# Patient Record
Sex: MALE | Race: WHITE | HISPANIC OR LATINO | Employment: OTHER | URBAN - METROPOLITAN AREA
[De-identification: names, ages, dates, MRNs, and addresses within clinical notes are randomized per-mention and may not be internally consistent; named-entity substitution may affect disease eponyms.]

---

## 2017-01-03 ENCOUNTER — OUTPATIENT INFUSION SERVICES (OUTPATIENT)
Dept: ONCOLOGY | Facility: MEDICAL CENTER | Age: 72
End: 2017-01-03
Attending: SPECIALIST
Payer: MEDICARE

## 2017-01-03 VITALS
HEART RATE: 71 BPM | BODY MASS INDEX: 27.42 KG/M2 | TEMPERATURE: 98.4 F | WEIGHT: 170.64 LBS | HEIGHT: 66 IN | OXYGEN SATURATION: 100 % | DIASTOLIC BLOOD PRESSURE: 52 MMHG | SYSTOLIC BLOOD PRESSURE: 130 MMHG | RESPIRATION RATE: 18 BRPM

## 2017-01-03 DIAGNOSIS — S06.5XAA SUBDURAL HEMATOMA, ACUTE (HCC): ICD-10-CM

## 2017-01-03 DIAGNOSIS — C92.10 CML (CHRONIC MYELOCYTIC LEUKEMIA) (HCC): ICD-10-CM

## 2017-01-03 DIAGNOSIS — D61.818 PANCYTOPENIA (HCC): ICD-10-CM

## 2017-01-03 LAB
ABO GROUP BLD: NORMAL
BASOPHILS # BLD AUTO: 0 % (ref 0–1.8)
BASOPHILS # BLD: 0 K/UL (ref 0–0.12)
BLD GP AB SCN SERPL QL: NORMAL
COMPONENT P 8504P: NORMAL
COMPONENT R 8504R: NORMAL
COMPONENT R 8504R: NORMAL
EOSINOPHIL # BLD AUTO: 0 K/UL (ref 0–0.51)
EOSINOPHIL NFR BLD: 0 % (ref 0–6.9)
ERYTHROCYTE [DISTWIDTH] IN BLOOD BY AUTOMATED COUNT: 92 FL (ref 35.9–50)
HCT VFR BLD AUTO: 22.9 % (ref 42–52)
HGB BLD-MCNC: 7.4 G/DL (ref 14–18)
LYMPHOCYTES # BLD AUTO: 0.86 K/UL (ref 1–4.8)
LYMPHOCYTES NFR BLD: 60.6 % (ref 22–41)
MCH RBC QN AUTO: 34.1 PG (ref 27–33)
MCHC RBC AUTO-ENTMCNC: 32.3 G/DL (ref 33.7–35.3)
MCV RBC AUTO: 105.5 FL (ref 81.4–97.8)
MONOCYTES # BLD AUTO: 0.17 K/UL (ref 0–0.85)
MONOCYTES NFR BLD AUTO: 12 % (ref 0–13.4)
NEUTROPHILS # BLD AUTO: 0.39 K/UL (ref 1.82–7.42)
NEUTROPHILS NFR BLD: 27.4 % (ref 44–72)
PLATELET # BLD AUTO: 10 K/UL (ref 164–446)
RBC # BLD AUTO: 2.17 M/UL (ref 4.7–6.1)
RH BLD: NORMAL
WBC # BLD AUTO: 1.4 K/UL (ref 4.8–10.8)

## 2017-01-03 PROCEDURE — 700111 HCHG RX REV CODE 636 W/ 250 OVERRIDE (IP): Performed by: SPECIALIST

## 2017-01-03 PROCEDURE — 36430 TRANSFUSION BLD/BLD COMPNT: CPT

## 2017-01-03 PROCEDURE — P9016 RBC LEUKOCYTES REDUCED: HCPCS

## 2017-01-03 PROCEDURE — 96374 THER/PROPH/DIAG INJ IV PUSH: CPT

## 2017-01-03 PROCEDURE — 86901 BLOOD TYPING SEROLOGIC RH(D): CPT

## 2017-01-03 PROCEDURE — 86850 RBC ANTIBODY SCREEN: CPT

## 2017-01-03 PROCEDURE — 86644 CMV ANTIBODY: CPT | Mod: 91

## 2017-01-03 PROCEDURE — 85025 COMPLETE CBC W/AUTO DIFF WBC: CPT

## 2017-01-03 PROCEDURE — 86923 COMPATIBILITY TEST ELECTRIC: CPT

## 2017-01-03 PROCEDURE — P9034 PLATELETS, PHERESIS: HCPCS

## 2017-01-03 PROCEDURE — A9270 NON-COVERED ITEM OR SERVICE: HCPCS | Performed by: SPECIALIST

## 2017-01-03 PROCEDURE — 36415 COLL VENOUS BLD VENIPUNCTURE: CPT

## 2017-01-03 PROCEDURE — 86900 BLOOD TYPING SEROLOGIC ABO: CPT

## 2017-01-03 PROCEDURE — 700102 HCHG RX REV CODE 250 W/ 637 OVERRIDE(OP): Performed by: SPECIALIST

## 2017-01-03 RX ORDER — ACETAMINOPHEN 325 MG/1
650 TABLET ORAL ONCE
Status: COMPLETED | OUTPATIENT
Start: 2017-01-03 | End: 2017-01-03

## 2017-01-03 RX ADMIN — HYDROCORTISONE SODIUM SUCCINATE 100 MG: 100 INJECTION, POWDER, FOR SOLUTION INTRAMUSCULAR; INTRAVENOUS at 12:18

## 2017-01-03 RX ADMIN — ACETAMINOPHEN 650 MG: 325 TABLET, FILM COATED ORAL at 12:17

## 2017-01-03 NOTE — PROGRESS NOTES
Patient arrived to Infusion for CBC/possible Infusion. PIV started, labs drawn.  Results reviewed, pt hemoglobin 7.4, platelets 10.  Parameters met for pt to received 2 units PRBC (CMV- and irradiated) and 1 unit platelets (CMV- and irradiated).  Pt notified daughter to re-scheduled his 4pm appt with Dr. Vu, as he will most likely not be able to make it. Blood bank notified.  Pre-meds given as ordered. Platelets received, infusion completed.  Pt tolerated well.  2 units PRBC infused.  Pt tolerated well.  PIV discontinued, confirmed next weeks appt.  Pt left in good spirits, with no s/s distress.

## 2017-01-10 ENCOUNTER — OUTPATIENT INFUSION SERVICES (OUTPATIENT)
Dept: ONCOLOGY | Facility: MEDICAL CENTER | Age: 72
End: 2017-01-10
Attending: SPECIALIST
Payer: MEDICARE

## 2017-01-10 VITALS
RESPIRATION RATE: 18 BRPM | DIASTOLIC BLOOD PRESSURE: 98 MMHG | SYSTOLIC BLOOD PRESSURE: 140 MMHG | TEMPERATURE: 98.4 F | OXYGEN SATURATION: 99 % | HEIGHT: 66 IN | BODY MASS INDEX: 27.42 KG/M2 | WEIGHT: 170.64 LBS | HEART RATE: 74 BPM

## 2017-01-10 DIAGNOSIS — C92.10 CML (CHRONIC MYELOCYTIC LEUKEMIA) (HCC): ICD-10-CM

## 2017-01-10 DIAGNOSIS — D61.818 PANCYTOPENIA (HCC): ICD-10-CM

## 2017-01-10 DIAGNOSIS — C95.90 LEUKEMIA, UNSPECIFIED LEUKEMIA TYPE: ICD-10-CM

## 2017-01-10 DIAGNOSIS — C92.02 AML (ACUTE MYELOID LEUKEMIA) IN RELAPSE (HCC): ICD-10-CM

## 2017-01-10 DIAGNOSIS — E87.6 HYPOKALEMIA: ICD-10-CM

## 2017-01-10 DIAGNOSIS — C92.10 ACCELERATED PHASE CHRONIC MYELOID LEUKEMIA (HCC): ICD-10-CM

## 2017-01-10 DIAGNOSIS — J90 PLEURAL EFFUSION, BILATERAL: ICD-10-CM

## 2017-01-10 DIAGNOSIS — R91.8 PULMONARY INFILTRATE: ICD-10-CM

## 2017-01-10 DIAGNOSIS — J90 PLEURAL EFFUSION EXUDATIVE: ICD-10-CM

## 2017-01-10 DIAGNOSIS — S06.5XAA SUBDURAL HEMATOMA, ACUTE (HCC): ICD-10-CM

## 2017-01-10 DIAGNOSIS — R04.2 HEMOPTYSIS: ICD-10-CM

## 2017-01-10 LAB
ALBUMIN SERPL BCP-MCNC: 4.3 G/DL (ref 3.2–4.9)
ALBUMIN/GLOB SERPL: 1.2 G/DL
ALP SERPL-CCNC: 167 U/L (ref 30–99)
ALT SERPL-CCNC: 63 U/L (ref 2–50)
ANION GAP SERPL CALC-SCNC: 3 MMOL/L (ref 0–11.9)
AST SERPL-CCNC: 38 U/L (ref 12–45)
BASOPHILS # BLD AUTO: 0.7 % (ref 0–1.8)
BASOPHILS # BLD: 0.01 K/UL (ref 0–0.12)
BILIRUB SERPL-MCNC: 0.7 MG/DL (ref 0.1–1.5)
BUN SERPL-MCNC: 18 MG/DL (ref 8–22)
CALCIUM SERPL-MCNC: 9.6 MG/DL (ref 8.5–10.5)
CHLORIDE SERPL-SCNC: 106 MMOL/L (ref 96–112)
CO2 SERPL-SCNC: 27 MMOL/L (ref 20–33)
COMPONENT P 8504P: NORMAL
CREAT SERPL-MCNC: 0.86 MG/DL (ref 0.5–1.4)
EOSINOPHIL # BLD AUTO: 0.01 K/UL (ref 0–0.51)
EOSINOPHIL NFR BLD: 0.7 % (ref 0–6.9)
ERYTHROCYTE [DISTWIDTH] IN BLOOD BY AUTOMATED COUNT: 80.9 FL (ref 35.9–50)
GFR SERPL CREATININE-BSD FRML MDRD: >60 ML/MIN/1.73 M 2
GLOBULIN SER CALC-MCNC: 3.5 G/DL (ref 1.9–3.5)
GLUCOSE SERPL-MCNC: 97 MG/DL (ref 65–99)
HCT VFR BLD AUTO: 30.1 % (ref 42–52)
HGB BLD-MCNC: 10 G/DL (ref 14–18)
LDH SERPL-CCNC: 193 U/L (ref 107–266)
LYMPHOCYTES # BLD AUTO: 0.92 K/UL (ref 1–4.8)
LYMPHOCYTES NFR BLD: 60.5 % (ref 22–41)
MCH RBC QN AUTO: 33.7 PG (ref 27–33)
MCHC RBC AUTO-ENTMCNC: 33.2 G/DL (ref 33.7–35.3)
MCV RBC AUTO: 101.3 FL (ref 81.4–97.8)
MONOCYTES # BLD AUTO: 0.18 K/UL (ref 0–0.85)
MONOCYTES NFR BLD AUTO: 11.8 % (ref 0–13.4)
NEUTROPHILS # BLD AUTO: 0.4 K/UL (ref 1.82–7.42)
NEUTROPHILS NFR BLD: 26.3 % (ref 44–72)
PLATELET # BLD AUTO: 11 K/UL (ref 164–446)
PMV BLD AUTO: 9.4 FL (ref 9–12.9)
POTASSIUM SERPL-SCNC: 3.9 MMOL/L (ref 3.6–5.5)
PROT SERPL-MCNC: 7.8 G/DL (ref 6–8.2)
RBC # BLD AUTO: 2.97 M/UL (ref 4.7–6.1)
SODIUM SERPL-SCNC: 136 MMOL/L (ref 135–145)
URATE SERPL-MCNC: 5 MG/DL (ref 2.5–8.3)
WBC # BLD AUTO: 1.5 K/UL (ref 4.8–10.8)

## 2017-01-10 PROCEDURE — 84550 ASSAY OF BLOOD/URIC ACID: CPT

## 2017-01-10 PROCEDURE — 700111 HCHG RX REV CODE 636 W/ 250 OVERRIDE (IP): Performed by: SPECIALIST

## 2017-01-10 PROCEDURE — 36415 COLL VENOUS BLD VENIPUNCTURE: CPT

## 2017-01-10 PROCEDURE — 86644 CMV ANTIBODY: CPT

## 2017-01-10 PROCEDURE — A9270 NON-COVERED ITEM OR SERVICE: HCPCS | Performed by: SPECIALIST

## 2017-01-10 PROCEDURE — 85025 COMPLETE CBC W/AUTO DIFF WBC: CPT

## 2017-01-10 PROCEDURE — 700102 HCHG RX REV CODE 250 W/ 637 OVERRIDE(OP): Performed by: SPECIALIST

## 2017-01-10 PROCEDURE — 80053 COMPREHEN METABOLIC PANEL: CPT

## 2017-01-10 PROCEDURE — 83615 LACTATE (LD) (LDH) ENZYME: CPT

## 2017-01-10 PROCEDURE — 36430 TRANSFUSION BLD/BLD COMPNT: CPT

## 2017-01-10 PROCEDURE — P9034 PLATELETS, PHERESIS: HCPCS

## 2017-01-10 PROCEDURE — 306780 HCHG STAT FOR TRANSFUSION PER CASE

## 2017-01-10 PROCEDURE — 96375 TX/PRO/DX INJ NEW DRUG ADDON: CPT

## 2017-01-10 RX ORDER — DIPHENHYDRAMINE HCL 25 MG
25 TABLET ORAL ONCE
Status: COMPLETED | OUTPATIENT
Start: 2017-01-10 | End: 2017-01-10

## 2017-01-10 RX ORDER — ACETAMINOPHEN 325 MG/1
650 TABLET ORAL ONCE
Status: COMPLETED | OUTPATIENT
Start: 2017-01-10 | End: 2017-01-10

## 2017-01-10 RX ADMIN — HYDROCORTISONE SODIUM SUCCINATE 100 MG: 100 INJECTION, POWDER, FOR SOLUTION INTRAMUSCULAR; INTRAVENOUS at 11:58

## 2017-01-10 RX ADMIN — DIPHENHYDRAMINE HCL 25 MG: 25 TABLET ORAL at 11:55

## 2017-01-10 RX ADMIN — ACETAMINOPHEN 650 MG: 325 TABLET, FILM COATED ORAL at 11:56

## 2017-01-10 NOTE — PROGRESS NOTES
Patient arrives ambulatory for labs and possible blood products.  IV established and labs collected per order.  Hgb 10 and is not within parameters to receive RBCs, platelets were 11,000 and are within parameters to receive one unit of platelets.  Pre medications given per order with no complication.  Platelets were infused as ordered with no complications or adverse effects.  Patient to return next week for next appt, confirmed with patient.  IV discontinued, gauze, and coban applied to site.  Patient left ambulatory in no distress.

## 2017-01-17 ENCOUNTER — OUTPATIENT INFUSION SERVICES (OUTPATIENT)
Dept: ONCOLOGY | Facility: MEDICAL CENTER | Age: 72
End: 2017-01-17
Attending: SPECIALIST
Payer: MEDICARE

## 2017-01-17 VITALS
OXYGEN SATURATION: 99 % | RESPIRATION RATE: 18 BRPM | SYSTOLIC BLOOD PRESSURE: 154 MMHG | WEIGHT: 174.6 LBS | HEIGHT: 66 IN | BODY MASS INDEX: 28.06 KG/M2 | HEART RATE: 86 BPM | TEMPERATURE: 98.1 F | DIASTOLIC BLOOD PRESSURE: 62 MMHG

## 2017-01-17 DIAGNOSIS — C92.02 AML (ACUTE MYELOID LEUKEMIA) IN RELAPSE (HCC): ICD-10-CM

## 2017-01-17 DIAGNOSIS — D61.818 PANCYTOPENIA (HCC): ICD-10-CM

## 2017-01-17 DIAGNOSIS — C92.10 CML (CHRONIC MYELOCYTIC LEUKEMIA) (HCC): ICD-10-CM

## 2017-01-17 LAB
ANISOCYTOSIS BLD QL SMEAR: ABNORMAL
BASOPHILS # BLD AUTO: 0.6 % (ref 0–1.8)
BASOPHILS # BLD: 0.01 K/UL (ref 0–0.12)
COMMENT 1642: NORMAL
COMPONENT P 8504P: NORMAL
EOSINOPHIL # BLD AUTO: 0 K/UL (ref 0–0.51)
EOSINOPHIL NFR BLD: 0 % (ref 0–6.9)
ERYTHROCYTE [DISTWIDTH] IN BLOOD BY AUTOMATED COUNT: 84.9 FL (ref 35.9–50)
HCT VFR BLD AUTO: 27.4 % (ref 42–52)
HGB BLD-MCNC: 8.9 G/DL (ref 14–18)
LG PLATELETS BLD QL SMEAR: NORMAL
LYMPHOCYTES # BLD AUTO: 1.07 K/UL (ref 1–4.8)
LYMPHOCYTES NFR BLD: 61.1 % (ref 22–41)
MACROCYTES BLD QL SMEAR: ABNORMAL
MCH RBC QN AUTO: 33.8 PG (ref 27–33)
MCHC RBC AUTO-ENTMCNC: 32.5 G/DL (ref 33.7–35.3)
MCV RBC AUTO: 104.2 FL (ref 81.4–97.8)
MICROCYTES BLD QL SMEAR: ABNORMAL
MONOCYTES # BLD AUTO: 0.22 K/UL (ref 0–0.85)
MONOCYTES NFR BLD AUTO: 12.6 % (ref 0–13.4)
MORPHOLOGY BLD-IMP: NORMAL
NEUTROPHILS # BLD AUTO: 0.45 K/UL (ref 1.82–7.42)
NEUTROPHILS NFR BLD: 25.7 % (ref 44–72)
PLATELET # BLD AUTO: 11 K/UL (ref 164–446)
PLATELET BLD QL SMEAR: NORMAL
PMV BLD AUTO: 9.6 FL (ref 9–12.9)
POIKILOCYTOSIS BLD QL SMEAR: NORMAL
POLYCHROMASIA BLD QL SMEAR: NORMAL
RBC # BLD AUTO: 2.63 M/UL (ref 4.7–6.1)
RBC BLD AUTO: PRESENT
WBC # BLD AUTO: 1.8 K/UL (ref 4.8–10.8)

## 2017-01-17 PROCEDURE — 306780 HCHG STAT FOR TRANSFUSION PER CASE

## 2017-01-17 PROCEDURE — 86644 CMV ANTIBODY: CPT

## 2017-01-17 PROCEDURE — 700111 HCHG RX REV CODE 636 W/ 250 OVERRIDE (IP): Performed by: SPECIALIST

## 2017-01-17 PROCEDURE — P9034 PLATELETS, PHERESIS: HCPCS

## 2017-01-17 PROCEDURE — 700102 HCHG RX REV CODE 250 W/ 637 OVERRIDE(OP): Performed by: SPECIALIST

## 2017-01-17 PROCEDURE — A9270 NON-COVERED ITEM OR SERVICE: HCPCS | Performed by: SPECIALIST

## 2017-01-17 PROCEDURE — 36415 COLL VENOUS BLD VENIPUNCTURE: CPT

## 2017-01-17 PROCEDURE — 85025 COMPLETE CBC W/AUTO DIFF WBC: CPT

## 2017-01-17 PROCEDURE — 36430 TRANSFUSION BLD/BLD COMPNT: CPT

## 2017-01-17 RX ORDER — DIPHENHYDRAMINE HCL 25 MG
25 TABLET ORAL ONCE
Status: COMPLETED | OUTPATIENT
Start: 2017-01-17 | End: 2017-01-17

## 2017-01-17 RX ORDER — ACETAMINOPHEN 325 MG/1
650 TABLET ORAL ONCE
Status: COMPLETED | OUTPATIENT
Start: 2017-01-17 | End: 2017-01-17

## 2017-01-17 RX ADMIN — DIPHENHYDRAMINE HCL 25 MG: 25 TABLET ORAL at 11:39

## 2017-01-17 RX ADMIN — HYDROCORTISONE SODIUM SUCCINATE 100 MG: 100 INJECTION, POWDER, FOR SOLUTION INTRAMUSCULAR; INTRAVENOUS at 11:42

## 2017-01-17 RX ADMIN — ACETAMINOPHEN 650 MG: 325 TABLET, FILM COATED ORAL at 11:38

## 2017-01-17 NOTE — PROGRESS NOTES
Patient arrives ambulatory for labs and possible blood products.  IV established and labs collected per order.  Hgb was 8.9 and was not within orders to proceed with blood transfusion.  Platelets were 11,000 and were within orders to proceed with giving 1 unit of platelets.  Platelets infused per protocol with no complications or adverse reactions.  IV discontinued, gauze, and coban applied to site.  Patient to return in one week, confirmed with patient.  Patient left ambulatory in no distress.

## 2017-01-24 ENCOUNTER — OUTPATIENT INFUSION SERVICES (OUTPATIENT)
Dept: ONCOLOGY | Facility: MEDICAL CENTER | Age: 72
End: 2017-01-24
Attending: SPECIALIST
Payer: MEDICARE

## 2017-01-24 VITALS
OXYGEN SATURATION: 99 % | RESPIRATION RATE: 18 BRPM | HEIGHT: 66 IN | DIASTOLIC BLOOD PRESSURE: 70 MMHG | TEMPERATURE: 98.5 F | HEART RATE: 80 BPM | SYSTOLIC BLOOD PRESSURE: 142 MMHG | WEIGHT: 173.72 LBS | BODY MASS INDEX: 27.92 KG/M2

## 2017-01-24 DIAGNOSIS — D61.818 PANCYTOPENIA (HCC): ICD-10-CM

## 2017-01-24 DIAGNOSIS — C92.10 CML (CHRONIC MYELOCYTIC LEUKEMIA) (HCC): ICD-10-CM

## 2017-01-24 LAB
ANISOCYTOSIS BLD QL SMEAR: ABNORMAL
BASOPHILS # BLD AUTO: 0 % (ref 0–1.8)
BASOPHILS # BLD: 0 K/UL (ref 0–0.12)
COMPONENT P 8504P: NORMAL
EOSINOPHIL # BLD AUTO: 0 K/UL (ref 0–0.51)
EOSINOPHIL NFR BLD: 0 % (ref 0–6.9)
ERYTHROCYTE [DISTWIDTH] IN BLOOD BY AUTOMATED COUNT: 86.8 FL (ref 35.9–50)
HCT VFR BLD AUTO: 27 % (ref 42–52)
HGB BLD-MCNC: 8.9 G/DL (ref 14–18)
LYMPHOCYTES # BLD AUTO: 0.74 K/UL (ref 1–4.8)
LYMPHOCYTES NFR BLD: 52.6 % (ref 22–41)
MACROCYTES BLD QL SMEAR: ABNORMAL
MANUAL DIFF BLD: NORMAL
MCH RBC QN AUTO: 35 PG (ref 27–33)
MCHC RBC AUTO-ENTMCNC: 33 G/DL (ref 33.7–35.3)
MCV RBC AUTO: 106.3 FL (ref 81.4–97.8)
MICROCYTES BLD QL SMEAR: ABNORMAL
MONOCYTES # BLD AUTO: 0.11 K/UL (ref 0–0.85)
MONOCYTES NFR BLD AUTO: 7.9 % (ref 0–13.4)
MORPHOLOGY BLD-IMP: NORMAL
NEUTROPHILS # BLD AUTO: 0.55 K/UL (ref 1.82–7.42)
NEUTROPHILS NFR BLD: 38.6 % (ref 44–72)
NEUTS BAND NFR BLD MANUAL: 0.9 % (ref 0–10)
OVALOCYTES BLD QL SMEAR: NORMAL
PLATELET # BLD AUTO: 13 K/UL (ref 164–446)
PLATELET BLD QL SMEAR: NORMAL
PMV BLD AUTO: 9.7 FL (ref 9–12.9)
POIKILOCYTOSIS BLD QL SMEAR: NORMAL
RBC # BLD AUTO: 2.54 M/UL (ref 4.7–6.1)
RBC BLD AUTO: PRESENT
WBC # BLD AUTO: 1.4 K/UL (ref 4.8–10.8)

## 2017-01-24 PROCEDURE — 86644 CMV ANTIBODY: CPT

## 2017-01-24 PROCEDURE — 85007 BL SMEAR W/DIFF WBC COUNT: CPT

## 2017-01-24 PROCEDURE — 36430 TRANSFUSION BLD/BLD COMPNT: CPT

## 2017-01-24 PROCEDURE — 306780 HCHG STAT FOR TRANSFUSION PER CASE

## 2017-01-24 PROCEDURE — A9270 NON-COVERED ITEM OR SERVICE: HCPCS | Performed by: SPECIALIST

## 2017-01-24 PROCEDURE — 85027 COMPLETE CBC AUTOMATED: CPT

## 2017-01-24 PROCEDURE — P9034 PLATELETS, PHERESIS: HCPCS

## 2017-01-24 PROCEDURE — 36415 COLL VENOUS BLD VENIPUNCTURE: CPT

## 2017-01-24 PROCEDURE — 700102 HCHG RX REV CODE 250 W/ 637 OVERRIDE(OP): Performed by: SPECIALIST

## 2017-01-24 PROCEDURE — 700111 HCHG RX REV CODE 636 W/ 250 OVERRIDE (IP): Performed by: SPECIALIST

## 2017-01-24 PROCEDURE — 96374 THER/PROPH/DIAG INJ IV PUSH: CPT

## 2017-01-24 RX ORDER — ACETAMINOPHEN 325 MG/1
650 TABLET ORAL ONCE
Status: COMPLETED | OUTPATIENT
Start: 2017-01-24 | End: 2017-01-24

## 2017-01-24 RX ADMIN — ACETAMINOPHEN 650 MG: 325 TABLET, FILM COATED ORAL at 10:30

## 2017-01-24 RX ADMIN — HYDROCORTISONE SODIUM SUCCINATE 100 MG: 100 INJECTION, POWDER, FOR SOLUTION INTRAMUSCULAR; INTRAVENOUS at 10:29

## 2017-01-24 NOTE — PROGRESS NOTES
Pt arrived to IS, ambulatory, for labs/possible blood transfusion. 24g PIV established in the R-AC, positive blood return noted. Labs drawn per active order. Platelets noted to be 13,000 today. Pre-medications given with no s/sx of adverse reaction. 1 unit platelets transfused with no s/sx of adverse reaction. PIV flushed and removed. Pt left IS with no s/sx of distress. Follow up appointment confirmed.

## 2017-01-30 ENCOUNTER — OUTPATIENT INFUSION SERVICES (OUTPATIENT)
Dept: ONCOLOGY | Facility: MEDICAL CENTER | Age: 72
End: 2017-01-30
Attending: SPECIALIST
Payer: MEDICARE

## 2017-01-30 VITALS
RESPIRATION RATE: 18 BRPM | WEIGHT: 171.3 LBS | TEMPERATURE: 98.3 F | HEIGHT: 66 IN | BODY MASS INDEX: 27.53 KG/M2 | OXYGEN SATURATION: 100 % | SYSTOLIC BLOOD PRESSURE: 159 MMHG | HEART RATE: 75 BPM | DIASTOLIC BLOOD PRESSURE: 68 MMHG

## 2017-01-30 DIAGNOSIS — D61.818 PANCYTOPENIA (HCC): ICD-10-CM

## 2017-01-30 DIAGNOSIS — C92.10 CML (CHRONIC MYELOCYTIC LEUKEMIA) (HCC): ICD-10-CM

## 2017-01-30 LAB
ABO GROUP BLD: NORMAL
ANISOCYTOSIS BLD QL SMEAR: ABNORMAL
BASOPHILS # BLD AUTO: 0.9 % (ref 0–1.8)
BASOPHILS # BLD: 0.01 K/UL (ref 0–0.12)
BLD GP AB SCN SERPL QL: NORMAL
COMPONENT P 8504P: NORMAL
COMPONENT RCI 8504RCI: NORMAL
EOSINOPHIL # BLD AUTO: 0.01 K/UL (ref 0–0.51)
EOSINOPHIL NFR BLD: 0.9 % (ref 0–6.9)
ERYTHROCYTE [DISTWIDTH] IN BLOOD BY AUTOMATED COUNT: 91.7 FL (ref 35.9–50)
HCT VFR BLD AUTO: 26.2 % (ref 42–52)
HGB BLD-MCNC: 8.5 G/DL (ref 14–18)
LYMPHOCYTES # BLD AUTO: 0.96 K/UL (ref 1–4.8)
LYMPHOCYTES NFR BLD: 60.3 % (ref 22–41)
MACROCYTES BLD QL SMEAR: ABNORMAL
MANUAL DIFF BLD: NORMAL
MCH RBC QN AUTO: 35 PG (ref 27–33)
MCHC RBC AUTO-ENTMCNC: 32.4 G/DL (ref 33.7–35.3)
MCV RBC AUTO: 107.8 FL (ref 81.4–97.8)
MICROCYTES BLD QL SMEAR: ABNORMAL
MONOCYTES # BLD AUTO: 0.1 K/UL (ref 0–0.85)
MONOCYTES NFR BLD AUTO: 6 % (ref 0–13.4)
MORPHOLOGY BLD-IMP: NORMAL
NEUTROPHILS # BLD AUTO: 0.51 K/UL (ref 1.82–7.42)
NEUTROPHILS NFR BLD: 31.9 % (ref 44–72)
PLATELET # BLD AUTO: 18 K/UL (ref 164–446)
PLATELET BLD QL SMEAR: NORMAL
POIKILOCYTOSIS BLD QL SMEAR: NORMAL
RBC # BLD AUTO: 2.43 M/UL (ref 4.7–6.1)
RBC BLD AUTO: PRESENT
RH BLD: NORMAL
VARIANT LYMPHS BLD QL SMEAR: NORMAL
WBC # BLD AUTO: 1.6 K/UL (ref 4.8–10.8)

## 2017-01-30 PROCEDURE — 36415 COLL VENOUS BLD VENIPUNCTURE: CPT

## 2017-01-30 PROCEDURE — 85027 COMPLETE CBC AUTOMATED: CPT

## 2017-01-30 PROCEDURE — A9270 NON-COVERED ITEM OR SERVICE: HCPCS | Performed by: SPECIALIST

## 2017-01-30 PROCEDURE — 306780 HCHG STAT FOR TRANSFUSION PER CASE

## 2017-01-30 PROCEDURE — 86850 RBC ANTIBODY SCREEN: CPT

## 2017-01-30 PROCEDURE — P9016 RBC LEUKOCYTES REDUCED: HCPCS

## 2017-01-30 PROCEDURE — 36430 TRANSFUSION BLD/BLD COMPNT: CPT

## 2017-01-30 PROCEDURE — 700102 HCHG RX REV CODE 250 W/ 637 OVERRIDE(OP): Performed by: SPECIALIST

## 2017-01-30 PROCEDURE — 86901 BLOOD TYPING SEROLOGIC RH(D): CPT

## 2017-01-30 PROCEDURE — 86644 CMV ANTIBODY: CPT | Mod: 91

## 2017-01-30 PROCEDURE — P9034 PLATELETS, PHERESIS: HCPCS

## 2017-01-30 PROCEDURE — 86923 COMPATIBILITY TEST ELECTRIC: CPT

## 2017-01-30 PROCEDURE — 700111 HCHG RX REV CODE 636 W/ 250 OVERRIDE (IP): Performed by: SPECIALIST

## 2017-01-30 PROCEDURE — 85007 BL SMEAR W/DIFF WBC COUNT: CPT

## 2017-01-30 PROCEDURE — 86900 BLOOD TYPING SEROLOGIC ABO: CPT

## 2017-01-30 PROCEDURE — 96374 THER/PROPH/DIAG INJ IV PUSH: CPT

## 2017-01-30 RX ORDER — ACETAMINOPHEN 325 MG/1
650 TABLET ORAL ONCE
Status: COMPLETED | OUTPATIENT
Start: 2017-01-30 | End: 2017-01-30

## 2017-01-30 RX ADMIN — ACETAMINOPHEN 650 MG: 325 TABLET, FILM COATED ORAL at 10:19

## 2017-01-30 RX ADMIN — HYDROCORTISONE SODIUM SUCCINATE 100 MG: 100 INJECTION, POWDER, FOR SOLUTION INTRAMUSCULAR; INTRAVENOUS at 10:19

## 2017-01-31 NOTE — PROGRESS NOTES
Pt arrived to IS ambulatory, here for CBC, possible transfusion. IV access established, CBC drawn with IV start. Results reviewed and pt appropriate for one unit of blood and platelets. Premeds given and platelets transfused. PRBC infused after platelets completed. Line flushed clear and IV flushed and removed. Pressure dressing applied. Pt knows to return, discharged home under self care in no apparent distress. Will return on 2/7.

## 2017-02-06 ENCOUNTER — TELEPHONE (OUTPATIENT)
Dept: OTHER | Facility: MEDICAL CENTER | Age: 72
End: 2017-02-06

## 2017-02-07 ENCOUNTER — TELEPHONE (OUTPATIENT)
Dept: OTHER | Facility: MEDICAL CENTER | Age: 72
End: 2017-02-07

## 2017-02-07 ENCOUNTER — OUTPATIENT INFUSION SERVICES (OUTPATIENT)
Dept: ONCOLOGY | Facility: MEDICAL CENTER | Age: 72
End: 2017-02-07
Attending: SPECIALIST
Payer: MEDICARE

## 2017-02-07 VITALS
HEIGHT: 66 IN | BODY MASS INDEX: 27.53 KG/M2 | RESPIRATION RATE: 18 BRPM | HEART RATE: 73 BPM | OXYGEN SATURATION: 98 % | WEIGHT: 171.3 LBS | DIASTOLIC BLOOD PRESSURE: 68 MMHG | TEMPERATURE: 98.4 F | SYSTOLIC BLOOD PRESSURE: 153 MMHG

## 2017-02-07 DIAGNOSIS — D61.818 PANCYTOPENIA (HCC): ICD-10-CM

## 2017-02-07 DIAGNOSIS — C92.10 CML (CHRONIC MYELOCYTIC LEUKEMIA) (HCC): ICD-10-CM

## 2017-02-07 LAB
ABO GROUP BLD: NORMAL
BARCODED ABORH UBTYP: 600
BARCODED PRD CODE UBPRD: NORMAL
BARCODED UNIT NUM UBUNT: NORMAL
BASOPHILS # BLD AUTO: 2.2 % (ref 0–1.8)
BASOPHILS # BLD: 0.04 K/UL (ref 0–0.12)
BLD GP AB SCN SERPL QL: NORMAL
COMPONENT P 8504P: NORMAL
EOSINOPHIL # BLD AUTO: 0.01 K/UL (ref 0–0.51)
EOSINOPHIL NFR BLD: 0.6 % (ref 0–6.9)
HCT VFR BLD AUTO: 29.2 % (ref 42–52)
HGB BLD-MCNC: 9.6 G/DL (ref 14–18)
LYMPHOCYTES # BLD AUTO: 0.73 K/UL (ref 1–4.8)
LYMPHOCYTES NFR BLD: 40.3 % (ref 22–41)
MCH RBC QN AUTO: 34.2 PG (ref 27–33)
MCHC RBC AUTO-ENTMCNC: 32.9 G/DL (ref 33.7–35.3)
MCV RBC AUTO: 103.9 FL (ref 81.4–97.8)
MONOCYTES # BLD AUTO: 0.28 K/UL (ref 0–0.85)
MONOCYTES NFR BLD AUTO: 15.5 % (ref 0–13.4)
MORPHOLOGY BLD-IMP: NORMAL
NEUTROPHILS # BLD AUTO: 0.75 K/UL (ref 1.82–7.42)
NEUTROPHILS NFR BLD: 41.4 % (ref 44–72)
PLATELET # BLD AUTO: 12 K/UL (ref 164–446)
PMV BLD AUTO: 11.2 FL (ref 9–12.9)
PRODUCT TYPE UPROD: NORMAL
RBC # BLD AUTO: 2.81 M/UL (ref 4.7–6.1)
RH BLD: NORMAL
UNIT STATUS USTAT: NORMAL
WBC # BLD AUTO: 1.8 K/UL (ref 4.8–10.8)

## 2017-02-07 PROCEDURE — P9034 PLATELETS, PHERESIS: HCPCS

## 2017-02-07 PROCEDURE — 86901 BLOOD TYPING SEROLOGIC RH(D): CPT

## 2017-02-07 PROCEDURE — 85025 COMPLETE CBC W/AUTO DIFF WBC: CPT

## 2017-02-07 PROCEDURE — 96375 TX/PRO/DX INJ NEW DRUG ADDON: CPT

## 2017-02-07 PROCEDURE — 700102 HCHG RX REV CODE 250 W/ 637 OVERRIDE(OP): Performed by: SPECIALIST

## 2017-02-07 PROCEDURE — 700111 HCHG RX REV CODE 636 W/ 250 OVERRIDE (IP): Performed by: SPECIALIST

## 2017-02-07 PROCEDURE — 96365 THER/PROPH/DIAG IV INF INIT: CPT

## 2017-02-07 PROCEDURE — 96366 THER/PROPH/DIAG IV INF ADDON: CPT

## 2017-02-07 PROCEDURE — 86644 CMV ANTIBODY: CPT

## 2017-02-07 PROCEDURE — 36430 TRANSFUSION BLD/BLD COMPNT: CPT

## 2017-02-07 PROCEDURE — A9270 NON-COVERED ITEM OR SERVICE: HCPCS | Performed by: SPECIALIST

## 2017-02-07 PROCEDURE — 36415 COLL VENOUS BLD VENIPUNCTURE: CPT

## 2017-02-07 PROCEDURE — 96374 THER/PROPH/DIAG INJ IV PUSH: CPT

## 2017-02-07 PROCEDURE — 86850 RBC ANTIBODY SCREEN: CPT

## 2017-02-07 PROCEDURE — 306780 HCHG STAT FOR TRANSFUSION PER CASE

## 2017-02-07 PROCEDURE — 86900 BLOOD TYPING SEROLOGIC ABO: CPT

## 2017-02-07 RX ORDER — ACETAMINOPHEN 325 MG/1
650 TABLET ORAL ONCE
Status: COMPLETED | OUTPATIENT
Start: 2017-02-07 | End: 2017-02-07

## 2017-02-07 RX ADMIN — HYDROCORTISONE SODIUM SUCCINATE 100 MG: 100 INJECTION, POWDER, FOR SOLUTION INTRAMUSCULAR; INTRAVENOUS at 11:18

## 2017-02-07 RX ADMIN — ACETAMINOPHEN 650 MG: 325 TABLET, FILM COATED ORAL at 11:18

## 2017-02-07 ASSESSMENT — PAIN SCALES - GENERAL: PAINLEVEL: NO PAIN

## 2017-02-07 NOTE — PROGRESS NOTES
On February 6, 2017,  Natalia Stewart attempted telephone contact with pt.'s daughter Keith to confirm appointment with BRIANNE Stewart and JUS Meza for February 8, 2017 at 2 pm.  Keith's voice mail was full.  BRIANNE Stewart sent an SMS message with telephone number to confirm appointment.

## 2017-02-07 NOTE — PROGRESS NOTES
On February 7, 2017,  Natalia Stewart spoke to pt.'s daughter Keith Post via telephone to confirm appointment.  BRIANNE Stewart explained where appointment was at Radiation Department and stressed importance of setting up appointments with BRIANNE Stewart and JUS Meza directly in the future to ensure there isn't any miscommunication.  Keith agreed and stated she and her father would meet with JUS Meza and BRIANNE Stewart.  Appointment is scheduled for 2 pm in Radiation Therapy.

## 2017-02-08 ENCOUNTER — AMB ONCOLOGY NURSE NAVIGATOR ENCOUNTER (OUTPATIENT)
Dept: OTHER | Facility: MEDICAL CENTER | Age: 72
End: 2017-02-08

## 2017-02-08 NOTE — PROGRESS NOTES
"Met briefly with patient and daughter Keith Post, Cell# [phone]. Patient is currently residing with her and her family.  Requesting help with providing care giver for Benjamin when he goes to [facility] SCT.  Keith states she is a donor match.  Was told by someone that [facility] would provide caregiver to stay with him for 30 as OP post-transplant.  She keeps referring to his bone marrow transplant as \"surgery\".  Tells me that there is no one in the family that can stay with him except her Uncle who lives in [place] and does not drive.  Her sister Reny are presently estranged from Benjamin and Keith. His ex-wife refuses to help as well.  Explained that we need to set up appointment with BRIANNE Stewart and myself as patient is Djiboutian speaking and I want him to clearly understand what we are discussing; not sure Keith clearly understands the bone marrow process either.  Appointment next Wednesday to meet and discuss [facility].  "

## 2017-02-14 ENCOUNTER — OUTPATIENT INFUSION SERVICES (OUTPATIENT)
Dept: ONCOLOGY | Facility: MEDICAL CENTER | Age: 72
End: 2017-02-14
Attending: SPECIALIST
Payer: MEDICARE

## 2017-02-14 ENCOUNTER — AMB ONCOLOGY NURSE NAVIGATOR ENCOUNTER (OUTPATIENT)
Dept: OTHER | Facility: MEDICAL CENTER | Age: 72
End: 2017-02-14

## 2017-02-14 VITALS
DIASTOLIC BLOOD PRESSURE: 54 MMHG | OXYGEN SATURATION: 99 % | TEMPERATURE: 99.2 F | SYSTOLIC BLOOD PRESSURE: 129 MMHG | BODY MASS INDEX: 27.53 KG/M2 | WEIGHT: 171.3 LBS | HEIGHT: 66 IN | RESPIRATION RATE: 18 BRPM | HEART RATE: 78 BPM

## 2017-02-14 DIAGNOSIS — D61.818 PANCYTOPENIA (HCC): ICD-10-CM

## 2017-02-14 DIAGNOSIS — C92.10 CML (CHRONIC MYELOCYTIC LEUKEMIA) (HCC): ICD-10-CM

## 2017-02-14 LAB
ANISOCYTOSIS BLD QL SMEAR: ABNORMAL
BARCODED ABORH UBTYP: 5100
BARCODED PRD CODE UBPRD: NORMAL
BARCODED UNIT NUM UBUNT: NORMAL
BASOPHILS # BLD AUTO: 0 % (ref 0–1.8)
BASOPHILS # BLD: 0 K/UL (ref 0–0.12)
COMPONENT P 8504P: NORMAL
EOSINOPHIL # BLD AUTO: 0 K/UL (ref 0–0.51)
EOSINOPHIL NFR BLD: 0 % (ref 0–6.9)
ERYTHROCYTE [DISTWIDTH] IN BLOOD BY AUTOMATED COUNT: 90.1 FL (ref 35.9–50)
HCT VFR BLD AUTO: 27.5 % (ref 42–52)
HGB BLD-MCNC: 9.1 G/DL (ref 14–18)
LYMPHOCYTES # BLD AUTO: 0.71 K/UL (ref 1–4.8)
LYMPHOCYTES NFR BLD: 54.6 % (ref 22–41)
MACROCYTES BLD QL SMEAR: ABNORMAL
MANUAL DIFF BLD: NORMAL
MCH RBC QN AUTO: 34.6 PG (ref 27–33)
MCHC RBC AUTO-ENTMCNC: 33.1 G/DL (ref 33.7–35.3)
MCV RBC AUTO: 104.6 FL (ref 81.4–97.8)
MICROCYTES BLD QL SMEAR: ABNORMAL
MONOCYTES # BLD AUTO: 0.06 K/UL (ref 0–0.85)
MONOCYTES NFR BLD AUTO: 4.5 % (ref 0–13.4)
MORPHOLOGY BLD-IMP: NORMAL
NEUTROPHILS # BLD AUTO: 0.53 K/UL (ref 1.82–7.42)
NEUTROPHILS NFR BLD: 40.9 % (ref 44–72)
NRBC # BLD AUTO: 0 K/UL
NRBC BLD AUTO-RTO: 0 /100 WBC
OVALOCYTES BLD QL SMEAR: NORMAL
PLATELET # BLD AUTO: 18 K/UL (ref 164–446)
PLATELET BLD QL SMEAR: NORMAL
PLATELETS.RETICULATED NFR BLD AUTO: 3.4 K/UL (ref 0.6–13.1)
POIKILOCYTOSIS BLD QL SMEAR: NORMAL
PRODUCT TYPE UPROD: NORMAL
RBC # BLD AUTO: 2.63 M/UL (ref 4.7–6.1)
RBC BLD AUTO: PRESENT
UNIT STATUS USTAT: NORMAL
WBC # BLD AUTO: 1.3 K/UL (ref 4.8–10.8)

## 2017-02-14 PROCEDURE — 36430 TRANSFUSION BLD/BLD COMPNT: CPT

## 2017-02-14 PROCEDURE — 700102 HCHG RX REV CODE 250 W/ 637 OVERRIDE(OP): Performed by: SPECIALIST

## 2017-02-14 PROCEDURE — P9034 PLATELETS, PHERESIS: HCPCS

## 2017-02-14 PROCEDURE — A9270 NON-COVERED ITEM OR SERVICE: HCPCS | Performed by: SPECIALIST

## 2017-02-14 PROCEDURE — 85055 RETICULATED PLATELET ASSAY: CPT

## 2017-02-14 PROCEDURE — 306780 HCHG STAT FOR TRANSFUSION PER CASE

## 2017-02-14 PROCEDURE — 86644 CMV ANTIBODY: CPT

## 2017-02-14 PROCEDURE — 85007 BL SMEAR W/DIFF WBC COUNT: CPT

## 2017-02-14 PROCEDURE — 36415 COLL VENOUS BLD VENIPUNCTURE: CPT

## 2017-02-14 PROCEDURE — 700111 HCHG RX REV CODE 636 W/ 250 OVERRIDE (IP): Performed by: SPECIALIST

## 2017-02-14 PROCEDURE — 85027 COMPLETE CBC AUTOMATED: CPT

## 2017-02-14 PROCEDURE — 96374 THER/PROPH/DIAG INJ IV PUSH: CPT

## 2017-02-14 RX ORDER — ACETAMINOPHEN 325 MG/1
650 TABLET ORAL PRN
Status: DISCONTINUED | OUTPATIENT
Start: 2017-02-14 | End: 2017-02-14 | Stop reason: HOSPADM

## 2017-02-14 RX ADMIN — ACETAMINOPHEN 650 MG: 325 TABLET, FILM COATED ORAL at 11:50

## 2017-02-14 RX ADMIN — HYDROCORTISONE SODIUM SUCCINATE 100 MG: 100 INJECTION, POWDER, FOR SOLUTION INTRAMUSCULAR; INTRAVENOUS at 11:56

## 2017-02-14 ASSESSMENT — PAIN SCALES - GENERAL: PAINLEVEL: NO PAIN

## 2017-02-14 NOTE — PROGRESS NOTES
Radha from Lab called with critical result of WBC= 1.3 and platelets = 18 at 1122. Critical lab result read back to Radha.   This critical lab result is within parameters established by Dr. Vu for this patient.

## 2017-02-14 NOTE — PROGRESS NOTES
Spoke with Joan, RN, Transplant Coordinator at [facility] regarding meeting on 2/8 with OP SW CIRO Stewart, Benjamin, and caregivers Gabrielle (daughter) and Jorden (son-in-law).  Relayed that they will be paying a male friend to care for Benjamin post transplant when he is released from the hospital. There are no family members available to work out a schedule to be with him for the 4-6 weeks post transplant at [facility], as they all work full time and have  issues.  Gabrielle also reports that her 2 other sisters and mother are currently estranged from Benjamin and do want to be a part of this process.  Joan voiced concern about the caregiver staying the entire 4-6 weeks.  I did explain that this was brought up during the meeting with the family, and they were encouraged to find alternate caregivers in case their friend is unable to stay the entire time.  Education provided in Belgian via CIRO Stewart translating about the SCT procedure and immediate post care that will be necessary.  Benjamin and family verbalized understanding regarding the SCT procedure and post care, but I have concerns that they do not completely understand the seriousness of transplant and possible side effects they may occur.  Benjamin has much ramone that God will see him thru this and is staying positive for a good outcome as he want many more years to be with his children and grandchildren. Additionally, Joan would like Benjamin to stay at the [facility] as this is the closest lodging to the transplant center in the event he needs to be seen/readmitted urgently.  Per Joan, he will need to undergo another complete diagnostic and dental work-up prior to transplant.  They are to go to [facility] this week to start the process. Tentative transplant to take place March, 2017.  Provided my contact information to Gabrielle.  Have asked that they drop off the financial applications at his next appointment in IS.  Continue to follow

## 2017-02-14 NOTE — PROGRESS NOTES
Pt to infusion services ambulatory per self.  Pt here for scheduled labs, possible blood products today.  Plan of care reviewed.  Pt verbalizes understanding.  PIV established to RAC, #22G.  IV flushes easily; + blood return verified.  CBC drawn per supportive plan.  CBC results available and reviewed.  Platelets = 18 and Hgb = 9.1.  Per supportive plan, patient to receive one unit of platelets today.  Pt updated to plan of care and verbalizes understanding. Consent previously signed for this month and in chart.

## 2017-02-15 NOTE — PROGRESS NOTES
Late entry for 1405:  1 unit of platelets received from blood bank, verified by RN's x 2 at patient chairside, and platelets transfused.  Platelet transfusion completed without incident.  Line flushed clear with normal saline.  No adverse effects observed or expressed.  VSS.  PIV flushed with normal saline; continued + blood return verified.  PIV d/c'd.  Pressure dressing applied.  Pt released to self care in no apparent distress after completion of treatment, ambulatory.  Pt to return in one week; appointment scheduled and confirmed with patient.

## 2017-02-21 ENCOUNTER — OUTPATIENT INFUSION SERVICES (OUTPATIENT)
Dept: ONCOLOGY | Facility: MEDICAL CENTER | Age: 72
End: 2017-02-21
Attending: SPECIALIST
Payer: MEDICARE

## 2017-02-21 VITALS
BODY MASS INDEX: 27.53 KG/M2 | OXYGEN SATURATION: 100 % | HEIGHT: 66 IN | DIASTOLIC BLOOD PRESSURE: 60 MMHG | WEIGHT: 171.3 LBS | TEMPERATURE: 99.2 F | HEART RATE: 72 BPM | RESPIRATION RATE: 16 BRPM | SYSTOLIC BLOOD PRESSURE: 116 MMHG

## 2017-02-21 DIAGNOSIS — C92.10 CML (CHRONIC MYELOCYTIC LEUKEMIA) (HCC): ICD-10-CM

## 2017-02-21 DIAGNOSIS — D61.818 PANCYTOPENIA (HCC): ICD-10-CM

## 2017-02-21 LAB
ABO GROUP BLD: NORMAL
ANISOCYTOSIS BLD QL SMEAR: ABNORMAL
BARCODED ABORH UBTYP: 9500
BARCODED PRD CODE UBPRD: NORMAL
BARCODED UNIT NUM UBUNT: NORMAL
BASOPHILS # BLD AUTO: 0 % (ref 0–1.8)
BASOPHILS # BLD: 0 K/UL (ref 0–0.12)
BLD GP AB SCN SERPL QL: NORMAL
COMPONENT P 8504P: NORMAL
EOSINOPHIL # BLD AUTO: 0.03 K/UL (ref 0–0.51)
EOSINOPHIL NFR BLD: 1.8 % (ref 0–6.9)
HCT VFR BLD AUTO: 28 % (ref 42–52)
HGB BLD-MCNC: 9.1 G/DL (ref 14–18)
LYMPHOCYTES # BLD AUTO: 0.79 K/UL (ref 1–4.8)
LYMPHOCYTES NFR BLD: 52.7 % (ref 22–41)
MACROCYTES BLD QL SMEAR: ABNORMAL
MANUAL DIFF BLD: NORMAL
MCH RBC QN AUTO: 35 PG (ref 27–33)
MCHC RBC AUTO-ENTMCNC: 32.5 G/DL (ref 33.7–35.3)
MCV RBC AUTO: 107.7 FL (ref 81.4–97.8)
MONOCYTES # BLD AUTO: 0.08 K/UL (ref 0–0.85)
MONOCYTES NFR BLD AUTO: 5.3 % (ref 0–13.4)
MORPHOLOGY BLD-IMP: NORMAL
NEUTROPHILS # BLD AUTO: 0.6 K/UL (ref 1.82–7.42)
NEUTROPHILS NFR BLD: 40.2 % (ref 44–72)
NRBC # BLD AUTO: 0 K/UL
NRBC BLD AUTO-RTO: 0 /100 WBC
OVALOCYTES BLD QL SMEAR: NORMAL
PLATELET # BLD AUTO: 15 K/UL (ref 164–446)
PLATELET BLD QL SMEAR: NORMAL
PLATELETS.RETICULATED NFR BLD AUTO: 4.9 K/UL (ref 0.6–13.1)
POIKILOCYTOSIS BLD QL SMEAR: NORMAL
POLYCHROMASIA BLD QL SMEAR: NORMAL
PRODUCT TYPE UPROD: NORMAL
RBC # BLD AUTO: 2.6 M/UL (ref 4.7–6.1)
RBC BLD AUTO: PRESENT
RH BLD: NORMAL
UNIT STATUS USTAT: NORMAL
VARIANT LYMPHS BLD QL SMEAR: NORMAL
WBC # BLD AUTO: 1.5 K/UL (ref 4.8–10.8)

## 2017-02-21 PROCEDURE — 86644 CMV ANTIBODY: CPT

## 2017-02-21 PROCEDURE — 86850 RBC ANTIBODY SCREEN: CPT

## 2017-02-21 PROCEDURE — 86901 BLOOD TYPING SEROLOGIC RH(D): CPT

## 2017-02-21 PROCEDURE — 85007 BL SMEAR W/DIFF WBC COUNT: CPT

## 2017-02-21 PROCEDURE — 36430 TRANSFUSION BLD/BLD COMPNT: CPT

## 2017-02-21 PROCEDURE — 700102 HCHG RX REV CODE 250 W/ 637 OVERRIDE(OP): Performed by: SPECIALIST

## 2017-02-21 PROCEDURE — 85027 COMPLETE CBC AUTOMATED: CPT

## 2017-02-21 PROCEDURE — P9034 PLATELETS, PHERESIS: HCPCS

## 2017-02-21 PROCEDURE — 306780 HCHG STAT FOR TRANSFUSION PER CASE

## 2017-02-21 PROCEDURE — 85055 RETICULATED PLATELET ASSAY: CPT

## 2017-02-21 PROCEDURE — 96375 TX/PRO/DX INJ NEW DRUG ADDON: CPT

## 2017-02-21 PROCEDURE — 86900 BLOOD TYPING SEROLOGIC ABO: CPT

## 2017-02-21 PROCEDURE — 36415 COLL VENOUS BLD VENIPUNCTURE: CPT

## 2017-02-21 PROCEDURE — A9270 NON-COVERED ITEM OR SERVICE: HCPCS | Performed by: SPECIALIST

## 2017-02-21 PROCEDURE — 700111 HCHG RX REV CODE 636 W/ 250 OVERRIDE (IP): Performed by: SPECIALIST

## 2017-02-21 RX ORDER — ACETAMINOPHEN 325 MG/1
650 TABLET ORAL PRN
Status: DISCONTINUED | OUTPATIENT
Start: 2017-02-21 | End: 2017-02-21 | Stop reason: HOSPADM

## 2017-02-21 RX ADMIN — ACETAMINOPHEN 650 MG: 325 TABLET, FILM COATED ORAL at 11:56

## 2017-02-21 RX ADMIN — HYDROCORTISONE SODIUM SUCCINATE 100 MG: 100 INJECTION, POWDER, FOR SOLUTION INTRAMUSCULAR; INTRAVENOUS at 11:57

## 2017-02-21 ASSESSMENT — PAIN SCALES - GENERAL: PAINLEVEL: NO PAIN

## 2017-02-21 NOTE — PROGRESS NOTES
Radha from Lab called with critical result of WBC 1.5, platelets 15 at 1110. Critical lab result read back to Radha.   Dr. Vu not required to be notified of critical lab.  Orders already in place for transfusion with parameters established.

## 2017-02-21 NOTE — PROGRESS NOTES
Pt arrived to IS ambulatory, here for CBC, possible transfusion. IV access established, CBC drawn with IV start. Results reviewed and pt appropriate for one unit of platelets. Premeds given and platelets transfused. Line flushed clear and IV flushed and removed. Pressure dressing applied. Pt knows to return, discharged home under self care in no apparent distress. Will return on 2/27 as he will be traveling to [place] to meet with the transplant team on 2/28.

## 2017-02-28 ENCOUNTER — APPOINTMENT (OUTPATIENT)
Dept: ONCOLOGY | Facility: MEDICAL CENTER | Age: 72
End: 2017-02-28
Attending: SPECIALIST
Payer: MEDICARE

## 2017-03-01 ENCOUNTER — OUTPATIENT INFUSION SERVICES (OUTPATIENT)
Dept: ONCOLOGY | Facility: MEDICAL CENTER | Age: 72
End: 2017-03-01
Attending: SPECIALIST
Payer: MEDICARE

## 2017-03-01 VITALS
DIASTOLIC BLOOD PRESSURE: 53 MMHG | WEIGHT: 172.18 LBS | RESPIRATION RATE: 18 BRPM | BODY MASS INDEX: 27.67 KG/M2 | OXYGEN SATURATION: 100 % | TEMPERATURE: 100 F | HEIGHT: 66 IN | HEART RATE: 75 BPM | SYSTOLIC BLOOD PRESSURE: 124 MMHG

## 2017-03-01 DIAGNOSIS — C92.10 CML (CHRONIC MYELOCYTIC LEUKEMIA) (HCC): ICD-10-CM

## 2017-03-01 DIAGNOSIS — D61.818 PANCYTOPENIA (HCC): ICD-10-CM

## 2017-03-01 LAB
BARCODED ABORH UBTYP: 9500
BARCODED PRD CODE UBPRD: NORMAL
BARCODED UNIT NUM UBUNT: NORMAL
COMPONENT P 8504P: NORMAL
PRODUCT TYPE UPROD: NORMAL
UNIT STATUS USTAT: NORMAL

## 2017-03-01 PROCEDURE — 306780 HCHG STAT FOR TRANSFUSION PER CASE

## 2017-03-01 PROCEDURE — 700111 HCHG RX REV CODE 636 W/ 250 OVERRIDE (IP): Performed by: SPECIALIST

## 2017-03-01 PROCEDURE — 700102 HCHG RX REV CODE 250 W/ 637 OVERRIDE(OP): Performed by: SPECIALIST

## 2017-03-01 PROCEDURE — 36415 COLL VENOUS BLD VENIPUNCTURE: CPT

## 2017-03-01 PROCEDURE — 86644 CMV ANTIBODY: CPT

## 2017-03-01 PROCEDURE — P9034 PLATELETS, PHERESIS: HCPCS

## 2017-03-01 PROCEDURE — 96374 THER/PROPH/DIAG INJ IV PUSH: CPT

## 2017-03-01 PROCEDURE — 36430 TRANSFUSION BLD/BLD COMPNT: CPT

## 2017-03-01 PROCEDURE — A9270 NON-COVERED ITEM OR SERVICE: HCPCS | Performed by: SPECIALIST

## 2017-03-01 RX ORDER — ACETAMINOPHEN 325 MG/1
650 TABLET ORAL PRN
Status: DISCONTINUED | OUTPATIENT
Start: 2017-03-01 | End: 2017-03-01 | Stop reason: HOSPADM

## 2017-03-01 RX ADMIN — HYDROCORTISONE SODIUM SUCCINATE 100 MG: 100 INJECTION, POWDER, FOR SOLUTION INTRAMUSCULAR; INTRAVENOUS at 14:18

## 2017-03-01 RX ADMIN — ACETAMINOPHEN 650 MG: 325 TABLET, FILM COATED ORAL at 14:17

## 2017-03-01 ASSESSMENT — PAIN SCALES - GENERAL: PAINLEVEL: NO PAIN

## 2017-03-02 NOTE — PROGRESS NOTES
Patient presents ambulatory for labs and possible transfusion.  IV established and labs collected per order, reviewed, and are within parameters to proceed with one unit of platelets.  Platelets infused per order with no complications or adverse reactions.  Patient to return 3/7/17, confirmed with patient.  IV discontinued, gauze, and coban applied to site.  Patient left ambulatory in no distress.

## 2017-03-07 ENCOUNTER — OUTPATIENT INFUSION SERVICES (OUTPATIENT)
Dept: ONCOLOGY | Facility: MEDICAL CENTER | Age: 72
End: 2017-03-07
Attending: SPECIALIST
Payer: MEDICARE

## 2017-03-07 VITALS
RESPIRATION RATE: 18 BRPM | SYSTOLIC BLOOD PRESSURE: 145 MMHG | HEART RATE: 80 BPM | OXYGEN SATURATION: 100 % | TEMPERATURE: 99.4 F | HEIGHT: 66 IN | WEIGHT: 171.74 LBS | BODY MASS INDEX: 27.6 KG/M2 | DIASTOLIC BLOOD PRESSURE: 69 MMHG

## 2017-03-07 DIAGNOSIS — D61.818 PANCYTOPENIA (HCC): ICD-10-CM

## 2017-03-07 DIAGNOSIS — C92.10 CML (CHRONIC MYELOCYTIC LEUKEMIA) (HCC): ICD-10-CM

## 2017-03-07 LAB
ABO GROUP BLD: NORMAL
ANISOCYTOSIS BLD QL SMEAR: ABNORMAL
BARCODED ABORH UBTYP: 5100
BARCODED ABORH UBTYP: 5100
BARCODED ABORH UBTYP: 7300
BARCODED PRD CODE UBPRD: NORMAL
BARCODED UNIT NUM UBUNT: NORMAL
BASOPHILS # BLD AUTO: 0 % (ref 0–1.8)
BASOPHILS # BLD: 0 K/UL (ref 0–0.12)
BLD GP AB SCN SERPL QL: NORMAL
COMPONENT P 8504P: NORMAL
COMPONENT R 8504R: NORMAL
COMPONENT R 8504R: NORMAL
EOSINOPHIL # BLD AUTO: 0.13 K/UL (ref 0–0.51)
EOSINOPHIL NFR BLD: 7.9 % (ref 0–6.9)
ERYTHROCYTE [DISTWIDTH] IN BLOOD BY AUTOMATED COUNT: 94.2 FL (ref 35.9–50)
HCT VFR BLD AUTO: 24.1 % (ref 42–52)
HGB BLD-MCNC: 7.7 G/DL (ref 14–18)
LYMPHOCYTES # BLD AUTO: 0.67 K/UL (ref 1–4.8)
LYMPHOCYTES NFR BLD: 39.5 % (ref 22–41)
MACROCYTES BLD QL SMEAR: ABNORMAL
MANUAL DIFF BLD: NORMAL
MCH RBC QN AUTO: 35.5 PG (ref 27–33)
MCHC RBC AUTO-ENTMCNC: 32 G/DL (ref 33.7–35.3)
MCV RBC AUTO: 111.1 FL (ref 81.4–97.8)
MICROCYTES BLD QL SMEAR: ABNORMAL
MONOCYTES # BLD AUTO: 0.1 K/UL (ref 0–0.85)
MONOCYTES NFR BLD AUTO: 6.1 % (ref 0–13.4)
MORPHOLOGY BLD-IMP: NORMAL
NEUTROPHILS # BLD AUTO: 0.79 K/UL (ref 1.82–7.42)
NEUTROPHILS NFR BLD: 45.6 % (ref 44–72)
NEUTS BAND NFR BLD MANUAL: 0.9 % (ref 0–10)
NRBC # BLD AUTO: 0 K/UL
NRBC BLD AUTO-RTO: 0 /100 WBC
OVALOCYTES BLD QL SMEAR: NORMAL
PLATELET # BLD AUTO: 17 K/UL (ref 164–446)
PLATELET BLD QL SMEAR: NORMAL
PLATELETS.RETICULATED NFR BLD AUTO: 5.3 K/UL (ref 0.6–13.1)
POIKILOCYTOSIS BLD QL SMEAR: NORMAL
PRODUCT TYPE UPROD: NORMAL
RBC # BLD AUTO: 2.17 M/UL (ref 4.7–6.1)
RBC BLD AUTO: PRESENT
RH BLD: NORMAL
UNIT STATUS USTAT: NORMAL
WBC # BLD AUTO: 1.7 K/UL (ref 4.8–10.8)

## 2017-03-07 PROCEDURE — 306780 HCHG STAT FOR TRANSFUSION PER CASE

## 2017-03-07 PROCEDURE — 86900 BLOOD TYPING SEROLOGIC ABO: CPT

## 2017-03-07 PROCEDURE — P9016 RBC LEUKOCYTES REDUCED: HCPCS

## 2017-03-07 PROCEDURE — 86901 BLOOD TYPING SEROLOGIC RH(D): CPT

## 2017-03-07 PROCEDURE — 96374 THER/PROPH/DIAG INJ IV PUSH: CPT

## 2017-03-07 PROCEDURE — 85027 COMPLETE CBC AUTOMATED: CPT

## 2017-03-07 PROCEDURE — A9270 NON-COVERED ITEM OR SERVICE: HCPCS | Performed by: SPECIALIST

## 2017-03-07 PROCEDURE — 85055 RETICULATED PLATELET ASSAY: CPT

## 2017-03-07 PROCEDURE — 36415 COLL VENOUS BLD VENIPUNCTURE: CPT

## 2017-03-07 PROCEDURE — P9034 PLATELETS, PHERESIS: HCPCS

## 2017-03-07 PROCEDURE — 700111 HCHG RX REV CODE 636 W/ 250 OVERRIDE (IP): Performed by: SPECIALIST

## 2017-03-07 PROCEDURE — 36430 TRANSFUSION BLD/BLD COMPNT: CPT

## 2017-03-07 PROCEDURE — 86850 RBC ANTIBODY SCREEN: CPT

## 2017-03-07 PROCEDURE — 86644 CMV ANTIBODY: CPT | Mod: 91

## 2017-03-07 PROCEDURE — 86923 COMPATIBILITY TEST ELECTRIC: CPT | Mod: 91

## 2017-03-07 PROCEDURE — 85007 BL SMEAR W/DIFF WBC COUNT: CPT

## 2017-03-07 PROCEDURE — 700102 HCHG RX REV CODE 250 W/ 637 OVERRIDE(OP): Performed by: SPECIALIST

## 2017-03-07 RX ORDER — ACETAMINOPHEN 325 MG/1
650 TABLET ORAL PRN
Status: DISCONTINUED | OUTPATIENT
Start: 2017-03-07 | End: 2017-03-07 | Stop reason: HOSPADM

## 2017-03-07 RX ADMIN — ACETAMINOPHEN 650 MG: 325 TABLET, FILM COATED ORAL at 12:54

## 2017-03-07 RX ADMIN — HYDROCORTISONE SODIUM SUCCINATE 100 MG: 100 INJECTION, POWDER, FOR SOLUTION INTRAMUSCULAR; INTRAVENOUS at 12:54

## 2017-03-07 ASSESSMENT — PAIN SCALES - GENERAL: PAINLEVEL: NO PAIN

## 2017-03-07 NOTE — PROGRESS NOTES
Lab from Lab called with critical result of WBC 1.7, PLT 17, HGB 7.7 at 1105. Critical lab result read back to Lab.   This critical lab result is within parameters established by Dr. Elizabeth for this patient

## 2017-03-08 NOTE — PROGRESS NOTES
Pt here for CBC possible transfusion.  Pt a very difficult IV start.  PIV started, lab drawn.  Manual diff needed d/t low ANC.  Pt within parameters for 2 units PRBC and 1 unit platelets.  Premeds given.  Pt tolerated infusions without issue.  PIV removed.  Pt left IC, no s/s of distress.  Next Tues apt confirmed with pt.

## 2017-03-14 ENCOUNTER — OUTPATIENT INFUSION SERVICES (OUTPATIENT)
Dept: ONCOLOGY | Facility: MEDICAL CENTER | Age: 72
End: 2017-03-14
Attending: SPECIALIST
Payer: MEDICARE

## 2017-03-14 ENCOUNTER — TELEPHONE (OUTPATIENT)
Dept: ONCOLOGY | Facility: MEDICAL CENTER | Age: 72
End: 2017-03-14

## 2017-03-14 VITALS
SYSTOLIC BLOOD PRESSURE: 118 MMHG | DIASTOLIC BLOOD PRESSURE: 55 MMHG | WEIGHT: 169.53 LBS | BODY MASS INDEX: 27.25 KG/M2 | OXYGEN SATURATION: 98 % | RESPIRATION RATE: 18 BRPM | HEIGHT: 66 IN | HEART RATE: 76 BPM | TEMPERATURE: 98.4 F

## 2017-03-14 DIAGNOSIS — C92.10 CML (CHRONIC MYELOCYTIC LEUKEMIA) (HCC): ICD-10-CM

## 2017-03-14 DIAGNOSIS — D61.818 PANCYTOPENIA (HCC): ICD-10-CM

## 2017-03-14 LAB
ANISOCYTOSIS BLD QL SMEAR: ABNORMAL
BARCODED ABORH UBTYP: 5100
BARCODED PRD CODE UBPRD: NORMAL
BARCODED UNIT NUM UBUNT: NORMAL
BASOPHILS # BLD AUTO: 0.4 % (ref 0–1.8)
BASOPHILS # BLD: 0.01 K/UL (ref 0–0.12)
COMMENT 1642: NORMAL
COMPONENT P 8504P: NORMAL
EOSINOPHIL # BLD AUTO: 0.06 K/UL (ref 0–0.51)
EOSINOPHIL NFR BLD: 2.2 % (ref 0–6.9)
ERYTHROCYTE [DISTWIDTH] IN BLOOD BY AUTOMATED COUNT: 83.8 FL (ref 35.9–50)
HCT VFR BLD AUTO: 34.1 % (ref 42–52)
HGB BLD-MCNC: 11.2 G/DL (ref 14–18)
IMM GRANULOCYTES # BLD AUTO: 0.03 K/UL (ref 0–0.11)
IMM GRANULOCYTES NFR BLD AUTO: 1.1 % (ref 0–0.9)
LYMPHOCYTES # BLD AUTO: 0.7 K/UL (ref 1–4.8)
LYMPHOCYTES NFR BLD: 25.9 % (ref 22–41)
MACROCYTES BLD QL SMEAR: ABNORMAL
MCH RBC QN AUTO: 35.6 PG (ref 27–33)
MCHC RBC AUTO-ENTMCNC: 33.3 G/DL (ref 33.7–35.3)
MCV RBC AUTO: 106.7 FL (ref 81.4–97.8)
MICROCYTES BLD QL SMEAR: ABNORMAL
MONOCYTES # BLD AUTO: 0.63 K/UL (ref 0–0.85)
MONOCYTES NFR BLD AUTO: 23.3 % (ref 0–13.4)
MORPHOLOGY BLD-IMP: NORMAL
NEUTROPHILS # BLD AUTO: 1.27 K/UL (ref 1.82–7.42)
NEUTROPHILS NFR BLD: 47.1 % (ref 44–72)
NRBC # BLD AUTO: 0 K/UL
NRBC BLD AUTO-RTO: 0 /100 WBC
PLATELET # BLD AUTO: 11 K/UL (ref 164–446)
PLATELET BLD QL SMEAR: NORMAL
PLATELETS.RETICULATED NFR BLD AUTO: 4.1 K/UL (ref 0.6–13.1)
POIKILOCYTOSIS BLD QL SMEAR: NORMAL
POLYCHROMASIA BLD QL SMEAR: NORMAL
PRODUCT TYPE UPROD: NORMAL
RBC # BLD AUTO: 3.15 M/UL (ref 4.7–6.1)
RBC BLD AUTO: PRESENT
UNIT STATUS USTAT: NORMAL
WBC # BLD AUTO: 2.7 K/UL (ref 4.8–10.8)

## 2017-03-14 PROCEDURE — 36430 TRANSFUSION BLD/BLD COMPNT: CPT

## 2017-03-14 PROCEDURE — P9034 PLATELETS, PHERESIS: HCPCS

## 2017-03-14 PROCEDURE — 700111 HCHG RX REV CODE 636 W/ 250 OVERRIDE (IP): Performed by: SPECIALIST

## 2017-03-14 PROCEDURE — 36415 COLL VENOUS BLD VENIPUNCTURE: CPT

## 2017-03-14 PROCEDURE — 96374 THER/PROPH/DIAG INJ IV PUSH: CPT

## 2017-03-14 PROCEDURE — 85025 COMPLETE CBC W/AUTO DIFF WBC: CPT

## 2017-03-14 PROCEDURE — A9270 NON-COVERED ITEM OR SERVICE: HCPCS | Performed by: SPECIALIST

## 2017-03-14 PROCEDURE — 86644 CMV ANTIBODY: CPT

## 2017-03-14 PROCEDURE — 700102 HCHG RX REV CODE 250 W/ 637 OVERRIDE(OP): Performed by: SPECIALIST

## 2017-03-14 PROCEDURE — 306780 HCHG STAT FOR TRANSFUSION PER CASE

## 2017-03-14 PROCEDURE — 85055 RETICULATED PLATELET ASSAY: CPT

## 2017-03-14 RX ORDER — ACETAMINOPHEN 325 MG/1
650 TABLET ORAL PRN
Status: DISCONTINUED | OUTPATIENT
Start: 2017-03-14 | End: 2017-03-14 | Stop reason: HOSPADM

## 2017-03-14 RX ADMIN — ACETAMINOPHEN 650 MG: 325 TABLET, FILM COATED ORAL at 11:37

## 2017-03-14 RX ADMIN — HYDROCORTISONE SODIUM SUCCINATE 100 MG: 100 INJECTION, POWDER, FOR SOLUTION INTRAMUSCULAR; INTRAVENOUS at 11:37

## 2017-03-14 ASSESSMENT — PAIN SCALES - GENERAL: PAINLEVEL: NO PAIN

## 2017-03-14 NOTE — PROGRESS NOTES
Pt arrives ambulatory to IS accompanied by self.  Pt is here for CBC and possible blood products.  He has had this course of treatment in the past.  Platelets=11,000, one unit platelets indicated today.    Pre medicated as ordered.  Platelets transfused as ordered, pt tolerated well, no evidence of adverse effects noted or expressed.    Pt to be seen at [facility] next week.  Will continue to come to IS weekly for CBC/possible blood products.  Pt does appear to have upper respiratory congestion today.  Afebrile, c/o fatigue.    Pt dc'd to care of self in no apparent distress.  Return weekly.

## 2017-03-15 NOTE — PROGRESS NOTES
Salvatore from Lab called with critical result of platelets=11,000 at 1122. Critical lab result read back to heraclio.   This critical lab result is within parameters established by  for this patient  Pt did receive one unit platelets as ordered.

## 2017-03-21 ENCOUNTER — APPOINTMENT (OUTPATIENT)
Dept: ONCOLOGY | Facility: MEDICAL CENTER | Age: 72
End: 2017-03-21
Attending: SPECIALIST
Payer: MEDICARE

## 2017-03-22 ENCOUNTER — OUTPATIENT INFUSION SERVICES (OUTPATIENT)
Dept: ONCOLOGY | Facility: MEDICAL CENTER | Age: 72
End: 2017-03-22
Attending: SPECIALIST
Payer: MEDICARE

## 2017-03-22 VITALS
HEART RATE: 69 BPM | RESPIRATION RATE: 18 BRPM | TEMPERATURE: 98.5 F | SYSTOLIC BLOOD PRESSURE: 110 MMHG | WEIGHT: 163.8 LBS | HEIGHT: 66 IN | BODY MASS INDEX: 26.33 KG/M2 | DIASTOLIC BLOOD PRESSURE: 41 MMHG | OXYGEN SATURATION: 100 %

## 2017-03-22 DIAGNOSIS — C92.02 AML (ACUTE MYELOID LEUKEMIA) IN RELAPSE (HCC): ICD-10-CM

## 2017-03-22 DIAGNOSIS — D61.818 PANCYTOPENIA (HCC): ICD-10-CM

## 2017-03-22 DIAGNOSIS — C92.10 CML (CHRONIC MYELOCYTIC LEUKEMIA) (HCC): ICD-10-CM

## 2017-03-22 LAB
ANISOCYTOSIS BLD QL SMEAR: ABNORMAL
BARCODED ABORH UBTYP: 6200
BARCODED PRD CODE UBPRD: NORMAL
BARCODED UNIT NUM UBUNT: NORMAL
BASOPHILS # BLD AUTO: 0 % (ref 0–1.8)
BASOPHILS # BLD: 0 K/UL (ref 0–0.12)
COMPONENT P 8504P: NORMAL
EOSINOPHIL # BLD AUTO: 0.07 K/UL (ref 0–0.51)
EOSINOPHIL NFR BLD: 1.8 % (ref 0–6.9)
ERYTHROCYTE [DISTWIDTH] IN BLOOD BY AUTOMATED COUNT: 77.8 FL (ref 35.9–50)
HCT VFR BLD AUTO: 26.8 % (ref 42–52)
HGB BLD-MCNC: 8.9 G/DL (ref 14–18)
LYMPHOCYTES # BLD AUTO: 0.35 K/UL (ref 1–4.8)
LYMPHOCYTES NFR BLD: 8.7 % (ref 22–41)
MACROCYTES BLD QL SMEAR: ABNORMAL
MANUAL DIFF BLD: NORMAL
MCH RBC QN AUTO: 34.6 PG (ref 27–33)
MCHC RBC AUTO-ENTMCNC: 33.2 G/DL (ref 33.7–35.3)
MCV RBC AUTO: 104.3 FL (ref 81.4–97.8)
METAMYELOCYTES NFR BLD MANUAL: 1.7 %
MONOCYTES # BLD AUTO: 0.31 K/UL (ref 0–0.85)
MONOCYTES NFR BLD AUTO: 7.8 % (ref 0–13.4)
MORPHOLOGY BLD-IMP: NORMAL
NEUTROPHILS # BLD AUTO: 3.2 K/UL (ref 1.82–7.42)
NEUTROPHILS NFR BLD: 77.4 % (ref 44–72)
NEUTS BAND NFR BLD MANUAL: 2.6 % (ref 0–10)
NRBC # BLD AUTO: 0 K/UL
NRBC BLD AUTO-RTO: 0 /100 WBC
OVALOCYTES BLD QL SMEAR: NORMAL
PLATELET # BLD AUTO: 14 K/UL (ref 164–446)
PLATELET BLD QL SMEAR: NORMAL
PLATELETS.RETICULATED NFR BLD AUTO: 4 K/UL (ref 0.6–13.1)
PMV BLD AUTO: 10.2 FL (ref 9–12.9)
POIKILOCYTOSIS BLD QL SMEAR: NORMAL
PRODUCT TYPE UPROD: NORMAL
RBC # BLD AUTO: 2.57 M/UL (ref 4.7–6.1)
RBC BLD AUTO: PRESENT
UNIT STATUS USTAT: NORMAL
WBC # BLD AUTO: 4 K/UL (ref 4.8–10.8)

## 2017-03-22 PROCEDURE — 85055 RETICULATED PLATELET ASSAY: CPT

## 2017-03-22 PROCEDURE — 36430 TRANSFUSION BLD/BLD COMPNT: CPT

## 2017-03-22 PROCEDURE — 700111 HCHG RX REV CODE 636 W/ 250 OVERRIDE (IP): Performed by: SPECIALIST

## 2017-03-22 PROCEDURE — 86644 CMV ANTIBODY: CPT

## 2017-03-22 PROCEDURE — 700102 HCHG RX REV CODE 250 W/ 637 OVERRIDE(OP): Performed by: SPECIALIST

## 2017-03-22 PROCEDURE — 36415 COLL VENOUS BLD VENIPUNCTURE: CPT

## 2017-03-22 PROCEDURE — A9270 NON-COVERED ITEM OR SERVICE: HCPCS | Performed by: SPECIALIST

## 2017-03-22 PROCEDURE — 85007 BL SMEAR W/DIFF WBC COUNT: CPT

## 2017-03-22 PROCEDURE — 85027 COMPLETE CBC AUTOMATED: CPT

## 2017-03-22 PROCEDURE — 306780 HCHG STAT FOR TRANSFUSION PER CASE

## 2017-03-22 PROCEDURE — 96374 THER/PROPH/DIAG INJ IV PUSH: CPT

## 2017-03-22 PROCEDURE — P9034 PLATELETS, PHERESIS: HCPCS

## 2017-03-22 RX ORDER — ACETAMINOPHEN 325 MG/1
650 TABLET ORAL PRN
Status: DISCONTINUED | OUTPATIENT
Start: 2017-03-22 | End: 2017-03-22 | Stop reason: HOSPADM

## 2017-03-22 RX ADMIN — HYDROCORTISONE SODIUM SUCCINATE 100 MG: 100 INJECTION, POWDER, FOR SOLUTION INTRAMUSCULAR; INTRAVENOUS at 11:16

## 2017-03-22 RX ADMIN — ACETAMINOPHEN 650 MG: 325 TABLET, FILM COATED ORAL at 11:16

## 2017-03-22 ASSESSMENT — PAIN SCALES - GENERAL: PAINLEVEL: NO PAIN

## 2017-03-22 NOTE — PROGRESS NOTES
Pt arrived to IS, ambulatory, for labs/possible blood products. Pt voices no new complaints. 24g PIV established in the L-AC, positive blood return noted. Labs drawn per active order, platelets noted to be 14 today. Pre-medications given with no s/sx of adverse reaction. 1 unit platelets transfused with no s/sx of adverse reaction. PIV flushed and removed. Pt left IS with no s/sx of distress. Follow up appointment confirmed.

## 2017-03-25 ENCOUNTER — HOSPITAL ENCOUNTER (EMERGENCY)
Facility: MEDICAL CENTER | Age: 72
DRG: 157 | End: 2017-03-25
Attending: EMERGENCY MEDICINE
Payer: MEDICARE

## 2017-03-25 ENCOUNTER — APPOINTMENT (OUTPATIENT)
Dept: RADIOLOGY | Facility: MEDICAL CENTER | Age: 72
DRG: 157 | End: 2017-03-25
Attending: EMERGENCY MEDICINE
Payer: MEDICARE

## 2017-03-25 ENCOUNTER — RESOLUTE PROFESSIONAL BILLING HOSPITAL PROF FEE (OUTPATIENT)
Dept: HOSPITALIST | Facility: MEDICAL CENTER | Age: 72
End: 2017-03-25
Payer: MEDICARE

## 2017-03-25 ENCOUNTER — APPOINTMENT (OUTPATIENT)
Dept: RADIOLOGY | Facility: MEDICAL CENTER | Age: 72
DRG: 157 | End: 2017-03-25
Attending: DENTIST
Payer: MEDICARE

## 2017-03-25 ENCOUNTER — HOSPITAL ENCOUNTER (INPATIENT)
Facility: MEDICAL CENTER | Age: 72
LOS: 3 days | DRG: 157 | End: 2017-03-28
Attending: EMERGENCY MEDICINE | Admitting: HOSPITALIST
Payer: MEDICARE

## 2017-03-25 VITALS
WEIGHT: 163 LBS | TEMPERATURE: 97.1 F | DIASTOLIC BLOOD PRESSURE: 58 MMHG | HEART RATE: 77 BPM | BODY MASS INDEX: 26.32 KG/M2 | RESPIRATION RATE: 16 BRPM | SYSTOLIC BLOOD PRESSURE: 134 MMHG | OXYGEN SATURATION: 98 %

## 2017-03-25 DIAGNOSIS — K04.7 DENTAL INFECTION: ICD-10-CM

## 2017-03-25 DIAGNOSIS — C80.1 CANCER (HCC): ICD-10-CM

## 2017-03-25 DIAGNOSIS — K08.9 DENTAL DISORDER: ICD-10-CM

## 2017-03-25 DIAGNOSIS — D69.6 THROMBOCYTOPENIA (HCC): ICD-10-CM

## 2017-03-25 PROBLEM — K02.9 DENTAL CARIES, UNSPECIFIED: Status: ACTIVE | Noted: 2017-03-25

## 2017-03-25 LAB
ABO GROUP BLD: NORMAL
ALBUMIN SERPL BCP-MCNC: 3.5 G/DL (ref 3.2–4.9)
ALBUMIN/GLOB SERPL: 0.9 G/DL
ALP SERPL-CCNC: 107 U/L (ref 30–99)
ALT SERPL-CCNC: 74 U/L (ref 2–50)
ANION GAP SERPL CALC-SCNC: 10 MMOL/L (ref 0–11.9)
ANISOCYTOSIS BLD QL SMEAR: ABNORMAL
APTT PPP: 35.1 SEC (ref 24.7–36)
AST SERPL-CCNC: 36 U/L (ref 12–45)
BARCODED ABORH UBTYP: 5100
BARCODED PRD CODE UBPRD: NORMAL
BARCODED UNIT NUM UBUNT: NORMAL
BASOPHILS # BLD AUTO: 0.9 % (ref 0–1.8)
BASOPHILS # BLD: 0.02 K/UL (ref 0–0.12)
BILIRUB SERPL-MCNC: 0.8 MG/DL (ref 0.1–1.5)
BLASTS NFR BLD MANUAL: 7.9 %
BLD GP AB SCN SERPL QL: NORMAL
BUN SERPL-MCNC: 29 MG/DL (ref 8–22)
CALCIUM SERPL-MCNC: 11.4 MG/DL (ref 8.5–10.5)
CHLORIDE SERPL-SCNC: 103 MMOL/L (ref 96–112)
CO2 SERPL-SCNC: 26 MMOL/L (ref 20–33)
COMPONENT P 8504P: NORMAL
COMPONENT R 8504R: NORMAL
COMPONENT R 8504R: NORMAL
CREAT SERPL-MCNC: 1.05 MG/DL (ref 0.5–1.4)
EKG IMPRESSION: NORMAL
EOSINOPHIL # BLD AUTO: 0.07 K/UL (ref 0–0.51)
EOSINOPHIL NFR BLD: 2.6 % (ref 0–6.9)
ERYTHROCYTE [DISTWIDTH] IN BLOOD BY AUTOMATED COUNT: 74.5 FL (ref 35.9–50)
GFR SERPL CREATININE-BSD FRML MDRD: >60 ML/MIN/1.73 M 2
GLOBULIN SER CALC-MCNC: 3.9 G/DL (ref 1.9–3.5)
GLUCOSE SERPL-MCNC: 111 MG/DL (ref 65–99)
HCT VFR BLD AUTO: 25.2 % (ref 42–52)
HGB BLD-MCNC: 8.6 G/DL (ref 14–18)
INR PPP: 1.22 (ref 0.87–1.13)
LYMPHOCYTES # BLD AUTO: 0.3 K/UL (ref 1–4.8)
LYMPHOCYTES NFR BLD: 11.4 % (ref 22–41)
MACROCYTES BLD QL SMEAR: ABNORMAL
MAGNESIUM SERPL-MCNC: 2.1 MG/DL (ref 1.5–2.5)
MANUAL DIFF BLD: NORMAL
MCH RBC QN AUTO: 35.1 PG (ref 27–33)
MCHC RBC AUTO-ENTMCNC: 34.1 G/DL (ref 33.7–35.3)
MCV RBC AUTO: 102.9 FL (ref 81.4–97.8)
MONOCYTES # BLD AUTO: 0.14 K/UL (ref 0–0.85)
MONOCYTES NFR BLD AUTO: 5.3 % (ref 0–13.4)
MORPHOLOGY BLD-IMP: NORMAL
NEUTROPHILS # BLD AUTO: 1.87 K/UL (ref 1.82–7.42)
NEUTROPHILS NFR BLD: 71.9 % (ref 44–72)
NRBC # BLD AUTO: 0 K/UL
NRBC BLD AUTO-RTO: 0 /100 WBC
OVALOCYTES BLD QL SMEAR: NORMAL
PLATELET # BLD AUTO: 17 K/UL (ref 164–446)
PLATELET BLD QL SMEAR: NORMAL
PLATELETS.RETICULATED NFR BLD AUTO: 3.7 K/UL (ref 0.6–13.1)
PMV BLD AUTO: 11.8 FL (ref 9–12.9)
POIKILOCYTOSIS BLD QL SMEAR: NORMAL
POTASSIUM SERPL-SCNC: 3.7 MMOL/L (ref 3.6–5.5)
PRODUCT TYPE UPROD: NORMAL
PROT SERPL-MCNC: 7.4 G/DL (ref 6–8.2)
PROTHROMBIN TIME: 15.8 SEC (ref 12–14.6)
RBC # BLD AUTO: 2.45 M/UL (ref 4.7–6.1)
RBC BLD AUTO: PRESENT
RH BLD: NORMAL
SODIUM SERPL-SCNC: 139 MMOL/L (ref 135–145)
UNIT STATUS USTAT: NORMAL
WBC # BLD AUTO: 2.6 K/UL (ref 4.8–10.8)

## 2017-03-25 PROCEDURE — 0C9X0Z1 DRAINAGE OF LOWER TOOTH, OPEN APPROACH, MULTIPLE: ICD-10-PCS | Performed by: DENTIST

## 2017-03-25 PROCEDURE — 160035 HCHG PACU - 1ST 60 MINS PHASE I: Performed by: DENTIST

## 2017-03-25 PROCEDURE — 700111 HCHG RX REV CODE 636 W/ 250 OVERRIDE (IP): Performed by: EMERGENCY MEDICINE

## 2017-03-25 PROCEDURE — 36415 COLL VENOUS BLD VENIPUNCTURE: CPT

## 2017-03-25 PROCEDURE — 93005 ELECTROCARDIOGRAM TRACING: CPT | Performed by: EMERGENCY MEDICINE

## 2017-03-25 PROCEDURE — 86901 BLOOD TYPING SEROLOGIC RH(D): CPT

## 2017-03-25 PROCEDURE — 80053 COMPREHEN METABOLIC PANEL: CPT

## 2017-03-25 PROCEDURE — 99285 EMERGENCY DEPT VISIT HI MDM: CPT

## 2017-03-25 PROCEDURE — 0C9W0Z1 DRAINAGE OF UPPER TOOTH, OPEN APPROACH, MULTIPLE: ICD-10-PCS | Performed by: DENTIST

## 2017-03-25 PROCEDURE — 86923 COMPATIBILITY TEST ELECTRIC: CPT

## 2017-03-25 PROCEDURE — P9034 PLATELETS, PHERESIS: HCPCS

## 2017-03-25 PROCEDURE — A4606 OXYGEN PROBE USED W OXIMETER: HCPCS | Performed by: DENTIST

## 2017-03-25 PROCEDURE — 99223 1ST HOSP IP/OBS HIGH 75: CPT | Performed by: HOSPITALIST

## 2017-03-25 PROCEDURE — 160009 HCHG ANES TIME/MIN: Performed by: DENTIST

## 2017-03-25 PROCEDURE — 85055 RETICULATED PLATELET ASSAY: CPT

## 2017-03-25 PROCEDURE — 96366 THER/PROPH/DIAG IV INF ADDON: CPT

## 2017-03-25 PROCEDURE — 96361 HYDRATE IV INFUSION ADD-ON: CPT

## 2017-03-25 PROCEDURE — 700105 HCHG RX REV CODE 258: Performed by: HOSPITALIST

## 2017-03-25 PROCEDURE — 110382 HCHG SHELL REV 271: Performed by: DENTIST

## 2017-03-25 PROCEDURE — 500444 HCHG HEMOSTAT, SURGICEL 2X3: Performed by: DENTIST

## 2017-03-25 PROCEDURE — 85027 COMPLETE CBC AUTOMATED: CPT

## 2017-03-25 PROCEDURE — 700105 HCHG RX REV CODE 258: Performed by: EMERGENCY MEDICINE

## 2017-03-25 PROCEDURE — 96365 THER/PROPH/DIAG IV INF INIT: CPT

## 2017-03-25 PROCEDURE — 85730 THROMBOPLASTIN TIME PARTIAL: CPT

## 2017-03-25 PROCEDURE — 87040 BLOOD CULTURE FOR BACTERIA: CPT | Mod: 91

## 2017-03-25 PROCEDURE — 700111 HCHG RX REV CODE 636 W/ 250 OVERRIDE (IP): Performed by: HOSPITALIST

## 2017-03-25 PROCEDURE — 83735 ASSAY OF MAGNESIUM: CPT

## 2017-03-25 PROCEDURE — 36430 TRANSFUSION BLD/BLD COMPNT: CPT

## 2017-03-25 PROCEDURE — 700101 HCHG RX REV CODE 250

## 2017-03-25 PROCEDURE — 700111 HCHG RX REV CODE 636 W/ 250 OVERRIDE (IP)

## 2017-03-25 PROCEDURE — 160002 HCHG RECOVERY MINUTES (STAT): Performed by: DENTIST

## 2017-03-25 PROCEDURE — 86644 CMV ANTIBODY: CPT | Mod: 91

## 2017-03-25 PROCEDURE — 85610 PROTHROMBIN TIME: CPT

## 2017-03-25 PROCEDURE — 30233N1 TRANSFUSION OF NONAUTOLOGOUS RED BLOOD CELLS INTO PERIPHERAL VEIN, PERCUTANEOUS APPROACH: ICD-10-PCS | Performed by: EMERGENCY MEDICINE

## 2017-03-25 PROCEDURE — 30233R1 TRANSFUSION OF NONAUTOLOGOUS PLATELETS INTO PERIPHERAL VEIN, PERCUTANEOUS APPROACH: ICD-10-PCS | Performed by: EMERGENCY MEDICINE

## 2017-03-25 PROCEDURE — 0CTX0Z2 RESECTION OF LOWER TOOTH, ALL, OPEN APPROACH: ICD-10-PCS | Performed by: DENTIST

## 2017-03-25 PROCEDURE — 500380 HCHG DRAIN, PENROSE 1/4X12: Performed by: DENTIST

## 2017-03-25 PROCEDURE — 160048 HCHG OR STATISTICAL LEVEL 1-5: Performed by: DENTIST

## 2017-03-25 PROCEDURE — 160038 HCHG SURGERY MINUTES - EA ADDL 1 MIN LEVEL 2: Performed by: DENTIST

## 2017-03-25 PROCEDURE — 160027 HCHG SURGERY MINUTES - 1ST 30 MINS LEVEL 2: Performed by: DENTIST

## 2017-03-25 PROCEDURE — 502240 HCHG MISC OR SUPPLY RC 0272: Performed by: DENTIST

## 2017-03-25 PROCEDURE — 500754 HCHG JAW BRA: Performed by: DENTIST

## 2017-03-25 PROCEDURE — 85007 BL SMEAR W/DIFF WBC COUNT: CPT

## 2017-03-25 PROCEDURE — 770001 HCHG ROOM/CARE - MED/SURG/GYN PRIV*

## 2017-03-25 PROCEDURE — P9016 RBC LEUKOCYTES REDUCED: HCPCS

## 2017-03-25 PROCEDURE — 86850 RBC ANTIBODY SCREEN: CPT

## 2017-03-25 PROCEDURE — 500445 HCHG HEMOSTAT, SURGICEL 4X8: Performed by: DENTIST

## 2017-03-25 PROCEDURE — 86900 BLOOD TYPING SEROLOGIC ABO: CPT

## 2017-03-25 PROCEDURE — 700102 HCHG RX REV CODE 250 W/ 637 OVERRIDE(OP): Performed by: HOSPITALIST

## 2017-03-25 PROCEDURE — 71010 DX-CHEST-PORTABLE (1 VIEW): CPT

## 2017-03-25 PROCEDURE — 0CTW0Z2 RESECTION OF UPPER TOOTH, ALL, OPEN APPROACH: ICD-10-PCS | Performed by: DENTIST

## 2017-03-25 PROCEDURE — 70355 PANORAMIC X-RAY OF JAWS: CPT

## 2017-03-25 PROCEDURE — 500888 HCHG PACK, MINOR BASIN: Performed by: DENTIST

## 2017-03-25 PROCEDURE — A9270 NON-COVERED ITEM OR SERVICE: HCPCS | Performed by: HOSPITALIST

## 2017-03-25 PROCEDURE — 501838 HCHG SUTURE GENERAL: Performed by: DENTIST

## 2017-03-25 RX ORDER — HYDROCODONE BITARTRATE AND ACETAMINOPHEN 5; 325 MG/1; MG/1
1-2 TABLET ORAL EVERY 4 HOURS PRN
Status: DISCONTINUED | OUTPATIENT
Start: 2017-03-25 | End: 2017-03-25

## 2017-03-25 RX ORDER — TAMSULOSIN HYDROCHLORIDE 0.4 MG/1
0.4 CAPSULE ORAL EVERY MORNING
Status: DISCONTINUED | OUTPATIENT
Start: 2017-03-25 | End: 2017-03-28 | Stop reason: HOSPADM

## 2017-03-25 RX ORDER — POLYETHYLENE GLYCOL 3350 17 G/17G
1 POWDER, FOR SOLUTION ORAL
Status: DISCONTINUED | OUTPATIENT
Start: 2017-03-26 | End: 2017-03-28 | Stop reason: HOSPADM

## 2017-03-25 RX ORDER — ACETAMINOPHEN 325 MG/1
650 TABLET ORAL EVERY 6 HOURS PRN
Status: DISCONTINUED | OUTPATIENT
Start: 2017-03-25 | End: 2017-03-28 | Stop reason: HOSPADM

## 2017-03-25 RX ORDER — OXYCODONE HYDROCHLORIDE 10 MG/1
5 TABLET ORAL EVERY 4 HOURS PRN
Status: DISCONTINUED | OUTPATIENT
Start: 2017-03-25 | End: 2017-03-28 | Stop reason: HOSPADM

## 2017-03-25 RX ORDER — LEVETIRACETAM 250 MG/1
500 TABLET ORAL 2 TIMES DAILY
Status: DISCONTINUED | OUTPATIENT
Start: 2017-03-25 | End: 2017-03-26

## 2017-03-25 RX ORDER — AMPICILLIN AND SULBACTAM 2; 1 G/1; G/1
3 INJECTION, POWDER, FOR SOLUTION INTRAMUSCULAR; INTRAVENOUS ONCE
Status: COMPLETED | OUTPATIENT
Start: 2017-03-25 | End: 2017-03-25

## 2017-03-25 RX ORDER — AMOXICILLIN 250 MG
2 CAPSULE ORAL 2 TIMES DAILY
Status: DISCONTINUED | OUTPATIENT
Start: 2017-03-26 | End: 2017-03-28 | Stop reason: HOSPADM

## 2017-03-25 RX ORDER — HYDROCODONE BITARTRATE AND ACETAMINOPHEN 5; 325 MG/1; MG/1
1-2 TABLET ORAL EVERY 4 HOURS PRN
Qty: 10 TAB | Refills: 0 | Status: SHIPPED | OUTPATIENT
Start: 2017-03-25 | End: 2017-03-25

## 2017-03-25 RX ORDER — BISACODYL 10 MG
10 SUPPOSITORY, RECTAL RECTAL
Status: DISCONTINUED | OUTPATIENT
Start: 2017-03-26 | End: 2017-03-28 | Stop reason: HOSPADM

## 2017-03-25 RX ORDER — AMOXICILLIN 500 MG/1
500 CAPSULE ORAL 3 TIMES DAILY
Qty: 30 CAP | Refills: 0 | Status: SHIPPED | OUTPATIENT
Start: 2017-03-25 | End: 2017-03-25

## 2017-03-25 RX ORDER — LOPERAMIDE HYDROCHLORIDE 2 MG/1
2 CAPSULE ORAL 4 TIMES DAILY PRN
Status: DISCONTINUED | OUTPATIENT
Start: 2017-03-25 | End: 2017-03-28 | Stop reason: HOSPADM

## 2017-03-25 RX ORDER — HYDROCODONE BITARTRATE AND ACETAMINOPHEN 5; 325 MG/1; MG/1
1 TABLET ORAL ONCE
Status: COMPLETED | OUTPATIENT
Start: 2017-03-25 | End: 2017-03-25

## 2017-03-25 RX ORDER — SODIUM CHLORIDE 9 MG/ML
INJECTION, SOLUTION INTRAVENOUS CONTINUOUS
Status: DISCONTINUED | OUTPATIENT
Start: 2017-03-25 | End: 2017-03-25

## 2017-03-25 RX ORDER — LIDOCAINE HYDROCHLORIDE AND EPINEPHRINE BITARTRATE 20; .01 MG/ML; MG/ML
INJECTION, SOLUTION SUBCUTANEOUS
Status: DISCONTINUED | OUTPATIENT
Start: 2017-03-25 | End: 2017-03-25 | Stop reason: HOSPADM

## 2017-03-25 RX ORDER — SODIUM CHLORIDE 9 MG/ML
INJECTION, SOLUTION INTRAVENOUS CONTINUOUS
Status: DISCONTINUED | OUTPATIENT
Start: 2017-03-25 | End: 2017-03-26

## 2017-03-25 RX ORDER — CARVEDILOL 6.25 MG/1
3.12 TABLET ORAL 2 TIMES DAILY WITH MEALS
Status: DISCONTINUED | OUTPATIENT
Start: 2017-03-25 | End: 2017-03-28

## 2017-03-25 RX ADMIN — AMPICILLIN SODIUM AND SULBACTAM SODIUM 3 G: 2; 1 INJECTION, POWDER, FOR SOLUTION INTRAMUSCULAR; INTRAVENOUS at 20:59

## 2017-03-25 RX ADMIN — SODIUM CHLORIDE: 9 INJECTION, SOLUTION INTRAVENOUS at 12:02

## 2017-03-25 RX ADMIN — CARVEDILOL 3.12 MG: 6.25 TABLET, FILM COATED ORAL at 21:00

## 2017-03-25 RX ADMIN — LEVETIRACETAM 500 MG: 250 TABLET, FILM COATED ORAL at 21:01

## 2017-03-25 RX ADMIN — AMPICILLIN AND SULBACTAM 3 G: 2; 1 INJECTION, POWDER, FOR SOLUTION INTRAMUSCULAR; INTRAVENOUS at 13:16

## 2017-03-25 RX ADMIN — OXYCODONE HYDROCHLORIDE 5 MG: 10 TABLET ORAL at 21:01

## 2017-03-25 RX ADMIN — HYDROCODONE BITARTRATE AND ACETAMINOPHEN 1 TABLET: 5; 325 TABLET ORAL at 09:55

## 2017-03-25 RX ADMIN — SODIUM CHLORIDE: 9 INJECTION, SOLUTION INTRAVENOUS at 20:58

## 2017-03-25 RX ADMIN — TAMSULOSIN HYDROCHLORIDE 0.4 MG: 0.4 CAPSULE ORAL at 21:04

## 2017-03-25 ASSESSMENT — LIFESTYLE VARIABLES
DO YOU DRINK ALCOHOL: NO
EVER_SMOKED: YES
DO YOU DRINK ALCOHOL: NO

## 2017-03-25 ASSESSMENT — PAIN SCALES - GENERAL
PAINLEVEL_OUTOF10: 0
PAINLEVEL_OUTOF10: 9
PAINLEVEL_OUTOF10: 8
PAINLEVEL_OUTOF10: 0
PAINLEVEL_OUTOF10: 5
PAINLEVEL_OUTOF10: 0

## 2017-03-25 NOTE — ED AVS SNAPSHOT
3/25/2017          Benjamin Post  [address]  [place] [address]    Dear Benjamin:    formerly [facility] wants to ensure your discharge home is safe and you or your loved ones have had all your questions answered regarding your care after you leave the hospital.    You may receive a telephone call within two days of your discharge.  This call is to make certain you understand your discharge instructions as well as ensure we provided you with the best care possible during your stay with us.     The call will only last approximately 3-5 minutes and will be done by a nurse.    Once again, we want to ensure your discharge home is safe and that you have a clear understanding of any next steps in your care.  If you have any questions or concerns, please do not hesitate to contact us, we are here for you.  Thank you for choosing [facility] for your healthcare needs.    Sincerely,    Darshan Heaton    [facility]

## 2017-03-25 NOTE — ED NOTES
Discussed  Dc instructions, medications. verbalizes understanding and denies other questions.  RX x 2 in hand. Released amb to family.

## 2017-03-25 NOTE — IP AVS SNAPSHOT
" <p align=\"LEFT\"><IMG SRC=\"//[email or web address]$/[email or web address]\" alt=\"Image\" WIDTH=\"50%\" HEIGHT=\"200\" BORDER=\"\"></p>                   Name:Benjamin oPst  Medical Record Number:[id number]  CSN: [id number]    YOB: 1945   Age: 71 y.o.  Sex: male  HT:1.676 m (5' 6\") WT: 73.936 kg (163 lb)          Admit Date: 3/25/2017     Discharge Date:   Today's Date: 3/28/2017  Attending Doctor:  Mary Pike M.D.                  Allergies:  Review of patient's allergies indicates no known allergies.          Your appointments     Apr 04, 2017 10:30 AM   Est Blood Products with RN 4   Infusion Services ([facility])    [address] [address]  [place] [address]   [phone]            Apr 11, 2017 10:30 AM   Est Blood Products with RN 4   Infusion Services ([facility])    [address] [address]  [place] [address]   [phone]            Apr 18, 2017 10:30 AM   Est Blood Products with RN 4   Infusion Services ([facility])    [address] [address]  [place] [address]   [phone]            Apr 25, 2017 10:00 AM   Est Blood Products with RN 1   Infusion Services ([facility])    [address] [address]  [place] [address]   [phone]            May 02, 2017 10:00 AM   Est Blood Products with RN 1   Infusion Services ([facility])    [address] [address]  [place] [address]   [phone]            May 09, 2017 10:00 AM   Est Blood Products with RN 1   Infusion Services ([facility])    [address] [address]  [place] [address]   [phone]            May 16, 2017 10:30 AM   Est Blood Products with RN 6   Infusion Services ([facility])    [address] [address]  [place] [address]   [phone]            May 23, 2017 10:00 AM   Est Blood Products with RN 2   Infusion Services ([facility])    [address] [address]  [place] [address]   [phone]            May 30, 2017 10:00 AM   Est Blood Products with RN 2   Infusion Services ([facility])    [address] [address]  [place] [address]   [phone]            Vasquez " 06, 2017 10:00 AM   Est Blood Products with RN 2   Infusion Services ([facility])    [address] [address] [address]   [phone]            Jun 13, 2017 10:30 AM   Est Blood Products with RN 6   Infusion Services ([facility])    [address] [address] [address]   [phone]            Jun 20, 2017 10:30 AM   Est Blood Products with RN 6   Infusion Services ([facility])    [address] [address] [address]   [phone]            Jun 27, 2017 10:30 AM   Est Blood Products with RN 6   Infusion Services ([facility])    [address] [address] [address]   [phone]              Follow-up Information     1. Follow up with [facility]. Schedule an appointment as soon as possible for a visit in 1 week.    Contact information    [address] [place] [address] [phone]        2. Follow up with Surendra Barragan D.M.D.,M.JAQUELINE.. Schedule an appointment as soon as possible for a visit in 1 week.    Specialty:  Oral Surgery    Contact information    [address]  [facility] [address]  [phone]           Medication List      Take these Medications        Instructions    Bosutinib 100 MG Tabs    Take 300 mg by mouth every day.   Dose:  300 mg       carvedilol 3.125 MG Tabs   Commonly known as:  COREG    Take 3.125 mg by mouth 2 times a day, with meals.   Dose:  3.125 mg       levetiracetam 500 MG Tabs   What changed:  how much to take   Commonly known as:  KEPPRA    Take 1.5 Tabs by mouth 2 Times a Day.   Dose:  750 mg       loperamide 2 MG Caps   Commonly known as:  IMODIUM    Take 2 mg by mouth 4 times a day as needed for Diarrhea.   Dose:  2 mg       oxycodone immediate-release 5 MG Tabs   Commonly known as:  ROXICODONE    Take 1 Tab by mouth every four hours as needed (Moderate Pain; (Pain scale 4-6)).   Dose:  5 mg       penicillin v potassium 500 MG Tabs   Commonly known as:  VEETID    Take 1 Tab by mouth 4 times a day for 7 days.   Dose:   500 mg       PRESERVISION AREDS 2 Caps    Take 1 Cap by mouth every day.   Dose:  1 Cap       tamsulosin 0.4 MG capsule   Commonly known as:  FLOMAX    Take 0.4 mg by mouth every morning.   Dose:  0.4 mg

## 2017-03-25 NOTE — ED NOTES
Patient was able to up self to restroom and back to room with steady gate at this time  He continues to wait for room on floor

## 2017-03-25 NOTE — IP AVS SNAPSHOT
[employer] Access Code: [email or web address]  Expires: 4/24/2017  9:59 AM    Your email address is not on file at [facility].  Email Addresses are required for you to sign up for [employer], please contact [phone] to verify your personal information and to provide your email address prior to attempting to register for [employer].    Benjamin Post  [address]  [place] [address]    [employer]  A secure, online tool to manage your health information     [facility]’s [employer]® is a secure, online tool that connects you to your personalized health information from the privacy of your home -- day or night - making it very easy for you to manage your healthcare. Once the activation process is completed, you can even access your medical information using the [employer] alexsander, which is available for free in the Apple Alexsander store or Google Play store.     To learn more about [employer], visit [email or web address]    There are two levels of access available (as shown below):   My Chart Features  [facility] Primary Care Doctor [facility]  Specialists [facility]  Urgent  Care Non-[facility] Primary Care Doctor   Email your healthcare team securely and privately 24/7 X X X    Manage appointments: schedule your next appointment; view details of past/upcoming appointments X      Request prescription refills. X      View recent personal medical records, including lab and immunizations X X X X   View health record, including health history, allergies, medications X X X X   Read reports about your outpatient visits, procedures, consult and ER notes X X X X   See your discharge summary, which is a recap of your hospital and/or ER visit that includes your diagnosis, lab results, and care plan X X  X     How to register for [employer]:  Once your e-mail address has been verified, follow the following steps to sign up for [employer].     1. Go to  [email or web address]  2. Click on the Sign Up Now box, which takes you to the New Member Sign Up page. You will  need to provide the following information:  a. Enter your [employer] Access Code exactly as it appears at the top of this page. (You will not need to use this code after you’ve completed the sign-up process. If you do not sign up before the expiration date, you must request a new code.)   b. Enter your date of birth.   c. Enter your home email address.   d. Click Submit, and follow the next screen’s instructions.  3. Create a [email or web address] ID. This will be your [employer] login ID and cannot be changed, so think of one that is secure and easy to remember.  4. Create a [employer] password. You can change your password at any time.  5. Enter your Password Reset Question and Answer. This can be used at a later time if you forget your password.   6. Enter your e-mail address. This allows you to receive e-mail notifications when new information is available in [employer].  7. Click Sign Up. You can now view your health information.    For assistance activating your [employer] account, call [phone]

## 2017-03-25 NOTE — DISCHARGE INSTRUCTIONS
Dental Care and Dentist Visits  Dental care supports good overall health. Regular dental visits can also help you avoid dental pain, bleeding, infection, and other more serious health problems in the future. It is important to keep the mouth healthy because diseases in the teeth, gums, and other oral tissues can spread to other areas of the body. Some problems, such as diabetes, heart disease, and pre-term labor have been associated with poor oral health.   See your dentist every 6 months. If you experience emergency problems such as a toothache or broken tooth, go to the dentist right away. If you see your dentist regularly, you may catch problems early. It is easier to be treated for problems in the early stages.   WHAT TO EXPECT AT A DENTIST VISIT   Your dentist will look for many common oral health problems and recommend proper treatment. At your regular dental visit, you can expect:  · Gentle cleaning of the teeth and gums. This includes scraping and polishing. This helps to remove the sticky substance around the teeth and gums (plaque). Plaque forms in the mouth shortly after eating. Over time, plaque hardens on the teeth as tartar. If tartar is not removed regularly, it can cause problems. Cleaning also helps remove stains.  · Periodic X-rays. These pictures of the teeth and supporting bone will help your dentist assess the health of your teeth.  · Periodic fluoride treatments. Fluoride is a natural mineral shown to help strengthen teeth. Fluoride treatment involves applying a fluoride gel or varnish to the teeth. It is most commonly done in children.  · Examination of the mouth, tongue, jaws, teeth, and gums to look for any oral health problems, such as:  · Cavities (dental caries). This is decay on the tooth caused by plaque, sugar, and acid in the mouth. It is best to catch a cavity when it is small.  · Inflammation of the gums caused by plaque buildup (gingivitis).  · Problems with the mouth or malformed  or misaligned teeth.  · Oral cancer or other diseases of the soft tissues or jaws.   KEEP YOUR TEETH AND GUMS HEALTHY  For healthy teeth and gums, follow these general guidelines as well as your dentist's specific advice:  · Have your teeth professionally cleaned at the dentist every 6 months.  · Brush twice daily with a fluoride toothpaste.  · Floss your teeth daily.   · Ask your dentist if you need fluoride supplements, treatments, or fluoride toothpaste.  · Eat a healthy diet. Reduce foods and drinks with added sugar.  · Avoid smoking.  TREATMENT FOR ORAL HEALTH PROBLEMS  If you have oral health problems, treatment varies depending on the conditions present in your teeth and gums.  · Your caregiver will most likely recommend good oral hygiene at each visit.  · For cavities, gingivitis, or other oral health disease, your caregiver will perform a procedure to treat the problem. This is typically done at a separate appointment. Sometimes your caregiver will refer you to another dental specialist for specific tooth problems or for surgery.  SEEK IMMEDIATE DENTAL CARE IF:  · You have pain, bleeding, or soreness in the gum, tooth, jaw, or mouth area.  · A permanent tooth becomes loose or  from the gum socket.  · You experience a blow or injury to the mouth or jaw area.     This information is not intended to replace advice given to you by your health care provider. Make sure you discuss any questions you have with your health care provider.     Document Released: 08/29/2012 Document Revised: 03/11/2013 Document Reviewed: 08/29/2012  [employer] Interactive Patient Education ©2016 [employer]    Toothache  Toothaches are usually caused by tooth decay (cavity). However, other causes of toothache include:  · Gum disease.  · Cracked tooth.  · Cracked filling.  · Injury.  · Jaw problem (temporo mandibular joint or TMJ disorder).  · Tooth abscess.  · Root sensitivity.  · Grinding.  · Eruption problems.  Swelling and  redness around a painful tooth often means you have a dental abscess.  Pain medicine and antibiotics can help reduce symptoms, but you will need to see a dentist within the next few days to have your problem properly evaluated and treated. If tooth decay is the problem, you may need a filling or root canal to save your tooth. If the problem is more severe, your tooth may need to be pulled.  SEEK IMMEDIATE MEDICAL CARE IF:  · You cannot swallow.  · You develop severe swelling, increased redness, or increased pain in your mouth or face.  · You have a fever.  · You cannot open your mouth adequately.  Document Released: 01/25/2006 Document Revised: 03/11/2013 Document Reviewed: 03/17/2011  ExitCare® Patient Information ©2014 [email or web address].    Teeth and Gum Care  Most problems with teeth and gums can be prevented if you do the following:  · Brush your teeth.  · Brush after each meal or at least 3 times a day with a soft bristle tooth brush.  · Brush in a circular motion.  · Brush close to your gums as well as your teeth.  · Brush your back teeth longer than your front teeth.  · Floss your teeth once a day. Before bedtime is a good time to floss.  · Go to a dentist 2 times a year.  · Eat a balanced diet.  · Eat good meals including fruits, vegetables, fish, chicken or other meat, and milk products.  · Drink 8 to 10 glasses of water a day.  · Do not eat candy or snack foods.  · Do not  drink sugar-sweetened soft drinks or juice.  · If a problem develops, go to your dentist right away.  · Common problems are cavities, bleeding gums, sores or lumps in your mouth, or pain in your teeth, gums or jaws.  · It is important to see a Dentist right away for these problems because you may lose your teeth or have a lot of pain if you do not go soon.  · Take care of your child's teeth.  · Brush the teeth with a soft bristle tooth brush.  · Do not let your child eat or drink sweet foods and drinks.  · Do not let your child fall asleep  with a bottle in his or her mouth.  · Take your child to a dentist when they are between 2 and 3 years old. This is the age you should start taking your child to a dentist. If your child has problems with their teeth, take them to a dentist sooner.  · If a tooth is knocked out (yours or your child's):  · Save the tooth if possible.  · Wrap it in tissue or put it in a glass of milk.  · Take it with you to your dentist right away.  Document Released: 09/26/2009 Document Revised: 03/11/2013 Document Reviewed: 09/26/2009  [employer]® Patient Information ©2014 [employer].

## 2017-03-25 NOTE — ED AVS SNAPSHOT
Home Care Instructions                                                                                                                Benjamin Post   MRN: [id number]    Department:  [facility], Emergency Dept   Date of Visit:  3/25/2017            [facility], Emergency Dept    [address] [address]    Phone:  [phone]      You were seen by     Blaine Blackmon M.D.      Your Diagnosis Was     Dental disorder     K08.9       These are the medications you received during your hospitalization from 03/25/2017 0900 to 03/25/2017 0959     Date/Time Order Dose Route Action    03/25/2017 0955 hydrocodone-acetaminophen (NORCO) 5-325 MG per tablet 1 Tab 1 Tab Oral Given      Follow-up Information     1. Follow up with LEVAR Vu M.D.. Schedule an appointment as soon as possible for a visit in 2 days.    Specialty:  Oncology    Contact information    [address] [address] [phone]        Medication Information     Review all of your home medications and newly ordered medications with your primary doctor and/or pharmacist as soon as possible. Follow medication instructions as directed by your doctor and/or pharmacist.     Please keep your complete medication list with you and share with your physician. Update the information when medications are discontinued, doses are changed, or new medications (including over-the-counter products) are added; and carry medication information at all times in the event of emergency situations.               Medication List      START taking these medications        Instructions    Morning Afternoon Evening Bedtime    amoxicillin 500 MG Caps   Commonly known as:  AMOXIL        Take 1 Cap by mouth 3 times a day for 10 days.   Dose:  500 mg                        hydrocodone-acetaminophen 5-325 MG Tabs per tablet   Last time this was given:  1 Tab on 3/25/2017  9:55 AM   Commonly known as:  NORCO        Take 1-2 Tabs  by mouth every four hours as needed.   Dose:  1-2 Tab                          ASK your doctor about these medications        Instructions    Morning Afternoon Evening Bedtime    Bosutinib 100 MG Tabs        Take 300 mg by mouth every day.   Dose:  300 mg                        carvedilol 3.125 MG Tabs   Commonly known as:  COREG        Take 3.125 mg by mouth 2 times a day, with meals.   Dose:  3.125 mg                        levetiracetam 500 MG Tabs   Commonly known as:  KEPPRA        Take 1 Tab by mouth 2 Times a Day.   Dose:  500 mg                        loperamide 2 MG Caps   Commonly known as:  IMODIUM        Take 3 mg by mouth every day.   Dose:  3 mg                        NON SPECIFIED        Indications: current chemo for leukemia                        oxycodone immediate-release 5 MG Tabs   Commonly known as:  ROXICODONE        Take 1 Tab by mouth every four hours as needed (Moderate Pain; (Pain scale 4-6)).   Dose:  5 mg                        PRESERVISION AREDS 2 Caps        Take 1 Cap by mouth every day.   Dose:  1 Cap                        tamsulosin 0.4 MG capsule   Commonly known as:  FLOMAX        Take 0.4 mg by mouth every morning.   Dose:  0.4 mg                             Where to Get Your Medications      You can get these medications from any pharmacy     Bring a paper prescription for each of these medications    - amoxicillin 500 MG Caps  - hydrocodone-acetaminophen 5-325 MG Tabs per tablet              Discharge Instructions       Dental Care and Dentist Visits  Dental care supports good overall health. Regular dental visits can also help you avoid dental pain, bleeding, infection, and other more serious health problems in the future. It is important to keep the mouth healthy because diseases in the teeth, gums, and other oral tissues can spread to other areas of the body. Some problems, such as diabetes, heart disease, and pre-term labor have been associated with poor oral health.      See your dentist every 6 months. If you experience emergency problems such as a toothache or broken tooth, go to the dentist right away. If you see your dentist regularly, you may catch problems early. It is easier to be treated for problems in the early stages.   WHAT TO EXPECT AT A DENTIST VISIT   Your dentist will look for many common oral health problems and recommend proper treatment. At your regular dental visit, you can expect:  · Gentle cleaning of the teeth and gums. This includes scraping and polishing. This helps to remove the sticky substance around the teeth and gums (plaque). Plaque forms in the mouth shortly after eating. Over time, plaque hardens on the teeth as tartar. If tartar is not removed regularly, it can cause problems. Cleaning also helps remove stains.  · Periodic X-rays. These pictures of the teeth and supporting bone will help your dentist assess the health of your teeth.  · Periodic fluoride treatments. Fluoride is a natural mineral shown to help strengthen teeth. Fluoride treatment involves applying a fluoride gel or varnish to the teeth. It is most commonly done in children.  · Examination of the mouth, tongue, jaws, teeth, and gums to look for any oral health problems, such as:  · Cavities (dental caries). This is decay on the tooth caused by plaque, sugar, and acid in the mouth. It is best to catch a cavity when it is small.  · Inflammation of the gums caused by plaque buildup (gingivitis).  · Problems with the mouth or malformed or misaligned teeth.  · Oral cancer or other diseases of the soft tissues or jaws.   KEEP YOUR TEETH AND GUMS HEALTHY  For healthy teeth and gums, follow these general guidelines as well as your dentist's specific advice:  · Have your teeth professionally cleaned at the dentist every 6 months.  · Brush twice daily with a fluoride toothpaste.  · Floss your teeth daily.   · Ask your dentist if you need fluoride supplements, treatments, or fluoride  toothpaste.  · Eat a healthy diet. Reduce foods and drinks with added sugar.  · Avoid smoking.  TREATMENT FOR ORAL HEALTH PROBLEMS  If you have oral health problems, treatment varies depending on the conditions present in your teeth and gums.  · Your caregiver will most likely recommend good oral hygiene at each visit.  · For cavities, gingivitis, or other oral health disease, your caregiver will perform a procedure to treat the problem. This is typically done at a separate appointment. Sometimes your caregiver will refer you to another dental specialist for specific tooth problems or for surgery.  SEEK IMMEDIATE DENTAL CARE IF:  · You have pain, bleeding, or soreness in the gum, tooth, jaw, or mouth area.  · A permanent tooth becomes loose or  from the gum socket.  · You experience a blow or injury to the mouth or jaw area.     This information is not intended to replace advice given to you by your health care provider. Make sure you discuss any questions you have with your health care provider.     Document Released: 08/29/2012 Document Revised: 03/11/2013 Document Reviewed: 08/29/2012  [employer] Interactive Patient Education ©2016 [employer]    Toothache  Toothaches are usually caused by tooth decay (cavity). However, other causes of toothache include:  · Gum disease.  · Cracked tooth.  · Cracked filling.  · Injury.  · Jaw problem (temporo mandibular joint or TMJ disorder).  · Tooth abscess.  · Root sensitivity.  · Grinding.  · Eruption problems.  Swelling and redness around a painful tooth often means you have a dental abscess.  Pain medicine and antibiotics can help reduce symptoms, but you will need to see a dentist within the next few days to have your problem properly evaluated and treated. If tooth decay is the problem, you may need a filling or root canal to save your tooth. If the problem is more severe, your tooth may need to be pulled.  SEEK IMMEDIATE MEDICAL CARE IF:  · You cannot  swallow.  · You develop severe swelling, increased redness, or increased pain in your mouth or face.  · You have a fever.  · You cannot open your mouth adequately.  Document Released: 01/25/2006 Document Revised: 03/11/2013 Document Reviewed: 03/17/2011  ExitCare® Patient Information ©2014 [employer].    Teeth and Gum Care  Most problems with teeth and gums can be prevented if you do the following:  · Brush your teeth.  · Brush after each meal or at least 3 times a day with a soft bristle tooth brush.  · Brush in a circular motion.  · Brush close to your gums as well as your teeth.  · Brush your back teeth longer than your front teeth.  · Floss your teeth once a day. Before bedtime is a good time to floss.  · Go to a dentist 2 times a year.  · Eat a balanced diet.  · Eat good meals including fruits, vegetables, fish, chicken or other meat, and milk products.  · Drink 8 to 10 glasses of water a day.  · Do not eat candy or snack foods.  · Do not  drink sugar-sweetened soft drinks or juice.  · If a problem develops, go to your dentist right away.  · Common problems are cavities, bleeding gums, sores or lumps in your mouth, or pain in your teeth, gums or jaws.  · It is important to see a Dentist right away for these problems because you may lose your teeth or have a lot of pain if you do not go soon.  · Take care of your child's teeth.  · Brush the teeth with a soft bristle tooth brush.  · Do not let your child eat or drink sweet foods and drinks.  · Do not let your child fall asleep with a bottle in his or her mouth.  · Take your child to a dentist when they are between 2 and 3 years old. This is the age you should start taking your child to a dentist. If your child has problems with their teeth, take them to a dentist sooner.  · If a tooth is knocked out (yours or your child's):  · Save the tooth if possible.  · Wrap it in tissue or put it in a glass of milk.  · Take it with you to your dentist right  away.  Document Released: 09/26/2009 Document Revised: 03/11/2013 Document Reviewed: 09/26/2009  ExitCare® Patient Information ©2014 [employer].            Patient Information     Patient Information    Following emergency treatment: all patient requiring follow-up care must return either to a private physician or a clinic if your condition worsens before you are able to obtain further medical attention, please return to the emergency room.     Billing Information    At [facility], we work to make the billing process streamlined for our patients.  Our Representatives are here to answer any questions you may have regarding your hospital bill.  If you have insurance coverage and have supplied your insurance information to us, we will submit a claim to your insurer on your behalf.  Should you have any questions regarding your bill, we can be reached online or by phone as follows:  Online: You are able pay your bills online or live chat with our representatives about any billing questions you may have. We are here to help Monday - Friday from 8:00am to 7:30pm and 9:00am - 12:00pm on Saturdays.  Please visit [email or web address]  for more information.   Phone:  [phone] or [phone]    Please note that your emergency physician, surgeon, pathologist, radiologist, anesthesiologist, and other specialists are not employed by [facility] and will therefore bill separately for their services.  Please contact them directly for any questions concerning their bills at the numbers below:     Emergency Physician Services:  [phone]  [facility]:  [phone]  [facility]:  [phone]  [facility]:  [phone]    1. Your final bill may vary from the amount quoted upon discharge if all procedures are not complete at that time, or if your doctor has additional procedures of which we are not aware. You will receive an additional bill if  you return to the Emergency Department at [facility] for suture removal regardless of the facility of which the sutures were placed.     2. Please arrange for settlement of this account at the emergency registration.    3. All self-pay accounts are due in full at the time of treatment.  If you are unable to meet this obligation then payment is expected within 4-5 days.     4. If you have had radiology studies (CT, X-ray, Ultrasound, MRI), you have received a preliminary result during your emergency department visit. Please contact the radiology department [phone] to receive a copy of your final result. Please discuss the Final result with your primary physician or with the follow up physician provided.     Crisis Hotline:  [place] Crisis Hotline:  0-628-WHTJSBJ or 1-948.648.7459  [place] Crisis Hotline:    1-594.210.3013 or 799-036-6063         ED Discharge Follow Up Questions    1. In order to provide you with very good care, we would like to follow up with a phone call in the next few days.  May we have your permission to contact you?     YES /  NO    2. What is the best phone number to call you? (       )_____-__________    3. What is the best time to call you?      Morning  /  Afternoon  /  Evening                   Patient Signature:  ____________________________________________________________    Date:  ____________________________________________________________      Your appointments     Mar 28, 2017 10:00 AM   [facility] with RN 2   In[facility] ([facility])    [address] [place] [address]  [place] [address]   [phone]            Apr 04, 2017 10:30 AM   [facility] with RN 4   [facility] ([facility])    [address] [place] [address]  [place] [address]   [phone]            Apr 11, 2017 10:30 AM   Est [facility] with RN 4   [facility] ([facility])    [address] [facility] [address]  [place] [address]   [phone]            Apr 18, 2017 10:30 AM   Est [facility] with  RN 4   Infusion [facility] ([facility])    [address] [facility] [address]  [place] [address]   [phone]            Apr 25, 2017 10:00 AM   Est [facility] with RN 1   In[facility] ([facility])    [address] [facility] [address]  [place] [address]   [phone]            May 02, 2017 10:00 AM   Est [facility] [employer] with RN 1   [facility] ([facility])    [address] [facility] [address]  [place] [address]   [phone]            May 09, 2017 10:00 AM   Est [facility] with RN 1   In[facility] ([facility])    [address] [facility] [address]  [place] [address]   [phone]            May 16, 2017 10:30 AM   Est [facility] [employer] with RN 6   Infusion [facility] ([facility])    [address] [facility] [address]  [place] [address]   [phone]            May 23, 2017 10:00 AM   Est [facility] [employer] with RN 2   In[facility] ([facility])    [address]  [place] [address]   [phone]            May 30, 2017 10:00 AM   Est [facility] [employer] with RN 2   In[facility] ([facility])    [address] [facility] [address]  [place] [address]   [phone]            Jun 06, 2017 10:00 AM   Est [employer] with RN 2   Infusion [facility] ([facility])    [address] [facility] [address]  [place] [address]   [phone]            Jun 13, 2017 10:30 AM   Est [facility] [employer] with RN 6   In[facility] ([facility])    [address] [facility] [address]  [place] [address]   [phone]            Jun 20, 2017 10:30 AM   Est Blood [employer] with RN 6   Infusion [facility] ([facility])    [address] [facility] [address]  [place] [address]   [phone]            Jun 27, 2017 10:30 AM   Est [facility] with RN 6   Infusion [facility] ([facility])    [address] [facility] [address]  [place] [address]   [phone]

## 2017-03-25 NOTE — ED PROVIDER NOTES
ED Provider Note    CHIEF COMPLAINT  No chief complaint on file.      Hospitals in [place]  Benjamin Post is a 71 y.o. male with history of CML, chronic thrombocytopenia who presents from the [facility] clinic for complaints of dental pain. The patient is awaiting a bone marrow transplant at [facility], and has been having problems with dental infections. The patient was seen at the [facility] clinic, and because of his chronic thrombocytopenia, no one was willing to do any dental extractions or place the patient on antibiotics. The patient was referred to the emergency room for further care. The patient denies any current fever, shaking chills, sore throat, cough, vomiting, or diarrhea. He has not noticed any significant bleeding from the gums, or blood in his stools or urine. He has noticed some mild bruising to the arms.  The patient was seen earlier today in the emergency department and was discharged home with instructions to follow-up with his oncologist Dr. Vu. However he apparently went back to see the dentist at the [facility] clinic who contacted Dr. Barragan or oral surgery.  The patient was then referred back to the emergency department.       REVIEW OF SYSTEMS  See HPI for further details. All other systems are negative.     PAST MEDICAL HISTORY  Past Medical History   Diagnosis Date   • Hypertension    • Cancer (CMS-HCC)    • Indigestion    • Heart burn        FAMILY HISTORY  No family history on file.    SOCIAL HISTORY  Social History     Social History   • Marital Status:      Spouse Name: N/A   • Number of Children: N/A   • Years of Education: N/A     Social History Main Topics   • Smoking status: Never Smoker    • Smokeless tobacco: Not on file   • Alcohol Use: No   • Drug Use: No   • Sexual Activity: Not on file     Other Topics Concern   • Not on file     Social History Narrative       SURGICAL HISTORY  Past Surgical History   Procedure Laterality Date   • Bone marrow aspiration  11/5/2015     Procedure: BONE MARROW  ASPIRATION;  Surgeon: Rosita Bolton M.D.;  Location: [facility];  Service:    • Bone marrow biopsy, ndl/trocar  11/5/2015     Procedure: BONE MARROW BIOPSY, NDL/TROCAR;  Surgeon: Rosita Bolton M.D.;  Location: [facility];  Service:    • Bone marrow biopsy, ndl/trocar  3/23/2016     Procedure: BONE MARROW BIOPSY, NDL/TROCAR;  Surgeon: Fili Prakash M.D.;  Location: [facility];  Service:    • Bone marrow aspiration  3/23/2016     Procedure: BONE MARROW ASPIRATION;  Surgeon: Fili Prakash M.D.;  Location: [facility];  Service:    • Bone marrow biopsy, ndl/trocar  7/12/2016     Procedure: BONE MARROW BIOPSY, NDL/TROCAR;  Surgeon: Fili Prakash M.D.;  Location: [facility];  Service:    • Bone marrow aspiration  7/12/2016     Procedure: BONE MARROW ASPIRATION;  Surgeon: Fili Prakash M.D.;  Location: [facility];  Service:        CURRENT MEDICATIONS  Home Medications     **Home medications have not yet been reviewed for this encounter**          ALLERGIES  No Known Allergies    PHYSICAL EXAM  VITAL SIGNS: /57 mmHg  Pulse 84  Temp(Src) 36.8 °C (98.3 °F)  Resp 16  SpO2 99%  Constitutional: Awake, alert, in no acute distress, Non-toxic appearance.   HENT: Atraumatic. Bilateral external ears normal, mucous membranes moist, throat nonerythematous without exudates, nose is normal. Mucous members are slightly dry. Teeth show poor dentition with several eroded teeth down to the gumline. There is no significant erythema or induration.  No swelling to the soft palate or uvula.  Eyes: PERRL, EOMI, conjunctiva moist, noninjected.  Neck: Nontender, Normal range of motion, No nuchal rigidity, No stridor.   Lymphatic: No lymphadenopathy noted.   Cardiovascular: Regular rate and rhythm, no murmurs, rubs, gallops.  Thorax & Lungs:  Good breath sounds bilaterally, no wheezes, rales, or retractions.  No chest  tenderness.  Abdomen: Bowel sounds normal, Soft, nontender, nondistended, no rebound, guarding, masses.  Back: No CVA or spinal tenderness.  Extremities: Intact distal pulses, No edema, No tenderness.   Skin: Warm, Dry, No rashes. There is some ecchymotic areas to the upper extremities, with no significant tenderness, erythema, or induration.  Musculoskeletal: No joint swelling or tenderness.  Neurologic: Alert & oriented x 3, sensory and motor function normal. No focal deficits.   Psychiatric: Affect normal, Judgment normal, Mood normal.     EKG  Twelve-lead EKG shows normal sinus rhythm, rate of 86, normal intervals, normal axis, no acute ST wave elevations or ST depressions, no pathologic Q waves, no evidence of an acute injury or ischemic pattern on my reading, comparison to previous EKG from May 2015, there are no acute changes.      RADIOLOGY/PROCEDURES  [id number]   Final Result      1.  Multiple maxillary dental caries.      2.  No root abscess identified.      3.  No acute bony abnormality.      DX-CHEST-PORTABLE (1 VIEW)   Final Result      Hazy bibasilar opacities may represent atelectasis or pneumonitis.      Stable blunting of the right costophrenic angle may be related to pleural thickening.      Healing lower right-sided rib fracture.            COURSE & MEDICAL DECISION MAKING  Pertinent Labs & Imaging studies reviewed. (See chart for details)  The patient presents immersed from with above complaints. He has had recurrent dental pain and infections to his teeth.  He has poor dentition and several eroded teeth. IV was placed, he was given normal saline, and Unasyn 3 g IVPB. Because of his history of anemia and thrombocytopenia, he was crossmatched. He was found to be thrombocytopenic with a white count of 17,000, and was given one unit of platelets. Discussed case Dr. Barragan of oral surgery, and he would like to take the patient to the operating room this evening. Because of his concern  for bleeding and the patient's current thrombocytopenia, he has asked that the patient be given a unit of packed red blood cells. Case was discussed with Dr. Pike for admission.    FINAL IMPRESSION  1. Thrombocytopenia  2. Anemia  3. Recurrent dental infection         Electronically signed by: Blaine Romero, 3/25/2017 11:37 AM

## 2017-03-25 NOTE — IP AVS SNAPSHOT
" Home Care Instructions                                                                                                                  Name:Benjamin Post  Medical Record Number:[id number]  CSN: [id number]    YOB: 1945   Age: 71 y.o.  Sex: male  HT:1.676 m (5' 6\") WT: 73.936 kg (163 lb)          Admit Date: 3/25/2017     Discharge Date:   Today's Date: 3/28/2017  Attending Doctor:  Mary Pike M.D.                  Allergies:  Review of patient's allergies indicates no known allergies.            Discharge Instructions       Ok to discharge to home   Take Rx as prescribed   Follow up with PCP, and Dr Barragan within 10 days   Call Primary Care MD if new problems arise   Return to ER/hospital for SOB, CP, or worsening/concerning symptoms     Discharge Instructions    Discharged to home by car with relative. Discharged via wheelchair, hospital escort: Yes.  Special equipment needed: Not Applicable    Be sure to schedule a follow-up appointment with your primary care doctor or any specialists as instructed.     Discharge Plan:   Influenza Vaccine Indication: Indicated: 65 years and older  Influenza Vaccine Given - only chart on this line when given: Influenza Vaccine Given (See MAR)    I understand that a diet low in cholesterol, fat, and sodium is recommended for good health. Unless I have been given specific instructions below for another diet, I accept this instruction as my diet prescription.   Other diet: regular    Special Instructions: None    · Is patient discharged on Warfarin / Coumadin?   No     · Is patient Post Blood Transfusion?  No    Depression / Suicide Risk    As you are discharged from this [facility] facility, it is important to learn how to keep safe from harming yourself.    Recognize the warning signs:  · Abrupt changes in personality, positive or negative- including increase in energy   · Giving away possessions  · Change in eating patterns- significant weight changes-  " positive or negative  · Change in sleeping patterns- unable to sleep or sleeping all the time   · Unwillingness or inability to communicate  · Depression  · Unusual sadness, discouragement and loneliness  · Talk of wanting to die  · Neglect of personal appearance   · Rebelliousness- reckless behavior  · Withdrawal from people/activities they love  · Confusion- inability to concentrate     If you or a loved one observes any of these behaviors or has concerns about self-harm, here's what you can do:  · Talk about it- your feelings and reasons for harming yourself  · Remove any means that you might use to hurt yourself (examples: pills, rope, extension cords, firearm)  · Get professional help from the community (Mental Health, Substance Abuse, psychological counseling)  · Do not be alone:Call your Safe Contact- someone whom you trust who will be there for you.  · Call your local CRISIS HOTLINE 431-4686 or 723-169-5605  · Call your local Children's Mobile Crisis Response Team [place] (419) 788-5230 or www.ConnectFu  · Call the toll free National Suicide Prevention Hotlines   · National Suicide Prevention Lifeline 729-607-MODC (5392)  · National Hope Line Network 800-SUICIDE (178-8655)        Your appointments     Apr 04, 2017 10:30 AM   [facility] with RN 4   Infusion Services ([facility])    [address] [address]  [place] [address]   [phone]            Apr 11, 2017 10:30 AM   [facility] with RN 4   Infusion Services ([facility])    [address] [address]  [place] [address]   [phone]            Apr 18, 2017 10:30 AM   Est [facility] [employer] with RN 4   Infusion Services ([facility])    [address] [address]  [place] [address]   [phone]            Apr 25, 2017 10:00 AM   Est [facility] with RN 1   Infusion Services ([facility])    [address] [address]  [place] [address]   [phone]            May 02, 2017 10:00 AM   Est Blood Products with RN 1   Infusion Services ([facility])    [address] [facility] [address]  [place] [address]   [phone]            May 09, 2017 10:00 AM   Est Blood Products with RN 1   Infusion Services ([facility])    [address] [address]  [place] [address]   [phone]            May 16, 2017 10:30 AM   Est Blood Products with RN 6   Infusion Services ([facility])    [address] [address]  [place] [address]   [phone]            May 23, 2017 10:00 AM   Est Blood Products with RN 2   Infusion Services ([facility])    [address] [address]  [place] [address]   [phone]            May 30, 2017 10:00 AM   Est Blood [employer] with RN 2   Infusion Services ([facility])    [address] [address]  [place] [address]   [phone]            Jun 06, 2017 10:00 AM   Est Blood Products with RN 2   Infusion Services ([facility])    [address] [address]  [place] [address]   [phone]            Jun 13, 2017 10:30 AM   Est Blood Products with RN 6   Infusion Services ([facility])    [address] [address]  [place] [address]   [phone]            Jun 20, 2017 10:30 AM   Est Blood Products with RN 6   Infusion Services ([facility])    [address] [address]  [place] [address]   [phone]            Jun 27, 2017 10:30 AM   Est Blood Products with RN 6   Infusion Services ([facility])    [address] [address]  [place] [address]   [phone]              Follow-up Information     1. Follow up with [facility]. Schedule an appointment as soon as possible for a visit in 1 week.    Contact information    [address] [place] [address] [phone]        2. Follow up with Surendra Barragan D.M.D.,M.JAQUELINE.. Schedule an appointment as soon as possible for a visit in 1 week.    Specialty:  Oral Surgery    Contact information    [address]  [place] [address]  [phone]           Discharge Medication Instructions:    Below are the medications your physician expects you to take upon discharge:    Review all your home  medications and newly ordered medications with your doctor and/or pharmacist. Follow medication instructions as directed by your doctor and/or pharmacist.    Please keep your medication list with you and share with your physician.               Medication List      START taking these medications        Instructions    penicillin v potassium 500 MG Tabs   Commonly known as:  VEETID    Take 1 Tab by mouth 4 times a day for 7 days.   Dose:  500 mg         CHANGE how you take these medications        Instructions    levetiracetam 500 MG Tabs   What changed:  how much to take   Last time this was given:  750 mg on 3/28/2017  8:58 AM   Commonly known as:  KEPPRA    Take 1.5 Tabs by mouth 2 Times a Day.   Dose:  750 mg         CONTINUE taking these medications        Instructions    Bosutinib 100 MG Tabs    Take 300 mg by mouth every day.   Dose:  300 mg       carvedilol 3.125 MG Tabs   Last time this was given:  3.125 mg on 3/27/2017  5:25 PM   Commonly known as:  COREG    Take 3.125 mg by mouth 2 times a day, with meals.   Dose:  3.125 mg       loperamide 2 MG Caps   Commonly known as:  IMODIUM    Take 2 mg by mouth 4 times a day as needed for Diarrhea.   Dose:  2 mg       oxycodone immediate-release 5 MG Tabs   Last time this was given:  5 mg on 3/28/2017  5:21 AM   Commonly known as:  ROXICODONE    Take 1 Tab by mouth every four hours as needed (Moderate Pain; (Pain scale 4-6)).   Dose:  5 mg       PRESERVISION AREDS 2 Caps    Take 1 Cap by mouth every day.   Dose:  1 Cap       tamsulosin 0.4 MG capsule   Last time this was given:  0.4 mg on 3/28/2017  8:58 AM   Commonly known as:  FLOMAX    Take 0.4 mg by mouth every morning.   Dose:  0.4 mg               Instructions           Diet / Nutrition:    Follow any diet instructions given to you by your doctor or the dietician, including how much salt (sodium) you are allowed each day.    If you are overweight, talk to your doctor about a weight reduction  plan.    Activity:    Remain physically active following your doctor's instructions about exercise and activity.    Rest often.     Any time you become even a little tired or short of breath, SIT DOWN and rest.    Worsening Symptoms:    Report any of the following signs and symptoms to the doctor's office immediately:    *Pain of jaw, arm, or neck  *Chest pain not relieved by medication                               *Dizziness or loss of consciousness  *Difficulty breathing even when at rest   *More tired than usual                                       *Bleeding drainage or swelling of surgical site  *Swelling of feet, ankles, legs or stomach                 *Fever (>100ºF)  *Pink or blood tinged sputum  *Weight gain (3lbs/day or 5lbs /week)           *Shock from internal defibrillator (if applicable)  *Palpitations or irregular heartbeats                *Cool and/or numb extremities    Stroke Awareness    Common Risk Factors for Stroke include:    Age  Atrial Fibrillation  Carotid Artery Stenosis  Diabetes Mellitus  Excessive alcohol consumption  High blood pressure  Overweight   Physical inactivity  Smoking    Warning signs and symptoms of a stroke include:    *Sudden numbness or weakness of the face, arm or leg (especially on one side of the body).  *Sudden confusion, trouble speaking or understanding.  *Sudden trouble seeing in one or both eyes.  *Sudden trouble walking, dizziness, loss of balance or coordination.Sudden severe headache with no known cause.    It is very important to get treatment quickly when a stroke occurs. If you experience any of the above warning signs, call 911 immediately.                   Disclaimer         Quit Smoking / Tobacco Use:    I understand the use of any tobacco products increases my chance of suffering from future heart disease or stroke and could cause other illnesses which may shorten my life. Quitting the use of tobacco products is the single most important thing I can  do to improve my health. For further information on smoking / tobacco cessation call a Toll Free Quit Line at 1-193.217.8407 (*National [employer] [place]) or 1-606.587.4369 (American Lung Association) or you can access the web based program at www.lungusa.org.    [place] Tobacco Users Help Line:  (205) 291-9283       Toll Free: 1-700.213.3436  Quit Tobacco Program [facility] (702)419-5410    Crisis Hotline:    [place] Crisis Hotline:  7-858-JUISLTH or 1-189.741.5395    [place] Crisis Hotline:    1-596.846.1917 or 866-717-8735    Discharge Survey:   Thank you for choosing [facility]. We hope we did everything we could to make your hospital stay a pleasant one. You may be receiving a phone survey and we would appreciate your time and participation in answering the questions. Your input is very valuable to us in our efforts to improve our service to our patients and their families.        My signature on this form indicates that:    1. I have reviewed and understand the above information.  2. My questions regarding this information have been answered to my satisfaction.  3. I have formulated a plan with my discharge nurse to obtain my prescribed medications for home.                  Disclaimer         __________________________________                     __________       ________                       Patient Signature                                                 Date                    Time

## 2017-03-25 NOTE — IP AVS SNAPSHOT
3/28/2017          Benjamin Post  [address]  [place] [address]    Dear Benjamin:    [facility] wants to ensure your discharge home is safe and you or your loved ones have had all your questions answered regarding your care after you leave the hospital.    You may receive a telephone call within two days of your discharge.  This call is to make certain you understand your discharge instructions as well as ensure we provided you with the best care possible during your stay with us.     The call will only last approximately 3-5 minutes and will be done by a nurse.    Once again, we want to ensure your discharge home is safe and that you have a clear understanding of any next steps in your care.  If you have any questions or concerns, please do not hesitate to contact us, we are here for you.  Thank you for choosing [facility] for your healthcare needs.    Sincerely,    Darshan Heaton    [facility]

## 2017-03-25 NOTE — ED NOTES
Med rec complete per pt and pt's family at bedside  Allergies reviewed - NKDA  No ABX in last month  Pt states that he did not take his medications today

## 2017-03-26 ENCOUNTER — APPOINTMENT (OUTPATIENT)
Dept: RADIOLOGY | Facility: MEDICAL CENTER | Age: 72
DRG: 157 | End: 2017-03-26
Attending: NURSE PRACTITIONER
Payer: MEDICARE

## 2017-03-26 ENCOUNTER — APPOINTMENT (OUTPATIENT)
Dept: RADIOLOGY | Facility: MEDICAL CENTER | Age: 72
DRG: 157 | End: 2017-03-26
Attending: PSYCHIATRY & NEUROLOGY
Payer: MEDICARE

## 2017-03-26 ENCOUNTER — APPOINTMENT (OUTPATIENT)
Dept: RADIOLOGY | Facility: MEDICAL CENTER | Age: 72
DRG: 157 | End: 2017-03-26
Attending: INTERNAL MEDICINE
Payer: MEDICARE

## 2017-03-26 ENCOUNTER — PATIENT OUTREACH (OUTPATIENT)
Dept: HEALTH INFORMATION MANAGEMENT | Facility: OTHER | Age: 72
End: 2017-03-26

## 2017-03-26 PROBLEM — D69.6 THROMBOCYTOPENIA (HCC): Chronic | Status: ACTIVE | Noted: 2017-03-25

## 2017-03-26 LAB
ALBUMIN SERPL BCP-MCNC: 2.9 G/DL (ref 3.2–4.9)
ALBUMIN SERPL BCP-MCNC: 2.9 G/DL (ref 3.2–4.9)
ALBUMIN/GLOB SERPL: 0.9 G/DL
ALBUMIN/GLOB SERPL: 0.9 G/DL
ALP SERPL-CCNC: 85 U/L (ref 30–99)
ALP SERPL-CCNC: 90 U/L (ref 30–99)
ALT SERPL-CCNC: 62 U/L (ref 2–50)
ALT SERPL-CCNC: 62 U/L (ref 2–50)
ANION GAP SERPL CALC-SCNC: 5 MMOL/L (ref 0–11.9)
ANION GAP SERPL CALC-SCNC: 5 MMOL/L (ref 0–11.9)
ANISOCYTOSIS BLD QL SMEAR: ABNORMAL
AST SERPL-CCNC: 24 U/L (ref 12–45)
AST SERPL-CCNC: 26 U/L (ref 12–45)
BASOPHILS # BLD AUTO: 0 % (ref 0–1.8)
BASOPHILS # BLD AUTO: 0 % (ref 0–1.8)
BASOPHILS # BLD: 0 K/UL (ref 0–0.12)
BASOPHILS # BLD: 0 K/UL (ref 0–0.12)
BILIRUB SERPL-MCNC: 0.6 MG/DL (ref 0.1–1.5)
BILIRUB SERPL-MCNC: 0.6 MG/DL (ref 0.1–1.5)
BLASTS NFR BLD MANUAL: 0.9 %
BUN SERPL-MCNC: 24 MG/DL (ref 8–22)
BUN SERPL-MCNC: 26 MG/DL (ref 8–22)
CALCIUM SERPL-MCNC: 10 MG/DL (ref 8.5–10.5)
CALCIUM SERPL-MCNC: 10.5 MG/DL (ref 8.5–10.5)
CHLORIDE SERPL-SCNC: 104 MMOL/L (ref 96–112)
CHLORIDE SERPL-SCNC: 106 MMOL/L (ref 96–112)
CO2 SERPL-SCNC: 27 MMOL/L (ref 20–33)
CO2 SERPL-SCNC: 27 MMOL/L (ref 20–33)
CREAT SERPL-MCNC: 0.88 MG/DL (ref 0.5–1.4)
CREAT SERPL-MCNC: 0.98 MG/DL (ref 0.5–1.4)
EOSINOPHIL # BLD AUTO: 0.04 K/UL (ref 0–0.51)
EOSINOPHIL # BLD AUTO: 0.06 K/UL (ref 0–0.51)
EOSINOPHIL NFR BLD: 1.7 % (ref 0–6.9)
EOSINOPHIL NFR BLD: 3 % (ref 0–6.9)
ERYTHROCYTE [DISTWIDTH] IN BLOOD BY AUTOMATED COUNT: 80.4 FL (ref 35.9–50)
ERYTHROCYTE [DISTWIDTH] IN BLOOD BY AUTOMATED COUNT: 81.3 FL (ref 35.9–50)
GFR SERPL CREATININE-BSD FRML MDRD: >60 ML/MIN/1.73 M 2
GFR SERPL CREATININE-BSD FRML MDRD: >60 ML/MIN/1.73 M 2
GLOBULIN SER CALC-MCNC: 3.3 G/DL (ref 1.9–3.5)
GLOBULIN SER CALC-MCNC: 3.4 G/DL (ref 1.9–3.5)
GLUCOSE BLD-MCNC: 132 MG/DL (ref 65–99)
GLUCOSE SERPL-MCNC: 113 MG/DL (ref 65–99)
GLUCOSE SERPL-MCNC: 142 MG/DL (ref 65–99)
HCT VFR BLD AUTO: 24.4 % (ref 42–52)
HCT VFR BLD AUTO: 24.4 % (ref 42–52)
HGB BLD-MCNC: 8.3 G/DL (ref 14–18)
HGB BLD-MCNC: 8.4 G/DL (ref 14–18)
LACTATE BLD-SCNC: 1 MMOL/L (ref 0.5–2)
LACTATE BLD-SCNC: 1.3 MMOL/L (ref 0.5–2)
LYMPHOCYTES # BLD AUTO: 0.24 K/UL (ref 1–4.8)
LYMPHOCYTES # BLD AUTO: 0.29 K/UL (ref 1–4.8)
LYMPHOCYTES NFR BLD: 14 % (ref 22–41)
LYMPHOCYTES NFR BLD: 9.6 % (ref 22–41)
MANUAL DIFF BLD: NORMAL
MANUAL DIFF BLD: NORMAL
MCH RBC QN AUTO: 32.8 PG (ref 27–33)
MCH RBC QN AUTO: 33.2 PG (ref 27–33)
MCHC RBC AUTO-ENTMCNC: 34 G/DL (ref 33.7–35.3)
MCHC RBC AUTO-ENTMCNC: 34.4 G/DL (ref 33.7–35.3)
MCV RBC AUTO: 96.4 FL (ref 81.4–97.8)
MCV RBC AUTO: 96.4 FL (ref 81.4–97.8)
METAMYELOCYTES NFR BLD MANUAL: 1 %
MONOCYTES # BLD AUTO: 0.07 K/UL (ref 0–0.85)
MONOCYTES # BLD AUTO: 0.11 K/UL (ref 0–0.85)
MONOCYTES NFR BLD AUTO: 2.6 % (ref 0–13.4)
MONOCYTES NFR BLD AUTO: 5 % (ref 0–13.4)
MORPHOLOGY BLD-IMP: NORMAL
MORPHOLOGY BLD-IMP: NORMAL
MYELOCYTES NFR BLD MANUAL: 0.9 %
NEUTROPHILS # BLD AUTO: 1.62 K/UL (ref 1.82–7.42)
NEUTROPHILS # BLD AUTO: 2.11 K/UL (ref 1.82–7.42)
NEUTROPHILS NFR BLD: 70 % (ref 44–72)
NEUTROPHILS NFR BLD: 76.5 % (ref 44–72)
NEUTS BAND NFR BLD MANUAL: 7 % (ref 0–10)
NEUTS BAND NFR BLD MANUAL: 7.8 % (ref 0–10)
NRBC # BLD AUTO: 0 K/UL
NRBC # BLD AUTO: 0.02 K/UL
NRBC BLD AUTO-RTO: 0 /100 WBC
NRBC BLD AUTO-RTO: 0.9 /100 WBC
OVALOCYTES BLD QL SMEAR: NORMAL
PLATELET # BLD AUTO: 26 K/UL (ref 164–446)
PLATELET # BLD AUTO: 36 K/UL (ref 164–446)
PLATELET BLD QL SMEAR: NORMAL
PLATELET BLD QL SMEAR: NORMAL
PLATELETS.RETICULATED NFR BLD AUTO: 1 K/UL (ref 0.6–13.1)
PLATELETS.RETICULATED NFR BLD AUTO: 1.9 K/UL (ref 0.6–13.1)
PMV BLD AUTO: 11.1 FL (ref 9–12.9)
PMV BLD AUTO: 11.4 FL (ref 9–12.9)
POIKILOCYTOSIS BLD QL SMEAR: NORMAL
POLYCHROMASIA BLD QL SMEAR: NORMAL
POTASSIUM SERPL-SCNC: 3.5 MMOL/L (ref 3.6–5.5)
POTASSIUM SERPL-SCNC: 4 MMOL/L (ref 3.6–5.5)
PROT SERPL-MCNC: 6.2 G/DL (ref 6–8.2)
PROT SERPL-MCNC: 6.3 G/DL (ref 6–8.2)
RBC # BLD AUTO: 2.53 M/UL (ref 4.7–6.1)
RBC # BLD AUTO: 2.53 M/UL (ref 4.7–6.1)
RBC BLD AUTO: PRESENT
RBC BLD AUTO: PRESENT
SMUDGE CELLS BLD QL SMEAR: NORMAL
SODIUM SERPL-SCNC: 136 MMOL/L (ref 135–145)
SODIUM SERPL-SCNC: 138 MMOL/L (ref 135–145)
WBC # BLD AUTO: 2.1 K/UL (ref 4.8–10.8)
WBC # BLD AUTO: 2.5 K/UL (ref 4.8–10.8)

## 2017-03-26 PROCEDURE — 99232 SBSQ HOSP IP/OBS MODERATE 35: CPT | Performed by: INTERNAL MEDICINE

## 2017-03-26 PROCEDURE — 85007 BL SMEAR W/DIFF WBC COUNT: CPT

## 2017-03-26 PROCEDURE — A9270 NON-COVERED ITEM OR SERVICE: HCPCS | Performed by: HOSPITALIST

## 2017-03-26 PROCEDURE — 71010 DX-CHEST-PORTABLE (1 VIEW): CPT

## 2017-03-26 PROCEDURE — 82962 GLUCOSE BLOOD TEST: CPT

## 2017-03-26 PROCEDURE — 3E0234Z INTRODUCTION OF SERUM, TOXOID AND VACCINE INTO MUSCLE, PERCUTANEOUS APPROACH: ICD-10-PCS | Performed by: HOSPITALIST

## 2017-03-26 PROCEDURE — 700105 HCHG RX REV CODE 258: Performed by: INTERNAL MEDICINE

## 2017-03-26 PROCEDURE — 700117 HCHG RX CONTRAST REV CODE 255: Performed by: PSYCHIATRY & NEUROLOGY

## 2017-03-26 PROCEDURE — 87040 BLOOD CULTURE FOR BACTERIA: CPT | Mod: 91

## 2017-03-26 PROCEDURE — 85055 RETICULATED PLATELET ASSAY: CPT

## 2017-03-26 PROCEDURE — 700102 HCHG RX REV CODE 250 W/ 637 OVERRIDE(OP): Performed by: PSYCHIATRY & NEUROLOGY

## 2017-03-26 PROCEDURE — 700102 HCHG RX REV CODE 250 W/ 637 OVERRIDE(OP): Performed by: HOSPITALIST

## 2017-03-26 PROCEDURE — 700102 HCHG RX REV CODE 250 W/ 637 OVERRIDE(OP): Performed by: NURSE PRACTITIONER

## 2017-03-26 PROCEDURE — A9270 NON-COVERED ITEM OR SERVICE: HCPCS | Performed by: PSYCHIATRY & NEUROLOGY

## 2017-03-26 PROCEDURE — 70450 CT HEAD/BRAIN W/O DYE: CPT

## 2017-03-26 PROCEDURE — 85027 COMPLETE CBC AUTOMATED: CPT

## 2017-03-26 PROCEDURE — 90662 IIV NO PRSV INCREASED AG IM: CPT | Performed by: HOSPITALIST

## 2017-03-26 PROCEDURE — 70498 CT ANGIOGRAPHY NECK: CPT

## 2017-03-26 PROCEDURE — 83605 ASSAY OF LACTIC ACID: CPT | Mod: 91

## 2017-03-26 PROCEDURE — 80053 COMPREHEN METABOLIC PANEL: CPT

## 2017-03-26 PROCEDURE — 770001 HCHG ROOM/CARE - MED/SURG/GYN PRIV*

## 2017-03-26 PROCEDURE — 700111 HCHG RX REV CODE 636 W/ 250 OVERRIDE (IP): Performed by: INTERNAL MEDICINE

## 2017-03-26 PROCEDURE — 700111 HCHG RX REV CODE 636 W/ 250 OVERRIDE (IP): Performed by: HOSPITALIST

## 2017-03-26 PROCEDURE — 74000 DX-ABDOMEN-1 VIEW: CPT

## 2017-03-26 PROCEDURE — 90471 IMMUNIZATION ADMIN: CPT

## 2017-03-26 PROCEDURE — A9270 NON-COVERED ITEM OR SERVICE: HCPCS | Performed by: NURSE PRACTITIONER

## 2017-03-26 PROCEDURE — 700105 HCHG RX REV CODE 258: Performed by: HOSPITALIST

## 2017-03-26 PROCEDURE — 36415 COLL VENOUS BLD VENIPUNCTURE: CPT

## 2017-03-26 PROCEDURE — 70551 MRI BRAIN STEM W/O DYE: CPT

## 2017-03-26 PROCEDURE — 70496 CT ANGIOGRAPHY HEAD: CPT

## 2017-03-26 RX ORDER — SODIUM CHLORIDE 9 MG/ML
500 INJECTION, SOLUTION INTRAVENOUS
Status: DISCONTINUED | OUTPATIENT
Start: 2017-03-26 | End: 2017-03-27

## 2017-03-26 RX ORDER — LEVETIRACETAM 250 MG/1
750 TABLET ORAL 2 TIMES DAILY
Status: DISCONTINUED | OUTPATIENT
Start: 2017-03-26 | End: 2017-03-28 | Stop reason: HOSPADM

## 2017-03-26 RX ORDER — PENICILLIN V POTASSIUM 500 MG/1
500 TABLET ORAL 4 TIMES DAILY
Qty: 28 TAB | Refills: 0 | Status: SHIPPED | OUTPATIENT
Start: 2017-03-26 | End: 2017-03-28

## 2017-03-26 RX ORDER — ACETAMINOPHEN 650 MG/1
650 SUPPOSITORY RECTAL ONCE
Status: COMPLETED | OUTPATIENT
Start: 2017-03-26 | End: 2017-03-26

## 2017-03-26 RX ORDER — SODIUM CHLORIDE 9 MG/ML
30 INJECTION, SOLUTION INTRAVENOUS
Status: DISCONTINUED | OUTPATIENT
Start: 2017-03-26 | End: 2017-03-27

## 2017-03-26 RX ORDER — SODIUM CHLORIDE 9 MG/ML
30 INJECTION, SOLUTION INTRAVENOUS ONCE
Status: COMPLETED | OUTPATIENT
Start: 2017-03-26 | End: 2017-03-26

## 2017-03-26 RX ADMIN — LEVETIRACETAM 750 MG: 250 TABLET, FILM COATED ORAL at 21:16

## 2017-03-26 RX ADMIN — ACETAMINOPHEN 650 MG: 325 TABLET, FILM COATED ORAL at 01:07

## 2017-03-26 RX ADMIN — CEFTRIAXONE 2 G: 2 INJECTION, POWDER, FOR SOLUTION INTRAMUSCULAR; INTRAVENOUS at 21:16

## 2017-03-26 RX ADMIN — AMPICILLIN SODIUM AND SULBACTAM SODIUM 3 G: 2; 1 INJECTION, POWDER, FOR SOLUTION INTRAMUSCULAR; INTRAVENOUS at 01:06

## 2017-03-26 RX ADMIN — OXYCODONE HYDROCHLORIDE 5 MG: 10 TABLET ORAL at 05:32

## 2017-03-26 RX ADMIN — ACETAMINOPHEN 650 MG: 650 SUPPOSITORY RECTAL at 16:21

## 2017-03-26 RX ADMIN — INFLUENZA A VIRUS A/CALIFORNIA/7/2009 X-179A (H1N1) ANTIGEN (FORMALDEHYDE INACTIVATED), INFLUENZA A VIRUS A/HONG KONG/4801/2014 X-263B (H3N2) ANTIGEN (FORMALDEHYDE INACTIVATED), AND INFLUENZA B VIRUS B/BRISBANE/60/2008 ANTIGEN (FORMALDEHYDE INACTIVATED) 0.5 ML: 60; 60; 60 INJECTION, SUSPENSION INTRAMUSCULAR at 05:40

## 2017-03-26 RX ADMIN — AZITHROMYCIN 500 MG: 500 INJECTION, POWDER, LYOPHILIZED, FOR SOLUTION INTRAVENOUS at 21:17

## 2017-03-26 RX ADMIN — CARVEDILOL 3.12 MG: 6.25 TABLET, FILM COATED ORAL at 08:44

## 2017-03-26 RX ADMIN — LEVETIRACETAM 500 MG: 250 TABLET, FILM COATED ORAL at 08:45

## 2017-03-26 RX ADMIN — OXYCODONE HYDROCHLORIDE 5 MG: 10 TABLET ORAL at 01:07

## 2017-03-26 RX ADMIN — CARVEDILOL 3.12 MG: 6.25 TABLET, FILM COATED ORAL at 17:42

## 2017-03-26 RX ADMIN — SODIUM CHLORIDE 2217 ML: 9 INJECTION, SOLUTION INTRAVENOUS at 17:14

## 2017-03-26 RX ADMIN — TAMSULOSIN HYDROCHLORIDE 0.4 MG: 0.4 CAPSULE ORAL at 08:44

## 2017-03-26 RX ADMIN — STANDARDIZED SENNA CONCENTRATE AND DOCUSATE SODIUM 2 TABLET: 8.6; 5 TABLET, FILM COATED ORAL at 21:16

## 2017-03-26 RX ADMIN — AMPICILLIN SODIUM AND SULBACTAM SODIUM 3 G: 2; 1 INJECTION, POWDER, FOR SOLUTION INTRAMUSCULAR; INTRAVENOUS at 05:35

## 2017-03-26 RX ADMIN — STANDARDIZED SENNA CONCENTRATE AND DOCUSATE SODIUM 2 TABLET: 8.6; 5 TABLET, FILM COATED ORAL at 08:44

## 2017-03-26 RX ADMIN — IOHEXOL 80 ML: 350 INJECTION, SOLUTION INTRAVENOUS at 21:05

## 2017-03-26 ASSESSMENT — ENCOUNTER SYMPTOMS
HEADACHES: 0
STRIDOR: 0
ABDOMINAL PAIN: 0
MYALGIAS: 0
CHILLS: 0
SORE THROAT: 0
SHORTNESS OF BREATH: 0
DIAPHORESIS: 0
FEVER: 0
VOMITING: 0
NAUSEA: 0

## 2017-03-26 ASSESSMENT — PATIENT HEALTH QUESTIONNAIRE - PHQ9
2. FEELING DOWN, DEPRESSED, IRRITABLE, OR HOPELESS: NOT AT ALL
SUM OF ALL RESPONSES TO PHQ QUESTIONS 1-9: 0
1. LITTLE INTEREST OR PLEASURE IN DOING THINGS: NOT AT ALL
SUM OF ALL RESPONSES TO PHQ9 QUESTIONS 1 AND 2: 0

## 2017-03-26 ASSESSMENT — COPD QUESTIONNAIRES
DURING THE PAST 4 WEEKS HOW MUCH DID YOU FEEL SHORT OF BREATH: NONE/LITTLE OF THE TIME
DO YOU EVER COUGH UP ANY MUCUS OR PHLEGM?: NO/ONLY WITH OCCASIONAL COLDS OR INFECTIONS
HAVE YOU SMOKED AT LEAST 100 CIGARETTES IN YOUR ENTIRE LIFE: YES
COPD SCREENING SCORE: 4

## 2017-03-26 ASSESSMENT — LIFESTYLE VARIABLES
DO YOU DRINK ALCOHOL: NO
EVER_SMOKED: YES
DO YOU DRINK ALCOHOL: NO

## 2017-03-26 ASSESSMENT — PAIN SCALES - GENERAL
PAINLEVEL_OUTOF10: 7
PAINLEVEL_OUTOF10: 8

## 2017-03-26 NOTE — PROGRESS NOTES
Pt admitted to room S144 and instructed on use of call bell.  Bed locked and in lowest position with bed alarm on.  Vital signs stable and Pt. Not in any distress.

## 2017-03-26 NOTE — PROGRESS NOTES
Pt refused lab draw.  Pt educated on need for labs and the possibility of the need for blood transfusion.  Pt agreed to lab draw but not before 6AM.

## 2017-03-26 NOTE — H&P
CHIEF COMPLAINT:  Dental caries.    HISTORY OF PRESENT ILLNESS:  The patient is a very pleasant 71-year-old male   with past medical history of CML, essentially presented in the [facility]   today for complaints of dental pain.  Patient is waiting to undergo bone   marrow transplantation at [facility]; however, because he has been having   chronic dental infections, as a result, his bone marrow transplantation will   be delayed.  Given this, patient was sent here for further medical management.    Upon our evaluation, ISIDRA Hennessy did discuss the case with Dr. Barragan,   oral surgeon, which he is willing to take the patient to surgery to remove his   dental caries in his teeth.  In addition, patient was noted to be   thrombocytopenic.  We will try to reverse that since the patient undergoes   surgery.  At this time, patient does not have any acute complaints.  Denies   having any chest pain, shortness of breath, fever, chills, nausea, or   vomiting.    REVIEW OF SYSTEMS:  Please see HPI.  Otherwise, all other review of systems   are negative per AMA/CMS criteria.    ALLERGIES:  No known drug allergies.    PAST MEDICAL HISTORY:  1.  Chronic myeloid leukemia.  2.  Chronic thrombocytopenia.  3.  History of dental carries and abscesses.    SOCIAL HISTORY:  Patient used to be a previous smoker, 50-pack-years.  Denies   using tobacco, alcohol, or illicit drugs currently.    FAMILY HISTORY:  Reviewed and noncontributory.    MEDICATIONS:  At home, include bosutinib tabs 300 mg daily, Coreg 3.125 mg   twice a day, Keppra 500 mg twice a day, Flomax 0.4 mg daily, multivitamins.    PHYSICAL EXAMINATION:  VITAL SIGNS:  Temperature 98.3, pulse 84, blood pressure 109/57, respirations   16, SpO2 of 99% on room air.  CONSTITUTIONAL:  Well-groomed, well-nourished.  No apparent distress.    Nontoxic appearance.  HEENT:  Normocephalic, atraumatic.  Pupils equal and reactive to light,   nonicteric.  Mucous membranes dry.  Patient has  poor dentition with several   teeth being eroded with foul smell.  No surrounding tissue erythema.  NECK:  Supple.  No thyromegaly, no JVD, no stridor.  CARDIOVASCULAR:  Normal rate and rhythm.  No gallops, rubs, or murmurs   appreciated.  LUNGS:  Clear bilaterally.  No wheezes, rales, or rhonchi.  ABDOMEN:  Soft, nontender, nondistended.  Positive bowel sounds.  No   hepatosplenomegaly or pulsatile masses.  SKIN:  Mild, upper extremities have several, ecchymotic spots.  EXTREMITIES:  Distal pulses are intact, +2.  No cyanosis, clubbing, or edema.    No joint deformities.  Range of motion in all 4 extremities is normal.  NEUROLOGIC:  Alert and oriented x3.  Cranial nerves II-XII grossly intact.  No   focal deficits.  PSYCHIATRIC:  Affect, judgment, mood is normal.    LABORATORY DATA:  WBC count 2.6, hemoglobin 9.6, hematocrit 25.2, .9,   platelet count 17.  Chemistry, sodium 139, potassium 3.7, chloride 103, CO2 of   26, BUN 29, creatinine 1.05.    DIAGNOSTIC WORKUP:  EKG shows heart rate 86.  No acute ST elevations or   depressions noted.  Mandibular panoramic x-ray, multiple maxillary dental   caries.  No root abscesses identified.  No acute bony abnormality.  Chest   x-ray, hazy bibasilar opacities, may represent atelectasis or pneumonitis,   stable blunting of right costophrenic angle. healing lower right-sided liver   fracture.    ASSESSMENT AND PLAN:  1.  Multiple dental caries and periodontal disease which certainly can _____   his treatment for undergoing a bone marrow biopsy at [facility] next week.  At   this time, Dr. Barragan has been consulted oral surgery for a full mouth   tooth extraction; however, before this procedure, we will have to increase his   platelets, which are currently at 17, with platelet infusions to avoid   bleeding risk.  2.  History of chronic myeloid leukemia, pending bone marrow transplant next   week.  3.  Chronic thrombocytopenia.  His platelets today are 17.  We will go  ahead   and transfuse him with 1 unit of platelets.  Recheck his platelet count.  If   under 30, will go ahead and provide another unit of platelets.  4.  Microcytic anemia secondary to chronic myelogenous leukemia.  Again, as   above, we will continue to monitor closely.    5.  Patient will be placed on sequential compression devices for deep vein   thrombosis prophylaxis as heparin is contraindicated at this time.  Docusate   for gastrointestinal prophylaxis.  6.  The patient's code status is a full code.    I anticipate hospital length of stay to be greater than 2 midnights.       ____________________________________     MD TATI AYERS / JOSÉ ANTONIO    DD:  03/25/2017 18:26:17  DT:  03/25/2017 20:07:32    D#:  [id number]  Job#:  [id number]

## 2017-03-26 NOTE — OR NURSING
Received report from ROGER Cotter. Pt awake alert verbal and appropriate. Denies pain or concern at this time. Await room availability.

## 2017-03-26 NOTE — CODE DOCUMENTATION
Pt back from CT, stroke team initiated.  NIHSS=16 prior to head CT, Ukrainian-speaking CNA aided in translation.

## 2017-03-26 NOTE — PROGRESS NOTES
Hospital Medicine Progress Note, Adult, Complex               Author: Chelsie Moore Date & Time created: 3/26/2017  3:42 PM     Interval History:  Patient was admitted for teeth extraction  He tolerated surgery well and had no bleeding issues overnight    Review of Systems:  Review of Systems   Constitutional: Negative for fever, chills and diaphoresis.   HENT: Negative for sore throat.    Respiratory: Negative for shortness of breath and stridor.    Cardiovascular: Negative for chest pain.   Gastrointestinal: Negative for nausea, vomiting and abdominal pain.   Genitourinary: Negative for urgency.   Musculoskeletal: Negative for myalgias.   Skin: Negative for rash.   Neurological: Negative for headaches.       Physical Exam:  Physical Exam   Constitutional: He is oriented to person, place, and time.   HENT:   Clots over gum surgical site with  No oozing   Eyes: Conjunctivae are normal. No scleral icterus.   Cardiovascular: Normal rate and regular rhythm.    No murmur heard.  Pulses:       Radial pulses are 2+ on the right side, and 2+ on the left side.   Pulmonary/Chest: Effort normal and breath sounds normal.   Abdominal: Soft.   Musculoskeletal: He exhibits no edema.   Neurological: He is alert and oriented to person, place, and time.   Skin: Skin is warm and dry.   Skin is pink and warm  Capillary refill is less than 4 seconds   Nursing note and vitals reviewed.      Labs:        Invalid input(s): [id number]      Recent Labs      03/25/17   1204  03/26/17   0559   SODIUM  139  138   POTASSIUM  3.7  4.0   CHLORIDE  103  106   CO2  26  27   BUN  29*  24*   CREATININE  1.05  0.88   MAGNESIUM  2.1   --    CALCIUM  11.4*  10.0     Recent Labs      03/25/17   1204  03/26/17   0559   ALTSGPT  74*  62*   ASTSGOT  36  26   ALKPHOSPHAT  107*  85   TBILIRUBIN  0.8  0.6   GLUCOSE  111*  113*     Recent Labs      03/25/17   1204  03/26/17   0559   RBC  2.45*  2.53*   HEMOGLOBIN  8.6*  8.4*   HEMATOCRIT  25.2*  24.4*    PLATELETCT  17*  36*   PROTHROMBTM  15.8*   --    APTT  35.1   --    INR  1.22*   --      Recent Labs      17   1204  17   0559   WBC  2.6*  2.5*   NEUTSPOLYS  71.90  76.50*   LYMPHOCYTES  11.40*  9.60*   MONOCYTES  5.30  2.60   EOSINOPHILS  2.60  1.70   BASOPHILS  0.90  0.00   ASTSGOT  36  26   ALTSGPT  74*  62*   ALKPHOSPHAT  107*  85   TBILIRUBIN  0.8  0.6           Hemodynamics:  Temp (24hrs), Av.4 °C (97.6 °F), Min:36 °C (96.8 °F), Max:36.8 °C (98.2 °F)  Temperature: 36.2 °C (97.1 °F)  Pulse  Av.4  Min: 65  Max: 87Heart Rate (Monitored): 81  Blood Pressure : 126/72 mmHg, NIBP: (!) 169/85 mmHg (PT DUE FOR SCHEDUELED MEDS. WILL NOTIFY ROGER SUTTON.)     Respiratory:    Respiration: 18, Pulse Oximetry: 96 %           Fluids:    Intake/Output Summary (Last 24 hours) at 17 1542  Last data filed at 17 1246   Gross per 24 hour   Intake   2565 ml   Output   2025 ml   Net    540 ml     Weight: 73.936 kg (163 lb)  GI/Nutrition:  Orders Placed This Encounter   Procedures   • Diet Order     Standing Status: Standing      Number of Occurrences: 1      Standing Expiration Date:      Order Specific Question:  Diet:     Answer:  Regular [1]     Order Specific Question:  Texture/Fiber modifications:     Answer:  Dysphagia 1(Pureed)specify fluid consistency(question 6) [1]     Medical Decision Making, by Problem:  Active Hospital Problems    Diagnosis   • *Dental caries, unspecified [K02.9] teeth extracted   • Pancytopenia (CMS-HCC) [D61.818] stable   • Thrombocytopenia (CMS-HCC) [D69.6]   • 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] treatment per oncology on follow up   Macrocytic anemia, stable overnight    Labs reviewed and Medications reviewed  Mahajan catheter: No Mahajan      DVT Prophylaxis: Contraindicated - High bleeding risk    Ulcer prophylaxis: Not indicated

## 2017-03-26 NOTE — PROGRESS NOTES
· 3/26/17 at 1:28 PM--Placed phone call to patient's daughter Keith.  Keith states that pt is still admitted to [facility].  Pt was d/c'ed from [facility] ER and readmitted same day on 3/25/17.  No discharge outreach completed at this time d/t pt's current admission.

## 2017-03-26 NOTE — CONSULTS
ORAL SURGERY NOTE    DATE OF CONSULTATION:  3/25/2017    CHIEF COMPLAINT:  Dental Pain and infected teeth    HISTORY OF PRESENT ILLNESS:  This is a 71 y.o. male who has a history of CML.  He is scheduled for a bone marrow transplant in a few weeks.  He has been having off and on pain and swelling in the lower jaw.  He has had worsening symptoms over the last several days until male came to the hospital.  According to him the transplant surgeon said he would not do the bone marrow transplant without all the teeth extracted.     Past Medical/ Family / Social history (PFSH):   Past Medical History:   Active Ambulatory Problems     Diagnosis Date Noted   • Leukemia (CMS-HCC) 03/12/2015   • Dyspnea 04/21/2015   • Pancytopenia (CMS-HCC) 04/21/2015   • Pleural effusion, bilateral 04/21/2015   • Pulmonary infiltrate 04/21/2015   • Hypokalemia 04/24/2015   • Acute respiratory failure (CMS-HCC) 04/24/2015   • CML (chronic myelocytic leukemia)-Accelerated phase 04/24/2015   • Pleural effusion exudative-side effect of Dasatinib 04/24/2015   • Fever 05/09/2015   • AML (acute myeloid leukemia) in relapse (CMS-HCC) 05/09/2015   • Pneumonia 05/10/2015   • Leukopenia 05/10/2015   • Accelerated phase chronic myeloid leukemia (CMS-HCC) 11/05/2015   • Hemoptysis 12/17/2015   • Subdural hematoma, acute (CMS-HCC) 08/03/2016     Resolved Ambulatory Problems     Diagnosis Date Noted   • Hypotension 04/21/2015     Past Medical History   Diagnosis Date   • Hypertension    • Cancer (CMS-HCC)    • Indigestion    • Heart burn      Past Medical History   Diagnosis Date   • Hypertension    • Cancer (CMS-HCC)    • Indigestion    • Heart burn      Past Surgical History:   See ED notes    Current Outpatient Medications:   Current Facility-Administered Medications   Medication Dose   • carvedilol (COREG) tablet 3.125 mg  3.125 mg   • levetiracetam (KEPPRA) tablet 500 mg  500 mg   • tamsulosin (FLOMAX) capsule 0.4 mg  0.4 mg   • [START ON 3/26/2017]  senna-docusate (PERICOLACE or SENOKOT S) 8.6-50 MG per tablet 2 Tab  2 Tab    And   • [START ON 3/26/2017] polyethylene glycol/lytes (MIRALAX) PACKET 1 Packet  1 Packet    And   • [START ON 3/26/2017] magnesium hydroxide (MILK OF MAGNESIA) suspension 30 mL  30 mL    And   • [START ON 3/26/2017] bisacodyl (DULCOLAX) suppository 10 mg  10 mg   • NS infusion     • acetaminophen (TYLENOL) tablet 650 mg  650 mg   • ampicillin/sulbactam (UNASYN) 3 g in  mL IVPB  3 g   • oxycodone immediate-release (ROXICODONE) tablet 5 mg  5 mg   • loperamide (IMODIUM) capsule 2 mg  2 mg       Medication Allergy/Sensitivities:   No Known Allergies     Social History:   NONE    Allergies:  No Known Allergies    REVIEW OF SYSTEMS:  Denies CP, Denies SOB, Denies any Changes in vision, no nausea, no GI upset, 12 point ROS was done and is negative per the HPI.  Facial pain.     PHYSICAL EXAMINATION:  VITAL SIGNS:  Stable.  male is afebrile. Cachectic. VSS. Jaundice?  GENERAL:  male is in no acute distress.  HEENT:  Head is normocephalic and atraumatic.   EARS: TM clear.    EYES: Extraocular   muscles are intact with no entrapment.  Pupils equally round and reactive to light and   accommodation.    NOSE: Nares are patent bilateral with no crepitus of the nasal bones  ORAL:   45 mm mouth opening.  no deviation upon opening.  Dentition is poor. Multiple losse and abscessed teeth. THROAT:  Mallampati 1  HEART:  His heart was regular rate and rhythm.  LUNGS:  Clear to auscultation bilaterally.    X-RAYS: Pano  Multiple abscessed and broken teeth.        ASSESSMENT:  This is a 71 y.o. male who has multiple abscessed teeth. Generalized perio and failing teeth. CML and scheduled for bone marrow transplant next week.   -thrombocytopenia  -anemic        PLAN:  Full mouth tooth extraction. Needs these teeth extracted prior to getting his bone marrow transplant next week.     1. Blood transfusion prior  2.Platelet transfussion prior    Keep him on  abx overnight. Send home tomorrow if hemostatic on penicillin vk 500mg 1 tab qid 1week.         ____________________________________     JOSE A CHOWDHURY DMD,MD

## 2017-03-26 NOTE — DISCHARGE SUMMARY
CHIEF COMPLAINT ON ADMISSION  Chief Complaint   Patient presents with   • Sent by MD   • Dental Pain       CODE STATUS  Full Code    HPI & HOSPITAL COURSE  This is a 71 y.o. year old male here with severe dental caries and history of CML with sig thrombocytopenia. He is due to have a bone marrow transplantation in [facility] but was unable to get until he had teeth extraction. Dr Barragan consulted and took patient for complete teeth extraction on 3/25/17 after a tranfusion of platelets for his level of 17. His platelets came up to 34 with the transfusion. The procedure went well and he has good clot formation. He has no pain complaint and was able to eat.     Therefore, he is discharged in good and stable condition with close outpatient follow-up and on PCN VK four times daily for one week.    SPECIFIC OUTPATIENT FOLLOW-UP  [facility]- within 1-2 weeks  Dr Barragan within 1-2 weeks    DISCHARGE PROBLEM LIST  Principal Problem:    Dental caries, unspecified POA: Yes  Active Problems:    Pancytopenia (CMS-HCC) (Chronic) POA: Yes    CML (chronic myelocytic leukemia)-Accelerated phase (Chronic) POA: Yes    Thrombocytopenia (CMS-HCC) (Chronic) POA: Yes  Resolved Problems:    * No resolved hospital problems. *      FOLLOW UP  Future Appointments  Date Time Provider Department Center   3/28/2017 10:00 AM RN 2 ON [address]   4/4/2017 10:30 AM RN 4 ON [address]   4/11/2017 10:30 AM RN 4 ON [address]   4/18/2017 10:30 AM RN 4 ON [address]   4/25/2017 10:00 AM RN 1 ON [address]   5/2/2017 10:00 AM RN 1 ON [address]   5/9/2017 10:00 AM RN 1 [address]   5/16/2017 10:30 AM RN 6 [address]   5/23/2017 10:00 AM RN 2 [address]   5/30/2017 10:00 AM RN 2 [address]   6/6/2017 10:00 AM RN 2 ON [address]   6/13/2017 10:30 AM RN 6 [address]   6/20/2017 10:30 AM RN 6 ON [address]   6/27/2017 10:30 AM RN 6 ON [place]         MEDICATIONS ON DISCHARGE   Benjamin Post    Home Medication Instructions [facility]:[id number]    Printed on:03/26/17 1001   Medication Information                      Bosutinib 100 MG Tab  Take 300 mg by mouth every day.             carvedilol (COREG) 3.125 MG TABS  Take 3.125 mg by mouth 2 times a day, with meals.             levetiracetam (KEPPRA) 500 MG Tab  Take 1 Tab by mouth 2 Times a Day.             loperamide (IMODIUM) 2 MG Cap  Take 2 mg by mouth 4 times a day as needed for Diarrhea.             Multiple Vitamins-Minerals (PRESERVISION AREDS 2) Cap  Take 1 Cap by mouth every day.             oxycodone immediate-release (ROXICODONE) 5 MG Tab  Take 1 Tab by mouth every four hours as needed (Moderate Pain; (Pain scale 4-6)).             penicillin v potassium (VEETID) 500 MG Tab  Take 1 Tab by mouth 4 times a day for 7 days.             tamsulosin (FLOMAX) 0.4 MG capsule  Take 0.4 mg by mouth every morning.                 DIET  Orders Placed This Encounter   Procedures   • Diet Order     Standing Status: Standing      Number of Occurrences: 1      Standing Expiration Date:      Order Specific Question:  Diet:     Answer:  Regular [1]     Order Specific Question:  Texture/Fiber modifications:     Answer:  Dysphagia 1(Pureed)specify fluid consistency(question 6) [1]       ACTIVITY  As tolerated.      CONSULTATIONS  Dr Barragan    PROCEDURES  Complete Teeth Extraction 3/25/17    LABORATORY  Lab Results   Component Value Date/Time    SODIUM 138 03/26/2017 05:59 AM    POTASSIUM 4.0 03/26/2017 05:59 AM    CHLORIDE 106 03/26/2017 05:59 AM    CO2 27 03/26/2017 05:59 AM    GLUCOSE 113* 03/26/2017 05:59 AM    BUN 24* 03/26/2017 05:59 AM    CREATININE 0.88 03/26/2017 05:59 AM        Lab Results   Component Value Date/Time    WBC 2.5* 03/26/2017 05:59 AM    HEMOGLOBIN 8.4* 03/26/2017 05:59 AM    HEMATOCRIT 24.4* 03/26/2017 05:59 AM    PLATELET COUNT 36* 03/26/2017 05:59 AM        Total time of the discharge process exceeds 31 minutes.  CC: Dr Barragan, [facility] [place]

## 2017-03-26 NOTE — CODE DOCUMENTATION
Pt was found to be confused which was a change from earlier today. He was only oriented to self and unable to lift any extremities. APRN paged. Labs ordered - CBC, LA, CMP. CXR ordered, CT without contrast ordered stat. Tylenol suppository ordered.     1619: Labs collected    1620: Pt taken down for stat head CT.

## 2017-03-26 NOTE — CARE PLAN
Problem: Pain Management  Goal: Pain level will decrease to patient’s comfort goal  Outcome: PROGRESSING AS EXPECTED  Patients pain is treated with oral narcotics.

## 2017-03-26 NOTE — DISCHARGE PLANNING
Medical SW    Referral: Sw received page from bedside RN, Alexandre. Pt to d/c today but pt's daughter (dtr), Keith, refused to take him home. RN states APN indicates pt is medically clear, has no reason to be in hospital and should d/c home.       Intervention: RN further reports:  Pt lives w/ dtr. Dtr indicated she cannot  pt until tomorrow AM. RN states pt is Kiswahili speaking, and is physically able to ambulate and transfer independently. Pt appears to be able to understand RN during prompting for medical care. Pt has twice weekly blood transfusions due to cancer dx. Pt has appointments for the coming week. Dtr is transportation source.     RN indicates pt is safe to ambulate independently per completed evaluation.     Sw called pt's dtr and left VM indicating the hospital bill will not be covered by pt's insurance if he stays tonight as medical staff indicates no medical need for him to be here. Sw awaiting return call from pt's dtr.    RN indicates if pt has to stay tonight and be p/u by dtr tomorrow AM, the family is aware of the out of pocket expense.        Plan: Sw to assist w/ d/c planning as needed.

## 2017-03-26 NOTE — PROGRESS NOTES
Upon discharge today patients daughter stated she could not care for her daughter today and would be able to pick him up tomorrow morning. RN notified JUSTYN Orionjair and she preferred the patient be discharged home today because he is not medically indicated to stay. Yajaira in social work was called to arrange transportation for patient. Daughter was unreachable and patient remains in room.

## 2017-03-26 NOTE — OR NURSING
PT MEETS D/C CRITERIA PLACED ON TRANSPORT BOARD. REPORT TO ROGER SUTTON [id number]. PT W/O PAIN OR NAUSEA. VS WNL.

## 2017-03-27 ENCOUNTER — APPOINTMENT (OUTPATIENT)
Dept: RADIOLOGY | Facility: MEDICAL CENTER | Age: 72
DRG: 157 | End: 2017-03-27
Attending: INTERNAL MEDICINE
Payer: MEDICARE

## 2017-03-27 LAB
ANION GAP SERPL CALC-SCNC: 3 MMOL/L (ref 0–11.9)
ANISOCYTOSIS BLD QL SMEAR: ABNORMAL
APPEARANCE UR: CLEAR
BASOPHILS # BLD AUTO: 0.8 % (ref 0–1.8)
BASOPHILS # BLD: 0.02 K/UL (ref 0–0.12)
BILIRUB UR QL STRIP.AUTO: NEGATIVE
BUN SERPL-MCNC: 22 MG/DL (ref 8–22)
CALCIUM SERPL-MCNC: 10.3 MG/DL (ref 8.5–10.5)
CHLORIDE SERPL-SCNC: 108 MMOL/L (ref 96–112)
CO2 SERPL-SCNC: 27 MMOL/L (ref 20–33)
COLOR UR: NORMAL
CREAT SERPL-MCNC: 0.89 MG/DL (ref 0.5–1.4)
EOSINOPHIL # BLD AUTO: 0.06 K/UL (ref 0–0.51)
EOSINOPHIL NFR BLD: 3 % (ref 0–6.9)
ERYTHROCYTE [DISTWIDTH] IN BLOOD BY AUTOMATED COUNT: 79.8 FL (ref 35.9–50)
GFR SERPL CREATININE-BSD FRML MDRD: >60 ML/MIN/1.73 M 2
GLUCOSE SERPL-MCNC: 116 MG/DL (ref 65–99)
GLUCOSE UR STRIP.AUTO-MCNC: NEGATIVE MG/DL
HCT VFR BLD AUTO: 22.2 % (ref 42–52)
HGB BLD-MCNC: 7.3 G/DL (ref 14–18)
KETONES UR STRIP.AUTO-MCNC: NEGATIVE MG/DL
LEUKOCYTE ESTERASE UR QL STRIP.AUTO: NEGATIVE
LYMPHOCYTES # BLD AUTO: 0.26 K/UL (ref 1–4.8)
LYMPHOCYTES NFR BLD: 13.5 % (ref 22–41)
MANUAL DIFF BLD: NORMAL
MCH RBC QN AUTO: 32.2 PG (ref 27–33)
MCHC RBC AUTO-ENTMCNC: 32.9 G/DL (ref 33.7–35.3)
MCV RBC AUTO: 97.8 FL (ref 81.4–97.8)
MICRO URNS: NORMAL
MONOCYTES # BLD AUTO: 0.26 K/UL (ref 0–0.85)
MONOCYTES NFR BLD AUTO: 13.5 % (ref 0–13.4)
MORPHOLOGY BLD-IMP: NORMAL
NEUTROPHILS # BLD AUTO: 1.31 K/UL (ref 1.82–7.42)
NEUTROPHILS NFR BLD: 66.2 % (ref 44–72)
NEUTS BAND NFR BLD MANUAL: 3 % (ref 0–10)
NITRITE UR QL STRIP.AUTO: NEGATIVE
NRBC # BLD AUTO: 0.02 K/UL
NRBC BLD AUTO-RTO: 1.1 /100 WBC
PH UR STRIP.AUTO: 6 [PH]
PLATELET # BLD AUTO: 22 K/UL (ref 164–446)
PLATELET BLD QL SMEAR: NORMAL
PLATELETS.RETICULATED NFR BLD AUTO: 2 K/UL (ref 0.6–13.1)
PMV BLD AUTO: 10.8 FL (ref 9–12.9)
POTASSIUM SERPL-SCNC: 3.8 MMOL/L (ref 3.6–5.5)
PROT UR QL STRIP: NEGATIVE MG/DL
RBC # BLD AUTO: 2.27 M/UL (ref 4.7–6.1)
RBC BLD AUTO: PRESENT
RBC UR QL AUTO: NEGATIVE
SODIUM SERPL-SCNC: 138 MMOL/L (ref 135–145)
SP GR UR STRIP.AUTO: 1.02
WBC # BLD AUTO: 1.9 K/UL (ref 4.8–10.8)

## 2017-03-27 PROCEDURE — 86644 CMV ANTIBODY: CPT

## 2017-03-27 PROCEDURE — 770001 HCHG ROOM/CARE - MED/SURG/GYN PRIV*

## 2017-03-27 PROCEDURE — 86923 COMPATIBILITY TEST ELECTRIC: CPT

## 2017-03-27 PROCEDURE — 71010 DX-CHEST-PORTABLE (1 VIEW): CPT

## 2017-03-27 PROCEDURE — A9270 NON-COVERED ITEM OR SERVICE: HCPCS | Performed by: PSYCHIATRY & NEUROLOGY

## 2017-03-27 PROCEDURE — P9016 RBC LEUKOCYTES REDUCED: HCPCS

## 2017-03-27 PROCEDURE — 700105 HCHG RX REV CODE 258: Performed by: INTERNAL MEDICINE

## 2017-03-27 PROCEDURE — 36415 COLL VENOUS BLD VENIPUNCTURE: CPT

## 2017-03-27 PROCEDURE — 85055 RETICULATED PLATELET ASSAY: CPT

## 2017-03-27 PROCEDURE — A9270 NON-COVERED ITEM OR SERVICE: HCPCS | Performed by: HOSPITALIST

## 2017-03-27 PROCEDURE — 95951 HCHG EEG-VIDEO-24HR: CPT | Mod: 52

## 2017-03-27 PROCEDURE — 36430 TRANSFUSION BLD/BLD COMPNT: CPT

## 2017-03-27 PROCEDURE — 99233 SBSQ HOSP IP/OBS HIGH 50: CPT | Performed by: INTERNAL MEDICINE

## 2017-03-27 PROCEDURE — 81003 URINALYSIS AUTO W/O SCOPE: CPT

## 2017-03-27 PROCEDURE — 80048 BASIC METABOLIC PNL TOTAL CA: CPT

## 2017-03-27 PROCEDURE — 85027 COMPLETE CBC AUTOMATED: CPT

## 2017-03-27 PROCEDURE — 700102 HCHG RX REV CODE 250 W/ 637 OVERRIDE(OP): Performed by: HOSPITALIST

## 2017-03-27 PROCEDURE — 700111 HCHG RX REV CODE 636 W/ 250 OVERRIDE (IP): Performed by: INTERNAL MEDICINE

## 2017-03-27 PROCEDURE — 94760 N-INVAS EAR/PLS OXIMETRY 1: CPT

## 2017-03-27 PROCEDURE — 700102 HCHG RX REV CODE 250 W/ 637 OVERRIDE(OP): Performed by: PSYCHIATRY & NEUROLOGY

## 2017-03-27 PROCEDURE — 85007 BL SMEAR W/DIFF WBC COUNT: CPT

## 2017-03-27 RX ADMIN — STANDARDIZED SENNA CONCENTRATE AND DOCUSATE SODIUM 2 TABLET: 8.6; 5 TABLET, FILM COATED ORAL at 08:18

## 2017-03-27 RX ADMIN — LEVETIRACETAM 750 MG: 250 TABLET, FILM COATED ORAL at 08:18

## 2017-03-27 RX ADMIN — LEVETIRACETAM 750 MG: 250 TABLET, FILM COATED ORAL at 19:59

## 2017-03-27 RX ADMIN — STANDARDIZED SENNA CONCENTRATE AND DOCUSATE SODIUM 2 TABLET: 8.6; 5 TABLET, FILM COATED ORAL at 19:59

## 2017-03-27 RX ADMIN — AZITHROMYCIN 500 MG: 500 INJECTION, POWDER, LYOPHILIZED, FOR SOLUTION INTRAVENOUS at 19:59

## 2017-03-27 RX ADMIN — CARVEDILOL 3.12 MG: 6.25 TABLET, FILM COATED ORAL at 08:17

## 2017-03-27 RX ADMIN — CARVEDILOL 3.12 MG: 6.25 TABLET, FILM COATED ORAL at 17:25

## 2017-03-27 RX ADMIN — OXYCODONE HYDROCHLORIDE 5 MG: 10 TABLET ORAL at 22:58

## 2017-03-27 RX ADMIN — CEFTRIAXONE 2 G: 2 INJECTION, POWDER, FOR SOLUTION INTRAMUSCULAR; INTRAVENOUS at 18:27

## 2017-03-27 RX ADMIN — TAMSULOSIN HYDROCHLORIDE 0.4 MG: 0.4 CAPSULE ORAL at 08:18

## 2017-03-27 ASSESSMENT — ENCOUNTER SYMPTOMS
DIARRHEA: 0
DIAPHORESIS: 0
PSYCHIATRIC NEGATIVE: 1
MUSCULOSKELETAL NEGATIVE: 1
CARDIOVASCULAR NEGATIVE: 1
DEPRESSION: 0
RESPIRATORY NEGATIVE: 1
PALPITATIONS: 0
TINGLING: 0
DOUBLE VISION: 0
SORE THROAT: 0
NAUSEA: 0
GASTROINTESTINAL NEGATIVE: 1
BRUISES/BLEEDS EASILY: 1
ABDOMINAL PAIN: 0
EYES NEGATIVE: 1
SHORTNESS OF BREATH: 0
FEVER: 0
DIZZINESS: 0
HEADACHES: 0
FOCAL WEAKNESS: 0
WEAKNESS: 1
CHILLS: 0
COUGH: 0
MYALGIAS: 0
VOMITING: 0
BLURRED VISION: 0
STRIDOR: 0
CONSTIPATION: 0

## 2017-03-27 ASSESSMENT — COPD QUESTIONNAIRES
HAVE YOU SMOKED AT LEAST 100 CIGARETTES IN YOUR ENTIRE LIFE: YES
DURING THE PAST 4 WEEKS HOW MUCH DID YOU FEEL SHORT OF BREATH: NONE/LITTLE OF THE TIME
DO YOU EVER COUGH UP ANY MUCUS OR PHLEGM?: NO/ONLY WITH OCCASIONAL COLDS OR INFECTIONS
COPD SCREENING SCORE: 4

## 2017-03-27 ASSESSMENT — LIFESTYLE VARIABLES: EVER_SMOKED: YES

## 2017-03-27 ASSESSMENT — PAIN SCALES - GENERAL
PAINLEVEL_OUTOF10: 0

## 2017-03-27 NOTE — PROCEDURES
VIDEO ELECTROENCEPHALOGRAM REPORT      Referring MD: Dr. Pike.    DOS:  3/27/2017 (total recording of 27 minutes and 19 seconds).     INDICATION:  Benjamin Post 71 y.o. male presenting with seizure.     CURRENT ANTIEPILEPTIC REGIMEN: Levetiracetam 750 mg po bid.     TECHNIQUE: 30 channel routine electroencephalogram (EEG) was performed in accordance with the international 10-20 system. The study was reviewed in bipolar and referential montages. The recording examined the patient during wakeful and drowsy/sleep state(s).     DESCRIPTION OF THE RECORD:  During the wakefulness, the background showed a symmetrical 7 Hz theta activity posteriorly with amplitude of 70 mV.  There was reactivity to eye closure/opening.  A normal anterior-posterior gradient was noted with faster beta frequencies seen anteriorly.  During drowsiness, theta/delta frequencies were seen.    During the sleep state, background shows diffuse high-amplitude 4-5 Hz delta activity.  Symmetrical sleep spindles and vertex sharps were seen in the leads over the central regions.     ACTIVATION PROCEDURES:    Intermittent Photic stimulation was performed in a stepwise fashion from 1 to 30 Hz and elicited a normal response (photic driving), most noticeable in the posterior leads.      ICTAL AND/OR INTERICTAL FINDINGS:    No focal or generalized epileptiform activity noted. No regional slowing was seen during this routine study.  No clinical events or seizures were reported or recorded during the study.      EKG: sampling of the EKG recording demonstrated sinus rhythm.      INTERPRETATION:  This is an abnormal video EEG recording in the awake, drowsy, and sleep state(s).  Mild diffuse cerebral dysfunction, suggestive of an encephalopathic state. Clinical correlation is recommended.    Note: This EEG does not rule out epilepsy.  If the clinical suspicion remains high for seizures, a prolonged recording to capture clinical or subclinical events may be  helpful.        Jose Alanis MD  Medical Director, Epilepsy and Neurodiagnostics.    Clinical  of Neurology [facility].    Diplomate in Neurology, Epilepsy, and Electrodiagnostic Medicine.    Office: [phone]  Fax: [phone]      HAYLIE / JOSÉ ANTONIO    DD:  03/27/2017 12:26:25  DT:  03/27/2017 12:33:12    D#:  [id number]  Job#:  [id number]

## 2017-03-27 NOTE — OR SURGEON
Immediate Post-Operative Note      PreOp Diagnosis: Abscessed teeth, severe periodontal disease generalized, CML awaiting transplant    PostOp Diagnosis: Same    Procedure(s):  DENTAL EXTRACTION(S) - Wound Class: Dirty or Infected Full mouth extractions   INTRAORAL  ABSCESS INCISION AND DRAINAGE - Wound Class: Dirty or Infected  Teeth: 3,4,5,6,7,8,9,10,11.12.13,15,18,21,22,26,27,28,29    Surgeon(s):  Surendra Barragan D.M.D.,MLAKIA    Anesthesiologist/Type of Anesthesia:  Anesthesiologist: Tray Damico M.D./General    Surgical Staff:  Circulator: Cooper Valdes R.N.  Scrub Person: Nando Saenz    Specimen: NONE    Estimated Blood Loss: 30ml. Thin blood. Coagulated but delayed. Placed surgicel and avitine into sockets. Sutured with figure of 8 stures.    Findings: Cx with dx    Complications: NONE        3/27/2017 3:00 PM Surendra Barragan

## 2017-03-27 NOTE — PROGRESS NOTES
Edwar from Lab called with critical lab results of platelets 22, WBC's 1.9.  Call placed to Hospitalist.

## 2017-03-27 NOTE — PROGRESS NOTES
Pt resting comfortably in bed, no signs or symptoms of distress, denies pain. Linen change was done and then pt was moved from chair to bed. Was made aware that patient will need a blood transfusion today. Bed in low position, call light in place and no needs expressed at this time. Hourly rounding is in place.

## 2017-03-27 NOTE — CARE PLAN
Problem: Safety  Goal: Will remain free from falls  Outcome: PROGRESSING AS EXPECTED  Fall precautions in place: treaded slipper socks on, mobility signs posted, hourly rounding, bed in lowest position, belongings and call light are within reach, bed alarm on, and near nurses station.    Problem: Mobility  Goal: Risk for activity intolerance will decrease  Outcome: PROGRESSING AS EXPECTED  Patient mobilizes frequently in room, tolerates well.

## 2017-03-27 NOTE — CONSULTS
Chief Complaint   Patient presents with   • Sent by MD   • Dental Pain       Problem List Items Addressed This Visit     Thrombocytopenia (CMS-HCC) (Chronic)      Other Visit Diagnoses     Dental infection             Cx: Dr. Moore / stroke alert.     History of present illness:  Benjamin Post 71 y.o. male who was admitted for extraction of all teeth as he had multiple cavities and has a h/o CML, pending bone marrow transplant at [facility].     He has a h/o chronic right subdural hematoma but denies trauma.     He apparently has no h/o seizures, at least that he knows of but I noticed he was taking Keppra 500 mg po bid at admission.     He has severe thrombocytopenia.     Around 3:45 pm, he was noted confused and with left sided weakness. Stroke alert was called. Rapid improvement, pt denying currently any symptoms when I presented at bedside.         Past medical history:   Past Medical History   Diagnosis Date   • Hypertension    • Cancer (CMS-HCC)    • Indigestion    • Heart burn        Past surgical history:   Past Surgical History   Procedure Laterality Date   • Bone marrow aspiration  11/5/2015     Procedure: BONE MARROW ASPIRATION;  Surgeon: Rosita Bolton M.D.;  Location: [facility];  Service:    • Bone marrow biopsy, ndl/trocar  11/5/2015     Procedure: BONE MARROW BIOPSY, NDL/TROCAR;  Surgeon: Rosita Bolton M.D.;  Location: [facility];  Service:    • Bone marrow biopsy, ndl/trocar  3/23/2016     Procedure: BONE MARROW BIOPSY, NDL/TROCAR;  Surgeon: Fili Prakash M.D.;  Location: [facility];  Service:    • Bone marrow aspiration  3/23/2016     Procedure: BONE MARROW ASPIRATION;  Surgeon: Fili Prakash M.D.;  Location: [facility];  Service:    • Bone marrow biopsy, ndl/trocar  7/12/2016     Procedure: BONE MARROW BIOPSY, NDL/TROCAR;  Surgeon: Fili Prakash M.D.;  Location: [facility];  Service:    • Bone marrow  aspiration  7/12/2016     Procedure: BONE MARROW ASPIRATION;  Surgeon: Fili Prakash M.D.;  Location: ENDOSCOPY [facility];  Service:    • Dental extraction(s) N/A 3/25/2017     Procedure: DENTAL EXTRACTION(S);  Surgeon: Surendra Barragan D.M.D.MLAKIA;  Location: SURGERY [facility];  Service:    • Submandible abscess incision and drainage N/A 3/25/2017     Procedure:  INTRAORAL  ABSCESS INCISION AND DRAINAGE;  Surgeon: Surendra Barragan D.M.D.,MLAKIA;  Location: SURGERY [facility];  Service:        Family history:   History reviewed. No pertinent family history.    Social history:   Social History     Social History   • Marital Status: Legally      Spouse Name: N/A   • Number of Children: N/A   • Years of Education: N/A     Occupational History   • Not on file.     Social History Main Topics   • Smoking status: Never Smoker    • Smokeless tobacco: Not on file   • Alcohol Use: No   • Drug Use: No   • Sexual Activity: Not on file     Other Topics Concern   • Not on file     Social History Narrative       Current medications:   Current Facility-Administered Medications   Medication Dose   • carvedilol (COREG) tablet 3.125 mg  3.125 mg   • levetiracetam (KEPPRA) tablet 500 mg  500 mg   • tamsulosin (FLOMAX) capsule 0.4 mg  0.4 mg   • senna-docusate (PERICOLACE or SENOKOT S) 8.6-50 MG per tablet 2 Tab  2 Tab    And   • polyethylene glycol/lytes (MIRALAX) PACKET 1 Packet  1 Packet    And   • magnesium hydroxide (MILK OF MAGNESIA) suspension 30 mL  30 mL    And   • bisacodyl (DULCOLAX) suppository 10 mg  10 mg   • NS infusion     • acetaminophen (TYLENOL) tablet 650 mg  650 mg   • ampicillin/sulbactam (UNASYN) 3 g in  mL IVPB  3 g   • oxycodone immediate-release (ROXICODONE) tablet 5 mg  5 mg   • loperamide (IMODIUM) capsule 2 mg  2 mg       Medication Allergy:  No Known Allergies    Review of systems:   Constitutional: denies malaise/fatigue.   Eyes: denies acute vision change, eye pain or  secretion.   Ears, Nose, Mouth, Throat: denies nasal bleeding, difficulty swallowing, hearing loss, tinnitus, vertigo, ear pain. Had all teeth removed.   Endocrine: denies recent weight changes, heat or cold intolerance, neck pain or swelling, polyuria, polydypsia, polyphagia.  Cardiovascular: denies new onset of chest pain, palpitations, syncope, lower extremity edema, or dyspnea of exertion.  Pulmonary: denies shortness of breath, new onset of cough, hemoptysis, wheezing, chest pain or flu-like symptoms.   GI: denies nausea, vomiting, diarrhea, GI bleeding, change in appetite, abdominal pain, and change in bowel habits.  : denies dysuria, urinary incontinence, hematuria.  Heme/oncology: CML.   Allergy/immunology: denies hives/urticaria.   Dermatologic: denies new rash or lesions.  Musculoskeletal:denies joint swelling or pain, muscle pain, neck and back pain.   Neurologic: denies headaches, acute visual changes, facial droopiness, muscle weakness (focal or generalized), paresthesias, anesthesia, ataxia, change in speech or language, memory loss, abnormal movements, seizures, loss of consciousness, or episodes of confusion.   Psychiatric: denies symptoms of depression, anxiety, hallucinations, mood swings or changes, suicidal or homicidal thoughts.     Physical examination:   Filed Vitals:    03/26/17 1601 03/26/17 1606 03/26/17 1615 03/26/17 1646   BP: 135/56  136/54 140/45   Pulse: 94  98 100   Temp:  39.4 °C (102.9 °F)  39.1 °C (102.4 °F)   Resp: 14   20   Height:       Weight:       SpO2: 97%  97% 100%     General: Patient in no acute distress, pleasant and cooperative.  HEENT: Normocephalic, no signs of acute trauma. Fresh blood on his gums from teeth extraction.   Neck: supple, no meningeal signs or carotid bruits. There is normal range of motion. No tenderness on exam.   Chest: clear to auscultation. No cough.   CV: RRR, no murmurs.   Skin: no signs of acute rashes or trauma.   Musculoskeletal: joints  exhibit full range of motion, without any pain to palpation. There are no signs of joint or muscle swelling. There is no tenderness to deep palpation of muscles.   Psychiatric: No hallucinatory behavior. Denies symptoms of depression or suicidal ideation. Mood and affect appear normal on exam.     NEUROLOGICAL EXAM:   Mental status, orientation: Awake, alert and oriented only to self and place but not situation.   Speech and language: speech is mildly slurred but fluent. The patient is able to name, repeat and comprehend.   Memory: Poor recollection of events.   Cranial nerve exam: Pupils are 3-4 mm bilaterally and equally reactive to light and accommodation. Visual fields are intact by confrontation. There is no nystagmus on primary or secondary gaze. Intact full EOM in all directions of gaze. Face appears asymmetric with mild left lower facial droop. Sensation in the face is intact to light touch. Uvula is midline. Palate elevates symmetrically. Tongue is midline and without any signs of tongue biting or fasciculations.Sternocleidomastoid muscles exhibit is normal strength bilaterally. Shoulder shrug is intact bilaterally.   Motor exam: Strength is 5/5 in all extremities. Tone is normal. No abnormal movements were seen on exam.   Sensory exam reveals normal sense of light touch, proprioception, vibration and pinprick in all extremities.   Deep tendon reflexes:  2+ throughout, with absent ankle jerks. Plantar responses are flexor. There is no clonus.   Coordination: shows a normal finger-nose-finger. Normal rapidly alternating movements.   Gait: Deferred.       ANCILLARY DATA REVIEWED:     Lab Data Review:  Recent Results (from the past 24 hour(s))   CBC with Differential    Collection Time: 03/26/17  5:59 AM   Result Value Ref Range    WBC 2.5 (LL) 4.8 - 10.8 K/uL    RBC 2.53 (L) 4.70 - 6.10 M/uL    Hemoglobin 8.4 (L) 14.0 - 18.0 g/dL    Hematocrit 24.4 (L) 42.0 - 52.0 %    MCV 96.4 81.4 - 97.8 fL    MCH 33.2 (H)  27.0 - 33.0 pg    MCHC 34.4 33.7 - 35.3 g/dL    RDW 81.3 (H) 35.9 - 50.0 fL    Platelet Count 36 (LL) 164 - 446 K/uL    MPV 11.4 9.0 - 12.9 fL    Nucleated RBC 0.00 /100 WBC    NRBC (Absolute) 0.00 K/uL    Neutrophils-Polys 76.50 (H) 44.00 - 72.00 %    Lymphocytes 9.60 (L) 22.00 - 41.00 %    Monocytes 2.60 0.00 - 13.40 %    Eosinophils 1.70 0.00 - 6.90 %    Basophils 0.00 0.00 - 1.80 %    Neutrophils (Absolute) 2.11 1.82 - 7.42 K/uL    Lymphs (Absolute) 0.24 (L) 1.00 - 4.80 K/uL    Monos (Absolute) 0.07 0.00 - 0.85 K/uL    Eos (Absolute) 0.04 0.00 - 0.51 K/uL    Baso (Absolute) 0.00 0.00 - 0.12 K/uL    Anisocytosis 1+    Comp Metabolic Panel (CMP)    Collection Time: 03/26/17  5:59 AM   Result Value Ref Range    Sodium 138 135 - 145 mmol/L    Potassium 4.0 3.6 - 5.5 mmol/L    Chloride 106 96 - 112 mmol/L    Co2 27 20 - 33 mmol/L    Anion Gap 5.0 0.0 - 11.9    Glucose 113 (H) 65 - 99 mg/dL    Bun 24 (H) 8 - 22 mg/dL    Creatinine 0.88 0.50 - 1.40 mg/dL    Calcium 10.0 8.5 - 10.5 mg/dL    AST(SGOT) 26 12 - 45 U/L    ALT(SGPT) 62 (H) 2 - 50 U/L    Alkaline Phosphatase 85 30 - 99 U/L    Total Bilirubin 0.6 0.1 - 1.5 mg/dL    Albumin 2.9 (L) 3.2 - 4.9 g/dL    Total Protein 6.3 6.0 - 8.2 g/dL    Globulin 3.4 1.9 - 3.5 g/dL    A-G Ratio 0.9 g/dL   ESTIMATED GFR    Collection Time: 03/26/17  5:59 AM   Result Value Ref Range    GFR If African American >60 >60 mL/min/1.73 m 2    GFR If Non African American >60 >60 mL/min/1.73 m 2   DIFFERENTIAL MANUAL    Collection Time: 03/26/17  5:59 AM   Result Value Ref Range    Bands-Stabs 7.80 0.00 - 10.00 %    Myelocytes 0.90 %    Blasts 0.90 %    Manual Diff Status PERFORMED    PERIPHERAL SMEAR REVIEW    Collection Time: 03/26/17  5:59 AM   Result Value Ref Range    Peripheral Smear Review see below    PLATELET ESTIMATE    Collection Time: 03/26/17  5:59 AM   Result Value Ref Range    Plt Estimation Marked Decrease    MORPHOLOGY    Collection Time: 03/26/17  5:59 AM   Result Value Ref  Range    RBC Morphology Present    IMMATURE PLT FRACTION    Collection Time: 03/26/17  5:59 AM   Result Value Ref Range    Imm. Plt Fraction 1.9 0.6 - 13.1 K/uL   COMP METABOLIC PANEL    Collection Time: 03/26/17  4:17 PM   Result Value Ref Range    Sodium 136 135 - 145 mmol/L    Potassium 3.5 (L) 3.6 - 5.5 mmol/L    Chloride 104 96 - 112 mmol/L    Co2 27 20 - 33 mmol/L    Anion Gap 5.0 0.0 - 11.9    Glucose 142 (H) 65 - 99 mg/dL    Bun 26 (H) 8 - 22 mg/dL    Creatinine 0.98 0.50 - 1.40 mg/dL    Calcium 10.5 8.5 - 10.5 mg/dL    AST(SGOT) 24 12 - 45 U/L    ALT(SGPT) 62 (H) 2 - 50 U/L    Alkaline Phosphatase 90 30 - 99 U/L    Total Bilirubin 0.6 0.1 - 1.5 mg/dL    Albumin 2.9 (L) 3.2 - 4.9 g/dL    Total Protein 6.2 6.0 - 8.2 g/dL    Globulin 3.3 1.9 - 3.5 g/dL    A-G Ratio 0.9 g/dL   CBC WITH DIFFERENTIAL    Collection Time: 03/26/17  4:17 PM   Result Value Ref Range    WBC 2.1 (LL) 4.8 - 10.8 K/uL    RBC 2.53 (L) 4.70 - 6.10 M/uL    Hemoglobin 8.3 (L) 14.0 - 18.0 g/dL    Hematocrit 24.4 (L) 42.0 - 52.0 %    MCV 96.4 81.4 - 97.8 fL    MCH 32.8 27.0 - 33.0 pg    MCHC 34.0 33.7 - 35.3 g/dL    RDW 80.4 (H) 35.9 - 50.0 fL    Platelet Count 26 (LL) 164 - 446 K/uL    MPV 11.1 9.0 - 12.9 fL    Nucleated RBC 0.90 /100 WBC    NRBC (Absolute) 0.02 K/uL    Neutrophils-Polys 70.00 44.00 - 72.00 %    Lymphocytes 14.00 (L) 22.00 - 41.00 %    Monocytes 5.00 0.00 - 13.40 %    Eosinophils 3.00 0.00 - 6.90 %    Basophils 0.00 0.00 - 1.80 %    Neutrophils (Absolute) 1.62 (L) 1.82 - 7.42 K/uL    Lymphs (Absolute) 0.29 (L) 1.00 - 4.80 K/uL    Monos (Absolute) 0.11 0.00 - 0.85 K/uL    Eos (Absolute) 0.06 0.00 - 0.51 K/uL    Baso (Absolute) 0.00 0.00 - 0.12 K/uL   LACTIC ACID    Collection Time: 03/26/17  4:17 PM   Result Value Ref Range    Lactic Acid 1.3 0.5 - 2.0 mmol/L   ESTIMATED GFR    Collection Time: 03/26/17  4:17 PM   Result Value Ref Range    GFR If African American >60 >60 mL/min/1.73 m 2    GFR If Non  >60 >60  mL/min/1.73 m 2   DIFFERENTIAL MANUAL    Collection Time: 03/26/17  4:17 PM   Result Value Ref Range    Bands-Stabs 7.00 0.00 - 10.00 %    Metamyelocytes 1.00 %    Manual Diff Status PERFORMED    PERIPHERAL SMEAR REVIEW    Collection Time: 03/26/17  4:17 PM   Result Value Ref Range    Peripheral Smear Review see below    PLATELET ESTIMATE    Collection Time: 03/26/17  4:17 PM   Result Value Ref Range    Plt Estimation Marked Decrease    MORPHOLOGY    Collection Time: 03/26/17  4:17 PM   Result Value Ref Range    RBC Morphology Present     Polychromia 1+     Poikilocytosis 1+     Ovalocytes 1+     Smudge Cells Few    IMMATURE PLT FRACTION    Collection Time: 03/26/17  4:17 PM   Result Value Ref Range    Imm. Plt Fraction 1.0 0.6 - 13.1 K/uL       Records reviewed:  Chart reviewed.       Imaging:   CTB 3/26/17:  1.  Holohemispheric right chronic subdural collection is similar in size to the prior exam. No significant mass effect or midline shift.  2.  Periventricular white matter changes are consistent with chronic small vessel disease.  (I personally reviewed images, small right SDH appears chronic, no midline shift).         ASSESSMENT AND PLAN:  I overall favor an unwitnessed convulsive focal right hemispheric seizure with post ictal confusion and left sided weakness (Tod's Paralysis). His chronic right SDH may be a risk factor for seizures. I noticed he was on Keppra on admission but there is no records here of epilepsy or h/o seizures. I increased his keppra from 500 mg po bid, to 750 mg po bid.   Alternatively, an acute right hemispheric TIA / ischemic infarct should be considered.   He is much recovered at this time.  He was not a TPA candidate due to multiple reasons: severe thrombocytopenia, chronic right SDH, rapidly improving, bleeding gums).  Proceed with stat MRI brain, CTA head / neck.   He is currently bleeding on his gums and has severe thrombocytopenia, unfortunately, the use of antiplatelets is not  recommended.     Discussed with attending physician.       Jose Alanis MD  Medical Director, Epilepsy and Neurodiagnostics.   Clinical  of Neurology [facility].   Diplomate in Neurology, Epilepsy, and Electrodiagnostic Medicine.   Office: [phone]  Fax: [phone]

## 2017-03-27 NOTE — EEG PROGRESS NOTE
VIDEO ELECTROENCEPHALOGRAM REPORT      Referring MD: Dr. Pike.    DOS:  3/27/2017 (total recording of 27 minutes and 19 seconds).     INDICATION:  Benjamin Post 71 y.o. male presenting with seizure.     CURRENT ANTIEPILEPTIC REGIMEN: Levetiracetam 750 mg po bid.     TECHNIQUE: 30 channel routine electroencephalogram (EEG) was performed in accordance with the international 10-20 system. The study was reviewed in bipolar and referential montages. The recording examined the patient during wakeful and drowsy/sleep state(s).     DESCRIPTION OF THE RECORD:  During the wakefulness, the background showed a symmetrical 7 Hz theta activity posteriorly with amplitude of 70 mV.  There was reactivity to eye closure/opening.  A normal anterior-posterior gradient was noted with faster beta frequencies seen anteriorly.  During drowsiness, theta/delta frequencies were seen.    During the sleep state, background shows diffuse high-amplitude 4-5 Hz delta activity.  Symmetrical sleep spindles and vertex sharps were seen in the leads over the central regions.     ACTIVATION PROCEDURES:   Intermittent Photic stimulation was performed in a stepwise fashion from 1 to 30 Hz and elicited a normal response (photic driving), most noticeable in the posterior leads.      ICTAL AND/OR INTERICTAL FINDINGS:   No focal or generalized epileptiform activity noted. No regional slowing was seen during this routine study.  No clinical events or seizures were reported or recorded during the study.     EKG: sampling of the EKG recording demonstrated sinus rhythm.      INTERPRETATION:  This is an abnormal video EEG recording in the awake, drowsy, and sleep state(s).  Mild diffuse cerebral dysfunction, suggestive of an encephalopathic state. Clinical correlation is recommended.    Note: This EEG does not rule out epilepsy.  If the clinical suspicion remains high for seizures, a prolonged recording to capture clinical or subclinical events may be  helpful.        Jose Alanis MD  Medical Director, Epilepsy and Neurodiagnostics.   Clinical  of Neurology [facility].   Diplomate in Neurology, Epilepsy, and Electrodiagnostic Medicine.   Office: [phone]  Fax: [phone]

## 2017-03-27 NOTE — PROGRESS NOTES
NEUROLOGY PROGRESS NOTE      Background:  71 y.o. male was admitted on 3/25/2017 11:30 AM for possible stroke.    SUBJECTIVE:   Awake, alert and able to follow command. Oriented to self.    NEUROLOGICAL EXAM:  Awake, alert and oriented only to self .    speech is mildly slurred but fluent. Poor recollection of events.    Pupils are 3-4 mm bilaterally and equally reactive to light and accommodation. Visual fields are intact by confrontation. There is no nystagmus on primary or secondary gaze. Intact full EOM in all directions of gaze. Face appears asymmetric with mild left lower facial droop. Sensation in the face is intact to light touch. Uvula is midline. Palate elevates symmetrically. Tongue is midline and without any signs of tongue biting or fasciculations.Sternocleidomastoid muscles exhibit is normal strength bilaterally. Shoulder shrug is intact bilaterally.   Strength is 5/5 in all extremities. Tone is normal. No abnormal movements were seen on exam.   Sensory exam reveals normal sense of light touch, proprioception, vibration and pinprick in all extremities.   Deep tendon reflexes:  2+ throughout, with absent ankle jerks. Plantar responses are flexor. There is no clonus.   Coordination: shows a normal finger-nose-finger. Normal rapidly alternating movements.   OBJECTIVE:    NEUROIMAGING:    Brain MRI:   1.  Marrow replacing lesions again seen in the clivus with some progression and again seen in the upper cervical spine within the field of view which may be related to the history of chronic myeloid leukemia.  2.  Resolving right hemispheric subdural hematomas with only mild residual chronic subdural and dural meningeal thickening over the right hemisphere and minimal residual dural meningeal thickening over the left frontal/parietal convexity.  3.  Slight remaining right to left shift of midline structures of up to 1.5 mm.  4.  No evidence of new subdural hemorrhage or parenchymal hemorrhage. No evidence of  "acute cerebral infarction    EEG:  This is an abnormal video EEG recording in the awake, drowsy, and sleep state(s).  Mild diffuse cerebral dysfunction, suggestive of an encephalopathic state. Clinical correlation is recommended.  Note: This EEG does not rule out epilepsy.  If the clinical suspicion remains high for seizures, a prolonged recording to capture clinical or subclinical events may be helpful.  Jose Alanis MD    MEDICATIONS:  Current Facility-Administered Medications   Medication Dose   • levetiracetam (KEPPRA) tablet 750 mg  750 mg   • cefTRIAXone (ROCEPHIN) 2 g in  mL IVPB  2 g   • azithromycin (ZITHROMAX) 500 mg in D5W 250 mL IVPB  500 mg   • Respiratory Care per Protocol     • carvedilol (COREG) tablet 3.125 mg  3.125 mg   • tamsulosin (FLOMAX) capsule 0.4 mg  0.4 mg   • senna-docusate (PERICOLACE or SENOKOT S) 8.6-50 MG per tablet 2 Tab  2 Tab    And   • polyethylene glycol/lytes (MIRALAX) PACKET 1 Packet  1 Packet    And   • magnesium hydroxide (MILK OF MAGNESIA) suspension 30 mL  30 mL    And   • bisacodyl (DULCOLAX) suppository 10 mg  10 mg   • acetaminophen (TYLENOL) tablet 650 mg  650 mg   • oxycodone immediate-release (ROXICODONE) tablet 5 mg  5 mg   • loperamide (IMODIUM) capsule 2 mg  2 mg       Blood pressure 136/64, pulse 75, temperature 37.2 °C (99 °F), resp. rate 16, height 1.676 m (5' 6\"), weight 73.936 kg (163 lb), SpO2 97 %.        Recent Labs      03/26/17   0559  03/26/17   1617  03/27/17   0130   WBC  2.5*  2.1*  1.9*   RBC  2.53*  2.53*  2.27*   HEMOGLOBIN  8.4*  8.3*  7.3*   HEMATOCRIT  24.4*  24.4*  22.2*   MCV  96.4  96.4  97.8   MCH  33.2*  32.8  32.2   MCHC  34.4  34.0  32.9*   RDW  81.3*  80.4*  79.8*   PLATELETCT  36*  26*  22*   MPV  11.4  11.1  10.8     Recent Labs      03/26/17   0559  03/26/17   1617  03/27/17   0130   SODIUM  138  136  138   POTASSIUM  4.0  3.5*  3.8   CHLORIDE  106  104  108   CO2  27  27  27   GLUCOSE  113*  142*  116*   BUN  24*  26*  22 "       Results for orders placed or performed during the hospital encounter of 03/12/15   ECHOCARDIOGRAM COMP W/O CONT   Result Value Ref Range    Eject.Frac. MOD BP 68.61     Eject.Frac. MOD 4C 70.16     Eject.Frac. MOD 2C 72.15         ASSESSMENT AND PLAN:   71 yr old with AMS and questionable Sz. Brain MRI negative for acute pathology, there is evidence of resolving SDH. EEG as above C/W encephalopathy.  Recommend to con't Keppra.  Will sign off, please call me if any question.

## 2017-03-27 NOTE — SEPSIS REASSESSMENT
"I examined the patient 3/26/2017 8:05 PM  Vital Signs:/45 mmHg  Pulse 100  Temp(Src) 39.1 °C (102.4 °F)  Resp 20  Ht 1.676 m (5' 6\")  Wt 73.936 kg (163 lb)  BMI 26.32 kg/m2  SpO2 100%  Cardiac examination significant for Tachycardia  Pulmonary examination significant for Clear lung fileds  Capillary refill is brisk  Peripheral Pulse is 2+   Skin is normal  "

## 2017-03-27 NOTE — PROGRESS NOTES
Received patient at change of shift resting in bed awake, alert, oriented X 4. No signs of distress at this time.  IV site to right AC in place infusing bolus NS at 500 ml/hr.  SCD's to bilateral lower extremities in place.  Chemo precautions in place.  Bed alarm activated.  Call light within reach.    Patient off the floor for MRI.

## 2017-03-27 NOTE — PROGRESS NOTES
0400 AM       Paged Hospitalist service regarding critical lab results, Hg 7.3, platelets, 22, WBC's 1.9.  Awaiting return call from Dr Samira James.      4:23 AM Second paged to Samira James regarding critical labs.  Received call back from Dr Samira James,   no further orders, just continue to monitor patient.

## 2017-03-27 NOTE — CARE PLAN
Problem: Safety  Goal: Will remain free from falls  Intervention: Implement fall precautions  Safety and fall precautions in place.  Bed in low position, upper side rails X 2, treaded socks applied.  Bed alarm activated.  Call light within reach.      Problem: Infection  Goal: Will remain free from infection  Intervention: Assess signs and symptoms of infection  IV antibiotics given as ordered.  Vital signs WNL.

## 2017-03-27 NOTE — PROGRESS NOTES
Hospital Medicine Progress Note, Adult, Complex               Author: Alyson Searsjair Date & Time created: 3/27/2017  7:50 AM     Interval History:  70 y/o male with h/o CML admitted for teeth extraction so he can get a bone marrow transplantation in [facility]  He tolerated surgery well and had no bleeding issues overnight on 3/25 - was set to go home when he developed fever, tachy, and confusion  Dr Alanis consulted- is concerned for possible seizures, MRI done, CTA head/neck done- unremarkable, and EEG ordered. CXR- shows infiltrates    3/27- afebrile, no longer confused, or tachy. WBC 1.9, Hgb 7.3, and Plts at 22. Was rcving another bolus during exam, concern for fluid overload and bringing Hgb further down, fluids stopped.    Review of Systems:  Review of Systems   Constitutional: Positive for malaise/fatigue. Negative for fever, chills and diaphoresis.   HENT: Negative.  Negative for congestion and sore throat.         All teeth removed, no pain, no bleeding   Eyes: Negative.  Negative for blurred vision and double vision.   Respiratory: Negative.  Negative for cough, shortness of breath and stridor.    Cardiovascular: Negative.  Negative for chest pain, palpitations and leg swelling.   Gastrointestinal: Negative.  Negative for nausea, vomiting, abdominal pain, diarrhea and constipation.   Genitourinary: Negative.  Negative for dysuria.   Musculoskeletal: Negative.  Negative for myalgias and joint pain.   Skin: Negative.  Negative for itching and rash.   Neurological: Positive for weakness. Negative for dizziness, tingling, focal weakness and headaches.   Endo/Heme/Allergies: Bruises/bleeds easily.   Psychiatric/Behavioral: Negative.  Negative for depression.       Physical Exam:  Physical Exam   Constitutional: He is oriented to person, place, and time. No distress.   Frail, chronically ill appearing   HENT:   Mouth/Throat: Oropharynx is clear and moist.   Clots over gum surgical site with  No oozing   Eyes:  Conjunctivae and EOM are normal. Pupils are equal, round, and reactive to light. No scleral icterus.   Cardiovascular: Normal rate and regular rhythm.    Murmur heard.  Pulses:       Radial pulses are 2+ on the right side, and 2+ on the left side.   Pulmonary/Chest: Effort normal and breath sounds normal. No respiratory distress.   Abdominal: Soft.   Musculoskeletal: He exhibits no edema.   Neurological: He is alert and oriented to person, place, and time.   Skin: Skin is warm and dry.   Skin is pink and warm  Capillary refill is less than 4 seconds   Psychiatric: He has a normal mood and affect. His behavior is normal. Judgment and thought content normal.   Nursing note and vitals reviewed.      Labs:        Invalid input(s): [id number]      Recent Labs      03/25/17   1204  03/26/17   0559  03/26/17 1617 03/27/17   0130   SODIUM  139  138  136  138   POTASSIUM  3.7  4.0  3.5*  3.8   CHLORIDE  103  106  104  108   CO2  26  27  27  27   BUN  29*  24*  26*  22   CREATININE  1.05  0.88  0.98  0.89   MAGNESIUM  2.1   --    --    --    CALCIUM  11.4*  10.0  10.5  10.3     Recent Labs      03/25/17   1204  03/26/17   0559  03/26/17 1617 03/27/17   0130   ALTSGPT  74*  62*  62*   --    ASTSGOT  36  26  24   --    ALKPHOSPHAT  107*  85  90   --    TBILIRUBIN  0.8  0.6  0.6   --    GLUCOSE  111*  113*  142*  116*     Recent Labs      03/25/17   1204  03/26/17   0559  03/26/17 1617 03/27/17   0130   RBC  2.45*  2.53*  2.53*  2.27*   HEMOGLOBIN  8.6*  8.4*  8.3*  7.3*   HEMATOCRIT  25.2*  24.4*  24.4*  22.2*   PLATELETCT  17*  36*  26*  22*   PROTHROMBTM  15.8*   --    --    --    APTT  35.1   --    --    --    INR  1.22*   --    --    --      Recent Labs      03/25/17   1204  03/26/17   0559  03/26/17 1617 03/27/17   0130   WBC  2.6*  2.5*  2.1*  1.9*   NEUTSPOLYS  71.90  76.50*  70.00  66.20   LYMPHOCYTES  11.40*  9.60*  14.00*  13.50*   MONOCYTES  5.30  2.60  5.00  13.50*   EOSINOPHILS  2.60  1.70  3.00   3.00   BASOPHILS  0.90  0.00  0.00  0.80   ASTSGOT  36  26  24   --    ALTSGPT  74*  62*  62*   --    ALKPHOSPHAT  107*  85  90   --    TBILIRUBIN  0.8  0.6  0.6   --            Hemodynamics:  Temp (24hrs), Av.4 °C (99.4 °F), Min:36.2 °C (97.1 °F), Max:39.4 °C (102.9 °F)  Temperature: 36.2 °C (97.2 °F)  Pulse  Av.7  Min: 65  Max: 100   Blood Pressure : 122/55 mmHg     Respiratory:    Respiration: 18, Pulse Oximetry: 95 %, O2 Daily Delivery Respiratory : Room Air with O2 Available           Fluids:    Intake/Output Summary (Last 24 hours) at 17 0750  Last data filed at 17 0600   Gross per 24 hour   Intake   1550 ml   Output   1450 ml   Net    100 ml        GI/Nutrition:  Orders Placed This Encounter   Procedures   • Diet Order     Standing Status: Standing      Number of Occurrences: 1      Standing Expiration Date:      Order Specific Question:  Diet:     Answer:  Regular [1]     Order Specific Question:  Texture/Fiber modifications:     Answer:  Dysphagia 1(Pureed)specify fluid consistency(question 6) [1]     Medical Decision Making, by Problem:  Active Hospital Problems    Diagnosis   • *Dental caries, unspecified [K02.9] teeth extracted, clots, doing well   • Pancytopenia (CMS-HCC) [D61.818] Hgb at 7.3, Plt 22, with weakness/fatigue and e/o sepsis last noc- will transfuse another 1unit PRBC   • Thrombocytopenia (CMS-HCC) [D69.6] Plt 22   • 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] treatment per oncology on follow up   Macrocytic anemia- Hgb 7.3, see above  Pneumonitis- infiltrates seen on CXR- IV rocephin/azithro, RT and O2 prn  Sepsis- Lactic acid trended and WNL, NS boluses given, IV abx, Afebrile now   Annabelle evaluating for questionable seizures given confused period, weakness last noc, and pt on Keppra at home- inc Keppra, MRI-  resolving prev subdural hematomas and prev lesions- no new acute findings, EEG pending    Labs reviewed and Medications reviewed  Mahajan catheter:  No Mahajan      DVT Prophylaxis: Contraindicated - High bleeding risk    Ulcer prophylaxis: Not indicated      Dispo- cont IV abx, stop IV fluids, EEG pending, appreciate Dr Barragan and Dr Annabelle nuñez, transfuse 1 unit PRBC, monitor closely

## 2017-03-28 ENCOUNTER — APPOINTMENT (OUTPATIENT)
Dept: ONCOLOGY | Facility: MEDICAL CENTER | Age: 72
End: 2017-03-28
Attending: SPECIALIST
Payer: MEDICARE

## 2017-03-28 VITALS
SYSTOLIC BLOOD PRESSURE: 141 MMHG | OXYGEN SATURATION: 96 % | BODY MASS INDEX: 26.2 KG/M2 | DIASTOLIC BLOOD PRESSURE: 66 MMHG | HEIGHT: 66 IN | RESPIRATION RATE: 28 BRPM | WEIGHT: 163 LBS | HEART RATE: 73 BPM | TEMPERATURE: 96.9 F

## 2017-03-28 LAB
ANION GAP SERPL CALC-SCNC: 5 MMOL/L (ref 0–11.9)
ANISOCYTOSIS BLD QL SMEAR: ABNORMAL
BASOPHILS # BLD AUTO: 0.9 % (ref 0–1.8)
BASOPHILS # BLD: 0.02 K/UL (ref 0–0.12)
BUN SERPL-MCNC: 20 MG/DL (ref 8–22)
CALCIUM SERPL-MCNC: 12 MG/DL (ref 8.5–10.5)
CHLORIDE SERPL-SCNC: 104 MMOL/L (ref 96–112)
CO2 SERPL-SCNC: 29 MMOL/L (ref 20–33)
CREAT SERPL-MCNC: 0.95 MG/DL (ref 0.5–1.4)
EOSINOPHIL # BLD AUTO: 0.06 K/UL (ref 0–0.51)
EOSINOPHIL NFR BLD: 2.6 % (ref 0–6.9)
ERYTHROCYTE [DISTWIDTH] IN BLOOD BY AUTOMATED COUNT: 69.8 FL (ref 35.9–50)
GFR SERPL CREATININE-BSD FRML MDRD: >60 ML/MIN/1.73 M 2
GLUCOSE SERPL-MCNC: 138 MG/DL (ref 65–99)
HCT VFR BLD AUTO: 25.7 % (ref 42–52)
HGB BLD-MCNC: 8.9 G/DL (ref 14–18)
LYMPHOCYTES # BLD AUTO: 0.25 K/UL (ref 1–4.8)
LYMPHOCYTES NFR BLD: 11.3 % (ref 22–41)
MANUAL DIFF BLD: NORMAL
MCH RBC QN AUTO: 32.4 PG (ref 27–33)
MCHC RBC AUTO-ENTMCNC: 34.6 G/DL (ref 33.7–35.3)
MCV RBC AUTO: 93.5 FL (ref 81.4–97.8)
MICROCYTES BLD QL SMEAR: ABNORMAL
MONOCYTES # BLD AUTO: 0.06 K/UL (ref 0–0.85)
MONOCYTES NFR BLD AUTO: 2.6 % (ref 0–13.4)
MORPHOLOGY BLD-IMP: NORMAL
MYELOCYTES NFR BLD MANUAL: 0.9 %
NEUTROPHILS # BLD AUTO: 1.8 K/UL (ref 1.82–7.42)
NEUTROPHILS NFR BLD: 75.6 % (ref 44–72)
NEUTS BAND NFR BLD MANUAL: 6.1 % (ref 0–10)
NRBC # BLD AUTO: 0.02 K/UL
NRBC BLD AUTO-RTO: 0.9 /100 WBC
PLATELET # BLD AUTO: 17 K/UL (ref 164–446)
PLATELET BLD QL SMEAR: NORMAL
PLATELETS.RETICULATED NFR BLD AUTO: 3.7 K/UL (ref 0.6–13.1)
POTASSIUM SERPL-SCNC: 3 MMOL/L (ref 3.6–5.5)
RBC # BLD AUTO: 2.75 M/UL (ref 4.7–6.1)
RBC BLD AUTO: PRESENT
SODIUM SERPL-SCNC: 138 MMOL/L (ref 135–145)
VARIANT LYMPHS BLD QL SMEAR: NORMAL
WBC # BLD AUTO: 2.2 K/UL (ref 4.8–10.8)

## 2017-03-28 PROCEDURE — 700102 HCHG RX REV CODE 250 W/ 637 OVERRIDE(OP): Performed by: HOSPITALIST

## 2017-03-28 PROCEDURE — 700102 HCHG RX REV CODE 250 W/ 637 OVERRIDE(OP): Performed by: NURSE PRACTITIONER

## 2017-03-28 PROCEDURE — A9270 NON-COVERED ITEM OR SERVICE: HCPCS | Performed by: NURSE PRACTITIONER

## 2017-03-28 PROCEDURE — 85007 BL SMEAR W/DIFF WBC COUNT: CPT

## 2017-03-28 PROCEDURE — A9270 NON-COVERED ITEM OR SERVICE: HCPCS | Performed by: PSYCHIATRY & NEUROLOGY

## 2017-03-28 PROCEDURE — 85055 RETICULATED PLATELET ASSAY: CPT

## 2017-03-28 PROCEDURE — 85027 COMPLETE CBC AUTOMATED: CPT

## 2017-03-28 PROCEDURE — 700102 HCHG RX REV CODE 250 W/ 637 OVERRIDE(OP): Performed by: PSYCHIATRY & NEUROLOGY

## 2017-03-28 PROCEDURE — 80048 BASIC METABOLIC PNL TOTAL CA: CPT

## 2017-03-28 PROCEDURE — 36415 COLL VENOUS BLD VENIPUNCTURE: CPT

## 2017-03-28 PROCEDURE — 99239 HOSP IP/OBS DSCHRG MGMT >30: CPT | Performed by: INTERNAL MEDICINE

## 2017-03-28 PROCEDURE — A9270 NON-COVERED ITEM OR SERVICE: HCPCS | Performed by: HOSPITALIST

## 2017-03-28 RX ORDER — PENICILLIN V POTASSIUM 500 MG/1
500 TABLET ORAL 4 TIMES DAILY
Qty: 28 TAB | Refills: 0 | Status: ON HOLD | OUTPATIENT
Start: 2017-03-28 | End: 2017-04-05

## 2017-03-28 RX ORDER — OXYCODONE HYDROCHLORIDE 5 MG/1
5 TABLET ORAL EVERY 4 HOURS PRN
Qty: 15 TAB | Refills: 0 | Status: ON HOLD | OUTPATIENT
Start: 2017-03-28 | End: 2017-06-15

## 2017-03-28 RX ORDER — CARVEDILOL 6.25 MG/1
6.25 TABLET ORAL 2 TIMES DAILY WITH MEALS
Status: DISCONTINUED | OUTPATIENT
Start: 2017-03-28 | End: 2017-03-28 | Stop reason: HOSPADM

## 2017-03-28 RX ORDER — POTASSIUM CHLORIDE 20 MEQ/1
40 TABLET, EXTENDED RELEASE ORAL ONCE
Status: COMPLETED | OUTPATIENT
Start: 2017-03-28 | End: 2017-03-28

## 2017-03-28 RX ORDER — LEVETIRACETAM 500 MG/1
750 TABLET ORAL 2 TIMES DAILY
Qty: 42 TAB | Refills: 0 | Status: ON HOLD | OUTPATIENT
Start: 2017-03-28 | End: 2017-04-29

## 2017-03-28 RX ADMIN — OXYCODONE HYDROCHLORIDE 5 MG: 10 TABLET ORAL at 05:21

## 2017-03-28 RX ADMIN — LEVETIRACETAM 750 MG: 250 TABLET, FILM COATED ORAL at 08:58

## 2017-03-28 RX ADMIN — POTASSIUM CHLORIDE 40 MEQ: 1500 TABLET, EXTENDED RELEASE ORAL at 08:58

## 2017-03-28 RX ADMIN — TAMSULOSIN HYDROCHLORIDE 0.4 MG: 0.4 CAPSULE ORAL at 08:58

## 2017-03-28 RX ADMIN — STANDARDIZED SENNA CONCENTRATE AND DOCUSATE SODIUM 2 TABLET: 8.6; 5 TABLET, FILM COATED ORAL at 08:58

## 2017-03-28 ASSESSMENT — PAIN SCALES - GENERAL: PAINLEVEL_OUTOF10: 0

## 2017-03-28 NOTE — DISCHARGE INSTRUCTIONS
Ok to discharge to home   Take Rx as prescribed   Follow up with PCP, and Dr Barragan within 10 days   Call Primary Care MD if new problems arise   Return to ER/hospital for SOB, CP, or worsening/concerning symptoms     Discharge Instructions    Discharged to home by car with relative. Discharged via wheelchair, hospital escort: Yes.  Special equipment needed: Not Applicable    Be sure to schedule a follow-up appointment with your primary care doctor or any specialists as instructed.     Discharge Plan:   Influenza Vaccine Indication: Indicated: 65 years and older  Influenza Vaccine Given - only chart on this line when given: Influenza Vaccine Given (See MAR)    I understand that a diet low in cholesterol, fat, and sodium is recommended for good health. Unless I have been given specific instructions below for another diet, I accept this instruction as my diet prescription.   Other diet: regular    Special Instructions: None    · Is patient discharged on Warfarin / Coumadin?   No     · Is patient Post Blood Transfusion?  No    Depression / Suicide Risk    As you are discharged from this [facility] facility, it is important to learn how to keep safe from harming yourself.    Recognize the warning signs:  · Abrupt changes in personality, positive or negative- including increase in energy   · Giving away possessions  · Change in eating patterns- significant weight changes-  positive or negative  · Change in sleeping patterns- unable to sleep or sleeping all the time   · Unwillingness or inability to communicate  · Depression  · Unusual sadness, discouragement and loneliness  · Talk of wanting to die  · Neglect of personal appearance   · Rebelliousness- reckless behavior  · Withdrawal from people/activities they love  · Confusion- inability to concentrate     If you or a loved one observes any of these behaviors or has concerns about self-harm, here's what you can do:  · Talk about it- your feelings and reasons for  harming yourself  · Remove any means that you might use to hurt yourself (examples: pills, rope, extension cords, firearm)  · Get professional help from the community (Mental Health, Substance Abuse, psychological counseling)  · Do not be alone:Call your Safe Contact- someone whom you trust who will be there for you.  · Call your local CRISIS HOTLINE 427-5930 or 555-375-7553  · Call your local Children's Mobile Crisis Response Team [place] (162) 515-0077 or www.[employer]  · Call the toll free National Suicide Prevention Hotlines   · National Suicide Prevention Lifeline 329-662-IDAH (7508)  · National Hope Line Network 800-SUICIDE (472-7385)

## 2017-03-28 NOTE — CARE PLAN
Problem: Safety  Goal: Will remain free from falls  Outcome: PROGRESSING AS EXPECTED  Fall precautions in place: treaded slipper socks on, mobility signs posted, hourly rounding, bed in lowest position, belongings and call light are within reach, bed alarm on, and near nurses station.    Problem: Infection  Goal: Will remain free from infection  Outcome: PROGRESSING AS EXPECTED  No s/s of infection noted on examination

## 2017-03-28 NOTE — PROGRESS NOTES
Discharge instructions given to pt and daughter. All questions answered. Pt left via wheelchair with hospital escort and daughter. All belongings taken.

## 2017-03-28 NOTE — OP REPORT
DATE OF SERVICE:  03/25/2017    PREOPERATIVE DIAGNOSES:  Abscessed teeth, severe periodontal disease,   generalized, and chronic myelogenous leukemia, awaiting transplant.    POSTOPERATIVE DIAGNOSES:  Abscessed teeth, severe periodontal disease,   generalized, and chronic myelogenous leukemia, awaiting transplant.    PROCEDURE PERFORMED:  Full mouth tooth extraction with incision and drainage   of intraoral abscess, right side and left side with extraction of teeth #3,   #4, #5, #6, #7, #8, #9, #10, #11, #12, #13, #15, #18, #21, #22, #26, #27, #28,   #29.    OPERATION DESCRIPTION AND FINDINGS:  After a thorough discussion with the   patient about the risks, benefits, and alternatives of the procedure,   including postoperative wound infections, postoperative sepsis, specifically   related to his immune status, possible complications from anesthesia, see   their notes for details, and bleeding issues after surgery due to his   thrombocytopenia, patient agreed to the procedure and said he needed it to be   done before he can get his transplant.  Next, patient was brought back to the   operating room in a supine position.  The patient was then prepped and draped   as usual for procedure in the operating room.  Local anesthesia was deposited   via routine fashion.  Incision was made with a #15 scalpel blade around teeth   to be extracted, #9 periosteal elevator was used to elevate the tissues, teeth   #3, #4, #5, #6, #7, #8, #9, #10, #11, #12, #13, #15, #18, #21, #22, #26, #27,   #28, #29 were all surgically removed.  There was a fair amount of bleeding   during this portion of the procedure.  I irrigated out all extraction sockets.    I did use some cautery to stop bleeding as well as packed Avitene and   Surgicel into the extraction sockets and then used figure-of-eight sutures   over the extraction sockets to stop the bleeding.  By the end of the   procedure, he was fairly hemostatic.  There were no complications  during the   procedure.  The patient tolerated the procedure well and was extubated to the   PACU in a stable condition.    SURGEON:  Surendra Chowdhury DMD, MD    ANESTHESIOLOGIST:  _____.    FINDINGS:  Consistent with diagnosis.    SPECIMENS:  None.    DRESSINGS:  Avitene and Surgicel with figure-of-eight sutures.    DRAINS:  None.    COMPLICATIONS:  None.    ESTIMATED BLOOD LOSS:  About 30 mL.    FLUIDS:  Approximately 500 mL normal saline.       ____________________________________     SURENDRA CHOWDHURY DMD, MD SMR / NTS    DD:  03/27/2017 15:09:47  DT:  03/27/2017 17:19:08    D#:  [id number]  Job#:  [id number]

## 2017-03-28 NOTE — DISCHARGE SUMMARY
CHIEF COMPLAINT ON ADMISSION  Chief Complaint   Patient presents with   • Sent by MD   • Dental Pain       CODE STATUS  Full Code    HPI & HOSPITAL COURSE  This is a 71 y.o. year old male here with severe dental caries and history of CML with sig thrombocytopenia. He is due to have a bone marrow transplantation in [facility] but was unable to get until he had teeth extraction. Dr Barragan consulted and took patient for complete teeth extraction on 3/25/17 after a tranfusion of platelets for his level of 17. His platelets came up to 34 with the transfusion. The procedure went well and he has good clot formation.  Following the surgery the patient developed a fever of 102.4 and became confused overnight.  Chest x-ray did reveal right basilar opacity and mild vascular congestion.  The patient was started on IV antibiotics.  Cultures remained negative.  His fever and confusion resolved overnight.  Dr. 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  EEG was performed and was consistent with encephalopathy, but no evidence of seizure was appreciated.  His home dose of Keppra was increased.  MRI of the brain was negative for acute infarct or new hemorrhage.  He had no witnessed seizure activity over his stay and his confusion completely resolved.  He had no other evidence of infection.  His pain remained controlled and he was tolerating an oral diet.  Due to the patient remaining stable he will be discharged home at this time.      SPECIFIC OUTPATIENT FOLLOW-UP  [facility]- within 1-2 weeks  Dr Barragan within 1-2 weeks    DISCHARGE PROBLEM LIST  Principal Problem:    Dental caries, unspecified POA: Yes  Active Problems:    Pancytopenia (CMS-HCC) (Chronic) POA: Yes    CML (chronic myelocytic leukemia)-Accelerated phase (Chronic) POA: Yes    Thrombocytopenia (CMS-HCC) (Chronic) POA: Yes  Resolved Problems:    Pneumonia POA: Yes      FOLLOW  UP  Future Appointments  Date Time Provider Department Center   3/28/2017 10:00 AM RN 2 ON[address]   4/4/2017 10:30 AM RN 4 ON[address]   4/11/2017 10:30 AM RN 4 ON[address]   4/18/2017 10:30 AM RN 4 ON[address]   4/25/2017 10:00 AM RN 1 ON[address]   5/2/2017 10:00 AM RN 1 [address]   5/9/2017 10:00 AM RN 1 [address]   5/16/2017 10:30 AM RN 6 [address]   5/23/2017 10:00 AM RN 2 ON[address]   5/30/2017 10:00 AM RN 2 ON[address]   6/6/2017 10:00 AM RN 2 ON[address]   6/13/2017 10:30 AM RN 6 ON[address]   6/20/2017 10:30 AM RN 6 ON[address]   6/27/2017 10:30 AM RN 6 ON [facility]         MEDICATIONS ON DISCHARGE   Benjamin Post   Home Medication Instructions MIKE:[id number]    Printed on:03/26/17 1001   Medication Information                      Bosutinib 100 MG Tab  Take 300 mg by mouth every day.             carvedilol (COREG) 3.125 MG TABS  Take 3.125 mg by mouth 2 times a day, with meals.             levetiracetam (KEPPRA) 500 MG Tab  Take 1.5 Tab by mouth 2 Times a Day.             loperamide (IMODIUM) 2 MG Cap  Take 2 mg by mouth 4 times a day as needed for Diarrhea.             Multiple Vitamins-Minerals (PRESERVISION AREDS 2) Cap  Take 1 Cap by mouth every day.             oxycodone immediate-release (ROXICODONE) 5 MG Tab  Take 1 Tab by mouth every four hours as needed (Moderate Pain; (Pain scale 4-6)).             penicillin v potassium (VEETID) 500 MG Tab  Take 1 Tab by mouth 4 times a day for 7 days.             tamsulosin (FLOMAX) 0.4 MG capsule  Take 0.4 mg by mouth every morning.                 DIET  Orders Placed This Encounter   Procedures   • Diet Order     Standing Status: Standing      Number of Occurrences: 1      Standing Expiration Date:      Order Specific Question:  Diet:     Answer:  Regular [1]     Order Specific Question:  Texture/Fiber modifications:     Answer:  Dysphagia 1(Pureed)specify fluid consistency(question 6) [1]   •  DISCONTINUE DIET TRAY     Standing Status: Standing      Number of Occurrences: 1      Standing Expiration Date:        ACTIVITY  As tolerated.      CONSULTATIONS  Dr. Barragan.  Dr. Alanis.    PROCEDURES  Complete Teeth Extraction 3/25/17.    LABORATORY  Lab Results   Component Value Date/Time    SODIUM 138 03/28/2017 01:11 AM    POTASSIUM 3.0* 03/28/2017 01:11 AM    CHLORIDE 104 03/28/2017 01:11 AM    CO2 29 03/28/2017 01:11 AM    GLUCOSE 138* 03/28/2017 01:11 AM    BUN 20 03/28/2017 01:11 AM    CREATININE 0.95 03/28/2017 01:11 AM        Lab Results   Component Value Date/Time    WBC 2.2* 03/28/2017 01:11 AM    HEMOGLOBIN 8.9* 03/28/2017 01:11 AM    HEMATOCRIT 25.7* 03/28/2017 01:11 AM    PLATELET COUNT 17* 03/28/2017 01:11 AM        Total time of the discharge process was 35 minutes.  CC: Dr Barragan, [facility]

## 2017-03-28 NOTE — PROGRESS NOTES
Karissa from Lab called with critical result of wbc and platelets at 2.2 and 17. Critical lab result read back to Karissa.   Dr. Ewing notified of critical lab result at 0200.  Critical lab result read back by Dr. Ewing. No new orders received.

## 2017-03-28 NOTE — PROGRESS NOTES
Patient alert and oriented. Denies numbness and tingling. Patient incontinent. Patient unsteady with ambulation. Patient very impulsive. Bed alarm on. Plan of care discussed with patient. Call light within reach. Bed locked and in low position. Patient encouraged to call with any needs/concerns.

## 2017-03-29 ENCOUNTER — PATIENT OUTREACH (OUTPATIENT)
Dept: HEALTH INFORMATION MANAGEMENT | Facility: OTHER | Age: 72
End: 2017-03-29

## 2017-03-29 NOTE — PROGRESS NOTES
03/29/2017 0902 - Discharge Outreach attempt - No answer, VMB full  03/30/2017 1354 - Discharge Outreach attempt - LM

## 2017-03-30 ENCOUNTER — APPOINTMENT (OUTPATIENT)
Dept: RADIOLOGY | Facility: MEDICAL CENTER | Age: 72
DRG: 640 | End: 2017-03-30
Attending: EMERGENCY MEDICINE
Payer: MEDICARE

## 2017-03-30 ENCOUNTER — RESOLUTE PROFESSIONAL BILLING HOSPITAL PROF FEE (OUTPATIENT)
Dept: OTHER | Facility: MEDICAL CENTER | Age: 72
End: 2017-03-30
Payer: MEDICARE

## 2017-03-30 ENCOUNTER — HOSPITAL ENCOUNTER (INPATIENT)
Facility: MEDICAL CENTER | Age: 72
LOS: 6 days | DRG: 640 | End: 2017-04-05
Attending: EMERGENCY MEDICINE | Admitting: INTERNAL MEDICINE
Payer: MEDICARE
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
## 2017-03-30 DIAGNOSIS — S06.5XAA SUBDURAL HEMATOMA, ACUTE (HCC): ICD-10-CM

## 2017-03-30 DIAGNOSIS — R41.82 ALTERED MENTAL STATUS, UNSPECIFIED ALTERED MENTAL STATUS TYPE: ICD-10-CM

## 2017-03-30 PROBLEM — E83.52 HYPERCALCEMIA: Status: ACTIVE | Noted: 2017-03-30

## 2017-03-30 LAB
ALBUMIN SERPL BCP-MCNC: 3.5 G/DL (ref 3.2–4.9)
ALBUMIN/GLOB SERPL: 0.8 G/DL
ALP SERPL-CCNC: 146 U/L (ref 30–99)
ALT SERPL-CCNC: 48 U/L (ref 2–50)
ANION GAP SERPL CALC-SCNC: 5 MMOL/L (ref 0–11.9)
APPEARANCE UR: CLEAR
AST SERPL-CCNC: 21 U/L (ref 12–45)
BACTERIA BLD CULT: NORMAL
BACTERIA BLD CULT: NORMAL
BILIRUB SERPL-MCNC: 0.9 MG/DL (ref 0.1–1.5)
BILIRUB UR QL STRIP.AUTO: NEGATIVE
BUN SERPL-MCNC: 40 MG/DL (ref 8–22)
CA-I SERPL-SCNC: 2.1 MMOL/L (ref 1.1–1.3)
CALCIUM SERPL-MCNC: 15.2 MG/DL (ref 8.5–10.5)
CHLORIDE SERPL-SCNC: 105 MMOL/L (ref 96–112)
CO2 SERPL-SCNC: 35 MMOL/L (ref 20–33)
COLOR UR: NORMAL
CREAT SERPL-MCNC: 1.54 MG/DL (ref 0.5–1.4)
CULTURE IF INDICATED INDCX: NO UA CULTURE
GFR SERPL CREATININE-BSD FRML MDRD: 44 ML/MIN/1.73 M 2
GLOBULIN SER CALC-MCNC: 4.4 G/DL (ref 1.9–3.5)
GLUCOSE SERPL-MCNC: 141 MG/DL (ref 65–99)
GLUCOSE UR STRIP.AUTO-MCNC: NEGATIVE MG/DL
KETONES UR STRIP.AUTO-MCNC: NEGATIVE MG/DL
LEUKOCYTE ESTERASE UR QL STRIP.AUTO: NEGATIVE
MANUAL DIFF BLD: NORMAL
MICRO URNS: NORMAL
MORPHOLOGY BLD-IMP: NORMAL
MYELOCYTES NFR BLD MANUAL: 0.9 %
NEUTS BAND NFR BLD MANUAL: 1.8 % (ref 0–10)
NITRITE UR QL STRIP.AUTO: NEGATIVE
PH UR STRIP.AUTO: 6.5 [PH]
PHOSPHATE SERPL-MCNC: 3.9 MG/DL (ref 2.5–4.5)
PLATELETS.RETICULATED NFR BLD AUTO: 3.8 K/UL (ref 0.6–13.1)
POTASSIUM SERPL-SCNC: 3.5 MMOL/L (ref 3.6–5.5)
PROT SERPL-MCNC: 7.9 G/DL (ref 6–8.2)
PROT UR QL STRIP: NEGATIVE MG/DL
PTH-INTACT SERPL-MCNC: 6.8 PG/ML (ref 14–72)
RBC UR QL AUTO: NEGATIVE
SIGNIFICANT IND 70042: NORMAL
SIGNIFICANT IND 70042: NORMAL
SITE SITE: NORMAL
SITE SITE: NORMAL
SODIUM SERPL-SCNC: 145 MMOL/L (ref 135–145)
SOURCE SOURCE: NORMAL
SOURCE SOURCE: NORMAL
SP GR UR STRIP.AUTO: 1.01
WBC OTHER NFR BLD MANUAL: 2.7 %

## 2017-03-30 PROCEDURE — 99291 CRITICAL CARE FIRST HOUR: CPT | Mod: GC | Performed by: INTERNAL MEDICINE

## 2017-03-30 PROCEDURE — 85055 RETICULATED PLATELET ASSAY: CPT

## 2017-03-30 PROCEDURE — 770022 HCHG ROOM/CARE - ICU (200)

## 2017-03-30 PROCEDURE — 84165 PROTEIN E-PHORESIS SERUM: CPT

## 2017-03-30 PROCEDURE — 700105 HCHG RX REV CODE 258: Performed by: EMERGENCY MEDICINE

## 2017-03-30 PROCEDURE — 80053 COMPREHEN METABOLIC PANEL: CPT

## 2017-03-30 PROCEDURE — 70450 CT HEAD/BRAIN W/O DYE: CPT

## 2017-03-30 PROCEDURE — 85027 COMPLETE CBC AUTOMATED: CPT

## 2017-03-30 PROCEDURE — 84100 ASSAY OF PHOSPHORUS: CPT

## 2017-03-30 PROCEDURE — 83735 ASSAY OF MAGNESIUM: CPT

## 2017-03-30 PROCEDURE — 93005 ELECTROCARDIOGRAM TRACING: CPT | Performed by: INTERNAL MEDICINE

## 2017-03-30 PROCEDURE — 96361 HYDRATE IV INFUSION ADD-ON: CPT

## 2017-03-30 PROCEDURE — 82330 ASSAY OF CALCIUM: CPT

## 2017-03-30 PROCEDURE — 81003 URINALYSIS AUTO W/O SCOPE: CPT

## 2017-03-30 PROCEDURE — 82306 VITAMIN D 25 HYDROXY: CPT

## 2017-03-30 PROCEDURE — 93005 ELECTROCARDIOGRAM TRACING: CPT | Performed by: EMERGENCY MEDICINE

## 2017-03-30 PROCEDURE — 71010 DX-CHEST-LIMITED (1 VIEW): CPT

## 2017-03-30 PROCEDURE — 99291 CRITICAL CARE FIRST HOUR: CPT

## 2017-03-30 PROCEDURE — 85007 BL SMEAR W/DIFF WBC COUNT: CPT

## 2017-03-30 PROCEDURE — 83970 ASSAY OF PARATHORMONE: CPT

## 2017-03-30 PROCEDURE — 84160 ASSAY OF PROTEIN ANY SOURCE: CPT

## 2017-03-30 RX ORDER — SODIUM CHLORIDE 9 MG/ML
1000 INJECTION, SOLUTION INTRAVENOUS ONCE
Status: COMPLETED | OUTPATIENT
Start: 2017-03-30 | End: 2017-03-30

## 2017-03-30 RX ORDER — SODIUM CHLORIDE 9 MG/ML
1000 INJECTION, SOLUTION INTRAVENOUS ONCE
Status: COMPLETED | OUTPATIENT
Start: 2017-03-31 | End: 2017-03-31

## 2017-03-30 RX ADMIN — SODIUM CHLORIDE 1000 ML: 9 INJECTION, SOLUTION INTRAVENOUS at 21:06

## 2017-03-30 ASSESSMENT — ENCOUNTER SYMPTOMS
CHILLS: 0
BRUISES/BLEEDS EASILY: 0
HEMOPTYSIS: 0
SEIZURES: 1
COUGH: 0
VOMITING: 0
NAUSEA: 0
BLURRED VISION: 0
FEVER: 0
HEADACHES: 0
PALPITATIONS: 0

## 2017-03-30 ASSESSMENT — LIFESTYLE VARIABLES: DO YOU DRINK ALCOHOL: NO

## 2017-03-30 ASSESSMENT — PAIN SCALES - GENERAL: PAINLEVEL_OUTOF10: 8

## 2017-03-30 NOTE — IP AVS SNAPSHOT
" <p align=\"LEFT\"><IMG SRC=\"[email or web address]\" alt=\"Image\" WIDTH=\"50%\" HEIGHT=\"200\" BORDER=\"\"></p>                   Name:Benjamin Post  Medical Record Number:[id number]  CSN: [id number]    YOB: 1945   Age: 71 y.o.  Sex: male  HT:1.753 m (5' 9.02\") WT: 70.4 kg (155 lb 3.3 oz)          Admit Date: 3/30/2017     Discharge Date:   Today's Date: 4/5/2017  Attending Doctor:  Valeriano Crawford M.D.                  Allergies:  Review of patient's allergies indicates no known allergies.          Your appointments     Apr 11, 2017 10:30 AM   Est Blood Products with RN 4   [facility] ([facility])    [address] [address]  [place] [address]   [phone]            Apr 18, 2017 10:30 AM   Est Blood Products with RN 4   [facility] ([facility])    [address] [address]  [place] [address]   [phone]            Apr 25, 2017 10:00 AM   Est Blood Products with RN 1   [facility] ([facility])    [address] [address]  [place] [address]   [phone]            May 02, 2017 10:00 AM   Est Blood Products with RN 1   [facility] ([facility])    [address] [address]  [place] [address]   [phone]            May 09, 2017 10:00 AM   Est Blood Products with RN 1   [facility] ([facility])    [address] [address]  [place] [address]   [phone]            May 16, 2017 10:30 AM   Est Blood Products with RN 6   [facility] ([facility])    [address] [address]  [place] [address]   [phone]            May 23, 2017 10:00 AM   Est Blood Products with RN 2   [facility] ([facility])    [address] [address]  [place] [address]   [phone]            May 30, 2017 10:00 AM   Est Blood Products with RN 2   [facility] ([facility])    [address] [facility] [address]  [place] [address]   [phone]            Jun 06, 2017 10:00 AM   Est Blood Products with RN 2   [facility] ([facility])    [address] [address]  [place] [address]   [phone]           " Jun 13, 2017 10:30 AM   Est Blood Products with RN 6   [facility] ([facility])    [address] [address] [address]   [phone]            Jun 20, 2017 10:30 AM   Est Blood Products with RN 6   [facility] ([facility])    [address] [address] [address]   [phone]            Jun 27, 2017 10:30 AM   Est Blood Products with RN 6   [facility] ([facility])    [address] [address]  [facility] [address]   [phone]                 Medication List      Take these Medications        Instructions    Bosutinib 100 MG Tabs    Take 300 mg by mouth every day.   Dose:  300 mg       carvedilol 3.125 MG Tabs   Commonly known as:  COREG    Take 3.125 mg by mouth 2 times a day, with meals.   Dose:  3.125 mg       levetiracetam 500 MG Tabs   Commonly known as:  KEPPRA    Take 1.5 Tabs by mouth 2 Times a Day.   Dose:  750 mg       oxycodone immediate-release 5 MG Tabs   Commonly known as:  ROXICODONE    Take 1 Tab by mouth every four hours as needed (Moderate Pain; (Pain scale 4-6)).   Dose:  5 mg       PRESERVISION AREDS 2 Caps    Take 1 Cap by mouth every day.   Dose:  1 Cap       tamsulosin 0.4 MG capsule   Commonly known as:  FLOMAX    Take 0.4 mg by mouth every morning.   Dose:  0.4 mg

## 2017-03-30 NOTE — IP AVS SNAPSHOT
4/5/2017          Benjamin Post  [address]  [place] [address]    Dear Benjamin:    [facility] wants to ensure your discharge home is safe and you or your loved ones have had all your questions answered regarding your care after you leave the hospital.    You may receive a telephone call within two days of your discharge.  This call is to make certain you understand your discharge instructions as well as ensure we provided you with the best care possible during your stay with us.     The call will only last approximately 3-5 minutes and will be done by a nurse.    Once again, we want to ensure your discharge home is safe and that you have a clear understanding of any next steps in your care.  If you have any questions or concerns, please do not hesitate to contact us, we are here for you.  Thank you for choosing [facility] for your healthcare needs.    Sincerely,    Darshan Heaton    [facility]

## 2017-03-30 NOTE — IP AVS SNAPSHOT
" Home Care Instructions                                                                                                                  Name:Benjamin Post  Medical Record Number:[id number]  CSN: [id number]    YOB: 1945   Age: 71 y.o.  Sex: male  HT:1.753 m (5' 9.02\") WT: 70.4 kg (155 lb 3.3 oz)          Admit Date: 3/30/2017     Discharge Date:   Today's Date: 4/5/2017  Attending Doctor:  Valeriano Crawford M.D.                  Allergies:  Review of patient's allergies indicates no known allergies.            Discharge Instructions       Discharge Instructions    Discharged to home by car with relative. Discharged via wheelchair, hospital escort: Yes.  Special equipment needed: Walker    Be sure to schedule a follow-up appointment with your primary care doctor or any specialists as instructed.     Discharge Plan:   Diet Plan: Discussed  Activity Level: Discussed  Confirmed Follow up Appointment: Appointment Scheduled  Confirmed Symptoms Management: Discussed  Medication Reconciliation Updated: Yes  Influenza Vaccine Indication: Patient Refuses    I understand that a diet low in cholesterol, fat, and sodium is recommended for good health. Unless I have been given specific instructions below for another diet, I accept this instruction as my diet prescription.   Other diet: Regular    Special Instructions: None    · Is patient discharged on Warfarin / Coumadin?   No     · Is patient Post Blood Transfusion?  Yes  POST BLOOD TRANSFUSION INFORMATION (ADULT)    The purpose of blood transfusion is to correct anemia, low levels of blood clotting factors or to correct acute blood loss.   Blood transfusion is very safe but occasionally unexpected adverse reactions do occur. Most adverse reactions occur during transfusion, within one to two days following transfusion or one to two weeks following transfusion. Some adverse reactions can occur one to six months after transfusion and some even years later.             If " any of the symptoms listed below happen to you during your transfusion,                                 please notify your nurse immediately.   · Fever or Chills  · Flushing of the Face  · Hives, rash or itching  · Difficulty in breathing or shortness of breath  · Pain or oozing of blood from the IV needle site  · Low back pain  · Nausea or vomiting  · Weakness or fainting  · Chest pain  · Blood in the urine  · Decreased frequency of urination    The above symptoms may also occur within 24 to 48 after transfusion; if so, notify your physician.     · Yellowing of the skin    This can happen one to six months after transfusion; if so, notify your physician    Depression / Suicide Risk    As you are discharged from this [facility] facility, it is important to learn how to keep safe from harming yourself.    Recognize the warning signs:  · Abrupt changes in personality, positive or negative- including increase in energy   · Giving away possessions  · Change in eating patterns- significant weight changes-  positive or negative  · Change in sleeping patterns- unable to sleep or sleeping all the time   · Unwillingness or inability to communicate  · Depression  · Unusual sadness, discouragement and loneliness  · Talk of wanting to die  · Neglect of personal appearance   · Rebelliousness- reckless behavior  · Withdrawal from people/activities they love  · Confusion- inability to concentrate     If you or a loved one observes any of these behaviors or has concerns about self-harm, here's what you can do:  · Talk about it- your feelings and reasons for harming yourself  · Remove any means that you might use to hurt yourself (examples: pills, rope, extension cords, firearm)  · Get professional help from the community (Mental Health, Substance Abuse, psychological counseling)  · Do not be alone:Call your Safe Contact- someone whom you trust who will be there for you.  · Call your local CRISIS HOTLINE 382-3355 or  779-410-1020  · Call your local Children's Mobile Crisis Response Team [place] (211) 319-5994 or [email or web address]  · Call the toll free National Suicide Prevention Hotlines   · National Suicide Prevention Lifeline 864-148-LMRH (0594)  · National Hope Line Network 800-SUICIDE (913-0995)        Your appointments     Apr 11, 2017 10:30 AM   [facility] with RN 4   Infusion Services ([facility])    [address]  [place] [address]   [phone]            Apr 18, 2017 10:30 AM   [facility] [employer] with RN 4   In[facility] ([facility])    [address]  [place] [address]   [phone]            Apr 25, 2017 10:00 AM   [facility] [employer] with RN 1   In[facility] ([facility])    [address] [address]  [place] [address]   [phone]            May 02, 2017 10:00 AM   [facility] [employer] with RN 1   In[facility] ([facility])    [address] [address]  [place] [address]   [phone]            May 09, 2017 10:00 AM   [facility] [employer] with RN 1   In[facility] ([facility])    [address] [address]  [place] [address]   [phone]            May 16, 2017 10:30 AM   [facility] [employer] with RN 6   In[facility] ([facility])    [address] [address]  [place] [address]   [phone]            May 23, 2017 10:00 AM   [facility] [employer] with RN 2   In[facility] ([facility])    [address] [address]  [place] [address]   [phone]            May 30, 2017 10:00 AM   [facility] [employer] with RN 2   Infusion [facility] ([facility])    [address] [address]  [place] [address]   [phone]            Jun 06, 2017 10:00 AM   [facility] [employer] with RN 2   In[facility] ([facility])    [address] [address]  [place] [address]   [phone]            Jun 13, 2017 10:30 AM   [facility] [employer] with RN 6   Infusion [facility] ([facility])    [address] [address]  [place] [address]   [phone]            Jun 20, 2017 10:30 AM   [facility] [employer] with RN 6    Infusion Services ([facility])    [address] [facility] [place] [address]   [phone]            Jun 27, 2017 10:30 AM   [facility] [employer] with RN 6   Infusion [facility] ([facility])    [address] [facility] [place] [address]   [phone]                 Discharge Medication Instructions:    Below are the medications your physician expects you to take upon discharge:    Review all your home medications and newly ordered medications with your doctor and/or pharmacist. Follow medication instructions as directed by your doctor and/or pharmacist.    Please keep your medication list with you and share with your physician.               Medication List      CONTINUE taking these medications        Instructions    Bosutinib 100 MG Tabs   Last time this was given:  300 mg on 4/5/2017  8:30 AM    Take 300 mg by mouth every day.   Dose:  300 mg       carvedilol 3.125 MG Tabs   Last time this was given:  12.5 mg on 4/5/2017  8:17 AM   Commonly known as:  COREG    Take 3.125 mg by mouth 2 times a day, with meals.   Dose:  3.125 mg       levetiracetam 500 MG Tabs   Commonly known as:  KEPPRA    Take 1.5 Tabs by mouth 2 Times a Day.   Dose:  750 mg       oxycodone immediate-release 5 MG Tabs   Commonly known as:  ROXICODONE    Take 1 Tab by mouth every four hours as needed (Moderate Pain; (Pain scale 4-6)).   Dose:  5 mg       PRESERVISION AREDS 2 Caps    Take 1 Cap by mouth every day.   Dose:  1 Cap       tamsulosin 0.4 MG capsule   Last time this was given:  0.4 mg on 4/5/2017  8:17 AM   Commonly known as:  FLOMAX    Take 0.4 mg by mouth every morning.   Dose:  0.4 mg         STOP taking these medications     penicillin v potassium 500 MG Tabs   Commonly known as:  VEETID               Instructions           Diet / Nutrition:    Follow any diet instructions given to you by your doctor or the dietician, including how much salt (sodium) you are allowed each day.    If you are overweight, talk to your doctor about a  weight reduction plan.    Activity:    Remain physically active following your doctor's instructions about exercise and activity.    Rest often.     Any time you become even a little tired or short of breath, SIT DOWN and rest.    Worsening Symptoms:    Report any of the following signs and symptoms to the doctor's office immediately:    *Pain of jaw, arm, or neck  *Chest pain not relieved by medication                               *Dizziness or loss of consciousness  *Difficulty breathing even when at rest   *More tired than usual                                       *Bleeding drainage or swelling of surgical site  *Swelling of feet, ankles, legs or stomach                 *Fever (>100ºF)  *Pink or blood tinged sputum  *Weight gain (3lbs/day or 5lbs /week)           *Shock from internal defibrillator (if applicable)  *Palpitations or irregular heartbeats                *Cool and/or numb extremities    Stroke Awareness    Common Risk Factors for Stroke include:    Age  Atrial Fibrillation  Carotid Artery Stenosis  Diabetes Mellitus  Excessive alcohol consumption  High blood pressure  Overweight   Physical inactivity  Smoking    Warning signs and symptoms of a stroke include:    *Sudden numbness or weakness of the face, arm or leg (especially on one side of the body).  *Sudden confusion, trouble speaking or understanding.  *Sudden trouble seeing in one or both eyes.  *Sudden trouble walking, dizziness, loss of balance or coordination.Sudden severe headache with no known cause.    It is very important to get treatment quickly when a stroke occurs. If you experience any of the above warning signs, call 911 immediately.                   Disclaimer         Quit Smoking / Tobacco Use:    I understand the use of any tobacco products increases my chance of suffering from future heart disease or stroke and could cause other illnesses which may shorten my life. Quitting the use of tobacco products is the single most  important thing I can do to improve my health. For further information on smoking / tobacco cessation call a Toll Free Quit Line at 1-838.179.6702 (*National Cancer [place]) or 1-125.198.5940 (American Lung Association) or you can access the web based program at www.lungusa.org.    [place] Tobacco Users Help Line:  (680) 175-1404       Toll Free: 1-436.612.6812  Quit Tobacco Program [facility] (717)926-5880    Crisis Hotline:    [place] Crisis Hotline:  5-489-DPZMMMC or 1-772.743.7078    [place] Crisis Hotline:    1-245.607.4010 or 853-317-0468    Discharge Survey:   Thank you for choosing [facility]. We hope we did everything we could to make your hospital stay a pleasant one. You may be receiving a phone survey and we would appreciate your time and participation in answering the questions. Your input is very valuable to us in our efforts to improve our service to our patients and their families.        My signature on this form indicates that:    1. I have reviewed and understand the above information.  2. My questions regarding this information have been answered to my satisfaction.  3. I have formulated a plan with my discharge nurse to obtain my prescribed medications for home.                  Disclaimer         __________________________________                     __________       ________                       Patient Signature                                                 Date                    Time

## 2017-03-30 NOTE — IP AVS SNAPSHOT
AdMaster Access Code: [email or web address]  Expires: 4/24/2017  9:59 AM    Your email address is not on file at [facility].  Email Addresses are required for you to sign up for AdMaster, please contact [phone] to verify your personal information and to provide your email address prior to attempting to register for AdMaster.    Benjamin Post  [address]  [place] [address]    AdMaster  A secure, online tool to manage your health information     [facility]’s AdMaster® is a secure, online tool that connects you to your personalized health information from the privacy of your home -- day or night - making it very easy for you to manage your healthcare. Once the activation process is completed, you can even access your medical information using the AdMaster alexsander, which is available for free in the Apple Alexsander store or Google Play store.     To learn more about AdMaster, visit [email or web address]    There are two levels of access available (as shown below):   My Chart Features  [facility] Primary Care Doctor [facility]  Specialists [facility]  Urgent  Care Non-[facility] Primary Care Doctor   Email your healthcare team securely and privately 24/7 X X X    Manage appointments: schedule your next appointment; view details of past/upcoming appointments X      Request prescription refills. X      View recent personal medical records, including lab and immunizations X X X X   View health record, including health history, allergies, medications X X X X   Read reports about your outpatient visits, procedures, consult and ER notes X X X X   See your discharge summary, which is a recap of your hospital and/or ER visit that includes your diagnosis, lab results, and care plan X X  X     How to register for General Compressiont:  Once your e-mail address has been verified, follow the following steps to sign up for AdMaster.     1. Go to  [email or web address]  2. Click on the Sign Up Now box, which takes you to the New Member Sign Up page. You will  need to provide the following information:  a. Enter your Qumu Access Code exactly as it appears at the top of this page. (You will not need to use this code after you’ve completed the sign-up process. If you do not sign up before the expiration date, you must request a new code.)   b. Enter your date of birth.   c. Enter your home email address.   d. Click Submit, and follow the next screen’s instructions.  3. Create a [employer] ID. This will be your Qumu login ID and cannot be changed, so think of one that is secure and easy to remember.  4. Create a Qumu password. You can change your password at any time.  5. Enter your Password Reset Question and Answer. This can be used at a later time if you forget your password.   6. Enter your e-mail address. This allows you to receive e-mail notifications when new information is available in Qumu.  7. Click Sign Up. You can now view your health information.    For assistance activating your Qumu account, call [phone]

## 2017-03-30 NOTE — DOCUMENTATION QUERY
DOCUMENTATION QUERY    PROVIDERS: Please select “Cosign w/ note”to reply to query.    To better represent the severity of illness of your patient, please review the following information and exercise your independent professional judgment in responding to this query.     Sepsis is documented in the Progress Notes. Based upon the clinical findings, risk factors, and treatment, can a diagnosis be provided to support this finding? and please specify if this condition was present on admission or developed after admission    Please select all that apply:   • Sepsis present on admission (specify causative organism if known and any associated organ dysfunction if known)  • Sepsis developed after admission  • Sepsis has been ruled out  • SIRS that is unrelated to any infection  • SIRS of non-infectious origin with acute organ dysfunction  • Other explanation of clinical findings  • Findings of no clinical significance  • Unable to determine      The medical record reflects the following:   Clinical Findings  Sepsis protocol started last night for fever and leukopenia, new pneumonia seen on chest xray, Sepsis Reassessment: Vital Signs:/45 mmHg  Pulse 100  Temp(Src) 39.1 °C (102.4 °F)  Resp 20, Dental Caries, OR-extraction    Treatment  Unasyn, Zithromax, Rocephin   Risk Factors  Dental Caries, Pneumonia, OR procedure   Location within medical record  Progress Notes     Thank you,   Ashtyn Boyce RN  Clinical documentation       · Sepsis developed after admission probably from dental abscess's.

## 2017-03-31 PROBLEM — S06.5XAA SUBDURAL HEMATOMA (HCC): Status: ACTIVE | Noted: 2017-03-31

## 2017-03-31 PROBLEM — R41.0 DISORIENTATION: Status: ACTIVE | Noted: 2017-03-31

## 2017-03-31 PROBLEM — D70.9 NEUTROPENIA (HCC): Status: ACTIVE | Noted: 2017-03-31

## 2017-03-31 PROBLEM — I10 ESSENTIAL HYPERTENSION: Status: ACTIVE | Noted: 2017-03-31
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
## 2017-03-31 LAB
25(OH)D3 SERPL-MCNC: 23 NG/ML (ref 30–100)
ALBUMIN SERPL BCP-MCNC: 3.2 G/DL (ref 3.2–4.9)
ALBUMIN SERPL BCP-MCNC: 3.2 G/DL (ref 3.2–4.9)
ALBUMIN/GLOB SERPL: 0.8 G/DL
ALBUMIN/GLOB SERPL: 0.8 G/DL
ALP SERPL-CCNC: 124 U/L (ref 30–99)
ALP SERPL-CCNC: 125 U/L (ref 30–99)
ALT SERPL-CCNC: 40 U/L (ref 2–50)
ALT SERPL-CCNC: 49 U/L (ref 2–50)
ANION GAP SERPL CALC-SCNC: 3 MMOL/L (ref 0–11.9)
ANION GAP SERPL CALC-SCNC: 7 MMOL/L (ref 0–11.9)
ANISOCYTOSIS BLD QL SMEAR: ABNORMAL
ANISOCYTOSIS BLD QL SMEAR: ABNORMAL
AST SERPL-CCNC: 23 U/L (ref 12–45)
AST SERPL-CCNC: 35 U/L (ref 12–45)
BACTERIA BLD CULT: NORMAL
BACTERIA BLD CULT: NORMAL
BARCODED ABORH UBTYP: 7300
BARCODED PRD CODE UBPRD: NORMAL
BARCODED UNIT NUM UBUNT: NORMAL
BASOPHILS # BLD AUTO: 0.9 % (ref 0–1.8)
BASOPHILS # BLD AUTO: 0.9 % (ref 0–1.8)
BASOPHILS # BLD: 0.02 K/UL (ref 0–0.12)
BASOPHILS # BLD: 0.02 K/UL (ref 0–0.12)
BILIRUB SERPL-MCNC: 0.9 MG/DL (ref 0.1–1.5)
BILIRUB SERPL-MCNC: 0.9 MG/DL (ref 0.1–1.5)
BUN SERPL-MCNC: 35 MG/DL (ref 8–22)
BUN SERPL-MCNC: 37 MG/DL (ref 8–22)
CALCIUM SERPL-MCNC: 12.7 MG/DL (ref 8.5–10.5)
CALCIUM SERPL-MCNC: 13.6 MG/DL (ref 8.5–10.5)
CALCIUM SERPL-MCNC: 14.3 MG/DL (ref 8.5–10.5)
CHLORIDE SERPL-SCNC: 111 MMOL/L (ref 96–112)
CHLORIDE SERPL-SCNC: 111 MMOL/L (ref 96–112)
CO2 SERPL-SCNC: 29 MMOL/L (ref 20–33)
CO2 SERPL-SCNC: 29 MMOL/L (ref 20–33)
COMPONENT P 8504P: NORMAL
CREAT SERPL-MCNC: 1.25 MG/DL (ref 0.5–1.4)
CREAT SERPL-MCNC: 1.43 MG/DL (ref 0.5–1.4)
EOSINOPHIL # BLD AUTO: 0.1 K/UL (ref 0–0.51)
EOSINOPHIL # BLD AUTO: 0.14 K/UL (ref 0–0.51)
EOSINOPHIL NFR BLD: 5.3 % (ref 0–6.9)
EOSINOPHIL NFR BLD: 8 % (ref 0–6.9)
ERYTHROCYTE [DISTWIDTH] IN BLOOD BY AUTOMATED COUNT: 66.4 FL (ref 35.9–50)
ERYTHROCYTE [DISTWIDTH] IN BLOOD BY AUTOMATED COUNT: 67.1 FL (ref 35.9–50)
GFR SERPL CREATININE-BSD FRML MDRD: 48 ML/MIN/1.73 M 2
GFR SERPL CREATININE-BSD FRML MDRD: 57 ML/MIN/1.73 M 2
GLOBULIN SER CALC-MCNC: 3.9 G/DL (ref 1.9–3.5)
GLOBULIN SER CALC-MCNC: 4 G/DL (ref 1.9–3.5)
GLUCOSE SERPL-MCNC: 120 MG/DL (ref 65–99)
GLUCOSE SERPL-MCNC: 145 MG/DL (ref 65–99)
HCT VFR BLD AUTO: 26.6 % (ref 42–52)
HCT VFR BLD AUTO: 32.3 % (ref 42–52)
HGB BLD-MCNC: 10.9 G/DL (ref 14–18)
HGB BLD-MCNC: 8.8 G/DL (ref 14–18)
LYMPHOCYTES # BLD AUTO: 0.44 K/UL (ref 1–4.8)
LYMPHOCYTES # BLD AUTO: 0.66 K/UL (ref 1–4.8)
LYMPHOCYTES NFR BLD: 25.9 % (ref 22–41)
LYMPHOCYTES NFR BLD: 36.6 % (ref 22–41)
MACROCYTES BLD QL SMEAR: ABNORMAL
MAGNESIUM SERPL-MCNC: 2.2 MG/DL (ref 1.5–2.5)
MANUAL DIFF BLD: NORMAL
MCH RBC QN AUTO: 31.7 PG (ref 27–33)
MCH RBC QN AUTO: 32.1 PG (ref 27–33)
MCHC RBC AUTO-ENTMCNC: 33.1 G/DL (ref 33.7–35.3)
MCHC RBC AUTO-ENTMCNC: 33.7 G/DL (ref 33.7–35.3)
MCV RBC AUTO: 95 FL (ref 81.4–97.8)
MCV RBC AUTO: 95.7 FL (ref 81.4–97.8)
METAMYELOCYTES NFR BLD MANUAL: 0.9 %
MICROCYTES BLD QL SMEAR: ABNORMAL
MICROCYTES BLD QL SMEAR: ABNORMAL
MONOCYTES # BLD AUTO: 0.05 K/UL (ref 0–0.85)
MONOCYTES # BLD AUTO: 0.08 K/UL (ref 0–0.85)
MONOCYTES NFR BLD AUTO: 2.7 % (ref 0–13.4)
MONOCYTES NFR BLD AUTO: 4.5 % (ref 0–13.4)
MORPHOLOGY BLD-IMP: NORMAL
NEUTROPHILS # BLD AUTO: 0.9 K/UL (ref 1.82–7.42)
NEUTROPHILS # BLD AUTO: 1 K/UL (ref 1.82–7.42)
NEUTROPHILS NFR BLD: 47.3 % (ref 44–72)
NEUTROPHILS NFR BLD: 57.1 % (ref 44–72)
NEUTS BAND NFR BLD MANUAL: 2.7 % (ref 0–10)
NRBC # BLD AUTO: 0 K/UL
NRBC # BLD AUTO: 0 K/UL
NRBC BLD AUTO-RTO: 0 /100 WBC
NRBC BLD AUTO-RTO: 0 /100 WBC
OVALOCYTES BLD QL SMEAR: NORMAL
PLATELET # BLD AUTO: 36 K/UL (ref 164–446)
PLATELET # BLD AUTO: 7 K/UL (ref 164–446)
PLATELET BLD QL SMEAR: NORMAL
PLATELET BLD QL SMEAR: NORMAL
PLATELETS.RETICULATED NFR BLD AUTO: 1.2 K/UL (ref 0.6–13.1)
PMV BLD AUTO: 10.8 FL (ref 9–12.9)
POIKILOCYTOSIS BLD QL SMEAR: NORMAL
POLYCHROMASIA BLD QL SMEAR: NORMAL
POTASSIUM SERPL-SCNC: 3.1 MMOL/L (ref 3.6–5.5)
POTASSIUM SERPL-SCNC: 4.1 MMOL/L (ref 3.6–5.5)
PRODUCT TYPE UPROD: NORMAL
PROT SERPL-MCNC: 7.1 G/DL (ref 6–8.2)
PROT SERPL-MCNC: 7.2 G/DL (ref 6–8.2)
RBC # BLD AUTO: 2.78 M/UL (ref 4.7–6.1)
RBC # BLD AUTO: 3.4 M/UL (ref 4.7–6.1)
RBC BLD AUTO: PRESENT
RBC BLD AUTO: PRESENT
SIGNIFICANT IND 70042: NORMAL
SIGNIFICANT IND 70042: NORMAL
SITE SITE: NORMAL
SITE SITE: NORMAL
SODIUM SERPL-SCNC: 143 MMOL/L (ref 135–145)
SODIUM SERPL-SCNC: 147 MMOL/L (ref 135–145)
SOURCE SOURCE: NORMAL
SOURCE SOURCE: NORMAL
UNIT STATUS USTAT: NORMAL
WBC # BLD AUTO: 1.7 K/UL (ref 4.8–10.8)
WBC # BLD AUTO: 1.8 K/UL (ref 4.8–10.8)
WBC OTHER NFR BLD MANUAL: 1.8 %

## 2017-03-31 PROCEDURE — 770022 HCHG ROOM/CARE - ICU (200)

## 2017-03-31 PROCEDURE — A9270 NON-COVERED ITEM OR SERVICE: HCPCS | Performed by: INTERNAL MEDICINE

## 2017-03-31 PROCEDURE — 85055 RETICULATED PLATELET ASSAY: CPT

## 2017-03-31 PROCEDURE — 85007 BL SMEAR W/DIFF WBC COUNT: CPT

## 2017-03-31 PROCEDURE — 700101 HCHG RX REV CODE 250: Performed by: INTERNAL MEDICINE

## 2017-03-31 PROCEDURE — 700111 HCHG RX REV CODE 636 W/ 250 OVERRIDE (IP): Performed by: INTERNAL MEDICINE

## 2017-03-31 PROCEDURE — 700102 HCHG RX REV CODE 250 W/ 637 OVERRIDE(OP): Performed by: INTERNAL MEDICINE

## 2017-03-31 PROCEDURE — 82310 ASSAY OF CALCIUM: CPT

## 2017-03-31 PROCEDURE — 85027 COMPLETE CBC AUTOMATED: CPT

## 2017-03-31 PROCEDURE — 700105 HCHG RX REV CODE 258: Performed by: INTERNAL MEDICINE

## 2017-03-31 PROCEDURE — 99233 SBSQ HOSP IP/OBS HIGH 50: CPT | Mod: GC | Performed by: INTERNAL MEDICINE

## 2017-03-31 PROCEDURE — 82542 COL CHROMOTOGRAPHY QUAL/QUAN: CPT

## 2017-03-31 PROCEDURE — P9034 PLATELETS, PHERESIS: HCPCS

## 2017-03-31 PROCEDURE — 80053 COMPREHEN METABOLIC PANEL: CPT | Mod: 91

## 2017-03-31 PROCEDURE — 36430 TRANSFUSION BLD/BLD COMPNT: CPT

## 2017-03-31 PROCEDURE — 96365 THER/PROPH/DIAG IV INF INIT: CPT

## 2017-03-31 PROCEDURE — 86644 CMV ANTIBODY: CPT

## 2017-03-31 RX ORDER — SODIUM CHLORIDE 9 MG/ML
INJECTION, SOLUTION INTRAVENOUS CONTINUOUS
Status: DISCONTINUED | OUTPATIENT
Start: 2017-03-31 | End: 2017-03-31

## 2017-03-31 RX ORDER — POTASSIUM CHLORIDE 7.45 MG/ML
10 INJECTION INTRAVENOUS
Status: COMPLETED | OUTPATIENT
Start: 2017-03-31 | End: 2017-03-31

## 2017-03-31 RX ORDER — CARVEDILOL 6.25 MG/1
3.12 TABLET ORAL 2 TIMES DAILY WITH MEALS
Status: DISCONTINUED | OUTPATIENT
Start: 2017-03-31 | End: 2017-03-31

## 2017-03-31 RX ORDER — HYDRALAZINE HYDROCHLORIDE 20 MG/ML
20 INJECTION INTRAMUSCULAR; INTRAVENOUS EVERY 4 HOURS PRN
Status: DISCONTINUED | OUTPATIENT
Start: 2017-03-31 | End: 2017-04-05 | Stop reason: HOSPADM

## 2017-03-31 RX ORDER — CARVEDILOL 12.5 MG/1
12.5 TABLET ORAL 2 TIMES DAILY WITH MEALS
Status: DISCONTINUED | OUTPATIENT
Start: 2017-03-31 | End: 2017-04-05 | Stop reason: HOSPADM

## 2017-03-31 RX ORDER — ZOLEDRONIC ACID 0.04 MG/ML
4 INJECTION, SOLUTION INTRAVENOUS ONCE
Status: DISCONTINUED | OUTPATIENT
Start: 2017-03-31 | End: 2017-03-31

## 2017-03-31 RX ORDER — SODIUM CHLORIDE 9 MG/ML
1000 INJECTION, SOLUTION INTRAVENOUS ONCE
Status: COMPLETED | OUTPATIENT
Start: 2017-03-31 | End: 2017-03-31

## 2017-03-31 RX ORDER — FAMOTIDINE 20 MG/1
20 TABLET, FILM COATED ORAL 2 TIMES DAILY
Status: DISCONTINUED | OUTPATIENT
Start: 2017-03-31 | End: 2017-04-05 | Stop reason: HOSPADM

## 2017-03-31 RX ORDER — ENALAPRILAT 1.25 MG/ML
1.25 INJECTION INTRAVENOUS EVERY 6 HOURS PRN
Status: DISCONTINUED | OUTPATIENT
Start: 2017-03-31 | End: 2017-03-31

## 2017-03-31 RX ORDER — POLYETHYLENE GLYCOL 3350 17 G/17G
1 POWDER, FOR SOLUTION ORAL
Status: DISCONTINUED | OUTPATIENT
Start: 2017-03-30 | End: 2017-04-05 | Stop reason: HOSPADM

## 2017-03-31 RX ORDER — PENICILLIN V POTASSIUM 500 MG/1
500 TABLET ORAL 4 TIMES DAILY
Status: COMPLETED | OUTPATIENT
Start: 2017-03-31 | End: 2017-04-04

## 2017-03-31 RX ORDER — CHLORHEXIDINE GLUCONATE ORAL RINSE 1.2 MG/ML
15 SOLUTION DENTAL 2 TIMES DAILY
Status: DISCONTINUED | OUTPATIENT
Start: 2017-03-31 | End: 2017-04-02

## 2017-03-31 RX ORDER — POTASSIUM CHLORIDE 20 MEQ/1
40 TABLET, EXTENDED RELEASE ORAL 3 TIMES DAILY
Status: COMPLETED | OUTPATIENT
Start: 2017-03-31 | End: 2017-04-02

## 2017-03-31 RX ORDER — SODIUM CHLORIDE 450 MG/100ML
INJECTION, SOLUTION INTRAVENOUS CONTINUOUS
Status: DISCONTINUED | OUTPATIENT
Start: 2017-03-31 | End: 2017-04-01

## 2017-03-31 RX ORDER — TAMSULOSIN HYDROCHLORIDE 0.4 MG/1
0.4 CAPSULE ORAL EVERY MORNING
Status: DISCONTINUED | OUTPATIENT
Start: 2017-03-31 | End: 2017-04-05 | Stop reason: HOSPADM

## 2017-03-31 RX ORDER — PENICILLIN V POTASSIUM 500 MG/1
500 TABLET ORAL 4 TIMES DAILY
Status: DISCONTINUED | OUTPATIENT
Start: 2017-03-31 | End: 2017-03-31

## 2017-03-31 RX ORDER — HYDRALAZINE HYDROCHLORIDE 20 MG/ML
20 INJECTION INTRAMUSCULAR; INTRAVENOUS EVERY 6 HOURS PRN
Status: DISCONTINUED | OUTPATIENT
Start: 2017-03-31 | End: 2017-03-31

## 2017-03-31 RX ORDER — FUROSEMIDE 10 MG/ML
40 INJECTION INTRAMUSCULAR; INTRAVENOUS
Status: DISCONTINUED | OUTPATIENT
Start: 2017-03-31 | End: 2017-04-01

## 2017-03-31 RX ORDER — DEXTROSE MONOHYDRATE 25 G/50ML
25 INJECTION, SOLUTION INTRAVENOUS
Status: DISCONTINUED | OUTPATIENT
Start: 2017-03-31 | End: 2017-04-05 | Stop reason: HOSPADM

## 2017-03-31 RX ORDER — BISACODYL 10 MG
10 SUPPOSITORY, RECTAL RECTAL
Status: DISCONTINUED | OUTPATIENT
Start: 2017-03-30 | End: 2017-04-05 | Stop reason: HOSPADM

## 2017-03-31 RX ORDER — LABETALOL HYDROCHLORIDE 5 MG/ML
10 INJECTION, SOLUTION INTRAVENOUS EVERY 6 HOURS PRN
Status: DISCONTINUED | OUTPATIENT
Start: 2017-03-31 | End: 2017-03-31

## 2017-03-31 RX ORDER — FUROSEMIDE 10 MG/ML
40 INJECTION INTRAMUSCULAR; INTRAVENOUS
Status: DISCONTINUED | OUTPATIENT
Start: 2017-03-31 | End: 2017-03-31

## 2017-03-31 RX ORDER — AMOXICILLIN 250 MG
2 CAPSULE ORAL 2 TIMES DAILY
Status: DISCONTINUED | OUTPATIENT
Start: 2017-03-31 | End: 2017-04-05 | Stop reason: HOSPADM

## 2017-03-31 RX ADMIN — CHLORHEXIDINE GLUCONATE 15 ML: 1.2 RINSE ORAL at 20:45

## 2017-03-31 RX ADMIN — TAMSULOSIN HYDROCHLORIDE 0.4 MG: 0.4 CAPSULE ORAL at 09:13

## 2017-03-31 RX ADMIN — CARVEDILOL 12.5 MG: 12.5 TABLET, FILM COATED ORAL at 09:10

## 2017-03-31 RX ADMIN — CHLORHEXIDINE GLUCONATE 15 ML: 1.2 RINSE ORAL at 14:21

## 2017-03-31 RX ADMIN — DEXTROSE MONOHYDRATE 750 MG: 50 INJECTION, SOLUTION INTRAVENOUS at 05:05

## 2017-03-31 RX ADMIN — FAMOTIDINE 20 MG: 20 TABLET, FILM COATED ORAL at 14:21

## 2017-03-31 RX ADMIN — LABETALOL HYDROCHLORIDE 10 MG: 5 INJECTION, SOLUTION INTRAVENOUS at 03:35

## 2017-03-31 RX ADMIN — SODIUM CHLORIDE: 4.5 INJECTION, SOLUTION INTRAVENOUS at 23:12

## 2017-03-31 RX ADMIN — SODIUM CHLORIDE: 9 INJECTION, SOLUTION INTRAVENOUS at 01:11

## 2017-03-31 RX ADMIN — POTASSIUM CHLORIDE 10 MEQ: 7.46 INJECTION, SOLUTION INTRAVENOUS at 02:43

## 2017-03-31 RX ADMIN — PENICILLIN V POTASIUM 500 MG: 500 TABLET OROPHARYNGEAL at 09:13

## 2017-03-31 RX ADMIN — POTASSIUM CHLORIDE 40 MEQ: 1500 TABLET, EXTENDED RELEASE ORAL at 16:53

## 2017-03-31 RX ADMIN — FUROSEMIDE 40 MG: 10 INJECTION, SOLUTION INTRAVENOUS at 16:53

## 2017-03-31 RX ADMIN — HYDRALAZINE HYDROCHLORIDE 20 MG: 20 INJECTION INTRAMUSCULAR; INTRAVENOUS at 09:10

## 2017-03-31 RX ADMIN — PENICILLIN V POTASIUM 500 MG: 500 TABLET OROPHARYNGEAL at 16:53

## 2017-03-31 RX ADMIN — POTASSIUM CHLORIDE 10 MEQ: 7.46 INJECTION, SOLUTION INTRAVENOUS at 03:53

## 2017-03-31 RX ADMIN — HYDRALAZINE HYDROCHLORIDE 20 MG: 20 INJECTION INTRAMUSCULAR; INTRAVENOUS at 03:07

## 2017-03-31 RX ADMIN — SODIUM CHLORIDE: 4.5 INJECTION, SOLUTION INTRAVENOUS at 16:54

## 2017-03-31 RX ADMIN — CALCITONIN SALMON 280 UNITS: 200 INJECTION, SOLUTION INTRAMUSCULAR; SUBCUTANEOUS at 21:06

## 2017-03-31 RX ADMIN — FUROSEMIDE 40 MG: 10 INJECTION, SOLUTION INTRAVENOUS at 07:45

## 2017-03-31 RX ADMIN — SODIUM CHLORIDE 1000 ML: 9 INJECTION, SOLUTION INTRAVENOUS at 09:10

## 2017-03-31 RX ADMIN — POTASSIUM CHLORIDE 10 MEQ: 7.46 INJECTION, SOLUTION INTRAVENOUS at 05:06

## 2017-03-31 RX ADMIN — CALCITONIN SALMON 280 UNITS: 200 INJECTION, SOLUTION INTRAMUSCULAR; SUBCUTANEOUS at 09:10

## 2017-03-31 RX ADMIN — SODIUM CHLORIDE 1000 ML: 9 INJECTION, SOLUTION INTRAVENOUS at 01:18

## 2017-03-31 RX ADMIN — POTASSIUM CHLORIDE 40 MEQ: 1500 TABLET, EXTENDED RELEASE ORAL at 20:45

## 2017-03-31 RX ADMIN — PAMIDRONATE DISODIUM 90 MG: 9 INJECTION, SOLUTION INTRAVENOUS at 01:11

## 2017-03-31 RX ADMIN — PENICILLIN V POTASIUM 500 MG: 500 TABLET OROPHARYNGEAL at 14:21

## 2017-03-31 RX ADMIN — PENICILLIN V POTASIUM 500 MG: 500 TABLET OROPHARYNGEAL at 20:45

## 2017-03-31 RX ADMIN — FAMOTIDINE 20 MG: 20 TABLET, FILM COATED ORAL at 20:45

## 2017-03-31 RX ADMIN — ENALAPRILAT 1.25 MG: 1.25 INJECTION INTRAVENOUS at 02:34

## 2017-03-31 RX ADMIN — CARVEDILOL 12.5 MG: 12.5 TABLET, FILM COATED ORAL at 16:54

## 2017-03-31 RX ADMIN — DEXTROSE MONOHYDRATE 750 MG: 50 INJECTION, SOLUTION INTRAVENOUS at 16:54

## 2017-03-31 ASSESSMENT — ENCOUNTER SYMPTOMS
COUGH: 0
PALPITATIONS: 0
HEMOPTYSIS: 0
ORTHOPNEA: 0
CHILLS: 0
FEVER: 0
DOUBLE VISION: 0
BLURRED VISION: 0
HEADACHES: 0

## 2017-03-31 ASSESSMENT — COPD QUESTIONNAIRES
COPD SCREENING SCORE: 2
DO YOU EVER COUGH UP ANY MUCUS OR PHLEGM?: NO/ONLY WITH OCCASIONAL COLDS OR INFECTIONS
DURING THE PAST 4 WEEKS HOW MUCH DID YOU FEEL SHORT OF BREATH: NONE/LITTLE OF THE TIME
HAVE YOU SMOKED AT LEAST 100 CIGARETTES IN YOUR ENTIRE LIFE: NO/DON'T KNOW

## 2017-03-31 ASSESSMENT — PAIN SCALES - GENERAL
PAINLEVEL_OUTOF10: 0

## 2017-03-31 ASSESSMENT — LIFESTYLE VARIABLES
REASON UNABLE TO ASSESS: CONFUSED
EVER_SMOKED: NEVER

## 2017-03-31 NOTE — PROGRESS NOTES
tech from Lab called with critical result of ca=12.7 at 1435. Critical lab result read back to tech.   This critical lab result is within parameters established by  for this patient

## 2017-03-31 NOTE — CARE PLAN
Problem: Safety  Goal: Will remain free from falls  Intervention: Implement fall precautions  Bed rails are up, bed alarm is on, pt's room is in view of the nurses station, call light is in reach, pt has been oriented by [place] speaking RN.        Problem: Skin Integrity  Goal: Risk for impaired skin integrity will decrease  Intervention: Assess and monitor skin integrity, appearance and/or temperature  2 RN skin assessment performed on admission to ICU. No skin problems r/t pressure detected at this time.  Pt has documented bruising throughout body from falls at home.

## 2017-03-31 NOTE — PROGRESS NOTES
I have seen and examined the patient today.  I have reviewed the medical record, laboratory data, imaging and all relevant studies.  I have discussed the plan of care with the Internal Medicine Resident and agree with the note and plan as documented.       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 that patient here  - transfuse 1 pack platelets (irradiated/cmv(-))     Total critical care time, not including billable procedures 40 minutes.

## 2017-03-31 NOTE — ED NOTES
Platelet transfusion started.  See doc flow sheet for charting.  Pt educated on transfusion reaction.

## 2017-03-31 NOTE — ED PROVIDER NOTES
ED Provider Note    CHIEF COMPLAINT  Chief Complaint   Patient presents with   • T-5000 GLF     witness fall onto face   • ALOC     increasing confusion since yesterday 3/29       HPI  Benjamin Post is a 71 y.o. male who presents here for evaluation of altered level of consciousness. The patient per family has had increased confusion since yesterday evening. The patient per family has a history of leukemia and is currently waiting bone marrow transplant. The patient reportedly had increased confusion beginning yesterday, without any associated symptoms. Family states that the patient is giving inappropriate answers to questions, is generally weak, and seems confused to them. He also had a ground-level fall yesterday where and he fell and hit the back of his head. Additionally the patient fell out of his bed this morning, and again impacted the back of his head on the ground. Secondary to the falls and increased altered mental status, family decided to bring the patient here for evaluation.    REVIEW OF SYSTEMS  Unable to obtain secondary to altered mental status    PAST MEDICAL HISTORY   has a past medical history of Hypertension; Cancer (CMS-HCC); Indigestion; and Heart burn.    SOCIAL HISTORY  Social History     Social History Main Topics   • Smoking status: Never Smoker    • Smokeless tobacco: Not on file   • Alcohol Use: No   • Drug Use: No   • Sexual Activity: Not on file       SURGICAL HISTORY   has past surgical history that includes bone marrow aspiration (11/5/2015); bone marrow biopsy, ndl/trocar (11/5/2015); bone marrow biopsy, ndl/trocar (3/23/2016); bone marrow aspiration (3/23/2016); bone marrow biopsy, ndl/trocar (7/12/2016); bone marrow aspiration (7/12/2016); dental extraction(s) (N/A, 3/25/2017); and submandible abscess incision and drainage (N/A, 3/25/2017).    CURRENT MEDICATIONS  Home Medications     Reviewed by Tona Dumont R.N. (Registered Nurse) on 03/30/17 at 2034  Med List Status:  "Partial    Medication Last Dose Status    Bosutinib 100 MG Tab 3/24/2017 Active    carvedilol (COREG) 3.125 MG TABS 3/24/2017 Active    levetiracetam (KEPPRA) 500 MG Tab  Active    loperamide (IMODIUM) 2 MG Cap 3/24/2017 Active    Multiple Vitamins-Minerals (PRESERVISION AREDS 2) Cap 3/24/2017 Active    oxycodone immediate-release (ROXICODONE) 5 MG Tab  Active    penicillin v potassium (VEETID) 500 MG Tab  Active    tamsulosin (FLOMAX) 0.4 MG capsule 3/24/2017 Active                ALLERGIES  No Known Allergies    PHYSICAL EXAM  VITAL SIGNS: /78 mmHg  Pulse 84  Temp(Src) 2.3 °C (36.1 °F)  Resp 20  Ht 1.753 m (5' 9.02\")  Wt 79.379 kg (175 lb)  BMI 25.83 kg/m2  SpO2 96%   Pulse ox interpretation: I interpret this pulse ox as normal.  Constitutional: Alert in no apparent distress.  HENT: Head normocephalic, atraumatic, Bilateral external ears normal, Nose normal, diffuse bruising over the lips of the mouth which family states is been present since the patient had oral surgery 4 days ago.  Eyes: Pupils are equal, extraocular movements intact, Conjunctiva normal, Non-icteric.   Neck: Normal range of motion, Supple, No stridor.   Lymphatic: No lymphadenopathy noted.   Cardiovascular: Regular rate and rhythm   Thorax & Lungs: Lungs clear to auscultation bilaterally, No acute respiratory distress, No wheezing, No increased work of breathing.   Abdomen: Soft, minimally tender in the left upper and suprapubic regions, no rebound, no guarding, Non-distended   Skin: Warm, Dry, No erythema, No rash.   Back: No bony tenderness, No CVA tenderness.   Extremities: Intact distal pulses, No edema, No tenderness, No cyanosis   Musculoskeletal: Good range of motion in all major joints. No tenderness to palpation or major deformities noted.   Neurologic: Alert, however not oriented to place or time, Normal motor function, Normal sensory function, No focal deficits noted.   Psychiatric: Affect is flat       DIAGNOSTIC STUDIES " / PROCEDURES    EKG:  Normal sinus rhythm, rate of 81, ST segment elevation in V2 through V4 not meeting criteria for STEMI, normal intervals, no reciprocal change. By comparison to previous patient previously had LVH as well  General impression: LVH    LABS  Labs Reviewed   CBC WITH DIFFERENTIAL - Abnormal; Notable for the following:     WBC 1.7 (*)     RBC 3.40 (*)     Hemoglobin 10.9 (*)     Hematocrit 32.3 (*)     RDW 67.1 (*)     Platelet Count 7 (*)     Eosinophils 8.00 (*)     Neutrophils (Absolute) 1.00 (*)     Lymphs (Absolute) 0.44 (*)     All other components within normal limits   COMP METABOLIC PANEL - Abnormal; Notable for the following:     Potassium 3.5 (*)     Co2 35 (*)     Glucose 141 (*)     Bun 40 (*)     Creatinine 1.54 (*)     Calcium 15.2 (*)     Alkaline Phosphatase 146 (*)     Globulin 4.4 (*)     All other components within normal limits   ESTIMATED GFR - Abnormal; Notable for the following:     GFR If  54 (*)     GFR If Non  44 (*)     All other components within normal limits   PTH WITH IONIZED CALCIUM (AR) - Abnormal; Notable for the following:     Ionized Calcium 2.1 (*)     Pth, Intact 6.8 (*)     All other components within normal limits   URINALYSIS,CULTURE IF INDICATED   PHOSPHORUS   DIFFERENTIAL MANUAL   PERIPHERAL SMEAR REVIEW   PLATELET ESTIMATE   MORPHOLOGY   IMMATURE PLT FRACTION   PATH INTERP HEME   TRANSFUSE PLATELET PHERESIS-NURSING COMMUNICATION         RADIOLOGY  CT-HEAD W/O   Final Result      1.  Stable, nearly isodense right holohemispheric subdural fluid collection.   2.  No new abnormalities.               INTERPRETING LOCATION:  [address] , [facility], [address]      DX-CHEST-LIMITED (1 VIEW)   Final Result      Minimal right basilar opacity, consistent with atelectasis and/or focal pneumonia. No significant change.               INTERPRETING LOCATION: [address] , [place], [address]          COURSE & MEDICAL DECISION MAKING  Pertinent Labs &  Imaging studies reviewed. (See chart for details)    10:34 PM spoke with R ICU team regarding the patient. They will accept him for admission.     Patient presenting here secondary to increased altered mental status and multiple falls recently. Here on examination the patient appears confused, though does not have significant external signs of trauma. The patient had initial considerations including intracranial hemorrhage, elect slight abnormality, pneumonia, and UTI. Here on CT scan of the head the patient has no evidence of intracranial hemorrhage, significantly elevated calcium with a calcium of 15. Additional laboratory tests showed acute kidney injury, and thrombocytopenia of 7. Given this, the patient was transfused a unit of platelets. Additionally the patient had stable pulmonary effusion, and no evidence of infection on the urinalysis. The patient had an EKG performed showing some abnormalities by comparison to previous which are likely effect from the calcium. Patient was given 2 L of normal saline in order to combat this. Additionally, given the severity of the electrolyte abnormality, and the possibility for cardiac manifestations, the patient will be admitted to the intensive care unit for close monitoring, and treatment of his hypercalcemia.    CRITICAL CARE  The very real possibilty of a deterioration of this patient's condition required the highest level of my preparedness for sudden, emergent intervention.  I provided critical care services, which included medication orders, frequent reevaluations of the patient's condition and response to treatment, ordering and reviewing test results, and discussing the case with various consultants.  The critical care time associated with the care of the patient was 34 minutes. Review chart for interventions. This time is exclusive of any other billable procedures.       FINAL IMPRESSION  1. Hypercalcemia  2. Leukemia  3. Acute kidney injury  4. Acute  thrombocytopenia         Electronically signed by: Valeriano Pandey, 3/30/2017 8:41 PM    This record was made with a voice recognition software. I have tried to correct any grammar, spelling or context errors to the best of my ability, but errors may still remain. Interpretation of this chart should be taken in this context.

## 2017-03-31 NOTE — PROGRESS NOTES
Pt care assumed. Pt resting in bed, no c/o pain, VSS. Assessment completed and medications given. Plan of care reviewed with pt using language line  service. Call light with in reach, bed in lowest position, fall precautions in place. Will continue to monitor.

## 2017-03-31 NOTE — PROGRESS NOTES
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 Infectious cause such as UTI and Pneumonia been considered. He also found to have plt <10 and he received 1 unit of platelets.    3/31:  Had large BM  Still confuse and getting out the bed and pulling lines.  NS 1000 CC bolus  Repele electrolytes  1/2  cc/hr + IV furosemide 40 mg BID with KCL   Continue calcitonin for total 2 days due to tachyphylaxis  Increase carvediolol 3.125 mg BID to 12.5mg BID for HTN  Continue IV hydralizine PRN  Continue Keppra  Start oral care with Chlorhexidine BID  Keep close eye on him if become septic such as hypotensive or altered again, may do Blood culture, repeat Chest Xray and start empirical antibiotics such as Zosyn, if hemodynamically unstable consider IV Vancomycin       Review of Systems:  Review of Systems   Constitutional: Negative for fever and chills.   HENT: Negative for hearing loss and tinnitus.    Eyes: Negative for blurred vision and double vision.   Respiratory: Negative for cough and hemoptysis.    Cardiovascular: Negative for chest pain, palpitations and orthopnea.   Neurological: Negative for headaches.       Physical Exam:  Physical Exam   Constitutional: He appears well-developed and well-nourished.   HENT:   Head: Normocephalic and atraumatic.   Right Ear: External ear normal.   Left Ear: External ear normal.   Oropharynx edentulous, blood in his mouth    Eyes: Conjunctivae are normal. Pupils are equal, round, and reactive to light.   Cardiovascular: Regular rhythm and normal heart sounds.  Exam reveals no friction rub.    No murmur heard.  tachycardia   Pulmonary/Chest: Effort normal and  breath sounds normal. No respiratory distress. He has no wheezes. He has no rales.   Abdominal: Soft. Bowel sounds are normal. He exhibits no distension. There is no tenderness. There is no rebound and no guarding.   Musculoskeletal: Normal range of motion. He exhibits no edema.   Neurological: He is alert.   Oriented *2-3  Getting out the bed and pulling lopez cath   Skin: Skin is warm and dry.   Psychiatric: His mood appears anxious. He is agitated. Cognition and memory are impaired. He expresses inappropriate judgment.   Nursing note and vitals reviewed.      Labs:  Recent Results (from the past 24 hour(s))   CBC WITH DIFFERENTIAL    Collection Time: 03/30/17  8:36 PM   Result Value Ref Range    WBC 1.7 (LL) 4.8 - 10.8 K/uL    RBC 3.40 (L) 4.70 - 6.10 M/uL    Hemoglobin 10.9 (L) 14.0 - 18.0 g/dL    Hematocrit 32.3 (L) 42.0 - 52.0 %    MCV 95.0 81.4 - 97.8 fL    MCH 32.1 27.0 - 33.0 pg    MCHC 33.7 33.7 - 35.3 g/dL    RDW 67.1 (H) 35.9 - 50.0 fL    Platelet Count 7 (LL) 164 - 446 K/uL    Nucleated RBC 0.00 /100 WBC    NRBC (Absolute) 0.00 K/uL    Neutrophils-Polys 57.10 44.00 - 72.00 %    Lymphocytes 25.90 22.00 - 41.00 %    Monocytes 2.70 0.00 - 13.40 %    Eosinophils 8.00 (H) 0.00 - 6.90 %    Basophils 0.90 0.00 - 1.80 %    Neutrophils (Absolute) 1.00 (L) 1.82 - 7.42 K/uL    Lymphs (Absolute) 0.44 (L) 1.00 - 4.80 K/uL    Monos (Absolute) 0.05 0.00 - 0.85 K/uL    Eos (Absolute) 0.14 0.00 - 0.51 K/uL    Baso (Absolute) 0.02 0.00 - 0.12 K/uL    Anisocytosis 1+     Macrocytosis 1+     Microcytosis 1+    COMP METABOLIC PANEL    Collection Time: 03/30/17  8:36 PM   Result Value Ref Range    Sodium 145 135 - 145 mmol/L    Potassium 3.5 (L) 3.6 - 5.5 mmol/L    Chloride 105 96 - 112 mmol/L    Co2 35 (H) 20 - 33 mmol/L    Anion Gap 5.0 0.0 - 11.9    Glucose 141 (H) 65 - 99 mg/dL    Bun 40 (H) 8 - 22 mg/dL    Creatinine 1.54 (H) 0.50 - 1.40 mg/dL    Calcium 15.2 (HH) 8.5 - 10.5 mg/dL    AST(SGOT) 21 12 - 45 U/L     ALT(SGPT) 48 2 - 50 U/L    Alkaline Phosphatase 146 (H) 30 - 99 U/L    Total Bilirubin 0.9 0.1 - 1.5 mg/dL    Albumin 3.5 3.2 - 4.9 g/dL    Total Protein 7.9 6.0 - 8.2 g/dL    Globulin 4.4 (H) 1.9 - 3.5 g/dL    A-G Ratio 0.8 g/dL   ESTIMATED GFR    Collection Time: 03/30/17  8:36 PM   Result Value Ref Range    GFR If  54 (A) >60 mL/min/1.73 m 2    GFR If Non  44 (A) >60 mL/min/1.73 m 2   PTH WITH IONIZED CALCIUM    Collection Time: 03/30/17  8:36 PM   Result Value Ref Range    Ionized Calcium 2.1 (H) 1.1 - 1.3 mmol/L    Pth, Intact 6.8 (L) 14.0 - 72.0 pg/mL   PHOSPHORUS    Collection Time: 03/30/17  8:36 PM   Result Value Ref Range    Phosphorus 3.9 2.5 - 4.5 mg/dL   DIFFERENTIAL MANUAL    Collection Time: 03/30/17  8:36 PM   Result Value Ref Range    Bands-Stabs 1.80 0.00 - 10.00 %    Myelocytes 0.90 %    Other 2.70 %    Manual Diff Status PERFORMED    PERIPHERAL SMEAR REVIEW    Collection Time: 03/30/17  8:36 PM   Result Value Ref Range    Peripheral Smear Review see below    PLATELET ESTIMATE    Collection Time: 03/30/17  8:36 PM   Result Value Ref Range    Plt Estimation Marked Decrease    MORPHOLOGY    Collection Time: 03/30/17  8:36 PM   Result Value Ref Range    RBC Morphology Present    IMMATURE PLT FRACTION    Collection Time: 03/30/17  8:36 PM   Result Value Ref Range    Imm. Plt Fraction 3.8 0.6 - 13.1 K/uL   VITAMIN D,25 HYDROXY    Collection Time: 03/30/17  8:36 PM   Result Value Ref Range    25-Hydroxy   Vitamin D 25 23 (L) 30 - 100 ng/mL   MAGNESIUM    Collection Time: 03/30/17  8:36 PM   Result Value Ref Range    Magnesium 2.2 1.5 - 2.5 mg/dL   EKG (ER)    Collection Time: 03/30/17  9:45 PM   Result Value Ref Range    Report       [facility]    Test Date:  2017-03-30  Pt Name:    MUKUND HAINES                Department: ER  MRN:        [id number]                      Room:       [facility]  Gender:     M                             Technician: [id number]  :        [id number]                   Requested By:JAROCHO LUQUE  Order #:    [id number]                    Reading MD:    Measurements  Intervals                                Axis  Rate:       81                           P:          36  NJ:         156                          QRS:        11  QRSD:       106                          T:          0  QT:         348  QTc:        404    Interpretive Statements  SINUS RHYTHM  PROBABLE LEFT VENTRICULAR HYPERTROPHY  ANTERIOR ST ELEVATION, PROBABLY DUE TO LVH  Compared to ECG 2017 12:18:57  Left ventricular hypertrophy now present  ST (T wave) deviation now present     URINALYSIS,CULTURE IF INDICATED    Collection Time: 17  9:54 PM   Result Value Ref Range    Color Lt. Yellow     Character Clear     Specific Gravity 1.007 <1.035    Ph 6.5 5.0-8.0    Glucose Negative Negative mg/dL    Ketones Negative Negative mg/dL    Protein Negative Negative mg/dL    Bilirubin Negative Negative    Nitrite Negative Negative    Leukocyte Esterase Negative Negative    Occult Blood Negative Negative    Micro Urine Req see below     Culture Indicated No UA Culture   PLATELETS REQUEST    Collection Time: 17 11:28 PM   Result Value Ref Range    Component P       P2I                 Plts,PheresisIRR    [id number]   issued       17   00:04      Product Type Platelets  pheresis IRR LR     Dispense Status Issued     Unit Number (Barcoded) [id number]     Product Code (Barcoded) [id number]     Blood Type (Barcoded) 7300    CALCIUM (CA)    Collection Time: 17  1:09 AM   Result Value Ref Range    Calcium 14.3 (HH) 8.5 - 10.5 mg/dL   CBC WITH DIFFERENTIAL    Collection Time: 17  6:30 AM   Result Value Ref Range    WBC 1.8 (LL) 4.8 - 10.8 K/uL    RBC 2.78 (L) 4.70 - 6.10 M/uL    Hemoglobin 8.8 (L) 14.0 - 18.0 g/dL    Hematocrit 26.6 (L) 42.0 - 52.0 %    MCV 95.7 81.4 - 97.8 fL    MCH 31.7 27.0 - 33.0 pg    MCHC 33.1 (L) 33.7 - 35.3 g/dL     RDW 66.4 (H) 35.9 - 50.0 fL    Platelet Count 36 (LL) 164 - 446 K/uL    MPV 10.8 9.0 - 12.9 fL    Nucleated RBC 0.00 /100 WBC    NRBC (Absolute) 0.00 K/uL    Neutrophils-Polys 47.30 44.00 - 72.00 %    Lymphocytes 36.60 22.00 - 41.00 %    Monocytes 4.50 0.00 - 13.40 %    Eosinophils 5.30 0.00 - 6.90 %    Basophils 0.90 0.00 - 1.80 %    Neutrophils (Absolute) 0.90 (L) 1.82 - 7.42 K/uL    Lymphs (Absolute) 0.66 (L) 1.00 - 4.80 K/uL    Monos (Absolute) 0.08 0.00 - 0.85 K/uL    Eos (Absolute) 0.10 0.00 - 0.51 K/uL    Baso (Absolute) 0.02 0.00 - 0.12 K/uL    Anisocytosis 1+     Microcytosis 1+    COMP METABOLIC PANEL    Collection Time: 03/31/17  6:30 AM   Result Value Ref Range    Sodium 143 135 - 145 mmol/L    Potassium 4.1 3.6 - 5.5 mmol/L    Chloride 111 96 - 112 mmol/L    Co2 29 20 - 33 mmol/L    Anion Gap 3.0 0.0 - 11.9    Glucose 120 (H) 65 - 99 mg/dL    Bun 37 (H) 8 - 22 mg/dL    Creatinine 1.43 (H) 0.50 - 1.40 mg/dL    Calcium 13.6 (HH) 8.5 - 10.5 mg/dL    AST(SGOT) 35 12 - 45 U/L    ALT(SGPT) 40 2 - 50 U/L    Alkaline Phosphatase 124 (H) 30 - 99 U/L    Total Bilirubin 0.9 0.1 - 1.5 mg/dL    Albumin 3.2 3.2 - 4.9 g/dL    Total Protein 7.2 6.0 - 8.2 g/dL    Globulin 4.0 (H) 1.9 - 3.5 g/dL    A-G Ratio 0.8 g/dL   ESTIMATED GFR    Collection Time: 03/31/17  6:30 AM   Result Value Ref Range    GFR If  59 (A) >60 mL/min/1.73 m 2    GFR If Non  48 (A) >60 mL/min/1.73 m 2   DIFFERENTIAL MANUAL    Collection Time: 03/31/17  6:30 AM   Result Value Ref Range    Bands-Stabs 2.70 0.00 - 10.00 %    Metamyelocytes 0.90 %    Other 1.80 %    Manual Diff Status PERFORMED    PERIPHERAL SMEAR REVIEW    Collection Time: 03/31/17  6:30 AM   Result Value Ref Range    Peripheral Smear Review see below    PLATELET ESTIMATE    Collection Time: 03/31/17  6:30 AM   Result Value Ref Range    Plt Estimation Marked Decrease    MORPHOLOGY    Collection Time: 03/31/17  6:30 AM   Result Value Ref Range    RBC  Morphology Present     Polychromia 1+     Poikilocytosis 1+     Ovalocytes 1+    IMMATURE PLT FRACTION    Collection Time: 17  6:30 AM   Result Value Ref Range    Imm. Plt Fraction 1.2 0.6 - 13.1 K/uL       Hemodynamics:  Temp (24hrs), Av.7 °C (98 °F), Min:36.1 °C (97 °F), Max:37.2 °C (98.9 °F)  Temperature: 36.8 °C (98.2 °F)  Pulse  Av.4  Min: 77  Max: 116Heart Rate (Monitored): 89  Blood Pressure : (!) 182/84 mmHg, NIBP: 150/70 mmHg     Respiratory:    Respiration: (!) 32, Pulse Oximetry: 97 %, O2 Daily Delivery Respiratory : Room Air with O2 Available     Work Of Breathing / Effort: Mild  RUL Breath Sounds: Clear, RML Breath Sounds: Clear, RLL Breath Sounds: Diminished, WAGNER Breath Sounds: Clear, LLL Breath Sounds: Diminished  Fluids:    Intake/Output Summary (Last 24 hours) at 17 1329  Last data filed at 17 1100   Gross per 24 hour   Intake   2800 ml   Output   3100 ml   Net   -300 ml     Weight: 70.4 kg (155 lb 3.3 oz)  GI/Nutrition:  Orders Placed This Encounter   Procedures   • DIET ORDER     Standing Status: Standing      Number of Occurrences: 1      Standing Expiration Date:      Order Specific Question:  Diet:     Answer:  Clear Liquid [10]     Medications:  Current Facility-Administered Medications   Medication Last Dose   • senna-docusate (PERICOLACE or SENOKOT S) 8.6-50 MG per tablet 2 Tab Stopped at 17 0900    And   • polyethylene glycol/lytes (MIRALAX) PACKET 1 Packet      And   • magnesium hydroxide (MILK OF MAGNESIA) suspension 30 mL      And   • bisacodyl (DULCOLAX) suppository 10 mg     • Respiratory Care per Protocol     • NS infusion     • glucose 4 g chewable tablet 16 g      And   • dextrose 50% (D50W) injection 25 mL     • Bosutinib 300 mg tab (Bosulif) (POM) 300 mg at 17 0830   • tamsulosin (FLOMAX) capsule 0.4 mg 0.4 mg at 17 0913   • penicillin v potassium (VEETID) tablet 500 mg 500 mg at 17 0913   • levetiracetam (KEPPRA) 750 mg in D5W  100 mL IVPB Stopped at 03/31/17 0520   • calcitonin (MIACALCIN) 200 UNIT/ML injection 280 Units 280 Units at 03/31/17 0910   • furosemide (LASIX) injection 40 mg 40 mg at 03/31/17 0745   • carvedilol (COREG) tablet 12.5 mg 12.5 mg at 03/31/17 0910   • hydrALAZINE (APRESOLINE) injection 20 mg 20 mg at 03/31/17 0910     Medical Decision Making, by Problem:  Active Hospital Problems    Diagnosis   • Neutropenia (CMS-HCC) [D70.9]     Priority: High     ANC 1000  Reverse isolation  No fever, chills   Chest xray show likely atelctasis, repeat chest xray tomorrow after hydration, if spike fever, we will start levofloxacin empirically for pneumonia     • Disorientation [R41.0]     Priority: High     Due to metabolic abnormalities  Correct calcium    • Hypercalcemia [E83.52]     Priority: High   • Thrombocytopenia (CMS-HCC) [D69.6]     Priority: High   • Fall from ground level [W18.30XA]     Priority: Medium   • Essential hypertension [I10]     Priority: Medium   • Hypokalemia [E87.6]     Priority: Medium   • Pancytopenia (CMS-HCC) [D61.818]     Priority: Medium   • Leukemia (CMS-[facility], located within [facility]) [C95.90]     Priority: Medium   • Subdural hematoma, acute (CMS-[facility], located within [facility]) [I62.01]     Priority: Low     CT head on 03/31 show Stable, nearly isodense right holohemispheric subdural fluid collection.       Quality  Measures:  EKG reviewed, Radiology images reviewed, Labs reviewed and Medications reviewed  Mahajan catheter: Critically Ill - Requiring Accurate Measurement of Urinary Output and Urinary Tract Retention or Urinary Tract Obstruction      DVT Prophylaxis: Contraindicated - High bleeding risk (thrombocytopenia)  DVT prophylaxis - mechanical: SCDs  Ulcer prophylaxis: Yes    Assessed for rehab: Patient unable to tolerate rehabilitation therapeutic regimen    Plan:  #altered mental status ( improving)  - secondary to dehydration + hypercalcemia + NATY  - Passed bedside swallow eval and startd clear liquid diet   - q 4 hrly neurochecks   - Fall,  aspiration, seizure precuation  - Start non violent restrain ( lap belt) due to agitation and risk of injuring him self    #Hypercalcemia  - most likely secondary to malignancy    - Ca 15.2, ionised calcium 2.1    - PTH : 6.8 appropriate response    - patient has h/o CML which can cause hypercalcemia of malignancy    - PTHrP pending, Vit D 25  Hydroxy 23 and SPEP  pending  - PTH was low which is an appropriate response  - received IV NS 2 L  in ED + 1 liter bolus today + IV  cc/hr  - calcitonin for 2 days due to tachyphylaxis and pamidronate received in ED  - Oncology consulted and I spoke with Dr. Holly    #Thrombocytopenia  Platelets 7,. S/p 1 unit of plt 37  CMV negative, irradiated Platelet transfused on 03/30  Watch for any spontaneous  Intracranial bleed    neurochecks   Fall precaution, aspiration and seizure preaution     #NATY   - secondary to poor appetite , mostly pre renal    - continue fluids   - repeat chem panel showing improvement     #CML   - oncology consult in am    - Dr Vasquez is the oncolgist and consulted and he will see patient  - continue Bosutinib  - patient is to undergo [facility] in [facility] in late April     #neutropenia  - no fever    - Precaution isolation due to ANC 1000  - CXR show atelectasis, ? Pneumonia  - UA negative for infection   - Oncology aware.      #Dental abscess : s/p surgery full mouth tooth extraction with I/D of intraoral abscess, on March 25, 2017 , continue penicillin V for 6 more days , end date in    #Hypertension : increase carvedilol  3 125 BID to 12.5 mg BID  #seizure disorder: recently diagnosed during the last hospital stay , EEG encehpalopathy, continue Keppra

## 2017-03-31 NOTE — ED NOTES
"Benjamin Post    Chief Complaint   Patient presents with   • T-5000 GLF     witness fall onto face   • ALOC     increasing confusion since yesterday 3/29       Pt BIBA patient had witness fall by family today at 1900.  Pt hit oral area, patient recently had dental work done.  Pain 8/10.  Family at bedside.  Family states patient has been increasing in confusion since last night 3/29 after taking PCN    Blood Pressure : 143/78 mmHg, Pulse: 84, Respiration: 20, Temperature: (!) 2.3 °C (36.1 °F), Height: 175.3 cm (5' 9.02\"), Weight: 79.379 kg (175 lb), BMI (Calculated): 25.83, BSA (Calculated): 2, Pulse Oximetry: 96 %, O2 Delivery: None (Room Air)      "

## 2017-03-31 NOTE — PROGRESS NOTES
Lab called with critical result of Calcium 13.6 WBC 1.8 and Platelet 36 at ***. Critical lab result read back to ***.    *** notified of critical lab result at ***.  Critical lab result read back by  ***.

## 2017-03-31 NOTE — ED NOTES
Patient educated on platelet transfusion reaction.  Patient verbalized understanding.  Platelets released

## 2017-03-31 NOTE — H&P
[facility] Internal Medicine Admitting History and Physical    Name Benjamin Post       1945   Age/Sex 71 y.o. male   MRN [id number]   Code Status  full code      After 5PM or if no immediate response to page, please call for cross-coverage  Attending/Team: Dr Locke/JESSICA KIMBROUGH  Call [phone] to page           Chief Complaint:  Fall and confusion     HPI:    71-year-old male with past medical history of chronic myeloid leukemia diagnosed 2 years ago, oncologist Dr. Cole. Past medical history of chronic thrombocytopenia with chronic dental abscesses. Patient was recently admitted here under hospitalist service for dental abscess surgery. During that time patient also had some seizure activity, neurologist was consulted, patient was started on Keppra, MRI brain did not reveal any acute change, EEG was consistent with encephalopathy.    Patient is Italian-speaking. All history obtained with the help of daughter who is also his primary caregiver, Keith . As per her patient had a fall today, it was witnessed by her children, apparently patient was cleaning to assess the site of the patch and trying to turn when he fell on his back and hit his head, there was no history of loss of consciousness, no history of seizure activity, urinary/fecal incontinence, no tongue biting. There is no history of any fevers, chills. After the fall patient's daughter came from work and noticed that he was a little confused, and brought him here .  Patient did have some non specific abdominal pain before coming to the ER but that has completely resolved now, no history of nausea/vomiting       Review of Systems   Constitutional: Negative for fever and chills.   Eyes: Negative for blurred vision.   Respiratory: Negative for cough and hemoptysis.    Cardiovascular: Negative for chest pain and palpitations.   Gastrointestinal: Negative for nausea and vomiting.   Skin: Negative for rash.   Neurological: Positive for seizures.  Negative for headaches.   Endo/Heme/Allergies: Does not bruise/bleed easily.             Past Medical History:   Past Medical History   Diagnosis Date   • Hypertension    • Cancer (CMS-HCC)    • Indigestion    • Heart burn        Past Surgical History:  Past Surgical History   Procedure Laterality Date   • Bone marrow aspiration  11/5/2015     Procedure: BONE MARROW ASPIRATION;  Surgeon: Rosita Bolton M.D.;  Location: [facility];  Service:    • Bone marrow biopsy, ndl/trocar  11/5/2015     Procedure: BONE MARROW BIOPSY, NDL/TROCAR;  Surgeon: Rosita Bolton M.D.;  Location: [facility];  Service:    • Bone marrow biopsy, ndl/trocar  3/23/2016     Procedure: BONE MARROW BIOPSY, NDL/TROCAR;  Surgeon: Fili Prakash M.D.;  Location: [facility];  Service:    • Bone marrow aspiration  3/23/2016     Procedure: BONE MARROW ASPIRATION;  Surgeon: Fili Prakash M.D.;  Location: [facility];  Service:    • Bone marrow biopsy, ndl/trocar  7/12/2016     Procedure: BONE MARROW BIOPSY, NDL/TROCAR;  Surgeon: Fili Prakash M.D.;  Location: [facility];  Service:    • Bone marrow aspiration  7/12/2016     Procedure: BONE MARROW ASPIRATION;  Surgeon: Fili Prakash M.D.;  Location: [facility];  Service:    • Dental extraction(s) N/A 3/25/2017     Procedure: DENTAL EXTRACTION(S);  Surgeon: Surendra Barragan D.M.D.,MRupertDRupert;  Location: SURGERY [facility];  Service:    • Submandible abscess incision and drainage N/A 3/25/2017     Procedure:  INTRAORAL  ABSCESS INCISION AND DRAINAGE;  Surgeon: Surendra Barragan D.M.D.,MRupertDRupert;  Location: SURGERY [facility];  Service:        Current Outpatient Medications:  Home Medications     Reviewed by Tona Dumont R.N. (Registered Nurse) on 03/30/17 at 2034  Med List Status: Partial    Medication Last Dose Status    Bosutinib 100 MG Tab 3/24/2017 Active    carvedilol (COREG) 3.125 MG  "TABS 3/24/2017 Active    levetiracetam (KEPPRA) 500 MG Tab  Active    loperamide (IMODIUM) 2 MG Cap 3/24/2017 Active    Multiple Vitamins-Minerals (PRESERVISION AREDS 2) Cap 3/24/2017 Active    oxycodone immediate-release (ROXICODONE) 5 MG Tab  Active    penicillin v potassium (VEETID) 500 MG Tab  Active    tamsulosin (FLOMAX) 0.4 MG capsule 3/24/2017 Active                Medication Allergy/Sensitivities:  No Known Allergies      Family History:  No family history on file.    Social History:  Social History     Social History   • Marital Status: Legally      Spouse Name: N/A   • Number of Children: N/A   • Years of Education: N/A     Occupational History   • Not on file.     Social History Main Topics   • Smoking status: Never Smoker    • Smokeless tobacco: Not on file   • Alcohol Use: No   • Drug Use: No   • Sexual Activity: Not on file     Other Topics Concern   • Not on file     Social History Narrative     Living situation: Lives with daughter  PCP : Dr. Cole( as per daughter he is his PCP )      Physical Exam     Filed Vitals:    03/30/17 2027 03/30/17 2030 03/30/17 2100 03/30/17 2130   BP:  143/78     Pulse:  84 86 80   Temp:  36.1 °C (97 °F)     Resp:  20 18 18   Height: 1.753 m (5' 9.02\")      Weight: 79.379 kg (175 lb)      SpO2:  96% 97% 95%     Body mass index is 25.83 kg/(m^2).  /78 mmHg  Pulse 80  Temp(Src) 36.1 °C (97 °F)  Resp 18  Ht 1.753 m (5' 9.02\")  Wt 79.379 kg (175 lb)  BMI 25.83 kg/m2  SpO2 95%  O2 therapy: Pulse Oximetry: 95 %, O2 Delivery: None (Room Air)    Physical Exam   Constitutional: No distress.   Thin looking man   HENT:   Head: Normocephalic and atraumatic.   Mouth/Throat: No oropharyngeal exudate.   Mouth no dentures, dried up blood around the mouth   Eyes:   Right pupil coloboma, arcus senilis present, reactive, left pupil reactive   Neck: Normal range of motion. No thyromegaly present.   Cardiovascular: Normal rate, regular rhythm and normal heart sounds.  "   No murmur heard.  Pulmonary/Chest: Effort normal and breath sounds normal.   Abdominal: Soft. Bowel sounds are normal. He exhibits no distension. There is tenderness.   Musculoskeletal: Normal range of motion. He exhibits no edema.   Neurological: He is alert.   Oriented ×2, DTR 2+ in upper extremities, 1+ in patellar, plantar downgoing  Motor strength 5/ 5 in upper extremities, 5/5 in lower extremities   Skin: Skin is warm and dry.   Psychiatric: Mood normal.                    Old Records Request:   Completed  Current Records review and summary: Completed    Lab Data Review:  Recent Results (from the past 24 hour(s))   CBC WITH DIFFERENTIAL    Collection Time: 03/30/17  8:36 PM   Result Value Ref Range    WBC 1.7 (LL) 4.8 - 10.8 K/uL    RBC 3.40 (L) 4.70 - 6.10 M/uL    Hemoglobin 10.9 (L) 14.0 - 18.0 g/dL    Hematocrit 32.3 (L) 42.0 - 52.0 %    MCV 95.0 81.4 - 97.8 fL    MCH 32.1 27.0 - 33.0 pg    MCHC 33.7 33.7 - 35.3 g/dL    RDW 67.1 (H) 35.9 - 50.0 fL    Platelet Count 7 (LL) 164 - 446 K/uL    Nucleated RBC 0.00 /100 WBC    NRBC (Absolute) 0.00 K/uL    Neutrophils-Polys 57.10 44.00 - 72.00 %    Lymphocytes 25.90 22.00 - 41.00 %    Monocytes 2.70 0.00 - 13.40 %    Eosinophils 8.00 (H) 0.00 - 6.90 %    Basophils 0.90 0.00 - 1.80 %    Neutrophils (Absolute) 1.00 (L) 1.82 - 7.42 K/uL    Lymphs (Absolute) 0.44 (L) 1.00 - 4.80 K/uL    Monos (Absolute) 0.05 0.00 - 0.85 K/uL    Eos (Absolute) 0.14 0.00 - 0.51 K/uL    Baso (Absolute) 0.02 0.00 - 0.12 K/uL   COMP METABOLIC PANEL    Collection Time: 03/30/17  8:36 PM   Result Value Ref Range    Sodium 145 135 - 145 mmol/L    Potassium 3.5 (L) 3.6 - 5.5 mmol/L    Chloride 105 96 - 112 mmol/L    Co2 35 (H) 20 - 33 mmol/L    Anion Gap 5.0 0.0 - 11.9    Glucose 141 (H) 65 - 99 mg/dL    Bun 40 (H) 8 - 22 mg/dL    Creatinine 1.54 (H) 0.50 - 1.40 mg/dL    Calcium 15.2 (HH) 8.5 - 10.5 mg/dL    AST(SGOT) 21 12 - 45 U/L    ALT(SGPT) 48 2 - 50 U/L    Alkaline Phosphatase 146 (H)  30 - 99 U/L    Total Bilirubin 0.9 0.1 - 1.5 mg/dL    Albumin 3.5 3.2 - 4.9 g/dL    Total Protein 7.9 6.0 - 8.2 g/dL    Globulin 4.4 (H) 1.9 - 3.5 g/dL    A-G Ratio 0.8 g/dL   ESTIMATED GFR    Collection Time: 03/30/17  8:36 PM   Result Value Ref Range    GFR If  54 (A) >60 mL/min/1.73 m 2    GFR If Non  44 (A) >60 mL/min/1.73 m 2   PTH WITH IONIZED CALCIUM    Collection Time: 03/30/17  8:36 PM   Result Value Ref Range    Ionized Calcium 2.1 (H) 1.1 - 1.3 mmol/L    Pth, Intact 6.8 (L) 14.0 - 72.0 pg/mL   PHOSPHORUS    Collection Time: 03/30/17  8:36 PM   Result Value Ref Range    Phosphorus 3.9 2.5 - 4.5 mg/dL   DIFFERENTIAL MANUAL    Collection Time: 03/30/17  8:36 PM   Result Value Ref Range    Bands-Stabs 1.80 0.00 - 10.00 %    Myelocytes 0.90 %    Other 2.70 %    Manual Diff Status PERFORMED    PERIPHERAL SMEAR REVIEW    Collection Time: 03/30/17  8:36 PM   Result Value Ref Range    Peripheral Smear Review see below    IMMATURE PLT FRACTION    Collection Time: 03/30/17  8:36 PM   Result Value Ref Range    Imm. Plt Fraction 3.8 0.6 - 13.1 K/uL   URINALYSIS,CULTURE IF INDICATED    Collection Time: 03/30/17  9:54 PM   Result Value Ref Range    Color Lt. Yellow     Character Clear     Specific Gravity 1.007 <1.035    Ph 6.5 5.0-8.0    Glucose Negative Negative mg/dL    Ketones Negative Negative mg/dL    Protein Negative Negative mg/dL    Bilirubin Negative Negative    Nitrite Negative Negative    Leukocyte Esterase Negative Negative    Occult Blood Negative Negative    Micro Urine Req see below     Culture Indicated No UA Culture       Imaging/Procedures Review:    ndependant Imaging Review: Completed  CT-HEAD W/O   Final Result      1.  Stable, nearly isodense right holohemispheric subdural fluid collection.   2.  No new abnormalities.               INTERPRETING LOCATION:  [address] [facility] [address], [place], [address]      DX-CHEST-LIMITED (1 VIEW)   Final Result      Minimal right basilar opacity,  consistent with atelectasis and/or focal pneumonia. No significant change.               INTERPRETING LOCATION: [address] [place] [address], [place], [address]                        Assessment/Plan       Patient Active Problem List    Diagnosis Date Noted   • Subdural hematoma, acute (CMS-HCC) 08/03/2016     Priority: High   • Pancytopenia (CMS-HCC) 04/21/2015     Priority: Medium   • Neutropenia (CMS-HCC) 03/31/2017   • Subdural hematoma (CMS-HCC) 03/31/2017   • Hypercalcemia 03/30/2017   • Thrombocytopenia (CMS-HCC) 03/25/2017   • Dental caries, unspecified 03/25/2017   • Hemoptysis 12/17/2015   • Accelerated phase chronic myeloid leukemia (CMS-HCC) 11/05/2015   • Leukopenia 05/10/2015   • Fever 05/09/2015   • AML (acute myeloid leukemia) in relapse (CMS-HCC) 05/09/2015   • Hypokalemia 04/24/2015   • Acute respiratory failure (CMS-HCC) 04/24/2015   • CML (chronic myelocytic leukemia)-Accelerated phase 04/24/2015   • Pleural effusion exudative-side effect of Dasatinib 04/24/2015   • Dyspnea 04/21/2015   • Pleural effusion, bilateral 04/21/2015   • Pulmonary infiltrate 04/21/2015   • Leukemia (CMS-HCC) 03/12/2015         #altered mental status   - secondary to dehydration , post fall concussion , and hypercalcemia ,with underlying Subdural hematoma   - patient has h/o CML which can cause hypercalcemia of malignancy   - will order PTHrP , Vit D 25 hydroxy and SPEP   - PTH was low which is an appropriate response   - status post iv fluids 2 L   - on maintenance 150  Ml/hr   - AMS is improving  - Bedside swallow eval before administering any meds   - q 4 hrly neurochecks     #Hypercalcemia  - most likely secondary to malignancy   - Ca 15.2, ionised calcium 2.1   - PTH : 6.8 appropriate response   - IVF 2 L bolus and maintenance   - calcitonin and pamidronate ordered ( zoledronic acid not available in this hospital]  - We'll do a parathyroid related peptide, SPEP, vitamin D 25-hydroxy  - Oncology consultation in am      #Thrombocytopenia  Platelets 7  CMV negative, irradiated   Watch for any spontaneous  Intracranial bleed   neurochecks     #NATY   - secondary to poor appetite , mostly pre renal   - continue fluids     #CML   - oncology consult in am   - Dr Vasquez is the oncolgist   - continue Bosutinib  - patient is to undergo BMT in [facility] in late April     #neutropenia  - no fever   - on neutropenic precautions   - no indication for any antiobiotics     #Dental abscess : s/p surgery full mouth tooth extraction with I/D of intraoral abscess, on March 25, 2017 , continue penicillin V for 6 more days , end date in   #Hypertension : continue carvedilol   #seizure disorder: recently diagnosed during the last hospital stay , EEG encehpalopathy, continue Keppra       Anticipated Hospital stay:  >2 midnights        Quality Measures  Labs reviewed, Medications reviewed and Radiology images reviewed  Mahajan catheter: No Mahajan      DVT Prophylaxis: Contraindicated - High bleeding risk  DVT prophylaxis - mechanical: SCDs  Ulcer prophylaxis: No        Discussed with Dr Locke

## 2017-03-31 NOTE — ED NOTES
Spoke with daughter regarding leukemia medication Dagoberto and told her to bring medication to [facility] this AM due to pharmacy not having medication.  RN also verified PCN medication started on 3/29 per daughter.  Information was related back to pharmacy.

## 2017-03-31 NOTE — ED NOTES
Lab called with critical result of WBC 1.7, platelets 7, abs neutrophils 0.49.  Critical lab result read back to Lab.   Dr. Pandey notified of critical lab result at 1040.  Critical lab result read back by Dr. Pandey.

## 2017-03-31 NOTE — ED NOTES
Med rec updated and complete  Allergies reviewed.  Pt is currently taking an antibiotic   For a dental procedure he had done.

## 2017-03-31 NOTE — ED NOTES
Lainey from Lab called with critical result of Calcium at 14.3. Critical lab result read back to Lainey.   Dr. Pandey notified of critical lab result at 203.  Critical lab result read back by Dr. Pandey.

## 2017-03-31 NOTE — DISCHARGE PLANNING
I am unable to complete an assessment at this time because the patient is confused.  He fell at home and hit his head.  He was admitted for hypercalcemia.  He has leukemia and is on chemo drugs.  He is South African-speaking.  He lives in a [place] in [place], apparently with his daughter, Keith Garvin.

## 2017-03-31 NOTE — ED NOTES
Lab called with critical result of Ca at 15.2. Critical lab result read back to lab.   Dr. Pandey notified of critical lab result at 4822.  Critical lab result read back by Dr. Pandey.

## 2017-03-31 NOTE — PROGRESS NOTES
[facility] Team Attending Note  (see full Resident note dictated in EPIC)    I have seen and examined the patient today.  I have reviewed the medical record, laboratory data, imaging and all relevant studies.  I have discussed the plan of care with the Internal Medicine Resident and agree with the note and plan as documented.      Interval events:    A&O x 2 - Uzbek only  Confused - better today with lower Ca  HTN at times - better  Pancytopenia persisting - chronic -> CML  NPO - passed bedside RN swallow azalia Mahajan  Afebrile  Renal function better  Plt transfusion in ER  - plt 7 -> 36  Awaiting Heme f/u re CML/Rx/elevated Ca    Assessment:  - Symptomatic, Severe Hypercalcemia - likely mixed (malignancy + dehydration); abdo pain resolved, ams near baseline  - Poor intake since dental extraction  - NATY  - Leukopenia  - Hypokalemia  - CML awaiting BMTx at [facility], on Bosutinib  - Recent Dental Surgery (3/25) full mouth dental extraction w I/D of intraoral abscesses   - Chronic Thrombocytopenia  - Hx of Chronic Right Subdural Hematoma  - Chronic Hypertension    Plan:  - IVF & lasix  - Calcitonin  - Zolidronic Acid -> pamidronate given   - serial Ca  - Continuous tele monitoring  - Await Heme F/u  - transfuse blood products PRN - try to limit - usual protocols    May be able to go to Heme Onc floor later today    D/w RN, RT, Pharmacy, Charge RN, [facility] Resident and patient

## 2017-03-31 NOTE — CARE PLAN
Problem: Communication  Goal: The ability to communicate needs accurately and effectively will improve  Outcome: PROGRESSING SLOWER THAN EXPECTED  Pt able to communicate all needs to staff, uses call light when appropriate, use of language line  service due to language barrier.        Problem: Safety  Goal: Will remain free from falls  Outcome: PROGRESSING SLOWER THAN EXPECTED  Safety measures in place, call light within reach, bed in lowest position, safety education complete, no learning evidence. MD contacted for placement of lap belt due to unsafe attempts at mobilization.

## 2017-04-01 ENCOUNTER — APPOINTMENT (OUTPATIENT)
Dept: RADIOLOGY | Facility: MEDICAL CENTER | Age: 72
DRG: 640 | End: 2017-04-01
Attending: INTERNAL MEDICINE
Payer: MEDICARE

## 2017-04-01 LAB
25(OH)D3 SERPL-MCNC: 22 NG/ML (ref 30–100)
ALBUMIN SERPL BCP-MCNC: 2.9 G/DL (ref 3.2–4.9)
ALBUMIN/GLOB SERPL: 0.8 G/DL
ALP SERPL-CCNC: 123 U/L (ref 30–99)
ALT SERPL-CCNC: 37 U/L (ref 2–50)
ANION GAP SERPL CALC-SCNC: 4 MMOL/L (ref 0–11.9)
ANISOCYTOSIS BLD QL SMEAR: ABNORMAL
AST SERPL-CCNC: 19 U/L (ref 12–45)
BASOPHILS # BLD AUTO: 0 % (ref 0–1.8)
BASOPHILS # BLD: 0 K/UL (ref 0–0.12)
BILIRUB SERPL-MCNC: 1 MG/DL (ref 0.1–1.5)
BUN SERPL-MCNC: 35 MG/DL (ref 8–22)
CA-I SERPL-SCNC: 1.5 MMOL/L (ref 1.1–1.3)
CALCIUM SERPL-MCNC: 10.8 MG/DL (ref 8.5–10.5)
CHLORIDE SERPL-SCNC: 115 MMOL/L (ref 96–112)
CO2 SERPL-SCNC: 27 MMOL/L (ref 20–33)
CREAT SERPL-MCNC: 1.33 MG/DL (ref 0.5–1.4)
EOSINOPHIL # BLD AUTO: 0.04 K/UL (ref 0–0.51)
EOSINOPHIL NFR BLD: 2.7 % (ref 0–6.9)
ERYTHROCYTE [DISTWIDTH] IN BLOOD BY AUTOMATED COUNT: 66 FL (ref 35.9–50)
GFR SERPL CREATININE-BSD FRML MDRD: 53 ML/MIN/1.73 M 2
GLOBULIN SER CALC-MCNC: 3.6 G/DL (ref 1.9–3.5)
GLUCOSE SERPL-MCNC: 113 MG/DL (ref 65–99)
HCT VFR BLD AUTO: 24.9 % (ref 42–52)
HGB BLD-MCNC: 8.4 G/DL (ref 14–18)
LYMPHOCYTES # BLD AUTO: 0.21 K/UL (ref 1–4.8)
LYMPHOCYTES NFR BLD: 13.4 % (ref 22–41)
MACROCYTES BLD QL SMEAR: ABNORMAL
MAGNESIUM SERPL-MCNC: 1.8 MG/DL (ref 1.5–2.5)
MANUAL DIFF BLD: NORMAL
MCH RBC QN AUTO: 32.1 PG (ref 27–33)
MCHC RBC AUTO-ENTMCNC: 33.7 G/DL (ref 33.7–35.3)
MCV RBC AUTO: 95 FL (ref 81.4–97.8)
METAMYELOCYTES NFR BLD MANUAL: 2.7 %
MICROCYTES BLD QL SMEAR: ABNORMAL
MONOCYTES # BLD AUTO: 0.06 K/UL (ref 0–0.85)
MONOCYTES NFR BLD AUTO: 3.6 % (ref 0–13.4)
NEUTROPHILS NFR BLD: 70.5 % (ref 44–72)
NEUTS BAND NFR BLD MANUAL: 4.4 % (ref 0–10)
NRBC # BLD AUTO: 0 K/UL
NRBC BLD AUTO-RTO: 0 /100 WBC
OVALOCYTES BLD QL SMEAR: NORMAL
PHOSPHATE SERPL-MCNC: 2.2 MG/DL (ref 2.5–4.5)
PLATELET # BLD AUTO: 24 K/UL (ref 164–446)
PLATELET BLD QL SMEAR: NORMAL
PMV BLD AUTO: 11 FL (ref 9–12.9)
POIKILOCYTOSIS BLD QL SMEAR: NORMAL
POTASSIUM SERPL-SCNC: 3.4 MMOL/L (ref 3.6–5.5)
PROT SERPL-MCNC: 6.5 G/DL (ref 6–8.2)
RBC # BLD AUTO: 2.62 M/UL (ref 4.7–6.1)
RBC BLD AUTO: PRESENT
SODIUM SERPL-SCNC: 146 MMOL/L (ref 135–145)
WBC # BLD AUTO: 1.6 K/UL (ref 4.8–10.8)
WBC OTHER NFR BLD MANUAL: 2.7 %

## 2017-04-01 PROCEDURE — 85007 BL SMEAR W/DIFF WBC COUNT: CPT

## 2017-04-01 PROCEDURE — 83735 ASSAY OF MAGNESIUM: CPT

## 2017-04-01 PROCEDURE — 700105 HCHG RX REV CODE 258: Performed by: INTERNAL MEDICINE

## 2017-04-01 PROCEDURE — 85027 COMPLETE CBC AUTOMATED: CPT

## 2017-04-01 PROCEDURE — 84100 ASSAY OF PHOSPHORUS: CPT

## 2017-04-01 PROCEDURE — 99233 SBSQ HOSP IP/OBS HIGH 50: CPT | Mod: GC | Performed by: INTERNAL MEDICINE

## 2017-04-01 PROCEDURE — 71010 DX-CHEST-LIMITED (1 VIEW): CPT

## 2017-04-01 PROCEDURE — A9270 NON-COVERED ITEM OR SERVICE: HCPCS | Performed by: INTERNAL MEDICINE

## 2017-04-01 PROCEDURE — 700111 HCHG RX REV CODE 636 W/ 250 OVERRIDE (IP): Performed by: INTERNAL MEDICINE

## 2017-04-01 PROCEDURE — 82330 ASSAY OF CALCIUM: CPT

## 2017-04-01 PROCEDURE — 700102 HCHG RX REV CODE 250 W/ 637 OVERRIDE(OP): Performed by: INTERNAL MEDICINE

## 2017-04-01 PROCEDURE — 80053 COMPREHEN METABOLIC PANEL: CPT

## 2017-04-01 PROCEDURE — 770001 HCHG ROOM/CARE - MED/SURG/GYN PRIV*

## 2017-04-01 PROCEDURE — 82306 VITAMIN D 25 HYDROXY: CPT

## 2017-04-01 RX ORDER — SODIUM CHLORIDE, SODIUM LACTATE, POTASSIUM CHLORIDE, CALCIUM CHLORIDE 600; 310; 30; 20 MG/100ML; MG/100ML; MG/100ML; MG/100ML
INJECTION, SOLUTION INTRAVENOUS CONTINUOUS
Status: DISCONTINUED | OUTPATIENT
Start: 2017-04-01 | End: 2017-04-02

## 2017-04-01 RX ADMIN — DIBASIC SODIUM PHOSPHATE, MONOBASIC POTASSIUM PHOSPHATE AND MONOBASIC SODIUM PHOSPHATE 1 TABLET: 852; 155; 130 TABLET ORAL at 14:24

## 2017-04-01 RX ADMIN — PENICILLIN V POTASIUM 500 MG: 500 TABLET OROPHARYNGEAL at 14:24

## 2017-04-01 RX ADMIN — POTASSIUM CHLORIDE 40 MEQ: 1500 TABLET, EXTENDED RELEASE ORAL at 14:24

## 2017-04-01 RX ADMIN — DEXTROSE MONOHYDRATE 750 MG: 50 INJECTION, SOLUTION INTRAVENOUS at 16:35

## 2017-04-01 RX ADMIN — SODIUM CHLORIDE, POTASSIUM CHLORIDE, SODIUM LACTATE AND CALCIUM CHLORIDE: 600; 310; 30; 20 INJECTION, SOLUTION INTRAVENOUS at 11:15

## 2017-04-01 RX ADMIN — CALCITONIN SALMON 280 UNITS: 200 INJECTION, SOLUTION INTRAMUSCULAR; SUBCUTANEOUS at 21:40

## 2017-04-01 RX ADMIN — POTASSIUM CHLORIDE 40 MEQ: 1500 TABLET, EXTENDED RELEASE ORAL at 21:40

## 2017-04-01 RX ADMIN — DIBASIC SODIUM PHOSPHATE, MONOBASIC POTASSIUM PHOSPHATE AND MONOBASIC SODIUM PHOSPHATE 1 TABLET: 852; 155; 130 TABLET ORAL at 09:28

## 2017-04-01 RX ADMIN — FUROSEMIDE 40 MG: 10 INJECTION, SOLUTION INTRAVENOUS at 06:31

## 2017-04-01 RX ADMIN — CARVEDILOL 12.5 MG: 12.5 TABLET, FILM COATED ORAL at 09:28

## 2017-04-01 RX ADMIN — PENICILLIN V POTASIUM 500 MG: 500 TABLET OROPHARYNGEAL at 09:28

## 2017-04-01 RX ADMIN — CHLORHEXIDINE GLUCONATE 15 ML: 1.2 RINSE ORAL at 09:28

## 2017-04-01 RX ADMIN — TAMSULOSIN HYDROCHLORIDE 0.4 MG: 0.4 CAPSULE ORAL at 09:28

## 2017-04-01 RX ADMIN — DIBASIC SODIUM PHOSPHATE, MONOBASIC POTASSIUM PHOSPHATE AND MONOBASIC SODIUM PHOSPHATE 1 TABLET: 852; 155; 130 TABLET ORAL at 16:37

## 2017-04-01 RX ADMIN — HYDRALAZINE HYDROCHLORIDE 20 MG: 20 INJECTION INTRAMUSCULAR; INTRAVENOUS at 02:05

## 2017-04-01 RX ADMIN — SODIUM CHLORIDE: 4.5 INJECTION, SOLUTION INTRAVENOUS at 06:32

## 2017-04-01 RX ADMIN — PENICILLIN V POTASIUM 500 MG: 500 TABLET OROPHARYNGEAL at 16:37

## 2017-04-01 RX ADMIN — DEXTROSE MONOHYDRATE 750 MG: 50 INJECTION, SOLUTION INTRAVENOUS at 03:53

## 2017-04-01 RX ADMIN — FAMOTIDINE 20 MG: 20 TABLET, FILM COATED ORAL at 09:28

## 2017-04-01 RX ADMIN — POTASSIUM CHLORIDE 40 MEQ: 1500 TABLET, EXTENDED RELEASE ORAL at 09:28

## 2017-04-01 RX ADMIN — PENICILLIN V POTASIUM 500 MG: 500 TABLET OROPHARYNGEAL at 21:40

## 2017-04-01 RX ADMIN — CALCITONIN SALMON 280 UNITS: 200 INJECTION, SOLUTION INTRAMUSCULAR; SUBCUTANEOUS at 09:28

## 2017-04-01 RX ADMIN — CARVEDILOL 12.5 MG: 12.5 TABLET, FILM COATED ORAL at 16:37

## 2017-04-01 RX ADMIN — FAMOTIDINE 20 MG: 20 TABLET, FILM COATED ORAL at 21:40

## 2017-04-01 ASSESSMENT — ENCOUNTER SYMPTOMS
ABDOMINAL PAIN: 0
SPUTUM PRODUCTION: 0
PALPITATIONS: 0
ORTHOPNEA: 0
HEADACHES: 0
DIARRHEA: 0
COUGH: 0
NAUSEA: 0
CHILLS: 0
HEMOPTYSIS: 0
VOMITING: 0
FEVER: 0

## 2017-04-01 ASSESSMENT — PAIN SCALES - GENERAL
PAINLEVEL_OUTOF10: 0

## 2017-04-01 NOTE — PROGRESS NOTES
Lab called about critical WBC and platelets for this patient.  These labs have been critical for this patient due to his history and are expected.

## 2017-04-01 NOTE — PROGRESS NOTES
[facility] Team Attending Note  (see full Resident note dictated in EPIC)    I have seen and examined the patient today.  I have reviewed the medical record, laboratory data, imaging and all relevant studies.  I have discussed the plan of care with the Internal Medicine Resident and agree with the note and plan as documented.      Interval events:    A&O x 2 - Swazi only  Less confused - better again today with lower Ca - 11.6 corrected  HTN at times - better again  Pancytopenia persisting - chronic -> CML  NPO - passed bedside RN swallow eval - starting PO - poor intake though  Afebrile - pancytopenia persists  Renal function better  Plt transfusion in ER  - plt 7 -> 36 -> 24  Heme/Onc seeing    Assessment:  - Symptomatic, Severe Hypercalcemia - likely mixed (malignancy + dehydration); abdo pain resolved, improving LOC  - Poor intake since dental extraction rescently  - NATY  - Leukopenia  - Hypokalemia  - CML awaiting BMTx at [facility], on Bosutinib - resumed  - Recent Dental Surgery (3/25) full mouth dental extraction w I/D of intraoral abscesses   - Chronic Thrombocytopenia  - Hx of Chronic Right Subdural Hematoma  - Chronic Hypertension    Plan:  - IVF - change IVF to 1/2 NS or LR  - Stop lasix for now  - replete K - follow Mg  - Calcitonin  - Zolidronic Acid -> pamidronate given   - serial Ca  - Continuous tele monitoring  - Await Heme F/u  - transfuse blood products PRN - try to limit - usual protocols  - SCDs  - Convert to Oral meds    Ok to to Heme Onc floor later today    D/w RN, RT, Pharmacy, Charge RN, [facility] Resident and patient

## 2017-04-01 NOTE — CARE PLAN
Problem: Communication  Goal: The ability to communicate needs accurately and effectively will improve  Outcome: PROGRESSING SLOWER THAN EXPECTED  Pt unable to communicate all needs to staff due to language barrier, this RN anticipates needs and uses  line for translation.    Problem: Safety  Goal: Will remain free from falls  Outcome: PROGRESSING AS EXPECTED  Safety measures in place, call light within reach, bed in lowest position, safety education complete using language line . Patient remains in lap belt and bilateral soft wrist restraints due to unsafe attempts at mobilization and continued attempts at removing lopez catheter.

## 2017-04-01 NOTE — CARE PLAN
Problem: Safety  Goal: Will remain free from falls  Intervention: Implement fall precautions  Pt currently in restraints because pt was pulling at catheter even after education.  Pt also has a lap belt on because he was trying to get out of bed and has a large history of falls.      Problem: Venous Thromboembolism (VTW)/Deep Vein Thrombosis (DVT) Prevention:  Goal: Patient will participate in Venous Thrombosis (VTE)/Deep Vein Thrombosis (DVT)Prevention Measures  Intervention: Ensure patient wears graduated elastic stockings (CARLTON hose) and/or SCDs, if ordered, when in bed or chair (Remove at least once per shift for skin check)  SCDs are in place and machine is on.

## 2017-04-01 NOTE — PROGRESS NOTES
[facility] Progress Note               Author: Desirae Morales Date & Time created: 4/1/2017  2:31 PM     Interval History:  ID: 70 yo male with h/o CML with sig thrombocytopenia, H/o complete teeth extraction on 3/25/17, encephalopathy present here with ground level fall and altered mental status likely due to metabolic abnormalities such as NATY and hypercalcemia. Infectious cause such as UTI and Pneumonia been considered. He also found to have plt <10 and he received 1 unit of platelets.    3/31:  Had large BM  Still confuse and getting out the bed and pulling lines.  NS 1000 CC bolus  Repele electrolytes  1/2  cc/hr + IV furosemide 40 mg BID with KCL   Continue calcitonin for total 2 days due to tachyphylaxis  Increase carvediolol 3.125 mg BID to 12.5mg BID for HTN  Continue IV hydralizine PRN  Continue Keppra  Start oral care with Chlorhexidine BID  Keep close eye on him if become septic such as hypotensive or altered again, may do Blood culture, repeat Chest Xray and start empirical antibiotics such as Zosyn, if hemodynamically unstable consider IV Vancomycin       4/1  Calcium improved, still confuse  Change IV 1/2 NS to Lactate ringer  Stop furosemide      Review of Systems:  Review of Systems   Constitutional: Negative for fever and chills.   HENT: Negative for tinnitus.    Respiratory: Negative for cough, hemoptysis and sputum production.    Cardiovascular: Negative for chest pain, palpitations and orthopnea.   Gastrointestinal: Negative for nausea, vomiting, abdominal pain and diarrhea.   Neurological: Negative for headaches.       Physical Exam:  Physical Exam   Constitutional: He appears well-developed and well-nourished.   HENT:   Head: Normocephalic and atraumatic.   Right Ear: External ear normal.   Left Ear: External ear normal.   Oropharynx edentulous, no blood    Eyes: Conjunctivae are normal. Pupils are equal, round, and reactive to light.   Cardiovascular: Regular rhythm and normal heart  sounds.  Exam reveals no friction rub.    No murmur heard.  tachycardia   Pulmonary/Chest: Effort normal and breath sounds normal. No respiratory distress. He has no wheezes. He has no rales.   Abdominal: Soft. Bowel sounds are normal. He exhibits no distension. There is no tenderness. There is no rebound and no guarding.   Musculoskeletal: Normal range of motion. He exhibits no edema.   Neurological: He is alert.   Oriented *2-3  Getting out the bed and pulling lopez cath   Skin: Skin is warm and dry.   Psychiatric: His mood appears anxious. He is agitated. Cognition and memory are impaired. He expresses inappropriate judgment.   Nursing note and vitals reviewed.      Labs:  Recent Results (from the past 24 hour(s))   CBC with Differential    Collection Time: 04/01/17  4:00 AM   Result Value Ref Range    WBC 1.6 (LL) 4.8 - 10.8 K/uL    RBC 2.62 (L) 4.70 - 6.10 M/uL    Hemoglobin 8.4 (L) 14.0 - 18.0 g/dL    Hematocrit 24.9 (L) 42.0 - 52.0 %    MCV 95.0 81.4 - 97.8 fL    MCH 32.1 27.0 - 33.0 pg    MCHC 33.7 33.7 - 35.3 g/dL    RDW 66.0 (H) 35.9 - 50.0 fL    Platelet Count 24 (LL) 164 - 446 K/uL    MPV 11.0 9.0 - 12.9 fL    Nucleated RBC 0.00 /100 WBC    NRBC (Absolute) 0.00 K/uL    Neutrophils-Polys 70.50 44.00 - 72.00 %    Lymphocytes 13.40 (L) 22.00 - 41.00 %    Monocytes 3.60 0.00 - 13.40 %    Eosinophils 2.70 0.00 - 6.90 %    Basophils 0.00 0.00 - 1.80 %    Lymphs (Absolute) 0.21 (L) 1.00 - 4.80 K/uL    Monos (Absolute) 0.06 0.00 - 0.85 K/uL    Eos (Absolute) 0.04 0.00 - 0.51 K/uL    Baso (Absolute) 0.00 0.00 - 0.12 K/uL    Anisocytosis 1+     Macrocytosis 1+     Microcytosis 1+    COMP METABOLIC PANEL    Collection Time: 04/01/17  4:00 AM   Result Value Ref Range    Sodium 146 (H) 135 - 145 mmol/L    Potassium 3.4 (L) 3.6 - 5.5 mmol/L    Chloride 115 (H) 96 - 112 mmol/L    Co2 27 20 - 33 mmol/L    Anion Gap 4.0 0.0 - 11.9    Glucose 113 (H) 65 - 99 mg/dL    Bun 35 (H) 8 - 22 mg/dL    Creatinine 1.33 0.50 - 1.40  mg/dL    Calcium 10.8 (H) 8.5 - 10.5 mg/dL    AST(SGOT) 19 12 - 45 U/L    ALT(SGPT) 37 2 - 50 U/L    Alkaline Phosphatase 123 (H) 30 - 99 U/L    Total Bilirubin 1.0 0.1 - 1.5 mg/dL    Albumin 2.9 (L) 3.2 - 4.9 g/dL    Total Protein 6.5 6.0 - 8.2 g/dL    Globulin 3.6 (H) 1.9 - 3.5 g/dL    A-G Ratio 0.8 g/dL   Magnesium    Collection Time: 17  4:00 AM   Result Value Ref Range    Magnesium 1.8 1.5 - 2.5 mg/dL   PHOSPHORUS    Collection Time: 17  4:00 AM   Result Value Ref Range    Phosphorus 2.2 (L) 2.5 - 4.5 mg/dL   VITAMIN D,25 HYDROXY    Collection Time: 17  4:00 AM   Result Value Ref Range    25-Hydroxy   Vitamin D 25 22 (L) 30 - 100 ng/mL   IONIZED CALCIUM    Collection Time: 17  4:00 AM   Result Value Ref Range    Ionized Calcium 1.5 (H) 1.1 - 1.3 mmol/L   ESTIMATED GFR    Collection Time: 17  4:00 AM   Result Value Ref Range    GFR If African American >60 >60 mL/min/1.73 m 2    GFR If Non  53 (A) >60 mL/min/1.73 m 2   DIFFERENTIAL MANUAL    Collection Time: 17  4:00 AM   Result Value Ref Range    Bands-Stabs 4.40 0.00 - 10.00 %    Metamyelocytes 2.70 %    Other 2.70 %    Manual Diff Status PERFORMED    PLATELET ESTIMATE    Collection Time: 17  4:00 AM   Result Value Ref Range    Plt Estimation Marked Decrease    MORPHOLOGY    Collection Time: 17  4:00 AM   Result Value Ref Range    RBC Morphology Present     Poikilocytosis 1+     Ovalocytes 1+        Hemodynamics:  Temp (24hrs), Av.1 °C (98.8 °F), Min:36.9 °C (98.5 °F), Max:37.3 °C (99.2 °F)  Temperature: 36.9 °C (98.5 °F)  Pulse  Av.1  Min: 69  Max: 116Heart Rate (Monitored): 86  NIBP: 147/70 mmHg     Respiratory:    Respiration: (!) 21, Pulse Oximetry: 99 %     Work Of Breathing / Effort: Mild  RUL Breath Sounds: Clear, RML Breath Sounds: Clear, RLL Breath Sounds: Diminished, WAGNER Breath Sounds: Clear, LLL Breath Sounds: Diminished  Fluids:    Intake/Output Summary (Last 24 hours) at  03/31/17 1329  Last data filed at 03/31/17 1100   Gross per 24 hour   Intake   2800 ml   Output   3100 ml   Net   -300 ml        GI/Nutrition:  Orders Placed This Encounter   Procedures   • DIET ORDER     Standing Status: Standing      Number of Occurrences: 1      Standing Expiration Date:      Order Specific Question:  Diet:     Answer:  Clear Liquid [10]     Medications:  Current Facility-Administered Medications   Medication Last Dose   • phosphorus (K-PHOS-NEUTRAL, PHOSPHA 250 NEUTRAL) per tablet 1 Tab 1 Tab at 04/01/17 1424   • lactated ringers infusion     • senna-docusate (PERICOLACE or SENOKOT S) 8.6-50 MG per tablet 2 Tab Stopped at 03/31/17 0900    And   • polyethylene glycol/lytes (MIRALAX) PACKET 1 Packet      And   • magnesium hydroxide (MILK OF MAGNESIA) suspension 30 mL      And   • bisacodyl (DULCOLAX) suppository 10 mg     • Respiratory Care per Protocol     • glucose 4 g chewable tablet 16 g      And   • dextrose 50% (D50W) injection 25 mL     • Bosutinib 300 mg tab (Bosulif) (POM) 300 mg at 04/01/17 1420   • tamsulosin (FLOMAX) capsule 0.4 mg 0.4 mg at 04/01/17 0928   • penicillin v potassium (VEETID) tablet 500 mg 500 mg at 04/01/17 1424   • levetiracetam (KEPPRA) 750 mg in D5W 100 mL IVPB Stopped at 04/01/17 0408   • calcitonin (MIACALCIN) 200 UNIT/ML injection 280 Units 280 Units at 04/01/17 0928   • carvedilol (COREG) tablet 12.5 mg 12.5 mg at 04/01/17 0928   • hydrALAZINE (APRESOLINE) injection 20 mg 20 mg at 04/01/17 0205   • famotidine (PEPCID) tablet 20 mg 20 mg at 04/01/17 0928   • chlorhexidine (PERIDEX) 0.12 % solution 15 mL 15 mL at 04/01/17 0928   • potassium chloride SA (Kdur) tablet 40 mEq 40 mEq at 04/01/17 1424     Medical Decision Making, by Problem:  Active Hospital Problems    Diagnosis   • Neutropenia (CMS-HCC) [D70.9]     Priority: High     ANC 1000  Reverse isolation  No fever, chills   Chest xray show likely atelctasis, repeat chest xray tomorrow after hydration, if spike  fever, we will start levofloxacin empirically for pneumonia     • Disorientation [R41.0]     Priority: High     Due to metabolic abnormalities  Correct calcium    • Hypercalcemia [E83.52]     Priority: High   • Thrombocytopenia (CMS-[facility]) [D69.6]     Priority: High   • Fall from ground level [W18.30XA]     Priority: Medium   • Essential hypertension [I10]     Priority: Medium   • Hypokalemia [E87.6]     Priority: Medium   • Pancytopenia (CMS-[facility]) [D61.818]     Priority: Medium   • Leukemia (CMS-[facility]) [C95.90]     Priority: Medium   • Subdural hematoma, acute (CMS-[facility]) [I62.01]     Priority: Low     CT head on 03/31 show Stable, nearly isodense right holohemispheric subdural fluid collection.       Quality  Measures:  EKG reviewed, Radiology images reviewed, Labs reviewed and Medications reviewed  Mahajan catheter: Urinary Tract Retention or Urinary Tract Obstruction      DVT Prophylaxis: Contraindicated - High bleeding risk (thrombocytopenia)  DVT prophylaxis - mechanical: SCDs  Ulcer prophylaxis: Yes    Assessed for rehab: Patient unable to tolerate rehabilitation therapeutic regimen    Plan:  #altered mental status ( improving)  - secondary to dehydration + hypercalcemia + NATY  - Passed bedside swallow eval and startd clear liquid diet   - q 4 hrly neurochecks   - Fall, aspiration, seizure precuation  - continue non violent restrain ( lap belt+ wrist) due to agitation and risk of injuring him self    #Hypercalcemia  - most likely secondary to malignancy    - Ca 15.2, ionised calcium 2.1    - PTH : 6.8 appropriate response    - patient has h/o CML which can cause hypercalcemia of malignancy    - PTHrP pending, Vit D 25  Hydroxy 23 and SPEP  pending  - PTH was low which is an appropriate response  - Start lactact ringer 150 cc  - calcitonin for 2 days due to tachyphylaxis and pamidronate received in ED  - Oncology consulted and I spoke with Dr. Bernal    #Thrombocytopenia  Platelets 7,. S/p 1 unit of plt 37  CMV  negative, irradiated Platelet transfused on 03/30  Watch for any spontaneous  Intracranial bleed    neurochecks   Fall precaution, aspiration and seizure preaution     #urine retention  -1000 ml straight cath  - Continue tamsulosin  - Continue lopez cath for now    #NATY   Resolved    #CML   - oncology consult in am    - Dr Vasquez is the oncolgist and consulted and he will see patient  - continue Bosutinib  - patient is to undergo BMT in [facility] in late April     #neutropenia  - no fever    - Precaution isolation due to ANC 1000  - CXR show atelectasis, ? Pneumonia  - UA negative for infection   - Oncology aware.      #Dental abscess : s/p surgery full mouth tooth extraction with I/D of intraoral abscess, on March 25, 2017 , continue penicillin V for 6 more days , end date in    #Hypertension : increase carvedilol  3 125 BID to 12.5 mg BID  #seizure disorder: recently diagnosed during the last hospital stay , EEG encehpalopathy, continue Keppra

## 2017-04-01 NOTE — PROGRESS NOTES
Transfer note      [facility]  70 yo male with h/o CML with sig thrombocytopenia, H/o complete teeth extraction on 3/25/17, encephalopathy present here with ground level fall and altered mental status likely due to metabolic abnormalities such as NATY and hypercalcemia. Infectious cause such as UTI and Pneumonia been considered in setting of neutropenia. He was given IV fluids, calcitonin, Pamidronate and Furosemide for hypercalcemia. His calcium is coming down but his mental status has not been changed much except he is more awake. He is stable enough to transfer to oncology floor with tele monitoring.       Altered mental status ( improving) : He present here with AMS and ground level fall. His CT head with out contrast no acute intracranial process seen and show chronic stable subdural hematoma. His altered mental status thought to be related to dehydration + hypercalcemia+ Acute kidney injury. Infectious process such as Pneumonia and UTI has been considered in setting of neutropenia. He does not have fever , tachycardia, hypotension or any other test suggest infection at this point. Patient has passed bedside swallow eval and he was started on clear liquid diet but he is not eating. He is currently on fall + aspiration and seizure precaution. He is requiring restrain ( lap belt + wrist) due to risk of injuring him self. On last discharge on 03/25/2017 Dr. 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  EEG was performed and was consistent with encephalopathy, but no evidence of seizure was appreciated.  His home dose of Keppra was increased.  MRI of the brain was negative for acute infarct or new hemorrhage. He is currently on IV Keppra as he is not taking oral medications.       Hypercalcemia : patient has h/o CML which can cause hypercalcemia of malignancy. On admission patient Ca 15.2, ionised calcium 2.1 PTH : 6.8 appropriate response.  PTHrP  pending, Vit D 25  Hydroxy 23 and SPEP  pending. He received IV NS 2 L  in ED + 1 liter bolus today + IV  cc/hr initially and later on transition to IV 1/2  cc/hr due to hypernatremia and today it was switch to ringer lactate due to hyperchloremia. He was started on IV furosemide 40 mg BID for 1 day, calcitonin total 2 days ( last dose on 04/02 9 am)and received IV Pamidronate on admission.  He is seen and followed by oncology.      Thrombocytopenia- On admission patient Platelet was 7 and he received  CMV negative, irradiated on 03/30 1 unit of platelet 37.  Given his chronic stable subdural hematoma and he is being monitored closely. His neuro exam did not show any significant change. Recommend to monitor platelet closely and transfuse if platelet <10 or active bleeding.     #urine retention  -1000 ml straight cath  - Continue tamsulosin  - Continue lopez cath for now    Acute kidney injury: secondary to poor appetite  And mostly pre renal. Recieved fluids and his kidney function return to baseline.     CML   - continue Bosutinib as per Oncology   - patient is to undergo BMT in [facility] in late April     neutropenia  - no fever    - Precaution isolation due to ANC 1000  - CXR show atelectasis, ? Pneumonia  - UA negative for infection    - Oncology aware.    Dental abscess : s/p surgery full mouth tooth extraction with I/D of intraoral abscess, on March 25, 2017 , continue penicillin V for 6 more days , end date in , Oral care BID    Hypertension : Increase carvedilol  3 125 BID to 12.5 mg BID    seizure disorder: recently diagnosed during the last hospital stay , EEG encehpalopathy, continue Keppra       DVT prophylaxis: SCD due to risk of bleeding related to thrombocytopenia  GI prophylaxis: Famotidine  Antibiotics: None  Procedures: None  Cath: Lopez cath due to urine retention     Consultant: ONcology ( following)

## 2017-04-02 LAB
ABO GROUP BLD: NORMAL
ALBUMIN SERPL BCP-MCNC: 2.8 G/DL (ref 3.2–4.9)
ALBUMIN SERPL-MCNC: 3.35 G/DL (ref 3.75–5.01)
ALBUMIN/GLOB SERPL: 0.8 G/DL
ALP SERPL-CCNC: 132 U/L (ref 30–99)
ALPHA1 GLOB SERPL ELPH-MCNC: 0.53 G/DL (ref 0.19–0.46)
ALPHA2 GLOB SERPL ELPH-MCNC: 0.81 G/DL (ref 0.48–1.05)
ALT SERPL-CCNC: 37 U/L (ref 2–50)
ANION GAP SERPL CALC-SCNC: 3 MMOL/L (ref 0–11.9)
ANISOCYTOSIS BLD QL SMEAR: ABNORMAL
AST SERPL-CCNC: 21 U/L (ref 12–45)
B-GLOBULIN SERPL ELPH-MCNC: 0.91 G/DL (ref 0.48–1.1)
BARCODED ABORH UBTYP: 5100
BARCODED ABORH UBTYP: 5100
BARCODED PRD CODE UBPRD: NORMAL
BARCODED PRD CODE UBPRD: NORMAL
BARCODED UNIT NUM UBUNT: NORMAL
BARCODED UNIT NUM UBUNT: NORMAL
BASOPHILS # BLD AUTO: 0.9 % (ref 0–1.8)
BASOPHILS # BLD: 0.01 K/UL (ref 0–0.12)
BILIRUB SERPL-MCNC: 0.8 MG/DL (ref 0.1–1.5)
BLASTS NFR BLD MANUAL: 4.5 %
BLD GP AB SCN SERPL QL: NORMAL
BUN SERPL-MCNC: 36 MG/DL (ref 8–22)
C DIFF DNA SPEC QL NAA+PROBE: NEGATIVE
C DIFF TOX GENS STL QL NAA+PROBE: NEGATIVE
CA-I SERPL-SCNC: 1.4 MMOL/L (ref 1.1–1.3)
CALCIUM SERPL-MCNC: 9.7 MG/DL (ref 8.5–10.5)
CHLORIDE SERPL-SCNC: 118 MMOL/L (ref 96–112)
CO2 SERPL-SCNC: 25 MMOL/L (ref 20–33)
COMPONENT R 8504R: NORMAL
COMPONENT R 8504R: NORMAL
CREAT SERPL-MCNC: 1.17 MG/DL (ref 0.5–1.4)
EER PROT ELECT SER Q1092: ABNORMAL
EOSINOPHIL # BLD AUTO: 0.05 K/UL (ref 0–0.51)
EOSINOPHIL NFR BLD: 4.5 % (ref 0–6.9)
ERYTHROCYTE [DISTWIDTH] IN BLOOD BY AUTOMATED COUNT: 65.7 FL (ref 35.9–50)
GAMMA GLOB SERPL ELPH-MCNC: 1.91 G/DL (ref 0.62–1.51)
GFR SERPL CREATININE-BSD FRML MDRD: >60 ML/MIN/1.73 M 2
GLOBULIN SER CALC-MCNC: 3.5 G/DL (ref 1.9–3.5)
GLUCOSE SERPL-MCNC: 108 MG/DL (ref 65–99)
HCT VFR BLD AUTO: 22.4 % (ref 42–52)
HGB BLD-MCNC: 7.5 G/DL (ref 14–18)
INTERPRETATION SERPL IFE-IMP: ABNORMAL
LYMPHOCYTES # BLD AUTO: 0.24 K/UL (ref 1–4.8)
LYMPHOCYTES NFR BLD: 19.8 % (ref 22–41)
MANUAL DIFF BLD: NORMAL
MCH RBC QN AUTO: 32.3 PG (ref 27–33)
MCHC RBC AUTO-ENTMCNC: 33.5 G/DL (ref 33.7–35.3)
MCV RBC AUTO: 96.6 FL (ref 81.4–97.8)
METAMYELOCYTES NFR BLD MANUAL: 0.9 %
MICROCYTES BLD QL SMEAR: ABNORMAL
MONOCYTES # BLD AUTO: 0.06 K/UL (ref 0–0.85)
MONOCYTES NFR BLD AUTO: 5.4 % (ref 0–13.4)
MORPHOLOGY BLD-IMP: NORMAL
MYELOCYTES NFR BLD MANUAL: 0.9 %
NEUTROPHILS # BLD AUTO: 0.76 K/UL (ref 1.82–7.42)
NEUTROPHILS NFR BLD: 57.7 % (ref 44–72)
NEUTS BAND NFR BLD MANUAL: 5.4 % (ref 0–10)
NRBC # BLD AUTO: 0 K/UL
NRBC BLD AUTO-RTO: 0 /100 WBC
OVALOCYTES BLD QL SMEAR: NORMAL
PLATELET # BLD AUTO: 14 K/UL (ref 164–446)
PLATELET BLD QL SMEAR: NORMAL
PLATELETS.RETICULATED NFR BLD AUTO: 1.8 K/UL (ref 0.6–13.1)
PMV BLD AUTO: 10.2 FL (ref 9–12.9)
POIKILOCYTOSIS BLD QL SMEAR: NORMAL
POTASSIUM SERPL-SCNC: 3.7 MMOL/L (ref 3.6–5.5)
PRODUCT TYPE UPROD: NORMAL
PRODUCT TYPE UPROD: NORMAL
PROT SERPL-MCNC: 6.3 G/DL (ref 6–8.2)
PROT SERPL-MCNC: 7.5 G/DL (ref 6–8.3)
RBC # BLD AUTO: 2.32 M/UL (ref 4.7–6.1)
RBC BLD AUTO: PRESENT
RH BLD: NORMAL
SODIUM SERPL-SCNC: 146 MMOL/L (ref 135–145)
UNIT STATUS USTAT: NORMAL
UNIT STATUS USTAT: NORMAL
WBC # BLD AUTO: 1.2 K/UL (ref 4.8–10.8)

## 2017-04-02 PROCEDURE — 80053 COMPREHEN METABOLIC PANEL: CPT

## 2017-04-02 PROCEDURE — 99232 SBSQ HOSP IP/OBS MODERATE 35: CPT | Mod: GC | Performed by: INTERNAL MEDICINE

## 2017-04-02 PROCEDURE — 700102 HCHG RX REV CODE 250 W/ 637 OVERRIDE(OP): Performed by: INTERNAL MEDICINE

## 2017-04-02 PROCEDURE — 85007 BL SMEAR W/DIFF WBC COUNT: CPT | Mod: 91

## 2017-04-02 PROCEDURE — 86923 COMPATIBILITY TEST ELECTRIC: CPT

## 2017-04-02 PROCEDURE — 36415 COLL VENOUS BLD VENIPUNCTURE: CPT

## 2017-04-02 PROCEDURE — 87493 C DIFF AMPLIFIED PROBE: CPT

## 2017-04-02 PROCEDURE — 86901 BLOOD TYPING SEROLOGIC RH(D): CPT

## 2017-04-02 PROCEDURE — 85055 RETICULATED PLATELET ASSAY: CPT | Mod: 91

## 2017-04-02 PROCEDURE — 770009 HCHG ROOM/CARE - ONCOLOGY SEMI PRI*

## 2017-04-02 PROCEDURE — 86644 CMV ANTIBODY: CPT

## 2017-04-02 PROCEDURE — 99233 SBSQ HOSP IP/OBS HIGH 50: CPT | Mod: GC | Performed by: INTERNAL MEDICINE

## 2017-04-02 PROCEDURE — 51798 US URINE CAPACITY MEASURE: CPT

## 2017-04-02 PROCEDURE — 700111 HCHG RX REV CODE 636 W/ 250 OVERRIDE (IP): Performed by: INTERNAL MEDICINE

## 2017-04-02 PROCEDURE — 85027 COMPLETE CBC AUTOMATED: CPT | Mod: 91

## 2017-04-02 PROCEDURE — P9016 RBC LEUKOCYTES REDUCED: HCPCS

## 2017-04-02 PROCEDURE — 86850 RBC ANTIBODY SCREEN: CPT

## 2017-04-02 PROCEDURE — A9270 NON-COVERED ITEM OR SERVICE: HCPCS | Performed by: INTERNAL MEDICINE

## 2017-04-02 PROCEDURE — 86900 BLOOD TYPING SEROLOGIC ABO: CPT

## 2017-04-02 PROCEDURE — 36430 TRANSFUSION BLD/BLD COMPNT: CPT

## 2017-04-02 PROCEDURE — 82330 ASSAY OF CALCIUM: CPT

## 2017-04-02 RX ORDER — DEXTROSE AND SODIUM CHLORIDE 5; .45 G/100ML; G/100ML
INJECTION, SOLUTION INTRAVENOUS CONTINUOUS
Status: DISCONTINUED | OUTPATIENT
Start: 2017-04-02 | End: 2017-04-05

## 2017-04-02 RX ADMIN — TAMSULOSIN HYDROCHLORIDE 0.4 MG: 0.4 CAPSULE ORAL at 09:32

## 2017-04-02 RX ADMIN — DIBASIC SODIUM PHOSPHATE, MONOBASIC POTASSIUM PHOSPHATE AND MONOBASIC SODIUM PHOSPHATE 1 TABLET: 852; 155; 130 TABLET ORAL at 16:42

## 2017-04-02 RX ADMIN — CARVEDILOL 12.5 MG: 12.5 TABLET, FILM COATED ORAL at 09:32

## 2017-04-02 RX ADMIN — PENICILLIN V POTASIUM 500 MG: 500 TABLET OROPHARYNGEAL at 09:32

## 2017-04-02 RX ADMIN — DIBASIC SODIUM PHOSPHATE, MONOBASIC POTASSIUM PHOSPHATE AND MONOBASIC SODIUM PHOSPHATE 1 TABLET: 852; 155; 130 TABLET ORAL at 06:58

## 2017-04-02 RX ADMIN — DEXTROSE AND SODIUM CHLORIDE: 5; .45 INJECTION, SOLUTION INTRAVENOUS at 11:21

## 2017-04-02 RX ADMIN — DEXTROSE MONOHYDRATE 750 MG: 50 INJECTION, SOLUTION INTRAVENOUS at 05:02

## 2017-04-02 RX ADMIN — POTASSIUM CHLORIDE 40 MEQ: 1500 TABLET, EXTENDED RELEASE ORAL at 09:32

## 2017-04-02 RX ADMIN — PENICILLIN V POTASIUM 500 MG: 500 TABLET OROPHARYNGEAL at 13:09

## 2017-04-02 RX ADMIN — DEXTROSE MONOHYDRATE 750 MG: 50 INJECTION, SOLUTION INTRAVENOUS at 16:35

## 2017-04-02 RX ADMIN — CARVEDILOL 12.5 MG: 12.5 TABLET, FILM COATED ORAL at 16:42

## 2017-04-02 RX ADMIN — PENICILLIN V POTASIUM 500 MG: 500 TABLET OROPHARYNGEAL at 22:02

## 2017-04-02 RX ADMIN — FAMOTIDINE 20 MG: 20 TABLET, FILM COATED ORAL at 09:32

## 2017-04-02 RX ADMIN — PENICILLIN V POTASIUM 500 MG: 500 TABLET OROPHARYNGEAL at 16:35

## 2017-04-02 RX ADMIN — DIBASIC SODIUM PHOSPHATE, MONOBASIC POTASSIUM PHOSPHATE AND MONOBASIC SODIUM PHOSPHATE 1 TABLET: 852; 155; 130 TABLET ORAL at 13:09

## 2017-04-02 RX ADMIN — FAMOTIDINE 20 MG: 20 TABLET, FILM COATED ORAL at 22:03

## 2017-04-02 RX ADMIN — SODIUM CHLORIDE, POTASSIUM CHLORIDE, SODIUM LACTATE AND CALCIUM CHLORIDE: 600; 310; 30; 20 INJECTION, SOLUTION INTRAVENOUS at 05:07

## 2017-04-02 ASSESSMENT — ENCOUNTER SYMPTOMS
HEMOPTYSIS: 0
MEMORY LOSS: 1
ORTHOPNEA: 0
DIARRHEA: 0
SPUTUM PRODUCTION: 0
FEVER: 0
VOMITING: 0
WEAKNESS: 1
COUGH: 0
NAUSEA: 0
RESPIRATORY NEGATIVE: 1
ABDOMINAL PAIN: 0
CHILLS: 0
SEIZURES: 0
PALPITATIONS: 0
CARDIOVASCULAR NEGATIVE: 1
HEADACHES: 0

## 2017-04-02 ASSESSMENT — PAIN SCALES - GENERAL
PAINLEVEL_OUTOF10: 0
PAINLEVEL_OUTOF10: 0

## 2017-04-02 NOTE — PROGRESS NOTES
[facility] INTERNAL MEDICINE ATTENDING NOTE:      Date & Time note created:   4/2/2017   2:03 PM     Visit Time:   Attending/resident bedside rounds 9-11:30 AM     The patient was evaluated with the resident staff.  I reviewed the resident's note and agree with the resident's findings and plan as documented in the resident's note except as documented in the attending note. Please reference resident daily note for complete information.The chart was reviewed and summarized.  Available labs, imaging, O2 sats, EKGs were reviewed. Available nursing, consultant, and resident notes were reviewed. I am actively involved in the patient's care.                                                                BRIEF DISCUSSION:                                                         4/2: During AM rounds patient oriented*2, following commands, awake and alert. Was on restraints. No apparent bleeding.   Nursing communication done for Mahajan removal, change of IVF, Restrain removal and bedside swallow evaluation.    // AMS, metabolic encephalopathy: Seems like has gotten better on what I read from notes. Has multiple neurological co morbidities: Seizure disorder, SDH. This episode is suspected to be secondary to metabolic causes: Dehydration and hypercalcemia.   No suspicion for infection as of yet. UA was ok, CXR ok, No fever.     // Hypercalcemia: PTH 6.8, Vit D 22, Likely related to malignancy. Await PTHrP. May need 24 hour Urinary Ca to r/o Familial Hypocalcuric  Hypercalcemia. Review Family history.   Continue with IVF therapy. Received Calcitonin and Pamidronate.     // NATY, Hypernatremia, Hyperchloremia: Will change IVF to 0.45 NS. Continue to monitor Na and Cl. Add free water to his diet if he is able to swallow well.     // CML likely with blast crisis, Anemia, thrombocytopenia and leucopenia: Continue to follow Hem/Onc recs.     // Valvular Cardiomyopathy, PHTN: No evidence of HF at this time. Cont BB. Monitor fluid status.  Outpatient cards follow up.     Echo 3/20: Left ventricular ejection fraction is >70%.  Grade II diastolic dysfunction.Mild-moderate mixed calcific aortic valve disease.  Mean gradient 14 mmHg.Calcific mitral disease with resultant moderate stenosis and moderate regurgitation.Right ventricular systolic pressure is estimated to be 50-55 mmHg.  Normal estimated right atrial pressure.No prior study is available for comparison.     // HTN: BP ok.  // Seizure Disorder: Continue Keppra.  ----------------------------------------------------------------------------------------------------------------------  Filed Vitals:    04/02/17 0700 04/02/17 1245 04/02/17 1315 04/02/17 1345   BP:  118/59  121/57   Pulse: 76 67  67   Temp:  36.9 °C (98.5 °F)  36.8 °C (98.3 °F)   Resp: 20  18 18   Height:       Weight:       SpO2: 97% 98%  98%     Weight/BMI: Body mass index is 22.91 kg/(m^2).  Pulse Oximetry: 98 %, O2 Delivery: None (Room Air)    Intake/Output Summary (Last 24 hours) at 04/02/17 1403  Last data filed at 04/02/17 0600   Gross per 24 hour   Intake   2580 ml   Output   1850 ml   Net    730 ml       Bianca Chung MD   [employer]

## 2017-04-02 NOTE — PROGRESS NOTES
[facility] Attending Note  (see full Resident note dictated in EPIC)    I have seen and examined the patient today.  I have reviewed the medical record, laboratory data, imaging and all relevant studies.  I have discussed the plan of care with the Internal Medicine Resident and agree with the note and plan as documented.      Interval events:    A&O x 2 - Swiss only - better per RN and family again today  Less confused - better again today with lower Ca  ~ 10.5 corrected  HTN at times - no change  Pancytopenia persisting - chronic -> CML  Poor intake persists - no aspiration though  Afebrile Tm 98.5 - pancytopenia persists  Renal function better  Plt transfusion in ER  - plt 7 -> 36 -> 24->14  Heme/Onc evaluated patient today    Assessment:  - Symptomatic, Severe Hypercalcemia - likely mixed (malignancy + dehydration); abdo pain resolved, improving LOC  - Poor intake since dental extraction rescently  - NATY  - Leukopenia  - Hypokalemia  - CML awaiting BMTx at [facility], on Bosutinib - resumed  - Recent Dental Surgery (3/25) full mouth dental extraction w I/D of intraoral abscesses   - Chronic Thrombocytopenia  - Hx of Chronic Right Subdural Hematoma  - Chronic Hypertension    Plan:  - IVF - change IVF to 1/2 NS   - Stop lasix for now  - replete K - follow Mg  - Calcitonin PRN  - Zolidronic Acid -> pamidronate given  - re-dose PRN  - serial Ca  - Heme-Onc notes reviewed  - transfuse blood products PRN - try to limit - usual protocols  - SCDs  - Convert to Oral meds    Still Ok to to Heme Onc floor - hopefully this AM   Will S/o to [facility] and Heme/Onc    D/w Family, RN, RT, Pharmacy, Charge RN, [facility] Resident and patient

## 2017-04-02 NOTE — PROGRESS NOTES
[facility] Progress Note               Author: Desirae Morales Date & Time created: 4/2/2017  1:06 PM     Residents : Dr Javed/Dr Morales/Dr Kent     Patient ID   ID: 72 yo male with h/o CML with sig thrombocytopenia, H/o complete teeth extraction on 3/25/17, encephalopathy present here with ground level fall and altered mental status likely due to metabolic abnormalities such as NATY and hypercalcemia. Infectious cause such as UTI and Pneumonia been considered. He also found to have plt <10 and he received 1 unit of platelets.    3/31:  Had large BM  Still confuse and getting out the bed and pulling lines.  NS 1000 CC bolus  Repele electrolytes  1/2  cc/hr + IV furosemide 40 mg BID with KCL   Continue calcitonin for total 2 days due to tachyphylaxis  Increase carvediolol 3.125 mg BID to 12.5mg BID for HTN  Continue IV hydralizine PRN  Continue Keppra  Start oral care with Chlorhexidine BID  Keep close eye on him if become septic such as hypotensive or altered again, may do Blood culture, repeat Chest Xray and start empirical antibiotics such as Zosyn, if hemodynamically unstable consider IV Vancomycin       4/1  Calcium improved, still confuse  Change IV 1/2 NS to Lactate ringer  Stop furosemide    4/2   No acute events overnight   Patient seen at 6 am , mental status has remarkably improved since admission   Corrected calcium : 10.7   Ionized calcium 1.4     Review of Systems:  Review of Systems   Constitutional: Negative for fever and chills.   HENT: Negative for tinnitus.    Respiratory: Negative for cough, hemoptysis and sputum production.    Cardiovascular: Negative for chest pain, palpitations and orthopnea.   Gastrointestinal: Negative for nausea, vomiting, abdominal pain and diarrhea.   Neurological: Negative for headaches.       Physical Exam:  Physical Exam   Constitutional: He appears well-developed and well-nourished. No distress.   HENT:   Head: Normocephalic and atraumatic.   Mouth/Throat: No  oropharyngeal exudate.   Oropharynx edentulous, no blood    Eyes: Conjunctivae are normal. Pupils are equal, round, and reactive to light. Left eye exhibits no discharge.   Neck: Normal range of motion. Neck supple.   Cardiovascular: Regular rhythm and normal heart sounds.  Exam reveals no friction rub.    No murmur heard.  tachycardia   Pulmonary/Chest: Effort normal and breath sounds normal. No respiratory distress. He has no wheezes. He has no rales.   Abdominal: Soft. Bowel sounds are normal. He exhibits no distension. There is no tenderness. There is no rebound.   Musculoskeletal: Normal range of motion. He exhibits no edema.   Neurological: He is alert.   Oriented  To self and place      Skin: Skin is warm and dry.   Psychiatric: His behavior is normal.   Nursing note and vitals reviewed.      Labs:  Recent Results (from the past 24 hour(s))   CBC with Differential    Collection Time: 04/02/17  5:08 AM   Result Value Ref Range    WBC 1.2 (LL) 4.8 - 10.8 K/uL    RBC 2.32 (L) 4.70 - 6.10 M/uL    Hemoglobin 7.5 (L) 14.0 - 18.0 g/dL    Hematocrit 22.4 (L) 42.0 - 52.0 %    MCV 96.6 81.4 - 97.8 fL    MCH 32.3 27.0 - 33.0 pg    MCHC 33.5 (L) 33.7 - 35.3 g/dL    RDW 65.7 (H) 35.9 - 50.0 fL    Platelet Count 14 (LL) 164 - 446 K/uL    MPV 10.2 9.0 - 12.9 fL    Nucleated RBC 0.00 /100 WBC    NRBC (Absolute) 0.00 K/uL    Neutrophils-Polys 57.70 44.00 - 72.00 %    Lymphocytes 19.80 (L) 22.00 - 41.00 %    Monocytes 5.40 0.00 - 13.40 %    Eosinophils 4.50 0.00 - 6.90 %    Basophils 0.90 0.00 - 1.80 %    Neutrophils (Absolute) 0.76 (L) 1.82 - 7.42 K/uL    Lymphs (Absolute) 0.24 (L) 1.00 - 4.80 K/uL    Monos (Absolute) 0.06 0.00 - 0.85 K/uL    Eos (Absolute) 0.05 0.00 - 0.51 K/uL    Baso (Absolute) 0.01 0.00 - 0.12 K/uL    Anisocytosis 1+     Microcytosis 1+    COMP METABOLIC PANEL    Collection Time: 04/02/17  5:08 AM   Result Value Ref Range    Sodium 146 (H) 135 - 145 mmol/L    Potassium 3.7 3.6 - 5.5 mmol/L    Chloride 118  (H) 96 - 112 mmol/L    Co2 25 20 - 33 mmol/L    Anion Gap 3.0 0.0 - 11.9    Glucose 108 (H) 65 - 99 mg/dL    Bun 36 (H) 8 - 22 mg/dL    Creatinine 1.17 0.50 - 1.40 mg/dL    Calcium 9.7 8.5 - 10.5 mg/dL    AST(SGOT) 21 12 - 45 U/L    ALT(SGPT) 37 2 - 50 U/L    Alkaline Phosphatase 132 (H) 30 - 99 U/L    Total Bilirubin 0.8 0.1 - 1.5 mg/dL    Albumin 2.8 (L) 3.2 - 4.9 g/dL    Total Protein 6.3 6.0 - 8.2 g/dL    Globulin 3.5 1.9 - 3.5 g/dL    A-G Ratio 0.8 g/dL   ESTIMATED GFR    Collection Time: 17  5:08 AM   Result Value Ref Range    GFR If African American >60 >60 mL/min/1.73 m 2    GFR If Non African American >60 >60 mL/min/1.73 m 2   DIFFERENTIAL MANUAL    Collection Time: 17  5:08 AM   Result Value Ref Range    Bands-Stabs 5.40 0.00 - 10.00 %    Metamyelocytes 0.90 %    Myelocytes 0.90 %    Blasts 4.50 %    Manual Diff Status PERFORMED    PERIPHERAL SMEAR REVIEW    Collection Time: 17  5:08 AM   Result Value Ref Range    Peripheral Smear Review see below    PLATELET ESTIMATE    Collection Time: 17  5:08 AM   Result Value Ref Range    Plt Estimation Marked Decrease    MORPHOLOGY    Collection Time: 17  5:08 AM   Result Value Ref Range    RBC Morphology Present     Poikilocytosis 1+     Ovalocytes 1+    IMMATURE PLT FRACTION    Collection Time: 17  5:08 AM   Result Value Ref Range    Imm. Plt Fraction 1.8 0.6 - 13.1 K/uL   IONIZED CALCIUM    Collection Time: 17  6:48 AM   Result Value Ref Range    Ionized Calcium 1.4 (H) 1.1 - 1.3 mmol/L       Hemodynamics:  Temp (24hrs), Av.7 °C (98 °F), Min:36.5 °C (97.7 °F), Max:36.9 °C (98.5 °F)  Temperature: 36.9 °C (98.5 °F)  Pulse  Av.1  Min: 67  Max: 116Heart Rate (Monitored): 74  Blood Pressure : 118/59 mmHg, NIBP: (!) 166/71 mmHg     Respiratory:    Respiration: 20, Pulse Oximetry: 98 %     Work Of Breathing / Effort: Mild  RUL Breath Sounds: Clear, RML Breath Sounds: Clear, RLL Breath Sounds: Diminished, WAGNER Breath  Sounds: Clear, LLL Breath Sounds: Diminished  Fluids:    Intake/Output Summary (Last 24 hours) at 03/31/17 1329  Last data filed at 03/31/17 1100   Gross per 24 hour   Intake   2800 ml   Output   3100 ml   Net   -300 ml        GI/Nutrition:  Orders Placed This Encounter   Procedures   • DIET ORDER     Standing Status: Standing      Number of Occurrences: 1      Standing Expiration Date:      Order Specific Question:  Diet:     Answer:  Clear Liquid [10]     Medications:  Current Facility-Administered Medications   Medication Last Dose   • D5 1/2 NS infusion     • phosphorus (K-PHOS-NEUTRAL, PHOSPHA 250 NEUTRAL) per tablet 1 Tab 1 Tab at 04/02/17 0658   • senna-docusate (PERICOLACE or SENOKOT S) 8.6-50 MG per tablet 2 Tab Stopped at 03/31/17 0900    And   • polyethylene glycol/lytes (MIRALAX) PACKET 1 Packet      And   • magnesium hydroxide (MILK OF MAGNESIA) suspension 30 mL      And   • bisacodyl (DULCOLAX) suppository 10 mg     • Respiratory Care per Protocol     • glucose 4 g chewable tablet 16 g      And   • dextrose 50% (D50W) injection 25 mL     • Bosutinib 300 mg tab (Bosulif) (POM) 300 mg at 04/02/17 0830   • tamsulosin (FLOMAX) capsule 0.4 mg 0.4 mg at 04/02/17 0932   • penicillin v potassium (VEETID) tablet 500 mg 500 mg at 04/02/17 0932   • levetiracetam (KEPPRA) 750 mg in D5W 100 mL IVPB Stopped at 04/02/17 0517   • carvedilol (COREG) tablet 12.5 mg 12.5 mg at 04/02/17 0932   • hydrALAZINE (APRESOLINE) injection 20 mg 20 mg at 04/01/17 0205   • famotidine (PEPCID) tablet 20 mg 20 mg at 04/02/17 0932     Medical Decision Making, by Problem:  Active Hospital Problems    Diagnosis   • Neutropenia (CMS-HCC) [D70.9]     Priority: High     ANC 1000  Reverse isolation  No fever, chills   Chest xray show likely atelctasis, repeat chest xray tomorrow after hydration, if spike fever, we will start levofloxacin empirically for pneumonia     • Disorientation [R41.0]     Priority: High     Due to metabolic  abnormalities  Correct calcium    • Hypercalcemia [E83.52]     Priority: High   • Thrombocytopenia (CMS-[facility]) [D69.6]     Priority: High   • Fall from ground level [W18.30XA]     Priority: Medium   • Essential hypertension [I10]     Priority: Medium   • Hypokalemia [E87.6]     Priority: Medium   • Pancytopenia (CMS-[facility]) [D61.818]     Priority: Medium   • Leukemia (CMS-[facility]) [C95.90]     Priority: Medium   • Subdural hematoma, acute (CMS-[facility]) [I62.01]     Priority: Low     CT head on 03/31 show Stable, nearly isodense right holohemispheric subdural fluid collection.       Quality  Measures:  EKG reviewed, Radiology images reviewed, Labs reviewed and Medications reviewed  Lopez catheter: Urinary Tract Retention or Urinary Tract Obstruction      DVT Prophylaxis: Contraindicated - High bleeding risk (thrombocytopenia)  DVT prophylaxis - mechanical: SCDs  Ulcer prophylaxis: Yes    Assessed for rehab: Patient unable to tolerate rehabilitation therapeutic regimen    Plan:    #altered mental status ( improving)  - secondary to dehydration + hypercalcemia + NATY  - Passed bedside swallow eval and started clear liquid diet : advance diet as tolerated   - Fall, aspiration, seizure precuation    #Hypercalcemia  - most likely secondary to malignancy    - Ca 15.2, ionised calcium 2.1 on admission : down to  9.7/1.4 , corrected ca 10.7   - PTH : 6.8 appropriate response    - patient has h/o CML which can cause hypercalcemia of malignancy    - PTHrP pending, Vit D 25  Hydroxy 23 and SPEP  pending  - PTH was low which is an appropriate response  - calcitonin for 2 days due to tachyphylaxis and pamidronate received in ED  - Oncology Dr Vu following  - Continue IV fluids     #Thrombocytopenia  Platelets 7,. S/p 1 unit on admission   CMV negative, irradiated Platelet transfused on 03/30  Watch for any spontaneous  Intracranial bleed      #urine retention  - Continue tamsulosin  - Continue lopez cath for now    #NATY   Resolved    #CML    - Dr Vu is the oncolgist , appreciate his recs   - continue Bosutinib  - patient is to undergo BMT in [facility] in late April     #neutropenia  - no fever    - Precaution isolation due to ANC <1000  - UA negative for infection     #Dental abscess : s/p surgery full mouth tooth extraction with I/D of intraoral abscess, on March 25, 2017 , continue penicillin V for 6 more days , end date in    #Hypertension : increase carvedilol  3 125 BID to 12.5 mg BID  #seizure disorder: recently diagnosed during the last hospital stay , EEG encehpalopathy, continue Keppra       Dispo : transferred out of ICU on 04/02

## 2017-04-02 NOTE — PROGRESS NOTES
Oncology/Hematology Progress Note               Author: LEVAR Vu Date & Time created: 4/2/2017  10:07 AM     Interval History:  CC: Hypercalcemia         CML  More awake and alert; now on floor out of ICU  Recognizes me; ionized calcium next to normal  On bosutinib; WBC 1.2/Hgb 7.5/plt 14k  Close to baseline counts    Review of Systems:  Review of Systems   Constitutional: Positive for malaise/fatigue. Negative for fever and chills.   Respiratory: Negative.    Cardiovascular: Negative.    Gastrointestinal: Negative for abdominal pain.   Genitourinary: Negative.    Neurological: Positive for weakness. Negative for seizures.   Psychiatric/Behavioral: Positive for memory loss.       Physical Exam:  Physical Exam   Constitutional: No distress.   HENT:   Head: Normocephalic.   Mouth/Throat: No oropharyngeal exudate.   Eyes: Pupils are equal, round, and reactive to light. Scleral icterus is present.   Cardiovascular: Normal rate and normal heart sounds.    Pulmonary/Chest: Effort normal and breath sounds normal.   Abdominal: Soft. Bowel sounds are normal.   Genitourinary:   lopez   Musculoskeletal: He exhibits no edema.   Lymphadenopathy:     He has no cervical adenopathy.   Neurological: He is alert.   Skin: Skin is warm.       Labs:        Invalid input(s): [id number]      Recent Labs      03/30/17 2036 03/31/17 1310 04/01/17   0400  04/02/17   0508   SODIUM  145   < >  147*  146*  146*   POTASSIUM  3.5*   < >  3.1*  3.4*  3.7   CHLORIDE  105   < >  111  115*  118*   CO2  35*   < >  29  27  25   BUN  40*   < >  35*  35*  36*   CREATININE  1.54*   < >  1.25  1.33  1.17   MAGNESIUM  2.2   --    --   1.8   --    PHOSPHORUS  3.9   --    --   2.2*   --    CALCIUM  15.2*   < >  12.7*  10.8*  9.7    < > = values in this interval not displayed.     Recent Labs      03/31/17 1310 04/01/17   0400  04/02/17   0508   ALTSGPT  49  37  37   ASTSGOT  23  19  21   ALKPHOSPHAT  125*  123*  132*   TBILIRUBIN  0.9   1.0  0.8   GLUCOSE  145*  113*  108*     Recent Labs      17   0630  17   0400  17   0508   RBC  2.78*  2.62*  2.32*   HEMOGLOBIN  8.8*  8.4*  7.5*   HEMATOCRIT  26.6*  24.9*  22.4*   PLATELETCT  36*  24*  14*     Recent Labs      17   0630  17   1310  17   0400  17   0508   WBC  1.8*   --   1.6*  1.2*   NEUTSPOLYS  47.30   --   70.50  57.70   LYMPHOCYTES  36.60   --   13.40*  19.80*   MONOCYTES  4.50   --   3.60  5.40   EOSINOPHILS  5.30   --   2.70  4.50   BASOPHILS  0.90   --   0.00  0.90   ASTSGOT  35  23  19  21   ALTSGPT  40  49  37  37   ALKPHOSPHAT  124*  125*  123*  132*   TBILIRUBIN  0.9  0.9  1.0  0.8     Recent Labs      17   1310  17   0400  17   0508   SODIUM  147*  146*  146*   POTASSIUM  3.1*  3.4*  3.7   CHLORIDE  111  115*  118*   CO2  29  27  25   GLUCOSE  145*  113*  108*   BUN  35*  35*  36*   CREATININE  1.25  1.33  1.17   CALCIUM  12.7*  10.8*  9.7     Hemodynamics:  Temp (24hrs), Av.6 °C (97.9 °F), Min:36.5 °C (97.7 °F), Max:36.7 °C (98.1 °F)  Temperature: 36.5 °C (97.7 °F)  Pulse  Av.3  Min: 67  Max: 116Heart Rate (Monitored): 74  NIBP: (!) 166/71 mmHg     Respiratory:    Respiration: 20, Pulse Oximetry: 97 %     Work Of Breathing / Effort: Mild  RUL Breath Sounds: Clear, RML Breath Sounds: Clear, RLL Breath Sounds: Diminished, WAGNER Breath Sounds: Clear, LLL Breath Sounds: Diminished  Fluids:    Intake/Output Summary (Last 24 hours) at 17 1007  Last data filed at 17 0600   Gross per 24 hour   Intake   3540 ml   Output   1850 ml   Net   1690 ml        GI/Nutrition:  Orders Placed This Encounter   Procedures   • DIET ORDER     Standing Status: Standing      Number of Occurrences: 1      Standing Expiration Date:      Order Specific Question:  Diet:     Answer:  Clear Liquid [10]     Medical Decision Making, by Problem:  Active Hospital Problems    Diagnosis   • Neutropenia (CMS-HCC) [D70.9]   • Disorientation  [R41.0]   • Hypercalcemia [E83.52]   • Thrombocytopenia (CMS-HCC) [D69.6]   • Fall from ground level [W18.30XA]   • Essential hypertension [I10]   • Hypokalemia [E87.6]   • Pancytopenia (CMS-HCC) [D61.818]   • Leukemia (CMS-HCC) [C95.90]   • Subdural hematoma, acute (CMS-HCC) [I62.01]       Plan:  Hopefully home in a day or two if continues to wake up  I will need to see in a week to stay on top of calcium  Transfuse if further drop in Hgb or plts    Labs reviewed

## 2017-04-03 PROBLEM — R41.82 ALTERED MENTAL STATUS: Status: ACTIVE | Noted: 2017-04-03

## 2017-04-03 PROBLEM — G40.909 SEIZURE DISORDER (HCC): Status: ACTIVE | Noted: 2017-04-03

## 2017-04-03 PROBLEM — K04.7 DENTAL ABSCESS: Status: ACTIVE | Noted: 2017-04-03

## 2017-04-03 PROBLEM — D64.9 ANEMIA: Status: ACTIVE | Noted: 2017-04-03

## 2017-04-03 PROBLEM — E87.0 HYPERNATREMIA: Status: ACTIVE | Noted: 2017-04-03

## 2017-04-03 LAB
ALBUMIN SERPL BCP-MCNC: 2.7 G/DL (ref 3.2–4.9)
ALBUMIN/GLOB SERPL: 0.8 G/DL
ALP SERPL-CCNC: 134 U/L (ref 30–99)
ALT SERPL-CCNC: 33 U/L (ref 2–50)
ANION GAP SERPL CALC-SCNC: 4 MMOL/L (ref 0–11.9)
ANION GAP SERPL CALC-SCNC: 5 MMOL/L (ref 0–11.9)
ANISOCYTOSIS BLD QL SMEAR: ABNORMAL
AST SERPL-CCNC: 17 U/L (ref 12–45)
BARCODED ABORH UBTYP: 6200
BARCODED PRD CODE UBPRD: NORMAL
BARCODED UNIT NUM UBUNT: NORMAL
BASOPHILS # BLD AUTO: 0 % (ref 0–1.8)
BASOPHILS # BLD AUTO: 0.9 % (ref 0–1.8)
BASOPHILS # BLD AUTO: 1.2 % (ref 0–1.8)
BASOPHILS # BLD: 0 K/UL (ref 0–0.12)
BASOPHILS # BLD: 0.01 K/UL (ref 0–0.12)
BASOPHILS # BLD: 0.01 K/UL (ref 0–0.12)
BILIRUB SERPL-MCNC: 0.7 MG/DL (ref 0.1–1.5)
BLASTS NFR BLD MANUAL: 1.8 %
BLASTS NFR BLD MANUAL: 4.6 %
BUN SERPL-MCNC: 24 MG/DL (ref 8–22)
BUN SERPL-MCNC: 30 MG/DL (ref 8–22)
CALCIUM SERPL-MCNC: 8.6 MG/DL (ref 8.5–10.5)
CALCIUM SERPL-MCNC: 9 MG/DL (ref 8.5–10.5)
CHLORIDE SERPL-SCNC: 117 MMOL/L (ref 96–112)
CHLORIDE SERPL-SCNC: 118 MMOL/L (ref 96–112)
CO2 SERPL-SCNC: 21 MMOL/L (ref 20–33)
CO2 SERPL-SCNC: 23 MMOL/L (ref 20–33)
COMPONENT P 8504P: NORMAL
CREAT SERPL-MCNC: 1.06 MG/DL (ref 0.5–1.4)
CREAT SERPL-MCNC: 1.12 MG/DL (ref 0.5–1.4)
EOSINOPHIL # BLD AUTO: 0.06 K/UL (ref 0–0.51)
EOSINOPHIL # BLD AUTO: 0.07 K/UL (ref 0–0.51)
EOSINOPHIL # BLD AUTO: 0.1 K/UL (ref 0–0.51)
EOSINOPHIL NFR BLD: 5.4 % (ref 0–6.9)
EOSINOPHIL NFR BLD: 5.7 % (ref 0–6.9)
EOSINOPHIL NFR BLD: 8.9 % (ref 0–6.9)
ERYTHROCYTE [DISTWIDTH] IN BLOOD BY AUTOMATED COUNT: 57.5 FL (ref 35.9–50)
ERYTHROCYTE [DISTWIDTH] IN BLOOD BY AUTOMATED COUNT: 62.2 FL (ref 35.9–50)
ERYTHROCYTE [DISTWIDTH] IN BLOOD BY AUTOMATED COUNT: 63.7 FL (ref 35.9–50)
GFR SERPL CREATININE-BSD FRML MDRD: >60 ML/MIN/1.73 M 2
GFR SERPL CREATININE-BSD FRML MDRD: >60 ML/MIN/1.73 M 2
GLOBULIN SER CALC-MCNC: 3.4 G/DL (ref 1.9–3.5)
GLUCOSE SERPL-MCNC: 119 MG/DL (ref 65–99)
GLUCOSE SERPL-MCNC: 120 MG/DL (ref 65–99)
HCT VFR BLD AUTO: 22.4 % (ref 42–52)
HCT VFR BLD AUTO: 24.8 % (ref 42–52)
HCT VFR BLD AUTO: 25.3 % (ref 42–52)
HGB BLD-MCNC: 7.6 G/DL (ref 14–18)
HGB BLD-MCNC: 8.4 G/DL (ref 14–18)
HGB BLD-MCNC: 8.5 G/DL (ref 14–18)
LYMPHOCYTES # BLD AUTO: 0.29 K/UL (ref 1–4.8)
LYMPHOCYTES # BLD AUTO: 0.38 K/UL (ref 1–4.8)
LYMPHOCYTES # BLD AUTO: 0.39 K/UL (ref 1–4.8)
LYMPHOCYTES NFR BLD: 26.5 % (ref 22–41)
LYMPHOCYTES NFR BLD: 30.3 % (ref 22–41)
LYMPHOCYTES NFR BLD: 37.9 % (ref 22–41)
MACROCYTES BLD QL SMEAR: ABNORMAL
MACROCYTES BLD QL SMEAR: ABNORMAL
MANUAL DIFF BLD: NORMAL
MCH RBC QN AUTO: 30.8 PG (ref 27–33)
MCH RBC QN AUTO: 31.8 PG (ref 27–33)
MCH RBC QN AUTO: 32 PG (ref 27–33)
MCHC RBC AUTO-ENTMCNC: 33.6 G/DL (ref 33.7–35.3)
MCHC RBC AUTO-ENTMCNC: 33.9 G/DL (ref 33.7–35.3)
MCHC RBC AUTO-ENTMCNC: 33.9 G/DL (ref 33.7–35.3)
MCV RBC AUTO: 90.8 FL (ref 81.4–97.8)
MCV RBC AUTO: 93.7 FL (ref 81.4–97.8)
MCV RBC AUTO: 95.1 FL (ref 81.4–97.8)
METAMYELOCYTES NFR BLD MANUAL: 1.2 %
METAMYELOCYTES NFR BLD MANUAL: 1.8 %
MICROCYTES BLD QL SMEAR: ABNORMAL
MONOCYTES # BLD AUTO: 0.03 K/UL (ref 0–0.85)
MONOCYTES # BLD AUTO: 0.1 K/UL (ref 0–0.85)
MONOCYTES # BLD AUTO: 0.15 K/UL (ref 0–0.85)
MONOCYTES NFR BLD AUTO: 13.3 % (ref 0–13.4)
MONOCYTES NFR BLD AUTO: 3.4 % (ref 0–13.4)
MONOCYTES NFR BLD AUTO: 8 % (ref 0–13.4)
MORPHOLOGY BLD-IMP: NORMAL
MYELOCYTES NFR BLD MANUAL: 0.9 %
MYELOCYTES NFR BLD MANUAL: 1.2 %
NEUTROPHILS # BLD AUTO: 0.45 K/UL (ref 1.82–7.42)
NEUTROPHILS # BLD AUTO: 0.53 K/UL (ref 1.82–7.42)
NEUTROPHILS # BLD AUTO: 0.7 K/UL (ref 1.82–7.42)
NEUTROPHILS NFR BLD: 44.2 % (ref 44–72)
NEUTROPHILS NFR BLD: 44.8 % (ref 44–72)
NEUTROPHILS NFR BLD: 53.6 % (ref 44–72)
NEUTS BAND NFR BLD MANUAL: 4.4 % (ref 0–10)
NRBC # BLD AUTO: 0 K/UL
NRBC BLD AUTO-RTO: 0 /100 WBC
PLATELET # BLD AUTO: 33 K/UL (ref 164–446)
PLATELET # BLD AUTO: 38 K/UL (ref 164–446)
PLATELET # BLD AUTO: 9 K/UL (ref 164–446)
PLATELET BLD QL SMEAR: NORMAL
PLATELETS.RETICULATED NFR BLD AUTO: 1.7 K/UL (ref 0.6–13.1)
PLATELETS.RETICULATED NFR BLD AUTO: 1.8 K/UL (ref 0.6–13.1)
PLATELETS.RETICULATED NFR BLD AUTO: 1.8 K/UL (ref 0.6–13.1)
PMV BLD AUTO: 10.5 FL (ref 9–12.9)
PMV BLD AUTO: 9.8 FL (ref 9–12.9)
POLYCHROMASIA BLD QL SMEAR: NORMAL
POTASSIUM SERPL-SCNC: 3.2 MMOL/L (ref 3.6–5.5)
POTASSIUM SERPL-SCNC: 3.5 MMOL/L (ref 3.6–5.5)
PRODUCT TYPE UPROD: NORMAL
PROT SERPL-MCNC: 6.1 G/DL (ref 6–8.2)
RBC # BLD AUTO: 2.39 M/UL (ref 4.7–6.1)
RBC # BLD AUTO: 2.66 M/UL (ref 4.7–6.1)
RBC # BLD AUTO: 2.73 M/UL (ref 4.7–6.1)
RBC BLD AUTO: PRESENT
SODIUM SERPL-SCNC: 142 MMOL/L (ref 135–145)
SODIUM SERPL-SCNC: 146 MMOL/L (ref 135–145)
UNIT STATUS USTAT: NORMAL
WBC # BLD AUTO: 1 K/UL (ref 4.8–10.8)
WBC # BLD AUTO: 1.1 K/UL (ref 4.8–10.8)
WBC # BLD AUTO: 1.3 K/UL (ref 4.8–10.8)

## 2017-04-03 PROCEDURE — 700111 HCHG RX REV CODE 636 W/ 250 OVERRIDE (IP): Performed by: INTERNAL MEDICINE

## 2017-04-03 PROCEDURE — A9270 NON-COVERED ITEM OR SERVICE: HCPCS | Performed by: INTERNAL MEDICINE

## 2017-04-03 PROCEDURE — 700102 HCHG RX REV CODE 250 W/ 637 OVERRIDE(OP): Performed by: INTERNAL MEDICINE

## 2017-04-03 PROCEDURE — 80053 COMPREHEN METABOLIC PANEL: CPT

## 2017-04-03 PROCEDURE — P9034 PLATELETS, PHERESIS: HCPCS

## 2017-04-03 PROCEDURE — 83735 ASSAY OF MAGNESIUM: CPT

## 2017-04-03 PROCEDURE — P9016 RBC LEUKOCYTES REDUCED: HCPCS

## 2017-04-03 PROCEDURE — 85055 RETICULATED PLATELET ASSAY: CPT

## 2017-04-03 PROCEDURE — 80048 BASIC METABOLIC PNL TOTAL CA: CPT

## 2017-04-03 PROCEDURE — 700102 HCHG RX REV CODE 250 W/ 637 OVERRIDE(OP): Performed by: HOSPITALIST

## 2017-04-03 PROCEDURE — 36415 COLL VENOUS BLD VENIPUNCTURE: CPT

## 2017-04-03 PROCEDURE — 86923 COMPATIBILITY TEST ELECTRIC: CPT

## 2017-04-03 PROCEDURE — 85027 COMPLETE CBC AUTOMATED: CPT

## 2017-04-03 PROCEDURE — 770009 HCHG ROOM/CARE - ONCOLOGY SEMI PRI*

## 2017-04-03 PROCEDURE — 84100 ASSAY OF PHOSPHORUS: CPT

## 2017-04-03 PROCEDURE — 99232 SBSQ HOSP IP/OBS MODERATE 35: CPT | Mod: GC | Performed by: INTERNAL MEDICINE

## 2017-04-03 PROCEDURE — 86644 CMV ANTIBODY: CPT

## 2017-04-03 PROCEDURE — 36430 TRANSFUSION BLD/BLD COMPNT: CPT

## 2017-04-03 PROCEDURE — 302255 BARRIER CREAM MOISTURE BAZA PROTECT: Performed by: INTERNAL MEDICINE

## 2017-04-03 PROCEDURE — 85007 BL SMEAR W/DIFF WBC COUNT: CPT

## 2017-04-03 PROCEDURE — A9270 NON-COVERED ITEM OR SERVICE: HCPCS | Performed by: HOSPITALIST

## 2017-04-03 PROCEDURE — 51798 US URINE CAPACITY MEASURE: CPT

## 2017-04-03 RX ORDER — POTASSIUM CHLORIDE 20 MEQ/1
40 TABLET, EXTENDED RELEASE ORAL ONCE
Status: COMPLETED | OUTPATIENT
Start: 2017-04-03 | End: 2017-04-03

## 2017-04-03 RX ORDER — ACETAMINOPHEN 325 MG/1
650 TABLET ORAL EVERY 4 HOURS PRN
Status: DISCONTINUED | OUTPATIENT
Start: 2017-04-03 | End: 2017-04-05 | Stop reason: HOSPADM

## 2017-04-03 RX ORDER — DIPHENHYDRAMINE HYDROCHLORIDE 50 MG/ML
25 INJECTION INTRAMUSCULAR; INTRAVENOUS EVERY 6 HOURS PRN
Status: DISCONTINUED | OUTPATIENT
Start: 2017-04-03 | End: 2017-04-05 | Stop reason: HOSPADM

## 2017-04-03 RX ADMIN — POTASSIUM CHLORIDE 40 MEQ: 1500 TABLET, EXTENDED RELEASE ORAL at 13:03

## 2017-04-03 RX ADMIN — DEXTROSE MONOHYDRATE 750 MG: 50 INJECTION, SOLUTION INTRAVENOUS at 16:15

## 2017-04-03 RX ADMIN — STANDARDIZED SENNA CONCENTRATE AND DOCUSATE SODIUM 2 TABLET: 8.6; 5 TABLET, FILM COATED ORAL at 22:19

## 2017-04-03 RX ADMIN — CARVEDILOL 12.5 MG: 12.5 TABLET, FILM COATED ORAL at 18:03

## 2017-04-03 RX ADMIN — PENICILLIN V POTASIUM 500 MG: 500 TABLET OROPHARYNGEAL at 09:34

## 2017-04-03 RX ADMIN — PENICILLIN V POTASIUM 500 MG: 500 TABLET OROPHARYNGEAL at 18:03

## 2017-04-03 RX ADMIN — DIPHENHYDRAMINE HYDROCHLORIDE 25 MG: 50 INJECTION, SOLUTION INTRAMUSCULAR; INTRAVENOUS at 15:58

## 2017-04-03 RX ADMIN — DIBASIC SODIUM PHOSPHATE, MONOBASIC POTASSIUM PHOSPHATE AND MONOBASIC SODIUM PHOSPHATE 1 TABLET: 852; 155; 130 TABLET ORAL at 00:26

## 2017-04-03 RX ADMIN — PENICILLIN V POTASIUM 500 MG: 500 TABLET OROPHARYNGEAL at 22:19

## 2017-04-03 RX ADMIN — TAMSULOSIN HYDROCHLORIDE 0.4 MG: 0.4 CAPSULE ORAL at 09:34

## 2017-04-03 RX ADMIN — DEXTROSE AND SODIUM CHLORIDE: 5; .45 INJECTION, SOLUTION INTRAVENOUS at 22:24

## 2017-04-03 RX ADMIN — FAMOTIDINE 20 MG: 20 TABLET, FILM COATED ORAL at 09:34

## 2017-04-03 RX ADMIN — ACETAMINOPHEN 650 MG: 325 TABLET, FILM COATED ORAL at 15:58

## 2017-04-03 RX ADMIN — CARVEDILOL 12.5 MG: 12.5 TABLET, FILM COATED ORAL at 09:34

## 2017-04-03 RX ADMIN — FAMOTIDINE 20 MG: 20 TABLET, FILM COATED ORAL at 22:19

## 2017-04-03 RX ADMIN — DEXTROSE MONOHYDRATE 750 MG: 50 INJECTION, SOLUTION INTRAVENOUS at 03:44

## 2017-04-03 RX ADMIN — PENICILLIN V POTASIUM 500 MG: 500 TABLET OROPHARYNGEAL at 13:03

## 2017-04-03 ASSESSMENT — ENCOUNTER SYMPTOMS: CONSTITUTIONAL NEGATIVE: 1

## 2017-04-03 ASSESSMENT — PAIN SCALES - GENERAL
PAINLEVEL_OUTOF10: 0

## 2017-04-03 NOTE — ASSESSMENT & PLAN NOTE
Hx of chronic thrombocytopenia  - likely secondary to CML  - received 1 unit on admission    CMV negative, irradiated Platelet transfused on 03/30  - platelet again 9 on 4/3, received 1 units of platelets, repeat: 38>>33 (4/4)  - per oncology platelet count to be >10 for dc.   - Watch for any spontaneous  Intracranial bleed    - per oncology patient is okay to be discharged.   - will monitor

## 2017-04-03 NOTE — PROGRESS NOTES
Dr. Corado notified of pt hgb at 7.6. Will transfuse as per Dr. Corado to transfuse if hgb <8. No orders for pre medications. Per Dr. Mak joseay to place order of 650 mg tylenol PO and 25 mg IV benadryl for pre med.

## 2017-04-03 NOTE — DIETARY
Nutrition Services: Pt seen for re-screen on day 4 of admit.     Pt is a 72 yo M, admitted for Hypercalcemia.     PMH: HTN, Cancer (chronic myeloid leukemia), chronic thrombocytopenia, chronic dental abscesses    Attempted to interview pt, unable to complete interview successfully. Per nursing progress note (4/2) pt is confused and disoriented. Pt is Hebrew speaking only. Pt is on clear liquid diet x 3 days. Nursing unclear of reasoning behind diet order. RN to clarify with MD per discussion with RN.     Current Diet Order: Clear liquid - x 2 meals % consumed per ADLs  Wt: Bed scale wt from 3/31 = 70.4 kg (155 lb 3.3 oz). Unable to obtain wt hx from pt.  BMI: 22.9  Pertinent Labs: Sodium 146, K+ 3.2, Chloride 118, Glucose 119, BUN 30, Alk Phos 134  Pertinent Meds: Bosulif, Coreg, Pepcid, Veetid, Pericolace, Flomax  Fluids: D5 1/2 NS infusion @ 100 ml/hr (408 kcals provided via D5W per day)  GI: Last BM 4/3.  Skin: No skin breakdown at this time.     Plan/Recommendations:  1. Recommend diet advancement past clears as medically feasible - pt will likely benefit from Dysphasia 1 Puree diet given hx of full teeth extraction, unless otherwise specified by SLP  2. Encourage PO intake  3. Nutrition Representative to see daily per dept policy  4. Please record intake as percentage of meals consumed   5. RD to monitor wt, PO intake, diet advancement vs nutrition support, and nutrition labs

## 2017-04-03 NOTE — ASSESSMENT & PLAN NOTE
- no fever    - Precaution isolation due to ANC <1000  - UA negative for infection   - oncology aware of Neutropenia.

## 2017-04-03 NOTE — CARE PLAN
Problem: Safety  Goal: Will remain free from injury  Outcome: PROGRESSING SLOWER THAN EXPECTED  Patient having confusion, pulled PIV. Line replaced. Lap belt placed for safety.     Problem: Bowel/Gastric:  Goal: Normal bowel function is maintained or improved  Outcome: PROGRESSING SLOWER THAN EXPECTED  Frequent loose stools noted, CDIF negative. Will continue to monitor.

## 2017-04-03 NOTE — CARE PLAN
Problem: Safety  Goal: Will remain free from falls  Intervention: Implement fall precautions  Bed alarm in place, call light within reach, bed in lowered positioned, hourly rounding in place.      Problem: Skin Integrity  Goal: Risk for impaired skin integrity will decrease  Intervention: Assess risk factors for impaired skin integrity and/or pressure ulcers  Thorough skin assessment performed at the beginning of shift and during q 2 hour non-violent restraint assessment.

## 2017-04-03 NOTE — PROGRESS NOTES
Rox from Lab called with critical result of WBC at 1.1 and Platelets at 38. Critical lab result read back to Rox.   This critical lab result is within parameters established by [facility] for this patient

## 2017-04-03 NOTE — PROGRESS NOTES
TECH from Lab called with critical result of WBC 1.3, PLT 9 at 1255. Critical lab result read back to TECH.   This critical lab result is within parameters established by [facility] for this patient.      ROGER JERONIMO paged for orders, no standing orders. MD PEREZ to call back with orders.

## 2017-04-03 NOTE — PROGRESS NOTES
MD garcia, [facility]. Patient pulled out PIV, bleeding thoroughly. Patient confused, disoriented. Patient requiring frequent orientation. Lap belt placed on patient for safety. Pressure held, bleeding stopped. Patient cleaned thoroughly, reoriented. Offered water. Call light within reach, patient demonstrated ability to push call light. Bed alarm on, strip alarm on. Staff aware.

## 2017-04-03 NOTE — PROGRESS NOTES
Patient did not require soft wrist restraints overnight. Patient slept, was compliant with leaving IV alone. Patient with frequent incontinence, buttocks red, sore and extremely tender. Patient placing hands in soiled area repeatedly. Cream applied to buttocks, education completed, will need reinforcement. Platelets infused per protocol, patient tolerated well. Call light within reach, safety education reinforced. Will continue to monitor.

## 2017-04-03 NOTE — ASSESSMENT & PLAN NOTE
s/p surgery full mouth tooth extraction with I/D of intraoral abscess, on March 25, 2017 , continue penicillin V for 6 more days , end date in 4/4/17

## 2017-04-03 NOTE — ASSESSMENT & PLAN NOTE
Assessment:  - secondary to dehydration + hypercalcemia + NATY; patient also had a fall at home  - CT head: 1.  Holohemispheric right chronic subdural collection is similar in size to the prior exam. No significant mass effect or midline shift.                    2.  Periventricular white matter changes are consistent with chronic small vessel disease.  In ICU: - Passed bedside swallow eval and started clear liquid diet    Plan:  - Mentation is near baseline. Per Nurse who has taken care of him in the past, patient was able to answer most of the questions to assess his orientation and also knew that he has leukemia and he is to receive bm transplant soon in [facility].   - diet changed to full liquid and will be advanced to solid food as tolerated by the patient  - patient was able to walk the raygoza way with 1 person assist, PT eval ordered and is pending  - given the progress, will aim for DC in AM.

## 2017-04-03 NOTE — PROGRESS NOTES
Assumed care of pt this am.  He is calm and reports no pain.  Vital signs are stable.  I will continue to assess and monitor.

## 2017-04-03 NOTE — PROGRESS NOTES
Pt was transferred to floor today and seen by floor team with Dr. Bautista.    Please refer to Dr. Javed today for full details.    Recommendations from medical team:  - Hypercalcemia: improving. Noted some hypernatremia at 146. IVFs changed from Ringer's lactate to D5 1/2 NS  - Thrombocytopenia: Plts 14 today. No transfusion today per Dr. Vu recommendations. May transfuse tomorrow if dropped further.  - Anemia: Hgb at 7.5. One unit of pRBCs given per onc recs, CMV neg and irradiated since pt is a bone marrow transplant candidate.  - Confusion: improving, will discontinue lopez and restraints today..      Continue inpatient monitoring for electrolytes. May discharge once clear from mental status point and electrolytes stable. Will follow up outpatient for CML and bone marrow transplant referral.

## 2017-04-03 NOTE — PROGRESS NOTES
Non violent restraints discontinued at 12:00 per physician order and RN judgement. The patient has been educated by the RN and by the patient's daughter regarding the removal of the restraints and has been instructed to refrain from pulling on PIV located on bilateral upper extremities. Patient verbalizes understanding. The patient was continuously observed by the RN for a 30 minute trial to evaluate the need for restraints. The RN determined that the patient was able to have the restraint order discontinued. Bed alarm in place, call light within reach, patient room close to the nursing station, hourly rounding in place.

## 2017-04-03 NOTE — PROGRESS NOTES
"  HEMATOLOGY-ONCOLOGY PROGRESS NOTE    Events: No overnight events     Subjective: He denies any HA, no bleeding . He is very alert and communicating appropriately this AM.   He denies any seizures, no focal weakness. He denies any s/o     Objective:  Medications reviewed and notable for:  Current Facility-Administered Medications   Medication Dose   • D5 1/2 NS infusion     • senna-docusate (PERICOLACE or SENOKOT S) 8.6-50 MG per tablet 2 Tab  2 Tab    And   • polyethylene glycol/lytes (MIRALAX) PACKET 1 Packet  1 Packet    And   • magnesium hydroxide (MILK OF MAGNESIA) suspension 30 mL  30 mL    And   • bisacodyl (DULCOLAX) suppository 10 mg  10 mg   • Respiratory Care per Protocol     • glucose 4 g chewable tablet 16 g  16 g    And   • dextrose 50% (D50W) injection 25 mL  25 mL   • Bosutinib 300 mg tab (Bosulif) (POM)  300 mg   • tamsulosin (FLOMAX) capsule 0.4 mg  0.4 mg   • penicillin v potassium (VEETID) tablet 500 mg  500 mg   • levetiracetam (KEPPRA) 750 mg in D5W 100 mL IVPB  750 mg   • carvedilol (COREG) tablet 12.5 mg  12.5 mg   • hydrALAZINE (APRESOLINE) injection 20 mg  20 mg   • famotidine (PEPCID) tablet 20 mg  20 mg       ROS:   Constitutional: No fatigue, no fevers or chills, no night sweats  Resp: No cough or SOB  Cardio:No chest pain or palpitations  Pschy: No depression or anxiety   Neuro: No headaches, no seizure, no vision changes  GI: no abdominal pain, nausea or vomiting. No diarrhea or constipation   All other ROS negative    Blood pressure 138/52, pulse 70, temperature 36.8 °C (98.2 °F), resp. rate 18, height 1.753 m (5' 9.02\"), weight 70.4 kg (155 lb 3.3 oz), SpO2 95 %.      General:  comfortable, NAD  HEENT:  sclera anicteric, pupils equal, round, reactive to light, oral cavity and oropharynx clear, mucous membranes moist  Neck:   supple, no lymphadenopathy  Cor:   regular rate and rhythm, no murmurs, rubs, or gallops  Pulm:   clear to auscultation bilaterally  Abd:   bowel sounds " present, soft, nontender, nondistended, no palpable masses or organomegaly  Extremities:  warm, no lower extremity edema  Neurologic:  A&O x 3  Pyschiatric:  Appropriate mood and affect    Labs reviewed and notable for:  Recent Labs      04/01/17   0400  04/02/17   0508  04/02/17   2359   WBC  1.6*  1.2*  1.3*   RBC  2.62*  2.32*  2.66*   HEMOGLOBIN  8.4*  7.5*  8.5*   HEMATOCRIT  24.9*  22.4*  25.3*   MCV  95.0  96.6  95.1   MCH  32.1  32.3  32.0   MCHC  33.7  33.5*  33.6*   RDW  66.0*  65.7*  63.7*   PLATELETCT  24*  14*  9*   MPV  11.0  10.2   --          .@CMP  Recent Results (from the past 24 hour(s))   CDIFF BY PCR    Collection Time: 04/02/17  4:53 PM   Result Value Ref Range    C Diff by PCR Negative Negative    [id number] Presumptive Negative Negative   CBC with Differential    Collection Time: 04/02/17 11:59 PM   Result Value Ref Range    WBC 1.3 (LL) 4.8 - 10.8 K/uL    RBC 2.66 (L) 4.70 - 6.10 M/uL    Hemoglobin 8.5 (L) 14.0 - 18.0 g/dL    Hematocrit 25.3 (L) 42.0 - 52.0 %    MCV 95.1 81.4 - 97.8 fL    MCH 32.0 27.0 - 33.0 pg    MCHC 33.6 (L) 33.7 - 35.3 g/dL    RDW 63.7 (H) 35.9 - 50.0 fL    Platelet Count 9 (LL) 164 - 446 K/uL    Nucleated RBC 0.00 /100 WBC    NRBC (Absolute) 0.00 K/uL    Neutrophils-Polys 53.60 44.00 - 72.00 %    Lymphocytes 30.30 22.00 - 41.00 %    Monocytes 8.00 0.00 - 13.40 %    Eosinophils 5.40 0.00 - 6.90 %    Basophils 0.90 0.00 - 1.80 %    Neutrophils (Absolute) 0.70 (L) 1.82 - 7.42 K/uL    Lymphs (Absolute) 0.39 (L) 1.00 - 4.80 K/uL    Monos (Absolute) 0.10 0.00 - 0.85 K/uL    Eos (Absolute) 0.07 0.00 - 0.51 K/uL    Baso (Absolute) 0.01 0.00 - 0.12 K/uL    Anisocytosis 1+     Microcytosis 1+    DIFFERENTIAL MANUAL    Collection Time: 04/02/17 11:59 PM   Result Value Ref Range    Metamyelocytes 1.80 %    Manual Diff Status PERFORMED    PERIPHERAL SMEAR REVIEW    Collection Time: 04/02/17 11:59 PM   Result Value Ref Range    Peripheral Smear Review see below    PLATELET ESTIMATE     Collection Time: 04/02/17 11:59 PM   Result Value Ref Range    Plt Estimation Marked Decrease    MORPHOLOGY    Collection Time: 04/02/17 11:59 PM   Result Value Ref Range    RBC Morphology Present    IMMATURE PLT FRACTION    Collection Time: 04/02/17 11:59 PM   Result Value Ref Range    Imm. 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 Planned transplant at [facility].   - Cytopenias due to # 1: Tranfusion dependant- orders to keep PLT > 10,000, HGB > 8.0  - Hypercalcemia normalized calcium levels   - MS changes improved  - Subdural bleed stable on recent CT scan 3/30/17     Plan: Continue Bosutinib   PLT transfusion last night   No labs this AM, [facility] team beside ordered CBC, CMP.   He is clinically improving. If he remains stable home in 1-2 days and follow up with Dr. Vu later this week     High complexicity/Drug monitoring     We will continue to follow with you; please call with any questions, [phone].      Ladonna Corado MD  [facility]   [phone]

## 2017-04-03 NOTE — ASSESSMENT & PLAN NOTE
- Dr Vu is the oncolgist , appreciate his recs    - continue Bosutinib  - patient is to undergo BMT in [facility] in late April   - per oncology pt is ok to dc.

## 2017-04-03 NOTE — CARE PLAN
Problem: Nutritional:  Goal: Achieve adequate nutritional intake  Diet will advance past clears and pt will consume >50% of meals.  Outcome: NOT MET  Intervention: Advance diet as tolerated  Currently, clear liquid diet x 3 days. PO intake inadequate.

## 2017-04-03 NOTE — ASSESSMENT & PLAN NOTE
ICU course:   - Ca 15.2, ionised calcium 2.1 on admission : down to  9.7/1.4 , corrected ca 10.7    - PTH : 6.8 appropriate response    - patient has h/o CML which can cause hypercalcemia of malignancy    - PTHrP pending, Vit D 25  Hydroxy 23 and SPEP - PTH was low which is an appropriate response  - calcitonin for 2 days due to tachyphylaxis and pamidronate received in ED  - Oncology Dr Vu following    Course on floor:  - Ca 4/3/17: 9>>8.4(4/4)  - on IV fluids 1/2 NS due to hypernatremia.,  Na- 142 (4/4)  - monitor

## 2017-04-04 ENCOUNTER — APPOINTMENT (OUTPATIENT)
Dept: ONCOLOGY | Facility: MEDICAL CENTER | Age: 72
End: 2017-04-04
Attending: SPECIALIST
Payer: MEDICARE

## 2017-04-04 ENCOUNTER — APPOINTMENT (OUTPATIENT)
Dept: RADIOLOGY | Facility: MEDICAL CENTER | Age: 72
DRG: 640 | End: 2017-04-04
Attending: INTERNAL MEDICINE
Payer: MEDICARE

## 2017-04-04 LAB
ALBUMIN SERPL BCP-MCNC: 2.7 G/DL (ref 3.2–4.9)
ALBUMIN/GLOB SERPL: 0.8 G/DL
ALP SERPL-CCNC: 147 U/L (ref 30–99)
ALT SERPL-CCNC: 33 U/L (ref 2–50)
ANION GAP SERPL CALC-SCNC: 4 MMOL/L (ref 0–11.9)
ANISOCYTOSIS BLD QL SMEAR: ABNORMAL
AST SERPL-CCNC: 18 U/L (ref 12–45)
BASOPHILS # BLD AUTO: 0.9 % (ref 0–1.8)
BASOPHILS # BLD: 0.01 K/UL (ref 0–0.12)
BILIRUB SERPL-MCNC: 0.7 MG/DL (ref 0.1–1.5)
BLASTS NFR BLD MANUAL: 0.9 %
BUN SERPL-MCNC: 23 MG/DL (ref 8–22)
CALCIUM SERPL-MCNC: 8.4 MG/DL (ref 8.5–10.5)
CHLORIDE SERPL-SCNC: 118 MMOL/L (ref 96–112)
CO2 SERPL-SCNC: 20 MMOL/L (ref 20–33)
CREAT SERPL-MCNC: 1.13 MG/DL (ref 0.5–1.4)
EOSINOPHIL # BLD AUTO: 0.07 K/UL (ref 0–0.51)
EOSINOPHIL NFR BLD: 7.1 % (ref 0–6.9)
ERYTHROCYTE [DISTWIDTH] IN BLOOD BY AUTOMATED COUNT: 57.1 FL (ref 35.9–50)
GFR SERPL CREATININE-BSD FRML MDRD: >60 ML/MIN/1.73 M 2
GLOBULIN SER CALC-MCNC: 3.3 G/DL (ref 1.9–3.5)
GLUCOSE SERPL-MCNC: 119 MG/DL (ref 65–99)
HCT VFR BLD AUTO: 24.3 % (ref 42–52)
HGB BLD-MCNC: 8.4 G/DL (ref 14–18)
LYMPHOCYTES # BLD AUTO: 0.4 K/UL (ref 1–4.8)
LYMPHOCYTES NFR BLD: 40.2 % (ref 22–41)
MAGNESIUM SERPL-MCNC: 1.7 MG/DL (ref 1.5–2.5)
MANUAL DIFF BLD: NORMAL
MCH RBC QN AUTO: 31.2 PG (ref 27–33)
MCHC RBC AUTO-ENTMCNC: 34.6 G/DL (ref 33.7–35.3)
MCV RBC AUTO: 90.3 FL (ref 81.4–97.8)
METAMYELOCYTES NFR BLD MANUAL: 0.9 %
MONOCYTES # BLD AUTO: 0.13 K/UL (ref 0–0.85)
MONOCYTES NFR BLD AUTO: 12.5 % (ref 0–13.4)
MORPHOLOGY BLD-IMP: NORMAL
NEUTROPHILS # BLD AUTO: 0.36 K/UL (ref 1.82–7.42)
NEUTROPHILS NFR BLD: 33 % (ref 44–72)
NEUTS BAND NFR BLD MANUAL: 2.7 % (ref 0–10)
NRBC # BLD AUTO: 0 K/UL
NRBC BLD AUTO-RTO: 0 /100 WBC
PHOSPHATE SERPL-MCNC: 2.8 MG/DL (ref 2.5–4.5)
PLATELET # BLD AUTO: 35 K/UL (ref 164–446)
PLATELET BLD QL SMEAR: NORMAL
PLATELETS.RETICULATED NFR BLD AUTO: 2.3 K/UL (ref 0.6–13.1)
PMV BLD AUTO: 12.1 FL (ref 9–12.9)
POTASSIUM SERPL-SCNC: 3.4 MMOL/L (ref 3.6–5.5)
PROMYELOCYTES NFR BLD MANUAL: 1.8 %
PROT SERPL-MCNC: 6 G/DL (ref 6–8.2)
PSA SERPL-MCNC: 8.4 NG/ML (ref 0–4)
RBC # BLD AUTO: 2.69 M/UL (ref 4.7–6.1)
RBC BLD AUTO: PRESENT
SODIUM SERPL-SCNC: 142 MMOL/L (ref 135–145)
VARIANT LYMPHS BLD QL SMEAR: NORMAL
WBC # BLD AUTO: 1 K/UL (ref 4.8–10.8)

## 2017-04-04 PROCEDURE — 84153 ASSAY OF PSA TOTAL: CPT

## 2017-04-04 PROCEDURE — A9270 NON-COVERED ITEM OR SERVICE: HCPCS | Performed by: INTERNAL MEDICINE

## 2017-04-04 PROCEDURE — 85007 BL SMEAR W/DIFF WBC COUNT: CPT

## 2017-04-04 PROCEDURE — 700117 HCHG RX CONTRAST REV CODE 255: Performed by: OBSTETRICS & GYNECOLOGY

## 2017-04-04 PROCEDURE — 36415 COLL VENOUS BLD VENIPUNCTURE: CPT

## 2017-04-04 PROCEDURE — 700111 HCHG RX REV CODE 636 W/ 250 OVERRIDE (IP): Performed by: INTERNAL MEDICINE

## 2017-04-04 PROCEDURE — 85027 COMPLETE CBC AUTOMATED: CPT

## 2017-04-04 PROCEDURE — 700102 HCHG RX REV CODE 250 W/ 637 OVERRIDE(OP): Performed by: HOSPITALIST

## 2017-04-04 PROCEDURE — 97165 OT EVAL LOW COMPLEX 30 MIN: CPT

## 2017-04-04 PROCEDURE — 80053 COMPREHEN METABOLIC PANEL: CPT

## 2017-04-04 PROCEDURE — 700111 HCHG RX REV CODE 636 W/ 250 OVERRIDE (IP): Performed by: HOSPITALIST

## 2017-04-04 PROCEDURE — 71260 CT THORAX DX C+: CPT

## 2017-04-04 PROCEDURE — 99231 SBSQ HOSP IP/OBS SF/LOW 25: CPT | Mod: GC | Performed by: INTERNAL MEDICINE

## 2017-04-04 PROCEDURE — A9270 NON-COVERED ITEM OR SERVICE: HCPCS | Performed by: HOSPITALIST

## 2017-04-04 PROCEDURE — 700102 HCHG RX REV CODE 250 W/ 637 OVERRIDE(OP): Performed by: INTERNAL MEDICINE

## 2017-04-04 PROCEDURE — G8988 SELF CARE GOAL STATUS: HCPCS | Mod: CI

## 2017-04-04 PROCEDURE — 770009 HCHG ROOM/CARE - ONCOLOGY SEMI PRI*

## 2017-04-04 PROCEDURE — G8987 SELF CARE CURRENT STATUS: HCPCS | Mod: CJ

## 2017-04-04 PROCEDURE — 85055 RETICULATED PLATELET ASSAY: CPT

## 2017-04-04 RX ORDER — LOPERAMIDE HYDROCHLORIDE 2 MG/1
2 CAPSULE ORAL 4 TIMES DAILY PRN
Status: DISCONTINUED | OUTPATIENT
Start: 2017-04-04 | End: 2017-04-05 | Stop reason: HOSPADM

## 2017-04-04 RX ORDER — MAGNESIUM SULFATE HEPTAHYDRATE 40 MG/ML
2 INJECTION, SOLUTION INTRAVENOUS ONCE
Status: COMPLETED | OUTPATIENT
Start: 2017-04-04 | End: 2017-04-04

## 2017-04-04 RX ORDER — POTASSIUM CHLORIDE 20 MEQ/1
40 TABLET, EXTENDED RELEASE ORAL ONCE
Status: COMPLETED | OUTPATIENT
Start: 2017-04-04 | End: 2017-04-04

## 2017-04-04 RX ADMIN — FAMOTIDINE 20 MG: 20 TABLET, FILM COATED ORAL at 21:48

## 2017-04-04 RX ADMIN — POTASSIUM CHLORIDE 40 MEQ: 1500 TABLET, EXTENDED RELEASE ORAL at 08:38

## 2017-04-04 RX ADMIN — STANDARDIZED SENNA CONCENTRATE AND DOCUSATE SODIUM 2 TABLET: 8.6; 5 TABLET, FILM COATED ORAL at 08:39

## 2017-04-04 RX ADMIN — PENICILLIN V POTASIUM 500 MG: 500 TABLET OROPHARYNGEAL at 12:42

## 2017-04-04 RX ADMIN — PENICILLIN V POTASIUM 500 MG: 500 TABLET OROPHARYNGEAL at 08:39

## 2017-04-04 RX ADMIN — DEXTROSE MONOHYDRATE 750 MG: 50 INJECTION, SOLUTION INTRAVENOUS at 04:17

## 2017-04-04 RX ADMIN — TAMSULOSIN HYDROCHLORIDE 0.4 MG: 0.4 CAPSULE ORAL at 08:39

## 2017-04-04 RX ADMIN — PENICILLIN V POTASIUM 500 MG: 500 TABLET OROPHARYNGEAL at 17:31

## 2017-04-04 RX ADMIN — MAGNESIUM SULFATE IN WATER 2 G: 40 INJECTION, SOLUTION INTRAVENOUS at 08:37

## 2017-04-04 RX ADMIN — CARVEDILOL 12.5 MG: 12.5 TABLET, FILM COATED ORAL at 17:41

## 2017-04-04 RX ADMIN — DEXTROSE MONOHYDRATE 750 MG: 50 INJECTION, SOLUTION INTRAVENOUS at 17:30

## 2017-04-04 RX ADMIN — IOHEXOL 100 ML: 350 INJECTION, SOLUTION INTRAVENOUS at 19:50

## 2017-04-04 RX ADMIN — FAMOTIDINE 20 MG: 20 TABLET, FILM COATED ORAL at 08:39

## 2017-04-04 RX ADMIN — DEXTROSE AND SODIUM CHLORIDE: 5; .45 INJECTION, SOLUTION INTRAVENOUS at 08:45

## 2017-04-04 RX ADMIN — LOPERAMIDE HYDROCHLORIDE 2 MG: 2 CAPSULE ORAL at 21:48

## 2017-04-04 RX ADMIN — PENICILLIN V POTASIUM 500 MG: 500 TABLET OROPHARYNGEAL at 21:48

## 2017-04-04 RX ADMIN — CARVEDILOL 12.5 MG: 12.5 TABLET, FILM COATED ORAL at 08:39

## 2017-04-04 ASSESSMENT — ACTIVITIES OF DAILY LIVING (ADL): TOILETING: INDEPENDENT

## 2017-04-04 ASSESSMENT — PAIN SCALES - GENERAL
PAINLEVEL_OUTOF10: 0

## 2017-04-04 NOTE — ASSESSMENT & PLAN NOTE
Na in the range of >145  - Na today (4/3/17): 146, with chloride: 118, --> Na: 142, Cl: 118 (4/4)  - likely from severe dehydrations and NATY present on admission; BUN/Cr: 30/1.12 (4/3)->23/1.13 (4/4)  - continuing 1/2 NS@ 100  - will monitor CMP

## 2017-04-04 NOTE — PROGRESS NOTES
Bedside report received from night shift, assumed care of pt at 0715. Pt in bed resting comfortably with no s/sx of pain or discomfort.  Pt A&O X 4. Denying pain at this time. One person assist. Patient ambulated halls with shuffled gait.  Pt calls appropriately for medication or assistance as needed. Call light within reach, all appropriate fall precautions in place and personal belongings within reach; hourly rounding in place. No needs at this time. See flowsheets for further assessment details.

## 2017-04-04 NOTE — PROGRESS NOTES
"  HEMATOLOGY-ONCOLOGY PROGRESS NOTE    Subjective: No overnight events. He is clinically stable. No fevers or chills. No N/V, no HA, no bleeding problems.     Objective:  Medications reviewed and notable for:  Current Facility-Administered Medications   Medication Dose   • potassium chloride SA (Kdur) tablet 40 mEq  40 mEq   • magnesium sulfate IVPB premix 2 g  2 g   • diphenhydrAMINE (BENADRYL) injection 25 mg  25 mg   • acetaminophen (TYLENOL) tablet 650 mg  650 mg   • D5 1/2 NS infusion     • senna-docusate (PERICOLACE or SENOKOT S) 8.6-50 MG per tablet 2 Tab  2 Tab    And   • polyethylene glycol/lytes (MIRALAX) PACKET 1 Packet  1 Packet    And   • magnesium hydroxide (MILK OF MAGNESIA) suspension 30 mL  30 mL    And   • bisacodyl (DULCOLAX) suppository 10 mg  10 mg   • Respiratory Care per Protocol     • glucose 4 g chewable tablet 16 g  16 g    And   • dextrose 50% (D50W) injection 25 mL  25 mL   • Bosutinib 300 mg tab (Bosulif) (POM)  300 mg   • tamsulosin (FLOMAX) capsule 0.4 mg  0.4 mg   • penicillin v potassium (VEETID) tablet 500 mg  500 mg   • levetiracetam (KEPPRA) 750 mg in D5W 100 mL IVPB  750 mg   • carvedilol (COREG) tablet 12.5 mg  12.5 mg   • hydrALAZINE (APRESOLINE) injection 20 mg  20 mg   • famotidine (PEPCID) tablet 20 mg  20 mg       ROS:   Constitutional: No fatigue, no fevers or chills, no night sweats  Resp: No cough or SOB  Cardio:No chest pain or palpitations  Pschy: No depression or anxiety   Neuro: No headaches, no seizure, no vision changes  GI: no abdominal pain, nausea or vomiting. No diarrhea or constipation   All other ROS negative    Blood pressure 141/58, pulse 63, temperature 36.6 °C (97.9 °F), resp. rate 18, height 1.753 m (5' 9.02\"), weight 70.4 kg (155 lb 3.3 oz), SpO2 99 %.    ECOG PS:  General:  comfortable, NAD  HEENT:  sclera anicteric, pupils equal, round, reactive to light, oral cavity and oropharynx clear, mucous membranes moist  Neck:   supple, no " lymphadenopathy  Cor:   regular rate and rhythm, no murmurs, rubs, or gallops  Pulm:   clear to auscultation bilaterally  Abd:   bowel sounds present, soft, nontender, nondistended, no palpable masses or organomegaly  Extremities:  warm, no lower extremity edema  Neurologic:  A&O x 3  Pyschiatric:  Appropriate mood and affect    Labs reviewed and notable for:  Recent Labs      04/03/17   1135  04/03/17   2035  04/03/17   2345   WBC  1.1*  1.0*  1.0*   RBC  2.39*  2.73*  2.69*   HEMOGLOBIN  7.6*  8.4*  8.4*   HEMATOCRIT  22.4*  24.8*  24.3*   MCV  93.7  90.8  90.3   MCH  31.8  30.8  31.2   MCHC  33.9  33.9  34.6   RDW  62.2*  57.5*  57.1*   PLATELETCT  38*  33*  35*   MPV  9.8  10.5  12.1         .@CMP  Recent Results (from the past 24 hour(s))   COMP METABOLIC PANEL    Collection Time: 04/03/17  7:30 AM   Result Value Ref Range    Sodium 146 (H) 135 - 145 mmol/L    Potassium 3.2 (L) 3.6 - 5.5 mmol/L    Chloride 118 (H) 96 - 112 mmol/L    Co2 23 20 - 33 mmol/L    Anion Gap 5.0 0.0 - 11.9    Glucose 119 (H) 65 - 99 mg/dL    Bun 30 (H) 8 - 22 mg/dL    Creatinine 1.12 0.50 - 1.40 mg/dL    Calcium 9.0 8.5 - 10.5 mg/dL    AST(SGOT) 17 12 - 45 U/L    ALT(SGPT) 33 2 - 50 U/L    Alkaline Phosphatase 134 (H) 30 - 99 U/L    Total Bilirubin 0.7 0.1 - 1.5 mg/dL    Albumin 2.7 (L) 3.2 - 4.9 g/dL    Total Protein 6.1 6.0 - 8.2 g/dL    Globulin 3.4 1.9 - 3.5 g/dL    A-G Ratio 0.8 g/dL   ESTIMATED GFR    Collection Time: 04/03/17  7:30 AM   Result Value Ref Range    GFR If African American >60 >60 mL/min/1.73 m 2    GFR If Non African American >60 >60 mL/min/1.73 m 2   CBC WITH DIFFERENTIAL    Collection Time: 04/03/17 11:35 AM   Result Value Ref Range    WBC 1.1 (LL) 4.8 - 10.8 K/uL    RBC 2.39 (L) 4.70 - 6.10 M/uL    Hemoglobin 7.6 (L) 14.0 - 18.0 g/dL    Hematocrit 22.4 (L) 42.0 - 52.0 %    MCV 93.7 81.4 - 97.8 fL    MCH 31.8 27.0 - 33.0 pg    MCHC 33.9 33.7 - 35.3 g/dL    RDW 62.2 (H) 35.9 - 50.0 fL    Platelet Count 38 (LL) 164  - 446 K/uL    MPV 9.8 9.0 - 12.9 fL    Nucleated RBC 0.00 /100 WBC    NRBC (Absolute) 0.00 K/uL    Neutrophils-Polys 44.20 44.00 - 72.00 %    Lymphocytes 26.50 22.00 - 41.00 %    Monocytes 13.30 0.00 - 13.40 %    Eosinophils 8.90 (H) 0.00 - 6.90 %    Basophils 0.00 0.00 - 1.80 %    Neutrophils (Absolute) 0.53 (L) 1.82 - 7.42 K/uL    Lymphs (Absolute) 0.29 (L) 1.00 - 4.80 K/uL    Monos (Absolute) 0.15 0.00 - 0.85 K/uL    Eos (Absolute) 0.10 0.00 - 0.51 K/uL    Baso (Absolute) 0.00 0.00 - 0.12 K/uL    Anisocytosis 1+     Macrocytosis 1+     Microcytosis 1+    DIFFERENTIAL MANUAL    Collection Time: 04/03/17 11:35 AM   Result Value Ref Range    Bands-Stabs 4.40 0.00 - 10.00 %    Myelocytes 0.90 %    Blasts 1.80 %    Manual Diff Status PERFORMED    PERIPHERAL SMEAR REVIEW    Collection Time: 04/03/17 11:35 AM   Result Value Ref Range    Peripheral Smear Review see below    PLATELET ESTIMATE    Collection Time: 04/03/17 11:35 AM   Result Value Ref Range    Plt Estimation Marked Decrease    MORPHOLOGY    Collection Time: 04/03/17 11:35 AM   Result Value Ref Range    RBC Morphology Present     Polychromia 1+    IMMATURE PLT FRACTION    Collection Time: 04/03/17 11:35 AM   Result Value Ref Range    Imm. Plt Fraction 1.8 0.6 - 13.1 K/uL   BASIC METABOLIC PANEL    Collection Time: 04/03/17  8:35 PM   Result Value Ref Range    Sodium 142 135 - 145 mmol/L    Potassium 3.5 (L) 3.6 - 5.5 mmol/L    Chloride 117 (H) 96 - 112 mmol/L    Co2 21 20 - 33 mmol/L    Glucose 120 (H) 65 - 99 mg/dL    Bun 24 (H) 8 - 22 mg/dL    Creatinine 1.06 0.50 - 1.40 mg/dL    Calcium 8.6 8.5 - 10.5 mg/dL    Anion Gap 4.0 0.0 - 11.9   CBC WITH DIFFERENTIAL    Collection Time: 04/03/17  8:35 PM   Result Value Ref Range    WBC 1.0 (LL) 4.8 - 10.8 K/uL    RBC 2.73 (L) 4.70 - 6.10 M/uL    Hemoglobin 8.4 (L) 14.0 - 18.0 g/dL    Hematocrit 24.8 (L) 42.0 - 52.0 %    MCV 90.8 81.4 - 97.8 fL    MCH 30.8 27.0 - 33.0 pg    MCHC 33.9 33.7 - 35.3 g/dL    RDW 57.5 (H)  35.9 - 50.0 fL    Platelet Count 33 (LL) 164 - 446 K/uL    MPV 10.5 9.0 - 12.9 fL    Nucleated RBC 0.00 /100 WBC    NRBC (Absolute) 0.00 K/uL    Neutrophils-Polys 44.80 44.00 - 72.00 %    Lymphocytes 37.90 22.00 - 41.00 %    Monocytes 3.40 0.00 - 13.40 %    Eosinophils 5.70 0.00 - 6.90 %    Basophils 1.20 0.00 - 1.80 %    Neutrophils (Absolute) 0.45 (LL) 1.82 - 7.42 K/uL    Lymphs (Absolute) 0.38 (L) 1.00 - 4.80 K/uL    Monos (Absolute) 0.03 0.00 - 0.85 K/uL    Eos (Absolute) 0.06 0.00 - 0.51 K/uL    Baso (Absolute) 0.01 0.00 - 0.12 K/uL    Anisocytosis 1+     Macrocytosis 1+     Microcytosis 1+    ESTIMATED GFR    Collection Time: 04/03/17  8:35 PM   Result Value Ref Range    GFR If African American >60 >60 mL/min/1.73 m 2    GFR If Non African American >60 >60 mL/min/1.73 m 2   DIFFERENTIAL MANUAL    Collection Time: 04/03/17  8:35 PM   Result Value Ref Range    Metamyelocytes 1.20 %    Myelocytes 1.20 %    Blasts 4.60 %    Manual Diff Status PERFORMED    PERIPHERAL SMEAR REVIEW    Collection Time: 04/03/17  8:35 PM   Result Value Ref Range    Peripheral Smear Review see below    PLATELET ESTIMATE    Collection Time: 04/03/17  8:35 PM   Result Value Ref Range    Plt Estimation Marked Decrease    MORPHOLOGY    Collection Time: 04/03/17  8:35 PM   Result Value Ref Range    RBC Morphology Present    IMMATURE PLT FRACTION    Collection Time: 04/03/17  8:35 PM   Result Value Ref Range    Imm. Plt Fraction 1.7 0.6 - 13.1 K/uL   CBC with Differential    Collection Time: 04/03/17 11:45 PM   Result Value Ref Range    WBC 1.0 (LL) 4.8 - 10.8 K/uL    RBC 2.69 (L) 4.70 - 6.10 M/uL    Hemoglobin 8.4 (L) 14.0 - 18.0 g/dL    Hematocrit 24.3 (L) 42.0 - 52.0 %    MCV 90.3 81.4 - 97.8 fL    MCH 31.2 27.0 - 33.0 pg    MCHC 34.6 33.7 - 35.3 g/dL    RDW 57.1 (H) 35.9 - 50.0 fL    Platelet Count 35 (LL) 164 - 446 K/uL    MPV 12.1 9.0 - 12.9 fL    Nucleated RBC 0.00 /100 WBC    NRBC (Absolute) 0.00 K/uL    Neutrophils-Polys 33.00 (L)  44.00 - 72.00 %    Lymphocytes 40.20 22.00 - 41.00 %    Monocytes 12.50 0.00 - 13.40 %    Eosinophils 7.10 (H) 0.00 - 6.90 %    Basophils 0.90 0.00 - 1.80 %    Neutrophils (Absolute) 0.36 (LL) 1.82 - 7.42 K/uL    Lymphs (Absolute) 0.40 (L) 1.00 - 4.80 K/uL    Monos (Absolute) 0.13 0.00 - 0.85 K/uL    Eos (Absolute) 0.07 0.00 - 0.51 K/uL    Baso (Absolute) 0.01 0.00 - 0.12 K/uL    Anisocytosis 1+    COMP METABOLIC PANEL    Collection Time: 04/03/17 11:45 PM   Result Value Ref Range    Sodium 142 135 - 145 mmol/L    Potassium 3.4 (L) 3.6 - 5.5 mmol/L    Chloride 118 (H) 96 - 112 mmol/L    Co2 20 20 - 33 mmol/L    Anion Gap 4.0 0.0 - 11.9    Glucose 119 (H) 65 - 99 mg/dL    Bun 23 (H) 8 - 22 mg/dL    Creatinine 1.13 0.50 - 1.40 mg/dL    Calcium 8.4 (L) 8.5 - 10.5 mg/dL    AST(SGOT) 18 12 - 45 U/L    ALT(SGPT) 33 2 - 50 U/L    Alkaline Phosphatase 147 (H) 30 - 99 U/L    Total Bilirubin 0.7 0.1 - 1.5 mg/dL    Albumin 2.7 (L) 3.2 - 4.9 g/dL    Total Protein 6.0 6.0 - 8.2 g/dL    Globulin 3.3 1.9 - 3.5 g/dL    A-G Ratio 0.8 g/dL   PHOSPHORUS    Collection Time: 04/03/17 11:45 PM   Result Value Ref Range    Phosphorus 2.8 2.5 - 4.5 mg/dL   MAGNESIUM    Collection Time: 04/03/17 11:45 PM   Result Value Ref Range    Magnesium 1.7 1.5 - 2.5 mg/dL   ESTIMATED GFR    Collection Time: 04/03/17 11:45 PM   Result Value Ref Range    GFR If African American >60 >60 mL/min/1.73 m 2    GFR If Non African American >60 >60 mL/min/1.73 m 2   DIFFERENTIAL MANUAL    Collection Time: 04/03/17 11:45 PM   Result Value Ref Range    Bands-Stabs 2.70 0.00 - 10.00 %    Metamyelocytes 0.90 %    Progranulocytes 1.80 %    Blasts 0.90 %    Manual Diff Status PERFORMED    PERIPHERAL SMEAR REVIEW    Collection Time: 04/03/17 11:45 PM   Result Value Ref Range    Peripheral Smear Review see below    PLATELET ESTIMATE    Collection Time: 04/03/17 11:45 PM   Result Value Ref Range    Plt Estimation Marked Decrease    MORPHOLOGY    Collection Time: 04/03/17  11:45 PM   Result Value Ref Range    RBC Morphology Present     Reactive Lymphocytes Few    IMMATURE PLT FRACTION    Collection Time: 04/03/17 11:45 PM   Result Value Ref Range    Imm. 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 Planned transplant at [facility].    - Cytopenias due to # 1: Tranfusion dependant- orders to keep PLT > 10,000, HGB > 8.0.   - Hypercalcemia normalized calcium levels    - MS changes improved  - Subdural bleed stable on recent CT scan 3/30/17 .    Plan: Reviewed lab work   Stable counts at baseline on Bosutinib.   OK to discharge him , follow up with Dr. Vu office on Thursday for counts  Evaluation at [facility] transplant center     We will continue to follow with you; please call with any questions, [phone].      Ladonna Corado MD  [facility]   [phone]

## 2017-04-04 NOTE — CARE PLAN
Problem: Safety  Goal: Will remain free from injury  Outcome: PROGRESSING AS EXPECTED  Treaded slipper socks on patient, bed alarm in place. Hourly rounding and offering bathroom.

## 2017-04-04 NOTE — PROGRESS NOTES
[facility] [place] INTERNAL MEDICINE ATTENDING NOTE:      Date & Time note created:   4/3/2017   5:14 PM     Visit Time:   Attending/resident bedside rounds 9-11:30 AM     The patient was evaluated with the resident staff.  I reviewed the resident's note and agree with the resident's findings and plan as documented in the resident's note except as documented in the attending note. Please reference resident daily note for complete information.The chart was reviewed and summarized.  Available labs, imaging, O2 sats, EKGs were reviewed. Available nursing, consultant, and resident notes were reviewed. I am actively involved in the patient's care.                                                                BRIEF DISCUSSION:                                                         4/2: During AM rounds patient oriented*2, following commands, awake and alert. Was on restraints. No apparent bleeding.   Nursing communication done for Mahajan removal, change of IVF, Restrain removal and bedside swallow evaluation.  4/3: MS looks better than yesterday. Electrolytes still not corrected. His oral mucosa looks dry as well. Continue IVF, replace free water orally.  // AMS, metabolic encephalopathy: Seems like has gotten better on what I read from notes. Has multiple neurological co morbidities: Seizure disorder, SDH. This episode is suspected to be secondary to metabolic causes: Dehydration and hypercalcemia.   No suspicion for infection as of yet. UA was ok, CXR ok, No fever.     // Hypercalcemia: PTH 6.8, Vit D 22, Likely related to malignancy. Await PTHrP. May need 24 hour Urinary Ca to r/o Familial Hypocalcuric  Hypercalcemia. Review Family history.   Continue with IVF therapy. Received Calcitonin and Pamidronate.     // NATY, Hypernatremia, Hyperchloremia: Changed IVF to 0.45 NS. Continue to monitor Na and Cl. Add free water to his diet.    // CML likely with blast crisis, Anemia, thrombocytopenia and leucopenia: Continue to follow  Hem/Onc recs.     // Valvular Cardiomyopathy, PHTN: No evidence of HF at this time. Cont BB. Monitor fluid status. Outpatient cards follow up.     Echo 3/20: Left ventricular ejection fraction is >70%.  Grade II diastolic dysfunction.Mild-moderate mixed calcific aortic valve disease.  Mean gradient 14 mmHg.Calcific mitral disease with resultant moderate stenosis and moderate regurgitation.Right ventricular systolic pressure is estimated to be 50-55 mmHg.  Normal estimated right atrial pressure.No prior study is available for comparison.     // HTN: BP ok.  // Seizure Disorder: Continue Keppra.  ----------------------------------------------------------------------------------------------------------------------  Filed Vitals:    04/03/17 1200 04/03/17 1600 04/03/17 1630 04/03/17 1700   BP: 148/58 138/58 154/61 145/70   Pulse: 68 66 70 72   Temp: 36.1 °C (97 °F) 37.1 °C (98.7 °F) 36.8 °C (98.3 °F) 36.8 °C (98.2 °F)   Resp: 18 18 16 20   Height:       Weight:       SpO2: 95% 98% 100% 92%     Weight/BMI: Body mass index is 22.91 kg/(m^2).  Pulse Oximetry: 92 %, O2 (LPM): 0, O2 Delivery: None (Room Air)    Intake/Output Summary (Last 24 hours) at 04/02/17 1403  Last data filed at 04/02/17 0600   Gross per 24 hour   Intake   2580 ml   Output   1850 ml   Net    730 ml       Bianca Chung MD   [facility]

## 2017-04-04 NOTE — THERAPY
"Occupational Therapy Evaluation completed.   Functional Status:  Up in chair, SBA sit>stand walking in room w/fww to bathroom SBA w/toilet txf, close SBA w/standing at sink for grooming, txf back to chair, had to use bathroom 2nd time then BTB, w/SBA, min A w/LB dressing daughter reports she will assist in the am as needed   Plan of Care: Will benefit from Occupational Therapy 2 times per week  Discharge Recommendations:  Equipment: Front-Wheel Walker and Will Continue to Assess for Equipment Needs. Post-acute therapy Discharge to home with outpatient or home health for additional skilled therapy services.    71 yr old male admitted d/t fall at home w/confusion, pt recently had dental procedure and was on new pain medication just prior to fall, pt has a hx of chronic myeloid leukemia and chronic thrombocytopenia with chronic dental abscesses, pt is typically home alone during day and independent w/basic daily activities daughter, son-in-law and grandchildren assist w/IADL's pt is demonstrating decreased balance and decreased activity tolerance, pt has a narrow AZUCENA concerning for risk of reoccurring fall pt demonstrated improved balance w/use of fww and daughter reports family can assist as needed may benefit from 1-2 more tx during hospital course to address above issues recommend post acute after d/c home     See \"Rehab Therapy-Acute\" Patient Summary Report for complete documentation.    "

## 2017-04-04 NOTE — CARE PLAN
Problem: Mobility  Goal: Risk for activity intolerance will decrease  Outcome: PROGRESSING AS EXPECTED  Ambulated halls with one person assist.

## 2017-04-04 NOTE — ASSESSMENT & PLAN NOTE
ecently diagnosed during the last hospital stay    EEG encehpalopathy   continue Keppra per neuro

## 2017-04-04 NOTE — ASSESSMENT & PLAN NOTE
On CT: 3/30/17: 1.  Stable, nearly isodense right holohemispheric subdural fluid collection.  2.  No new abnormalities.  - patient has mild AMS  - monitoring, with seizure, fall and aspiration precautions.   - also on Keppra.

## 2017-04-04 NOTE — ASSESSMENT & PLAN NOTE
Per oncology recs to maintain Hb level above 8  - patient received 1 unit of PRBC irradiated cmv negative (4/2/17)  - hb today: 7.6, will order 1 unit  Irradiated, cmv negative.   - monitor

## 2017-04-04 NOTE — PROGRESS NOTES
Karissa from Lab called with critical result of WBC at 1.0 and Platelets at 35. Critical lab result read back to Karissa.   This critical lab result is within parameters established by [facility] policy for this patient

## 2017-04-04 NOTE — PROGRESS NOTES
Assumed care of patient @ 1900. Bedside report received. Patient AO x3. VSS,  Pain 0/10. Fall precautions in place, PIV infusing. POC discussed with patient, all questions answered, call light within reach, hourly rounding in place.

## 2017-04-04 NOTE — PROGRESS NOTES
[facility] [place] [facility] Interval [place]    Name Benjamin Post       1945   Age/Sex 71 y.o. male   MRN [id number]   Code Status Full     After 5PM or if no immediate response to page, please call for cross-coverage  Attending/Team: Dr. Chung Call [phone] to page   1st Call - Day Intern (R1):   Brittany 2nd Call - Day Sr. Resident (R2/R3):   Elie         Chief complaint/ reason for interval visit (Primary Diagnosis)   Altered mental status  Hypercalcemia    Interval Problem Daily Status Update      Patient mentation is improving, able to answer most questions by the Latvian speaking RN who has taken care for patient in the past. According to her patient near baseline mentation.   Monitoring CMP for NS correction.   Advanced diet to full liquid, consider solid food later today if tolerates.   Able to ambulate in the hallway with 1 person assist  Aim for DC in AM.   Active Hospital Problems    Diagnosis   • Neutropenia (CMS-HCC) [D70.9]   • Disorientation [R41.0]   • Hypercalcemia [E83.52]   • Thrombocytopenia (CMS-HCC) [D69.6]   • Fall from ground level [W18.30XA]   • Essential hypertension [I10]   • Hypokalemia [E87.6]   • Pancytopenia (CMS-HCC) [D61.818]   • Leukemia (CMS-HCC) [C95.90]   • Subdural hematoma, acute (CMS-HCC) [I62.01]   • Altered mental status [R41.82]   • Seizure disorder (CMS-HCC) [G40.909]   • Dental abscess [K04.7]   • Anemia [D64.9]   • Hypernatremia [E87.0]       Review of Systems   Constitutional:        Patient denies fever, chills, chest pain, abdominal pain, n/v, d/c, sob or dysuria according to .        Consultants/Specialty  Oncology    Disposition  Inpatient being treated for Altered Mental status and hypercalcemia which has resolved.     Quality Measures  EKG reviewed, Labs reviewed, Medications reviewed and Radiology images reviewed          DVT prophylaxis - mechanical: SCDs                Physical Exam       Filed Vitals:    17  1945 04/04/17 0000 04/04/17 0400 04/04/17 0800   BP: 131/68 133/58 141/58 125/67   Pulse: 61 63 63 69   Temp: 36.7 °C (98.1 °F) 36.6 °C (97.8 °F) 36.6 °C (97.9 °F) 36.3 °C (97.4 °F)   Resp: 18 18 18 18   Height:       Weight:       SpO2: 100% 98% 99% 98%     Body mass index is 22.91 kg/(m^2).    Oxygen Therapy:  Pulse Oximetry: 98 %, O2 (LPM): 0, O2 Delivery: None (Room Air)    Physical Exam   Constitutional: No distress.   HENT:   Head: Normocephalic and atraumatic.   Eyes: Pupils are equal, round, and reactive to light.   Neck: Normal range of motion.   Cardiovascular: Normal rate and regular rhythm.    Pulmonary/Chest: Effort normal.   Abdominal: Soft. There is no tenderness.   Neurological:   Confused, alert to self, but not to place or time.          Lab Data Review:      4/3/2017  5:14 PM    Recent Labs      04/03/17 0730 04/03/17 2035 04/03/17   2345   SODIUM  146*  142  142   POTASSIUM  3.2*  3.5*  3.4*   CHLORIDE  118*  117*  118*   CO2  23  21  20   BUN  30*  24*  23*   CREATININE  1.12  1.06  1.13   MAGNESIUM   --    --   1.7   PHOSPHORUS   --    --   2.8   CALCIUM  9.0  8.6  8.4*       Recent Labs      04/02/17   0508  04/03/17 0730 04/03/17 2035 04/03/17   2345   ALTSGPT  37  33   --   33   ASTSGOT  21  17   --   18   ALKPHOSPHAT  132*  134*   --   147*   TBILIRUBIN  0.8  0.7   --   0.7   GLUCOSE  108*  119*  120*  119*       Recent Labs      04/03/17   1135  04/03/17 2035 04/03/17   2345   RBC  2.39*  2.73*  2.69*   HEMOGLOBIN  7.6*  8.4*  8.4*   HEMATOCRIT  22.4*  24.8*  24.3*   PLATELETCT  38*  33*  35*       Recent Labs      04/02/17   0508   04/03/17 0730 04/03/17   1135  04/03/17 2035  04/03/17   2345   WBC  1.2*   < >   --   1.1*  1.0*  1.0*   NEUTSPOLYS  57.70   < >   --   44.20  44.80  33.00*   LYMPHOCYTES  19.80*   < >   --   26.50  37.90  40.20   MONOCYTES  5.40   < >   --   13.30  3.40  12.50   EOSINOPHILS  4.50   < >   --   8.90*  5.70  7.10*   BASOPHILS  0.90   < >   --    0.00  1.20  0.90   ASTSGOT  21   --   17   --    --   18   ALTSGPT  37   --   33   --    --   33   ALKPHOSPHAT  132*   --   134*   --    --   147*   TBILIRUBIN  0.8   --   0.7   --    --   0.7    < > = values in this interval not displayed.           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   - Watch for any spontaneous  Intracranial bleed    - per oncology patient is okay to be discharged.   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     Leukemia (CMS-HCC)  Assessment & Plan  - Dr Vu is the oncolgist , appreciate his recs    - continue Bosutinib  - patient is to undergo BMT in [facility] in late April   - per oncology pt is ok to dc.       Hypokalemia  Assessment & Plan  Replete PRN    Essential hypertension  Assessment & Plan  Continue carvedilol 12.5 bid  Prn hydralazine.     Altered mental status  Assessment & Plan  Assessment:  - secondary to dehydration + hypercalcemia + NATY; patient also had a fall at home  - CT head: 1.   Holohemispheric right chronic subdural collection is similar in size to the prior exam. No significant mass effect or midline shift.                    2.  Periventricular white matter changes are consistent with chronic small vessel disease.  In ICU: - Passed bedside swallow eval and started clear liquid diet    Plan:  - Mentation is near baseline. Per Nurse who has taken care of him in the past, patient was able to answer most of the questions to assess his orientation and also knew that he has leukemia and he is to receive [facility]m transplant soon in [facility].   - diet changed to full liquid and will be advanced to solid food as tolerated by the patient  - patient was able to walk the raygoza way with 1 person assist, PT eval ordered and is pending  - given the progress, will aim for DC in AM.       Seizure disorder (CMS-[facility])  Assessment & Plan  ecently diagnosed during the last hospital stay    EEG encehpalopathy   continue Keppra per neuro      Dental abscess  Assessment & Plan  s/p surgery full mouth tooth extraction with I/D of intraoral abscess, on March 25, 2017 , continue penicillin V for 6 more days , end date in 4/4/17    Anemia  Assessment & Plan  Per oncology recs to maintain Hb level above 8  - patient received 1 unit of PRBC irradiated cmv negative (4/2/17)  - hb today: 7.6, will order 1 unit  Irradiated, cmv negative.   - monitor    Hypernatremia  Assessment & Plan  Na in the range of >145  - Na today (4/3/17): 146, with chloride: 118, --> Na: 142, Cl: 118 (4/4)  - likely from severe dehydrations and NATY present on admission; BUN/Cr: 30/1.12 (4/3)->23/1.13 (4/4)  - continuing 1/2 NS@ 100  - will monitor CMP    Subdural hematoma, acute (CMS-HCC)  Assessment & Plan  On CT: 3/30/17: 1.  Stable, nearly isodense right holohemispheric subdural fluid collection.  2.  No new abnormalities.  - patient has mild AMS  - monitoring, with seizure, fall and aspiration precautions.   - also on Keppra.

## 2017-04-04 NOTE — PROGRESS NOTES
[facility] [place] [facility] Medicine Interval Note    Name Benjamin Post       1945   Age/Sex 71 y.o. male   MRN [id number]   Code Status Full     After 5PM or if no immediate response to page, please call for cross-coverage  Attending/Team: Dr. Chung Call [phone] to page   1st Call - Day Intern (R1):   Brittany 2nd Call - Day Sr. Resident (R2/R3):   Elie         Chief complaint/ reason for interval visit (Primary Diagnosis)   Altered mental status  Hypercalcemia    Interval Problem Daily Status Update      Patient mentation is improving, but he is not at his baseline after discussion with his daughter, as he was completely alert a day or 2 before he was admitted to the hospital.   Oncology is on board, given 1 units of platelets and 1 units of PRBC irradiated and CMV negative  Monitoring CMP for NS correction.   Active Hospital Problems    Diagnosis   • Neutropenia (CMS-HCC) [D70.9]   • Disorientation [R41.0]   • Hypercalcemia [E83.52]   • Thrombocytopenia (CMS-HCC) [D69.6]   • Fall from ground level [W18.30XA]   • Essential hypertension [I10]   • Hypokalemia [E87.6]   • Pancytopenia (CMS-HCC) [D61.818]   • Leukemia (CMS-HCC) [C95.90]   • Subdural hematoma, acute (CMS-HCC) [I62.01]   • Altered mental status [R41.82]   • Seizure disorder (CMS-HCC) [G40.909]   • Dental abscess [K04.7]   • Anemia [D64.9]   • Hypernatremia [E87.0]       Review of Systems   Unable to perform ROS  Constitutional: Negative.    Patient is confused still but denies any acute complaints.     Consultants/Specialty  Oncology    Disposition  Inpatient being treated for Altered Mental status and hypercalcemia which has resolved.     Quality Measures  EKG reviewed, Labs reviewed, Medications reviewed and Radiology images reviewed          DVT prophylaxis - mechanical: SCDs                Physical Exam       Filed Vitals:    17 1200 17 1600 17 1630 17 1700   BP: 148/58 138/58 154/61 145/70    Pulse: 68 66 70 72   Temp: 36.1 °C (97 °F) 37.1 °C (98.7 °F) 36.8 °C (98.3 °F) 36.8 °C (98.2 °F)   Resp: 18 18 16 20   Height:       Weight:       SpO2: 95% 98% 100% 92%     Body mass index is 22.91 kg/(m^2).    Oxygen Therapy:  Pulse Oximetry: 92 %, O2 (LPM): 0, O2 Delivery: None (Room Air)    Physical Exam   Constitutional: No distress.   HENT:   Head: Normocephalic and atraumatic.   Eyes: Pupils are equal, round, and reactive to light.   Neck: Normal range of motion.   Cardiovascular: Normal rate and regular rhythm.    Pulmonary/Chest: Effort normal.   Abdominal: Soft. There is no tenderness.   Neurological:   Confused, alert to self, but not to place or time.          Lab Data Review:      4/3/2017  5:14 PM    Recent Labs      04/01/17   0400 04/02/17   0508 04/03/17   0730   SODIUM  146*  146*  146*   POTASSIUM  3.4*  3.7  3.2*   CHLORIDE  115*  118*  118*   CO2  27  25  23   BUN  35*  36*  30*   CREATININE  1.33  1.17  1.12   MAGNESIUM  1.8   --    --    PHOSPHORUS  2.2*   --    --    CALCIUM  10.8*  9.7  9.0       Recent Labs      04/01/17   0400 04/02/17   0508 04/03/17   0730   ALTSGPT  37  37  33   ASTSGOT  19  21  17   ALKPHOSPHAT  123*  132*  134*   TBILIRUBIN  1.0  0.8  0.7   GLUCOSE  113*  108*  119*       Recent Labs      04/02/17   0508  04/02/17   2359 04/03/17   1135   RBC  2.32*  2.66*  2.39*   HEMOGLOBIN  7.5*  8.5*  7.6*   HEMATOCRIT  22.4*  25.3*  22.4*   PLATELETCT  14*  9*  38*       Recent Labs      04/01/17   0400  04/02/17   0508  04/02/17   2359 04/03/17   0730  04/03/17   1135   WBC  1.6*  1.2*  1.3*   --   1.1*   NEUTSPOLYS  70.50  57.70  53.60   --   44.20   LYMPHOCYTES  13.40*  19.80*  30.30   --   26.50   MONOCYTES  3.60  5.40  8.00   --   13.30   EOSINOPHILS  2.70  4.50  5.40   --   8.90*   BASOPHILS  0.00  0.90  0.90   --   0.00   ASTSGOT  19  21   --   17   --    ALTSGPT  37  37   --   33   --    ALKPHOSPHAT  123*  132*   --   134*   --    TBILIRUBIN  1.0  0.8   --   0.7    --            Assessment/Plan     Thrombocytopenia (CMS-HCC)  Assessment & Plan  Hx of chronic thrombocytopenia  - 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   - Watch for any spontaneous  Intracranial bleed    - will monitor    Hypercalcemia  Assessment & Plan  ICU course:   - Ca 15.2, ionised calcium 2.1 on admission : down to  9.7/1.4 , corrected ca 10.7    - PTH : 6.8 appropriate response    - patient has h/o CML which can cause hypercalcemia of malignancy    - PTHrP pending, Vit D 25  Hydroxy 23 and SPEP - PTH was low which is an appropriate response  - calcitonin for 2 days due to tachyphylaxis and pamidronate received in ED  - Oncology Dr Vu following    Course on floor:  - Ca 4/3/17: 9  - on IV fluids 1/2 NS due to hypernatremia.   - mentation is slowly improving, oncology is following.       Neutropenia (CMS-HCC)  Assessment & Plan  - no fever    - Precaution isolation due to ANC <1000  - UA negative for infection   - oncology aware of Neutropenia.     Leukemia (CMS-HCC)  Assessment & Plan  - Dr Vu is the oncolgist , appreciate his recs    - continue Bosutinib  - patient is to undergo BMT in [facility] in late April       Hypokalemia  Assessment & Plan  Replete PRN    Essential hypertension  Assessment & Plan  Continue carvedilol 12.5 bid  Prn hydralazine.     Altered mental status  Assessment & Plan  Assessment:  - secondary to dehydration + hypercalcemia + NATY; patient also had a fall at home  - CT head: 1.  Holohemispheric right chronic subdural collection is similar in size to the prior exam. No significant mass effect or midline shift.                    2.  Periventricular white matter changes are consistent with chronic small vessel disease.  In ICU: - Passed bedside swallow eval and started clear liquid  diet    Plan:  - Mentation improving  - however, still mild confusion, unable to name the city or where he is; Called daughter to find out his baseline mental function, and according to her is was active, and had good memory and was completely aware of the surroundings  - Fall, aspiration, seizure precuation and q4 neuro checks  - will continue to monitor        Seizure disorder (CMS-[facility])  Assessment & Plan  ecently diagnosed during the last hospital stay    EEG encehpalopathy   continue Keppra per neuro      Dental abscess  Assessment & Plan  s/p surgery full mouth tooth extraction with I/D of intraoral abscess, on March 25, 2017 , continue penicillin V for 6 more days , end date in 4/4/17    Anemia  Assessment & Plan  Per oncology recs to maintain Hb level above 8  - patient received 1 unit of PRBC irradiated cmv negative (4/2/17)  - hb today: 7.6, will order 1 unit  Irradiated, cmv negative.   - monitor    Hypernatremia  Assessment & Plan  Na in the range of >145  - Na today (4/3/17): 146, with chloride: 118,   - likely from severe dehydrations and NATY present on admission; BUN/Cr: 30/1.12, improving  - continuing 1/2 NS@ 100  - will monitor CMP    Subdural hematoma, acute (CMS-[facility])  Assessment & Plan  On CT: 3/30/17: 1.  Stable, nearly isodense right holohemispheric subdural fluid collection.  2.  No new abnormalities.  - patient has mild AMS  - monitoring, with seizure, fall and aspiration precautions.   - also on Keppra.

## 2017-04-05 VITALS
TEMPERATURE: 97 F | BODY MASS INDEX: 22.99 KG/M2 | SYSTOLIC BLOOD PRESSURE: 113 MMHG | WEIGHT: 155.2 LBS | HEIGHT: 69 IN | HEART RATE: 70 BPM | OXYGEN SATURATION: 98 % | DIASTOLIC BLOOD PRESSURE: 59 MMHG | RESPIRATION RATE: 18 BRPM

## 2017-04-05 LAB
ALBUMIN SERPL BCP-MCNC: 2.7 G/DL (ref 3.2–4.9)
ALBUMIN/GLOB SERPL: 0.8 G/DL
ALP SERPL-CCNC: 193 U/L (ref 30–99)
ALT SERPL-CCNC: 38 U/L (ref 2–50)
ANION GAP SERPL CALC-SCNC: 5 MMOL/L (ref 0–11.9)
ANISOCYTOSIS BLD QL SMEAR: ABNORMAL
AST SERPL-CCNC: 20 U/L (ref 12–45)
BASOPHILS # BLD AUTO: 0.9 % (ref 0–1.8)
BASOPHILS # BLD AUTO: 3.5 % (ref 0–1.8)
BASOPHILS # BLD: 0.01 K/UL (ref 0–0.12)
BASOPHILS # BLD: 0.04 K/UL (ref 0–0.12)
BILIRUB SERPL-MCNC: 0.8 MG/DL (ref 0.1–1.5)
BLASTS NFR BLD MANUAL: 2.6 %
BLASTS NFR BLD MANUAL: 2.6 %
BUN SERPL-MCNC: 17 MG/DL (ref 8–22)
CALCIUM SERPL-MCNC: 8.4 MG/DL (ref 8.5–10.5)
CHLORIDE SERPL-SCNC: 112 MMOL/L (ref 96–112)
CO2 SERPL-SCNC: 19 MMOL/L (ref 20–33)
CREAT SERPL-MCNC: 1.08 MG/DL (ref 0.5–1.4)
EOSINOPHIL # BLD AUTO: 0.05 K/UL (ref 0–0.51)
EOSINOPHIL # BLD AUTO: 0.08 K/UL (ref 0–0.51)
EOSINOPHIL NFR BLD: 5.3 % (ref 0–6.9)
EOSINOPHIL NFR BLD: 7.8 % (ref 0–6.9)
ERYTHROCYTE [DISTWIDTH] IN BLOOD BY AUTOMATED COUNT: 56.9 FL (ref 35.9–50)
ERYTHROCYTE [DISTWIDTH] IN BLOOD BY AUTOMATED COUNT: 57.8 FL (ref 35.9–50)
GFR SERPL CREATININE-BSD FRML MDRD: >60 ML/MIN/1.73 M 2
GLOBULIN SER CALC-MCNC: 3.5 G/DL (ref 1.9–3.5)
GLUCOSE SERPL-MCNC: 117 MG/DL (ref 65–99)
HCT VFR BLD AUTO: 22.8 % (ref 42–52)
HCT VFR BLD AUTO: 23.5 % (ref 42–52)
HGB BLD-MCNC: 7.8 G/DL (ref 14–18)
HGB BLD-MCNC: 8.1 G/DL (ref 14–18)
LYMPHOCYTES # BLD AUTO: 0.35 K/UL (ref 1–4.8)
LYMPHOCYTES # BLD AUTO: 0.43 K/UL (ref 1–4.8)
LYMPHOCYTES NFR BLD: 35.4 % (ref 22–41)
LYMPHOCYTES NFR BLD: 42.6 % (ref 22–41)
MANUAL DIFF BLD: NORMAL
MANUAL DIFF BLD: NORMAL
MCH RBC QN AUTO: 30.7 PG (ref 27–33)
MCH RBC QN AUTO: 31.2 PG (ref 27–33)
MCHC RBC AUTO-ENTMCNC: 34.2 G/DL (ref 33.7–35.3)
MCHC RBC AUTO-ENTMCNC: 34.5 G/DL (ref 33.7–35.3)
MCV RBC AUTO: 89.8 FL (ref 81.4–97.8)
MCV RBC AUTO: 90.4 FL (ref 81.4–97.8)
METAMYELOCYTES NFR BLD MANUAL: 0.9 %
MICROCYTES BLD QL SMEAR: ABNORMAL
MONOCYTES # BLD AUTO: 0.04 K/UL (ref 0–0.85)
MONOCYTES # BLD AUTO: 0.12 K/UL (ref 0–0.85)
MONOCYTES NFR BLD AUTO: 11.5 % (ref 0–13.4)
MONOCYTES NFR BLD AUTO: 3.5 % (ref 0–13.4)
MORPHOLOGY BLD-IMP: NORMAL
MORPHOLOGY BLD-IMP: NORMAL
MYELOCYTES NFR BLD MANUAL: 0.9 %
MYELOCYTES NFR BLD MANUAL: 0.9 %
NEUTROPHILS # BLD AUTO: 0.38 K/UL (ref 1.82–7.42)
NEUTROPHILS # BLD AUTO: 0.43 K/UL (ref 1.82–7.42)
NEUTROPHILS NFR BLD: 38.2 % (ref 44–72)
NEUTROPHILS NFR BLD: 43.4 % (ref 44–72)
NRBC # BLD AUTO: 0 K/UL
NRBC # BLD AUTO: 0 K/UL
NRBC BLD AUTO-RTO: 0 /100 WBC
NRBC BLD AUTO-RTO: 0 /100 WBC
PLATELET # BLD AUTO: 23 K/UL (ref 164–446)
PLATELET # BLD AUTO: 25 K/UL (ref 164–446)
PLATELET BLD QL SMEAR: NORMAL
PLATELET BLD QL SMEAR: NORMAL
PLATELETS.RETICULATED NFR BLD AUTO: 1.6 K/UL (ref 0.6–13.1)
PLATELETS.RETICULATED NFR BLD AUTO: 2.4 K/UL (ref 0.6–13.1)
PMV BLD AUTO: 10.3 FL (ref 9–12.9)
PMV BLD AUTO: 11.8 FL (ref 9–12.9)
POTASSIUM SERPL-SCNC: 3.5 MMOL/L (ref 3.6–5.5)
PROT SERPL-MCNC: 6.2 G/DL (ref 6–8.2)
PTH RELATED PROT SERPL-SCNC: 5.9 PMOL/L (ref 0–2.3)
RBC # BLD AUTO: 2.54 M/UL (ref 4.7–6.1)
RBC # BLD AUTO: 2.6 M/UL (ref 4.7–6.1)
RBC BLD AUTO: NORMAL
RBC BLD AUTO: PRESENT
SODIUM SERPL-SCNC: 136 MMOL/L (ref 135–145)
WBC # BLD AUTO: 1 K/UL (ref 4.8–10.8)
WBC # BLD AUTO: 1 K/UL (ref 4.8–10.8)

## 2017-04-05 PROCEDURE — A9270 NON-COVERED ITEM OR SERVICE: HCPCS | Performed by: HOSPITALIST

## 2017-04-05 PROCEDURE — 700102 HCHG RX REV CODE 250 W/ 637 OVERRIDE(OP): Performed by: INTERNAL MEDICINE

## 2017-04-05 PROCEDURE — 85007 BL SMEAR W/DIFF WBC COUNT: CPT

## 2017-04-05 PROCEDURE — G8979 MOBILITY GOAL STATUS: HCPCS | Mod: CI

## 2017-04-05 PROCEDURE — G8978 MOBILITY CURRENT STATUS: HCPCS | Mod: CI

## 2017-04-05 PROCEDURE — 36415 COLL VENOUS BLD VENIPUNCTURE: CPT

## 2017-04-05 PROCEDURE — 97162 PT EVAL MOD COMPLEX 30 MIN: CPT

## 2017-04-05 PROCEDURE — A9270 NON-COVERED ITEM OR SERVICE: HCPCS | Performed by: INTERNAL MEDICINE

## 2017-04-05 PROCEDURE — G8980 MOBILITY D/C STATUS: HCPCS | Mod: CI

## 2017-04-05 PROCEDURE — 85055 RETICULATED PLATELET ASSAY: CPT

## 2017-04-05 PROCEDURE — 700111 HCHG RX REV CODE 636 W/ 250 OVERRIDE (IP): Performed by: INTERNAL MEDICINE

## 2017-04-05 PROCEDURE — 85027 COMPLETE CBC AUTOMATED: CPT

## 2017-04-05 PROCEDURE — 700102 HCHG RX REV CODE 250 W/ 637 OVERRIDE(OP): Performed by: HOSPITALIST

## 2017-04-05 PROCEDURE — 99239 HOSP IP/OBS DSCHRG MGMT >30: CPT | Mod: GC | Performed by: INTERNAL MEDICINE

## 2017-04-05 RX ORDER — POTASSIUM CHLORIDE 20 MEQ/1
40 TABLET, EXTENDED RELEASE ORAL ONCE
Status: COMPLETED | OUTPATIENT
Start: 2017-04-05 | End: 2017-04-05

## 2017-04-05 RX ADMIN — DEXTROSE MONOHYDRATE 750 MG: 50 INJECTION, SOLUTION INTRAVENOUS at 04:20

## 2017-04-05 RX ADMIN — TAMSULOSIN HYDROCHLORIDE 0.4 MG: 0.4 CAPSULE ORAL at 08:17

## 2017-04-05 RX ADMIN — FAMOTIDINE 20 MG: 20 TABLET, FILM COATED ORAL at 08:17

## 2017-04-05 RX ADMIN — POTASSIUM CHLORIDE 40 MEQ: 1500 TABLET, EXTENDED RELEASE ORAL at 08:17

## 2017-04-05 RX ADMIN — CARVEDILOL 12.5 MG: 12.5 TABLET, FILM COATED ORAL at 08:17

## 2017-04-05 ASSESSMENT — LIFESTYLE VARIABLES: EVER_SMOKED: UNABLE TO EVALUATE AT THIS TIME - NEEDS ASSESSMENT PRIOR TO DISCHARGE

## 2017-04-05 ASSESSMENT — GAIT ASSESSMENTS
DEVIATION: DECREASED BASE OF SUPPORT
DISTANCE (FEET): 150
GAIT LEVEL OF ASSIST: STAND BY ASSIST
ASSISTIVE DEVICE: FRONT WHEEL WALKER

## 2017-04-05 ASSESSMENT — PAIN SCALES - GENERAL: PAINLEVEL_OUTOF10: 0

## 2017-04-05 NOTE — PROGRESS NOTES
"  HEMATOLOGY-ONCOLOGY PROGRESS NOTE    Subjective: He had no overnight events. He denies any complaints . No bleeding. He is sitting up in chair. He denies any abdominal pain. No urinary c/o   Objective:  Medications reviewed and notable for:  Current Facility-Administered Medications   Medication Dose   • potassium chloride SA (Kdur) tablet 40 mEq  40 mEq   • loperamide (IMODIUM) capsule 2 mg  2 mg   • diphenhydrAMINE (BENADRYL) injection 25 mg  25 mg   • acetaminophen (TYLENOL) tablet 650 mg  650 mg   • senna-docusate (PERICOLACE or SENOKOT S) 8.6-50 MG per tablet 2 Tab  2 Tab    And   • polyethylene glycol/lytes (MIRALAX) PACKET 1 Packet  1 Packet    And   • magnesium hydroxide (MILK OF MAGNESIA) suspension 30 mL  30 mL    And   • bisacodyl (DULCOLAX) suppository 10 mg  10 mg   • Respiratory Care per Protocol     • glucose 4 g chewable tablet 16 g  16 g    And   • dextrose 50% (D50W) injection 25 mL  25 mL   • Bosutinib 300 mg tab (Bosulif) (POM)  300 mg   • tamsulosin (FLOMAX) capsule 0.4 mg  0.4 mg   • levetiracetam (KEPPRA) 750 mg in D5W 100 mL IVPB  750 mg   • carvedilol (COREG) tablet 12.5 mg  12.5 mg   • hydrALAZINE (APRESOLINE) injection 20 mg  20 mg   • famotidine (PEPCID) tablet 20 mg  20 mg       ROS:   Constitutional: No fatigue, no fevers or chills, no night sweats  Resp: No cough or SOB  Cardio:No chest pain or palpitations  Pschy: No depression or anxiety   Neuro: No headaches, no seizure, no vision changes  GI: no abdominal pain, nausea or vomiting. No diarrhea or constipation   All other ROS negative    Blood pressure 137/62, pulse 68, temperature 36.6 °C (97.8 °F), resp. rate 18, height 1.753 m (5' 9.02\"), weight 70.4 kg (155 lb 3.3 oz), SpO2 100 %.    ECOG PS:  General:  comfortable, NAD  HEENT:  sclera anicteric, pupils equal, round, reactive to light, oral cavity and oropharynx clear, mucous membranes moist  Neck:   supple, no lymphadenopathy  Cor:   regular rate and rhythm, no murmurs, rubs, " or gallops  Pulm:   clear to auscultation bilaterally  Abd:   bowel sounds present, soft, nontender, nondistended, no palpable masses or organomegaly  Extremities:  warm, no lower extremity edema  Neurologic:  A&O x 3  Pyschiatric:  Appropriate mood and affect    Labs reviewed and notable for:  Recent Labs      04/03/17 2035 04/03/17   2345  04/04/17   2358   WBC  1.0*  1.0*  1.0*   RBC  2.73*  2.69*  2.54*   HEMOGLOBIN  8.4*  8.4*  7.8*   HEMATOCRIT  24.8*  24.3*  22.8*   MCV  90.8  90.3  89.8   MCH  30.8  31.2  30.7   MCHC  33.9  34.6  34.2   RDW  57.5*  57.1*  57.8*   PLATELETCT  33*  35*  23*   MPV  10.5  12.1  11.8         .@CMP  Recent Results (from the past 24 hour(s))   PROSTATE SPECIFIC AG SCREENING    Collection Time: 04/04/17  2:33 PM   Result Value Ref Range    Prostatic Specific Antigen Tot 8.40 (H) 0.00 - 4.00 ng/mL   COMP METABOLIC PANEL    Collection Time: 04/04/17 11:58 PM   Result Value Ref Range    Sodium 136 135 - 145 mmol/L    Potassium 3.5 (L) 3.6 - 5.5 mmol/L    Chloride 112 96 - 112 mmol/L    Co2 19 (L) 20 - 33 mmol/L    Anion Gap 5.0 0.0 - 11.9    Glucose 117 (H) 65 - 99 mg/dL    Bun 17 8 - 22 mg/dL    Creatinine 1.08 0.50 - 1.40 mg/dL    Calcium 8.4 (L) 8.5 - 10.5 mg/dL    AST(SGOT) 20 12 - 45 U/L    ALT(SGPT) 38 2 - 50 U/L    Alkaline Phosphatase 193 (H) 30 - 99 U/L    Total Bilirubin 0.8 0.1 - 1.5 mg/dL    Albumin 2.7 (L) 3.2 - 4.9 g/dL    Total Protein 6.2 6.0 - 8.2 g/dL    Globulin 3.5 1.9 - 3.5 g/dL    A-G Ratio 0.8 g/dL   CBC WITH DIFFERENTIAL    Collection Time: 04/04/17 11:58 PM   Result Value Ref Range    WBC 1.0 (LL) 4.8 - 10.8 K/uL    RBC 2.54 (L) 4.70 - 6.10 M/uL    Hemoglobin 7.8 (L) 14.0 - 18.0 g/dL    Hematocrit 22.8 (L) 42.0 - 52.0 %    MCV 89.8 81.4 - 97.8 fL    MCH 30.7 27.0 - 33.0 pg    MCHC 34.2 33.7 - 35.3 g/dL    RDW 57.8 (H) 35.9 - 50.0 fL    Platelet Count 23 (LL) 164 - 446 K/uL    MPV 11.8 9.0 - 12.9 fL    Nucleated RBC 0.00 /100 WBC    NRBC (Absolute) 0.00 K/uL     Neutrophils-Polys 38.20 (L) 44.00 - 72.00 %    Lymphocytes 42.60 (H) 22.00 - 41.00 %    Monocytes 3.50 0.00 - 13.40 %    Eosinophils 7.80 (H) 0.00 - 6.90 %    Basophils 3.50 (H) 0.00 - 1.80 %    Neutrophils (Absolute) 0.38 (LL) 1.82 - 7.42 K/uL    Lymphs (Absolute) 0.43 (L) 1.00 - 4.80 K/uL    Monos (Absolute) 0.04 0.00 - 0.85 K/uL    Eos (Absolute) 0.08 0.00 - 0.51 K/uL    Baso (Absolute) 0.04 0.00 - 0.12 K/uL    Anisocytosis 1+     Microcytosis 1+    ESTIMATED GFR    Collection Time: 04/04/17 11:58 PM   Result Value Ref Range    GFR If African American >60 >60 mL/min/1.73 m 2    GFR If Non African American >60 >60 mL/min/1.73 m 2   DIFFERENTIAL MANUAL    Collection Time: 04/04/17 11:58 PM   Result Value Ref Range    Metamyelocytes 0.90 %    Myelocytes 0.90 %    Blasts 2.60 %    Manual Diff Status PERFORMED    PERIPHERAL SMEAR REVIEW    Collection Time: 04/04/17 11:58 PM   Result Value Ref Range    Peripheral Smear Review see below    PLATELET ESTIMATE    Collection Time: 04/04/17 11:58 PM   Result Value Ref Range    Plt Estimation Marked Decrease    MORPHOLOGY    Collection Time: 04/04/17 11:58 PM   Result Value Ref Range    RBC Morphology Present    IMMATURE PLT FRACTION    Collection Time: 04/04/17 11:58 PM   Result Value Ref Range    Imm. Plt Fraction 1.6 0.6 - 13.1 K/uL       Diagnostic imaging:  CT scan on 4/4/17 :      1. Intramedullary the heterogeneous appearance of the visualized bones with areas of sclerosis and lucencies, could relate to the marrow replacing lesions from known CML.    2. An expansile area in the right lateral 8th rib could also related to CML.    3. No lymph node enlargement identified.    4. Significantly enlarged prostate.    5. A 2 mm nodule in the left upper lobe without definite calcification.        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 Planned transplant  at [facility].    - Cytopenias due to # 1: Tranfusion dependant- orders to keep PLT > 10,000, HGB > 8.0.    - Hypercalcemia normalized calcium levels    - MS changes improved  - Subdural bleed stable on recent CT scan 3/30/17 .    Plan: Clinically stable.   As per [facility] request to r/o out other causes of hypercalcemia, CT scan done and reviewed with patient today   PSA slightly high at 8.4 , CT scan showed prostate enlargement ?? BPH .   He denies any urinary s/o. He will need outpatient evaluation by urology in future as outpatient , notified results to his primary oncologist Dr. Vu  No CBC today , ordered one, If PLT count < 20,000 transfuse prior to d/c   He should see Dr. Vu office in 1-2 days for another CBC    - Discussed neutropenic precautions, advised to call if any fever 100.5 or higher   - Continue Bosutinib as planned.   - Ok to d/c him home from onc point of view       High complexicity/Drug monitoring     We will continue to follow with you; please call with any questions, [phone].      Ladonna Corado MD  [facility]   [phone]

## 2017-04-05 NOTE — PROGRESS NOTES
Received report from night shift RN, assumed care of patient at 0700. AOx4. Stand by assist with the use of a front wheel walker. Denies pain or nausea. Pt ambulated to chair with assistance.  Call light within reach, bed in low and locked position. No other needs at this time.

## 2017-04-05 NOTE — CARE PLAN
Problem: Nutritional:  Goal: Achieve adequate nutritional intake  Diet will advance past clears and pt will consume >50% of meals.   Outcome: PROGRESSING AS EXPECTED

## 2017-04-05 NOTE — CARE PLAN
Problem: Safety  Goal: Will remain free from injury  Outcome: PROGRESSING AS EXPECTED  Pt in room near nursing station. Bed locked and in lowest position. Call light within reach. Pt has bed alarm in place.     Problem: Mobility  Goal: Risk for activity intolerance will decrease  Outcome: PROGRESSING AS EXPECTED  Pt maintains steady gait with SBA and FWW. PT eval ordered.

## 2017-04-05 NOTE — FACE TO FACE
Face to Face Note  -  Durable Medical Equipment    Gerry Soto M.D. - NPI: [id number]  I certify that this patient is under my care and that they had a durable medical equipment(DME)face to face encounter by myself that meets the physician DME face-to-face encounter requirements with this patient on:    Date of encounter:   Patient:                    MRN:                       YOB: 2019  Benjamin Post  [id number]  1945     The encounter with the patient was in whole, or in part, for the following medical condition, which is the primary reason for durable medical equipment:  Other - Front wheel walker    I certify that, based on my findings, the following durable medical equipment is medically necessary:  Walkers.    HOME O2 Saturation Measurements:(Values must be present for Home Oxygen orders)         ,     ,         My Clinical findings support the need for the above equipment due to:  Frequent Falls    Supporting Symptoms: Had a recent fall at home with head injury.

## 2017-04-05 NOTE — PROGRESS NOTES
Mikki from Lab called with critical result of ANC of 0.43 at 1028. Critical lab result read back to Mikki. Critical lab results within parameters for this patient.

## 2017-04-05 NOTE — DISCHARGE INSTRUCTIONS
Discharge Instructions    Discharged to home by car with relative. Discharged via wheelchair, hospital escort: Yes.  Special equipment needed: Walker    Be sure to schedule a follow-up appointment with your primary care doctor or any specialists as instructed.     Discharge Plan:   Diet Plan: Discussed  Activity Level: Discussed  Confirmed Follow up Appointment: Appointment Scheduled  Confirmed Symptoms Management: Discussed  Medication Reconciliation Updated: Yes  Influenza Vaccine Indication: Patient Refuses    I understand that a diet low in cholesterol, fat, and sodium is recommended for good health. Unless I have been given specific instructions below for another diet, I accept this instruction as my diet prescription.   Other diet: Regular    Special Instructions: None    · Is patient discharged on Warfarin / Coumadin?   No     · Is patient Post Blood Transfusion?  Yes  POST BLOOD TRANSFUSION INFORMATION (ADULT)    The purpose of blood transfusion is to correct anemia, low levels of blood clotting factors or to correct acute blood loss.   Blood transfusion is very safe but occasionally unexpected adverse reactions do occur. Most adverse reactions occur during transfusion, within one to two days following transfusion or one to two weeks following transfusion. Some adverse reactions can occur one to six months after transfusion and some even years later.             If any of the symptoms listed below happen to you during your transfusion,                                 please notify your nurse immediately.   · Fever or Chills  · Flushing of the Face  · Hives, rash or itching  · Difficulty in breathing or shortness of breath  · Pain or oozing of blood from the IV needle site  · Low back pain  · Nausea or vomiting  · Weakness or fainting  · Chest pain  · Blood in the urine  · Decreased frequency of urination    The above symptoms may also occur within 24 to 48 after transfusion; if so, notify your physician.      · Yellowing of the skin    This can happen one to six months after transfusion; if so, notify your physician    Depression / Suicide Risk    As you are discharged from this [facility], it is important to learn how to keep safe from harming yourself.    Recognize the warning signs:  · Abrupt changes in personality, positive or negative- including increase in energy   · Giving away possessions  · Change in eating patterns- significant weight changes-  positive or negative  · Change in sleeping patterns- unable to sleep or sleeping all the time   · Unwillingness or inability to communicate  · Depression  · Unusual sadness, discouragement and loneliness  · Talk of wanting to die  · Neglect of personal appearance   · Rebelliousness- reckless behavior  · Withdrawal from people/activities they love  · Confusion- inability to concentrate     If you or a loved one observes any of these behaviors or has concerns about self-harm, here's what you can do:  · Talk about it- your feelings and reasons for harming yourself  · Remove any means that you might use to hurt yourself (examples: pills, rope, extension cords, firearm)  · Get professional help from the community (Mental Health, Substance Abuse, psychological counseling)  · Do not be alone:Call your Safe Contact- someone whom you trust who will be there for you.  · Call your local CRISIS HOTLINE 430-1641 or 236-533-7201  · Call your local Children's Mobile Crisis Response Team [place] (404) 893-6454 or www.[employer] [facility]  · Call the toll free National Suicide Prevention Hotlines   · National Suicide Prevention Lifeline 637-721-KJHU (2738)  · National Hope Line Network 800-SUICIDE (518-8901)

## 2017-04-05 NOTE — DIETARY
Nutrition Services: Update  Pt is on a regular diet. Attempted to visit with pt, pt was busy with other discipline. Per chart pt PO varies %, however most meals 25-50%. No new wt since 3/31 - 70.4 kg via bed scale.     Pertinent Labs 4/4: K 3.5, Glucose 117  Pertinent Meds: coreg, pepcid, keppra, pericolace  Fluids: D5 1/2 NS infusion @ 100 ml/hr (requires stopped action)  Skin: No skin breakdown noted in chart  GI: Last BM 4/4    PLAN/RECOMMEND -   1) Nutrition rep to see patient daily for meal and snack preferences.  2) Encourage PO  3) Weekly weights to monitor fluid and nutrition status  4) Obtain supplement order per RD as needed.  5) Please document PO intake for each meal as percentage of meals consumed    RD following

## 2017-04-05 NOTE — PROGRESS NOTES
Ulices from Lab called with critical result of WBC of 1.0 and platelets of 25 at 0929. Critical lab result read back to Ulices. Critical lab results within parameters for this patient.

## 2017-04-05 NOTE — PROGRESS NOTES
KURTIS from Lab called with critical result of WBC at 1.0, Plt at 23, and ANC 0.26. Critical lab result read back to KURTIS.   This critical lab result is within parameters established by [facility] policy for this patient

## 2017-04-05 NOTE — PROGRESS NOTES
Pt discharged home at 1500 via wheelchair with daughter. PIV removed. Discharge instructions discussed, pt verbalized understanding. Copy given to pt and placed in chart. All belongings and prescriptions with pt upon discharge. No s/s of distress noted. No c/o pain upon discharge.

## 2017-04-05 NOTE — THERAPY
"Physical Therapy Evaluation completed.   Bed Mobility:  Supine to Sit: Stand by Assist  Transfers: Sit to Stand: Stand by Assist  Gait: Level Of Assist: Stand by Assist with Front-Wheel Walker       Plan of Care: Patient with no further skilled PT needs in the acute care setting at this time  Discharge Recommendations: Equipment: Front-Wheel Walker. Post-acute therapy Discharge to home with outpatient or home health for additional skilled therapy services.    See \"Rehab Therapy-Acute\" Patient Summary Report for complete documentation.     "

## 2017-04-05 NOTE — PROGRESS NOTES
Rec'd report from day shift RN. Assumed pt care. Assessment completed. AAOx4. Patient has no complaints of pain at this time. No other s/s of discomfort or distress. Pt ambulates to the bathroom with SBA and FWW, maintains steady gait and tolerates well. Bed in lowest position, bed locked, bed alarm is on. Patient does not always call appropriately. Treaded socks in place, RN and CNA numbers provided, call light within reach. Pt needs met at this time.

## 2017-04-05 NOTE — DOCUMENTATION QUERY
DOCUMENTATION QUERY    PROVIDERS: Please select “Cosign w/ note”to reply to query.    To better represent the severity of illness of your patient, please review the following information and exercise your independent professional judgment in responding to this query.     Pancytopenia is documented in the History and Physical and Progress Notes. Based upon the clinical findings, risk factors, and treatment, can this diagnosis be further specified?    • The _Pancytopenia__is secondary to the _Chemotherapy _related to CML  • Pancytopenia due to (Please Specify)  • Other explanation of clinical findings  • Findings of no clinical significance  • Unable to determine      The medical record reflects the following:   Clinical Findings  WBC 1.8, RBC 3.4 & Platelets 7 on admission Patient had to receive transfusions for the Platelets and RBC. Current Labs are WBC 1.0, RBC 2.69 & Platelets 35 Last chemotherapy treatment documentation was 08/03/16    Treatment Transfusions & Fluids    Risk Factors  CML    Location within medical record  H&P, Consults and Progress Notes      Thank you for your time.   Sincerely,   Katelin Ocampo RN  Clinical Documentation Improvement Specialist   [phone]  [email or web address]

## 2017-04-05 NOTE — DISCHARGE PLANNING
The patient has an order for a FWW.  He chose [facility] and signed the choice form.  I put the order in through traction assistance.  I faxed the choice form to RACHELE Thorpe, so that she can scan it in to Epic.

## 2017-04-05 NOTE — DISCHARGE PLANNING
Medical Social Work    Pt is anticipated to d/c home once medically cleared.  Per previous notes, he lives at home with his daughter, Keith.  PT/OT to eval for d/c reccomendations.    Plan:  SS will remain available to provide support and assist with d/c planning as needed.

## 2017-04-06 ENCOUNTER — PATIENT OUTREACH (OUTPATIENT)
Dept: HEALTH INFORMATION MANAGEMENT | Facility: OTHER | Age: 72
End: 2017-04-06

## 2017-04-06 NOTE — DISCHARGE SUMMARY
[facility] Internal Medicine Discharge Summary      Admit Date:  3/30/2017       Discharge Date:   4/5/2017     Service:   [facility] Internal Medicine Purple Team  Attending Physician(s):   Dr. Chung       Senior Resident(s):   Dr. Delgadillo  Bryant Resident(s):   Dr. Soto      Primary Diagnosis:   Altered Mental status  Hypercalcemia  Neutropenia  Leukemia  Hypernatremia      Secondary Diagnoses:                Active Hospital Problems    Diagnosis   • Neutropenia (CMS-HCC) [D70.9]   • Disorientation [R41.0]   • Hypercalcemia [E83.52]   • Thrombocytopenia (CMS-HCC) [D69.6]   • Fall from ground level [W18.30XA]   • Essential hypertension [I10]   • Hypokalemia [E87.6]   • Pancytopenia (CMS-HCC) [D61.818]   • Leukemia (CMS-HCC) [C95.90]   • Subdural hematoma, acute (CMS-HCC) [I62.01]   • Altered mental status [R41.82]   • Seizure disorder (CMS-HCC) [G40.909]   • Dental abscess [K04.7]   • Anemia [D64.9]   • Hypernatremia [E87.0]       Hospital Summary (Brief Narrative):       HPI as per Dr. Javed  71-year-old male with past medical history of chronic myeloid leukemia diagnosed 2 years ago, oncologist Dr. Cole. Past medical history of chronic thrombocytopenia with chronic dental abscesses. Patient was recently admitted here under hospitalist service for dental abscess surgery. During that time patient also had some seizure activity, neurologist was consulted, patient was started on Keppra, MRI brain did not reveal any acute change, EEG was consistent with encephalopathy.    Patient is Greek-speaking. All history obtained with the help of daughter who is also his primary caregiver, Keith . As per her patient had a fall today, it was witnessed by her children, apparently patient was cleaning to assess the site of the patch and trying to turn when he fell on his back and hit his head, there was no history of loss of consciousness, no history of seizure activity, urinary/fecal incontinence, no tongue biting. There  is no history of any fevers, chills. After the fall patient's daughter came from work and noticed that he was a little confused, and brought him here .  Patient did have some non specific abdominal pain before coming to the ER but that has completely resolved now, no history of nausea/vomiting    Hospital Course:  Patient was admitted to the ICU with hypercalcemia. He was in altered mental status. He also had a glf and had head trauma with small subdural hematoma. He was treated with aggressive fluid hydration, calcitonin and bisphosphonates were also given. His PTH was low. It was thought to be due to malignancy and dehydration. He was transferred out of [facility] on 4/2 but he was still slightly confused. He was also found to be hypernatremic and he was continued to half normal saline. Patient was pancytopenic and was followed by oncology inpatient. Patient was given regular transfusion with PRBC with irradiated and CMV negative blood to keep Hb above 8 and he received platelets as well as he was thrombocytopenia to keep his platelets above 88129. His hyperatremia started to improve. By 4/4 his mentation was almost at baseline and he was able to ambulate with walker. He had PT/OT evaluation and was discharged with FWW. Patient was discharged home with daughter in stable medical condition. He will be undergoing Bone marrow transplant in [facility] in 2 weeks.   Of note patient underwent PAN CT from thorax to pelvis for bone marrow transplant workup by Oncology and was found to have bony changes consistent with CML and small 2mm pulmonary nodule and enlarged prostate. His PSA was also elevated at 8.4 but he did not have dysuria, and was stable on flomax.     Patient /Hospital Summary (Details -- Problem Oriented) :          Thrombocytopenia (CMS-HCC)  Assessment & Plan  Hx of chronic thrombocytopenia  - likely secondary to CML  - received 1 unit on admission    CMV negative, irradiated Platelet transfused on 03/30  -  platelet again 9 on 4/3, received 1 units of platelets, repeat: 38>>33 (4/4)  - per oncology platelet count to be >10 for dc.   - Watch for any spontaneous  Intracranial bleed    - per oncology patient is okay to be discharged.   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     Leukemia (CMS-[facility])  Assessment & Plan  - Dr Vu is the oncolgist , appreciate his recs    - continue Bosutinib  - patient is to undergo BMT in [facility] in late April   - per oncology pt is ok to dc.       Hypokalemia  Assessment & Plan  Replete PRN    Essential hypertension  Assessment & Plan  Continue carvedilol 12.5 bid  Prn hydralazine.     Altered mental status  Assessment & Plan  Assessment:  - secondary to dehydration + hypercalcemia + NATY; patient also had a fall at home  - CT head: 1.  Holohemispheric right chronic subdural collection is similar in size to the prior exam. No significant mass effect or midline shift.                    2.  Periventricular white matter changes are consistent with chronic small vessel disease.  In ICU: - Passed bedside swallow eval and started clear liquid diet    Plan:  - Mentation is near baseline. Per Nurse who has taken care of him in the past, patient was able to answer most of the questions to assess his  orientation and also knew that he has leukemia and he is to receive bm transplant soon in [facility].   - diet changed to full liquid and will be advanced to solid food as tolerated by the patient  - patient was able to walk the raygoza way with 1 person assist, PT azalia ordered and is pending  - given the progress, will aim for DC in AM.       Seizure disorder (CMS-HCC)  Assessment & Plan  ecently diagnosed during the last hospital stay    EEG encehpalopathy   continue Keppra per neuro      Dental abscess  Assessment & Plan  s/p surgery full mouth tooth extraction with I/D of intraoral abscess, on March 25, 2017 , continue penicillin V for 6 more days , end date in 4/4/17    Anemia  Assessment & Plan  Per oncology recs to maintain Hb level above 8  - patient received 1 unit of PRBC irradiated cmv negative (4/2/17)  - hb today: 7.6, will order 1 unit  Irradiated, cmv negative.   - monitor    Hypernatremia  Assessment & Plan  Na in the range of >145  - Na today (4/3/17): 146, with chloride: 118, --> Na: 142, Cl: 118 (4/4)  - likely from severe dehydrations and NATY present on admission; BUN/Cr: 30/1.12 (4/3)->23/1.13 (4/4)  - continuing 1/2 NS@ 100  - will monitor CMP    Subdural hematoma, acute (CMS-HCC)  Assessment & Plan  On CT: 3/30/17: 1.  Stable, nearly isodense right holohemispheric subdural fluid collection.  2.  No new abnormalities.  - patient has mild AMS  - monitoring, with seizure, fall and aspiration precautions.   - also on Keppra.       Consultants:     Critical care  Oncology    Procedures:        None    Imaging/ Testing:      CT-CHEST,ABDOMEN,PELVIS WITH   Final Result         1. Intramedullary the heterogeneous appearance of the visualized bones with areas of sclerosis and lucencies, could relate to the marrow replacing lesions from known CML.      2. An expansile area in the right lateral 8th rib could also related to CML.      3. No lymph node enlargement identified.      4. Significantly enlarged  prostate.      5. A 2 mm nodule in the left upper lobe without definite calcification.      Fleischner Society Guidelines   Fleischner Society guidelines for follow-up and management of incidental pulmonary nodule (Radiology 2005; 237:395-400):      Nodule <= 4 mm:   LOW-RISK patient (minimal or absent history of smoking and other known risk factors): No follow-up needed.   HIGH-RISK patient (history of smoking or other known risk factors): Follow-up CT at 12 months; if unchanged, no further follow-up.      DX-CHEST-LIMITED (1 VIEW)   Final Result      Unremarkable chest.               INTERPRETING LOCATION: [address], [place], [address]      CT-HEAD W/O   Final Result      1.  Stable, nearly isodense right holohemispheric subdural fluid collection.   2.  No new abnormalities.               INTERPRETING LOCATION:  [address], [place], [address]      DX-CHEST-LIMITED (1 VIEW)   Final Result      Minimal right basilar opacity, consistent with atelectasis and/or focal pneumonia. No significant change.               INTERPRETING LOCATION: [address], [place], [address]            Discharge Medications:         Medication Reconciliation: Completed        Medication List      CONTINUE taking these medications       Instructions    Bosutinib 100 MG Tabs   Last time this was given:  300 mg on 4/5/2017  8:30 AM    Take 300 mg by mouth every day.   Dose:  300 mg       carvedilol 3.125 MG Tabs   Last time this was given:  12.5 mg on 4/5/2017  8:17 AM   Commonly known as:  COREG    Take 3.125 mg by mouth 2 times a day, with meals.   Dose:  3.125 mg       levetiracetam 500 MG Tabs   Commonly known as:  KEPPRA    Take 1.5 Tabs by mouth 2 Times a Day.   Dose:  750 mg       oxycodone immediate-release 5 MG Tabs   Commonly known as:  ROXICODONE    Take 1 Tab by mouth every four hours as needed (Moderate Pain; (Pain scale 4-6)).   Dose:  5 mg       PRESERVISION AREDS 2 Caps    Take 1 Cap by mouth every day.   Dose:  1 Cap       tamsulosin  0.4 MG capsule   Last time this was given:  0.4 mg on 4/5/2017  8:17 AM   Commonly known as:  FLOMAX    Take 0.4 mg by mouth every morning.   Dose:  0.4 mg         STOP taking these medications          penicillin v potassium 500 MG Tabs   Commonly known as:  VEETID               Disposition:   Discharged home    Diet:   Regular diet    Activity:   As tolerated with FWW    Instructions:      Patient to f/u with oncology and for bone marrow transplant with [facility].    The patient was instructed to return to the ER in the event of worsening symptoms. I have counseled the patient on the importance of compliance and the patient has agreed to proceed with all medical recommendations and follow up plan indicated above.   The patient understands that all medications come with benefits and risks. Risks may include permanent injury or death and these risks can be minimized with close reassessment and monitoring.        Primary Care Provider:    Not in EPIC  Discharge summary faxed to primary care provider:  Deferred  Copy of discharge summary given to the patient: Deferred    Follow up appointment details :      Oncology with Dr. Vu    Pending Studies:        none    Time spent on discharge day patient visit, preparing discharge paperwork and arranging for patient follow up.    Summary of follow up issues:   none    Discharge Time (Minutes) :    60 minutes      Condition on Discharge    ______________________________________________________________________    Interval history/exam for day of discharge:     Patient stable on discharge day. Denies any new complaints. Doing well.     Filed Vitals:    04/04/17 2000 04/05/17 0400 04/05/17 0900 04/05/17 1200   BP: 130/62 137/62 150/63 113/59   Pulse: 69 68 61 70   Temp: 36.5 °C (97.7 °F) 36.6 °C (97.8 °F) 36.7 °C (98.1 °F) 36.1 °C (97 °F)   Resp: 18 18 18 18   Height:       Weight:       SpO2: 100% 100% 100% 98%     Weight/BMI: Body mass index is 22.91 kg/(m^2).        General: Sitting comfortable in chair, NAD  CVS: S1, S2, RRR  PULM: CTAB, no added sounds  Abd: soft, nt, nd, +bs.     Most Recent Labs:    Lab Results   Component Value Date/Time    WBC 1.0* 04/05/2017 08:48 AM    RBC 2.60* 04/05/2017 08:48 AM    HEMOGLOBIN 8.1* 04/05/2017 08:48 AM    HEMATOCRIT 23.5* 04/05/2017 08:48 AM    MCV 90.4 04/05/2017 08:48 AM    MCH 31.2 04/05/2017 08:48 AM    MCHC 34.5 04/05/2017 08:48 AM    MPV 10.3 04/05/2017 08:48 AM    NEUTROPHILS-POLYS 43.40* 04/05/2017 08:48 AM    LYMPHOCYTES 35.40 04/05/2017 08:48 AM    MONOCYTES 11.50 04/05/2017 08:48 AM    EOSINOPHILS 5.30 04/05/2017 08:48 AM    BASOPHILS 0.90 04/05/2017 08:48 AM    HYPOCHROMIA 1+ 08/07/2016 02:16 AM    ANISOCYTOSIS 1+ 04/04/2017 11:58 PM      Lab Results   Component Value Date/Time    SODIUM 136 04/04/2017 11:58 PM    POTASSIUM 3.5* 04/04/2017 11:58 PM    CHLORIDE 112 04/04/2017 11:58 PM    CO2 19* 04/04/2017 11:58 PM    GLUCOSE 117* 04/04/2017 11:58 PM    BUN 17 04/04/2017 11:58 PM    CREATININE 1.08 04/04/2017 11:58 PM      Lab Results   Component Value Date/Time    ALT(SGPT) 38 04/04/2017 11:58 PM    AST(SGOT) 20 04/04/2017 11:58 PM    ALKALINE PHOSPHATASE 193* 04/04/2017 11:58 PM    TOTAL BILIRUBIN 0.8 04/04/2017 11:58 PM    ALBUMIN 2.7* 04/04/2017 11:58 PM    GLOBULIN 3.5 04/04/2017 11:58 PM    INR 1.22* 03/25/2017 12:04 PM    MACROCYTOSIS 1+ 04/03/2017 08:35 PM     Lab Results   Component Value Date/Time    PT 15.8* 03/25/2017 12:04 PM    INR 1.22* 03/25/2017 12:04 PM

## 2017-04-10 NOTE — DOCUMENTATION QUERY
DOCUMENTATION QUERY    PROVIDERS: Please select “Cosign w/ note”to reply to query.    To better represent the severity of illness of your patient, please review the following information and exercise your independent professional judgment in responding to this query.      Patient presents with periodontitis and chronic subdural hematoma. On 03/26 patient developed sepsis, confusion and left sided weakness. EEG performed showed encephalopathy and ruled out possible seizure activity.    Please clarify whether the encephalopathy is    1. Related to sepsis (severe sepsis)  2. Unrelated to sepsis (sepsis)  3. Other explanation of clinical presentation (please document)  4. Unable to determine    The medical record reflects the following:   Clinical Findings  periodontitis   chronic subdural hematoma   sepsis   confusion   left sided weakness   Treatment  sepsis protocol, EEG, increased keppra   Risk Factors     Location within medical record  Progress Notes, EEG, Discharge Summary and Consult Notes     Thank you,   Millie Bowden

## 2017-04-11 ENCOUNTER — APPOINTMENT (OUTPATIENT)
Dept: ONCOLOGY | Facility: MEDICAL CENTER | Age: 72
End: 2017-04-11
Attending: SPECIALIST
Payer: MEDICARE

## 2017-04-11 VITALS
DIASTOLIC BLOOD PRESSURE: 66 MMHG | OXYGEN SATURATION: 100 % | BODY MASS INDEX: 24.99 KG/M2 | HEIGHT: 66 IN | SYSTOLIC BLOOD PRESSURE: 145 MMHG | TEMPERATURE: 98.6 F | HEART RATE: 78 BPM | RESPIRATION RATE: 16 BRPM | WEIGHT: 155.51 LBS

## 2017-04-11 DIAGNOSIS — C92.10 CML (CHRONIC MYELOCYTIC LEUKEMIA) (HCC): ICD-10-CM

## 2017-04-11 DIAGNOSIS — C92.02 AML (ACUTE MYELOID LEUKEMIA) IN RELAPSE (HCC): ICD-10-CM

## 2017-04-11 DIAGNOSIS — C95.90 LEUKEMIA, UNSPECIFIED LEUKEMIA TYPE: ICD-10-CM

## 2017-04-11 DIAGNOSIS — D61.818 PANCYTOPENIA (HCC): ICD-10-CM

## 2017-04-11 LAB
ABO GROUP BLD: NORMAL
ANION GAP SERPL CALC-SCNC: 7 MMOL/L (ref 0–11.9)
ANISOCYTOSIS BLD QL SMEAR: ABNORMAL
BARCODED ABORH UBTYP: 5100
BARCODED ABORH UBTYP: 5100
BARCODED ABORH UBTYP: 6200
BARCODED PRD CODE UBPRD: NORMAL
BARCODED UNIT NUM UBUNT: NORMAL
BASOPHILS # BLD AUTO: 2 % (ref 0–1.8)
BASOPHILS # BLD: 0.02 K/UL (ref 0–0.12)
BLD GP AB SCN SERPL QL: NORMAL
BUN SERPL-MCNC: 23 MG/DL (ref 8–22)
CALCIUM SERPL-MCNC: 8.8 MG/DL (ref 8.5–10.5)
CHLORIDE SERPL-SCNC: 106 MMOL/L (ref 96–112)
CO2 SERPL-SCNC: 22 MMOL/L (ref 20–33)
COMPONENT P 8504P: NORMAL
COMPONENT P 8504P: NORMAL
COMPONENT R 8504R: NORMAL
COMPONENT R 8504R: NORMAL
CREAT SERPL-MCNC: 1.09 MG/DL (ref 0.5–1.4)
EKG IMPRESSION: NORMAL
EOSINOPHIL # BLD AUTO: 0.16 K/UL (ref 0–0.51)
EOSINOPHIL NFR BLD: 17.6 % (ref 0–6.9)
ERYTHROCYTE [DISTWIDTH] IN BLOOD BY AUTOMATED COUNT: 53.9 FL (ref 35.9–50)
GFR SERPL CREATININE-BSD FRML MDRD: >60 ML/MIN/1.73 M 2
GLUCOSE SERPL-MCNC: 128 MG/DL (ref 65–99)
HCT VFR BLD AUTO: 17.3 % (ref 42–52)
HGB BLD-MCNC: 5.8 G/DL (ref 14–18)
LYMPHOCYTES # BLD AUTO: 0.36 K/UL (ref 1–4.8)
LYMPHOCYTES NFR BLD: 40.2 % (ref 22–41)
MAGNESIUM SERPL-MCNC: 2 MG/DL (ref 1.5–2.5)
MANUAL DIFF BLD: NORMAL
MCH RBC QN AUTO: 30.7 PG (ref 27–33)
MCHC RBC AUTO-ENTMCNC: 33.5 G/DL (ref 33.7–35.3)
MCV RBC AUTO: 91.7 FL (ref 81.4–97.8)
METAMYELOCYTES NFR BLD MANUAL: 1 %
MICROCYTES BLD QL SMEAR: ABNORMAL
MONOCYTES # BLD AUTO: 0.07 K/UL (ref 0–0.85)
MONOCYTES NFR BLD AUTO: 7.8 % (ref 0–13.4)
MORPHOLOGY BLD-IMP: NORMAL
MYELOCYTES NFR BLD MANUAL: 1 %
NEUTROPHILS # BLD AUTO: 0.27 K/UL (ref 1.82–7.42)
NEUTROPHILS NFR BLD: 28.4 % (ref 44–72)
NEUTS BAND NFR BLD MANUAL: 2 % (ref 0–10)
NRBC # BLD AUTO: 0 K/UL
NRBC BLD AUTO-RTO: 0 /100 WBC
PLATELET # BLD AUTO: 6 K/UL (ref 164–446)
PLATELET BLD QL SMEAR: NORMAL
PLATELETS.RETICULATED NFR BLD AUTO: 5.8 K/UL (ref 0.6–13.1)
POTASSIUM SERPL-SCNC: 4.4 MMOL/L (ref 3.6–5.5)
PRODUCT TYPE UPROD: NORMAL
RBC # BLD AUTO: 1.92 M/UL (ref 4.7–6.1)
RBC BLD AUTO: PRESENT
RH BLD: NORMAL
SODIUM SERPL-SCNC: 135 MMOL/L (ref 135–145)
UNIT STATUS USTAT: NORMAL
WBC # BLD AUTO: 0.9 K/UL (ref 4.8–10.8)

## 2017-04-11 PROCEDURE — 86644 CMV ANTIBODY: CPT | Mod: 91

## 2017-04-11 PROCEDURE — 84165 PROTEIN E-PHORESIS SERUM: CPT

## 2017-04-11 PROCEDURE — 700102 HCHG RX REV CODE 250 W/ 637 OVERRIDE(OP): Performed by: SPECIALIST

## 2017-04-11 PROCEDURE — 85027 COMPLETE CBC AUTOMATED: CPT

## 2017-04-11 PROCEDURE — 86923 COMPATIBILITY TEST ELECTRIC: CPT

## 2017-04-11 PROCEDURE — 80048 BASIC METABOLIC PNL TOTAL CA: CPT

## 2017-04-11 PROCEDURE — P9034 PLATELETS, PHERESIS: HCPCS

## 2017-04-11 PROCEDURE — 86901 BLOOD TYPING SEROLOGIC RH(D): CPT

## 2017-04-11 PROCEDURE — 96374 THER/PROPH/DIAG INJ IV PUSH: CPT

## 2017-04-11 PROCEDURE — 36430 TRANSFUSION BLD/BLD COMPNT: CPT

## 2017-04-11 PROCEDURE — 700111 HCHG RX REV CODE 636 W/ 250 OVERRIDE (IP): Performed by: SPECIALIST

## 2017-04-11 PROCEDURE — 700105 HCHG RX REV CODE 258: Performed by: SPECIALIST

## 2017-04-11 PROCEDURE — 85055 RETICULATED PLATELET ASSAY: CPT

## 2017-04-11 PROCEDURE — 306780 HCHG STAT FOR TRANSFUSION PER CASE

## 2017-04-11 PROCEDURE — 85007 BL SMEAR W/DIFF WBC COUNT: CPT

## 2017-04-11 PROCEDURE — 86900 BLOOD TYPING SEROLOGIC ABO: CPT

## 2017-04-11 PROCEDURE — 96375 TX/PRO/DX INJ NEW DRUG ADDON: CPT

## 2017-04-11 PROCEDURE — 86850 RBC ANTIBODY SCREEN: CPT

## 2017-04-11 PROCEDURE — 83735 ASSAY OF MAGNESIUM: CPT

## 2017-04-11 PROCEDURE — A9270 NON-COVERED ITEM OR SERVICE: HCPCS | Performed by: SPECIALIST

## 2017-04-11 PROCEDURE — 36415 COLL VENOUS BLD VENIPUNCTURE: CPT

## 2017-04-11 PROCEDURE — 84160 ASSAY OF PROTEIN ANY SOURCE: CPT

## 2017-04-11 PROCEDURE — P9016 RBC LEUKOCYTES REDUCED: HCPCS | Mod: 91

## 2017-04-11 RX ORDER — ACETAMINOPHEN 325 MG/1
650 TABLET ORAL PRN
Status: DISCONTINUED | OUTPATIENT
Start: 2017-04-11 | End: 2017-04-13 | Stop reason: HOSPADM

## 2017-04-11 RX ORDER — SODIUM CHLORIDE 9 MG/ML
INJECTION, SOLUTION INTRAVENOUS CONTINUOUS
Status: DISCONTINUED | OUTPATIENT
Start: 2017-04-11 | End: 2017-04-13 | Stop reason: HOSPADM

## 2017-04-11 RX ADMIN — SODIUM CHLORIDE 500 ML: 9 INJECTION, SOLUTION INTRAVENOUS at 12:38

## 2017-04-11 RX ADMIN — HYDROCORTISONE SODIUM SUCCINATE 100 MG: 100 INJECTION, POWDER, FOR SOLUTION INTRAMUSCULAR; INTRAVENOUS at 12:38

## 2017-04-11 RX ADMIN — ACETAMINOPHEN 650 MG: 325 TABLET, FILM COATED ORAL at 12:38

## 2017-04-11 ASSESSMENT — PAIN SCALES - GENERAL: PAINLEVEL: NO PAIN

## 2017-04-12 ENCOUNTER — APPOINTMENT (OUTPATIENT)
Dept: ONCOLOGY | Facility: MEDICAL CENTER | Age: 72
End: 2017-04-12
Attending: SPECIALIST
Payer: MEDICARE

## 2017-04-12 ENCOUNTER — AMB ONCOLOGY NURSE NAVIGATOR ENCOUNTER (OUTPATIENT)
Dept: OTHER | Facility: MEDICAL CENTER | Age: 72
End: 2017-04-12

## 2017-04-12 LAB
ALBUMIN SERPL-MCNC: 3.57 G/DL (ref 3.75–5.01)
ALPHA1 GLOB SERPL ELPH-MCNC: 0.34 G/DL (ref 0.19–0.46)
ALPHA2 GLOB SERPL ELPH-MCNC: 0.65 G/DL (ref 0.48–1.05)
B-GLOBULIN SERPL ELPH-MCNC: 0.85 G/DL (ref 0.48–1.1)
EER PROT ELECT SER Q1092: ABNORMAL
GAMMA GLOB SERPL ELPH-MCNC: 1.9 G/DL (ref 0.62–1.51)
INTERPRETATION SERPL IFE-IMP: ABNORMAL
PROT SERPL-MCNC: 7.3 G/DL (ref 6–8.3)

## 2017-04-12 NOTE — PROGRESS NOTES
"Saw patient in IS for his routinely scheduled blood product transfusion, post recent inpatient admission X 2.  Spoke to patient via  MARILU Merritt, in IS.  Patient denies feeling \"fuzzy\" in his mind or confused.  Tells me he feels fine.  No complaints of muscle pain/cramps.  Eating only soft foods, soup and \"baby food\" after having his recent dental extractions (20 teeth removed).  Informed him there is a check waiting for him to help with housing at [facility] post SCT.  He tells me he has an appointment on 4/12at [facility] to discuss SCT.  Have asked he let his daughter know about the check and to call me after their visit.  Gave him 8 containers of boost today, no chocolate please.  Continue to follow.                                                                                                                                                          "

## 2017-04-12 NOTE — PROGRESS NOTES
"Pt ambulated into department for a blood draw and possible transfusion. Pt reported that he was in the hospital because he was \"confused.\" Pt was A&Ox4 in [facility], declined having any acute complaints today. Dr. Vu called RN and ordered for additional lab work to be done. Blood drawn and sent to lab for analysis. Pt's Hb.8 and Plt's: 6, met parameters to transfuse. Pt signed consents and given pre-medications as prescribed. Pt was difficult to establish a PIV, so there was a delay in starting Pt's transfusion. 1 unit of platelets and 2 units of PRBC's given as prescribed. Pt tolerated well, VSS throughout. PIV discontinued after, bleeding controlled with gauze and coban. Future appointments confirmed with Pt prior to leaving, left department by self (reported his daughter was picking him up) and appeared in good spirits and NAD.  "

## 2017-04-13 ENCOUNTER — HOSPITAL ENCOUNTER (OUTPATIENT)
Dept: RADIOLOGY | Facility: MEDICAL CENTER | Age: 72
End: 2017-04-13
Attending: SPECIALIST
Payer: MEDICARE

## 2017-04-13 DIAGNOSIS — C92.10 CML (CHRONIC MYELOCYTIC LEUKEMIA) (HCC): ICD-10-CM

## 2017-04-13 PROCEDURE — A9503 TC99M MEDRONATE: HCPCS

## 2017-04-18 ENCOUNTER — APPOINTMENT (OUTPATIENT)
Dept: ONCOLOGY | Facility: MEDICAL CENTER | Age: 72
End: 2017-04-18
Attending: SPECIALIST
Payer: MEDICARE

## 2017-04-18 VITALS
HEART RATE: 80 BPM | TEMPERATURE: 99.4 F | RESPIRATION RATE: 20 BRPM | HEIGHT: 66 IN | BODY MASS INDEX: 26.33 KG/M2 | DIASTOLIC BLOOD PRESSURE: 69 MMHG | SYSTOLIC BLOOD PRESSURE: 143 MMHG | OXYGEN SATURATION: 100 % | WEIGHT: 163.8 LBS

## 2017-04-18 DIAGNOSIS — C92.10 CML (CHRONIC MYELOCYTIC LEUKEMIA) (HCC): ICD-10-CM

## 2017-04-18 DIAGNOSIS — D61.818 PANCYTOPENIA (HCC): ICD-10-CM

## 2017-04-18 LAB
ABO GROUP BLD: NORMAL
ANION GAP SERPL CALC-SCNC: 6 MMOL/L (ref 0–11.9)
BARCODED ABORH UBTYP: 5100
BARCODED PRD CODE UBPRD: NORMAL
BARCODED UNIT NUM UBUNT: NORMAL
BASOPHILS # BLD AUTO: 0 % (ref 0–1.8)
BASOPHILS # BLD: 0 K/UL (ref 0–0.12)
BLD GP AB SCN SERPL QL: NORMAL
BUN SERPL-MCNC: 20 MG/DL (ref 8–22)
CALCIUM SERPL-MCNC: 8.7 MG/DL (ref 8.5–10.5)
CHLORIDE SERPL-SCNC: 106 MMOL/L (ref 96–112)
CO2 SERPL-SCNC: 22 MMOL/L (ref 20–33)
COMPONENT P 8504P: NORMAL
COMPONENT P 8504P: NORMAL
COMPONENT R 8504R: NORMAL
CREAT SERPL-MCNC: 1.08 MG/DL (ref 0.5–1.4)
EOSINOPHIL # BLD AUTO: 0.15 K/UL (ref 0–0.51)
EOSINOPHIL NFR BLD: 18.9 % (ref 0–6.9)
ERYTHROCYTE [DISTWIDTH] IN BLOOD BY AUTOMATED COUNT: 49.9 FL (ref 35.9–50)
GFR SERPL CREATININE-BSD FRML MDRD: >60 ML/MIN/1.73 M 2
GLUCOSE SERPL-MCNC: 126 MG/DL (ref 65–99)
HCT VFR BLD AUTO: 20.3 % (ref 42–52)
HGB BLD-MCNC: 6.9 G/DL (ref 14–18)
LYMPHOCYTES # BLD AUTO: 0.36 K/UL (ref 1–4.8)
LYMPHOCYTES NFR BLD: 44.4 % (ref 22–41)
MANUAL DIFF BLD: NORMAL
MCH RBC QN AUTO: 30.7 PG (ref 27–33)
MCHC RBC AUTO-ENTMCNC: 34 G/DL (ref 33.7–35.3)
MCV RBC AUTO: 90.2 FL (ref 81.4–97.8)
MONOCYTES # BLD AUTO: 0.1 K/UL (ref 0–0.85)
MONOCYTES NFR BLD AUTO: 12.2 % (ref 0–13.4)
MORPHOLOGY BLD-IMP: NORMAL
NEUTROPHILS # BLD AUTO: 0.2 K/UL (ref 1.82–7.42)
NEUTROPHILS NFR BLD: 24.5 % (ref 44–72)
NRBC # BLD AUTO: 0 K/UL
NRBC BLD AUTO-RTO: 0 /100 WBC
PLATELET # BLD AUTO: 4 K/UL (ref 164–446)
PLATELET BLD QL SMEAR: NORMAL
PLATELETS.RETICULATED NFR BLD AUTO: 5.5 K/UL (ref 0.6–13.1)
PMV BLD AUTO: 12.7 FL (ref 9–12.9)
POTASSIUM SERPL-SCNC: 3.9 MMOL/L (ref 3.6–5.5)
PRODUCT TYPE UPROD: NORMAL
RBC # BLD AUTO: 2.25 M/UL (ref 4.7–6.1)
RBC BLD AUTO: NORMAL
RH BLD: NORMAL
SODIUM SERPL-SCNC: 134 MMOL/L (ref 135–145)
UNIT STATUS USTAT: NORMAL
WBC # BLD AUTO: 0.8 K/UL (ref 4.8–10.8)

## 2017-04-18 PROCEDURE — 700111 HCHG RX REV CODE 636 W/ 250 OVERRIDE (IP): Performed by: SPECIALIST

## 2017-04-18 PROCEDURE — 85055 RETICULATED PLATELET ASSAY: CPT

## 2017-04-18 PROCEDURE — 96374 THER/PROPH/DIAG INJ IV PUSH: CPT

## 2017-04-18 PROCEDURE — 86850 RBC ANTIBODY SCREEN: CPT

## 2017-04-18 PROCEDURE — P9034 PLATELETS, PHERESIS: HCPCS

## 2017-04-18 PROCEDURE — 85027 COMPLETE CBC AUTOMATED: CPT

## 2017-04-18 PROCEDURE — 306780 HCHG STAT FOR TRANSFUSION PER CASE

## 2017-04-18 PROCEDURE — P9016 RBC LEUKOCYTES REDUCED: HCPCS

## 2017-04-18 PROCEDURE — 80048 BASIC METABOLIC PNL TOTAL CA: CPT

## 2017-04-18 PROCEDURE — A9270 NON-COVERED ITEM OR SERVICE: HCPCS | Performed by: SPECIALIST

## 2017-04-18 PROCEDURE — 86923 COMPATIBILITY TEST ELECTRIC: CPT

## 2017-04-18 PROCEDURE — 86901 BLOOD TYPING SEROLOGIC RH(D): CPT

## 2017-04-18 PROCEDURE — 36415 COLL VENOUS BLD VENIPUNCTURE: CPT

## 2017-04-18 PROCEDURE — 36430 TRANSFUSION BLD/BLD COMPNT: CPT

## 2017-04-18 PROCEDURE — 86644 CMV ANTIBODY: CPT

## 2017-04-18 PROCEDURE — 85007 BL SMEAR W/DIFF WBC COUNT: CPT

## 2017-04-18 PROCEDURE — 86900 BLOOD TYPING SEROLOGIC ABO: CPT

## 2017-04-18 PROCEDURE — 700102 HCHG RX REV CODE 250 W/ 637 OVERRIDE(OP): Performed by: SPECIALIST

## 2017-04-18 RX ORDER — ACETAMINOPHEN 325 MG/1
650 TABLET ORAL PRN
Status: DISCONTINUED | OUTPATIENT
Start: 2017-04-18 | End: 2017-04-18 | Stop reason: HOSPADM

## 2017-04-18 RX ADMIN — HYDROCORTISONE SODIUM SUCCINATE 100 MG: 100 INJECTION, POWDER, FOR SOLUTION INTRAMUSCULAR; INTRAVENOUS at 08:49

## 2017-04-18 RX ADMIN — ACETAMINOPHEN 650 MG: 325 TABLET, FILM COATED ORAL at 08:48

## 2017-04-18 ASSESSMENT — PAIN SCALES - GENERAL: PAINLEVEL: NO PAIN

## 2017-04-18 NOTE — PROGRESS NOTES
Pt scheduled for platelets only d/t leaving for transplant today.  PIV started lab drawn.  Results reviewed with Dr. Vu.  Pt actually needs 2 units platelets and 1 unit RBCs.  D/t pt lab results, his apt for transplant is being rescheduled per transplant coordinator, Joan.  Platelets and RBCs infused.  Report given to Luis DURAN for RBC completion.

## 2017-04-21 ENCOUNTER — AMB ONCOLOGY NURSE NAVIGATOR ENCOUNTER (OUTPATIENT)
Dept: OTHER | Facility: MEDICAL CENTER | Age: 72
End: 2017-04-21

## 2017-04-21 NOTE — PROGRESS NOTES
"Rounded on patient is IS with MARILU Luu serving as .  Was told he was to go to [facility] this week for admission for SCT, but patient does not know if this if happening or if just a regular f/u appointment.  He refers to himself as \"crazy little me\" in reference to his recent falls.  Tells us today that he actually had the first in a series of falls at the [facility].  He does not remember what happened, reports \"waking up\" on the floor with a  helping him  He reports he lost his wallet, maybe at the [facility] as he does not remember having it after that fall.  All of his personal identification, Medicare card, green card and birth certificate from the [place] consulate were in his wallet.  He has searched his home, and cannot find his wallet there.  He says his daughter called the [facility], but to date has had no luck speaking with the lost and found department. Advised to tell her to go there in person with him and ask if his wallet has been found.  Encouraged to call [facility] lost and found and see if by chance it is here.  Advised he will need to replace these items if he cannot find them.  He verbalized understanding and tells us that he is no longer \"allowed\" to go the [facility] by himself per his daughter.  Informed him again there is assistance for lodging when he is discharged from [facility] and to have his daughter call me or the OP BRIANNE Stewart. Provided with my contact information again.  Of note, his daughter has never reached out to me regarding any plans since our initial with her, Mike Stewart MSW.  Continue to follow.  "

## 2017-04-25 ENCOUNTER — OUTPATIENT INFUSION SERVICES (OUTPATIENT)
Dept: ONCOLOGY | Facility: MEDICAL CENTER | Age: 72
End: 2017-04-25
Attending: SPECIALIST
Payer: MEDICARE

## 2017-04-25 VITALS
WEIGHT: 163.8 LBS | BODY MASS INDEX: 26.33 KG/M2 | HEIGHT: 66 IN | DIASTOLIC BLOOD PRESSURE: 70 MMHG | OXYGEN SATURATION: 100 % | TEMPERATURE: 99.5 F | RESPIRATION RATE: 18 BRPM | HEART RATE: 81 BPM | SYSTOLIC BLOOD PRESSURE: 129 MMHG

## 2017-04-25 DIAGNOSIS — D61.818 PANCYTOPENIA (HCC): ICD-10-CM

## 2017-04-25 DIAGNOSIS — C92.10 CML (CHRONIC MYELOCYTIC LEUKEMIA) (HCC): Chronic | ICD-10-CM

## 2017-04-25 LAB
ABO GROUP BLD: NORMAL
ANISOCYTOSIS BLD QL SMEAR: ABNORMAL
BARCODED ABORH UBTYP: 5100
BARCODED ABORH UBTYP: 5100
BARCODED ABORH UBTYP: 7300
BARCODED PRD CODE UBPRD: NORMAL
BARCODED UNIT NUM UBUNT: NORMAL
BASOPHILS # BLD AUTO: 2.3 % (ref 0–1.8)
BASOPHILS # BLD: 0.02 K/UL (ref 0–0.12)
BLD GP AB SCN SERPL QL: NORMAL
COMPONENT P 8504P: NORMAL
COMPONENT R 8504R: NORMAL
COMPONENT R 8504R: NORMAL
EOSINOPHIL # BLD AUTO: 0.06 K/UL (ref 0–0.51)
EOSINOPHIL NFR BLD: 7.9 % (ref 0–6.9)
ERYTHROCYTE [DISTWIDTH] IN BLOOD BY AUTOMATED COUNT: 48.4 FL (ref 35.9–50)
HCT VFR BLD AUTO: 20.2 % (ref 42–52)
HGB BLD-MCNC: 6.6 G/DL (ref 14–18)
LYMPHOCYTES # BLD AUTO: 0.4 K/UL (ref 1–4.8)
LYMPHOCYTES NFR BLD: 57.3 % (ref 22–41)
MANUAL DIFF BLD: NORMAL
MCH RBC QN AUTO: 29.3 PG (ref 27–33)
MCHC RBC AUTO-ENTMCNC: 32.7 G/DL (ref 33.7–35.3)
MCV RBC AUTO: 89.8 FL (ref 81.4–97.8)
MICROCYTES BLD QL SMEAR: ABNORMAL
MONOCYTES # BLD AUTO: 0.05 K/UL (ref 0–0.85)
MONOCYTES NFR BLD AUTO: 6.7 % (ref 0–13.4)
MORPHOLOGY BLD-IMP: NORMAL
NEUTROPHILS # BLD AUTO: 0.18 K/UL (ref 1.82–7.42)
NEUTROPHILS NFR BLD: 25.8 % (ref 44–72)
NRBC # BLD AUTO: 0 K/UL
NRBC BLD AUTO-RTO: 0 /100 WBC
OVALOCYTES BLD QL SMEAR: NORMAL
PLATELET # BLD AUTO: 9 K/UL (ref 164–446)
PLATELET BLD QL SMEAR: NORMAL
PLATELETS.RETICULATED NFR BLD AUTO: 1.8 K/UL (ref 0.6–13.1)
PMV BLD AUTO: 10.8 FL (ref 9–12.9)
POIKILOCYTOSIS BLD QL SMEAR: NORMAL
PRODUCT TYPE UPROD: NORMAL
RBC # BLD AUTO: 2.25 M/UL (ref 4.7–6.1)
RBC BLD AUTO: PRESENT
RH BLD: NORMAL
UNIT STATUS USTAT: NORMAL
VARIANT LYMPHS BLD QL SMEAR: NORMAL
WBC # BLD AUTO: 0.7 K/UL (ref 4.8–10.8)

## 2017-04-25 PROCEDURE — 85055 RETICULATED PLATELET ASSAY: CPT

## 2017-04-25 PROCEDURE — 96375 TX/PRO/DX INJ NEW DRUG ADDON: CPT

## 2017-04-25 PROCEDURE — P9016 RBC LEUKOCYTES REDUCED: HCPCS | Mod: 91

## 2017-04-25 PROCEDURE — 86923 COMPATIBILITY TEST ELECTRIC: CPT

## 2017-04-25 PROCEDURE — 86850 RBC ANTIBODY SCREEN: CPT

## 2017-04-25 PROCEDURE — 85027 COMPLETE CBC AUTOMATED: CPT

## 2017-04-25 PROCEDURE — 85007 BL SMEAR W/DIFF WBC COUNT: CPT

## 2017-04-25 PROCEDURE — 700111 HCHG RX REV CODE 636 W/ 250 OVERRIDE (IP): Performed by: SPECIALIST

## 2017-04-25 PROCEDURE — A9270 NON-COVERED ITEM OR SERVICE: HCPCS | Performed by: SPECIALIST

## 2017-04-25 PROCEDURE — 86901 BLOOD TYPING SEROLOGIC RH(D): CPT

## 2017-04-25 PROCEDURE — 36415 COLL VENOUS BLD VENIPUNCTURE: CPT

## 2017-04-25 PROCEDURE — 306780 HCHG STAT FOR TRANSFUSION PER CASE

## 2017-04-25 PROCEDURE — P9034 PLATELETS, PHERESIS: HCPCS

## 2017-04-25 PROCEDURE — 700102 HCHG RX REV CODE 250 W/ 637 OVERRIDE(OP): Performed by: SPECIALIST

## 2017-04-25 PROCEDURE — 86644 CMV ANTIBODY: CPT | Mod: 91

## 2017-04-25 PROCEDURE — 36430 TRANSFUSION BLD/BLD COMPNT: CPT

## 2017-04-25 PROCEDURE — 86900 BLOOD TYPING SEROLOGIC ABO: CPT

## 2017-04-25 RX ORDER — ACETAMINOPHEN 325 MG/1
650 TABLET ORAL PRN
Status: DISCONTINUED | OUTPATIENT
Start: 2017-04-25 | End: 2017-04-25 | Stop reason: HOSPADM

## 2017-04-25 RX ADMIN — ACETAMINOPHEN 650 MG: 325 TABLET, FILM COATED ORAL at 11:51

## 2017-04-25 RX ADMIN — HYDROCORTISONE SODIUM SUCCINATE 100 MG: 100 INJECTION, POWDER, FOR SOLUTION INTRAMUSCULAR; INTRAVENOUS at 11:52

## 2017-04-25 NOTE — PROGRESS NOTES
Pt presents ambulatory for labs and possible blood transfusion. Bell MICHEL present for translation. IV established and CBC drawn. Critical lab result of hgb 6.6, platelets 9,000 and ANC of 180 called by lab, value read back to lab. Pt to receive 2 units of RBCs and 1 unit of platelets per standing blood treatment plan. Results reviewed with pt and precaution including neutropenic precautions, pt verbalized understanding. Pre-medications given to pt. 20 mins observed prior to starting transfusion. Bailee DURAN navigator present at pt's chairside. First unit of RBCs currently infusing and pt resting in chair with call light within reach.

## 2017-04-26 NOTE — PROGRESS NOTES
Both units of RBCs infused per orders without complications or adverse reactions. Tubing changed and unit of platelets transfused per orders without complications or adverse reactions. RN called MD office and pt to see Dr. Vu on 4/27/17 at 1430, RN gave pt appt information and labs to be sent to MD office for review. IV flushed with saline and d/c'd with gauze drsg applied to site. Pt to return on 5/2/17, appt confirmed with pt. Pt left ambulatory in no distress at 1740.     4/27/17 0904 RN spoke with Dr. Vu to update MD regarding pt's current trend of transfusions, pt needing 2 units RBC and platelets on a weekly basis. MD reports that pt is no longer a candidate for transplant and he is trying to coordinate an inpatient BID injection for 14 days with transfusion support for pt. Pt to be seen in MD office today and lab results faxed to MD. RN left message with Bailee DURAN navigator for update of POC.

## 2017-04-28 ENCOUNTER — RESOLUTE PROFESSIONAL BILLING HOSPITAL PROF FEE (OUTPATIENT)
Dept: HOSPITALIST | Facility: MEDICAL CENTER | Age: 72
End: 2017-04-28
Payer: MEDICARE

## 2017-04-28 ENCOUNTER — HOSPITAL ENCOUNTER (INPATIENT)
Facility: MEDICAL CENTER | Age: 72
LOS: 47 days | DRG: 808 | End: 2017-06-15
Attending: EMERGENCY MEDICINE | Admitting: INTERNAL MEDICINE
Payer: MEDICARE

## 2017-04-28 DIAGNOSIS — C95.90 LEUKEMIA, UNSPECIFIED LEUKEMIA TYPE: ICD-10-CM

## 2017-04-28 DIAGNOSIS — D70.9 NEUTROPENIC FEVER (HCC): Primary | ICD-10-CM

## 2017-04-28 DIAGNOSIS — R50.81 NEUTROPENIC FEVER (HCC): Primary | ICD-10-CM

## 2017-04-28 DIAGNOSIS — M54.50 ACUTE MIDLINE LOW BACK PAIN WITHOUT SCIATICA: ICD-10-CM

## 2017-04-28 DIAGNOSIS — C92.10 CML (CHRONIC MYELOCYTIC LEUKEMIA) (HCC): Chronic | ICD-10-CM

## 2017-04-28 DIAGNOSIS — D61.818 PANCYTOPENIA (HCC): Chronic | ICD-10-CM

## 2017-04-28 PROCEDURE — 99285 EMERGENCY DEPT VISIT HI MDM: CPT

## 2017-04-28 ASSESSMENT — PAIN SCALES - GENERAL: PAINLEVEL_OUTOF10: 10

## 2017-04-28 NOTE — IP AVS SNAPSHOT
" Home Care Instructions                                                                                                                  Name:Benjamin Post  Medical Record Number:[id number]  CSN: [id number]    YOB: 1945   Age: 71 y.o.  Sex: male  HT:1.702 m (5' 7\") WT: 65.9 kg (145 lb 4.5 oz)          Admit Date: 4/28/2017     Discharge Date:   Today's Date: 6/15/2017  Attending Doctor:  Bianca Chung M.D.                  Allergies:  Review of patient's allergies indicates no known allergies.            Discharge Instructions       Discharge Instructions    Discharged to other by ambulance with escort. Discharged via ambulance, hospital escort: Yes.  Special equipment needed: Not Applicable    Be sure to schedule a follow-up appointment with your primary care doctor or any specialists as instructed.     Discharge Plan:   Diet Plan: Discussed  Activity Level: Discussed  Confirmed Follow up Appointment: No (Comments)  Confirmed Symptoms Management: Discussed  Medication Reconciliation Updated: Yes  Influenza Vaccine Indication: Not indicated: Previously immunized this influenza season and > 8 years of age    I understand that a diet low in cholesterol, fat, and sodium is recommended for good health. Unless I have been given specific instructions below for another diet, I accept this instruction as my diet prescription.   Other diet: Regular diet as tolerated      Special Instructions: None    · Is patient discharged on Warfarin / Coumadin?   No     · Is patient Post Blood Transfusion?  Yes  POST BLOOD TRANSFUSION INFORMATION (ADULT)    The purpose of blood transfusion is to correct anemia, low levels of blood clotting factors or to correct acute blood loss.   Blood transfusion is very safe but occasionally unexpected adverse reactions do occur. Most adverse reactions occur during transfusion, within one to two days following transfusion or one to two weeks following transfusion. Some adverse reactions can " occur one to six months after transfusion and some even years later.             If any of the symptoms listed below happen to you during your transfusion,                                 please notify your nurse immediately.   · Fever or Chills  · Flushing of the Face  · Hives, rash or itching  · Difficulty in breathing or shortness of breath  · Pain or oozing of blood from the IV needle site  · Low back pain  · Nausea or vomiting  · Weakness or fainting  · Chest pain  · Blood in the urine  · Decreased frequency of urination    The above symptoms may also occur within 24 to 48 after transfusion; if so, notify your physician.     · Yellowing of the skin    This can happen one to six months after transfusion; if so, notify your physician    Depression / Suicide Risk    As you are discharged from this [facility] facility, it is important to learn how to keep safe from harming yourself.    Recognize the warning signs:  · Abrupt changes in personality, positive or negative- including increase in energy   · Giving away possessions  · Change in eating patterns- significant weight changes-  positive or negative  · Change in sleeping patterns- unable to sleep or sleeping all the time   · Unwillingness or inability to communicate  · Depression  · Unusual sadness, discouragement and loneliness  · Talk of wanting to die  · Neglect of personal appearance   · Rebelliousness- reckless behavior  · Withdrawal from people/activities they love  · Confusion- inability to concentrate     If you or a loved one observes any of these behaviors or has concerns about self-harm, here's what you can do:  · Talk about it- your feelings and reasons for harming yourself  · Remove any means that you might use to hurt yourself (examples: pills, rope, extension cords, firearm)  · Get professional help from the community (Mental Health, Substance Abuse, psychological counseling)  · Do not be alone:Call your Safe Contact- someone whom you trust  who will be there for you.  · Call your local CRISIS HOTLINE 663-2430 or 406-229-9304  · Call your local Children's Mobile Crisis Response Team [place] (027) 612-6403 or www.[employer]  · Call the toll free National Suicide Prevention Hotlines   · National Suicide Prevention Lifeline 975-586-BQQF (1828)  · National Hope Line Network 800-SUICIDE (755-1785)        Your appointments     Jun 20, 2017 10:30 AM   Est Blood Products with RN 6   Infusion Services ([facility])    [address] [address]   [phone]            Jun 27, 2017 10:30 AM   Est Blood Products with RN 4   Infusion Services ([facility])    [address] [address]   [phone]                 Discharge Medication Instructions:    Below are the medications your physician expects you to take upon discharge:    Review all your home medications and newly ordered medications with your doctor and/or pharmacist. Follow medication instructions as directed by your doctor and/or pharmacist.    Please keep your medication list with you and share with your physician.               Medication List      START taking these medications        Instructions    Morning Afternoon Evening Bedtime    acyclovir 400 MG tablet   Last time this was given:  400 mg on 6/15/2017  8:31 AM   Commonly known as:  ZOVIRAX        Take 1 Tab by mouth 2 Times a Day.   Dose:  400 mg                        amlodipine 10 MG Tabs   Last time this was given:  10 mg on 6/15/2017  8:32 AM   Commonly known as:  NORVASC        Take 1 Tab by mouth every day.   Dose:  10 mg                        cyanocobalamin 1000 MCG/ML Soln   Last time this was given:  1,000 mcg on 6/10/2017 10:44 AM   Commonly known as:  VITAMIN B-12        1 mL by Intramuscular route every 7 days.   Dose:  1000 mcg                        diphenhydrAMINE 25 MG Tabs   Last time this was given:  25 mg on 6/14/2017 11:40 AM   Commonly known as:  BENADRYL        Take 1 tablet by  mouth as needed (premed for transfusions).   Dose:  25 mg                        diphenoxylate-atropine 2.5-0.025 MG Tabs   Last time this was given:  1 Tab on 5/24/2017 10:23 AM   Commonly known as:  LOMOTIL        Take 1 Tab by mouth 4 times a day as needed for Diarrhea.   Dose:  1 Tab                        enalaprilat 1.25 MG/ML Inj   Last time this was given:  1.25 mg on 6/13/2017  3:11 PM   Commonly known as:  VASOTEC        1 mL by Intravenous route every 6 hours as needed (SBP>160 or DBP>110).   Dose:  1.25 mg                        finasteride 5 MG Tabs   Last time this was given:  5 mg on 6/15/2017  8:32 AM   Commonly known as:  PROSCAR        Take 1 Tab by mouth every day.   Dose:  5 mg                        folic acid 1 MG Tabs   Last time this was given:  1 mg on 6/15/2017  8:31 AM   Commonly known as:  FOLVITE        Take 1 Tab by mouth every day.   Dose:  1 mg                        hydrALAZINE 20 MG/ML Soln   Last time this was given:  10 mg on 6/14/2017  5:47 AM   Commonly known as:  APRESOLINE        0.5 mL by Intravenous route every 6 hours as needed.   Dose:  10 mg                        hydrocodone-acetaminophen 5-325 MG Tabs per tablet   Last time this was given:  2 Tabs on 5/21/2017  4:30 PM   Commonly known as:  NORCO        Take 1-2 Tabs by mouth every 6 hours as needed.   Dose:  1-2 Tab                        ipratropium-albuterol 0.5-2.5 (3) MG/3ML nebulizer solution   Commonly known as:  DUONEB        3 mL by Nebulization route every four hours as needed for Shortness of Breath.   Dose:  3 mL                        lidocaine 5 % Ptch   Last time this was given:  1 Patch on 6/9/2017  8:31 AM   Commonly known as:  LIDODERM        Apply 1 Patch to skin as directed every 24 hours.   Dose:  1 Patch                        lisinopril 20 MG Tabs   Last time this was given:  20 mg on 6/15/2017  8:31 AM   Commonly known as:  PRINIVIL        Take 1 Tab by mouth every day.   Dose:  20 mg                         loperamide 2 MG Caps   Last time this was given:  2 mg on 6/15/2017 12:11 PM   Commonly known as:  IMODIUM        Take 1 Cap by mouth 4 times a day as needed for Diarrhea.   Dose:  2 mg                        NS SOLN 100 mL with piperacillin-tazobactam 4.5 (4-0.5) G SOLR 4.5 g   Last time this was given:  4.5 g on 6/15/2017 12:11 PM        4.5 g by Intravenous route every 6 hours.   Dose:  4.5 g                        nystatin 639615 UNIT/ML Susp   Last time this was given:  500,000 Units on 6/14/2017  9:12 PM   Commonly known as:  MYCOSTATIN        Take 5 mL by mouth 4 times a day.   Dose:  5 mL                        ondansetron 4 MG Tbdp   Commonly known as:  ZOFRAN ODT        Take 1 Tab by mouth every four hours as needed for Nausea/Vomiting (give PO if IV route is unavailable. May give per feeding tube.).   Dose:  4 mg                        ondansetron 4 MG/2ML Soln injection   Last time this was given:  4 mg on 6/9/2017 11:47 PM   Commonly known as:  ZOFRAN        2 mL by Intravenous route every four hours as needed.   Dose:  4 mg                        pneumococcal 13-Elizabeth Conj Vacc syringe   Commonly known as:  PREVNAR 13        0.5 mL by Intramuscular route Once PRN.   Dose:  0.5 mL                        vitamin D (Ergocalciferol) 87355 UNITS Caps capsule   Last time this was given:  50,000 Units on 6/10/2017  7:57 AM   Commonly known as:  DRISDOL        Take 1 Cap by mouth every 7 days.   Dose:  23681 Units                        voriconazole 200 MG Tabs   Last time this was given:  200 mg on 6/15/2017  8:34 AM   Commonly known as:  VFEND        Take 1 Tab by mouth every 12 hours.   Dose:  200 mg                             Where to Get Your Medications      Information about where to get these medications is not yet available     ! Ask your nurse or doctor about these medications    - acyclovir 400 MG tablet  - amlodipine 10 MG Tabs  - cyanocobalamin 1000 MCG/ML Soln  - diphenhydrAMINE 25 MG  Tabs  - diphenoxylate-atropine 2.5-0.025 MG Tabs  - enalaprilat 1.25 MG/ML Inj  - finasteride 5 MG Tabs  - folic acid 1 MG Tabs  - hydrALAZINE 20 MG/ML Soln  - hydrocodone-acetaminophen 5-325 MG Tabs per tablet  - ipratropium-albuterol 0.5-2.5 (3) MG/3ML nebulizer solution  - lidocaine 5 % Ptch  - lisinopril 20 MG Tabs  - loperamide 2 MG Caps  - NS SOLN 100 mL with piperacillin-tazobactam 4.5 (4-0.5) G SOLR 4.5 g  - nystatin 127971 UNIT/ML Susp  - ondansetron 4 MG Tbdp  - ondansetron 4 MG/2ML Soln injection  - pneumococcal 13-Elizabeth Conj Vacc syringe  - vitamin D (Ergocalciferol) 88832 UNITS Caps capsule  - voriconazole 200 MG Tabs            Orders for after discharge     REFERRAL TO HOSPICE    Complete by:  As directed              Instructions           Diet / Nutrition:    Follow any diet instructions given to you by your doctor or the dietician, including how much salt (sodium) you are allowed each day.    If you are overweight, talk to your doctor about a weight reduction plan.    Activity:    Remain physically active following your doctor's instructions about exercise and activity.    Rest often.     Any time you become even a little tired or short of breath, SIT DOWN and rest.    Worsening Symptoms:    Report any of the following signs and symptoms to the doctor's office immediately:    *Pain of jaw, arm, or neck  *Chest pain not relieved by medication                               *Dizziness or loss of consciousness  *Difficulty breathing even when at rest   *More tired than usual                                       *Bleeding drainage or swelling of surgical site  *Swelling of feet, ankles, legs or stomach                 *Fever (>100ºF)  *Pink or blood tinged sputum  *Weight gain (3lbs/day or 5lbs /week)           *Shock from internal defibrillator (if applicable)  *Palpitations or irregular heartbeats                *Cool and/or numb extremities    Stroke Awareness    Common Risk Factors for Stroke  include:    Age  Atrial Fibrillation  Carotid Artery Stenosis  Diabetes Mellitus  Excessive alcohol consumption  High blood pressure  Overweight   Physical inactivity  Smoking    Warning signs and symptoms of a stroke include:    *Sudden numbness or weakness of the face, arm or leg (especially on one side of the body).  *Sudden confusion, trouble speaking or understanding.  *Sudden trouble seeing in one or both eyes.  *Sudden trouble walking, dizziness, loss of balance or coordination.Sudden severe headache with no known cause.    It is very important to get treatment quickly when a stroke occurs. If you experience any of the above warning signs, call 911 immediately.                   Disclaimer         Quit Smoking / Tobacco Use:    I understand the use of any tobacco products increases my chance of suffering from future heart disease or stroke and could cause other illnesses which may shorten my life. Quitting the use of tobacco products is the single most important thing I can do to improve my health. For further information on smoking / tobacco cessation call a Toll Free Quit Line at 1-222.617.9626 (*National Cancer [place]) or 1-186.534.5720 (American Lung Association) or you can access the web based program at www.lungusa.org.    [place] Tobacco Users Help Line:  (958) 606-7898       Toll Free: 1-736.675.4463  Quit Tobacco Program [facility] [phone]    Crisis Hotline:    [place] Crisis Hotline:  6-387-VUYCEWK or 1-691.350.7943    [place] Crisis Hotline:    1-200.171.6322 or 851-724-7156    Discharge Survey:   Thank you for choosing [facility]. We hope we did everything we could to make your hospital stay a pleasant one. You may be receiving a phone survey and we would appreciate your time and participation in answering the questions. Your input is very valuable to us in our efforts to improve our service to our patients and their families.        My signature on this form  indicates that:    1. I have reviewed and understand the above information.  2. My questions regarding this information have been answered to my satisfaction.  3. I have formulated a plan with my discharge nurse to obtain my prescribed medications for home.                  Disclaimer         __________________________________                     __________       ________                       Patient Signature                                                 Date                    Time

## 2017-04-28 NOTE — IP AVS SNAPSHOT
" <p align=\"LEFT\"><IMG SRC=\"[email or web address]\" alt=\"Image\" WIDTH=\"50%\" HEIGHT=\"200\" BORDER=\"\"></p>                   Name:Benjamin Post  Medical Record Number:[id number]  CSN: [id number]    YOB: 1945   Age: 71 y.o.  Sex: male  HT:1.702 m (5' 7\") WT: 65.9 kg (145 lb 4.5 oz)          Admit Date: 4/28/2017     Discharge Date:   Today's Date: 6/15/2017  Attending Doctor:  Bianca Chung M.D.                  Allergies:  Review of patient's allergies indicates no known allergies.          Your appointments     Jun 20, 2017 10:30 AM   Est Blood Products with RN 6   Infusion Services ([facility])    [address] [place] [address]   [phone]            Jun 27, 2017 10:30 AM   Est Blood Products with RN 4   Infusion Services ([facility])    [address] [place] [address]   [phone]                 Medication List      Take these Medications        Instructions    acyclovir 400 MG tablet   Commonly known as:  ZOVIRAX    Take 1 Tab by mouth 2 Times a Day.   Dose:  400 mg       amlodipine 10 MG Tabs   Commonly known as:  NORVASC    Take 1 Tab by mouth every day.   Dose:  10 mg       cyanocobalamin 1000 MCG/ML Soln   Commonly known as:  VITAMIN B-12    1 mL by Intramuscular route every 7 days.   Dose:  1000 mcg       diphenhydrAMINE 25 MG Tabs   Commonly known as:  BENADRYL    Take 1 tablet by mouth as needed (premed for transfusions).   Dose:  25 mg       diphenoxylate-atropine 2.5-0.025 MG Tabs   Commonly known as:  LOMOTIL    Take 1 Tab by mouth 4 times a day as needed for Diarrhea.   Dose:  1 Tab       enalaprilat 1.25 MG/ML Inj   Commonly known as:  VASOTEC    1 mL by Intravenous route every 6 hours as needed (SBP>160 or DBP>110).   Dose:  1.25 mg       finasteride 5 MG Tabs   Commonly known as:  PROSCAR    Take 1 Tab by mouth every day.   Dose:  5 mg       folic acid 1 MG Tabs   Commonly known as:  FOLVITE    Take 1 Tab by mouth every day.   Dose:  1 mg       hydrALAZINE " 20 MG/ML Soln   Commonly known as:  APRESOLINE    0.5 mL by Intravenous route every 6 hours as needed.   Dose:  10 mg       hydrocodone-acetaminophen 5-325 MG Tabs per tablet   Commonly known as:  NORCO    Take 1-2 Tabs by mouth every 6 hours as needed.   Dose:  1-2 Tab       ipratropium-albuterol 0.5-2.5 (3) MG/3ML nebulizer solution   Commonly known as:  DUONEB    3 mL by Nebulization route every four hours as needed for Shortness of Breath.   Dose:  3 mL       lidocaine 5 % Ptch   Commonly known as:  LIDODERM    Apply 1 Patch to skin as directed every 24 hours.   Dose:  1 Patch       lisinopril 20 MG Tabs   Commonly known as:  PRINIVIL    Take 1 Tab by mouth every day.   Dose:  20 mg       loperamide 2 MG Caps   Commonly known as:  IMODIUM    Take 1 Cap by mouth 4 times a day as needed for Diarrhea.   Dose:  2 mg       NS SOLN 100 mL with piperacillin-tazobactam 4.5 (4-0.5) G SOLR 4.5 g    4.5 g by Intravenous route every 6 hours.   Dose:  4.5 g       nystatin 810838 UNIT/ML Susp   Commonly known as:  MYCOSTATIN    Take 5 mL by mouth 4 times a day.   Dose:  5 mL       ondansetron 4 MG Tbdp   Commonly known as:  ZOFRAN ODT    Take 1 Tab by mouth every four hours as needed for Nausea/Vomiting (give PO if IV route is unavailable. May give per feeding tube.).   Dose:  4 mg       ondansetron 4 MG/2ML Soln injection   Commonly known as:  ZOFRAN    2 mL by Intravenous route every four hours as needed.   Dose:  4 mg       pneumococcal 13-Elizabeth Conj Vacc syringe   Commonly known as:  PREVNAR 13    0.5 mL by Intramuscular route Once PRN.   Dose:  0.5 mL       vitamin D (Ergocalciferol) 70172 UNITS Caps capsule   Commonly known as:  DRISDOL    Take 1 Cap by mouth every 7 days.   Dose:  84071 Units       voriconazole 200 MG Tabs   Commonly known as:  VFEND    Take 1 Tab by mouth every 12 hours.   Dose:  200 mg

## 2017-04-28 NOTE — IP AVS SNAPSHOT
[employer] Access Code: [email or web address]  Expires: 7/15/2017 12:55 PM    Your email address is not on file at [facility].  Email Addresses are required for you to sign up for [employer], please contact [phone] to verify your personal information and to provide your email address prior to attempting to register for [employer].    Benjamin Post  [address]  [place] [address]    Oneexchangestreett  A secure, online tool to manage your health information     [facility]’s [employer]® is a secure, online tool that connects you to your personalized health information from the privacy of your home -- day or night - making it very easy for you to manage your healthcare. Once the activation process is completed, you can even access your medical information using the [employer] alexsander, which is available for free in the Apple Alexsander store or Google Play store.     To learn more about [employer], visit [email or web address]    There are two levels of access available (as shown below):   My Chart Features  [facility] Primary Care Doctor [facility]  Specialists [facility]  Urgent  Care Non-[facility] Primary Care Doctor   Email your healthcare team securely and privately 24/7 X X X    Manage appointments: schedule your next appointment; view details of past/upcoming appointments X      Request prescription refills. X      View recent personal medical records, including lab and immunizations X X X X   View health record, including health history, allergies, medications X X X X   Read reports about your outpatient visits, procedures, consult and ER notes X X X X   See your discharge summary, which is a recap of your hospital and/or ER visit that includes your diagnosis, lab results, and care plan X X  X     How to register for [employer]:  Once your e-mail address has been verified, follow the following steps to sign up for [employer].     1. Go to  [email or web address]  2. Click on the Sign Up Now box, which takes you to the New Member Sign Up page. You will  need to provide the following information:  a. Enter your [employer] Access Code exactly as it appears at the top of this page. (You will not need to use this code after you’ve completed the sign-up process. If you do not sign up before the expiration date, you must request a new code.)   b. Enter your date of birth.   c. Enter your home email address.   d. Click Submit, and follow the next screen’s instructions.  3. Create a [employer] ID. This will be your Pulsar login ID and cannot be changed, so think of one that is secure and easy to remember.  4. Create a Pulsar password. You can change your password at any time.  5. Enter your Password Reset Question and Answer. This can be used at a later time if you forget your password.   6. Enter your e-mail address. This allows you to receive e-mail notifications when new information is available in Pulsar.  7. Click Sign Up. You can now view your health information.    For assistance activating your [employer] account, call [phone]

## 2017-04-28 NOTE — IP AVS SNAPSHOT
6/15/2017    Benjamin Post  [address]  [place] [address]    Dear Benjamin:    [facility] wants to ensure your discharge home is safe and you or your loved ones have had all of your questions answered regarding your care after you leave the hospital.    Below is a list of resources and contact information should you have any questions regarding your hospital stay, follow-up instructions, or active medical symptoms.    Questions or Concerns Regarding… Contact   Medical Questions Related to Your Discharge  (7 days a week, 8am-5pm) Contact a Nurse Care Coordinator   [phone]   Medical Questions Not Related to Your Discharge  (24 hours a day / 7 days a week)  Contact the Nurse Health Line   [phone]    Medications or Discharge Instructions Refer to your discharge packet   or contact your [facility] Primary Care Provider   [phone]   Follow-up Appointment(s) Schedule your appointment via [employer]   or contact Scheduling [phone]   Billing Review your statement via [employer]ag  or contact Billing [phone]   Medical Records Review your records via WigWag   or contact Medical Records [phone]     You may receive a telephone call within two days of discharge. This call is to make certain you understand your discharge instructions and have the opportunity to have any questions answered. You can also easily access your medical information, test results and upcoming appointments via the WigWag free online health management tool. You can learn more and sign up at [employer]/WigWag. For assistance setting up your WigWag account, please call [phone].    Once again, we want to ensure your discharge home is safe and that you have a clear understanding of any next steps in your care. If you have any questions or concerns, please do not hesitate to contact us, we are here for you. Thank you for choosing [facility] for your healthcare needs.    Sincerely,    Your [facility] Healthcare Team

## 2017-04-29 ENCOUNTER — APPOINTMENT (OUTPATIENT)
Dept: RADIOLOGY | Facility: MEDICAL CENTER | Age: 72
DRG: 808 | End: 2017-04-29
Attending: EMERGENCY MEDICINE
Payer: MEDICARE

## 2017-04-29 PROBLEM — M54.50 ACUTE BILATERAL LOW BACK PAIN WITHOUT SCIATICA: Status: ACTIVE | Noted: 2017-04-29

## 2017-04-29 PROBLEM — R74.01 TRANSAMINITIS: Status: ACTIVE | Noted: 2017-04-29

## 2017-04-29 PROBLEM — M89.8X9 BONE PAIN: Status: ACTIVE | Noted: 2017-04-29

## 2017-04-29 LAB
25(OH)D3 SERPL-MCNC: 11 NG/ML (ref 30–100)
ALBUMIN SERPL BCP-MCNC: 3.7 G/DL (ref 3.2–4.9)
ALBUMIN/GLOB SERPL: 0.9 G/DL
ALP SERPL-CCNC: 371 U/L (ref 30–99)
ALT SERPL-CCNC: 111 U/L (ref 2–50)
ANION GAP SERPL CALC-SCNC: 9 MMOL/L (ref 0–11.9)
ANISOCYTOSIS BLD QL SMEAR: ABNORMAL
APPEARANCE UR: CLEAR
APTT PPP: 29.8 SEC (ref 24.7–36)
AST SERPL-CCNC: 39 U/L (ref 12–45)
BASOPHILS # BLD AUTO: 0 % (ref 0–1.8)
BASOPHILS # BLD: 0 K/UL (ref 0–0.12)
BILIRUB SERPL-MCNC: 1.1 MG/DL (ref 0.1–1.5)
BILIRUB UR QL STRIP.AUTO: NEGATIVE
BUN SERPL-MCNC: 32 MG/DL (ref 8–22)
CALCIUM SERPL-MCNC: 8.8 MG/DL (ref 8.5–10.5)
CHLORIDE SERPL-SCNC: 103 MMOL/L (ref 96–112)
CO2 SERPL-SCNC: 21 MMOL/L (ref 20–33)
COLOR UR: YELLOW
CREAT SERPL-MCNC: 1.08 MG/DL (ref 0.5–1.4)
DEPRECATED D DIMER PPP IA-ACNC: 412 NG/ML(D-DU)
EOSINOPHIL # BLD AUTO: 0.08 K/UL (ref 0–0.51)
EOSINOPHIL NFR BLD: 15.1 % (ref 0–6.9)
ERYTHROCYTE [DISTWIDTH] IN BLOOD BY AUTOMATED COUNT: 45.8 FL (ref 35.9–50)
ERYTHROCYTE [DISTWIDTH] IN BLOOD BY AUTOMATED COUNT: 47.3 FL (ref 35.9–50)
GFR SERPL CREATININE-BSD FRML MDRD: >60 ML/MIN/1.73 M 2
GLOBULIN SER CALC-MCNC: 4.1 G/DL (ref 1.9–3.5)
GLUCOSE SERPL-MCNC: 121 MG/DL (ref 65–99)
GLUCOSE UR STRIP.AUTO-MCNC: NEGATIVE MG/DL
HCT VFR BLD AUTO: 22.8 % (ref 42–52)
HCT VFR BLD AUTO: 23.1 % (ref 42–52)
HGB BLD-MCNC: 7.7 G/DL (ref 14–18)
HGB BLD-MCNC: 7.7 G/DL (ref 14–18)
INR PPP: 1.21 (ref 0.87–1.13)
KETONES UR STRIP.AUTO-MCNC: NEGATIVE MG/DL
LACTATE BLD-SCNC: 1.1 MMOL/L (ref 0.5–2)
LACTATE BLD-SCNC: 1.3 MMOL/L (ref 0.5–2)
LEUKOCYTE ESTERASE UR QL STRIP.AUTO: NEGATIVE
LYMPHOCYTES # BLD AUTO: 0.23 K/UL (ref 1–4.8)
LYMPHOCYTES NFR BLD: 45.5 % (ref 22–41)
MACROCYTES BLD QL SMEAR: ABNORMAL
MAGNESIUM SERPL-MCNC: 2.1 MG/DL (ref 1.5–2.5)
MANUAL DIFF BLD: NORMAL
MCH RBC QN AUTO: 29.5 PG (ref 27–33)
MCH RBC QN AUTO: 29.8 PG (ref 27–33)
MCHC RBC AUTO-ENTMCNC: 33.3 G/DL (ref 33.7–35.3)
MCHC RBC AUTO-ENTMCNC: 33.8 G/DL (ref 33.7–35.3)
MCV RBC AUTO: 87.4 FL (ref 81.4–97.8)
MCV RBC AUTO: 89.5 FL (ref 81.4–97.8)
MICRO URNS: NORMAL
MICROCYTES BLD QL SMEAR: ABNORMAL
MONOCYTES # BLD AUTO: 0.03 K/UL (ref 0–0.85)
MONOCYTES NFR BLD AUTO: 6.1 % (ref 0–13.4)
MORPHOLOGY BLD-IMP: NORMAL
NEUTROPHILS # BLD AUTO: 0.17 K/UL (ref 1.82–7.42)
NEUTROPHILS # BLD AUTO: ABNORMAL K/UL (ref 1.82–7.42)
NEUTROPHILS NFR BLD: 33.3 % (ref 44–72)
NITRITE UR QL STRIP.AUTO: NEGATIVE
NRBC # BLD AUTO: 0 K/UL
NRBC # BLD AUTO: 0 K/UL
NRBC BLD AUTO-RTO: 0 /100 WBC
NRBC BLD AUTO-RTO: 0 /100 WBC
PH UR STRIP.AUTO: 5.5 [PH]
PHOSPHATE SERPL-MCNC: 2.6 MG/DL (ref 2.5–4.5)
PLATELET # BLD AUTO: 10 K/UL (ref 164–446)
PLATELET # BLD AUTO: 11 K/UL (ref 164–446)
PLATELET BLD QL SMEAR: NORMAL
PLATELETS.RETICULATED NFR BLD AUTO: 1.5 K/UL (ref 0.6–13.1)
PMV BLD AUTO: 10.9 FL (ref 9–12.9)
PMV BLD AUTO: 11.3 FL (ref 9–12.9)
POLYCHROMASIA BLD QL SMEAR: NORMAL
POTASSIUM SERPL-SCNC: 4.4 MMOL/L (ref 3.6–5.5)
PROT SERPL-MCNC: 7.8 G/DL (ref 6–8.2)
PROT UR QL STRIP: NEGATIVE MG/DL
PROTHROMBIN TIME: 15.7 SEC (ref 12–14.6)
RBC # BLD AUTO: 2.58 M/UL (ref 4.7–6.1)
RBC # BLD AUTO: 2.61 M/UL (ref 4.7–6.1)
RBC BLD AUTO: PRESENT
RBC UR QL AUTO: NEGATIVE
SODIUM SERPL-SCNC: 133 MMOL/L (ref 135–145)
SP GR UR STRIP.AUTO: 1.02
TROPONIN I SERPL-MCNC: <0.01 NG/ML (ref 0–0.04)
WBC # BLD AUTO: 0.5 K/UL (ref 4.8–10.8)
WBC # BLD AUTO: 0.5 K/UL (ref 4.8–10.8)

## 2017-04-29 PROCEDURE — A9270 NON-COVERED ITEM OR SERVICE: HCPCS | Performed by: STUDENT IN AN ORGANIZED HEALTH CARE EDUCATION/TRAINING PROGRAM

## 2017-04-29 PROCEDURE — 85055 RETICULATED PLATELET ASSAY: CPT

## 2017-04-29 PROCEDURE — 87086 URINE CULTURE/COLONY COUNT: CPT

## 2017-04-29 PROCEDURE — 84100 ASSAY OF PHOSPHORUS: CPT

## 2017-04-29 PROCEDURE — 700105 HCHG RX REV CODE 258: Performed by: EMERGENCY MEDICINE

## 2017-04-29 PROCEDURE — 72100 X-RAY EXAM L-S SPINE 2/3 VWS: CPT

## 2017-04-29 PROCEDURE — 85025 COMPLETE CBC W/AUTO DIFF WBC: CPT

## 2017-04-29 PROCEDURE — 700102 HCHG RX REV CODE 250 W/ 637 OVERRIDE(OP): Performed by: STUDENT IN AN ORGANIZED HEALTH CARE EDUCATION/TRAINING PROGRAM

## 2017-04-29 PROCEDURE — 700117 HCHG RX CONTRAST REV CODE 255: Performed by: EMERGENCY MEDICINE

## 2017-04-29 PROCEDURE — 83605 ASSAY OF LACTIC ACID: CPT

## 2017-04-29 PROCEDURE — 700105 HCHG RX REV CODE 258

## 2017-04-29 PROCEDURE — 85027 COMPLETE CBC AUTOMATED: CPT

## 2017-04-29 PROCEDURE — 80053 COMPREHEN METABOLIC PANEL: CPT

## 2017-04-29 PROCEDURE — 700102 HCHG RX REV CODE 250 W/ 637 OVERRIDE(OP): Performed by: HOSPITALIST

## 2017-04-29 PROCEDURE — 85007 BL SMEAR W/DIFF WBC COUNT: CPT

## 2017-04-29 PROCEDURE — A9270 NON-COVERED ITEM OR SERVICE: HCPCS | Performed by: HOSPITALIST

## 2017-04-29 PROCEDURE — 85379 FIBRIN DEGRADATION QUANT: CPT

## 2017-04-29 PROCEDURE — 51798 US URINE CAPACITY MEASURE: CPT

## 2017-04-29 PROCEDURE — 700111 HCHG RX REV CODE 636 W/ 250 OVERRIDE (IP): Performed by: STUDENT IN AN ORGANIZED HEALTH CARE EDUCATION/TRAINING PROGRAM

## 2017-04-29 PROCEDURE — 83735 ASSAY OF MAGNESIUM: CPT

## 2017-04-29 PROCEDURE — 700101 HCHG RX REV CODE 250: Performed by: STUDENT IN AN ORGANIZED HEALTH CARE EDUCATION/TRAINING PROGRAM

## 2017-04-29 PROCEDURE — 700111 HCHG RX REV CODE 636 W/ 250 OVERRIDE (IP): Performed by: EMERGENCY MEDICINE

## 2017-04-29 PROCEDURE — 700105 HCHG RX REV CODE 258: Performed by: STUDENT IN AN ORGANIZED HEALTH CARE EDUCATION/TRAINING PROGRAM

## 2017-04-29 PROCEDURE — 96367 TX/PROPH/DG ADDL SEQ IV INF: CPT

## 2017-04-29 PROCEDURE — 82306 VITAMIN D 25 HYDROXY: CPT

## 2017-04-29 PROCEDURE — 99223 1ST HOSP IP/OBS HIGH 75: CPT | Mod: GC | Performed by: INTERNAL MEDICINE

## 2017-04-29 PROCEDURE — 770021 HCHG ROOM/CARE - ISO PRIVATE

## 2017-04-29 PROCEDURE — 84484 ASSAY OF TROPONIN QUANT: CPT

## 2017-04-29 PROCEDURE — 85610 PROTHROMBIN TIME: CPT

## 2017-04-29 PROCEDURE — 700111 HCHG RX REV CODE 636 W/ 250 OVERRIDE (IP)

## 2017-04-29 PROCEDURE — 36415 COLL VENOUS BLD VENIPUNCTURE: CPT

## 2017-04-29 PROCEDURE — 81003 URINALYSIS AUTO W/O SCOPE: CPT

## 2017-04-29 PROCEDURE — 96365 THER/PROPH/DIAG IV INF INIT: CPT

## 2017-04-29 PROCEDURE — 85730 THROMBOPLASTIN TIME PARTIAL: CPT

## 2017-04-29 PROCEDURE — 71275 CT ANGIOGRAPHY CHEST: CPT

## 2017-04-29 PROCEDURE — 87040 BLOOD CULTURE FOR BACTERIA: CPT | Mod: 91

## 2017-04-29 PROCEDURE — 72070 X-RAY EXAM THORAC SPINE 2VWS: CPT

## 2017-04-29 PROCEDURE — 71010 DX-CHEST-PORTABLE (1 VIEW): CPT

## 2017-04-29 RX ORDER — LEVOFLOXACIN 500 MG/1
500 TABLET, FILM COATED ORAL DAILY
Status: ON HOLD | COMMUNITY
Start: 2017-04-27 | End: 2017-06-15

## 2017-04-29 RX ORDER — LIDOCAINE 50 MG/G
1 PATCH TOPICAL EVERY 24 HOURS
Status: DISCONTINUED | OUTPATIENT
Start: 2017-04-29 | End: 2017-06-15 | Stop reason: HOSPADM

## 2017-04-29 RX ORDER — POLYETHYLENE GLYCOL 3350 17 G/17G
1 POWDER, FOR SOLUTION ORAL
Status: DISCONTINUED | OUTPATIENT
Start: 2017-04-29 | End: 2017-05-13

## 2017-04-29 RX ORDER — ERGOCALCIFEROL 1.25 MG/1
50000 CAPSULE ORAL
Status: DISCONTINUED | OUTPATIENT
Start: 2017-04-29 | End: 2017-06-15 | Stop reason: HOSPADM

## 2017-04-29 RX ORDER — LEVOFLOXACIN 500 MG/1
500 TABLET, FILM COATED ORAL DAILY
Status: DISCONTINUED | OUTPATIENT
Start: 2017-04-29 | End: 2017-04-29

## 2017-04-29 RX ORDER — OXYCODONE HYDROCHLORIDE AND ACETAMINOPHEN 5; 325 MG/1; MG/1
1 TABLET ORAL EVERY 4 HOURS PRN
Status: DISCONTINUED | OUTPATIENT
Start: 2017-04-29 | End: 2017-04-29

## 2017-04-29 RX ORDER — CARVEDILOL 3.12 MG/1
3.12 TABLET ORAL 2 TIMES DAILY WITH MEALS
Status: DISCONTINUED | OUTPATIENT
Start: 2017-04-29 | End: 2017-05-22

## 2017-04-29 RX ORDER — BISACODYL 10 MG
10 SUPPOSITORY, RECTAL RECTAL
Status: DISCONTINUED | OUTPATIENT
Start: 2017-04-29 | End: 2017-05-13

## 2017-04-29 RX ORDER — SODIUM CHLORIDE 9 MG/ML
INJECTION, SOLUTION INTRAVENOUS CONTINUOUS
Status: DISCONTINUED | OUTPATIENT
Start: 2017-04-29 | End: 2017-04-29

## 2017-04-29 RX ORDER — LEVETIRACETAM 500 MG/1
750 TABLET ORAL 2 TIMES DAILY
Status: ON HOLD | COMMUNITY
End: 2017-06-15

## 2017-04-29 RX ORDER — AMOXICILLIN 250 MG
2 CAPSULE ORAL 2 TIMES DAILY
Status: DISCONTINUED | OUTPATIENT
Start: 2017-04-29 | End: 2017-05-13

## 2017-04-29 RX ORDER — ONDANSETRON 2 MG/ML
4 INJECTION INTRAMUSCULAR; INTRAVENOUS EVERY 4 HOURS PRN
Status: DISCONTINUED | OUTPATIENT
Start: 2017-04-29 | End: 2017-06-15 | Stop reason: HOSPADM

## 2017-04-29 RX ORDER — TAMSULOSIN HYDROCHLORIDE 0.4 MG/1
0.4 CAPSULE ORAL EVERY MORNING
Status: DISCONTINUED | OUTPATIENT
Start: 2017-04-29 | End: 2017-05-22

## 2017-04-29 RX ORDER — OXYCODONE HYDROCHLORIDE 5 MG/1
5 TABLET ORAL EVERY 4 HOURS PRN
Status: DISCONTINUED | OUTPATIENT
Start: 2017-04-29 | End: 2017-06-15 | Stop reason: HOSPADM

## 2017-04-29 RX ORDER — MORPHINE SULFATE 4 MG/ML
2 INJECTION, SOLUTION INTRAMUSCULAR; INTRAVENOUS EVERY 4 HOURS PRN
Status: DISCONTINUED | OUTPATIENT
Start: 2017-04-29 | End: 2017-05-31

## 2017-04-29 RX ORDER — LEVETIRACETAM 250 MG/1
750 TABLET ORAL 2 TIMES DAILY
Status: DISCONTINUED | OUTPATIENT
Start: 2017-04-29 | End: 2017-06-15 | Stop reason: HOSPADM

## 2017-04-29 RX ORDER — ONDANSETRON 4 MG/1
4 TABLET, ORALLY DISINTEGRATING ORAL EVERY 4 HOURS PRN
Status: DISCONTINUED | OUTPATIENT
Start: 2017-04-29 | End: 2017-06-15 | Stop reason: HOSPADM

## 2017-04-29 RX ORDER — ANTIOX #8/OM3/DHA/EPA/LUT/ZEAX 250-2.5 MG
1 CAPSULE ORAL DAILY
Status: DISCONTINUED | OUTPATIENT
Start: 2017-04-29 | End: 2017-04-29

## 2017-04-29 RX ORDER — SODIUM CHLORIDE 9 MG/ML
1000 INJECTION, SOLUTION INTRAVENOUS ONCE
Status: COMPLETED | OUTPATIENT
Start: 2017-04-29 | End: 2017-04-29

## 2017-04-29 RX ADMIN — MORPHINE SULFATE 2 MG: 4 INJECTION INTRAVENOUS at 06:32

## 2017-04-29 RX ADMIN — SODIUM CHLORIDE 1000 ML: 9 INJECTION, SOLUTION INTRAVENOUS at 04:40

## 2017-04-29 RX ADMIN — LEVETIRACETAM 750 MG: 250 TABLET, FILM COATED ORAL at 09:07

## 2017-04-29 RX ADMIN — LEVETIRACETAM 750 MG: 250 TABLET, FILM COATED ORAL at 20:28

## 2017-04-29 RX ADMIN — SODIUM CHLORIDE: 9 INJECTION, SOLUTION INTRAVENOUS at 18:30

## 2017-04-29 RX ADMIN — MORPHINE SULFATE 2 MG: 4 INJECTION INTRAVENOUS at 12:30

## 2017-04-29 RX ADMIN — TAMSULOSIN HYDROCHLORIDE 0.4 MG: 0.4 CAPSULE ORAL at 09:07

## 2017-04-29 RX ADMIN — SODIUM CHLORIDE: 9 INJECTION, SOLUTION INTRAVENOUS at 05:19

## 2017-04-29 RX ADMIN — VANCOMYCIN HYDROCHLORIDE 1900 MG: 100 INJECTION, POWDER, LYOPHILIZED, FOR SOLUTION INTRAVENOUS at 03:59

## 2017-04-29 RX ADMIN — CEFEPIME 2 G: 2 INJECTION, POWDER, FOR SOLUTION INTRAMUSCULAR; INTRAVENOUS at 23:44

## 2017-04-29 RX ADMIN — CARVEDILOL 3.12 MG: 3.12 TABLET, FILM COATED ORAL at 18:29

## 2017-04-29 RX ADMIN — ERGOCALCIFEROL 50000 UNITS: 1.25 CAPSULE ORAL at 09:09

## 2017-04-29 RX ADMIN — CEFEPIME 2 G: 2 INJECTION, POWDER, FOR SOLUTION INTRAMUSCULAR; INTRAVENOUS at 02:46

## 2017-04-29 RX ADMIN — LIDOCAINE 1 PATCH: 50 PATCH CUTANEOUS at 06:26

## 2017-04-29 RX ADMIN — IOHEXOL 100 ML: 350 INJECTION, SOLUTION INTRAVENOUS at 03:55

## 2017-04-29 RX ADMIN — CEFEPIME 2 G: 2 INJECTION, POWDER, FOR SOLUTION INTRAMUSCULAR; INTRAVENOUS at 12:31

## 2017-04-29 RX ADMIN — CARVEDILOL 3.12 MG: 3.12 TABLET, FILM COATED ORAL at 09:07

## 2017-04-29 ASSESSMENT — ENCOUNTER SYMPTOMS
FALLS: 0
PSYCHIATRIC NEGATIVE: 1
FEVER: 1
DOUBLE VISION: 0
FOCAL WEAKNESS: 0
SPUTUM PRODUCTION: 0
CHILLS: 0
DIARRHEA: 0
SHORTNESS OF BREATH: 0
SORE THROAT: 0
HEARTBURN: 0
WEIGHT LOSS: 0
VOMITING: 0
CARDIOVASCULAR NEGATIVE: 1
BLURRED VISION: 0
DIZZINESS: 0
CLAUDICATION: 0
BACK PAIN: 1
ABDOMINAL PAIN: 0
FEVER: 0
SPEECH CHANGE: 0
NEUROLOGICAL NEGATIVE: 1
EYES NEGATIVE: 1
BRUISES/BLEEDS EASILY: 1
CONSTIPATION: 0
NECK PAIN: 0
NERVOUS/ANXIOUS: 0
HEADACHES: 0
RESPIRATORY NEGATIVE: 1
COUGH: 0
BLOOD IN STOOL: 0
NAUSEA: 0

## 2017-04-29 ASSESSMENT — PAIN SCALES - GENERAL
PAINLEVEL_OUTOF10: 3
PAINLEVEL_OUTOF10: 5
PAINLEVEL_OUTOF10: 9
PAINLEVEL_OUTOF10: 9

## 2017-04-29 ASSESSMENT — LIFESTYLE VARIABLES
SUBSTANCE_ABUSE: 0
ALCOHOL_USE: NO
EVER_SMOKED: NEVER

## 2017-04-29 NOTE — ED NOTES
Benjamin Post  71 y.o.  male  Chief Complaint   Patient presents with   • Body Aches     generalized   • Back Pain     Present to triage c/o generalized body pain , pain more on the lower back since Thursday. Went to his primary MD and was told it might be viral. Denies cough / colds. Afebrile. Cancer patient ( leukemia ) last chemo ( 4/26/17 ).

## 2017-04-29 NOTE — ED NOTES
Pt to room by wheelchair. Presents with generalized weakness and body aches. Pt was diagnosed with leukemia and his last chemo tx was 4/26/17. Per family pt is taking levofloxacin.

## 2017-04-29 NOTE — H&P
[facility] [place] and Physical    Name Benjamin Post       1945   Age/Sex 71 y.o. male   MRN [id number]   Code Status FULL     After 5PM or if no immediate response to page, please call for cross-coverage  Attending/Team: Dr. Apodaca/Ly Call [phone] to page   1st Call - Day Intern (R1):   Dr. Soto 2nd Call - Day Sr. Resident (R2/R3):   Dr. 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 Patient denied falls/injury/trauma, fever, chills, sob/chest pain/cough/n/v/d/abd pain/ble swelling/headache or vision changes. Patient stated that only thing that bothered him was back pain that starts at the right upper back as well as the right upper rib where he was diagnosed with lytic lesion as a metastatic lesion from his CML. Patient has a poor medical history memory and couldn't get the answer for the status on BM transplant status in [facility] for his CML condition. He follows Dr. Cole for hem/onc.     At the ED, patient was non-toxic. Patient's vitals were stable. His pancytopenia were stable: WBC 0.5, Hgb 7.7, PLT 11. Per Hem/onc, his PLT goal is >10. Neutropenic isolation initiated with . X-ray of T-spine were neg. CXR neg. Pending L-spine xray. Ddimer 412. CTPE: right 8th lytic lesion without PE. Trop neg. Liver transaminitis from side effect of Bosutinib as expected. Got one dose of vanco and cefepime with 1L bolus IVF NS.       Review of Systems   Constitutional: Negative for fever and chills.   HENT: Negative for congestion, nosebleeds and sore throat.    Eyes: Negative for blurred vision and double vision.   Respiratory: Negative for cough, sputum production and shortness of breath.    Cardiovascular:  Negative for chest pain, claudication and leg swelling.   Gastrointestinal: Negative for heartburn, nausea, vomiting, abdominal pain, diarrhea, constipation and blood in stool.   Genitourinary: Negative for dysuria and urgency.   Musculoskeletal: Positive for back pain and joint pain. Negative for falls and neck pain.        Right upper back mainly   Skin: Negative for rash.   Neurological: Negative for dizziness, speech change, focal weakness and headaches.   Endo/Heme/Allergies: Bruises/bleeds easily.   Psychiatric/Behavioral: Negative for substance abuse. The patient is not nervous/anxious.              Past Medical History:   Past Medical History   Diagnosis Date   • Hypertension    • Cancer (CMS-HCC)    • Indigestion    • Heart burn        Past Surgical History:  Past Surgical History   Procedure Laterality Date   • Bone marrow aspiration  11/5/2015     Procedure: BONE MARROW ASPIRATION;  Surgeon: Rosita Bolton M.D.;  Location: [facility];  Service:    • Bone marrow biopsy, ndl/trocar  11/5/2015     Procedure: BONE MARROW BIOPSY, NDL/TROCAR;  Surgeon: Rosita Bolton M.D.;  Location: [facility];  Service:    • Bone marrow biopsy, ndl/trocar  3/23/2016     Procedure: BONE MARROW BIOPSY, NDL/TROCAR;  Surgeon: Fili Prakash M.D.;  Location: [facility];  Service:    • Bone marrow aspiration  3/23/2016     Procedure: BONE MARROW ASPIRATION;  Surgeon: Fili Prakash M.D.;  Location: [facility];  Service:    • Bone marrow biopsy, ndl/trocar  7/12/2016     Procedure: BONE MARROW BIOPSY, NDL/TROCAR;  Surgeon: Fili Prakash M.D.;  Location: [facility];  Service:    • Bone marrow aspiration  7/12/2016     Procedure: BONE MARROW ASPIRATION;  Surgeon: Fili Prakash M.D.;  Location: [facility];  Service:    • Dental extraction(s) N/A 3/25/2017     Procedure: DENTAL EXTRACTION(S);  Surgeon: Surendra Barragan  "MARQUEZ ELAM;  Location: SURGERY [facility];  Service:    • Submandible abscess incision and drainage N/A 3/25/2017     Procedure:  INTRAORAL  ABSCESS INCISION AND DRAINAGE;  Surgeon: Surendra Barragan D.M.D., M.D.;  Location: SURGERY [facility];  Service:        Current Outpatient Medications:  Home Medications     **Home medications have not yet been reviewed for this encounter**          Medication Allergy/Sensitivities:  No Known Allergies      Family History:  No family history on file.    Social History:  Social History     Social History   • Marital Status: Legally      Spouse Name: N/A   • Number of Children: N/A   • Years of Education: N/A     Occupational History   • Not on file.     Social History Main Topics   • Smoking status: Never Smoker    • Smokeless tobacco: Not on file   • Alcohol Use: No   • Drug Use: No   • Sexual Activity: Not on file     Other Topics Concern   • Not on file     Social History Narrative       Living situation: [place], [place]  PCP : Pcp Pt States None      Physical Exam     Filed Vitals:    04/28/17 2355 04/29/17 0000 04/29/17 0230 04/29/17 0300   BP:       Pulse: 87 86 86 90   Temp:  37.1 °C (98.8 °F)     Resp:       Height:       Weight:       SpO2: 96% 98% 98% 97%     Body mass index is 25.84 kg/(m^2).  /68 mmHg  Pulse 90  Temp(Src) 37.1 °C (98.8 °F)  Resp 16  Ht 1.702 m (5' 7\")  Wt 74.844 kg (165 lb)  BMI 25.84 kg/m2  SpO2 97%  O2 therapy: Pulse Oximetry: 97 %, O2 Delivery: None (Room Air)      Physical Exam   Constitutional: He is oriented to person, place, and time. No distress.   HENT:   Head: Normocephalic and atraumatic.   Eyes: Conjunctivae and EOM are normal. Pupils are equal, round, and reactive to light. No scleral icterus.   Neck: Normal range of motion. No JVD present.   Cardiovascular: Normal rate, regular rhythm and normal heart sounds.  Exam reveals no gallop and no friction rub.    No murmur heard.  Pulmonary/Chest: Effort normal and " breath sounds normal. No stridor. No respiratory distress. He has no wheezes.   Abdominal: Soft. Bowel sounds are normal. He exhibits no distension and no mass. There is no tenderness. There is no rebound and no guarding.   Musculoskeletal: He exhibits tenderness. He exhibits no edema.   Tender to palpate at the right 8th rib and whole mid-thoracic spine from T1-12, no skin abscess or fluctuance noticed.   Neurological: He is alert and oriented to person, place, and time. No cranial nerve deficit.   Skin: Skin is dry. No rash noted. He is not diaphoretic. No erythema. No pallor.             Data Review       Old Records Request:   Completed  Current Records review and summary: Completed    Lab Data Review:  Recent Results (from the past 24 hour(s))   CBC WITH DIFFERENTIAL    Collection Time: 04/29/17  2:15 AM   Result Value Ref Range    WBC 0.5 (LL) 4.8 - 10.8 K/uL    RBC 2.61 (L) 4.70 - 6.10 M/uL    Hemoglobin 7.7 (L) 14.0 - 18.0 g/dL    Hematocrit 22.8 (L) 42.0 - 52.0 %    MCV 87.4 81.4 - 97.8 fL    MCH 29.5 27.0 - 33.0 pg    MCHC 33.8 33.7 - 35.3 g/dL    RDW 45.8 35.9 - 50.0 fL    Platelet Count 11 (LL) 164 - 446 K/uL    MPV 11.3 9.0 - 12.9 fL    Nucleated RBC 0.00 /100 WBC    NRBC (Absolute) 0.00 K/uL    Neutrophils-Polys 33.30 (L) 44.00 - 72.00 %    Lymphocytes 45.50 (H) 22.00 - 41.00 %    Monocytes 6.10 0.00 - 13.40 %    Eosinophils 15.10 (H) 0.00 - 6.90 %    Basophils 0.00 0.00 - 1.80 %    Neutrophils (Absolute) 0.17 (LL) 1.82 - 7.42 K/uL    Lymphs (Absolute) 0.23 (L) 1.00 - 4.80 K/uL    Monos (Absolute) 0.03 0.00 - 0.85 K/uL    Eos (Absolute) 0.08 0.00 - 0.51 K/uL    Baso (Absolute) 0.00 0.00 - 0.12 K/uL    Anisocytosis 1+     Macrocytosis 1+     Microcytosis 1+    COMP METABOLIC PANEL    Collection Time: 04/29/17  2:15 AM   Result Value Ref Range    Sodium 133 (L) 135 - 145 mmol/L    Potassium 4.4 3.6 - 5.5 mmol/L    Chloride 103 96 - 112 mmol/L    Co2 21 20 - 33 mmol/L    Anion Gap 9.0 0.0 - 11.9     Glucose 121 (H) 65 - 99 mg/dL    Bun 32 (H) 8 - 22 mg/dL    Creatinine 1.08 0.50 - 1.40 mg/dL    Calcium 8.8 8.5 - 10.5 mg/dL    AST(SGOT) 39 12 - 45 U/L    ALT(SGPT) 111 (H) 2 - 50 U/L    Alkaline Phosphatase 371 (H) 30 - 99 U/L    Total Bilirubin 1.1 0.1 - 1.5 mg/dL    Albumin 3.7 3.2 - 4.9 g/dL    Total Protein 7.8 6.0 - 8.2 g/dL    Globulin 4.1 (H) 1.9 - 3.5 g/dL    A-G Ratio 0.9 g/dL   LACTIC ACID    Collection Time: 04/29/17  2:15 AM   Result Value Ref Range    Lactic Acid 1.3 0.5 - 2.0 mmol/L   TROPONIN    Collection Time: 04/29/17  2:15 AM   Result Value Ref Range    Troponin I <0.01 0.00 - 0.04 ng/mL   APTT    Collection Time: 04/29/17  2:15 AM   Result Value Ref Range    APTT 29.8 24.7 - 36.0 sec   PROTHROMBIN TIME    Collection Time: 04/29/17  2:15 AM   Result Value Ref Range    PT 15.7 (H) 12.0 - 14.6 sec    INR 1.21 (H) 0.87 - 1.13   MAGNESIUM    Collection Time: 04/29/17  2:15 AM   Result Value Ref Range    Magnesium 2.1 1.5 - 2.5 mg/dL   PHOSPHORUS    Collection Time: 04/29/17  2:15 AM   Result Value Ref Range    Phosphorus 2.6 2.5 - 4.5 mg/dL   D-DIMER    Collection Time: 04/29/17  2:15 AM   Result Value Ref Range    D-Dimer Screen 412 (H) <250 ng/mL(D-DU)   ESTIMATED GFR    Collection Time: 04/29/17  2:15 AM   Result Value Ref Range    GFR If African American >60 >60 mL/min/1.73 m 2    GFR If Non African American >60 >60 mL/min/1.73 m 2   DIFFERENTIAL MANUAL    Collection Time: 04/29/17  2:15 AM   Result Value Ref Range    Manual Diff Status PERFORMED    PERIPHERAL SMEAR REVIEW    Collection Time: 04/29/17  2:15 AM   Result Value Ref Range    Peripheral Smear Review see below    PLATELET ESTIMATE    Collection Time: 04/29/17  2:15 AM   Result Value Ref Range    Plt Estimation Marked Decrease    MORPHOLOGY    Collection Time: 04/29/17  2:15 AM   Result Value Ref Range    RBC Morphology Present     Polychromia 1+    IMMATURE PLT FRACTION    Collection Time: 04/29/17  2:15 AM   Result Value Ref Range     Imm. Plt Fraction 1.5 0.6 - 13.1 K/uL   URINALYSIS    Collection Time: 04/29/17  3:17 AM   Result Value Ref Range    Color Yellow     Character Clear     Specific Gravity 1.018 <1.035    Ph 5.5 5.0-8.0    Glucose Negative Negative mg/dL    Ketones Negative Negative mg/dL    Protein Negative Negative mg/dL    Bilirubin Negative Negative    Nitrite Negative Negative    Leukocyte Esterase Negative Negative    Occult Blood Negative Negative    Micro Urine Req see below    LACTIC ACID    Collection Time: 04/29/17  3:28 AM   Result Value Ref Range    Lactic Acid 1.1 0.5 - 2.0 mmol/L       Imaging/Procedures Review:    ndependant Imaging Review: Completed  CT-CTA CHEST PULMONARY ARTERY W/ RECONS   Final Result      No evidence of pulmonary embolus.      Heterogeneous appearance of the bones with areas of lucency with expansile lesion of the right lateral eighth rib again likely related to patient's history of CML.               DX-CHEST-PORTABLE (1 VIEW)   Final Result      No acute cardiac or pulmonary abnormalities are identified.      DX-THORACIC SPINE-2 VIEWS   Final Result      Unremarkable thoracic spine.      DX-LUMBAR SPINE-4+ VIEWS    (Results Pending)        EKG:   EKG Independant Review: Deferred  No EKG was done.    (x) Records reviewed and summarized in current documentation         Assessment/Plan     # Acute pain on right 8th lytic bone lesion  # Acute mid-low back bone pain  # CML on Bosutinib  # Neutropenia  # Pancytopenia  # Liver transaminitis, from Bosutinib  - since last chemo regimen on 4/26, a patient experienced worsening mid-low back bone pain along with specific right 8th lytic lesion (confirmed from bone scan) bone pain with no other neurological deficits or other complaints  - denied falls/injury/trauma, fever, chills, sob/chest pain/cough/n/v/d/abd pain/ble swelling/headache or vision changes  - couldn't get the answer for the status on BM transplant status in [facility] for his CML condition.  He follows Dr. Cole for hem/onc.  -  with WBC 0.5, neutrophils 33%  - per hem/onc, goal of PLT >10, Hgb >7  - on exam: non-toxic, non-septic appearance, only tenderness to bone pain along T1-12 and right 8th rib   - got one dose of vanco and cefepime with 1L bolus IVF NS    PLANS:  - put him on neutropenic isolation  - resume cefepime and vanco IV for broad spectrum coverage from infection   - pain management of bone lytic pain with morphine IV with breakthrough oxycodone PO and lidocaine patch  - transfuse PRBC if hgb<7, PLT if plt<10  - monitor for any spike of fever or sepsis while inpatient  - day team to consider hem/onc if needed      Disposition: Inpatient management for back pain/rib pain and IV abx for neutropenia    Anticipated Hospital stay:  >2 midnights    Quality Measures  Labs reviewed, Medications reviewed and Radiology images reviewed  Mahajan catheter: No Mahajan      DVT Prophylaxis: Contraindicated - High bleeding risk  DVT prophylaxis - mechanical: SCDs  Ulcer prophylaxis: Not indicated  Antibiotics: Treating active infection/contamination beyond 24 hours perioperative coverage (for neutropenia; need prophalytic broad abx coverage)

## 2017-04-29 NOTE — ED PROVIDER NOTES
"ED Provider Note    CHIEF COMPLAINT  Chief Complaint   Patient presents with   • Body Aches     generalized   • Back Pain       HPI  Benjamin Post is a 71 y.o. male who presents to the emergency department with his family complaining of total body pain and general malaise and fatigue for 2 days. Mid back pain worse with movement and deep breathing. Fever 102.1 at home yesterday. Tylenol ineffective for discomfort. Patient was reportedly seen at his oncologist office yesterday, discharged with Levaquin. Last chemotherapy 4/26/17. Apparently this was the last of this course of chemotherapy, although oncology mentioned trialing a new oral medication for patients leukemia. Decreased appetite but tolerating oral fluids. No nausea or vomiting. No abdominal pain. No diarrhea. No dysuria or hematuria. No rash. Denies sick contacts.    REVIEW OF SYSTEMS  See HPI for further details. All other systems are negative.     PAST MEDICAL HISTORY   has a past medical history of Hypertension; Cancer (CMS-HCC); Indigestion; and Heart burn.    SOCIAL HISTORY  Social History     Social History Main Topics   • Smoking status: Never Smoker    • Smokeless tobacco: Not on file   • Alcohol Use: No   • Drug Use: No   • Sexual Activity: Not on file       SURGICAL HISTORY   has past surgical history that includes bone marrow aspiration (11/5/2015); bone marrow biopsy, ndl/trocar (11/5/2015); bone marrow biopsy, ndl/trocar (3/23/2016); bone marrow aspiration (3/23/2016); bone marrow biopsy, ndl/trocar (7/12/2016); bone marrow aspiration (7/12/2016); dental extraction(s) (N/A, 3/25/2017); and submandible abscess incision and drainage (N/A, 3/25/2017).    CURRENT MEDICATIONS  Home Medications     **Home medications have not yet been reviewed for this encounter**          ALLERGIES  No Known Allergies    PHYSICAL EXAM  VITAL SIGNS: /68 mmHg  Pulse 86  Temp(Src) 37.1 °C (98.8 °F)  Resp 16  Ht 1.702 m (5' 7\")  Wt 74.844 kg (165 lb)  BMI " 25.84 kg/m2  SpO2 98%  Pulse ox interpretation: I interpret this pulse ox as normal.  Constitutional: Alert in no apparent distress.   HENT: Normocephalic, atraumatic. Bilateral external ears normal, Nose normal. Slightly dry mucous membranes.    Eyes: Pupils are equal and reactive, Conjunctiva normal.   Neck: Normal range of motion, Supple. No midline tenderness to palpation. No step-offs. No evidence of meningeal irritation.  Lymphatic: No lymphadenopathy noted.   Cardiovascular: Regular rate and rhythm, no murmurs. Distal pulses intact.  No peripheral edema.  Thorax & Lungs: Normal breath sounds.  No wheezing/rales/ronchi. No increased work of breathing, clipped speech or retractions. Splinting with deep inspiration.  Abdomen: Soft, non-distended, non-tender to palpation.   Skin: Warm, Dry, No erythema, No rash.   Musculoskeletal: Midline tenderness to palpation mid thoracic spine without step-offs or hematoma. Good range of motion in all major joints. No major deformities noted.   Neurologic: Alert and oriented ×4, no gross focal deficit noted. 5 out of 5 strength bilateral lower extremities, no saddle anesthesia.  Psychiatric: Flat affect. Cooperative.      DIAGNOSTIC STUDIES / PROCEDURES    LABS  Results for orders placed or performed during the hospital encounter of 04/28/17   CBC WITH DIFFERENTIAL   Result Value Ref Range    WBC 0.5 (LL) 4.8 - 10.8 K/uL    RBC 2.61 (L) 4.70 - 6.10 M/uL    Hemoglobin 7.7 (L) 14.0 - 18.0 g/dL    Hematocrit 22.8 (L) 42.0 - 52.0 %    MCV 87.4 81.4 - 97.8 fL    MCH 29.5 27.0 - 33.0 pg    MCHC 33.8 33.7 - 35.3 g/dL    RDW 45.8 35.9 - 50.0 fL    Platelet Count 11 (LL) 164 - 446 K/uL    MPV 11.3 9.0 - 12.9 fL    Nucleated RBC 0.00 /100 WBC    NRBC (Absolute) 0.00 K/uL    Neutrophils-Polys 33.30 (L) 44.00 - 72.00 %    Lymphocytes 45.50 (H) 22.00 - 41.00 %    Monocytes 6.10 0.00 - 13.40 %    Eosinophils 15.10 (H) 0.00 - 6.90 %    Basophils 0.00 0.00 - 1.80 %    Neutrophils (Absolute)  0.17 (LL) 1.82 - 7.42 K/uL    Lymphs (Absolute) 0.23 (L) 1.00 - 4.80 K/uL    Monos (Absolute) 0.03 0.00 - 0.85 K/uL    Eos (Absolute) 0.08 0.00 - 0.51 K/uL    Baso (Absolute) 0.00 0.00 - 0.12 K/uL    Anisocytosis 1+     Macrocytosis 1+     Microcytosis 1+    COMP METABOLIC PANEL   Result Value Ref Range    Sodium 133 (L) 135 - 145 mmol/L    Potassium 4.4 3.6 - 5.5 mmol/L    Chloride 103 96 - 112 mmol/L    Co2 21 20 - 33 mmol/L    Anion Gap 9.0 0.0 - 11.9    Glucose 121 (H) 65 - 99 mg/dL    Bun 32 (H) 8 - 22 mg/dL    Creatinine 1.08 0.50 - 1.40 mg/dL    Calcium 8.8 8.5 - 10.5 mg/dL    AST(SGOT) 39 12 - 45 U/L    ALT(SGPT) 111 (H) 2 - 50 U/L    Alkaline Phosphatase 371 (H) 30 - 99 U/L    Total Bilirubin 1.1 0.1 - 1.5 mg/dL    Albumin 3.7 3.2 - 4.9 g/dL    Total Protein 7.8 6.0 - 8.2 g/dL    Globulin 4.1 (H) 1.9 - 3.5 g/dL    A-G Ratio 0.9 g/dL   LACTIC ACID   Result Value Ref Range    Lactic Acid 1.3 0.5 - 2.0 mmol/L   LACTIC ACID   Result Value Ref Range    Lactic Acid 1.1 0.5 - 2.0 mmol/L   URINALYSIS   Result Value Ref Range    Color Yellow     Character Clear     Specific Gravity 1.018 <1.035    Ph 5.5 5.0-8.0    Glucose Negative Negative mg/dL    Ketones Negative Negative mg/dL    Protein Negative Negative mg/dL    Bilirubin Negative Negative    Nitrite Negative Negative    Leukocyte Esterase Negative Negative    Occult Blood Negative Negative    Micro Urine Req see below    TROPONIN   Result Value Ref Range    Troponin I <0.01 0.00 - 0.04 ng/mL   APTT   Result Value Ref Range    APTT 29.8 24.7 - 36.0 sec   PROTHROMBIN TIME   Result Value Ref Range    PT 15.7 (H) 12.0 - 14.6 sec    INR 1.21 (H) 0.87 - 1.13   MAGNESIUM   Result Value Ref Range    Magnesium 2.1 1.5 - 2.5 mg/dL   PHOSPHORUS   Result Value Ref Range    Phosphorus 2.6 2.5 - 4.5 mg/dL   D-DIMER   Result Value Ref Range    D-Dimer Screen 412 (H) <250 ng/mL(D-DU)   ESTIMATED GFR   Result Value Ref Range    GFR If African American >60 >60 mL/min/1.73 m 2     GFR If Non African American >60 >60 mL/min/1.73 m 2   DIFFERENTIAL MANUAL   Result Value Ref Range    Manual Diff Status PERFORMED    PERIPHERAL SMEAR REVIEW   Result Value Ref Range    Peripheral Smear Review see below    PLATELET ESTIMATE   Result Value Ref Range    Plt Estimation Marked Decrease    MORPHOLOGY   Result Value Ref Range    RBC Morphology Present     Polychromia 1+    IMMATURE PLT FRACTION   Result Value Ref Range    Imm. Plt Fraction 1.5 0.6 - 13.1 K/uL       RADIOLOGY  CT-CTA CHEST PULMONARY ARTERY W/ RECONS   Final Result      No evidence of pulmonary embolus.      Heterogeneous appearance of the bones with areas of lucency with expansile lesion of the right lateral eighth rib again likely related to patient's history of CML.               DX-CHEST-PORTABLE (1 VIEW)   Final Result      No acute cardiac or pulmonary abnormalities are identified.      DX-THORACIC SPINE-2 VIEWS   Final Result      Unremarkable thoracic spine.      DX-LUMBAR SPINE-4+ VIEWS    (Results Pending)       COURSE & MEDICAL DECISION MAKING  Nursing notes and vital signs were reviewed. (See chart for details)  The patients records were reviewed, history was obtained from the patient and his daughter;     Evaluation is consistent with neutropenic fever. Although patient is afebrile during this evaluation is reported history for fever 102.1 at home yesterday. Daughter provides strong history, it appears appropriate in this setting. Septic protocol without lactic acidosis. Broad-spectrum antibiotics initiated immediately following my evaluation. IV fluid boluses well. No definitive focus for infection, blood and urine cultures are pending. Mid back pain with otherwise normal thoracic imaging. CT of the chest for pulmonary embolism ordered for elevated d-dimer and pleuritic back pain. No cellulitis. Stable without fever or tachycardia, patient was never hypotensive or hypoxic. He will be admitted to [facility], who recently saw and  evaluated him on previous admission, for further evaluation and treatment.    0330 - [facility] is aware of the patient and agreeable to admission. Will follow CT the chest for pulmonary embolism.    The total critical care time on this patient is 40 minutes, continuous hemodynamic monitoring, multiple bedside evaluations, resuscitating patient, titrate medications/fluid in assessing response to treatment, deciphering test results, speaking with admitting physician, and arranging for hospital admission.. This 40 minutes is exclusive of separately billable procedures.    FINAL IMPRESSION  (D70.9,  R50.81) Neutropenic fever (CMS-HCC)  (primary encounter diagnosis)  (C95.90) Leukemia, unspecified leukemia type (CMS-[facility])  (M54.5) Acute midline low back pain without sciatica  Critical care, 40 minutes    Electronically signed by: Lydia Pagan, 4/29/2017 3:18 AM      This dictation was created using voice recognition software. The accuracy of the dictation is limited to the abilities of the software. I expect there may be some errors of grammar and possibly content. The nursing notes were reviewed and certain aspects of this information were incorporated into this note.

## 2017-04-29 NOTE — SENIOR ADMIT NOTE
"[facility]NAL MEDICINE SENIOR ADMIT NOTE:      Patient ID:   Name:             Benjamin Post     YOB: 1945  Age:                 71 y.o.  male   MRN:               [id number]                                                          Chief Complaint:       Acute onset of back pain    History of Present Illness:    Mr. Post is a 71 y.o. 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 Cole oncologist, severe neutropenia, chronic thrombocytopenia (goal platelets >10,000, Hg >8), subdural bleed (stable since 3/30/17) presented to the ED for complaints of acute onset of back pain since chemotherapy 3 days ago.  He denies a recent fall, trauma to the back. Last chemotherapy on 4/26/17. States that he had a fever, temperature 100°, taken twice for the past 2 days.    Denies chest pain, cough, phlegm, shortness of breath, abdominal pain, blood in stool or urine, bladder or bowel incontinence, weakness or pain anywhere else in the body.    CT PE done in ED was negative for pulmonary embolism, showed \"heterogeneous appearance of bones with areas of lucency, due to CML\".     Active Ambulatory Problems     Diagnosis Date Noted   • Leukemia (CMS-HCC) 03/12/2015   • Dyspnea 04/21/2015   • Pancytopenia (CMS-HCC) 04/21/2015   • Pleural effusion, bilateral 04/21/2015   • Pulmonary infiltrate 04/21/2015   • Hypokalemia 04/24/2015   • Acute respiratory failure (CMS-HCC) 04/24/2015   • CML (chronic myelocytic leukemia)-Accelerated phase 04/24/2015   • Pleural effusion exudative-side effect of Dasatinib 04/24/2015   • Fever 05/09/2015   • AML (acute myeloid leukemia) in relapse (CMS-HCC) 05/09/2015   • Leukopenia 05/10/2015   • Accelerated phase chronic myeloid leukemia (CMS-HCC) 11/05/2015   • Hemoptysis 12/17/2015   • Subdural hematoma, acute (CMS-HCC) 08/03/2016   • Thrombocytopenia (CMS-HCC) 03/25/2017   • Dental caries, unspecified 03/25/2017   • Hypercalcemia 03/30/2017   • Neutropenia " "(CMS-HCC) 03/31/2017   • Subdural hematoma (CMS-HCC) 03/31/2017   • Disorientation 03/31/2017   • Fall from ground level 03/31/2017   • Essential hypertension 03/31/2017   • Altered mental status 04/03/2017   • Seizure disorder (CMS-HCC) 04/03/2017   • Dental abscess 04/03/2017   • Anemia 04/03/2017   • Hypernatremia 04/03/2017     Resolved Ambulatory Problems     Diagnosis Date Noted   • Hypotension 04/21/2015   • Pneumonia 05/10/2015     Past Medical History   Diagnosis Date   • Hypertension    • Cancer (CMS-HCC)    • Indigestion    • Heart burn          Vitals:   Weight/BMI: Body mass index is 24.99 kg/(m^2).  Blood pressure 149/69, pulse 103, temperature 37.2 °C (99 °F), resp. rate 17, height 1.702 m (5' 7\"), weight 72.4 kg (159 lb 9.8 oz), SpO2 96 %.  Filed Vitals:    04/29/17 0230 04/29/17 0300 04/29/17 0430 04/29/17 0510   BP:    149/69   Pulse: 86 90 95 103   Temp:    37.2 °C (99 °F)   Resp:    17   Height:       Weight:    72.4 kg (159 lb 9.8 oz)   SpO2: 98% 97% 99% 96%     Oxygen Therapy:  Pulse Oximetry: 96 %, O2 (LPM): 0, O2 Delivery: None (Room Air)   71 years old male lying on bed  Generalized tenderness to palpation extending from T4 to lumbar region, no erythema, fluctuation, discharge    Cardiovascular regular rate and rhythm, no murmur  Respiratory normal physical breath sounds  No pedal edema    Assessment:   Back pain   CML on chemotherapy  Severe neutropenia  Thrombocytopenia (11)   Elevated d-dimer  Hyponatremia  Elevated alkaline phosphatase, ALT  Normocytic anemia, hemoglobin 7.7    PLAN:  Given severe neutropenia, fever at 100°, in an immunocompromised patient with CML, continued on cefepime, vancomycin. De-escalate when appropriate. Pending blood, urine cultures. CTPE negative for infiltrates.   Back pain due to bony changes from CML. On opioids for pain control, bowel regimen.   Consult oncology for blood transfusion, now at 7.7 (goal at 8).   Thrombocytopenia at goal (>10).        Please " refer to Interns H&P for complete admission details.

## 2017-04-29 NOTE — PROGRESS NOTES
Patient arrived via gurney with transport from ED.  Patient is on room air, IV is running NS and IV abx.  Patient is AOx4.  Reports back pain, waiting on lidocaine patch from pharmacy.  No needs expressed at this time.  Safety precautions in place, SCDs on,  in place.  Admit profile complete, 2 RN skin check complete.

## 2017-04-29 NOTE — PROGRESS NOTES
Received bedside report. Assumed care of patient. Patient A&Ox4, included in plan of care. Patient complains of pain 9/10 in back, received lidoderm patch and morphine on noc shift, not due for pain meds at this time. Patient educated on safety and use of call light.   Call light within reach, bed locked and at lowest position, bed alarm on.  XR Sched for 10am

## 2017-04-29 NOTE — PROGRESS NOTES
"Pharmacy Kinetics 71 y.o. male on vancomycin day # 1 2017    Vancomycin loading dose 25 mg/kg x1    Indication for Treatment: Neutropenic fever    Pertinent history per medical record: Admitted on 2017 for complaining of total body pain and general malaise and fatigue for 2 days. Mid back pain worse with movement and deep breathing. Fever 102.1 at home yesterday. Tylenol ineffective for discomfort. Patient was reportedly seen at his oncologist office yesterday, discharged with Levaquin. Last chemotherapy 17. Apparently this was the last of this course of chemotherapy, although oncology mentioned trialing a new oral medication for patients leukemia. Decreased appetite but tolerating oral fluids. No nausea or vomiting. No abdominal pain. No diarrhea. No dysuria or hematuria. No rash. Denies sick contacts.    Other antibiotics: cefepime 2g q12hr    Allergies: Review of patient's allergies indicates no known allergies.     List concerns for renal function: abnormal LFTs, BUN/SCr ratio > 20    Pertinent cultures to date:   17 - pbcx x2 - in process     Recent Labs      17   0215   WBC  0.5*   NEUTSPOLYS  33.30*     Recent Labs      17   0215   BUN  32*   CREATININE  1.08   ALBUMIN  3.7     No results for input(s): VANCOTROUGH, VANCOPEAK, VANCORANDOM in the last 72 hours.  Intake/Output Summary (Last 24 hours) at 17 0654  Last data filed at 17 0600   Gross per 24 hour   Intake    445 ml   Output      0 ml   Net    445 ml      Blood pressure 149/69, pulse 103, temperature 37.2 °C (99 °F), resp. rate 17, height 1.702 m (5' 7\"), weight 72.4 kg (159 lb 9.8 oz), SpO2 96 %. Temp (24hrs), Av.8 °C (98.3 °F), Min:36.2 °C (97.2 °F), Max:37.2 °C (99 °F)      A/P   1. Vancomycin dose change: Start Vancomycin 25 mg/kg x1, followed by 20 mg/kg q24hr (1400mg)  2. Next vancomycin level: prior to the 3rd or 4th dose or sooner if clinically indicated  3. Goal trough: 16-20 " mcg/ml  4. Comments: Unknown source of infection in neutropenic patient. Renal function appears stable, risk factors for accumulation noted above. Recommend rapid de-escalation as able. Dosing as above per protocol. Pharmacy will follow.    Shankar Plascencia, JabierD, BCPS

## 2017-04-29 NOTE — PROGRESS NOTES
Med rec updated and complete.  Allergies reviewed.  Placed call to both family and pts home pharmacy  ([facility]) .  Pt recently started on an antibiotic LEVAQUIN.

## 2017-04-29 NOTE — CARE PLAN
Problem: Safety  Goal: Will remain free from injury  Outcome: PROGRESSING AS EXPECTED  Bed in locked and low position call light in reach  Bed alarm on        Problem: Infection  Goal: Will remain free from infection  Outcome: PROGRESSING AS EXPECTED  Iv cefepime as ordered   Neutropenic prec in place

## 2017-04-29 NOTE — PROGRESS NOTES
[facility] Medicine Interval Note    Name Benjamin Post       1945   Age/Sex 71 y.o. male   MRN [id number]   Code Status Full Code     After 5PM or if no immediate response to page, please call for cross-coverage  Attending/Team: Dr. Apodaca/Ly Call [phone] to page   1st Call - Day Intern (R1):   Dr. Soto 2nd Call - Day Sr. Resident (R2/R3):   Dr. Law         Chief complaint/ reason for interval visit (Primary Diagnosis)   Back pain     Interval Problem Daily Status Update    Patient has back pain but in NAD  No fever  Will continue to monitor for spike in temperature give neutropenia and monitor hb and platelets and transfuse if needed .    Active Problems:    Neutropenia (CMS-HCC) POA: Yes      Overview: ANC 1000    Pancytopenia (CMS-HCC) (Chronic) POA: Yes    Acute bilateral low back pain without sciatica POA: Yes    Bone pain POA: Yes    Transaminitis POA: Yes  Resolved Problems:    * No resolved hospital problems. *      Review of Systems   Constitutional: Positive for fever. Negative for chills and weight loss.   HENT: Negative.    Eyes: Negative.    Respiratory: Negative.    Cardiovascular: Negative.    Musculoskeletal: Positive for back pain.   Skin: Negative.    Neurological: Negative.    Psychiatric/Behavioral: Negative.        Consultants/Specialty  none    Disposition  Inpatient being treated for back pain    Quality Measures  EKG reviewed, Labs reviewed, Medications reviewed and Radiology images reviewed  Mahajan catheter: No Mahajan        DVT prophylaxis - mechanical: SCDs                Physical Exam       Filed Vitals:    17 0300 17 0430 17 0510 17 0700   BP:   149/69 141/77   Pulse: 90 95 103 107   Temp:   37.2 °C (99 °F) 36.9 °C (98.4 °F)   Resp:   17 18   Height:       Weight:   72.4 kg (159 lb 9.8 oz)    SpO2: 97% 99% 96% 97%     Body mass index is 24.99 kg/(m^2). Weight: 72.4 kg (159 lb 9.8 oz)  Oxygen Therapy:  Pulse  Oximetry: 97 %, O2 (LPM): 0, O2 Delivery: None (Room Air)    Physical Exam   Constitutional: He is oriented to person, place, and time. No distress.   HENT:   Head: Normocephalic and atraumatic.   Eyes: Conjunctivae are normal. Pupils are equal, round, and reactive to light. No scleral icterus.   Neck: Normal range of motion. Neck supple.   Cardiovascular: Normal rate, regular rhythm and normal heart sounds.    Pulmonary/Chest: Effort normal and breath sounds normal.   Abdominal: Soft. Bowel sounds are normal. He exhibits no distension. There is no tenderness.   Musculoskeletal:        Arms:  Neurological: He is alert and oriented to person, place, and time. GCS score is 15.   Psychiatric: Affect normal.         Lab Data Review:      4/29/2017  8:41 AM    Recent Labs      04/29/17 0215   SODIUM  133*   POTASSIUM  4.4   CHLORIDE  103   CO2  21   BUN  32*   CREATININE  1.08   MAGNESIUM  2.1   PHOSPHORUS  2.6   CALCIUM  8.8       Recent Labs      04/29/17 0215   ALTSGPT  111*   ASTSGOT  39   ALKPHOSPHAT  371*   TBILIRUBIN  1.1   GLUCOSE  121*       Recent Labs      04/29/17 0215   RBC  2.61*   HEMOGLOBIN  7.7*   HEMATOCRIT  22.8*   PLATELETCT  11*   PROTHROMBTM  15.7*   APTT  29.8   INR  1.21*       Recent Labs      04/29/17 0215   WBC  0.5*   NEUTSPOLYS  33.30*   LYMPHOCYTES  45.50*   MONOCYTES  6.10   EOSINOPHILS  15.10*   BASOPHILS  0.00   ASTSGOT  39   ALTSGPT  111*   ALKPHOSPHAT  371*   TBILIRUBIN  1.1           Assessment/Plan     Neutropenia (CMS-[facility])  - patient on chemo for CML on bosutinib; last chemotherapy 4/26  - has been neutropenic for long time  - as per float time, family reported that patient was febrile at home  - has been afebrile since being inpatient  - was started on cefepime and vancomycin; dc vancomycin ; blood cultures drawn  - will continue cefepime for now; closely monitor vitals and if there is any spike in temperature; will repeat blood cultures further escalate antibiotics  -  neutropenic isoloation    Pancytopenia (CMS-HCC)  Pancytopenia secondary to CML and chemotherapy  - Anemia with Hb: 7.7  - platelet: 11  Plan:  - transfuse hb <7  - platelet if <10  - will update oncology regarding patient's inpatient status    Bone pain  since last chemo regimen on 4/26, a patient experienced worsening mid-low back bone pain along with specific right 8th rib (confirmed from bone scan) bone pain with no other neurological deficits or other complaints  - denied falls/injury/trauma, fever, chills, sob/chest pain/cough/n/v/d/abd pain/ble swelling/headache or vision changes  - did not have Bone marrow transplant at [facility] early April.  - on exam: non-toxic, non-septic appearance, only tenderness to bone pain along T1-12 and right 8th rib    - Ct imaging shows slight worsening of lucency of rib and of the vertrebral body from 4th of april  PLANS:  - put him on neutropenic isolation  - pain management of bone lytic pain with morphine IV with breakthrough oxycodone PO and lidocaine patch  - monitor; with fall precautions.

## 2017-04-30 LAB
ABO GROUP BLD: NORMAL
ALBUMIN SERPL BCP-MCNC: 3 G/DL (ref 3.2–4.9)
ALBUMIN/GLOB SERPL: 0.9 G/DL
ALP SERPL-CCNC: 307 U/L (ref 30–99)
ALT SERPL-CCNC: 89 U/L (ref 2–50)
ANION GAP SERPL CALC-SCNC: 7 MMOL/L (ref 0–11.9)
ANISOCYTOSIS BLD QL SMEAR: ABNORMAL
AST SERPL-CCNC: 31 U/L (ref 12–45)
BARCODED ABORH UBTYP: 5100
BARCODED ABORH UBTYP: 7300
BARCODED PRD CODE UBPRD: NORMAL
BARCODED PRD CODE UBPRD: NORMAL
BARCODED UNIT NUM UBUNT: NORMAL
BARCODED UNIT NUM UBUNT: NORMAL
BASOPHILS # BLD AUTO: 1.6 % (ref 0–1.8)
BASOPHILS # BLD: 0.01 K/UL (ref 0–0.12)
BILIRUB SERPL-MCNC: 0.9 MG/DL (ref 0.1–1.5)
BLD GP AB SCN SERPL QL: NORMAL
BUN SERPL-MCNC: 23 MG/DL (ref 8–22)
CALCIUM SERPL-MCNC: 8.4 MG/DL (ref 8.5–10.5)
CHLORIDE SERPL-SCNC: 108 MMOL/L (ref 96–112)
CO2 SERPL-SCNC: 20 MMOL/L (ref 20–33)
COMPONENT P 8504P: NORMAL
COMPONENT R 8504R: NORMAL
CREAT SERPL-MCNC: 0.89 MG/DL (ref 0.5–1.4)
EOSINOPHIL # BLD AUTO: 0.01 K/UL (ref 0–0.51)
EOSINOPHIL NFR BLD: 3.3 % (ref 0–6.9)
ERYTHROCYTE [DISTWIDTH] IN BLOOD BY AUTOMATED COUNT: 43.6 FL (ref 35.9–50)
ERYTHROCYTE [DISTWIDTH] IN BLOOD BY AUTOMATED COUNT: 44.6 FL (ref 35.9–50)
GFR SERPL CREATININE-BSD FRML MDRD: >60 ML/MIN/1.73 M 2
GLOBULIN SER CALC-MCNC: 3.4 G/DL (ref 1.9–3.5)
GLUCOSE SERPL-MCNC: 112 MG/DL (ref 65–99)
HCT VFR BLD AUTO: 19.3 % (ref 42–52)
HCT VFR BLD AUTO: 21.4 % (ref 42–52)
HGB BLD-MCNC: 6.6 G/DL (ref 14–18)
HGB BLD-MCNC: 7.4 G/DL (ref 14–18)
LYMPHOCYTES # BLD AUTO: 0.19 K/UL (ref 1–4.8)
LYMPHOCYTES NFR BLD: 48 % (ref 22–41)
MANUAL DIFF BLD: NORMAL
MCH RBC QN AUTO: 29.7 PG (ref 27–33)
MCH RBC QN AUTO: 29.7 PG (ref 27–33)
MCHC RBC AUTO-ENTMCNC: 34.2 G/DL (ref 33.7–35.3)
MCHC RBC AUTO-ENTMCNC: 34.6 G/DL (ref 33.7–35.3)
MCV RBC AUTO: 85.9 FL (ref 81.4–97.8)
MCV RBC AUTO: 86.9 FL (ref 81.4–97.8)
MONOCYTES # BLD AUTO: 0.04 K/UL (ref 0–0.85)
MONOCYTES NFR BLD AUTO: 8.9 % (ref 0–13.4)
MORPHOLOGY BLD-IMP: NORMAL
NEUTROPHILS # BLD AUTO: 0.15 K/UL (ref 1.82–7.42)
NEUTROPHILS # BLD AUTO: ABNORMAL K/UL (ref 1.82–7.42)
NEUTROPHILS NFR BLD: 34.1 % (ref 44–72)
NEUTS BAND NFR BLD MANUAL: 4.1 % (ref 0–10)
NRBC # BLD AUTO: 0 K/UL
NRBC # BLD AUTO: 0.02 K/UL
NRBC BLD AUTO-RTO: 0 /100 WBC
NRBC BLD AUTO-RTO: 5.1 /100 WBC
PLATELET # BLD AUTO: 24 K/UL (ref 164–446)
PLATELET # BLD AUTO: 5 K/UL (ref 164–446)
PLATELET BLD QL SMEAR: NORMAL
PLATELETS.RETICULATED NFR BLD AUTO: 2.1 K/UL (ref 0.6–13.1)
PMV BLD AUTO: 12.8 FL (ref 9–12.9)
PMV BLD AUTO: 8.9 FL (ref 9–12.9)
POTASSIUM SERPL-SCNC: 3.7 MMOL/L (ref 3.6–5.5)
PRODUCT TYPE UPROD: NORMAL
PRODUCT TYPE UPROD: NORMAL
PROT SERPL-MCNC: 6.4 G/DL (ref 6–8.2)
RBC # BLD AUTO: 2.22 M/UL (ref 4.7–6.1)
RBC # BLD AUTO: 2.49 M/UL (ref 4.7–6.1)
RBC BLD AUTO: PRESENT
RH BLD: NORMAL
SODIUM SERPL-SCNC: 135 MMOL/L (ref 135–145)
UNIT STATUS USTAT: NORMAL
UNIT STATUS USTAT: NORMAL
WBC # BLD AUTO: 0.4 K/UL (ref 4.8–10.8)
WBC # BLD AUTO: 0.4 K/UL (ref 4.8–10.8)

## 2017-04-30 PROCEDURE — 86644 CMV ANTIBODY: CPT

## 2017-04-30 PROCEDURE — 85055 RETICULATED PLATELET ASSAY: CPT

## 2017-04-30 PROCEDURE — 80053 COMPREHEN METABOLIC PANEL: CPT

## 2017-04-30 PROCEDURE — 700102 HCHG RX REV CODE 250 W/ 637 OVERRIDE(OP): Performed by: HOSPITALIST

## 2017-04-30 PROCEDURE — 770021 HCHG ROOM/CARE - ISO PRIVATE

## 2017-04-30 PROCEDURE — 85027 COMPLETE CBC AUTOMATED: CPT

## 2017-04-30 PROCEDURE — 85007 BL SMEAR W/DIFF WBC COUNT: CPT

## 2017-04-30 PROCEDURE — 700102 HCHG RX REV CODE 250 W/ 637 OVERRIDE(OP): Performed by: STUDENT IN AN ORGANIZED HEALTH CARE EDUCATION/TRAINING PROGRAM

## 2017-04-30 PROCEDURE — 36415 COLL VENOUS BLD VENIPUNCTURE: CPT

## 2017-04-30 PROCEDURE — 86850 RBC ANTIBODY SCREEN: CPT

## 2017-04-30 PROCEDURE — A9270 NON-COVERED ITEM OR SERVICE: HCPCS | Performed by: STUDENT IN AN ORGANIZED HEALTH CARE EDUCATION/TRAINING PROGRAM

## 2017-04-30 PROCEDURE — 700111 HCHG RX REV CODE 636 W/ 250 OVERRIDE (IP): Performed by: STUDENT IN AN ORGANIZED HEALTH CARE EDUCATION/TRAINING PROGRAM

## 2017-04-30 PROCEDURE — 30233R1 TRANSFUSION OF NONAUTOLOGOUS PLATELETS INTO PERIPHERAL VEIN, PERCUTANEOUS APPROACH: ICD-10-PCS | Performed by: INTERNAL MEDICINE

## 2017-04-30 PROCEDURE — 85025 COMPLETE CBC W/AUTO DIFF WBC: CPT

## 2017-04-30 PROCEDURE — 700105 HCHG RX REV CODE 258: Performed by: STUDENT IN AN ORGANIZED HEALTH CARE EDUCATION/TRAINING PROGRAM

## 2017-04-30 PROCEDURE — 86901 BLOOD TYPING SEROLOGIC RH(D): CPT

## 2017-04-30 PROCEDURE — 36430 TRANSFUSION BLD/BLD COMPNT: CPT

## 2017-04-30 PROCEDURE — 86923 COMPATIBILITY TEST ELECTRIC: CPT

## 2017-04-30 PROCEDURE — P9034 PLATELETS, PHERESIS: HCPCS

## 2017-04-30 PROCEDURE — P9016 RBC LEUKOCYTES REDUCED: HCPCS

## 2017-04-30 PROCEDURE — 700105 HCHG RX REV CODE 258

## 2017-04-30 PROCEDURE — 86900 BLOOD TYPING SEROLOGIC ABO: CPT

## 2017-04-30 PROCEDURE — A9270 NON-COVERED ITEM OR SERVICE: HCPCS | Performed by: HOSPITALIST

## 2017-04-30 PROCEDURE — 700101 HCHG RX REV CODE 250: Performed by: STUDENT IN AN ORGANIZED HEALTH CARE EDUCATION/TRAINING PROGRAM

## 2017-04-30 PROCEDURE — 30233N1 TRANSFUSION OF NONAUTOLOGOUS RED BLOOD CELLS INTO PERIPHERAL VEIN, PERCUTANEOUS APPROACH: ICD-10-PCS | Performed by: INTERNAL MEDICINE

## 2017-04-30 PROCEDURE — 87086 URINE CULTURE/COLONY COUNT: CPT

## 2017-04-30 RX ORDER — POTASSIUM CHLORIDE 20 MEQ/1
40 TABLET, EXTENDED RELEASE ORAL ONCE
Status: COMPLETED | OUTPATIENT
Start: 2017-04-30 | End: 2017-04-30

## 2017-04-30 RX ORDER — SODIUM CHLORIDE 9 MG/ML
INJECTION, SOLUTION INTRAVENOUS
Status: COMPLETED
Start: 2017-04-30 | End: 2017-04-30

## 2017-04-30 RX ADMIN — LEVETIRACETAM 750 MG: 250 TABLET, FILM COATED ORAL at 19:36

## 2017-04-30 RX ADMIN — POTASSIUM CHLORIDE 40 MEQ: 1500 TABLET, EXTENDED RELEASE ORAL at 07:47

## 2017-04-30 RX ADMIN — TAMSULOSIN HYDROCHLORIDE 0.4 MG: 0.4 CAPSULE ORAL at 07:58

## 2017-04-30 RX ADMIN — LEVETIRACETAM 750 MG: 250 TABLET, FILM COATED ORAL at 07:58

## 2017-04-30 RX ADMIN — CARVEDILOL 3.12 MG: 3.12 TABLET, FILM COATED ORAL at 07:58

## 2017-04-30 RX ADMIN — SODIUM CHLORIDE 250 ML: 9 INJECTION, SOLUTION INTRAVENOUS at 08:05

## 2017-04-30 RX ADMIN — CARVEDILOL 3.12 MG: 3.12 TABLET, FILM COATED ORAL at 18:14

## 2017-04-30 RX ADMIN — LIDOCAINE 1 PATCH: 50 PATCH CUTANEOUS at 05:34

## 2017-04-30 RX ADMIN — CEFEPIME 2 G: 2 INJECTION, POWDER, FOR SOLUTION INTRAMUSCULAR; INTRAVENOUS at 11:55

## 2017-04-30 ASSESSMENT — COPD QUESTIONNAIRES
DO YOU EVER COUGH UP ANY MUCUS OR PHLEGM?: NO/ONLY WITH OCCASIONAL COLDS OR INFECTIONS
DURING THE PAST 4 WEEKS HOW MUCH DID YOU FEEL SHORT OF BREATH: SOME OF THE TIME
HAVE YOU SMOKED AT LEAST 100 CIGARETTES IN YOUR ENTIRE LIFE: NO/DON'T KNOW
COPD SCREENING SCORE: 4

## 2017-04-30 ASSESSMENT — LIFESTYLE VARIABLES: EVER_SMOKED: NEVER

## 2017-04-30 ASSESSMENT — ENCOUNTER SYMPTOMS
BACK PAIN: 1
WEIGHT LOSS: 0
CARDIOVASCULAR NEGATIVE: 1
EYES NEGATIVE: 1
FEVER: 1
NEUROLOGICAL NEGATIVE: 1
RESPIRATORY NEGATIVE: 1
PSYCHIATRIC NEGATIVE: 1
CHILLS: 0

## 2017-04-30 ASSESSMENT — PAIN SCALES - GENERAL
PAINLEVEL_OUTOF10: 0
PAINLEVEL_OUTOF10: 0
PAINLEVEL_OUTOF10: 2
PAINLEVEL_OUTOF10: 0
PAINLEVEL_OUTOF10: 0

## 2017-04-30 NOTE — PROGRESS NOTES
Called [facility] to inform of platelets of 5 and Hgb of 6.6.  Blood and platelet transfusion ordered.  Per blood bank need new COD, ordered.  Will call when done.

## 2017-04-30 NOTE — ASSESSMENT & PLAN NOTE
Has completed multiple chemotherapy cycles.    The patient refused hospice care and he wants to try chemotherapy.   Oncology on board, to start with new med for chemotherapy.

## 2017-04-30 NOTE — CARE PLAN
Problem: Pain Management  Goal: Pain level will decrease to patient’s comfort goal  Outcome: PROGRESSING AS EXPECTED  Patient's pain has decreased since his arrival this past morning.  It is manageable and he is not requesting pain medications.    Problem: Venous Thromboembolism (VTW)/Deep Vein Thrombosis (DVT) Prevention:  Goal: Patient will participate in Venous Thrombosis (VTE)/Deep Vein Thrombosis (DVT)Prevention Measures  Outcome: PROGRESSING AS EXPECTED  Intervention: Ensure patient wears graduated elastic stockings (CARLTON hose) and/or SCDs, if ordered, when in bed or chair (Remove at least once per shift for skin check)  Patient is wearing SCDs as DVT prophylaxis.  Pharmacological prophylaxis is contraindicated due to thrombocytopenia.

## 2017-04-30 NOTE — PROGRESS NOTES
[facility] Internal Medicine Interval Note    Name Benjamin Post       1945   Age/Sex 71 y.o. male   MRN [id number]   Code Status Full Code     After 5PM or if no immediate response to page, please call for cross-coverage  Attending/Team: Dr. Apodaca/Ly Call [phone] to page   1st Call - Day Intern (R1):   Dr. Soto 2nd Call - Day Sr. Resident (R2/R3):   Dr. Law         Chief complaint/ reason for interval visit (Primary Diagnosis)   Back pain     Interval Problem Daily Status Update    Back pain has improved. No fever  Consulted Oncology; awaited Recs  Given 1 unit PRBC and Platelet  Will continue to monitor for spike in temperature give neutropenia and monitor hb and platelets and transfuse if needed .  Discussed regarding hospice with daughter. She will discuss with father (as he does not speak english well) and rest of family and will update in AM.     Active Problems:    Neutropenia (CMS-HCC) POA: Yes      Overview: ANC 1000    Pancytopenia (CMS-HCC) (Chronic) POA: Yes    CML (chronic myelocytic leukemia)-Accelerated phase (Chronic) POA: Yes    Acute bilateral low back pain without sciatica POA: Yes    Bone pain POA: Yes    Transaminitis POA: Yes  Resolved Problems:    * No resolved hospital problems. *      Review of Systems   Constitutional: Positive for fever. Negative for chills and weight loss.   HENT: Negative.    Eyes: Negative.    Respiratory: Negative.    Cardiovascular: Negative.    Musculoskeletal: Positive for back pain.   Skin: Negative.    Neurological: Negative.    Psychiatric/Behavioral: Negative.        Consultants/Specialty  none    Disposition  Inpatient being treated for back pain    Quality Measures  EKG reviewed, Labs reviewed, Medications reviewed and Radiology images reviewed  Mahajan catheter: No Mahajan        DVT prophylaxis - mechanical: SCDs                Physical Exam       Filed Vitals:    17 1300 17 1315 17 1331 17  1422   BP: 133/73 129/76 130/76 121/71   Pulse: 81 82 86 89   Temp: 37.1 °C (98.7 °F) 36.9 °C (98.4 °F) 37 °C (98.6 °F) 36.8 °C (98.2 °F)   Resp: 18 20 18 20   Height:       Weight:       SpO2: 99% 99% 97% 98%     Body mass index is 27.44 kg/(m^2). Weight: 79.5 kg (175 lb 4.3 oz)  Oxygen Therapy:  Pulse Oximetry: 98 %, O2 (LPM): 0, O2 Delivery: None (Room Air)    Physical Exam   Constitutional: He is oriented to person, place, and time. No distress.   HENT:   Head: Normocephalic and atraumatic.   Eyes: Conjunctivae are normal. Pupils are equal, round, and reactive to light. No scleral icterus.   Neck: Normal range of motion. Neck supple.   Cardiovascular: Normal rate, regular rhythm and normal heart sounds.    Pulmonary/Chest: Effort normal and breath sounds normal.   Abdominal: Soft. Bowel sounds are normal. He exhibits no distension. There is no tenderness.   Musculoskeletal:        Arms:  Neurological: He is alert and oriented to person, place, and time. GCS score is 15.   Psychiatric: Affect normal.         Lab Data Review:      4/29/2017  8:41 AM    Recent Labs      04/29/17 0215 04/30/17   0353   SODIUM  133*  135   POTASSIUM  4.4  3.7   CHLORIDE  103  108   CO2  21  20   BUN  32*  23*   CREATININE  1.08  0.89   MAGNESIUM  2.1   --    PHOSPHORUS  2.6   --    CALCIUM  8.8  8.4*       Recent Labs      04/29/17 0215  04/30/17   0353   ALTSGPT  111*  89*   ASTSGOT  39  31   ALKPHOSPHAT  371*  307*   TBILIRUBIN  1.1  0.9   GLUCOSE  121*  112*       Recent Labs      04/29/17 0215  04/29/17   1303  04/30/17   0353   RBC  2.61*  2.58*  2.22*   HEMOGLOBIN  7.7*  7.7*  6.6*   HEMATOCRIT  22.8*  23.1*  19.3*   PLATELETCT  11*  10*  5*   PROTHROMBTM  15.7*   --    --    APTT  29.8   --    --    INR  1.21*   --    --        Recent Labs      04/29/17   0215  04/29/17   1303  04/30/17   0353   WBC  0.5*  0.5*  0.4*   NEUTSPOLYS  33.30*   --   34.10*   LYMPHOCYTES  45.50*   --   48.00*   MONOCYTES  6.10   --   8.90    EOSINOPHILS  15.10*   --   3.30   BASOPHILS  0.00   --   1.60   ASTSGOT  39   --   31   ALTSGPT  111*   --   89*   ALKPHOSPHAT  371*   --   307*   TBILIRUBIN  1.1   --   0.9           Assessment/Plan     Neutropenia (CMS-HCC)  - patient on chemo for CML on bosutinib; last chemotherapy 4/26  - has been neutropenic for long time  - as per float time, family reported that patient was febrile at home  - has been afebrile since being inpatient  - was started on cefepime and vancomycin; dc vancomycin ; blood cultures drawn  - will continue cefepime for now; closely monitor vitals and if there is any spike in temperature; will repeat blood cultures further escalate antibiotics  - neutropenic isoloation  - 4/30: no spike in temp so far. Will continue Cefepime for now. Will consult oncology for duration of antibiotics as Neutropenia has been going on for long time.     Pancytopenia (CMS-HCC)  Pancytopenia secondary to CML and chemotherapy  - Anemia with Hb: 7.7  - platelet: 11  Plan:  - transfuse hb <7  - platelet if <10  - 4/30: given 1 unit of Irradiated and CMV neg Platelets (5000) and 1 units of CMV neg and Irradiated PRBC (6.6). Oncology updated regarding patient's inpatient status; Dr. Frank will see the patient.     Bone pain  since last chemo regimen on 4/26, a patient experienced worsening mid-low back bone pain along with specific right 8th rib (confirmed from bone scan) bone pain with no other neurological deficits or other complaints  - denied falls/injury/trauma, fever, chills, sob/chest pain/cough/n/v/d/abd pain/ble swelling/headache or vision changes  - did not have Bone marrow transplant at [facility] post [facility] hospital early April.  - on exam: non-toxic, non-septic appearance, only tenderness to bone pain along T1-12 and right 8th rib    - Ct imaging shows slight worsening of lucency of rib and of the vertrebral body from 4th of april  PLANS:  - put him on neutropenic isolation  - pain management of bone  lytic pain with morphine IV with breakthrough oxycodone PO and lidocaine patch  - monitor; with fall precautions.   - Pain Improved today. Patient feeling better.       CML (chronic myelocytic leukemia)-Accelerated phase  Patient has CML. Has completed multiple chemotherapy cycles. Spoke with daughter, and she told that patient was not considered candidate for bone marrow transplant by Dr. Vu as the cancer has spread.   Discussed the option of hospice with the daughter. She will talk to the family and her father and will update tomorrow morning (5/1)  Will also consult Oncology for prognosis.

## 2017-04-30 NOTE — ASSESSMENT & PLAN NOTE
- patient was on chemo for CML on bosutinib; last chemotherapy 4/26  - has been neutropenic for long time  - has been afebrile since being inpatient  - was started on cefepime and vancomycin; dc vancomycin ; blood cultures Negative till now.   - d/c cefepime at this time; closely monitor vitals and if there is any spike in temperature; will repeat blood cultures further start antibiotics if needed.   - neutropenic isoloation  -Oncology consult to start with new chemotherapy.

## 2017-04-30 NOTE — PROGRESS NOTES
Bedside report completed.  Assumed pt care. Patient in bed, resting, in no apparent distress.  Safety precautions in place. Call light & personal belongings within reach.  Plan of care discussed.  Neutropenic precautions in place.  Patient is on room air, IV is running NS @ 75 mL/hour.  Patient is receiving IV abx.  Patient reports 8/10 pain in back and generalized throughout the body, no needs expressed at this time, will medicate per MAR.  Patient is awaiting a bed on oncology, therefore, no tele box in place.  Will continue to monitor with hourly rounding.

## 2017-04-30 NOTE — PROGRESS NOTES
Attending Note:  I have personally evaluated and examined this patient and agree with the findings as documented in the resident note except as documented in this attending note.  I am actively involved in the patient's care.    71 year old male with CML and chronic pancytopenia.  He was admitted with subjective fever and back pain.  There is no clear cause of the back pain.  May be related to bony pain from CML?  He was started on empiric cefipime.  Appreciate heme/onc consult for help in duration treatment.  Poor prognosis.

## 2017-04-30 NOTE — CARE PLAN
Problem: Infection  Goal: Will remain free from infection  Intervention: Assess signs and symptoms of infection  Pt lab values monitored, VS monitored. No new s/s infection.           Problem: Knowledge Deficit  Goal: Knowledge of disease process/condition, treatment plan, diagnostic tests, and medications will improve  Intervention: Explain information regarding disease process/condition, treatment plan, diagnostic tests, and medications and document in education  Pt  educated on POC, all questions answered in regards to disease process, treatment and diet. Pt  verbalize understanding and voice no further questions at this time.

## 2017-04-30 NOTE — ASSESSMENT & PLAN NOTE
- transfuse if  hb <7  - platelet if <10  - 4/30: given 1 unit of Irradiated and CMV neg Platelets (5000) and 1 units of CMV neg and Irradiated PRBC (6.6).

## 2017-04-30 NOTE — ASSESSMENT & PLAN NOTE
- pain management of bone lytic pain with morphine IV with breakthrough oxycodone PO and lidocaine patch  - monitor; with fall precautions.   - Pain Improved today. Patient feeling ok.   - Follow up and consider radiation therapy if needed.

## 2017-04-30 NOTE — CONSULTS
"Consult Note: Oncology    Date of consultation: 4/30/2017 2:53 PM        Reason for consultation: Chronic myelogenous leukemia, possibly entering blast phase, back pain from osteolytic lesion  Referring provider Maryam Javed MD    History of presenting illness:     JAQUELINE Post   is a 71 y.o. year old  patient of Dr. Cole with refractory CML. According to Dr. Cole's prior notes from 3/30/17-    \"The patient presented in March 2015 with    a bone marrow demonstrating 15% blasts and a BCR-ABL that was 100%.  He was    started on dasatinib.  With treatment, the patient developed a pleural    effusion.  Overtime, however, after the reinitiation of dasatinib, the    patient's BCR-ABL trended downwards.  The patient has chronically had    pancytopenia.  He has been seen by Dr. Lew at [facility].  The patient had a    mutational analysis.  He was not found to have any genomic alterations that    would affect CML dosing.  For a while, the patient was continued on dasatinib    with interruptions.  As the patient's BCR-ABL fall and then juan, he was    switched to bosutinib that was about a year ago.  He has continued to be    followed by Dr. Lew.  Last year, the patient had a bone marrow that was    hypocellular and negative for a [place] chromosome.  He had a repeat bone   marrow last summer that was suboptimal, but showed 13% blasts and was then    positive for BCR-ABL.  Last summer, the patient had a subdural hematoma on    8/03/2016.  He was [facility].  He was not deemed to be a surgical    candidate.  Overtime getting platelets twice a week, the patient's subdural    resolved.  The patient has been on track to get a transplant at [facility]    despite his pancytopenia.  Last weekend, the patient had all of his teeth    pulled because of a dental abscess.  Shortly after having his teeth pulled, he   was noted to have seizure activity.\" He was started on Keppra.    He was then admitted with " confusion from hypercalcemia requiring hydration bisphosphonates. CT from thorax to pelvis for bone marrow transplant workup by Oncology and was found to have bony changes consistent with CML and small 2mm pulmonary nodule and enlarged prostate. His PSA was also elevated at 8.4 .   Bone marrow BIOPSY done on 4/14/17 at [place]: 90% cellular, 25% blasts. Bcr-abl: 19%.  Dr. Cole planning on Synribo. Bosutinib discontinued since 42/6    Admitted with fever and worsening back pain. 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  Currently feeling better. Getting transfusions.    Past Medical History:    Past Medical History   Diagnosis Date   • Hypertension    • Cancer (CMS-HCC)    • Indigestion    • Heart burn        Past surgical history:    Past Surgical History   Procedure Laterality Date   • Bone marrow aspiration  11/5/2015     Procedure: BONE MARROW ASPIRATION;  Surgeon: Rosita Bolton M.D.;  Location: [facility];  Service:    • Bone marrow biopsy, ndl/trocar  11/5/2015     Procedure: BONE MARROW BIOPSY, NDL/TROCAR;  Surgeon: Rosita Bolton M.D.;  Location: [facility];  Service:    • Bone marrow biopsy, ndl/trocar  3/23/2016     Procedure: BONE MARROW BIOPSY, NDL/TROCAR;  Surgeon: Fili Prakash M.D.;  Location: [facility];  Service:    • Bone marrow aspiration  3/23/2016     Procedure: BONE MARROW ASPIRATION;  Surgeon: Fili Prakash M.D.;  Location: [facility];  Service:    • Bone marrow biopsy, ndl/trocar  7/12/2016     Procedure: BONE MARROW BIOPSY, NDL/TROCAR;  Surgeon: Fili Prakash M.D.;  Location: [facility];  Service:    • Bone marrow aspiration  7/12/2016     Procedure: BONE MARROW ASPIRATION;  Surgeon: Flii Prakash M.D.;  Location: [facility];  Service:    • Dental extraction(s) N/A 3/25/2017     Procedure: DENTAL EXTRACTION(S);  Surgeon: Surenrda Barragan D.M.D.,M.AVA;   Location: SURGERY [facility];  Service:    • Submandible abscess incision and drainage N/A 3/25/2017     Procedure:  INTRAORAL  ABSCESS INCISION AND DRAINAGE;  Surgeon: Surendra Barragan D.M.D., M.D.;  Location: SURGERY [facility];  Service:        Allergies:  Review of patient's allergies indicates no known allergies.    Medications:    Current Facility-Administered Medications   Medication Dose Route Frequency Provider Last Rate Last Dose   • senna-docusate (PERICOLACE or SENOKOT S) 8.6-50 MG per tablet 2 Tab  2 Tab Oral BID Rc Boss D.O.   2 Tab at 04/29/17 0900    And   • polyethylene glycol/lytes (MIRALAX) PACKET 1 Packet  1 Packet Oral QDAY PRN AVA TurciosO.        And   • magnesium hydroxide (MILK OF MAGNESIA) suspension 30 mL  30 mL Oral QDAY PRN AVA TurciosO.        And   • bisacodyl (DULCOLAX) suppository 10 mg  10 mg Rectal QDAY PRN JAQUELINE Turcios.O.       • Respiratory Care per Protocol   Nebulization Continuous RT JAQUELINE Turcios.ALONZO.       • ondansetron (ZOFRAN) syringe/vial injection 4 mg  4 mg Intravenous Q4HRS PRN AVA TruciosO.       • ondansetron (ZOFRAN ODT) dispertab 4 mg  4 mg Oral Q4HRS PRN JAQUELINE Turcios.O.       • morphine (pf) 4 mg/ml injection 2 mg  2 mg Intravenous Q4HRS PRN JAQUELINE Turcios.O.   2 mg at 04/29/17 1230   • cefepime (MAXIPIME) 2 g in  mL IVPB  2 g Intravenous Q12HRS JAQUELINE Turcios.O.   Stopped at 04/30/17 1225   • carvedilol (COREG) tablet 3.125 mg  3.125 mg Oral BID WITH MEALS JAQUELINE Turcios.O.   3.125 mg at 04/30/17 0758   • levetiracetam (KEPPRA) tablet 750 mg  750 mg Oral BID JAQUELINE Turcios.O.   750 mg at 04/30/17 0758   • oxycodone immediate-release (ROXICODONE) tablet 5 mg  5 mg Oral Q4HRS PRN Rc Boss D.O.   Stopped at 04/29/17 1220   • tamsulosin (FLOMAX) capsule 0.4 mg  0.4 mg Oral QAM Rc Boss D.O.   0.4 mg at 04/30/17 0758   • lidocaine (LIDODERM) 5 % 1 Patch  1 Patch Transdermal Q24HR Rc Boss,  "D.O.   1 Patch at 04/30/17 0534   • vitamin D (Ergocalciferol) (DRISDOL) capsule 50,000 Units  50,000 Units Oral Q7 DAYS Gerry Soto M.D.   50,000 Units at 04/29/17 0909       Social History:     Social History     Social History   • Marital Status: Legally      Spouse Name: N/A   • Number of Children: N/A   • Years of Education: N/A     Occupational History   • Not on file.     Social History Main Topics   • Smoking status: Never Smoker    • Smokeless tobacco: Not on file   • Alcohol Use: No   • Drug Use: No   • Sexual Activity: Not on file     Other Topics Concern   • Not on file     Social History Narrative       Family History:   No family history on file.    Review of Systems:  Obtained through . Currently feeling much better. Still has some scapular area pain. Denies significant shortness of breath. No obvious bleeding from anywhere else. Does not appear to be confused.      Physical Exam:  Vitals:   /71 mmHg  Pulse 89  Temp(Src) 36.8 °C (98.2 °F)  Resp 20  Ht 1.702 m (5' 7\")  Wt 79.5 kg (175 lb 4.3 oz)  BMI 27.44 kg/m2  SpO2 98%    General: Not in acute distress, alert and oriented x 3  HEENT: No pallor, icterus. Oropharynx clear. Edentulous   Neck: Supple, no palpable masses.  Lymph nodes: No palpable cervical, supraclavicular, axillary or inguinal lymphadenopathy.    CVS: regular rate and rhythm, no rubs or gallops  RESP: Clear to auscultate bilaterally, no wheezing or crackles.   ABD: Soft, non tender, non distended, positive bowel sounds, spleen palpable  EXT: No edema or cyanosis  CNS: Alert and oriented x3, No focal deficits.  Skin- No rash      Labs:   Recent Labs      04/29/17   0215  04/29/17   1303  04/30/17   0353   RBC  2.61*  2.58*  2.22*   HEMOGLOBIN  7.7*  7.7*  6.6*   HEMATOCRIT  22.8*  23.1*  19.3*   PLATELETCT  11*  10*  5*   PROTHROMBTM  15.7*   --    --    APTT  29.8   --    --    INR  1.21*   --    --      Lab Results   Component Value Date/Time    " SODIUM 135 04/30/2017 03:53 AM    POTASSIUM 3.7 04/30/2017 03:53 AM    CHLORIDE 108 04/30/2017 03:53 AM    CO2 20 04/30/2017 03:53 AM    GLUCOSE 112* 04/30/2017 03:53 AM    BUN 23* 04/30/2017 03:53 AM    CREATININE 0.89 04/30/2017 03:53 AM        Assessment and Plan:  CML- currently in blast phase according with recent bone marrow biopsy. Unfortunately, the planned bone marrow transplant has been delayed multiple times due to various issues. Dr. Vu was planning on starting him on Synribo . Not available through pharmacy. Will need approval. Consider starting it s.q bid during this admission as Dr. Vu was planning on admitting him anyways. May have to ask  to d/w pt. about hospice option too. Continue holding Bosulib.    Rib Lytic lesion- appears to be causing his back pain .   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 Palliative radiation may alleviate the pain if it worsens. Call XRt tomorrow.    Severe chronic pancytopenia-transfuse prn    Recent hypercalcemia-probably from blast transformation, resolved with bisphosphonate.    H/o IC hemorrhage.-Monitor in view of her low platelets.  ? Fever at home - on empiric antibiotics. Follow up on the cultures. Imaging shows no pneumonia.    Complex patient requiring complex decision making.     Please note that this dictation was created using voice recognition software. I have made every reasonable attempt to correct obvious errors, but I expect that there are errors of grammar and possibly content that I did not discover before finalizing the note.      SIGNATURES:  Toe Frank    CC:  Pcp Pt States None  No ref. provider found

## 2017-04-30 NOTE — PROGRESS NOTES
The pt appeared very fatigued today, and slept for most of the day.  He received a unit of RBCs and unit of platelets, with no apparent adverse effects.  The pt's daughter was at the bedside and discussed the pt's medical history and prognosis with Dr. Soto. Pt remains afebrile.     12 hour CC

## 2017-04-30 NOTE — PROGRESS NOTES
Alerted Dr. Perez that the pt's daughter is in [place] and cannot bring the Bosutinib 300 mg in for him. He missed this medicine yesterday and today

## 2017-04-30 NOTE — PROGRESS NOTES
Rox from Lab called with critical result of platelets of 5 at 0535. Critical lab result read back to Rox.   Dr. Lawrence notified of critical lab result at 0545.  Critical lab result read back by Dr. Lawrecne.

## 2017-04-30 NOTE — PROGRESS NOTES
Report received by day RN. Assumed care of pt. Assessment complete. Food from family at bedside. Pt A&O x 4. Pt denies pain, has blood running @ 30 mL/hr, and has no s/s negative reaction. Pt in no apparent signs of distress. Plan of care discussed. Call light in reach,  bed in lowest position, and pt has no further questions at this time.

## 2017-05-01 LAB
ALBUMIN SERPL BCP-MCNC: 3.1 G/DL (ref 3.2–4.9)
ALBUMIN/GLOB SERPL: 0.9 G/DL
ALP SERPL-CCNC: 277 U/L (ref 30–99)
ALT SERPL-CCNC: 71 U/L (ref 2–50)
ANION GAP SERPL CALC-SCNC: 7 MMOL/L (ref 0–11.9)
AST SERPL-CCNC: 21 U/L (ref 12–45)
BACTERIA UR CULT: NORMAL
BASOPHILS # BLD AUTO: 2.1 % (ref 0–1.8)
BASOPHILS # BLD: 0.01 K/UL (ref 0–0.12)
BILIRUB SERPL-MCNC: 1 MG/DL (ref 0.1–1.5)
BLASTS NFR BLD MANUAL: 1.1 %
BUN SERPL-MCNC: 24 MG/DL (ref 8–22)
CALCIUM SERPL-MCNC: 8.6 MG/DL (ref 8.5–10.5)
CHLORIDE SERPL-SCNC: 106 MMOL/L (ref 96–112)
CO2 SERPL-SCNC: 20 MMOL/L (ref 20–33)
CREAT SERPL-MCNC: 0.89 MG/DL (ref 0.5–1.4)
EOSINOPHIL # BLD AUTO: 0.03 K/UL (ref 0–0.51)
EOSINOPHIL NFR BLD: 8.6 % (ref 0–6.9)
ERYTHROCYTE [DISTWIDTH] IN BLOOD BY AUTOMATED COUNT: 43.6 FL (ref 35.9–50)
GFR SERPL CREATININE-BSD FRML MDRD: >60 ML/MIN/1.73 M 2
GLOBULIN SER CALC-MCNC: 3.5 G/DL (ref 1.9–3.5)
GLUCOSE SERPL-MCNC: 112 MG/DL (ref 65–99)
HCT VFR BLD AUTO: 22.6 % (ref 42–52)
HGB BLD-MCNC: 7.8 G/DL (ref 14–18)
LYMPHOCYTES # BLD AUTO: 0.13 K/UL (ref 1–4.8)
LYMPHOCYTES NFR BLD: 33.3 % (ref 22–41)
MAGNESIUM SERPL-MCNC: 1.9 MG/DL (ref 1.5–2.5)
MANUAL DIFF BLD: NORMAL
MCH RBC QN AUTO: 29.5 PG (ref 27–33)
MCHC RBC AUTO-ENTMCNC: 34.5 G/DL (ref 33.7–35.3)
MCV RBC AUTO: 85.6 FL (ref 81.4–97.8)
MONOCYTES # BLD AUTO: 0.06 K/UL (ref 0–0.85)
MONOCYTES NFR BLD AUTO: 16.1 % (ref 0–13.4)
MORPHOLOGY BLD-IMP: NORMAL
MYELOCYTES NFR BLD MANUAL: 1.1 %
NEUTROPHILS # BLD AUTO: 0.15 K/UL (ref 1.82–7.42)
NEUTROPHILS NFR BLD: 35.5 % (ref 44–72)
NEUTS BAND NFR BLD MANUAL: 2.2 % (ref 0–10)
NRBC # BLD AUTO: 0 K/UL
NRBC BLD AUTO-RTO: 0 /100 WBC
PHOSPHATE SERPL-MCNC: 2.9 MG/DL (ref 2.5–4.5)
PLATELET # BLD AUTO: 25 K/UL (ref 164–446)
PLATELET BLD QL SMEAR: ADEQUATE
PLATELETS.RETICULATED NFR BLD AUTO: 1 K/UL (ref 0.6–13.1)
PMV BLD AUTO: 11 FL (ref 9–12.9)
POTASSIUM SERPL-SCNC: 3.9 MMOL/L (ref 3.6–5.5)
PROT SERPL-MCNC: 6.6 G/DL (ref 6–8.2)
RBC # BLD AUTO: 2.64 M/UL (ref 4.7–6.1)
RBC BLD AUTO: PRESENT
SIGNIFICANT IND 70042: NORMAL
SITE SITE: NORMAL
SODIUM SERPL-SCNC: 133 MMOL/L (ref 135–145)
SOURCE SOURCE: NORMAL
WBC # BLD AUTO: 0.4 K/UL (ref 4.8–10.8)

## 2017-05-01 PROCEDURE — 80053 COMPREHEN METABOLIC PANEL: CPT

## 2017-05-01 PROCEDURE — 770021 HCHG ROOM/CARE - ISO PRIVATE

## 2017-05-01 PROCEDURE — A9270 NON-COVERED ITEM OR SERVICE: HCPCS | Performed by: STUDENT IN AN ORGANIZED HEALTH CARE EDUCATION/TRAINING PROGRAM

## 2017-05-01 PROCEDURE — 36415 COLL VENOUS BLD VENIPUNCTURE: CPT

## 2017-05-01 PROCEDURE — 700102 HCHG RX REV CODE 250 W/ 637 OVERRIDE(OP): Performed by: STUDENT IN AN ORGANIZED HEALTH CARE EDUCATION/TRAINING PROGRAM

## 2017-05-01 PROCEDURE — 700101 HCHG RX REV CODE 250: Performed by: STUDENT IN AN ORGANIZED HEALTH CARE EDUCATION/TRAINING PROGRAM

## 2017-05-01 PROCEDURE — 85055 RETICULATED PLATELET ASSAY: CPT

## 2017-05-01 PROCEDURE — 83735 ASSAY OF MAGNESIUM: CPT

## 2017-05-01 PROCEDURE — 84100 ASSAY OF PHOSPHORUS: CPT

## 2017-05-01 PROCEDURE — 85007 BL SMEAR W/DIFF WBC COUNT: CPT

## 2017-05-01 PROCEDURE — 99233 SBSQ HOSP IP/OBS HIGH 50: CPT | Mod: GC | Performed by: INTERNAL MEDICINE

## 2017-05-01 PROCEDURE — 700111 HCHG RX REV CODE 636 W/ 250 OVERRIDE (IP): Performed by: STUDENT IN AN ORGANIZED HEALTH CARE EDUCATION/TRAINING PROGRAM

## 2017-05-01 PROCEDURE — 85027 COMPLETE CBC AUTOMATED: CPT

## 2017-05-01 PROCEDURE — 700105 HCHG RX REV CODE 258: Performed by: STUDENT IN AN ORGANIZED HEALTH CARE EDUCATION/TRAINING PROGRAM

## 2017-05-01 RX ADMIN — CARVEDILOL 3.12 MG: 3.12 TABLET, FILM COATED ORAL at 17:23

## 2017-05-01 RX ADMIN — STANDARDIZED SENNA CONCENTRATE AND DOCUSATE SODIUM 2 TABLET: 8.6; 5 TABLET, FILM COATED ORAL at 19:56

## 2017-05-01 RX ADMIN — LEVETIRACETAM 750 MG: 250 TABLET, FILM COATED ORAL at 10:09

## 2017-05-01 RX ADMIN — CARVEDILOL 3.12 MG: 3.12 TABLET, FILM COATED ORAL at 10:09

## 2017-05-01 RX ADMIN — LIDOCAINE 1 PATCH: 50 PATCH CUTANEOUS at 05:38

## 2017-05-01 RX ADMIN — OXYCODONE HYDROCHLORIDE 5 MG: 5 TABLET ORAL at 19:56

## 2017-05-01 RX ADMIN — ONDANSETRON 4 MG: 4 TABLET, ORALLY DISINTEGRATING ORAL at 19:55

## 2017-05-01 RX ADMIN — CEFEPIME 2 G: 2 INJECTION, POWDER, FOR SOLUTION INTRAMUSCULAR; INTRAVENOUS at 13:27

## 2017-05-01 RX ADMIN — CEFEPIME 2 G: 2 INJECTION, POWDER, FOR SOLUTION INTRAMUSCULAR; INTRAVENOUS at 00:07

## 2017-05-01 RX ADMIN — LEVETIRACETAM 750 MG: 250 TABLET, FILM COATED ORAL at 19:56

## 2017-05-01 RX ADMIN — TAMSULOSIN HYDROCHLORIDE 0.4 MG: 0.4 CAPSULE ORAL at 10:09

## 2017-05-01 ASSESSMENT — ENCOUNTER SYMPTOMS
ABDOMINAL PAIN: 0
WEIGHT LOSS: 0
COUGH: 0
PSYCHIATRIC NEGATIVE: 1
VOMITING: 0
CARDIOVASCULAR NEGATIVE: 1
SHORTNESS OF BREATH: 0
RESPIRATORY NEGATIVE: 1
SORE THROAT: 0
SPUTUM PRODUCTION: 0
HEARTBURN: 0
HEMOPTYSIS: 0
EYES NEGATIVE: 1
DIARRHEA: 0
NEUROLOGICAL NEGATIVE: 1
HEADACHES: 0
FEVER: 0
BACK PAIN: 1
NAUSEA: 0
CHILLS: 0

## 2017-05-01 ASSESSMENT — PAIN SCALES - GENERAL
PAINLEVEL_OUTOF10: 0
PAINLEVEL_OUTOF10: 8
PAINLEVEL_OUTOF10: 0
PAINLEVEL_OUTOF10: 0

## 2017-05-01 NOTE — DISCHARGE PLANNING
Medical Social Work    Update: SW paged [facility] to see if there are any needs from SW. Per [facility], family is discussing hospice. Per [facility], [facility] will update SW on family decision regarding POC.

## 2017-05-01 NOTE — DISCHARGE PLANNING
Medical Social Work    Update: SW called pt's dtr. She will be in between 4-4:30pm to make hospice choice. SW will leave choice form at bedside in case pt's dtr arrives after 4:30.

## 2017-05-01 NOTE — PROGRESS NOTES
Report received from nightshift RN. Patient sitting up in bed, alert and oriented. Denies pain and denies nausea at this time. Daughters at bedside. Bed low and in locked position, call light within reach. Will continue to monitor.

## 2017-05-01 NOTE — DISCHARGE PLANNING
Medical Social Work    Update: Family has decided on hospice. SW will meet with dtr at bedside when she returns from an appointment to obtain hospice choice.

## 2017-05-01 NOTE — PROGRESS NOTES
Oncology/Hematology Progress Note               Author: Bautista LANG Agustin    Cc:  CML Date & Time created: 5/1/2017  2:09 PM     Interval History:  Difficulty as only speaks Sinhala.  No pain per questioning, but not sure if this means the pain is now controlled on medications or not.  No n/v/SOB/cough.      PMHx, PSurgHx, SocHx, FAMHx:  All reviewed and unchanged    Review of Systems:  Review of Systems   Constitutional: Positive for malaise/fatigue. Negative for fever and chills.   HENT: Negative for sore throat.    Respiratory: Negative for cough, hemoptysis, sputum production and shortness of breath.    Cardiovascular: Negative for chest pain and leg swelling.   Gastrointestinal: Negative for heartburn, nausea, vomiting, abdominal pain and diarrhea.   Neurological: Negative for headaches.       Physical Exam:  Physical Exam   Constitutional: He is oriented to person, place, and time. He appears well-developed and well-nourished. No distress.   HENT:   Head: Normocephalic and atraumatic.   Mouth/Throat: Oropharynx is clear and moist. No oropharyngeal exudate.   Eyes: Conjunctivae and EOM are normal. Right eye exhibits no discharge. Left eye exhibits no discharge. No scleral icterus.   Neck: Normal range of motion. Neck supple. No thyromegaly present.   Cardiovascular: Normal rate, regular rhythm and normal heart sounds.  Exam reveals no gallop and no friction rub.    No murmur heard.  Pulmonary/Chest: Effort normal and breath sounds normal. No respiratory distress. He has no wheezes. He has no rales.   Abdominal: Soft. Bowel sounds are normal. He exhibits no distension. There is no tenderness. There is no rebound and no guarding.   Musculoskeletal: Normal range of motion. He exhibits no edema or tenderness.   Lymphadenopathy:     He has no cervical adenopathy.   Neurological: He is alert and oriented to person, place, and time. No cranial nerve deficit. Coordination normal.   Skin: Skin is warm and dry. No rash  noted. He is not diaphoretic. No erythema.   Psychiatric: He has a normal mood and affect. His behavior is normal. Thought content normal.       Labs:        Invalid input(s): [id number]  Recent Labs      04/29/17 0215   TROPONINI  <0.01     Recent Labs      04/29/17 0215 04/30/17 0353 05/01/17 0131   SODIUM  133*  135  133*   POTASSIUM  4.4  3.7  3.9   CHLORIDE  103  108  106   CO2  21  20  20   BUN  32*  23*  24*   CREATININE  1.08  0.89  0.89   MAGNESIUM  2.1   --   1.9   PHOSPHORUS  2.6   --   2.9   CALCIUM  8.8  8.4*  8.6     Recent Labs      04/29/17 0215 04/30/17 0353 05/01/17 0131   ALTSGPT  111*  89*  71*   ASTSGOT  39  31  21   ALKPHOSPHAT  371*  307*  277*   TBILIRUBIN  1.1  0.9  1.0   GLUCOSE  121*  112*  112*     Recent Labs      04/29/17 0215 04/30/17 0353 04/30/17 1515 05/01/17 0131   RBC  2.61*   < >  2.22*  2.49*  2.64*   HEMOGLOBIN  7.7*   < >  6.6*  7.4*  7.8*   HEMATOCRIT  22.8*   < >  19.3*  21.4*  22.6*   PLATELETCT  11*   < >  5*  24*  25*   PROTHROMBTM  15.7*   --    --    --    --    APTT  29.8   --    --    --    --    INR  1.21*   --    --    --    --     < > = values in this interval not displayed.     Recent Labs      04/29/17 0215 04/30/17 0353 04/30/17 1515 05/01/17 0131   WBC  0.5*   < >  0.4*  0.4*  0.4*   NEUTSPOLYS  33.30*   --   34.10*   --   35.50*   LYMPHOCYTES  45.50*   --   48.00*   --   33.30   MONOCYTES  6.10   --   8.90   --   16.10*   EOSINOPHILS  15.10*   --   3.30   --   8.60*   BASOPHILS  0.00   --   1.60   --   2.10*   ASTSGOT  39   --   31   --   21   ALTSGPT  111*   --   89*   --   71*   ALKPHOSPHAT  371*   --   307*   --   277*   TBILIRUBIN  1.1   --   0.9   --   1.0    < > = values in this interval not displayed.     Recent Labs      04/29/17   0215  04/30/17   0353  05/01/17   0131   SODIUM  133*  135  133*   POTASSIUM  4.4  3.7  3.9   CHLORIDE  103  108  106   CO2  21  20  20   GLUCOSE  121*  112*  112*   BUN  32*   23*  24*   CREATININE  1.08  0.89  0.89   CALCIUM  8.8  8.4*  8.6     Hemodynamics:  Temp (24hrs), Av.9 °C (98.5 °F), Min:36.7 °C (98 °F), Max:37.5 °C (99.5 °F)  Temperature:  (Medical at this time.)  Pulse  Av.9  Min: 78  Max: 107   Blood Pressure : 137/76 mmHg     Respiratory:    Respiration: 16, Pulse Oximetry: 99 %     Work Of Breathing / Effort: Mild  RUL Breath Sounds: Diminished, RML Breath Sounds: Diminished, RLL Breath Sounds: Diminished, WAGNER Breath Sounds: Diminished, LLL Breath Sounds: Diminished  Fluids:    Intake/Output Summary (Last 24 hours) at 17 1409  Last data filed at 17 0838   Gross per 24 hour   Intake   1006 ml   Output    575 ml   Net    431 ml     Weight: 79.1 kg (174 lb 6.1 oz)  GI/Nutrition:  Orders Placed This Encounter   Procedures   • Diet Order     Standing Status: Standing      Number of Occurrences: 1      Standing Expiration Date:      Order Specific Question:  Diet:     Answer:  2 Gram Sodium [7]     Order Specific Question:  Texture/Fiber modifications:     Answer:  Dysphagia 3(Mechanical Soft)specify fluid consistency(question 6) [3]     Medical Decision Making, by Problem:  Active Hospital Problems    Diagnosis   • Neutropenia (CMS-HCC) [D70.9]   • Pancytopenia (CMS-HCC) [D61.818]   • Acute bilateral low back pain without sciatica [M54.5]   • Bone pain [M89.8X9]   • Transaminitis [R74.0]   • 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  CML-- multiple relapses.  Was treated on dasatinib for a time with response, but then relapse.  Was more recently on bosutinib with response, but again recent relapse.  Sounds like this medicine was stopped by Dr. Vu on 17.  The plan has been for [facility]BMT at [facility], but they need him to get some kind of response to chemo before they can do the transplant.  There has been some discussion with his primary oncologist, Dr. Vu about trying Synribo.  Dr. Vu has submitted and  non-formulary request for this medication to be administered here at [facility].  I called and spoke with Steven in pharmacy, and he is checking on the status of this medication.     Synribo certainly would be a high risk medication at this point.  There is a 's warning for cerebral bleeding, and to avoid NSAIDS, blood thinners, etc as well as to use with caution in patients with Platelets<50K.  He has a h/o of a SDH in the recent past.  Also this medication can be a/w severe cytopenias.  He is already starting out with severe cytopenias.  He would be at very high risk for infectious complications.  Because of his debilitated state and the fact that he is at high risk of complications with this chemo, we would prefer to do this as an inpatient. I spoke with Dr. Vu from the floor, and this is his plan.      I called pharmacy from the floor and discussed this.  We will see if they can get him the medication.    I do recommend that he be a DNR/DNI.  I couldn't have this conversation with him w/o a .  There was no one on the floor that could help while I was here, and they couldn't get the phone translation to work either.  He will need some discussion to this end.    2.  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  Not likely to improve unless he can get response to some kind of chemo.  Will transfuse as needed.  Transfuse platelets to keep >10, or higher if bleeding.    3.  Neutropenia-- no obvious s/s of infection.  Would stop atbx at this time unless has infection.  My understanding is he came in for back pain and not infectious symptoms.  Will let medicine stop this medication, in case there is something I'm not seeing here, but wouldn't keep him on atbx for just neutropenia w/o s/s of infection.    4.  Elevated LFT's-- related to past chemo.  Monitor.    5.  8th rib lesion-- he seems to indicate that he is not in much pain now.  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  If pain continues to be an issue,  could consult rad onc to radiate this spot, which would likely provide great pain relief.      Highly complex patient with high risk of morbidity and mortality.      Labs reviewed, Medications reviewed and Radiology images reviewed  Mahajan catheter: No Mahajan      DVT Prophylaxis: Contraindicated - High bleeding risk

## 2017-05-01 NOTE — PROGRESS NOTES
Attending Note:  I have personally evaluated and examined this patient and agree with the findings as documented in the resident note except as documented in this attending note.  I am actively involved in the patient's care.    71 year old male with CML and admit for rib pain related to bony disease.  Seems to be improved.  Discussed care and plan with daughter who speaks Dutch and English.  She was informed of his poor prognosis and lack of curative plan.  I recommended he transition to hospice care and she and the patient are in agreement with this.  He also would like to be DNR.      Discussed with SW who will arrange a hospice consult with patient and daughter.  Agree with Dr. Agustin that palliative radiation to the bony lesion may provide relief and we can look into this if he is still in significant pain.  Intern note to follow.

## 2017-05-01 NOTE — CARE PLAN
Problem: Bowel/Gastric:  Goal: Normal bowel function is maintained or improved  Patient will have a bowel movement by end of shift    Problem: Discharge Barriers/Planning  Goal: Patient’s continuum of care needs will be met  Collaborate with MD and family to establish plan of care for patient

## 2017-05-01 NOTE — PROGRESS NOTES
Bedside report completed.  Assumed pt care. Patient in bed, sleeping/resting, in no apparent distress.  Safety precautions in place. Call light & personal belongings within reach.  Plan of care discussed.  Patient received rbc/platelets today.  States he is feeling better since, just tired.  Reports mild back pain, but declines intervention at this time.  IV is saline locked.  SCDS in place,  on. Patient is AOx4. No needs expressed at this time.  Will continue to monitor with hourly rounding in place.

## 2017-05-01 NOTE — PROGRESS NOTES
Hospice choice form given to daughter. Daughter states she will take this to her father's  with chemo to discuss choices.

## 2017-05-01 NOTE — CARE PLAN
Problem: Infection  Goal: Will remain free from infection  Outcome: PROGRESSING SLOWER THAN EXPECTED  Patient's current WBC is 0.4, platelets 24 (up from 11 and 5).    Problem: Bowel/Gastric:  Goal: Normal bowel function is maintained or improved  Outcome: PROGRESSING SLOWER THAN EXPECTED  Patient has not had a bowel movement since 4/27 (pta), refusing stool softeners despite education.

## 2017-05-01 NOTE — PROGRESS NOTES
Patient awake for midnight med pass.  He is still tired, reports no pain, and no needs expressed at this time.  He has not eaten anything yet this shift.  Safety precautions in place.  Will continue to monitor.

## 2017-05-02 ENCOUNTER — HOME CARE VISIT (OUTPATIENT)
Dept: HOSPICE | Facility: HOSPICE | Age: 72
End: 2017-05-02
Payer: MEDICARE

## 2017-05-02 ENCOUNTER — APPOINTMENT (OUTPATIENT)
Dept: ONCOLOGY | Facility: MEDICAL CENTER | Age: 72
End: 2017-05-02
Attending: SPECIALIST
Payer: MEDICARE

## 2017-05-02 ENCOUNTER — HOSPICE ADMISSION (OUTPATIENT)
Dept: HOSPICE | Facility: HOSPICE | Age: 72
End: 2017-05-02
Payer: MEDICARE

## 2017-05-02 PROBLEM — N40.0 BPH (BENIGN PROSTATIC HYPERPLASIA): Chronic | Status: ACTIVE | Noted: 2017-05-02

## 2017-05-02 PROBLEM — R79.89 LOW VITAMIN D LEVEL: Chronic | Status: ACTIVE | Noted: 2017-05-02

## 2017-05-02 LAB
BACTERIA UR CULT: NORMAL
SIGNIFICANT IND 70042: NORMAL
SITE SITE: NORMAL
SOURCE SOURCE: NORMAL

## 2017-05-02 PROCEDURE — 99232 SBSQ HOSP IP/OBS MODERATE 35: CPT | Mod: GC | Performed by: INTERNAL MEDICINE

## 2017-05-02 PROCEDURE — A9270 NON-COVERED ITEM OR SERVICE: HCPCS | Performed by: STUDENT IN AN ORGANIZED HEALTH CARE EDUCATION/TRAINING PROGRAM

## 2017-05-02 PROCEDURE — 36415 COLL VENOUS BLD VENIPUNCTURE: CPT

## 2017-05-02 PROCEDURE — 700101 HCHG RX REV CODE 250: Performed by: STUDENT IN AN ORGANIZED HEALTH CARE EDUCATION/TRAINING PROGRAM

## 2017-05-02 PROCEDURE — 85025 COMPLETE CBC W/AUTO DIFF WBC: CPT

## 2017-05-02 PROCEDURE — 770009 HCHG ROOM/CARE - ONCOLOGY SEMI PRI*

## 2017-05-02 PROCEDURE — 700102 HCHG RX REV CODE 250 W/ 637 OVERRIDE(OP): Performed by: STUDENT IN AN ORGANIZED HEALTH CARE EDUCATION/TRAINING PROGRAM

## 2017-05-02 PROCEDURE — 80053 COMPREHEN METABOLIC PANEL: CPT

## 2017-05-02 RX ADMIN — LEVETIRACETAM 750 MG: 250 TABLET, FILM COATED ORAL at 09:40

## 2017-05-02 RX ADMIN — OXYCODONE HYDROCHLORIDE 5 MG: 5 TABLET ORAL at 18:45

## 2017-05-02 RX ADMIN — LEVETIRACETAM 750 MG: 250 TABLET, FILM COATED ORAL at 21:19

## 2017-05-02 RX ADMIN — LIDOCAINE 1 PATCH: 50 PATCH CUTANEOUS at 04:46

## 2017-05-02 RX ADMIN — TAMSULOSIN HYDROCHLORIDE 0.4 MG: 0.4 CAPSULE ORAL at 09:40

## 2017-05-02 RX ADMIN — CARVEDILOL 3.12 MG: 3.12 TABLET, FILM COATED ORAL at 09:40

## 2017-05-02 RX ADMIN — CARVEDILOL 3.12 MG: 3.12 TABLET, FILM COATED ORAL at 17:56

## 2017-05-02 ASSESSMENT — ENCOUNTER SYMPTOMS
BACK PAIN: 1
FEVER: 0
EYES NEGATIVE: 1
RESPIRATORY NEGATIVE: 1
CARDIOVASCULAR NEGATIVE: 1
CHILLS: 0
PSYCHIATRIC NEGATIVE: 1
WEIGHT LOSS: 0
NEUROLOGICAL NEGATIVE: 1

## 2017-05-02 ASSESSMENT — PAIN SCALES - GENERAL
PAINLEVEL_OUTOF10: 0
PAINLEVEL_OUTOF10: 6
PAINLEVEL_OUTOF10: 0
PAINLEVEL_OUTOF10: 0

## 2017-05-02 NOTE — PROGRESS NOTES
Received report. Assessed pt. Discussed plan of care and pain management plan. AOx4 Call light in reach. Fall precautions in place. Bed in lowest position, bed locked, RN and CNA numbers provided. Provided water. Hourly rounding in practice.

## 2017-05-02 NOTE — PROGRESS NOTES
[facility] [place]    Name Benjamin Post       1945   Age/Sex 71 y.o. male   MRN [id number]   Code Status DNR     After 5PM or if no immediate response to page, please call for cross-coverage  Attending/Team: Dr. Apodaca/Ly Call [phone] to page   1st Call - Day Intern (R1):   Dr. Méndez 2nd Call - Day Sr. Resident (R2/R3):   Dr. Benson         Chief complaint/ reason for interval visit (Primary Diagnosis)   Back pain/CML    Interval Problem Daily Status Update    Back pain has improved. No fever  Discussed regarding hospice with daughter, we explained to them all situation and answered all their question.They accepted hospice care and we will arrange for hospice home with the daughter and SW.  The patient accepted DNR.    Active Problems:    Neutropenia (CMS-HCC) POA: Yes      Overview: ANC 1000    Pancytopenia (CMS-HCC) (Chronic) POA: Yes    CML (chronic myelocytic leukemia)-Accelerated phase (Chronic) POA: Yes    Acute bilateral low back pain without sciatica POA: Yes    Bone pain POA: Yes  Controlled with oxycodone.       Review of Systems   Constitutional: Negative for fever, chills and weight loss.   HENT: Negative.    Eyes: Negative.    Respiratory: Negative.    Cardiovascular: Negative.    Musculoskeletal: Positive for back pain.   Skin: Negative.    Neurological: Negative.    Psychiatric/Behavioral: Negative.        Consultants/Specialty  none    Disposition  Inpatient being treated for back pain    Quality Measures  EKG reviewed, Labs reviewed, Medications reviewed and Radiology images reviewed  Mahajan catheter: No Mahajan        DVT prophylaxis - mechanical: SCDs                Physical Exam       Filed Vitals:    17 2115 17 0338 17 0837 17 1600   BP: 127/72 122/73 137/76 128/75   Pulse: 87 78 81 76   Temp:  36.7 °C (98 °F) 36.7 °C (98.1 °F) 36.7 °C (98 °F)   Resp: 16 15 16 18   Height:       Weight: 79.1 kg (174 lb  6.1 oz)      SpO2: 96% 99% 99% 99%     Body mass index is 27.31 kg/(m^2). Weight: 79.1 kg (174 lb 6.1 oz)  Oxygen Therapy:  Pulse Oximetry: 99 %, O2 (LPM): 0, O2 Delivery: None (Room Air)    Physical Exam   Constitutional: He is oriented to person, place, and time. No distress.   HENT:   Head: Normocephalic and atraumatic.   Eyes: Conjunctivae are normal. Pupils are equal, round, and reactive to light. No scleral icterus.   Neck: Normal range of motion. Neck supple.   Cardiovascular: Normal rate, regular rhythm and normal heart sounds.    No murmur heard.  Pulmonary/Chest: Effort normal and breath sounds normal.   Abdominal: Soft. Bowel sounds are normal. He exhibits no distension. There is no tenderness.   Musculoskeletal:        Arms:  Neurological: He is alert and oriented to person, place, and time. GCS score is 15.   Psychiatric: Affect normal.         Lab Data Review:      4/29/2017  8:41 AM    Recent Labs      04/29/17 0215 04/30/17   0353  05/01/17   0131   SODIUM  133*  135  133*   POTASSIUM  4.4  3.7  3.9   CHLORIDE  103  108  106   CO2  21  20  20   BUN  32*  23*  24*   CREATININE  1.08  0.89  0.89   MAGNESIUM  2.1   --   1.9   PHOSPHORUS  2.6   --   2.9   CALCIUM  8.8  8.4*  8.6       Recent Labs      04/29/17 0215 04/30/17   0353  05/01/17   0131   ALTSGPT  111*  89*  71*   ASTSGOT  39  31  21   ALKPHOSPHAT  371*  307*  277*   TBILIRUBIN  1.1  0.9  1.0   GLUCOSE  121*  112*  112*       Recent Labs      04/29/17   0215   04/30/17   0353  04/30/17   1515  05/01/17   0131   RBC  2.61*   < >  2.22*  2.49*  2.64*   HEMOGLOBIN  7.7*   < >  6.6*  7.4*  7.8*   HEMATOCRIT  22.8*   < >  19.3*  21.4*  22.6*   PLATELETCT  11*   < >  5*  24*  25*   PROTHROMBTM  15.7*   --    --    --    --    APTT  29.8   --    --    --    --    INR  1.21*   --    --    --    --     < > = values in this interval not displayed.       Recent Labs      04/29/17   0215   04/30/17   0353  04/30/17   1515  05/01/17   0131   WBC   0.5*   < >  0.4*  0.4*  0.4*   NEUTSPOLYS  33.30*   --   34.10*   --   35.50*   LYMPHOCYTES  45.50*   --   48.00*   --   33.30   MONOCYTES  6.10   --   8.90   --   16.10*   EOSINOPHILS  15.10*   --   3.30   --   8.60*   BASOPHILS  0.00   --   1.60   --   2.10*   ASTSGOT  39   --   31   --   21   ALTSGPT  111*   --   89*   --   71*   ALKPHOSPHAT  371*   --   307*   --   277*   TBILIRUBIN  1.1   --   0.9   --   1.0    < > = values in this interval not displayed.           Assessment/Plan     Neutropenia (CMS-HCC)  - patient on chemo for CML on bosutinib; last chemotherapy 4/26  - has been neutropenic for long time  - as per float time, family reported that patient was febrile at home  - has been afebrile since being inpatient  - was started on cefepime and vancomycin; dc vancomycin ; blood cultures drawn  - will continue cefepime for now; closely monitor vitals and if there is any spike in temperature; will repeat blood cultures further escalate antibiotics  - neutropenic isoloation  - 4/30: no spike in temp so far. Will continue Cefepime for now. Will consult oncology for duration of antibiotics as Neutropenia has been going on for long time.     Pancytopenia (CMS-HCC)  - transfuse if  hb <7  - platelet if <10  - 4/30: given 1 unit of Irradiated and CMV neg Platelets (5000) and 1 units of CMV neg and Irradiated PRBC (6.6). Oncology updated regarding patient's inpatient status.    Bone pain  - pain management of bone lytic pain with morphine IV with breakthrough oxycodone PO and lidocaine patch  - monitor; with fall precautions.   - Pain Improved today. Patient feeling better.   - Follow up and consider radiation therapy if needed.       CML (chronic myelocytic leukemia)-Accelerated phase  Has completed multiple chemotherapy cycles.    The patient and family accepted hospice care.

## 2017-05-02 NOTE — DISCHARGE PLANNING
Medical Social Work    Update: SW was updated by pharmacist Radha that pt will need Synribo rx and it will need to be filled through specialty pharmacy as [facility]. SW forwarded email, including Rx and Synribo application form, to [facility] SW, as pt has just transferred floors.

## 2017-05-02 NOTE — PROGRESS NOTES
Updated plan of care with Dr. Apodaca and attending physician. Discussed that patient does not want to be a DNR and does not want hospice. [facility] team aware of patient's decision. Patient to transfer to oncology now.

## 2017-05-02 NOTE — PROGRESS NOTES
Pt arrived to unit via wheelchair. Alert and oriented x 4. Ecuadorean speaking only. VSS. PIV to L wrist flushing well with positive blood return. Saline locked. Pt denies pain or nausea. Neutropenic and precautions in place. Received report from nurse on T7 that pt does not want hospice and would like everything done in regards to his care. MARILU Jaramillo, at the bedside to translate. When asked pt states he would like everything done in the event that his heart were to stop or if he were to stop breathing. Page placed to [facility] in regards to code status. Pt resting comfortably at this time. Call light within reach. Hourly rounding in place.

## 2017-05-02 NOTE — PROGRESS NOTES
Updated by Jason from oncology that patient does not wish to be a DNR, he would like to follow through with treatments and does not want to go home on hospice. [facility] paged to update plan of care.

## 2017-05-02 NOTE — DISCHARGE PLANNING
Medical Social Work    Referral:  Synribo Medication    Intervention:  BRIANNE met with pt at bedside and used Syriac speaking RN for translation to complete application for Synribo medication.  Pt was not able to state his address.  He lives with his daughter, Keith (ph#: [phone]), so SW called his daughter and left a  requesting a return phone call so that SW can confirm address to have chemo medication delivered too.  Application form will need to be signed by oncologist.  Prescribing physician is Dr. Vu.  BRIANNE called and spoke with Dr. Vu and obtain faxed number (fax#: [phone]) to fax to him to sign and fax back to this SW.  BRIANNE faxed application to Dr. Vu.      Plan:  Await signed application for Synribo and return phone call from pt's daughter with home address.  SS will remain available to provide support and assist as needed.

## 2017-05-02 NOTE — CARE PLAN
Problem: Safety  Goal: Will remain free from injury  Patient to call for assistance with ambulation as tolerated    Problem: Discharge Barriers/Planning  Goal: Patient’s continuum of care needs will be met  Collaborate with [facility] team and oncology to determine patient's needs and wishes for care

## 2017-05-02 NOTE — PROGRESS NOTES
Report received from nightshift RN. Patient resting comfortably in bed, respirations even and unlabored. Denies pain at this time. Bed locked and in low position, call light within reach. Will continue to monitor.

## 2017-05-02 NOTE — CARE PLAN
Problem: Pain Management  Goal: Pain level will decrease to patient’s comfort goal  Intervention: Follow pain managment plan developed in collaboration with patient and Interdisciplinary Team  Discuss pain management plan. Med per MAR.       Problem: Communication  Goal: The ability to communicate needs accurately and effectively will improve  Intervention: Educate patient and significant other/support system about the plan of care, procedures, treatments, medications and allow for questions  Discuss plan of care. Address concerns and questions.       Problem: Safety  Goal: Will remain free from injury  Intervention: Provide assistance with mobility  Discuss safety precautions and educate pt to call for assistance.       Problem: Mobility  Goal: Risk for activity intolerance will decrease  Intervention: Assess and monitor signs of activity intolerance  Assist pt with mobility.

## 2017-05-02 NOTE — PROGRESS NOTES
[facility] [place] [facility] [place]    Name Benjamin Post       1945   Age/Sex 71 y.o. male   MRN [id number]   Code Status Full code     After 5PM or if no immediate response to page, please call for cross-coverage  Attending/Team: Dr. Apodaca/Ly Call [phone] to page   1st Call - Day Intern (R1):   Dr. Méndez 2nd Call - Day Sr. Resident (R2/R3):   Dr. Benson         Chief complaint/ reason for interval visit (Primary Diagnosis)   Back pain/CML    Interval Problem Daily Status Update    We talked with the patient about his situation (he does not speak English, one of the nurse who speaks Qatari was the ), he wants to try every thing and try chemotherapy, he does not want hospice care and he wants to be a full code, we educated him about his disease and the chemotherapy and the benefit and risk of it, he understood and accepted.   His pain is controlled and no fever.     Active Problems:      CML (chronic myelocytic leukemia)-Accelerated phase (Chronic) POA: Yes     Chemotherapy.     Acute bilateral low back pain without sciatica POA: Yes    Bone pain POA: Yes  Controlled with oxycodone.       Review of Systems   Constitutional: Negative for fever, chills and weight loss.   HENT: Negative.    Eyes: Negative.    Respiratory: Negative.    Cardiovascular: Negative.    Musculoskeletal: Positive for back pain.   Skin: Negative.    Neurological: Negative.    Psychiatric/Behavioral: Negative.        Consultants/Specialty  none    Disposition  Inpatient being treated for back pain    Quality Measures  EKG reviewed, Labs reviewed, Medications reviewed and Radiology images reviewed  Mahajan catheter: No Mahajan        DVT prophylaxis - mechanical: SCDs (low plt)                Physical Exam       Filed Vitals:    17 0400 17 0800 17 1152 17 1507   BP: 130/77 138/72 118/66 118/69   Pulse: 76 84 66 69   Temp: 36.8 °C (98.3 °F) 36.7 °C (98.1 °F) 37.1  °C (98.7 °F) 36.6 °C (97.9 °F)   Resp: 18 19 18 18   Height:       Weight:       SpO2: 93% 98% 99% 98%     Body mass index is 27.72 kg/(m^2). Weight: 80.3 kg (177 lb 0.5 oz)  Oxygen Therapy:  Pulse Oximetry: 98 %, O2 (LPM): 0, O2 Delivery: None (Room Air)    Physical Exam   Constitutional: He is oriented to person, place, and time. No distress.   HENT:   Head: Normocephalic and atraumatic.   Eyes: Conjunctivae are normal. Pupils are equal, round, and reactive to light. No scleral icterus.   Neck: Normal range of motion. Neck supple.   Cardiovascular: Normal rate, regular rhythm and normal heart sounds.    No murmur heard.  Pulmonary/Chest: Effort normal and breath sounds normal.   Abdominal: Soft. Bowel sounds are normal. He exhibits no distension. There is no tenderness.   Musculoskeletal:        Arms:  Neurological: He is alert and oriented to person, place, and time. GCS score is 15.   Psychiatric: Affect normal.         Lab Data Review:      4/29/2017  8:41 AM    Recent Labs      04/30/17   0353  05/01/17   0131   SODIUM  135  133*   POTASSIUM  3.7  3.9   CHLORIDE  108  106   CO2  20  20   BUN  23*  24*   CREATININE  0.89  0.89   MAGNESIUM   --   1.9   PHOSPHORUS   --   2.9   CALCIUM  8.4*  8.6       Recent Labs      04/30/17   0353  05/01/17   0131   ALTSGPT  89*  71*   ASTSGOT  31  21   ALKPHOSPHAT  307*  277*   TBILIRUBIN  0.9  1.0   GLUCOSE  112*  112*       Recent Labs      04/30/17 0353  04/30/17   1515  05/01/17   0131   RBC  2.22*  2.49*  2.64*   HEMOGLOBIN  6.6*  7.4*  7.8*   HEMATOCRIT  19.3*  21.4*  22.6*   PLATELETCT  5*  24*  25*       Recent Labs      04/30/17   0353  04/30/17   1515  05/01/17   0131   WBC  0.4*  0.4*  0.4*   NEUTSPOLYS  34.10*   --   35.50*   LYMPHOCYTES  48.00*   --   33.30   MONOCYTES  8.90   --   16.10*   EOSINOPHILS  3.30   --   8.60*   BASOPHILS  1.60   --   2.10*   ASTSGOT  31   --   21   ALTSGPT  89*   --   71*   ALKPHOSPHAT  307*   --   277*   TBILIRUBIN  0.9   --   1.0            Assessment/Plan     Neutropenia (CMS-HCC)  - patient was on chemo for CML on bosutinib; last chemotherapy 4/26  - has been neutropenic for long time  - has been afebrile since being inpatient  - was started on cefepime and vancomycin; dc vancomycin ; blood cultures Negative till now.   - d/c cefepime at this time; closely monitor vitals and if there is any spike in temperature; will repeat blood cultures further start antibiotics if needed.   - neutropenic isoloation  -Oncology consult to start with new chemotherapy.     Pancytopenia (CMS-HCC)  - transfuse if  hb <7  - platelet if <10  - 4/30: given 1 unit of Irradiated and CMV neg Platelets (5000) and 1 units of CMV neg and Irradiated PRBC (6.6).     Bone pain  - pain management of bone lytic pain with morphine IV with breakthrough oxycodone PO and lidocaine patch  - monitor; with fall precautions.   - Pain Improved today. Patient feeling ok.   - Follow up and consider radiation therapy if needed.       CML (chronic myelocytic leukemia)-Accelerated phase  Has completed multiple chemotherapy cycles.    The patient refused hospice care and he wants to try chemotherapy.   Oncology on board, to start with new med for chemotherapy.     Seizure disorder (CMS-HCC)  Stable   Continue on his home dose of keppra    BPH (benign prostatic hyperplasia)  Stable.   Continue on tamsulosin 0.4    Low vitamin D level  Continue vit D2 weekly.

## 2017-05-02 NOTE — PROGRESS NOTES
[facility] paged again to update plan of care as patient is to be transferring to oncology. Waiting for response.

## 2017-05-02 NOTE — PROGRESS NOTES
"Talked to patient at bedside with outpatient SW CIRO Stewart who speaks Sammarinese and served as  during our visit.  This patient is well known to both of us as we have followed him in [facility] and have provided resources and education for SCT when he was a candidate.  Upon direct questioning if he wants to go home with hospice, he replied no.  He does not \"want to die at home\" and wants to pursue treatment.  Explained the POC per his medical oncologist and that the pharmacy and hospital are trying to obtain the chemotherapy drug Synribo.  He expressed to both of us that he wishes to try this new chemotherapy agent.  Explained that he would most likely need to be hospitalized during the administration of the drug, perhaps two weeks, and he is fine with this.  He also expressed a desire to pursue radiation therapy for his bone mets if this is appropriate at this time.  He made it very clear to us that he does not want hospice yet.  He will only consider hospice if the chemotherapy drug is unobtainable or if therapy fails  Spoke to bedside RN with update, and she will call [facility] team. This writer spoke with Ana in the pharmacy, [phone], to update her regarding patient wishes.  She will call the unit SW to coordinate obtaining the drug for the patient, to be administered as a patients home medication once it is obtained.  Have asked the patient to have his daughter call Natalia and myself when she comes to see him so we can speak to her about his wishes and POC.  Continue to follow.  "

## 2017-05-02 NOTE — PROGRESS NOTES
Physician on call notified of pt's wish to be a full code. Stated he will convey the message to the pt's physician.

## 2017-05-03 LAB
ALBUMIN SERPL BCP-MCNC: 2.9 G/DL (ref 3.2–4.9)
ALBUMIN/GLOB SERPL: 0.7 G/DL
ALP SERPL-CCNC: 234 U/L (ref 30–99)
ALT SERPL-CCNC: 54 U/L (ref 2–50)
ANION GAP SERPL CALC-SCNC: 6 MMOL/L (ref 0–11.9)
AST SERPL-CCNC: 21 U/L (ref 12–45)
BILIRUB SERPL-MCNC: 0.8 MG/DL (ref 0.1–1.5)
BUN SERPL-MCNC: 24 MG/DL (ref 8–22)
CALCIUM SERPL-MCNC: 8.8 MG/DL (ref 8.5–10.5)
CHLORIDE SERPL-SCNC: 101 MMOL/L (ref 96–112)
CO2 SERPL-SCNC: 22 MMOL/L (ref 20–33)
CREAT SERPL-MCNC: 0.93 MG/DL (ref 0.5–1.4)
ERYTHROCYTE [DISTWIDTH] IN BLOOD BY AUTOMATED COUNT: 42.5 FL (ref 35.9–50)
GFR SERPL CREATININE-BSD FRML MDRD: >60 ML/MIN/1.73 M 2
GLOBULIN SER CALC-MCNC: 4.1 G/DL (ref 1.9–3.5)
GLUCOSE SERPL-MCNC: 103 MG/DL (ref 65–99)
HCT VFR BLD AUTO: 23.8 % (ref 42–52)
HGB BLD-MCNC: 8.2 G/DL (ref 14–18)
MCH RBC QN AUTO: 29.5 PG (ref 27–33)
MCHC RBC AUTO-ENTMCNC: 34.5 G/DL (ref 33.7–35.3)
MCV RBC AUTO: 85.6 FL (ref 81.4–97.8)
NEUTROPHILS # BLD AUTO: ABNORMAL K/UL (ref 1.82–7.42)
NRBC # BLD AUTO: 0 K/UL
NRBC BLD AUTO-RTO: 0 /100 WBC
PLATELET # BLD AUTO: 12 K/UL (ref 164–446)
PMV BLD AUTO: 10.1 FL (ref 9–12.9)
POTASSIUM SERPL-SCNC: 3.7 MMOL/L (ref 3.6–5.5)
PROT SERPL-MCNC: 7 G/DL (ref 6–8.2)
RBC # BLD AUTO: 2.78 M/UL (ref 4.7–6.1)
SODIUM SERPL-SCNC: 129 MMOL/L (ref 135–145)
WBC # BLD AUTO: 0.5 K/UL (ref 4.8–10.8)

## 2017-05-03 PROCEDURE — 700101 HCHG RX REV CODE 250: Performed by: STUDENT IN AN ORGANIZED HEALTH CARE EDUCATION/TRAINING PROGRAM

## 2017-05-03 PROCEDURE — 770009 HCHG ROOM/CARE - ONCOLOGY SEMI PRI*

## 2017-05-03 PROCEDURE — 700102 HCHG RX REV CODE 250 W/ 637 OVERRIDE(OP): Performed by: STUDENT IN AN ORGANIZED HEALTH CARE EDUCATION/TRAINING PROGRAM

## 2017-05-03 PROCEDURE — 80053 COMPREHEN METABOLIC PANEL: CPT

## 2017-05-03 PROCEDURE — A9270 NON-COVERED ITEM OR SERVICE: HCPCS | Performed by: STUDENT IN AN ORGANIZED HEALTH CARE EDUCATION/TRAINING PROGRAM

## 2017-05-03 PROCEDURE — 85025 COMPLETE CBC W/AUTO DIFF WBC: CPT

## 2017-05-03 PROCEDURE — 99232 SBSQ HOSP IP/OBS MODERATE 35: CPT | Mod: GC | Performed by: INTERNAL MEDICINE

## 2017-05-03 PROCEDURE — 36415 COLL VENOUS BLD VENIPUNCTURE: CPT

## 2017-05-03 RX ADMIN — OXYCODONE HYDROCHLORIDE 5 MG: 5 TABLET ORAL at 21:44

## 2017-05-03 RX ADMIN — STANDARDIZED SENNA CONCENTRATE AND DOCUSATE SODIUM 2 TABLET: 8.6; 5 TABLET, FILM COATED ORAL at 08:26

## 2017-05-03 RX ADMIN — CARVEDILOL 3.12 MG: 3.12 TABLET, FILM COATED ORAL at 08:26

## 2017-05-03 RX ADMIN — LEVETIRACETAM 750 MG: 250 TABLET, FILM COATED ORAL at 08:26

## 2017-05-03 RX ADMIN — TAMSULOSIN HYDROCHLORIDE 0.4 MG: 0.4 CAPSULE ORAL at 08:26

## 2017-05-03 RX ADMIN — CARVEDILOL 3.12 MG: 3.12 TABLET, FILM COATED ORAL at 18:21

## 2017-05-03 RX ADMIN — LEVETIRACETAM 750 MG: 250 TABLET, FILM COATED ORAL at 21:44

## 2017-05-03 RX ADMIN — LIDOCAINE 1 PATCH: 50 PATCH CUTANEOUS at 06:00

## 2017-05-03 ASSESSMENT — PAIN SCALES - GENERAL
PAINLEVEL_OUTOF10: 0
PAINLEVEL_OUTOF10: 4
PAINLEVEL_OUTOF10: 0
PAINLEVEL_OUTOF10: 0

## 2017-05-03 NOTE — PROGRESS NOTES
Attending Note:  I have personally evaluated and examined this patient and agree with the findings as documented in the resident note except as documented in this attending note.  I am actively involved in the patient's care.    Resident note to follow.  Awaiting start of Syniribo Rx.  Continues to be pancytopenic.  Transfuse if plt <10 or if any bleeding.

## 2017-05-03 NOTE — CARE PLAN
Problem: Venous Thromboembolism (VTW)/Deep Vein Thrombosis (DVT) Prevention:  Goal: Patient will participate in Venous Thrombosis (VTE)/Deep Vein Thrombosis (DVT)Prevention Measures  Outcome: PROGRESSING AS EXPECTED  SQ Heparin for DVT prophylaxis. Held this am for IR procedure    Problem: Acute Care of the Febrile Neutropenia Patient  Goal: Optimal Outcome for the Febrile Neutropenia Patient  Outcome: PROGRESSING AS EXPECTED  Pt still profoundly neutropenic. Appropriate precautions in place.  Pt with low grade temp 04:00, but afebrile thus far this shift  Intervention: Assess oral mucosa, provide oral care, NS rinses, soft toothbrush, no flossing  NS rinses provided. Reminders needed

## 2017-05-03 NOTE — PROGRESS NOTES
Received report from day RN. POC discussed with patient, pt verbalizes understanding and agreement.  A&Ox4. Pt is up with 1 assist, calling appropriatley for help. Pt denies c/o pain. He reports a poor appetite. It is hard for him to eat sometimes due to having all of his teeth pulled. Room near nurses station. Neutropenic precautions in place. Call light in reach, bed in low position, Q2 hr rounding.

## 2017-05-03 NOTE — PROGRESS NOTES
KURTIS from Lab called with critical result of WBC of 0.5 and platelets of 12 at 0130. Critical lab result read back to KURTIS.   This critical lab result is within parameters established by [facility] for this patient

## 2017-05-03 NOTE — DISCHARGE PLANNING
Care Transition Team Assessment  SW completed assessment over the phone with pt's daughter, Keith.  Pt lives with his daughter, son-in-law and teenage grandchildren.  Keith reports that pt has been independent with all of his ADLs and that someone is always home with him.  Keith reports that she does not want her sister, Celena, involved in pt's plan of care.  Plan is for pt to return home once medically cleared.      Information Source  Orientation : Oriented x 4  Information Given By:  (daughter, Keith)  Informant's Name:  (Keith)  Who is responsible for making decisions for patient? : Patient    Readmission Evaluation  Is this a readmission?: Yes - unplanned readmission    Elopement Risk  Legal Hold: No  Ambulatory or Self Mobile in Wheelchair: Yes  Disoriented: No  Psychiatric Symptoms: None  History of Wandering: No  Elopement this Admit: No  Vocalizing Wanting to Leave: No  Displays Behaviors, Body Language Wanting to Leave: No-Not at Risk for Elopement  Elopement Risk: Not at Risk for Elopement  Wanderguard On: No (See Comments)  Personal Belongings: Hospital Clothing Only    Interdisciplinary Discharge Planning  Does Admitting Nurse Feel This Could be a Complex Discharge?: No  Primary Care Physician: Bg Vu  Lives with - Patient's Self Care Capacity: Adult Children  Patient or legal guardian wants to designate a caregiver (see row info): No  Support Systems: Children  Housing / Facility: Mobile Home  Do You Take your Prescribed Medications Regularly: Yes  Able to Return to Previous ADL's: Yes  Mobility Issues: No  Prior Services: None  Patient Expects to be Discharged to:: Home  Assistance Needed: No  Durable Medical Equipment: Walker    Discharge Preparedness  What is your plan after discharge?: Home with help  What are your discharge supports?: Child  Prior Functional Level: Ambulatory, Independent with Activities of Daily Living  Difficulity with ADLs: None  Difficulity with IADLs:  None    Functional Assesment  Prior Functional Level: Ambulatory, Independent with Activities of Daily Living    Finances  Financial Barriers to Discharge: No  Prescription Coverage: Yes (goes to [facility] [address])    Vision / Hearing Impairment  Vision Impairment : Yes  Right Eye Vision: Impaired, Wears Glasses  Left Eye Vision: Impaired, Wears Glasses  Hearing Impairment : No    Values / Beliefs / Concerns  Values / Beliefs Concerns : No  Special Hospitalization Concerns: None    Advance Directive  Advance Directive?: None    Domestic Abuse  Have you ever been the victim of abuse or violence?: No  Physical Abuse or Sexual Abuse: No  Verbal Abuse or Emotional Abuse: No  Possible Abuse Reported to:: Not Applicable    Psychological Assessment  Non-compliant with Treatment: No  Newly Diagnosed Illness: No    Discharge Risks or Barriers  Discharge risks or barriers?: Complex medical needs  Patient risk factors: Homeless, Readmission    Anticipated Discharge Information  Anticipated discharge disposition: Home  Discharge Address:  ([address] [place] [address] [place] [id number])  Discharge Contact Phone Number:  ([phone])

## 2017-05-03 NOTE — PROGRESS NOTES
Assumed pt care at change of shift. Labs and orders noted. Pt aa&o x3- unsure of day/time, mostly Kenyan speaking. Stating lower back pain at comfort level with Lidocaine patch. Toileted as pt using urinal but mostly incontinent throughout night. Plan of care updated and questions answered. Safety precautions in place. No seizure activity noted. Continuing hourly rounding and assessing for additional needs

## 2017-05-03 NOTE — CARE PLAN
Problem: Pain Management  Goal: Pain level will decrease to patient’s comfort goal  Outcome: PROGRESSING AS EXPECTED  Intervention: Follow pain managment plan developed in collaboration with patient and Interdisciplinary Team  Patient declines c/o pain.  He is aware pain medications are available      Problem: Bowel/Gastric:  Goal: Normal bowel function is maintained or improved  Outcome: PROGRESSING AS EXPECTED  Intervention: Educate patient and significant other/support system about diet, fluid intake, medications and activity to promote bowel function  Poor po intake; encouraging oral intake  Last BM 5/2  Pt refused stool softeners despite education

## 2017-05-03 NOTE — CARE PLAN
Problem: Safety  Goal: Will remain free from injury  Outcome: PROGRESSING AS EXPECTED  Pt up with 1 sba. Call light and belongings within reach. Bed locked and in lowest position. Safety strip in place and sounding    Problem: Discharge Barriers/Planning  Goal: Patient’s continuum of care needs will be met  Outcome: PROGRESSING AS EXPECTED  SW involved in meeting DC needs and assisting with PO chemotherapy procurement as noted    Problem: Skin Integrity  Goal: Risk for impaired skin integrity will decrease  Intervention: Assess risk factors for impaired skin integrity and/or pressure ulcers  Q2 toileting encouraged and assisted to prevent skin breakdown r/t urinary incontinence

## 2017-05-03 NOTE — DISCHARGE PLANNING
Medical Social Work    Ongoing:  Synribo Medication    SW received application for Synribo form back from Dr. Vu.  He asked that pharmacy assist in completing a portion of the form.  SW spoke with pharmacist and emailed form to her to assist in completing.   SW called [facility] (ph#: [phone]) to obtain information on application process.  [facility] worker informed this SW that once they receive the form and script, they will overnight the prescription to pt's home.  SW is currently awaiting a return phone call from pt's daughter with his address to have medication shipped to as pt was not able to state his address when asked.    9:15am-  SW spoke with pt's daughter, Keith (ph#: [phone]), over the phone and verified that the address on pt's facesheet is correct ( [address] [place] [address]).  SW informed her that chemo medication will be sent to her house and asked that she bring it to the hospital once it arrives; Keith expressed verbal understanding of this and is agreeable.    9:30am-  BRIANNE received script for Synribo from pharmacist.  SW faxed to Dr. Vu to complete and asked that he fax it back to SW so that SW can send it to pharmaceutical company.    10:00am-  BRIANNE received return fax from Dr. Vu for Syniribo script.  SW faxed application and script to [facility] (fax#: [phone]) and received confirmation that fax went through.  SW placed application and script on pt's chart.    Plan:  [facility] to mail medication to pt's home, pt's daughter will bring to hospital once received.  SS will remain available to provide support and assist with d/c planning as needed.

## 2017-05-04 LAB
ALBUMIN SERPL BCP-MCNC: 3 G/DL (ref 3.2–4.9)
ALBUMIN/GLOB SERPL: 0.7 G/DL
ALP SERPL-CCNC: 237 U/L (ref 30–99)
ALT SERPL-CCNC: 52 U/L (ref 2–50)
ANION GAP SERPL CALC-SCNC: 5 MMOL/L (ref 0–11.9)
AST SERPL-CCNC: 20 U/L (ref 12–45)
BACTERIA BLD CULT: NORMAL
BACTERIA BLD CULT: NORMAL
BARCODED ABORH UBTYP: 6200
BARCODED PRD CODE UBPRD: NORMAL
BARCODED UNIT NUM UBUNT: NORMAL
BILIRUB SERPL-MCNC: 0.7 MG/DL (ref 0.1–1.5)
BUN SERPL-MCNC: 24 MG/DL (ref 8–22)
CALCIUM SERPL-MCNC: 9.2 MG/DL (ref 8.5–10.5)
CHLORIDE SERPL-SCNC: 100 MMOL/L (ref 96–112)
CO2 SERPL-SCNC: 25 MMOL/L (ref 20–33)
COMPONENT P 8504P: NORMAL
CREAT SERPL-MCNC: 0.86 MG/DL (ref 0.5–1.4)
ERYTHROCYTE [DISTWIDTH] IN BLOOD BY AUTOMATED COUNT: 41.9 FL (ref 35.9–50)
GFR SERPL CREATININE-BSD FRML MDRD: >60 ML/MIN/1.73 M 2
GLOBULIN SER CALC-MCNC: 4.1 G/DL (ref 1.9–3.5)
GLUCOSE SERPL-MCNC: 101 MG/DL (ref 65–99)
HCT VFR BLD AUTO: 22.1 % (ref 42–52)
HGB BLD-MCNC: 7.7 G/DL (ref 14–18)
MCH RBC QN AUTO: 29.4 PG (ref 27–33)
MCHC RBC AUTO-ENTMCNC: 34.8 G/DL (ref 33.7–35.3)
MCV RBC AUTO: 84.4 FL (ref 81.4–97.8)
NEUTROPHILS # BLD AUTO: ABNORMAL K/UL (ref 1.82–7.42)
NRBC # BLD AUTO: 0 K/UL
NRBC BLD AUTO-RTO: 0 /100 WBC
PLATELET # BLD AUTO: 7 K/UL (ref 164–446)
PMV BLD AUTO: 10.8 FL (ref 9–12.9)
POTASSIUM SERPL-SCNC: 4.2 MMOL/L (ref 3.6–5.5)
PRODUCT TYPE UPROD: NORMAL
PROT SERPL-MCNC: 7.1 G/DL (ref 6–8.2)
RBC # BLD AUTO: 2.62 M/UL (ref 4.7–6.1)
SIGNIFICANT IND 70042: NORMAL
SIGNIFICANT IND 70042: NORMAL
SITE SITE: NORMAL
SITE SITE: NORMAL
SODIUM SERPL-SCNC: 130 MMOL/L (ref 135–145)
SOURCE SOURCE: NORMAL
SOURCE SOURCE: NORMAL
UNIT STATUS USTAT: NORMAL
WBC # BLD AUTO: 0.4 K/UL (ref 4.8–10.8)

## 2017-05-04 PROCEDURE — 80053 COMPREHEN METABOLIC PANEL: CPT

## 2017-05-04 PROCEDURE — 85027 COMPLETE CBC AUTOMATED: CPT

## 2017-05-04 PROCEDURE — 36415 COLL VENOUS BLD VENIPUNCTURE: CPT

## 2017-05-04 PROCEDURE — 86644 CMV ANTIBODY: CPT

## 2017-05-04 PROCEDURE — 99232 SBSQ HOSP IP/OBS MODERATE 35: CPT | Mod: GC | Performed by: INTERNAL MEDICINE

## 2017-05-04 PROCEDURE — 700102 HCHG RX REV CODE 250 W/ 637 OVERRIDE(OP): Performed by: STUDENT IN AN ORGANIZED HEALTH CARE EDUCATION/TRAINING PROGRAM

## 2017-05-04 PROCEDURE — A9270 NON-COVERED ITEM OR SERVICE: HCPCS | Performed by: STUDENT IN AN ORGANIZED HEALTH CARE EDUCATION/TRAINING PROGRAM

## 2017-05-04 PROCEDURE — 85055 RETICULATED PLATELET ASSAY: CPT

## 2017-05-04 PROCEDURE — 85007 BL SMEAR W/DIFF WBC COUNT: CPT

## 2017-05-04 PROCEDURE — 770009 HCHG ROOM/CARE - ONCOLOGY SEMI PRI*

## 2017-05-04 PROCEDURE — P9034 PLATELETS, PHERESIS: HCPCS

## 2017-05-04 PROCEDURE — 700101 HCHG RX REV CODE 250: Performed by: STUDENT IN AN ORGANIZED HEALTH CARE EDUCATION/TRAINING PROGRAM

## 2017-05-04 PROCEDURE — 36430 TRANSFUSION BLD/BLD COMPNT: CPT

## 2017-05-04 RX ORDER — ACETAMINOPHEN 325 MG/1
650 TABLET ORAL PRN
Status: DISCONTINUED | OUTPATIENT
Start: 2017-05-04 | End: 2017-05-25

## 2017-05-04 RX ORDER — DIPHENHYDRAMINE HCL 25 MG
25 TABLET ORAL PRN
Status: DISCONTINUED | OUTPATIENT
Start: 2017-05-04 | End: 2017-06-15 | Stop reason: HOSPADM

## 2017-05-04 RX ADMIN — ACETAMINOPHEN 650 MG: 325 TABLET, FILM COATED ORAL at 05:09

## 2017-05-04 RX ADMIN — OXYCODONE HYDROCHLORIDE 5 MG: 5 TABLET ORAL at 21:40

## 2017-05-04 RX ADMIN — LIDOCAINE 1 PATCH: 50 PATCH CUTANEOUS at 05:09

## 2017-05-04 RX ADMIN — DIPHENHYDRAMINE HCL 25 MG: 25 TABLET ORAL at 05:09

## 2017-05-04 RX ADMIN — TAMSULOSIN HYDROCHLORIDE 0.4 MG: 0.4 CAPSULE ORAL at 09:30

## 2017-05-04 RX ADMIN — CARVEDILOL 3.12 MG: 3.12 TABLET, FILM COATED ORAL at 18:28

## 2017-05-04 RX ADMIN — LEVETIRACETAM 750 MG: 250 TABLET, FILM COATED ORAL at 21:40

## 2017-05-04 RX ADMIN — LEVETIRACETAM 750 MG: 250 TABLET, FILM COATED ORAL at 09:30

## 2017-05-04 RX ADMIN — CARVEDILOL 3.12 MG: 3.12 TABLET, FILM COATED ORAL at 09:25

## 2017-05-04 ASSESSMENT — ENCOUNTER SYMPTOMS
NAUSEA: 0
COUGH: 0
DEPRESSION: 0
NECK PAIN: 0
MYALGIAS: 0
CHILLS: 0
HEMOPTYSIS: 0
HEARTBURN: 0
FEVER: 0
PALPITATIONS: 0
HEADACHES: 0
DIZZINESS: 0

## 2017-05-04 ASSESSMENT — PAIN SCALES - GENERAL
PAINLEVEL_OUTOF10: 0
PAINLEVEL_OUTOF10: 5
PAINLEVEL_OUTOF10: 0
PAINLEVEL_OUTOF10: 2
PAINLEVEL_OUTOF10: 0

## 2017-05-04 NOTE — DISCHARGE PLANNING
[facility] has authorized Synribo 2,4 mg calculate dose Sub Q, every 12 hrs for a total of 28 doses until 12/31/17. Updated Martina DECKER via VM and Email.

## 2017-05-04 NOTE — PROGRESS NOTES
Assumed pt care at change of shift. Pt sleepy, but awakening to verbal stimulation. Denies pain. Platelet transfusion complete- pt tolerated w/out any s/e. Plan of care updated and questions answered. Continuing hourly rounding

## 2017-05-04 NOTE — CARE PLAN
Problem: Venous Thromboembolism (VTW)/Deep Vein Thrombosis (DVT) Prevention:  Goal: Patient will participate in Venous Thrombosis (VTE)/Deep Vein Thrombosis (DVT)Prevention Measures  Outcome: PROGRESSING SLOWER THAN EXPECTED  Intervention: Encourage ambulation/mobilization at level directed by Physical Therapy in collaboration with Interdisciplinary Team  Pt ambulates to bathroom.      Problem: Bowel/Gastric:  Goal: Normal bowel function is maintained or improved  Outcome: PROGRESSING AS EXPECTED  Intervention: Educate patient and significant other/support system about diet, fluid intake, medications and activity to promote bowel function  PO intake improving.  Pt refusing stool softeners  Last BM 5/3

## 2017-05-04 NOTE — ASSESSMENT & PLAN NOTE
Failed tyrosine kinase inhibitors previously, responded to omacetaxine  Bone Marrow showed: hypocellular with blast less than 10%, considered responded to chemotherapy   Pan CT showed: Multiple new ill-defined pulmonary nodules involving both lungs, which may be infectious/inflammatory or metastatic.  Septic emboli are a consideration.  lytic lesions in the T5 vertebral body on the LEFT and adjacent LEFT medial 5th rib, likely metastatic.  There is high possibility that these lesions are septic emboli from his previous MSSA bacteremia vs metastasis.  ECHO no vegetation. BREANNA pending  Thoracic MRI diffuse infiltration of T spine consistent with CML  per oncology they will go through BM transplant despite the septic emboli And it will be arranged probably on 6/25

## 2017-05-04 NOTE — ASSESSMENT & PLAN NOTE
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    - Pain is controlled, Patient feeling ok.    - Follow up and consider radiation therapy if directed by oncology

## 2017-05-04 NOTE — PROGRESS NOTES
Received report from day RN. POC discussed with patient, pt verbalizes understanding and agreement.  A&Ox4, up 1x assist, pt calls for help appropriately. Voiding in urinal, pt is incontinent at times. Encouraging PO fluid intake. Neutropenic precautions in place. Medicated once with oxycodone for pain.  Bed alarm on, call light in reach, bed in low position, Q2 hr rounding.

## 2017-05-04 NOTE — PROGRESS NOTES
Talked with blood bank and they do not have CMV negative irradiated platelets available at this time. They will starting working on getting some and call when available.

## 2017-05-04 NOTE — DISCHARGE PLANNING
Medical Social Work    Ongoing: Synribo Medication    Yesterday BRIANNE sent script and application to [facility] for Synribo medication.  BRIANNE called [facility] (ph#: [phone]) to make sure they received fax.  BRIANNE spoke with Lydia brantley [facility] who stated that they received fax and are processing the application.  Lydia stated that pt's insurance company is requiring a prior auth.  Prior auth phone number is: [phone] and pt's insurance ID# is:[id number] and the date of birth that the insurance company has for pt is 1945.  Lalitha stated that once they receive the prior auth from the insurance company, they will be able to compound the medication and overnight ship it to pt.  Lydia advised that this medication only has a shelf life of 6 days, so they will need to make multiple shipments.  BRIANNE spoke with ELIJAH RN and provided her with application and script to obtain prior auth for medication.    Plan:  Await prior auth for medication.  SS will remain available to provide support and assist as needed.

## 2017-05-04 NOTE — PROGRESS NOTES
Lainey from Lab called with critical result of WBC of 0.4 and platelets of 7 at 0122. Critical lab result read back to Lainey.   This critical lab result is within parameters established by [facility] for this patient

## 2017-05-04 NOTE — ASSESSMENT & PLAN NOTE
Transfused multiple platelet and PRBCs in the past.  Transfuse plt>10  Or >30 if bleeding and Hb>7.5.  Folate and B12 low-replacement started

## 2017-05-04 NOTE — CARE PLAN
Problem: Venous Thromboembolism (VTW)/Deep Vein Thrombosis (DVT) Prevention:  Goal: Patient will participate in Venous Thrombosis (VTE)/Deep Vein Thrombosis (DVT)Prevention Measures  Intervention: Ensure patient wears graduated elastic stockings (CARLTON hose) and/or SCDs, if ordered, when in bed or chair (Remove at least once per shift for skin check)  SCDs in place for DVT prophylaxis. Pharmacologic interventions contraindicated r/t thrombocytopenia. Pt s/p platelet transfusion      Problem: Mobility  Goal: Risk for activity intolerance will decrease  Intervention: Encourage patient to increase activity level in collaboration with Interdisciplinary Team  Pt up to chair for meals      Problem: Acute Care of the Febrile Neutropenia Patient  Goal: Optimal Outcome for the Febrile Neutropenia Patient  Outcome: PROGRESSING AS EXPECTED  Pt afebrile last 24 hrs

## 2017-05-04 NOTE — PROGRESS NOTES
[facility] [place] Interval Note    Name Benjamin Post       1945   Age/Sex 71 y.o. male   MRN [id number]   Code Status Full code     After 5PM or if no immediate response to page, please call for cross-coverage  Attending/Team: >joy/Ly Call [phone] to page   1st Call - Day Intern (R1):   Dr Méndez 2nd Call - Day Sr. Resident (R2/R3):   Dr Benson         Chief complaint/ reason for interval visit (Primary Diagnosis)   CML    Interval Problem Daily Status Update  :  Patient seen. No acute events overnight.  Still neutropenic but afebrile.   Pain is controlled.   Awaiting to start of Syniribo.  Platelet down to 7 >>>platelets transfusion today.     Principal Problem:    CML (chronic myelocytic leukemia)-Accelerated phase (Chronic) POA: Yes      Overview: currently in blast phase according with recent bone marrow biopsy.      WBC: 0.4 Neut: 35%>>ANC:149  Active Problems:    Neutropenia (CMS-[facility]) POA: Yes      Overview: WBC:0.4 and ANC  145          Bone pain POA: Yes      Overview: - since  patient experienced worsening mid-low back bone pain along       with specific right 8th rib (confirmed from bone scan) bone pain with no       other neurological deficits or other complaints      - denied falls/injury/trauma, fever, chills, sob/chest       pain/cough/n/v/d/abd pain/ble swelling/headache or vision changes      - Ct imaging shows slight worsening of lucency of rib and of the       vertrebral body from     Pancytopenia (CMS-HCC) (Chronic) POA: Yes      Overview: - Pancytopenia secondary to CML and chemotherapy      - : given 1 unit of Irradiated and CMV neg Platelets (5000) and 1       units of CMV neg and Irradiated PRBC (6.6).       - Anemia with Hb: 7.7      - platelet: 11>>7    Acute bilateral low back pain without sciatica POA: Yes    Seizure disorder (CMS-HCC) POA: Yes    Transaminitis POA: Yes    BPH (benign prostatic hyperplasia)  (Chronic) POA: Yes    Low vitamin D level (Chronic) POA: Yes      Overview: Vit D: 11 on 4/29/17  Resolved Problems:    * No resolved hospital problems. *      Review of Systems   Constitutional: Negative for fever and chills.   HENT: Negative for hearing loss.    Respiratory: Negative for cough and hemoptysis.    Cardiovascular: Negative for chest pain and palpitations.   Gastrointestinal: Negative for heartburn and nausea.   Genitourinary: Negative for dysuria and urgency.   Musculoskeletal: Negative for myalgias and neck pain.   Skin: Negative for rash.   Neurological: Negative for dizziness and headaches.   Psychiatric/Behavioral: Negative for depression and suicidal ideas.       Consultants/Specialty  Oncology    Disposition  Home after chemotherapy     Quality Measures  EKG reviewed, Labs reviewed, Medications reviewed and Radiology images reviewed  Mahajan catheter: No Mahajan        DVT prophylaxis - mechanical: SCDs (low plt)                Physical Exam       Filed Vitals:    05/04/17 0620 05/04/17 0720 05/04/17 0736 05/04/17 1227   BP: 130/68 121/71 132/70 101/59   Pulse: 61 66 61 67   Temp: 36.6 °C (97.8 °F) 37.1 °C (98.7 °F) 36.6 °C (97.9 °F) 36.3 °C (97.3 °F)   Resp: 18 16 18 18   Height:       Weight:       SpO2: 100% 100% 100% 99%     Body mass index is 27.72 kg/(m^2).    Oxygen Therapy:  Pulse Oximetry: 99 %, O2 (LPM): 0, O2 Delivery: None (Room Air)    Physical Exam   Constitutional: He is well-developed, well-nourished, and in no distress. No distress.   HENT:   Head: Normocephalic.   Neck: No tracheal deviation present. No thyromegaly present.   Cardiovascular: Normal rate and normal heart sounds.    Pulmonary/Chest: Effort normal. No respiratory distress. He has no wheezes.   Abdominal: Soft. He exhibits no distension. There is no tenderness.   Musculoskeletal: He exhibits no edema.   Neurological: He is alert.   Skin: No erythema.         Lab Data Review:      5/4/2017  1:28 PM    Recent Labs       05/02/17 2343 05/03/17   2351   SODIUM  129*  130*   POTASSIUM  3.7  4.2   CHLORIDE  101  100   CO2  22  25   BUN  24*  24*   CREATININE  0.93  0.86   CALCIUM  8.8  9.2       Recent Labs      05/02/17 2343 05/03/17   2351   ALTSGPT  54*  52*   ASTSGOT  21  20   ALKPHOSPHAT  234*  237*   TBILIRUBIN  0.8  0.7   GLUCOSE  103*  101*       Recent Labs      05/02/17 2343 05/03/17 2351   RBC  2.78*  2.62*   HEMOGLOBIN  8.2*  7.7*   HEMATOCRIT  23.8*  22.1*   PLATELETCT  12*  7*       Recent Labs      05/02/17 2343 05/03/17 2351   WBC  0.5*  0.4*   ASTSGOT  21  20   ALTSGPT  54*  52*   ALKPHOSPHAT  234*  237*   TBILIRUBIN  0.8  0.7           Assessment/Plan     * CML (chronic myelocytic leukemia)-Accelerated phase (present on admission)  Overview  currently in blast phase according with recent bone marrow biopsy.  WBC: 0.4 Neut: 35%>>ANC:149    Assessment & Plan  Has completed multiple chemotherapy cycles.       Oncology on board, to start with new med for chemotherapy.   Appreciate oncology consult.     Neutropenia (CMS-HCC) (present on admission)  Overview  WBC:0.4 and ANC  145      Assessment & Plan  No fever and no infection.   Follow up and consider ABX if needed.     Bone pain (present on admission)  Overview  - since 4/26 patient experienced worsening mid-low back bone pain along with specific right 8th rib (confirmed from bone scan) bone pain with no other neurological deficits or other complaints  - denied falls/injury/trauma, fever, chills, sob/chest pain/cough/n/v/d/abd pain/ble swelling/headache or vision changes  - Ct imaging shows slight worsening of lucency of rib and of the vertrebral body from 4th of april    Assessment & Plan  - pain management of bone lytic pain with morphine IV with breakthrough oxycodone PO and lidocaine patch  - monitor; with fall precautions.    - Pain is controlled, Patient feeling ok.    - Follow up and consider radiation therapy if needed.     Pancytopenia (CMS-HCC)  (present on admission)  Overview  - Pancytopenia secondary to CML and chemotherapy  - 4/30: given 1 unit of Irradiated and CMV neg Platelets (5000) and 1 units of CMV neg and Irradiated PRBC (6.6).   - Anemia with Hb: 7.7  - platelet: 11>>7    Assessment & Plan  Transfer 1 unit irritated platelets on 5/4/17   Follow up.     BPH (benign prostatic hyperplasia) (present on admission)  Assessment & Plan  Stable.  Continue on tamsulosin 0.4 mg daily.     Low vitamin D level (present on admission)  Overview  Vit D: 11 on 4/29/17    Assessment & Plan  Continue supplementation.     Seizure disorder (CMS-HCC) (present on admission)  Assessment & Plan  Stable    Continue on his home dose of keppra

## 2017-05-05 LAB
ALBUMIN SERPL BCP-MCNC: 3.1 G/DL (ref 3.2–4.9)
ALBUMIN/GLOB SERPL: 0.7 G/DL
ALP SERPL-CCNC: 209 U/L (ref 30–99)
ALT SERPL-CCNC: 58 U/L (ref 2–50)
ANION GAP SERPL CALC-SCNC: 6 MMOL/L (ref 0–11.9)
AST SERPL-CCNC: 25 U/L (ref 12–45)
BASOPHILS # BLD AUTO: 2.3 % (ref 0–1.8)
BASOPHILS # BLD: 0.01 K/UL (ref 0–0.12)
BILIRUB SERPL-MCNC: 0.7 MG/DL (ref 0.1–1.5)
BUN SERPL-MCNC: 20 MG/DL (ref 8–22)
CALCIUM SERPL-MCNC: 9.4 MG/DL (ref 8.5–10.5)
CHLORIDE SERPL-SCNC: 100 MMOL/L (ref 96–112)
CO2 SERPL-SCNC: 24 MMOL/L (ref 20–33)
CREAT SERPL-MCNC: 0.81 MG/DL (ref 0.5–1.4)
EOSINOPHIL # BLD AUTO: 0.02 K/UL (ref 0–0.51)
EOSINOPHIL NFR BLD: 4.5 % (ref 0–6.9)
ERYTHROCYTE [DISTWIDTH] IN BLOOD BY AUTOMATED COUNT: 41.4 FL (ref 35.9–50)
GFR SERPL CREATININE-BSD FRML MDRD: >60 ML/MIN/1.73 M 2
GLOBULIN SER CALC-MCNC: 4.2 G/DL (ref 1.9–3.5)
GLUCOSE SERPL-MCNC: 106 MG/DL (ref 65–99)
HCT VFR BLD AUTO: 22.1 % (ref 42–52)
HGB BLD-MCNC: 7.6 G/DL (ref 14–18)
LYMPHOCYTES # BLD AUTO: 0.28 K/UL (ref 1–4.8)
LYMPHOCYTES NFR BLD: 56.8 % (ref 22–41)
MANUAL DIFF BLD: NORMAL
MCH RBC QN AUTO: 29.3 PG (ref 27–33)
MCHC RBC AUTO-ENTMCNC: 34.4 G/DL (ref 33.7–35.3)
MCV RBC AUTO: 85.3 FL (ref 81.4–97.8)
MONOCYTES # BLD AUTO: 0.06 K/UL (ref 0–0.85)
MONOCYTES NFR BLD AUTO: 11.4 % (ref 0–13.4)
MORPHOLOGY BLD-IMP: NORMAL
NEUTROPHILS # BLD AUTO: 0.13 K/UL (ref 1.82–7.42)
NEUTROPHILS NFR BLD: 25 % (ref 44–72)
NRBC # BLD AUTO: 0 K/UL
NRBC BLD AUTO-RTO: 0 /100 WBC
PLATELET # BLD AUTO: 28 K/UL (ref 164–446)
PLATELET BLD QL SMEAR: NORMAL
PLATELETS.RETICULATED NFR BLD AUTO: 1.1 K/UL (ref 0.6–13.1)
PMV BLD AUTO: 10.8 FL (ref 9–12.9)
POTASSIUM SERPL-SCNC: 4.3 MMOL/L (ref 3.6–5.5)
PROT SERPL-MCNC: 7.3 G/DL (ref 6–8.2)
RBC # BLD AUTO: 2.59 M/UL (ref 4.7–6.1)
RBC BLD AUTO: NORMAL
SODIUM SERPL-SCNC: 130 MMOL/L (ref 135–145)
WBC # BLD AUTO: 0.5 K/UL (ref 4.8–10.8)

## 2017-05-05 PROCEDURE — A9270 NON-COVERED ITEM OR SERVICE: HCPCS | Performed by: STUDENT IN AN ORGANIZED HEALTH CARE EDUCATION/TRAINING PROGRAM

## 2017-05-05 PROCEDURE — 700101 HCHG RX REV CODE 250: Performed by: STUDENT IN AN ORGANIZED HEALTH CARE EDUCATION/TRAINING PROGRAM

## 2017-05-05 PROCEDURE — 700102 HCHG RX REV CODE 250 W/ 637 OVERRIDE(OP): Performed by: STUDENT IN AN ORGANIZED HEALTH CARE EDUCATION/TRAINING PROGRAM

## 2017-05-05 PROCEDURE — 770009 HCHG ROOM/CARE - ONCOLOGY SEMI PRI*

## 2017-05-05 PROCEDURE — 99232 SBSQ HOSP IP/OBS MODERATE 35: CPT | Mod: GC | Performed by: INTERNAL MEDICINE

## 2017-05-05 PROCEDURE — G8978 MOBILITY CURRENT STATUS: HCPCS | Mod: CJ

## 2017-05-05 PROCEDURE — 97162 PT EVAL MOD COMPLEX 30 MIN: CPT

## 2017-05-05 PROCEDURE — G8979 MOBILITY GOAL STATUS: HCPCS | Mod: CI

## 2017-05-05 RX ADMIN — CARVEDILOL 3.12 MG: 3.12 TABLET, FILM COATED ORAL at 09:36

## 2017-05-05 RX ADMIN — LIDOCAINE 1 PATCH: 50 PATCH CUTANEOUS at 06:12

## 2017-05-05 RX ADMIN — STANDARDIZED SENNA CONCENTRATE AND DOCUSATE SODIUM 2 TABLET: 8.6; 5 TABLET, FILM COATED ORAL at 09:37

## 2017-05-05 RX ADMIN — TAMSULOSIN HYDROCHLORIDE 0.4 MG: 0.4 CAPSULE ORAL at 09:37

## 2017-05-05 RX ADMIN — OXYCODONE HYDROCHLORIDE 5 MG: 5 TABLET ORAL at 21:05

## 2017-05-05 RX ADMIN — CARVEDILOL 3.12 MG: 3.12 TABLET, FILM COATED ORAL at 18:42

## 2017-05-05 RX ADMIN — LEVETIRACETAM 750 MG: 250 TABLET, FILM COATED ORAL at 09:37

## 2017-05-05 RX ADMIN — LEVETIRACETAM 750 MG: 250 TABLET, FILM COATED ORAL at 21:05

## 2017-05-05 RX ADMIN — STANDARDIZED SENNA CONCENTRATE AND DOCUSATE SODIUM 2 TABLET: 8.6; 5 TABLET, FILM COATED ORAL at 21:05

## 2017-05-05 ASSESSMENT — ENCOUNTER SYMPTOMS
ABDOMINAL PAIN: 0
COUGH: 0
NAUSEA: 0
SORE THROAT: 0
HEARTBURN: 0
HEADACHES: 0
CHILLS: 0
DIZZINESS: 0
VOMITING: 0
DIARRHEA: 0
MYALGIAS: 0
SPUTUM PRODUCTION: 0
SHORTNESS OF BREATH: 0
DEPRESSION: 0
FEVER: 0
HEMOPTYSIS: 0
PALPITATIONS: 0
NECK PAIN: 0

## 2017-05-05 ASSESSMENT — PAIN SCALES - GENERAL
PAINLEVEL_OUTOF10: 0
PAINLEVEL_OUTOF10: 5

## 2017-05-05 ASSESSMENT — GAIT ASSESSMENTS
DISTANCE (FEET): 250
DEVIATION: DECREASED BASE OF SUPPORT;BRADYKINETIC
GAIT LEVEL OF ASSIST: MINIMAL ASSIST
ASSISTIVE DEVICE: HAND HELD ASSIST

## 2017-05-05 NOTE — DISCHARGE PLANNING
Medical Social Work    Ongoing: Synribo Medication    BRIANNE received phone call from Dr. Vu's RN, Luisa, asking for a return phone call (ph#: [phone]) to discuss pt's chemo- Synribo.  BRIANNE called Luisa back and left a message as she was busy with a pt.     [facility] (ph[facility] [phone]) to follow up on status of medication and to ensure they received prior auth from the insurance company.  BRIANNE spoke with Keri who provided another phone # for BRIANNE to call in the future (ph#: [phone]).  Keri stated she spoke with Dr. Vu's RN, Luisa, this morning.  Keri also informed this BRIANNE that they have received insurance auth and that she needs to verify pt's  with his daughter, Keith (ph#: [phone]),  before they are able to compound and ship the medication.  She will also need to confirm with pt's daughter, that she is agreeable to have medication shipped to her house and brought to the hospital right away.  Keri stated that they can potentially have the medication shipped out today if she is able to speak with Keith.    If medication is able to be shipped out today, pt will be able to have his first dose tomorrow evening.  Keri informed that she will be able to send 10 doses in this first shipment which would get pt through until Thursday morning.  Keri would then compound and ship additional doses on Wednesday to arrive in time for pt's Thursday night dose.  Shelf life of medication is only 6 days.    BRIANNE provided [facility] pharmacist and bedside RN with update.    12:55pm-  BRIANNE spoke with ROGER Moran with Dr. Vu's office.  She informed this BRIANNE that she received notification that pt's Synribo will be over nighted to pt's daughter today.    Plan:  SS will remain avaliable to provide support and assist as needed.

## 2017-05-05 NOTE — PROGRESS NOTES
[facility] [place] [facility] Interval Note    Name Benjamin Post       1945   Age/Sex 71 y.o. male   MRN [id number]   Code Status Full code     After 5PM or if no immediate response to page, please call for cross-coverage  Attending/Team: Garrick/Ly Call [phone] to page   1st Call - Day Intern (R1):   Dr Méndez 2nd Call - Day Sr. Resident (R2/R3):   Dr Benson         Chief complaint/ reason for interval visit (Primary Diagnosis)   CML/pancytopenia     Interval Problem Daily Status Update  :  Patient seen. No acute events overnight.  Still neutropenic but afebrile.   Pain is controlled.   Awaiting to start with Syniribo.       Principal Problem:    CML (chronic myelocytic leukemia)-Accelerated phase (Chronic) POA: Yes      Overview: currently in blast phase according with recent bone marrow biopsy.      WBC: 0.4 Neut: 35%>>ANC:149  Active Problems:    Pancytopenia (CMS-HCC) (Chronic) POA: Yes      Overview: - Pancytopenia secondary to CML and chemotherapy      - WBC: 0.5      - : given 1 unit of Irradiated and CMV neg Platelets (5000) and 1       units of CMV neg and Irradiated PRBC (6.6).       - Given one unit of platelets on 17.       - Anemia with Hb: 7.7>>7.6      - platelet: 11>>7>>28      - Hx of Chronic Right Subdural Hematoma    Neutropenia (CMS-HCC) POA: Yes      Overview: WBC:0.4>0.5 and ANC  145          Bone pain POA: Yes      Overview: - since  patient experienced worsening mid-low back bone pain along       with specific right 8th rib (confirmed from bone scan) bone pain with no       other neurological deficits or other complaints      - denied falls/injury/trauma, fever, chills, sob/chest       pain/cough/n/v/d/abd pain/ble swelling/headache or vision changes      - Ct imaging shows slight worsening of lucency of rib and of the       vertrebral body from     Acute bilateral low back pain  without sciatica POA: Yes    Seizure disorder (CMS-HCC) POA: Yes    Transaminitis POA: Yes    BPH (benign prostatic hyperplasia) (Chronic) POA: Yes    Low vitamin D level (Chronic) POA: Yes      Overview: Vit D: 11 on 4/29/17  Resolved Problems:    * No resolved hospital problems. *      Review of Systems   Constitutional: Negative for fever and chills.   HENT: Negative for hearing loss.    Respiratory: Negative for cough and hemoptysis.    Cardiovascular: Negative for chest pain and palpitations.   Gastrointestinal: Negative for heartburn and nausea.   Genitourinary: Negative for dysuria and urgency.   Musculoskeletal: Negative for myalgias and neck pain.   Skin: Negative for rash.   Neurological: Negative for dizziness and headaches.   Psychiatric/Behavioral: Negative for depression and suicidal ideas.       Consultants/Specialty  Oncology    Disposition  Home after chemotherapy     Quality Measures  EKG reviewed, Labs reviewed, Medications reviewed and Radiology images reviewed  Mahajan catheter: No Mahajan        DVT prophylaxis - mechanical: SCDs (low plt)                Physical Exam       Filed Vitals:    05/04/17 2000 05/04/17 2357 05/05/17 0500 05/05/17 0800   BP: 123/69 121/72 128/65 125/71   Pulse: 72 60 72 69   Temp: 36.9 °C (98.4 °F) 36.7 °C (98.1 °F) 36.9 °C (98.4 °F) 36.9 °C (98.4 °F)   Resp: 16 18 18 20   Height:       Weight:       SpO2: 100% 94% 97% 98%     Body mass index is 27.72 kg/(m^2).    Oxygen Therapy:  Pulse Oximetry: 98 %, O2 (LPM): 0, O2 Delivery: None (Room Air)    Physical Exam   Constitutional: He is well-developed, well-nourished, and in no distress. No distress.   HENT:   Head: Normocephalic.   Neck: No tracheal deviation present. No thyromegaly present.   Cardiovascular: Normal rate and normal heart sounds.    Pulmonary/Chest: Effort normal. No respiratory distress. He has no wheezes.   Abdominal: Soft. He exhibits no distension. There is no tenderness.   Musculoskeletal: He exhibits  no edema.   Neurological: He is alert.   Skin: No erythema.         Lab Data Review:      5/4/2017  1:28 PM    Recent Labs      05/02/17 2343 05/03/17 2351 05/04/17 2356   SODIUM  129*  130*  130*   POTASSIUM  3.7  4.2  4.3   CHLORIDE  101  100  100   CO2  22  25  24   BUN  24*  24*  20   CREATININE  0.93  0.86  0.81   CALCIUM  8.8  9.2  9.4       Recent Labs      05/02/17 2343 05/03/17 2351 05/04/17 2356   ALTSGPT  54*  52*  58*   ASTSGOT  21  20  25   ALKPHOSPHAT  234*  237*  209*   TBILIRUBIN  0.8  0.7  0.7   GLUCOSE  103*  101*  106*       Recent Labs      05/02/17 2343 05/03/17 2351 05/04/17 2356   RBC  2.78*  2.62*  2.59*   HEMOGLOBIN  8.2*  7.7*  7.6*   HEMATOCRIT  23.8*  22.1*  22.1*   PLATELETCT  12*  7*  28*       Recent Labs      05/02/17 2343 05/03/17 2351 05/04/17 2356   WBC  0.5*  0.4*  0.5*   NEUTSPOLYS   --    --   25.00*   LYMPHOCYTES   --    --   56.80*   MONOCYTES   --    --   11.40   EOSINOPHILS   --    --   4.50   BASOPHILS   --    --   2.30*   ASTSGOT  21  20  25   ALTSGPT  54*  52*  58*   ALKPHOSPHAT  234*  237*  209*   TBILIRUBIN  0.8  0.7  0.7           Assessment/Plan     * CML (chronic myelocytic leukemia)-Accelerated phase (present on admission)  Overview  currently in blast phase according with recent bone marrow biopsy.  WBC: 0.4 Neut: 35%>>ANC:149    Assessment & Plan  Has completed multiple chemotherapy cycles.       Oncology on board,  to start with Syniribo   We talked with the patient about the new chemotherapy and the risk and benefit of it and maybe the risk over benefit according to his situation and discussed with him the other options like the hospice care, he is clear he wants the chemotherapy.   Appreciate oncology consult.     Pancytopenia (CMS-HCC) (present on admission)  Overview  - Pancytopenia secondary to CML and chemotherapy  - WBC: 0.5  - 4/30: given 1 unit of Irradiated and CMV neg Platelets (5000) and 1 units of CMV neg and  Irradiated PRBC (6.6).   - Given one unit of platelets on 5/4/17.   - Anemia with Hb: 7.7>>7.6  - platelet: 11>>7>>28  - Hx of Chronic Right Subdural Hematoma    Assessment & Plan  Transfer 1 unit irritated platelets on 5/4/17   Follow up.     Neutropenia (CMS-HCC) (present on admission)  Overview  WBC:0.4>0.5 and ANC  145      Assessment & Plan  No fever and no infection.   Follow up and consider ABX if needed.     Bone pain (present on admission)  Overview  - since 4/26 patient experienced worsening mid-low back bone pain along with specific right 8th rib (confirmed from bone scan) bone pain with no other neurological deficits or other complaints  - denied falls/injury/trauma, fever, chills, sob/chest pain/cough/n/v/d/abd pain/ble swelling/headache or vision changes  - Ct imaging shows slight worsening of lucency of rib and of the vertrebral body from 4th of april    Assessment & Plan  - pain management of bone lytic pain with morphine IV with breakthrough oxycodone PO and lidocaine patch  - monitor; with fall precautions.    - Pain is controlled, Patient feeling ok.    - Follow up and consider radiation therapy if needed.     BPH (benign prostatic hyperplasia) (present on admission)  Assessment & Plan  Stable.  Continue on tamsulosin 0.4 mg daily.     Low vitamin D level (present on admission)  Overview  Vit D: 11 on 4/29/17    Assessment & Plan  Continue supplementation.     Seizure disorder (CMS-[facility]) (present on admission)  Assessment & Plan  Stable    Continue on his home dose of keppra

## 2017-05-05 NOTE — PROGRESS NOTES
Oncology/Hematology Progress Note               Author: Bautista Agustin    Cc:  CML Date & Time created: 5/5/2017  11:29 AM     Interval History:  We were able to speak with the help of an  today.  He has no complaints.  His chest wall pain is controlled.  No N/v/d.  No SOB/cough.      PMHx, PSurgHx, SocHx, FAMHx:  All reviewed and unchanged    Review of Systems:  Review of Systems   Constitutional: Positive for malaise/fatigue. Negative for fever and chills.   HENT: Negative for sore throat.    Respiratory: Negative for cough, hemoptysis, sputum production and shortness of breath.    Cardiovascular: Negative for chest pain and leg swelling.   Gastrointestinal: Negative for heartburn, nausea, vomiting, abdominal pain and diarrhea.   Neurological: Negative for headaches.       Physical Exam:  Physical Exam   Constitutional: He is oriented to person, place, and time. He appears well-developed and well-nourished. No distress.   HENT:   Head: Normocephalic and atraumatic.   Mouth/Throat: Oropharynx is clear and moist. No oropharyngeal exudate.   Eyes: Conjunctivae and EOM are normal. Right eye exhibits no discharge. Left eye exhibits no discharge. No scleral icterus.   Neck: Normal range of motion. Neck supple. No thyromegaly present.   Cardiovascular: Normal rate, regular rhythm and normal heart sounds.  Exam reveals no gallop and no friction rub.    No murmur heard.  Pulmonary/Chest: Effort normal and breath sounds normal. No respiratory distress. He has no wheezes. He has no rales.   Abdominal: Soft. Bowel sounds are normal. He exhibits no distension. There is no tenderness. There is no rebound and no guarding.   Musculoskeletal: Normal range of motion. He exhibits no edema or tenderness.   Lymphadenopathy:     He has no cervical adenopathy.   Neurological: He is alert and oriented to person, place, and time. No cranial nerve deficit. Coordination normal.   Skin: Skin is warm and dry. No rash noted. He is  not diaphoretic. No erythema.   Psychiatric: He has a normal mood and affect. His behavior is normal. Thought content normal.       Labs:        Invalid input(s): [id number]      Recent Labs      17   SODIUM  129*  130*  130*   POTASSIUM  3.7  4.2  4.3   CHLORIDE  101  100  100   CO2  22  25  24   BUN  24*  24*  20   CREATININE  0.93  0.86  0.81   CALCIUM  8.8  9.2  9.4     Recent Labs      17   ALTSGPT  54*  52*  58*   ASTSGOT  21  20  25   ALKPHOSPHAT  234*  237*  209*   TBILIRUBIN  0.8  0.7  0.7   GLUCOSE  103*  101*  106*     Recent Labs      17   RBC  2.78*  2.62*  2.59*   HEMOGLOBIN  8.2*  7.7*  7.6*   HEMATOCRIT  23.8*  22.1*  22.1*   PLATELETCT  12*  7*  28*     Recent Labs      17   WBC  0.5*  0.4*  0.5*   NEUTSPOLYS   --    --   25.00*   LYMPHOCYTES   --    --   56.80*   MONOCYTES   --    --   11.40   EOSINOPHILS   --    --   4.50   BASOPHILS   --    --   2.30*   ASTSGOT  21  20  25   ALTSGPT  54*  52*  58*   ALKPHOSPHAT  234*  237*  209*   TBILIRUBIN  0.8  0.7  0.7     Recent Labs      17   SODIUM  129*  130*  130*   POTASSIUM  3.7  4.2  4.3   CHLORIDE  101  100  100   CO2  22  25  24   GLUCOSE  103*  101*  106*   BUN  24*  24*  20   CREATININE  0.93  0.86  0.81   CALCIUM  8.8  9.2  9.4     Hemodynamics:  Temp (24hrs), Av.7 °C (98 °F), Min:36.3 °C (97.3 °F), Max:36.9 °C (98.4 °F)  Temperature: 36.9 °C (98.4 °F)  Pulse  Av.8  Min: 60  Max: 116   Blood Pressure : 125/71 mmHg     Respiratory:    Respiration: 20, Pulse Oximetry: 98 %        RUL Breath Sounds: Diminished, RML Breath Sounds: Diminished, RLL Breath Sounds: Diminished, WAGNER Breath Sounds: Diminished, LLL Breath Sounds: Diminished  Fluids:    Intake/Output Summary (Last 24 hours) at 17 1409  Last  data filed at 05/01/17 0838   Gross per 24 hour   Intake   1006 ml   Output    575 ml   Net    431 ml        GI/Nutrition:  Orders Placed This Encounter   Procedures   • Diet Order     Standing Status: Standing      Number of Occurrences: 1      Standing Expiration Date:      Order Specific Question:  Diet:     Answer:  2 Gram Sodium [7]     Order Specific Question:  Texture/Fiber modifications:     Answer:  Dysphagia 2(Pureed/Chopped)specify fluid consistency(question 6) [2]      Comments:  thins ok; pt with no teeth/ nor dentures     Medical Decision Making, by Problem:  Active Hospital Problems    Diagnosis   • Neutropenia (CMS-HCC) [D70.9]   • Pancytopenia (CMS-HCC) [D61.818]   • Acute bilateral low back pain without sciatica [M54.5]   • Bone pain [M89.8X9]   • Transaminitis [R74.0]   • 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  CML-- multiple relapses.  Was treated on dasatinib for a time with response, but then relapse.  Was more recently on bosutinib with response, but again recent relapse.  Sounds like this medicine was stopped by Dr. Vu on 4/26/17.  The plan has been for allo-BMT at [facility], but they need him to get some kind of response to chemo before they can do the transplant.   Mirella wants to try Synribo. The patient will be getting this in the mail tomorrow.  The goal would be to let him start his own home supply while he is monitored here for side effects.  Synribo is given as a SQ shot BID x 14 days with the first cycle, followed by 2 weeks off.        Synribo certainly would be a high risk medication at this point.  There is a 's warning for cerebral bleeding, and to avoid NSAIDS, blood thinners, etc as well as to use with caution in patients with Platelets<50K.  He has a h/o of a SDH in the recent past.  Also this medication can be a/w severe cytopenias.  He is already starting out with severe cytopenias.  He would be at very high risk for  infectious complications.  I explained all of this in detail to him today.  There is probably about a 15% chance that this puts him back into remission.  Usually it takes 2-3 months for maximum response, and the response may only last 4-5 months on average.  I made sure that he understands this is a long shot, and the more likely outcome is that the medication doesn't work and he dies from his disease.  He re-iterates that even if there is a small chance of success, he wants to try it.  He again declines hospice.          I do recommend that he be a DNR/DNI.  We had an extensive conversation about this.  We described in great detail what FULL CODE means vs DNR/DNI.  I think he would only have a 10% chance of surviving a code.  Even if he survived he would not be the same ernestine.  He certainly would be too debilitated to undergo further treatment, and we would just be bringing him back to suffer and die from the leukemia.  There would also be a high chance that he would be on life support for weeks and weeks, ultimately forcing his family to make a very hard decision about when and how to withdraw care.  After carefully clarifying and making sure he understood all this, he has decided to be DNR/DNI.  The nurse navigator was present for this conversation.  She will follow-up with the family to make sure they understand this decision that Mr. Post has made.  I think this is the right medical decision.    2.  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  Not likely to improve unless he can get response to some kind of chemo.  Will transfuse as needed.  Transfuse platelets to keep >10, or higher if bleeding.    3.  Neutropenia-- no obvious s/s of infection.  Off antibiotics.  Just monitor.  If spikes temp, will need neutropenic broad spectrum antibiotics.    4.  Elevated LFT's-- related to past chemo.  Monitor.    5.  8th rib lesion-- he seems to indicate that he is not in much pain now.  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  If  pain continues to be an issue, could consult rad onc to radiate this spot, which would likely provide great pain relief.      Highly complex patient with high risk of morbidity and mortality.  i spent >60 min in care of this patient today with 45 min in face to face care.      Labs reviewed, Medications reviewed and Radiology images reviewed  Mahajan catheter: No Mahajan      DVT Prophylaxis: Contraindicated - High bleeding risk

## 2017-05-05 NOTE — PROGRESS NOTES
Received report from day RN. POC discussed with patient, pt verbalizes understanding and agreement.  A&Ox4. PRN oxycodone given for pain. Voiding via urinal. Neutropenic precautions in place. Pt is afebrile, fatigued this evening. Room near nurses station.  Bed alarm on, call light in reach, bed in low position, Q2 hr rounding.

## 2017-05-05 NOTE — CARE PLAN
Problem: Safety  Goal: Will remain free from falls  Outcome: PROGRESSING AS EXPECTED  Intervention: Assess risk factors for falls  Fall risk assessment completed  Intervention: Implement fall precautions  Fall risk sign on door.  Room near nurses station  Yellow treaded socks on patient  Bed in lowest, locked position  Bed alarm on      Problem: Urinary Elimination:  Goal: Ability to reestablish a normal urinary elimination pattern will improve  Outcome: PROGRESSING AS EXPECTED  Intervention: Assess and monitor for signs and symptoms of urinary retention  Pt voiding adequate amounts of urine  Intervention: Encourage scheduled voiding  Scheduled voiding encouraged

## 2017-05-05 NOTE — THERAPY
"Physical Therapy Evaluation completed.   Bed Mobility:  Supine to Sit: Stand by Assist  Transfers: Sit to Stand: Stand by Assist  Gait: Level Of Assist: Minimal Assist with Hand Held Assist       Plan of Care: Will benefit from Physical Therapy 2 times per week and Plan to complete next treatment by Wednesday 5/10  Discharge Recommendations: Equipment: Front-Wheel Walker. Post-acute therapy Discharge to home with outpatient or home health for additional skilled therapy services.    See \"Rehab Therapy-Acute\" Patient Summary Report for complete documentation.     "

## 2017-05-05 NOTE — PROGRESS NOTES
Received report form night RN and assumed care of this patient. Patient is awake and oriented. Protective precautions in place. Family at bedside. Daughter is waiting to receive chemotherapy med before treatment can start. Q 4 neuro checks in place. Treaded slipper socks on, call light and belongings are within reach. Bed is in low, locked position, and bed alarm is on.

## 2017-05-05 NOTE — PROGRESS NOTES
Cariessa from Lab called with critical result of ANC of 0.13 at 0428. Critical lab result read back to Cariessa.   This critical lab result is within parameters established by [facility] for this patient

## 2017-05-05 NOTE — PROGRESS NOTES
Pt's daughter Keith called for updates r/t Synribo Rx and also for plan of care updates with MD Agustin. No answer. This RNs extension provided for call back

## 2017-05-06 LAB
ANION GAP SERPL CALC-SCNC: 3 MMOL/L (ref 0–11.9)
BUN SERPL-MCNC: 19 MG/DL (ref 8–22)
CALCIUM SERPL-MCNC: 9.8 MG/DL (ref 8.5–10.5)
CHLORIDE SERPL-SCNC: 101 MMOL/L (ref 96–112)
CO2 SERPL-SCNC: 27 MMOL/L (ref 20–33)
CREAT SERPL-MCNC: 0.85 MG/DL (ref 0.5–1.4)
ERYTHROCYTE [DISTWIDTH] IN BLOOD BY AUTOMATED COUNT: 39.5 FL (ref 35.9–50)
GFR SERPL CREATININE-BSD FRML MDRD: >60 ML/MIN/1.73 M 2
GLUCOSE SERPL-MCNC: 100 MG/DL (ref 65–99)
HCT VFR BLD AUTO: 22 % (ref 42–52)
HGB BLD-MCNC: 7.5 G/DL (ref 14–18)
MCH RBC QN AUTO: 28.6 PG (ref 27–33)
MCHC RBC AUTO-ENTMCNC: 34.1 G/DL (ref 33.7–35.3)
MCV RBC AUTO: 84 FL (ref 81.4–97.8)
PLATELET # BLD AUTO: 20 K/UL (ref 164–446)
PMV BLD AUTO: 10.6 FL (ref 9–12.9)
POTASSIUM SERPL-SCNC: 4.5 MMOL/L (ref 3.6–5.5)
RBC # BLD AUTO: 2.62 M/UL (ref 4.7–6.1)
SODIUM SERPL-SCNC: 131 MMOL/L (ref 135–145)
WBC # BLD AUTO: 0.5 K/UL (ref 4.8–10.8)

## 2017-05-06 PROCEDURE — 99232 SBSQ HOSP IP/OBS MODERATE 35: CPT | Mod: GC | Performed by: INTERNAL MEDICINE

## 2017-05-06 PROCEDURE — 85027 COMPLETE CBC AUTOMATED: CPT

## 2017-05-06 PROCEDURE — 85025 COMPLETE CBC W/AUTO DIFF WBC: CPT

## 2017-05-06 PROCEDURE — 700111 HCHG RX REV CODE 636 W/ 250 OVERRIDE (IP): Performed by: SPECIALIST

## 2017-05-06 PROCEDURE — A9270 NON-COVERED ITEM OR SERVICE: HCPCS | Performed by: STUDENT IN AN ORGANIZED HEALTH CARE EDUCATION/TRAINING PROGRAM

## 2017-05-06 PROCEDURE — 80053 COMPREHEN METABOLIC PANEL: CPT

## 2017-05-06 PROCEDURE — 700101 HCHG RX REV CODE 250: Performed by: STUDENT IN AN ORGANIZED HEALTH CARE EDUCATION/TRAINING PROGRAM

## 2017-05-06 PROCEDURE — 700102 HCHG RX REV CODE 250 W/ 637 OVERRIDE(OP): Performed by: STUDENT IN AN ORGANIZED HEALTH CARE EDUCATION/TRAINING PROGRAM

## 2017-05-06 PROCEDURE — 770009 HCHG ROOM/CARE - ONCOLOGY SEMI PRI*

## 2017-05-06 PROCEDURE — 80048 BASIC METABOLIC PNL TOTAL CA: CPT

## 2017-05-06 PROCEDURE — 36415 COLL VENOUS BLD VENIPUNCTURE: CPT

## 2017-05-06 RX ORDER — DEXAMETHASONE SODIUM PHOSPHATE 4 MG/ML
12 INJECTION, SOLUTION INTRA-ARTICULAR; INTRALESIONAL; INTRAMUSCULAR; INTRAVENOUS; SOFT TISSUE ONCE
Status: COMPLETED | OUTPATIENT
Start: 2017-05-06 | End: 2017-05-06

## 2017-05-06 RX ADMIN — CARVEDILOL 3.12 MG: 3.12 TABLET, FILM COATED ORAL at 17:13

## 2017-05-06 RX ADMIN — LIDOCAINE 1 PATCH: 50 PATCH CUTANEOUS at 05:15

## 2017-05-06 RX ADMIN — LEVETIRACETAM 750 MG: 250 TABLET, FILM COATED ORAL at 20:35

## 2017-05-06 RX ADMIN — LEVETIRACETAM 750 MG: 250 TABLET, FILM COATED ORAL at 10:15

## 2017-05-06 RX ADMIN — DEXAMETHASONE SODIUM PHOSPHATE 12 MG: 4 INJECTION, SOLUTION INTRAMUSCULAR; INTRAVENOUS at 12:27

## 2017-05-06 RX ADMIN — CARVEDILOL 3.12 MG: 3.12 TABLET, FILM COATED ORAL at 10:14

## 2017-05-06 RX ADMIN — TAMSULOSIN HYDROCHLORIDE 0.4 MG: 0.4 CAPSULE ORAL at 10:15

## 2017-05-06 ASSESSMENT — ENCOUNTER SYMPTOMS
VOMITING: 0
HEARTBURN: 0
SPUTUM PRODUCTION: 0
SHORTNESS OF BREATH: 0
ABDOMINAL PAIN: 0
DEPRESSION: 0
NAUSEA: 0
COUGH: 0
HEADACHES: 0
DIARRHEA: 0
MYALGIAS: 0
FEVER: 0
CHILLS: 0
HEMOPTYSIS: 0
DIZZINESS: 0
PALPITATIONS: 0
SORE THROAT: 0
NECK PAIN: 0

## 2017-05-06 ASSESSMENT — PAIN SCALES - GENERAL
PAINLEVEL_OUTOF10: 3
PAINLEVEL_OUTOF10: 0

## 2017-05-06 NOTE — PROGRESS NOTES
Chemotherapy Verification - PRIMARY RN      Height = 1.7m  Weight = 80.3kg  BSA = 1.95m2       Medication: Omacetaxine  Dose: 1.25mg/m2  Calculated Dose: 2.4mg = ordered dose                             (In mg/m2, AUC, mg/kg)               I confirm this process was performed independently with the BSA and all final chemotherapy dosing calculations congruent.  Any discrepancies of 5% or greater have been addressed with the chemotherapy pharmacist. The resolution of the discrepancy has been documented in the EPIC progress notes.

## 2017-05-06 NOTE — PROGRESS NOTES
Oncology/Hematology Progress Note               Author: Bautistacher Agustin    Cc:  CML Date & Time created: 5/6/2017  4:39 PM     Interval History:  No HA's.  No nausea or diarrhea.  No pain.  No f/c.      PMHx, PSurgHx, SocHx, FAMHx:  All reviewed and unchanged    Review of Systems:  Review of Systems   Constitutional: Positive for malaise/fatigue. Negative for fever and chills.   HENT: Negative for sore throat.    Respiratory: Negative for cough, hemoptysis, sputum production and shortness of breath.    Cardiovascular: Negative for chest pain and leg swelling.   Gastrointestinal: Negative for heartburn, nausea, vomiting, abdominal pain and diarrhea.   Neurological: Negative for headaches.       Physical Exam:  Physical Exam   Constitutional: He is oriented to person, place, and time. He appears well-developed and well-nourished. No distress.   HENT:   Head: Normocephalic and atraumatic.   Mouth/Throat: Oropharynx is clear and moist. No oropharyngeal exudate.   Eyes: Conjunctivae and EOM are normal. Right eye exhibits no discharge. Left eye exhibits no discharge. No scleral icterus.   Neck: Normal range of motion. Neck supple. No thyromegaly present.   Cardiovascular: Normal rate, regular rhythm and normal heart sounds.  Exam reveals no gallop and no friction rub.    No murmur heard.  Pulmonary/Chest: Effort normal and breath sounds normal. No respiratory distress. He has no wheezes. He has no rales.   Abdominal: Soft. Bowel sounds are normal. He exhibits no distension. There is no tenderness. There is no rebound and no guarding.   Musculoskeletal: Normal range of motion. He exhibits no edema or tenderness.   Lymphadenopathy:     He has no cervical adenopathy.   Neurological: He is alert and oriented to person, place, and time. No cranial nerve deficit. Coordination normal.   Skin: Skin is warm and dry. No rash noted. He is not diaphoretic. No erythema.   Psychiatric: He has a normal mood and affect. His behavior is  normal. Thought content normal.       Labs:        Invalid input(s): [id number]      Recent Labs      17   0013   SODIUM  130*  130*  131*   POTASSIUM  4.2  4.3  4.5   CHLORIDE  100  100  101   CO2  25  24  27   BUN  24*  20  19   CREATININE  0.86  0.81  0.85   CALCIUM  9.2  9.4  9.8     Recent Labs      17   0013   ALTSGPT  52*  58*   --    ASTSGOT  20  25   --    ALKPHOSPHAT  237*  209*   --    TBILIRUBIN  0.7  0.7   --    GLUCOSE  101*  106*  100*     Recent Labs      17   0013   RBC  2.62*  2.59*  2.62*   HEMOGLOBIN  7.7*  7.6*  7.5*   HEMATOCRIT  22.1*  22.1*  22.0*   PLATELETCT  7*  28*  20*     Recent Labs      17   0013   WBC  0.4*  0.5*  0.5*   NEUTSPOLYS   --   25.00*   --    LYMPHOCYTES   --   56.80*   --    MONOCYTES   --   11.40   --    EOSINOPHILS   --   4.50   --    BASOPHILS   --   2.30*   --    ASTSGOT  20  25   --    ALTSGPT  52*  58*   --    ALKPHOSPHAT  237*  209*   --    TBILIRUBIN  0.7  0.7   --      Recent Labs      17   0013   SODIUM  130*  130*  131*   POTASSIUM  4.2  4.3  4.5   CHLORIDE  100  100  101   CO2  25  24  27   GLUCOSE  101*  106*  100*   BUN  24*  20  19   CREATININE  0.86  0.81  0.85   CALCIUM  9.2  9.4  9.8     Hemodynamics:  Temp (24hrs), Av.8 °C (98.2 °F), Min:36.7 °C (98 °F), Max:36.9 °C (98.4 °F)  Temperature: 36.7 °C (98 °F)  Pulse  Av.5  Min: 60  Max: 116   Blood Pressure : 112/60 mmHg     Respiratory:    Respiration: 18, Pulse Oximetry: 97 %     Work Of Breathing / Effort: Mild;Shallow  RUL Breath Sounds: Clear, RML Breath Sounds: Diminished, RLL Breath Sounds: Diminished, WAGNER Breath Sounds: Clear, LLL Breath Sounds: Diminished  Fluids:    Intake/Output Summary (Last 24 hours) at 17 1409  Last data filed at 17 0838   Gross per 24 hour   Intake   1006  ml   Output    575 ml   Net    431 ml        GI/Nutrition:  Orders Placed This Encounter   Procedures   • Diet Order     Standing Status: Standing      Number of Occurrences: 1      Standing Expiration Date:      Order Specific Question:  Diet:     Answer:  2 Gram Sodium [7]     Order Specific Question:  Texture/Fiber modifications:     Answer:  Dysphagia 2(Pureed/Chopped)specify fluid consistency(question 6) [2]      Comments:  thins ok; pt with no teeth/ nor dentures     Medical Decision Making, by Problem:  Active Hospital Problems    Diagnosis   • Neutropenia (CMS-HCC) [D70.9]   • Pancytopenia (CMS-HCC) [D61.818]   • Acute bilateral low back pain without sciatica [M54.5]   • Bone pain [M89.8X9]   • Transaminitis [R74.0]   • 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  CML-- multiple relapses.  Was treated on dasatinib for a time with response, but then relapse.  Was more recently on bosutinib with response, but again recent relapse.  Sounds like this medicine was stopped by Dr. Vu on 4/26/17.  The plan has been for allo-BMT at [facility], but they need him to get some kind of response to chemo before they can do the transplant.   The plan is to try Synribo.  He got this in the mail, and is on his own home supply while he is monitored here for side effects.  Synribo is given as a SQ shot BID x 14 days with the first cycle, followed by 2 weeks off.  Started on 5/6/17.        Synribo certainly would be a high risk medication at this point.  There is a 's warning for cerebral bleeding, and to avoid NSAIDS, blood thinners, etc as well as to use with caution in patients with Platelets<50K.  He has a h/o of a SDH in the recent past.   -- Day 1 of synribo  -- tolerating  -- monitor for Head aches or AMS changes    -- keep in hospital for first 14 days of the treatment, to monitor for side effects, and monitor counts        2.  Thrombocytopenia-- related to blast crisis  CML.  Not likely to improve unless he can get response to some kind of chemo.  Will transfuse as needed.  Transfuse platelets to keep >10, or higher if bleeding.    3.  Neutropenia-- no obvious s/s of infection.  Off antibiotics.  Just monitor.  If spikes temp, will need neutropenic broad spectrum antibiotics.    4.  Elevated LFT's-- related to past chemo.  Monitor.    5.  8th rib lesion-- he seems to indicate that he is not in much pain now.  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  If pain continues to be an issue, could consult rad onc to radiate this spot, which would likely provide great pain relief.    6. Anemia-- transfuse 1 unit of PRBC's if <7.5      Highly complex patient with high risk of morbidity and mortality.     Labs reviewed and Medications reviewed  Mahajan catheter: No Mahajan      DVT Prophylaxis: Contraindicated - High bleeding risk

## 2017-05-06 NOTE — PROGRESS NOTES
Pt is A&O.  Sitting up in bed this am eating breakfast.  Tolerated well.  Family at bedside.  Pt's spouse brought in chemo from home.  Promptly given to PharmacistDemetrice for reconstitution.  Chemo calculations done and drug researched.  Pt refused stool softeners as he had 2 bms yesterday.  Voiding via urinal qs yellow uop.  Neuro checks done and WNL.  Lidocaine patch providing good pain control to R rib cage.  Neutropenic precautions in place.  Education reinforced with pt and family in room.  Bed alarm on and sounding.

## 2017-05-06 NOTE — PROGRESS NOTES
Samira from Lab called with critical result of WBC at 0.5 and Plt at 20. Critical lab result read back to Samira.   This critical lab result is within parameters established by  for this patient

## 2017-05-06 NOTE — PROGRESS NOTES
Chemotherapy Verification - SECONDARY RN       Height = 170.2 cm  Weight = 80.3 kg  BSA = 1.95 m2      Medication: Omacetaxine  Dose: 1.25mg/m2  Calculated Dose: 2.44 mg Ordered Dose: 2.4 mg                            (In mg/m2, AUC, mg/kg)         I confirm that this process was performed independently.

## 2017-05-06 NOTE — PROGRESS NOTES
"Pharmacy Chemotherapy Calculations    Dx: CML  Cycle: 1 day 1 of 14  (Previous treatment = s/p dasatinib, bosutinib,hopeful for allo-BMT at [facility] if responds.)    Regimen and Dosing Reference  Omacetaxine (Syynribo)  Omacetaxine 1.25 mg/m2 Subcutaneously twice daily for 14 consecutive days of a 28 day treatment cycle  Continue until hematologic response is achieved.  Then maintenance 1.25 mg/m2 subcutaneously twice daily for 7 consecutive days of a 28 day treatment cycle until no longer achieving clinical treatment benefit    Brice DOWLING et al Phase 2 study of subcutaneous Omacetaxine Mepesuccinate after TKI faillure in patients with Chronic Phase CML with  Mutation Blood, 2012 120(13):2573-80  NCCN Guidelines for Chronic Myelogenous Leukemia V.1.2016        Blood pressure 121/68, pulse 72, temperature 36.7 °C (98.1 °F), resp. rate 18, height 1.702 m (5' 7\"), weight 80.3 kg (177 lb 0.5 oz), SpO2 97 %. BSA = 1.95 m2    Ht:  67 in      Wt: 80.3 kg     BSA = 1.95 m2    Labs 5/5/17 ANC ~ 130   LFT = AST/ALT/AlkPhos/Tbili = 25/58/209/0.7    Labs 5/6/17 Plt = 20 k  Hgb = 7.5   Scr =  0.85      Crcl ~88 ml/min     MD and patient aware of all labs and risks involved in treatment, ok to proceed per Dr. Agustin. (see Dr. Agustin note of 5/5/17 for details.)    Omacetaxine 1.25 mg/m2 x 1.95 m2 = 2.4 mg Subcutaneous   <5% diff, ok to treat with final written dose =  2.4 mg Subcutaneous twice daily.--patient own med, stored in inpatient pharmacy to be dispensed daily. Refrigeration required.     Demetrice Ramos, PharmD      "

## 2017-05-06 NOTE — PROGRESS NOTES
"Assumed care of pt this evening.  Reporting some mild pain.  Provided medication and performed neuro check.  He responds with \"Aminata\" (heart) when asked where he is, and responds with his name, when asked the date in Syriac.  /65 mmHg  Pulse 78  Temp(Src) 36.7 °C (98 °F)  Resp 20  Ht 1.702 m (5' 7\")  Wt 80.3 kg (177 lb 0.5 oz)  BMI 27.72 kg/m2  SpO2 98% I will continue to assess and monitor.    "

## 2017-05-06 NOTE — PROGRESS NOTES
[facility] Internal Medicine Interval Note    Name Benjamin Post       1945   Age/Sex 71 y.o. male   MRN [id number]   Code Status Full code     After 5PM or if no immediate response to page, please call for cross-coverage  Attending/Team: Garrick/Ly Call [phone] to page   1st Call - Day Intern (R1):   Dr Méndez 2nd Call - Day Sr. Resident (R2/R3):   Dr Benson         Chief complaint/ reason for interval visit (Primary Diagnosis)   CML/pancytopenia     Interval Problem Daily Status Update  :  Patient seen. No acute events overnight.  Still neutropenic but afebrile.   Started today with chemotherapy synribo after long discussion with the patient about the risk and benefit. .        Principal Problem:    CML (chronic myelocytic leukemia)-Accelerated phase (Chronic) POA: Yes      Overview: multiple relapses.        Was treated on dasatinib for a time with response, but then relapse.        Was more recently on bosutinib with response, but again recent relapse.       WBC: 0.4-0.5 Neut: 35%>>ANC:149  Active Problems:    Pancytopenia (CMS-HCC) (Chronic) POA: Yes      Overview: - Pancytopenia secondary to CML and chemotherapy      - WBC: 0.5      - 4/30: given 1 unit of Irradiated and CMV neg Platelets (5000) and 1       units of CMV neg and Irradiated PRBC (6.6).       - Given one unit of platelets on 17.       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      - since  patient experienced worsening mid-low back bone pain along       with specific right 8th rib (confirmed from bone scan) bone pain with no       other neurological deficits or other complaints      - Ct imaging shows slight worsening of lucency of rib and of  the       vertrebral body from 4th of april    Acute bilateral low back pain without sciatica POA: Yes    Seizure disorder (CMS-HCC) POA: Yes    Transaminitis POA: Yes    BPH (benign prostatic hyperplasia) (Chronic) POA: Yes    Low vitamin D level (Chronic) POA: Yes      Overview: Vit D: 11 on 4/29/17  Resolved Problems:    * No resolved hospital problems. *      Review of Systems   Constitutional: Negative for fever and chills.   HENT: Negative for hearing loss.    Respiratory: Negative for cough and hemoptysis.    Cardiovascular: Negative for chest pain and palpitations.   Gastrointestinal: Negative for heartburn and nausea.   Genitourinary: Negative for dysuria and urgency.   Musculoskeletal: Negative for myalgias and neck pain.   Skin: Negative for rash.   Neurological: Negative for dizziness and headaches.   Psychiatric/Behavioral: Negative for depression and suicidal ideas.       Consultants/Specialty  Oncology    Disposition  Home after chemotherapy     Quality Measures  EKG reviewed, Labs reviewed, Medications reviewed and Radiology images reviewed  Mahajan catheter: No Mahajan        DVT prophylaxis - mechanical: SCDs (low plt)                Physical Exam       Filed Vitals:    05/06/17 0000 05/06/17 0400 05/06/17 0800 05/06/17 1200   BP: 120/68 124/72 121/68 113/66   Pulse: 61 68 72 78   Temp: 36.7 °C (98.1 °F) 36.9 °C (98.4 °F) 36.7 °C (98.1 °F) 36.8 °C (98.3 °F)   Resp: 18 18 18 18   Height:       Weight:       SpO2: 100% 100% 97% 98%     Body mass index is 27.72 kg/(m^2).    Oxygen Therapy:  Pulse Oximetry: 98 %, O2 (LPM): 0, O2 Delivery: None (Room Air)    Physical Exam   Constitutional: He is well-developed, well-nourished, and in no distress. No distress.   HENT:   Head: Normocephalic.   Neck: No tracheal deviation present. No thyromegaly present.   Cardiovascular: Normal rate and normal heart sounds.    Pulmonary/Chest: Effort normal. No respiratory distress. He has no wheezes.   Abdominal: Soft. He  exhibits no distension. There is no tenderness.   Musculoskeletal: He exhibits no edema.   Neurological: He is alert.   Skin: No erythema.         Lab Data Review:      5/4/2017  1:28 PM    Recent Labs      05/03/17 2351 05/04/17 2356 05/06/17   0013   SODIUM  130*  130*  131*   POTASSIUM  4.2  4.3  4.5   CHLORIDE  100  100  101   CO2  25  24  27   BUN  24*  20  19   CREATININE  0.86  0.81  0.85   CALCIUM  9.2  9.4  9.8       Recent Labs      05/03/17 2351 05/04/17 2356 05/06/17   0013   ALTSGPT  52*  58*   --    ASTSGOT  20  25   --    ALKPHOSPHAT  237*  209*   --    TBILIRUBIN  0.7  0.7   --    GLUCOSE  101*  106*  100*       Recent Labs      05/03/17 2351 05/04/17 2356 05/06/17   0013   RBC  2.62*  2.59*  2.62*   HEMOGLOBIN  7.7*  7.6*  7.5*   HEMATOCRIT  22.1*  22.1*  22.0*   PLATELETCT  7*  28*  20*       Recent Labs      05/03/17 2351 05/04/17 2356 05/06/17   0013   WBC  0.4*  0.5*  0.5*   NEUTSPOLYS   --   25.00*   --    LYMPHOCYTES   --   56.80*   --    MONOCYTES   --   11.40   --    EOSINOPHILS   --   4.50   --    BASOPHILS   --   2.30*   --    ASTSGOT  20  25   --    ALTSGPT  52*  58*   --    ALKPHOSPHAT  237*  209*   --    TBILIRUBIN  0.7  0.7   --            Assessment/Plan     * CML (chronic myelocytic leukemia)-Accelerated phase (present on admission)  Overview  multiple relapses.    Was treated on dasatinib for a time with response, but then relapse.    Was more recently on bosutinib with response, but again recent relapse.   WBC: 0.4-0.5 Neut: 35%>>ANC:149    Assessment & Plan  Has completed multiple chemotherapy cycles.       Started on 5/6/17 with chemotherapy (synribo ) BID for 14 days.   Appreciate oncology consult.     Pancytopenia (CMS-HCC) (present on admission)  Overview  - Pancytopenia secondary to CML and chemotherapy  - WBC: 0.5  - 4/30: given 1 unit of Irradiated and CMV neg Platelets (5000) and 1 units of CMV neg and Irradiated PRBC (6.6).   - Given one unit of  platelets on 5/4/17.   - Anemia with Hb: 7.7>>7.6  - platelet: 11>>7>>28>>20  - Hx of Chronic Right Subdural Hematoma    Assessment & Plan  Transfer 1 unit irritated platelets on 5/4/17   Follow up and keep plt>10.     Neutropenia (CMS-[facility]) (present on admission)  Overview  WBC:0.4>0.5 and ANC  145      Assessment & Plan  No fever and no infection.   Follow up and consider ABX if needed.     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    - Pain is controlled, Patient feeling ok.    - Follow up and consider radiation therapy if needed.     BPH (benign prostatic hyperplasia) (present on admission)  Assessment & Plan  Stable.  Continue on tamsulosin 0.4 mg daily.     Low vitamin D level (present on admission)  Overview  Vit D: 11 on 4/29/17    Assessment & Plan  Continue supplementation.     Seizure disorder (CMS-HCC) (present on admission)  Assessment & Plan  Stable    Continue on his home dose of keppra

## 2017-05-06 NOTE — PROGRESS NOTES
"Pharmacy Chemotherapy Verification    Patient Name: Benjamin Post   Dx: CML       Protocol: Synribo Induction  Omacetaxine (Synribo) 1.25 mg/m2 SQ BID for 14 days  NCCN Guidelines for Chronic Myelogenous Leukemia.V.1.2016  Brice J, et al. Blood. 2012 Sep 27;120(13):2573-80. Epub 2012 Aug 15.  Alfonso ROSADO et al. J Clin Oncol. 2011;30(15s):abstr 6513.    Allergies:  Review of patient's allergies indicates no known allergies.     /68 mmHg  Pulse 72  Temp(Src) 36.7 °C (98.1 °F)  Resp 18  Ht 1.702 m (5' 7\")  Wt 80.3 kg (177 lb 0.5 oz)  BMI 27.72 kg/m2  SpO2 97% Body surface area is 1.95 meters squared.    Labs 5/4/17  ANC ~130   LFTs = 25/58/209 T bili = 0.7  Labs 5/6/17  Plt = 20k Hgb = 7.5 SCr = 0.85 mg/dL CrCl = ~88 ml/min       ~~MD aware and would like to proceed with treatment 5/6/17~~     Drug Order   (Drug name, dose, route, IV Fluid & volume, frequency, number of doses) Cycle: 1 Day 1     Previous treatment: s/p dasatinib and bosutinib (last 4/26/17), both followed  by relapse     Medication = Omacetaxine  Base Dose= 1.25 mg/m2   Calc Dose: Base Dose x 1.95 m2= 2.44 mg  Final Dose = 2.4 mg  Route = subq  Conc & Volume = 3.5 mg/mL = 0.69 mL  Admin Duration = to be given subcutaneously          <5% difference, okay to treat with final dose       By my signature below, I confirm this process was performed independently with the BSA and all final chemotherapy dosing calculations congruent. I have reviewed the above chemotherapy order and that my calculation of the final dose and BSA (when applicable) corroborate those calculations of the  pharmacist. Discrepancies of 5% or greater in the written dose have been addressed and documented within the [facility] Progress notes.    Jabier HoranD          "

## 2017-05-07 ENCOUNTER — APPOINTMENT (OUTPATIENT)
Dept: RADIOLOGY | Facility: MEDICAL CENTER | Age: 72
DRG: 808 | End: 2017-05-07
Attending: INTERNAL MEDICINE
Payer: MEDICARE

## 2017-05-07 LAB
ALBUMIN SERPL BCP-MCNC: 3.4 G/DL (ref 3.2–4.9)
ALBUMIN/GLOB SERPL: 0.8 G/DL
ALP SERPL-CCNC: 218 U/L (ref 30–99)
ALT SERPL-CCNC: 75 U/L (ref 2–50)
ANION GAP SERPL CALC-SCNC: 7 MMOL/L (ref 0–11.9)
AST SERPL-CCNC: 33 U/L (ref 12–45)
BILIRUB SERPL-MCNC: 0.6 MG/DL (ref 0.1–1.5)
BUN SERPL-MCNC: 26 MG/DL (ref 8–22)
CALCIUM SERPL-MCNC: 9.7 MG/DL (ref 8.5–10.5)
CHLORIDE SERPL-SCNC: 103 MMOL/L (ref 96–112)
CO2 SERPL-SCNC: 23 MMOL/L (ref 20–33)
CREAT SERPL-MCNC: 0.81 MG/DL (ref 0.5–1.4)
ERYTHROCYTE [DISTWIDTH] IN BLOOD BY AUTOMATED COUNT: 39.4 FL (ref 35.9–50)
GFR SERPL CREATININE-BSD FRML MDRD: >60 ML/MIN/1.73 M 2
GLOBULIN SER CALC-MCNC: 4.1 G/DL (ref 1.9–3.5)
GLUCOSE SERPL-MCNC: 133 MG/DL (ref 65–99)
HCT VFR BLD AUTO: 22.7 % (ref 42–52)
HGB BLD-MCNC: 7.9 G/DL (ref 14–18)
MCH RBC QN AUTO: 28.9 PG (ref 27–33)
MCHC RBC AUTO-ENTMCNC: 34.8 G/DL (ref 33.7–35.3)
MCV RBC AUTO: 83.2 FL (ref 81.4–97.8)
NEUTROPHILS # BLD AUTO: ABNORMAL K/UL (ref 1.82–7.42)
NRBC # BLD AUTO: 0 K/UL
NRBC BLD AUTO-RTO: 0 /100 WBC
PLATELET # BLD AUTO: 15 K/UL (ref 164–446)
PMV BLD AUTO: 9.3 FL (ref 9–12.9)
POTASSIUM SERPL-SCNC: 4.6 MMOL/L (ref 3.6–5.5)
PROT SERPL-MCNC: 7.5 G/DL (ref 6–8.2)
RBC # BLD AUTO: 2.73 M/UL (ref 4.7–6.1)
SODIUM SERPL-SCNC: 133 MMOL/L (ref 135–145)
WBC # BLD AUTO: 0.3 K/UL (ref 4.8–10.8)

## 2017-05-07 PROCEDURE — 80053 COMPREHEN METABOLIC PANEL: CPT

## 2017-05-07 PROCEDURE — 36415 COLL VENOUS BLD VENIPUNCTURE: CPT

## 2017-05-07 PROCEDURE — 71010 DX-CHEST-PORTABLE (1 VIEW): CPT

## 2017-05-07 PROCEDURE — 87040 BLOOD CULTURE FOR BACTERIA: CPT | Mod: 91

## 2017-05-07 PROCEDURE — 700111 HCHG RX REV CODE 636 W/ 250 OVERRIDE (IP): Performed by: INTERNAL MEDICINE

## 2017-05-07 PROCEDURE — 700102 HCHG RX REV CODE 250 W/ 637 OVERRIDE(OP): Performed by: INTERNAL MEDICINE

## 2017-05-07 PROCEDURE — 700105 HCHG RX REV CODE 258: Performed by: INTERNAL MEDICINE

## 2017-05-07 PROCEDURE — 770009 HCHG ROOM/CARE - ONCOLOGY SEMI PRI*

## 2017-05-07 PROCEDURE — 700101 HCHG RX REV CODE 250: Performed by: STUDENT IN AN ORGANIZED HEALTH CARE EDUCATION/TRAINING PROGRAM

## 2017-05-07 PROCEDURE — 85025 COMPLETE CBC W/AUTO DIFF WBC: CPT

## 2017-05-07 PROCEDURE — 700102 HCHG RX REV CODE 250 W/ 637 OVERRIDE(OP): Performed by: STUDENT IN AN ORGANIZED HEALTH CARE EDUCATION/TRAINING PROGRAM

## 2017-05-07 PROCEDURE — A9270 NON-COVERED ITEM OR SERVICE: HCPCS | Performed by: STUDENT IN AN ORGANIZED HEALTH CARE EDUCATION/TRAINING PROGRAM

## 2017-05-07 PROCEDURE — A9270 NON-COVERED ITEM OR SERVICE: HCPCS | Performed by: INTERNAL MEDICINE

## 2017-05-07 RX ADMIN — STANDARDIZED SENNA CONCENTRATE AND DOCUSATE SODIUM 2 TABLET: 8.6; 5 TABLET, FILM COATED ORAL at 21:09

## 2017-05-07 RX ADMIN — NYSTATIN 500000 UNITS: 500000 SUSPENSION ORAL at 21:09

## 2017-05-07 RX ADMIN — CARVEDILOL 3.12 MG: 3.12 TABLET, FILM COATED ORAL at 18:19

## 2017-05-07 RX ADMIN — CEFEPIME 2 G: 2 INJECTION, POWDER, FOR SOLUTION INTRAMUSCULAR; INTRAVENOUS at 18:22

## 2017-05-07 RX ADMIN — LEVETIRACETAM 750 MG: 250 TABLET, FILM COATED ORAL at 21:10

## 2017-05-07 RX ADMIN — CARVEDILOL 3.12 MG: 3.12 TABLET, FILM COATED ORAL at 08:44

## 2017-05-07 RX ADMIN — LEVETIRACETAM 750 MG: 250 TABLET, FILM COATED ORAL at 08:45

## 2017-05-07 RX ADMIN — TAMSULOSIN HYDROCHLORIDE 0.4 MG: 0.4 CAPSULE ORAL at 08:46

## 2017-05-07 RX ADMIN — LIDOCAINE 1 PATCH: 50 PATCH CUTANEOUS at 05:32

## 2017-05-07 RX ADMIN — NYSTATIN 500000 UNITS: 500000 SUSPENSION ORAL at 18:24

## 2017-05-07 RX ADMIN — CEFEPIME 2 G: 2 INJECTION, POWDER, FOR SOLUTION INTRAMUSCULAR; INTRAVENOUS at 23:02

## 2017-05-07 ASSESSMENT — ENCOUNTER SYMPTOMS
HEARTBURN: 0
NAUSEA: 0
DEPRESSION: 0
SPUTUM PRODUCTION: 0
FEVER: 1
BLURRED VISION: 0
SHORTNESS OF BREATH: 0
DOUBLE VISION: 0
FEVER: 0
PHOTOPHOBIA: 0
HEADACHES: 0
SORE THROAT: 0
NECK PAIN: 0
MYALGIAS: 0
COUGH: 0
PALPITATIONS: 0
ABDOMINAL PAIN: 0
DIZZINESS: 0
CHILLS: 0
VOMITING: 0
HEMOPTYSIS: 0
DIARRHEA: 0

## 2017-05-07 ASSESSMENT — PAIN SCALES - GENERAL
PAINLEVEL_OUTOF10: 0

## 2017-05-07 NOTE — PROGRESS NOTES
Lainey from Lab called with critical result of WBC/PLT at 0.3/15. Critical lab result read back to Lainey.   This critical lab result is within parameters established by [facility] for this patient

## 2017-05-07 NOTE — PROGRESS NOTES
Chemotherapy Verification - SECONDARY RN       Height = 170.2 cm  Weight = 80.3 kg  BSA = 1.95 m2       Medication: Omacetaxine  Dose: 1.25 mg/m2  Calculated Dose: 2.43 mg Ordered dose 2.4 mg                             (In mg/m2, AUC, mg/kg)     < 5% difference    I confirm that this process was performed independently.

## 2017-05-07 NOTE — PROGRESS NOTES
Oncology/Hematology Progress Note               Author: Bautista Agustin    Cc:  CML Date & Time created: 5/7/2017  4:52 PM     Interval History:  No HA's.  No nausea or diarrhea.  No pain.  Feeling a little cold or chilled.      PMHx, PSurgHx, SocHx, FAMHx:  All reviewed and unchanged    Review of Systems:  Review of Systems   Constitutional: Positive for fever and malaise/fatigue. Negative for chills.   HENT: Negative for nosebleeds and sore throat.    Eyes: Negative for blurred vision, double vision and photophobia.   Respiratory: Negative for cough, hemoptysis, sputum production and shortness of breath.    Cardiovascular: Negative for chest pain and leg swelling.   Gastrointestinal: Negative for heartburn, nausea, vomiting, abdominal pain and diarrhea.   Genitourinary: Negative for dysuria, urgency and frequency.   Skin: Negative for itching and rash.   Neurological: Negative for headaches.       Physical Exam:  Physical Exam   Constitutional: He is oriented to person, place, and time. He appears well-developed and well-nourished. No distress.   HENT:   Head: Normocephalic and atraumatic.   Mouth/Throat: Oropharynx is clear and moist. No oropharyngeal exudate.   Eyes: Conjunctivae and EOM are normal. Right eye exhibits no discharge. Left eye exhibits no discharge. No scleral icterus.   Neck: Normal range of motion. Neck supple. No thyromegaly present.   Cardiovascular: Normal rate, regular rhythm and normal heart sounds.  Exam reveals no gallop and no friction rub.    No murmur heard.  Pulmonary/Chest: Effort normal and breath sounds normal. No respiratory distress. He has no wheezes. He has no rales.   Abdominal: Soft. Bowel sounds are normal. He exhibits no distension. There is no tenderness. There is no rebound and no guarding.   Musculoskeletal: Normal range of motion. He exhibits no edema or tenderness.   Lymphadenopathy:     He has no cervical adenopathy.   Neurological: He is alert and oriented to person,  place, and time. No cranial nerve deficit. Coordination normal.   Skin: Skin is warm and dry. No rash noted. He is not diaphoretic. No erythema.   Psychiatric: He has a normal mood and affect. His behavior is normal. Thought content normal.       Labs:        Invalid input(s): [id number]      Recent Labs      170   SODIUM  130*  131*  133*   POTASSIUM  4.3  4.5  4.6   CHLORIDE  100  101  103   CO2  24  27  23   BUN  20  19  26*   CREATININE  0.81  0.85  0.81   CALCIUM  9.4  9.8  9.7     Recent Labs      17   ALTSGPT  58*   --   75*   ASTSGOT  25   --   33   ALKPHOSPHAT  209*   --   218*   TBILIRUBIN  0.7   --   0.6   GLUCOSE  106*  100*  133*     Recent Labs      170   RBC  2.59*  2.62*  2.73*   HEMOGLOBIN  7.6*  7.5*  7.9*   HEMATOCRIT  22.1*  22.0*  22.7*   PLATELETCT  28*  20*  15*     Recent Labs      170   WBC  0.5*  0.5*  0.3*   NEUTSPOLYS  25.00*   --    --    LYMPHOCYTES  56.80*   --    --    MONOCYTES  11.40   --    --    EOSINOPHILS  4.50   --    --    BASOPHILS  2.30*   --    --    ASTSGOT  25   --   33   ALTSGPT  58*   --   75*   ALKPHOSPHAT  209*   --   218*   TBILIRUBIN  0.7   --   0.6     Recent Labs      170   SODIUM  130*  131*  133*   POTASSIUM  4.3  4.5  4.6   CHLORIDE  100  101  103   CO2  24  27  23   GLUCOSE  106*  100*  133*   BUN  20  19  26*   CREATININE  0.81  0.85  0.81   CALCIUM  9.4  9.8  9.7     Hemodynamics:  Temp (24hrs), Av.6 °C (97.8 °F), Min:36.4 °C (97.6 °F), Max:36.7 °C (98.1 °F)  Temperature: 36.7 °C (98.1 °F)  Pulse  Av.4  Min: 60  Max: 116   Blood Pressure : (!) 95/51 mmHg     Respiratory:    Respiration: 18, Pulse Oximetry: 100 %     Work Of Breathing / Effort: Mild;Shallow  RUL Breath Sounds: Clear, RML Breath Sounds: Diminished, RLL  Breath Sounds: Diminished, WAGNER Breath Sounds: Clear, LLL Breath Sounds: Diminished  Fluids:    Intake/Output Summary (Last 24 hours) at 05/01/17 1409  Last data filed at 05/01/17 0838   Gross per 24 hour   Intake   1006 ml   Output    575 ml   Net    431 ml        GI/Nutrition:  Orders Placed This Encounter   Procedures   • Diet Order     Standing Status: Standing      Number of Occurrences: 1      Standing Expiration Date:      Order Specific Question:  Diet:     Answer:  2 Gram Sodium [7]     Order Specific Question:  Texture/Fiber modifications:     Answer:  Dysphagia 2(Pureed/Chopped)specify fluid consistency(question 6) [2]      Comments:  thins ok; pt with no teeth/ nor dentures     Medical Decision Making, by Problem:  Active Hospital Problems    Diagnosis   • Neutropenia (CMS-HCC) [D70.9]   • Pancytopenia (CMS-HCC) [D61.818]   • Acute bilateral low back pain without sciatica [M54.5]   • Bone pain [M89.8X9]   • Transaminitis [R74.0]   • 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  CML-- multiple relapses.  Was treated on dasatinib for a time with response, but then relapse.  Was more recently on bosutinib with response, but again recent relapse.  Sounds like this medicine was stopped by Dr. Vu on 4/26/17.  The plan has been for allo-BMT at [facility], but they need him to get some kind of response to chemo before they can do the transplant.   The plan is to try Synribo.  He got this in the mail, and is on his own home supply while he is monitored here for side effects.  Synribo is given as a SQ shot BID x 14 days with the first cycle, followed by 2 weeks off.  Started on 5/6/17.        Synribo certainly would be a high risk medication at this point.  There is a 's warning for cerebral bleeding, and to avoid NSAIDS, blood thinners, etc as well as to use with caution in patients with Platelets<50K.  He has a h/o of a SDH in the recent past.   -- Day 2 of  synribo  -- tolerating  -- monitor for Head aches or AMS changes    -- keep in hospital for first 14 days of the treatment, to monitor for side effects, and monitor counts        2.  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  Not likely to improve unless he can get response to some kind of chemo.  Will transfuse as needed.  Transfuse platelets to keep >10, or higher if bleeding.    3.  Neutropenia-- Just checked his temp and it was 100.4.    -- BCx's, UCx's, and CXR  -- start cefepime IV    4.  Elevated LFT's-- related to past chemo.  Monitor.    5.  8th rib lesion-- he seems to indicate that he is not in much pain now.  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  If pain continues to be an issue, could consult rad onc to radiate this spot, which would likely provide great pain relief.    6. Anemia-- transfuse 1 unit of PRBC's if <7.5      Highly complex patient with high risk of morbidity and mortality.     Labs reviewed and Medications reviewed  Mahajan catheter: No Mahajan      DVT Prophylaxis: Contraindicated - High bleeding risk

## 2017-05-07 NOTE — PROGRESS NOTES
[facility] Internal Medicine Interval Note    Name Benjamin Post       1945   Age/Sex 71 y.o. male   MRN [id number]   Code Status Full code     After 5PM or if no immediate response to page, please call for cross-coverage  Attending/Team: Garrick/Ly Call [phone] to page   1st Call - Day Intern (R1):   Dr Méndez 2nd Call - Day Sr. Resident (R2/R3):   Dr Benson         Chief complaint/ reason for interval visit (Primary Diagnosis)   CML/pancytopenia     Interval Problem Daily Status Update  :  Patient was seen. No acute events overnight.  Still neutropenic and pancytopenia but afebrile.   On  chemotherapy synribo (day is 2)       Principal Problem:    CML (chronic myelocytic leukemia)-Accelerated phase (Chronic) POA: Yes      Overview: multiple relapses.        Was treated on dasatinib for a time with response, but then relapse.        Was more recently on bosutinib with response, but again recent relapse.       WBC: 0.3 Plt: 15  Hb:7.9  Active Problems:    Pancytopenia (CMS-HCC) (Chronic) POA: Yes      Overview: - Pancytopenia secondary to CML and chemotherapy      - WBC: 0.5      - : given 1 unit of Irradiated and CMV neg Platelets (5000) and 1       units of CMV neg and Irradiated PRBC (6.6).       - Given one unit of platelets on 17.       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     BPH (benign prostatic hyperplasia) (Chronic) POA: Yes    Low vitamin D level (Chronic) POA: Yes      Overview: Vit D: 11 on 4/29/17  Resolved Problems:    * No resolved hospital problems. *      Review of Systems   Constitutional: Negative for fever and chills.   HENT: Negative for hearing loss.    Respiratory: Negative for cough and hemoptysis.    Cardiovascular: Negative for chest pain and palpitations.   Gastrointestinal: Negative for heartburn and nausea.   Genitourinary: Negative for dysuria and urgency.   Musculoskeletal: Negative for myalgias and neck pain.   Skin: Negative for rash.   Neurological: Negative for dizziness and headaches.   Psychiatric/Behavioral: Negative for depression and suicidal ideas.       Consultants/Specialty  Oncology    Disposition  Home after chemotherapy     Quality Measures  EKG reviewed, Labs reviewed, Medications reviewed and Radiology images reviewed  Mahajan catheter: No Mahajan        DVT prophylaxis - mechanical: SCDs (low plt)                Physical Exam       Filed Vitals:    05/06/17 1940 05/06/17 2333 05/07/17 0400 05/07/17 0800   BP: 101/60 95/50 103/60 117/59   Pulse: 79 63 68 79   Temp: 36.7 °C (98.1 °F) 36.4 °C (97.6 °F) 36.5 °C (97.7 °F) 36.4 °C (97.6 °F)   Resp: 16 18 18 16   Height:       Weight:       SpO2: 100% 99% 100% 98%     Body mass index is 27.72 kg/(m^2).    Oxygen Therapy:  Pulse Oximetry: 98 %, O2 (LPM): 0, O2 Delivery: None (Room Air)    Physical Exam   Constitutional: He is well-developed, well-nourished, and in no distress. No distress.   HENT:   Head: Normocephalic.   Neck: No tracheal deviation present. No thyromegaly present.   Cardiovascular: Normal rate and normal heart sounds.    Pulmonary/Chest: Effort normal. No respiratory distress. He has no wheezes.   Abdominal: Soft. He exhibits no distension. There is  no tenderness.   Musculoskeletal: He exhibits no edema.   Neurological: He is alert.   Skin: No erythema.         Lab Data Review:      5/4/2017  1:28 PM    Recent Labs      05/04/17 2356 05/06/17 0013 05/06/17   2340   SODIUM  130*  131*  133*   POTASSIUM  4.3  4.5  4.6   CHLORIDE  100  101  103   CO2  24  27  23   BUN  20  19  26*   CREATININE  0.81  0.85  0.81   CALCIUM  9.4  9.8  9.7       Recent Labs      05/04/17 2356 05/06/17 0013 05/06/17   2340   ALTSGPT  58*   --   75*   ASTSGOT  25   --   33   ALKPHOSPHAT  209*   --   218*   TBILIRUBIN  0.7   --   0.6   GLUCOSE  106*  100*  133*       Recent Labs      05/04/17 2356 05/06/17 0013 05/06/17   2340   RBC  2.59*  2.62*  2.73*   HEMOGLOBIN  7.6*  7.5*  7.9*   HEMATOCRIT  22.1*  22.0*  22.7*   PLATELETCT  28*  20*  15*       Recent Labs      05/04/17 2356 05/06/17 0013 05/06/17   2340   WBC  0.5*  0.5*  0.3*   NEUTSPOLYS  25.00*   --    --    LYMPHOCYTES  56.80*   --    --    MONOCYTES  11.40   --    --    EOSINOPHILS  4.50   --    --    BASOPHILS  2.30*   --    --    ASTSGOT  25   --   33   ALTSGPT  58*   --   75*   ALKPHOSPHAT  209*   --   218*   TBILIRUBIN  0.7   --   0.6           Assessment/Plan     * CML (chronic myelocytic leukemia)-Accelerated phase (present on admission)  Overview  multiple relapses.    Was treated on dasatinib for a time with response, but then relapse.    Was more recently on bosutinib with response, but again recent relapse.   WBC: 0.3 Plt: 15  Hb:7.9    Assessment & Plan  Has completed multiple chemotherapy cycles.       Started on 5/6/17 with chemotherapy (synribo ) BID for 14 days, (today is 2).  Labs daily for follow up side effects.   Appreciate oncology consult.     Pancytopenia (CMS-HCC) (present on admission)  Overview  - Pancytopenia secondary to CML and chemotherapy  - WBC: 0.5  - 4/30: given 1 unit of Irradiated and CMV neg Platelets (5000) and 1 units of CMV neg and Irradiated PRBC (6.6).   - Given  one unit of platelets on 5/4/17.   - Anemia with Hb: 7.7>>7.6  - platelet: 11>>7>>28>>20  - Hx of Chronic Right Subdural Hematoma    Assessment & Plan  Transfer 1 unit irritated platelets on 5/4/17   Follow up and keep plt>10.     Neutropenia (CMS-HCC) (present on admission)  Overview  WBC:0.4>>0.3      Assessment & Plan  No fever and no infection.   Follow up and consider ABX if needed.     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    - Pain is controlled, Patient feeling ok.    - Follow up and consider radiation therapy if needed.     Transaminitis (present on admission)  Overview  Related to chemo therapy.     Assessment & Plan  AST/ALT: 33/75  Follow up.     BPH (benign prostatic hyperplasia) (present on admission)  Assessment & Plan  Stable.  Continue on tamsulosin 0.4 mg daily.     Low vitamin D level (present on admission)  Overview  Vit D: 11 on 4/29/17    Assessment & Plan  Continue supplementation.     Seizure disorder (CMS-HCC) (present on admission)  Assessment & Plan  Stable    Continue on his home dose of keppra

## 2017-05-07 NOTE — PROGRESS NOTES
"Pharmacy Chemotherapy Verification    Patient Name: Benjamin Post   Dx: CML       Protocol: Synribo Induction  Omacetaxine (Synribo) 1.25 mg/m2 SQ BID for 14 days  NCCN Guidelines for Chronic Myelogenous Leukemia.V.1.2016  Brice J, et al. Blood. 2012 Sep 27;120(13):2573-80. Epub 2012 Aug 15.  Alfonso ROSADO et al. J Clin Oncol. 2011;30(15s):abstr 6513.    Allergies:  Review of patient's allergies indicates no known allergies.     /59 mmHg  Pulse 79  Temp(Src) 36.4 °C (97.6 °F)  Resp 16  Ht 1.702 m (5' 7\")  Wt 80.3 kg (177 lb 0.5 oz)  BMI 27.72 kg/m2  SpO2 98% Body surface area is 1.95 meters squared.    Labs 5/6/17@2340  ANC~ 0 Plt = 15k   Hgb = 7.9     SCr = 0.81mg/dL CrCl ~ 92mL/min   LFT's = 33/75/218 TBili = 0.6     CML -  Treat through any counts, standing transfusion orders in place per Dr. Agustin.     Drug Order   (Drug name, dose, route, IV Fluid & volume, frequency, number of doses) Cycle: 1 Day 2     Previous treatment: s/p dasatinib and bosutinib (last 4/26/17), both followed  by relapse     Medication = Omacetaxine  Base Dose= 1.25 mg/m2   Calc Dose: Base Dose x 1.95 m2= 2.44 mg  Final Dose = 2.4 mg  Route = subq  Conc & Volume = 3.5 mg/mL = 0.69 mL  Admin Duration = to be given subcutaneously   PT SUPPLIED MEDICATION  q12h on days 1-14      <5% difference, okay to treat with final dose       By my signature below, I confirm this process was performed independently with the BSA and all final chemotherapy dosing calculations congruent. I have reviewed the above chemotherapy order and that my calculation of the final dose and BSA (when applicable) corroborate those calculations of the  pharmacist. Discrepancies of 5% or greater in the written dose have been addressed and documented within the [facility] Progress notes.    Kermit SaezD.            "

## 2017-05-07 NOTE — CARE PLAN
Problem: Infection  Goal: Will remain free from infection  Outcome: PROGRESSING AS EXPECTED  Will remain on neutropenic precuations. Will remain afebrile        Problem: Bowel/Gastric:  Goal: Normal bowel function is maintained or improved  Outcome: PROGRESSING AS EXPECTED  Will have regular bowel movements. Will have no n/v

## 2017-05-07 NOTE — PROGRESS NOTES
Attending Note:  I have personally evaluated and examined this patient and agree with the findings as documented in the resident note except as documented in this attending note.  I am actively involved in the patient's care.    Accelerated CML  Synribo started.  Counts remain very low.  Monitor for side effects of treatment.  Risk of hemorrhage high given platelets.  Intern note to follow.    Dr. Chung assuming attending role 5/8

## 2017-05-07 NOTE — PROGRESS NOTES
Pt is A&O.  Awake and eating breakfast presently.  Denies pain or nausea.  Lidocaine patch helping rib pain. Tolerated chemo SQ shot this am.  No site reaction noted.  No s+s of bleeding.  Bed alarm on and sounding as pt does forget to call for asst when needing the bathroom.  UOP qs via urinal.

## 2017-05-07 NOTE — ASSESSMENT & PLAN NOTE
No abdominal pain, likely effect of chemotherapy  Discontinue all hepatotoxic drugs as possible  Continue to follow

## 2017-05-07 NOTE — PROGRESS NOTES
Chemotherapy Verification - PRIMARY RN      Height = 1.7m  Weight = 80.3kg BSA = 1.95m2       Medication: Synribo  Dose: 1.25mg/m2  Calculated Dose: 2.4mg                             (In mg/m2, AUC, mg/kg)             I confirm this process was performed independently with the BSA and all final chemotherapy dosing calculations congruent.  Any discrepancies of 5% or greater have been addressed with the chemotherapy pharmacist. The resolution of the discrepancy has been documented in the EPIC progress notes.

## 2017-05-08 PROBLEM — R97.20 ELEVATED PSA: Chronic | Status: ACTIVE | Noted: 2017-05-08

## 2017-05-08 LAB
ABO GROUP BLD: NORMAL
ALBUMIN SERPL BCP-MCNC: 3 G/DL (ref 3.2–4.9)
ALBUMIN/GLOB SERPL: 0.8 G/DL
ALP SERPL-CCNC: 179 U/L (ref 30–99)
ALT SERPL-CCNC: 89 U/L (ref 2–50)
ANION GAP SERPL CALC-SCNC: 7 MMOL/L (ref 0–11.9)
AST SERPL-CCNC: 46 U/L (ref 12–45)
BARCODED ABORH UBTYP: 5100
BARCODED ABORH UBTYP: 9500
BARCODED PRD CODE UBPRD: NORMAL
BARCODED PRD CODE UBPRD: NORMAL
BARCODED UNIT NUM UBUNT: NORMAL
BARCODED UNIT NUM UBUNT: NORMAL
BILIRUB SERPL-MCNC: 0.7 MG/DL (ref 0.1–1.5)
BLD GP AB SCN SERPL QL: NORMAL
BUN SERPL-MCNC: 45 MG/DL (ref 8–22)
CALCIUM SERPL-MCNC: 9.3 MG/DL (ref 8.5–10.5)
CHLORIDE SERPL-SCNC: 101 MMOL/L (ref 96–112)
CO2 SERPL-SCNC: 20 MMOL/L (ref 20–33)
COMPONENT R 8504R: NORMAL
COMPONENT RCI 8504RCI: NORMAL
CREAT SERPL-MCNC: 1.23 MG/DL (ref 0.5–1.4)
ERYTHROCYTE [DISTWIDTH] IN BLOOD BY AUTOMATED COUNT: 39.6 FL (ref 35.9–50)
ERYTHROCYTE [DISTWIDTH] IN BLOOD BY AUTOMATED COUNT: 40.5 FL (ref 35.9–50)
GFR SERPL CREATININE-BSD FRML MDRD: 58 ML/MIN/1.73 M 2
GLOBULIN SER CALC-MCNC: 3.6 G/DL (ref 1.9–3.5)
GLUCOSE SERPL-MCNC: 128 MG/DL (ref 65–99)
HCT VFR BLD AUTO: 20 % (ref 42–52)
HCT VFR BLD AUTO: 23.7 % (ref 42–52)
HGB BLD-MCNC: 6.8 G/DL (ref 14–18)
HGB BLD-MCNC: 8.4 G/DL (ref 14–18)
MCH RBC QN AUTO: 28.6 PG (ref 27–33)
MCH RBC QN AUTO: 29.8 PG (ref 27–33)
MCHC RBC AUTO-ENTMCNC: 34 G/DL (ref 33.7–35.3)
MCHC RBC AUTO-ENTMCNC: 35.4 G/DL (ref 33.7–35.3)
MCV RBC AUTO: 84 FL (ref 81.4–97.8)
MCV RBC AUTO: 84 FL (ref 81.4–97.8)
NEUTROPHILS # BLD AUTO: ABNORMAL K/UL (ref 1.82–7.42)
NEUTROPHILS # BLD AUTO: ABNORMAL K/UL (ref 1.82–7.42)
NRBC # BLD AUTO: 0 K/UL
NRBC # BLD AUTO: 0 K/UL
NRBC BLD AUTO-RTO: 0 /100 WBC
NRBC BLD AUTO-RTO: 0 /100 WBC
PLATELET # BLD AUTO: 10 K/UL (ref 164–446)
PLATELET # BLD AUTO: 33 K/UL (ref 164–446)
PMV BLD AUTO: 9 FL (ref 9–12.9)
PMV BLD AUTO: 9.1 FL (ref 9–12.9)
POTASSIUM SERPL-SCNC: 3.9 MMOL/L (ref 3.6–5.5)
PRODUCT TYPE UPROD: NORMAL
PRODUCT TYPE UPROD: NORMAL
PROT SERPL-MCNC: 6.6 G/DL (ref 6–8.2)
RBC # BLD AUTO: 2.38 M/UL (ref 4.7–6.1)
RBC # BLD AUTO: 2.82 M/UL (ref 4.7–6.1)
RH BLD: NORMAL
SODIUM SERPL-SCNC: 128 MMOL/L (ref 135–145)
UNIT STATUS USTAT: NORMAL
UNIT STATUS USTAT: NORMAL
WBC # BLD AUTO: 0.3 K/UL (ref 4.8–10.8)
WBC # BLD AUTO: 0.4 K/UL (ref 4.8–10.8)

## 2017-05-08 PROCEDURE — 700102 HCHG RX REV CODE 250 W/ 637 OVERRIDE(OP): Performed by: STUDENT IN AN ORGANIZED HEALTH CARE EDUCATION/TRAINING PROGRAM

## 2017-05-08 PROCEDURE — 84160 ASSAY OF PROTEIN ANY SOURCE: CPT

## 2017-05-08 PROCEDURE — 86140 C-REACTIVE PROTEIN: CPT

## 2017-05-08 PROCEDURE — 82784 ASSAY IGA/IGD/IGG/IGM EACH: CPT | Mod: 91

## 2017-05-08 PROCEDURE — 770009 HCHG ROOM/CARE - ONCOLOGY SEMI PRI*

## 2017-05-08 PROCEDURE — 700111 HCHG RX REV CODE 636 W/ 250 OVERRIDE (IP): Performed by: INTERNAL MEDICINE

## 2017-05-08 PROCEDURE — 85055 RETICULATED PLATELET ASSAY: CPT

## 2017-05-08 PROCEDURE — 36430 TRANSFUSION BLD/BLD COMPNT: CPT

## 2017-05-08 PROCEDURE — 85007 BL SMEAR W/DIFF WBC COUNT: CPT

## 2017-05-08 PROCEDURE — 85027 COMPLETE CBC AUTOMATED: CPT

## 2017-05-08 PROCEDURE — 86923 COMPATIBILITY TEST ELECTRIC: CPT

## 2017-05-08 PROCEDURE — A9270 NON-COVERED ITEM OR SERVICE: HCPCS | Performed by: INTERNAL MEDICINE

## 2017-05-08 PROCEDURE — 85025 COMPLETE CBC W/AUTO DIFF WBC: CPT

## 2017-05-08 PROCEDURE — 85652 RBC SED RATE AUTOMATED: CPT

## 2017-05-08 PROCEDURE — 700105 HCHG RX REV CODE 258: Performed by: INTERNAL MEDICINE

## 2017-05-08 PROCEDURE — A9270 NON-COVERED ITEM OR SERVICE: HCPCS | Performed by: STUDENT IN AN ORGANIZED HEALTH CARE EDUCATION/TRAINING PROGRAM

## 2017-05-08 PROCEDURE — 84165 PROTEIN E-PHORESIS SERUM: CPT

## 2017-05-08 PROCEDURE — 36415 COLL VENOUS BLD VENIPUNCTURE: CPT

## 2017-05-08 PROCEDURE — 86901 BLOOD TYPING SEROLOGIC RH(D): CPT

## 2017-05-08 PROCEDURE — 86900 BLOOD TYPING SEROLOGIC ABO: CPT

## 2017-05-08 PROCEDURE — P9034 PLATELETS, PHERESIS: HCPCS

## 2017-05-08 PROCEDURE — 86644 CMV ANTIBODY: CPT | Mod: 91

## 2017-05-08 PROCEDURE — 86334 IMMUNOFIX E-PHORESIS SERUM: CPT

## 2017-05-08 PROCEDURE — 80053 COMPREHEN METABOLIC PANEL: CPT

## 2017-05-08 PROCEDURE — 86850 RBC ANTIBODY SCREEN: CPT

## 2017-05-08 PROCEDURE — P9016 RBC LEUKOCYTES REDUCED: HCPCS

## 2017-05-08 PROCEDURE — 99232 SBSQ HOSP IP/OBS MODERATE 35: CPT | Mod: GC | Performed by: INTERNAL MEDICINE

## 2017-05-08 PROCEDURE — 700102 HCHG RX REV CODE 250 W/ 637 OVERRIDE(OP): Performed by: INTERNAL MEDICINE

## 2017-05-08 PROCEDURE — 82784 ASSAY IGA/IGD/IGG/IGM EACH: CPT

## 2017-05-08 PROCEDURE — 700101 HCHG RX REV CODE 250: Performed by: STUDENT IN AN ORGANIZED HEALTH CARE EDUCATION/TRAINING PROGRAM

## 2017-05-08 RX ORDER — SODIUM CHLORIDE 9 MG/ML
INJECTION, SOLUTION INTRAVENOUS CONTINUOUS
Status: DISCONTINUED | OUTPATIENT
Start: 2017-05-08 | End: 2017-05-23

## 2017-05-08 RX ADMIN — CEFEPIME 2 G: 2 INJECTION, POWDER, FOR SOLUTION INTRAMUSCULAR; INTRAVENOUS at 14:25

## 2017-05-08 RX ADMIN — SODIUM CHLORIDE: 9 INJECTION, SOLUTION INTRAVENOUS at 18:31

## 2017-05-08 RX ADMIN — NYSTATIN 500000 UNITS: 500000 SUSPENSION ORAL at 21:14

## 2017-05-08 RX ADMIN — NYSTATIN 500000 UNITS: 500000 SUSPENSION ORAL at 14:25

## 2017-05-08 RX ADMIN — LEVETIRACETAM 750 MG: 250 TABLET, FILM COATED ORAL at 21:57

## 2017-05-08 RX ADMIN — NYSTATIN 500000 UNITS: 500000 SUSPENSION ORAL at 10:04

## 2017-05-08 RX ADMIN — NYSTATIN 500000 UNITS: 500000 SUSPENSION ORAL at 18:30

## 2017-05-08 RX ADMIN — CARVEDILOL 3.12 MG: 3.12 TABLET, FILM COATED ORAL at 18:30

## 2017-05-08 RX ADMIN — CEFEPIME 2 G: 2 INJECTION, POWDER, FOR SOLUTION INTRAMUSCULAR; INTRAVENOUS at 05:00

## 2017-05-08 RX ADMIN — LIDOCAINE 1 PATCH: 50 PATCH CUTANEOUS at 04:51

## 2017-05-08 RX ADMIN — TAMSULOSIN HYDROCHLORIDE 0.4 MG: 0.4 CAPSULE ORAL at 10:04

## 2017-05-08 RX ADMIN — DIPHENHYDRAMINE HCL 25 MG: 25 TABLET ORAL at 01:58

## 2017-05-08 RX ADMIN — CEFEPIME 2 G: 2 INJECTION, POWDER, FOR SOLUTION INTRAMUSCULAR; INTRAVENOUS at 21:25

## 2017-05-08 RX ADMIN — LEVETIRACETAM 750 MG: 250 TABLET, FILM COATED ORAL at 10:04

## 2017-05-08 RX ADMIN — SODIUM CHLORIDE: 9 INJECTION, SOLUTION INTRAVENOUS at 10:00

## 2017-05-08 RX ADMIN — ACETAMINOPHEN 650 MG: 325 TABLET, FILM COATED ORAL at 01:58

## 2017-05-08 ASSESSMENT — ENCOUNTER SYMPTOMS
FEVER: 0
HEARTBURN: 0
MYALGIAS: 0
VOMITING: 0
ABDOMINAL PAIN: 0
BRUISES/BLEEDS EASILY: 0
PALPITATIONS: 0
HEMOPTYSIS: 0
NAUSEA: 0
COUGH: 0
DEPRESSION: 0
NERVOUS/ANXIOUS: 0
FOCAL WEAKNESS: 0
NECK PAIN: 0
DIZZINESS: 0
DIARRHEA: 1
BLURRED VISION: 0
CHILLS: 0
HEADACHES: 0
SHORTNESS OF BREATH: 0
SORE THROAT: 0

## 2017-05-08 ASSESSMENT — PAIN SCALES - GENERAL
PAINLEVEL_OUTOF10: 0

## 2017-05-08 NOTE — PROGRESS NOTES
PRBC have transfused, Platelets infusing at this time. BP has improved. Denies complaints at thistime.

## 2017-05-08 NOTE — ASSESSMENT & PLAN NOTE
- Stable, can continue workup on outpatient basis  Will trend once as acute prostatitis is possible given his penile pain, rectal exam is contraindicated in such patients

## 2017-05-08 NOTE — PROGRESS NOTES
"Pharmacy Chemotherapy Verification    Patient Name: Benjamin Post   Dx: CML       Protocol: Synribo Induction  Omacetaxine (Synribo) 1.25 mg/m2 SQ BID for 14 days  NCCN Guidelines for Chronic Myelogenous Leukemia.V.1.2016  Brice J, et al. Blood. 2012 Sep 27;120(13):2573-80. Epub 2012 Aug 15.  Alfonso ROSADO et al. J Clin Oncol. 2011;30(15s):abstr 6513.    Allergies:  Review of patient's allergies indicates no known allergies.     /61 mmHg  Pulse 87  Temp(Src) 36.3 °C (97.3 °F)  Resp 16  Ht 1.702 m (5' 7\")  Wt 80.3 kg (177 lb 0.5 oz)  BMI 27.72 kg/m2  SpO2 95% Body surface area is 1.95 meters squared.    5/8/17:  ANC~ 0 Plt = 33k   Hgb = 8.4    5/7/17:     SCr = 0.81mg/dL CrCl ~ 92mL/min   LFT's = 33/75/218 TBili = 0.6     CML -  Treat through any counts, standing transfusion orders in place per Dr. Agustin.     Drug Order   (Drug name, dose, route, IV Fluid & volume, frequency, number of doses) Cycle: 1 Day 3     Previous treatment: s/p dasatinib and bosutinib (last 4/26/17), both followed  by relapse     Medication = Omacetaxine  Base Dose= 1.25 mg/m2   Calc Dose: Base Dose x 1.95 m2= 2.44 mg  Final Dose = 2.4 mg  Route = subq  Conc & Volume = 3.5 mg/mL = 0.69 mL  Admin Duration = to be given subcutaneously   PT SUPPLIED MEDICATION  q12h on days 1-14      <5% difference, okay to treat with final dose       By my signature below, I confirm this process was performed independently with the BSA and all final chemotherapy dosing calculations congruent. I have reviewed the above chemotherapy order and that my calculation of the final dose and BSA (when applicable) corroborate those calculations of the  pharmacist. Discrepancies of 5% or greater in the written dose have been addressed and documented within the [facility] Progress notes.    Kermit SaezD.            "

## 2017-05-08 NOTE — PROGRESS NOTES
"Pt A&Ox4. VS: BP 90/52 mmHg  Pulse 100  Temp(Src) 36.6 °C (97.8 °F)  Resp 16  Ht 1.702 m (5' 7\")  Wt 80.3 kg (177 lb 0.5 oz)  BMI 27.72 kg/m2  SpO2 94%. Pt denies pain, n/v, numbness, tingling, SOB and chest pain. Pt needs met at this time, call light within reach, hourly rounding in effect, and will continue to monitor.     Discussed with pharmacy that chemotherapy is to be given even with Hgb at 6.8 and plt of 10 s/p PRBC and plt transfusions      "

## 2017-05-08 NOTE — PROGRESS NOTES
Chemotherapy Verification - PRIMARY RN  Cycle 1 Day 3      Height = 170.2 cm  Weight = 80.3 kg  BSA = 1.95 m2      Medication: Omacetaxine  Dose: 1.25 mg/m2  Calculated Dose: 2.4375 mg (2.4 mg ordered)                             (In mg/m2, AUC, mg/kg)       I confirm this process was performed independently with the BSA and all final chemotherapy dosing calculations congruent.  Any discrepancies of 5% or greater have been addressed with the chemotherapy pharmacist. The resolution of the discrepancy has been documented in the EPIC progress notes.

## 2017-05-08 NOTE — PROGRESS NOTES
Hgb 6.8, prbc infusing at this time. BP 80's/50's, asymptomatic. Will continue to monitor closely. Pt was pre medicated with tylenol and benadryl for blood transfusion

## 2017-05-08 NOTE — PROGRESS NOTES
Diane Boo from Lab called with critical result of WBC 0.3 and Plt 33 at 0900. Critical lab result read back to Diane Boo.   This critical lab result is within parameters established by Dr.[facility] Policy for this patient

## 2017-05-08 NOTE — PROGRESS NOTES
Pt incontinent loose stool. Pt denies abd pain or cramping. Requests to not have stool softerns any more. (Did receive senokot this evening). Denies n/v. Reminded pt to use call light for needs or assist, (does not call). Bed alarm in place and active. Hand held call light in reach.

## 2017-05-08 NOTE — PROGRESS NOTES
Chemotherapy Verification - SECONDARY RN       Height =170.2 cm  Weight = 80.3 kg  BSA = 1.95       Medication: Omacetaxine   Dose: 1.25mg/m2  Calculated Dose: 2.43 mg                              (In mg/m2, AUC, mg/kg)   Ordered dose: 2.4 mg        I confirm that this process was performed independently.

## 2017-05-08 NOTE — CARE PLAN
Problem: Safety  Goal: Will remain free from injury  Hourly rounding in effect, pt instructed to call for assistance, bed locked and in lowest position. Bed alarm on. Personal possessions within reach.           Problem: Bowel/Gastric:  Goal: Normal bowel function is maintained or improved  Hyperactive bowel sounds, pt having loose stools. Holding am dose of sennakot    Problem: Knowledge Deficit  Goal: Knowledge of disease process/condition, treatment plan, diagnostic tests, and medications will improve  Pt updated and educated on nursing interventions, medications and POC

## 2017-05-08 NOTE — PROGRESS NOTES
[facility] Interval Note    Name Benjamin Post       1945   Age/Sex 71 y.o. male   MRN [id number]   Code Status DNR     After 5PM or if no immediate response to page, please call for cross-coverage  Attending/Team: Dr. Chung/Ly Call [phone] to page   1st Call - Day Intern (R1):   Dr Méndez 2nd Call - Day Sr. Resident (R2/R3):            Chief complaint/ reason for interval visit (Primary Diagnosis)   CML/pancytopenia     Interval Problem Daily Status Update  :  On  chemotherapy synribo (today is 3).   Developed low plt and low Hb and fever>>>transfusion plt, PRBC and started with cefepime.   Na: 128 with dehydration: started with IV fluid 100 ml/hr       Principal Problem:    CML (chronic myelocytic leukemia)-Accelerated phase (Chronic) POA: Yes      Overview: multiple relapses.      On chemotherapy.   Active Problems:    Pancytopenia (CMS-HCC) (Chronic) POA: Yes     On cefepime.   Bone pain POA: Yes    Pain is controlled.            Review of Systems   Constitutional: Negative for fever and chills.   HENT: Negative for hearing loss.    Respiratory: Negative for cough and hemoptysis.    Cardiovascular: Negative for chest pain and palpitations.   Gastrointestinal: Negative for heartburn and nausea.   Genitourinary: Negative for dysuria and urgency.   Musculoskeletal: Negative for myalgias and neck pain.   Skin: Negative for rash.   Neurological: Negative for dizziness and headaches.   Psychiatric/Behavioral: Negative for depression and suicidal ideas.       Consultants/Specialty  Oncology    Disposition  Home after chemotherapy     Quality Measures  EKG reviewed, Labs reviewed, Medications reviewed and Radiology images reviewed  Mahajan catheter: No Mahajan        DVT prophylaxis - mechanical: SCDs (low plt)                Physical Exam       Filed Vitals:    17 0400 17 0430 17 0454 17 0800    BP: 92/58 94/60 100/61 90/52   Pulse: 90 89 87 100   Temp: 36.2 °C (97.2 °F) 36.1 °C (97 °F) 36.3 °C (97.3 °F) 36.6 °C (97.8 °F)   Resp: 18 16 16 16   Height:       Weight:       SpO2: 97% 95% 95% 94%     Body mass index is 27.72 kg/(m^2).    Oxygen Therapy:  Pulse Oximetry: 94 %, O2 (LPM): 0, O2 Delivery: None (Room Air)    Physical Exam   Constitutional: He is well-developed, well-nourished, and in no distress. No distress.   HENT:   Head: Normocephalic.   Neck: No tracheal deviation present. No thyromegaly present.   Cardiovascular: Normal rate.  Exam reveals no friction rub.    Murmur heard.  Pansystolic murmur on the apex    Pulmonary/Chest: Effort normal. No respiratory distress. He has no wheezes.   Abdominal: Soft. He exhibits no distension. There is no tenderness.   Musculoskeletal: He exhibits no edema.   Neurological: He is alert.   Skin: No erythema.         Lab Data Review:      5/4/2017  1:28 PM    Recent Labs      05/06/17 0013 05/06/17 2340 05/07/17   2342   SODIUM  131*  133*  128*   POTASSIUM  4.5  4.6  3.9   CHLORIDE  101  103  101   CO2  27  23  20   BUN  19  26*  45*   CREATININE  0.85  0.81  1.23   CALCIUM  9.8  9.7  9.3       Recent Labs      05/06/17 0013 05/06/17 2340 05/07/17   2342   ALTSGPT   --   75*  89*   ASTSGOT   --   33  46*   ALKPHOSPHAT   --   218*  179*   TBILIRUBIN   --   0.6  0.7   GLUCOSE  100*  133*  128*       Recent Labs      05/06/17 2340 05/07/17 2342 05/08/17   0816   RBC  2.73*  2.38*  2.82*   HEMOGLOBIN  7.9*  6.8*  8.4*   HEMATOCRIT  22.7*  20.0*  23.7*   PLATELETCT  15*  10*  33*       Recent Labs      05/06/17 2340 05/07/17 2342 05/08/17   0816   WBC  0.3*  0.4*  0.3*   ASTSGOT  33  46*   --    ALTSGPT  75*  89*   --    ALKPHOSPHAT  218*  179*   --    TBILIRUBIN  0.6  0.7   --            Assessment/Plan     * CML (chronic myelocytic leukemia)-Accelerated phase (present on admission)  Overview  multiple relapses.    Was treated on dasatinib  for a time with response, but then relapse.    Was more recently on bosutinib with response, but again recent relapse.   WBC: 0.3 Plt: 15  Hb:7.9  Protein electrophoresis: the polyclonal increase in the gamma region which may be indicative of specific immune   response or an early monoclonal protein.     Assessment & Plan  Has completed multiple chemotherapy cycles.       Started on 5/6/17 with chemotherapy (synribo ) BID for 14 days, (today is 3).  Labs daily for follow up side effects.   FERNY to r/o MM  Appreciate oncology consult.     Pancytopenia (CMS-HCC) (present on admission)  Overview  - Pancytopenia secondary to CML and chemotherapy  - WBC: 0.5  - Given one unit of platelets on 5/4/17.   - Anemia with Hb: 7.7>>7.6>>6.8  - platelet: 11>>7>>28>>20>10  - Hx of Chronic Right Subdural Hematoma    Assessment & Plan  Transfer 1 unit irritated platelets on 4/30, 5/4/17 and 5/8/17  PRBC one unit on 4/30 and 5/8/17   Follow up and keep plt>10 and Hb>7.  Started with cefepim on 5/7/17 due to fever 100.4 and after Urine and blood culture were ordered.     Neutropenia (CMS-HCC) (present on admission)  Overview  WBC:0.4>>0.3      Assessment & Plan  Fever 100.4 on 5/7 on the the day 2 of chemotherapy>>started cefepim.   Urine and blood culture pending.       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    - Pain is controlled, Patient feeling ok.    - Follow up and consider radiation therapy if needed.     Elevated PSA (present on  admission)  Overview  PSA: 8.4 on 4/4/17    Assessment & Plan  - work up could be done as out patient.     Transaminitis (present on admission)  Overview  Related to chemo therapy.     Assessment & Plan  AST/ALT: 46/89  Follow up.     BPH (benign prostatic hyperplasia) (present on admission)  Assessment & Plan  Stable.  Continue on tamsulosin 0.4 mg daily.     Low vitamin D level (present on admission)  Overview  Vit D: 11 on 4/29/17    Assessment & Plan  Continue supplementation.     Seizure disorder (CMS-HCC) (present on admission)  Assessment & Plan  Stable    Continue on his home dose of keppra

## 2017-05-08 NOTE — PROGRESS NOTES
Discussed with Dr. Méndez that pt sodium is 128 and systolic BP in the 90s. Per Dr. Méndez to start NS infusion at 100 cc/hr.

## 2017-05-08 NOTE — PROGRESS NOTES
Tech from Lab called with critical result of WBC/PLT at 0.4/10. Critical lab result read back to tech.   This critical lab result is within parameters established by [facility] for this patient

## 2017-05-09 ENCOUNTER — APPOINTMENT (OUTPATIENT)
Dept: ONCOLOGY | Facility: MEDICAL CENTER | Age: 72
End: 2017-05-09
Attending: SPECIALIST
Payer: MEDICARE

## 2017-05-09 LAB
ALBUMIN SERPL BCP-MCNC: 2.9 G/DL (ref 3.2–4.9)
ALBUMIN/GLOB SERPL: 0.9 G/DL
ALP SERPL-CCNC: 156 U/L (ref 30–99)
ALT SERPL-CCNC: 111 U/L (ref 2–50)
ANION GAP SERPL CALC-SCNC: 4 MMOL/L (ref 0–11.9)
ANISOCYTOSIS BLD QL SMEAR: ABNORMAL
AST SERPL-CCNC: 61 U/L (ref 12–45)
BASOPHILS # BLD AUTO: 4.7 % (ref 0–1.8)
BASOPHILS # BLD: 0.01 K/UL (ref 0–0.12)
BILIRUB SERPL-MCNC: 0.7 MG/DL (ref 0.1–1.5)
BUN SERPL-MCNC: 34 MG/DL (ref 8–22)
CALCIUM SERPL-MCNC: 9.1 MG/DL (ref 8.5–10.5)
CHLORIDE SERPL-SCNC: 107 MMOL/L (ref 96–112)
CO2 SERPL-SCNC: 20 MMOL/L (ref 20–33)
CREAT SERPL-MCNC: 0.94 MG/DL (ref 0.5–1.4)
CRP SERPL HS-MCNC: 12.55 MG/DL (ref 0–0.75)
EOSINOPHIL # BLD AUTO: 0 K/UL (ref 0–0.51)
EOSINOPHIL NFR BLD: 0 % (ref 0–6.9)
ERYTHROCYTE [DISTWIDTH] IN BLOOD BY AUTOMATED COUNT: 40.3 FL (ref 35.9–50)
ERYTHROCYTE [DISTWIDTH] IN BLOOD BY AUTOMATED COUNT: 40.9 FL (ref 35.9–50)
ERYTHROCYTE [SEDIMENTATION RATE] IN BLOOD BY WESTERGREN METHOD: 103 MM/HOUR (ref 0–20)
GFR SERPL CREATININE-BSD FRML MDRD: >60 ML/MIN/1.73 M 2
GLOBULIN SER CALC-MCNC: 3.2 G/DL (ref 1.9–3.5)
GLUCOSE SERPL-MCNC: 109 MG/DL (ref 65–99)
HCT VFR BLD AUTO: 19.5 % (ref 42–52)
HCT VFR BLD AUTO: 24.3 % (ref 42–52)
HGB BLD-MCNC: 6.8 G/DL (ref 14–18)
HGB BLD-MCNC: 8.8 G/DL (ref 14–18)
LYMPHOCYTES # BLD AUTO: 0.11 K/UL (ref 1–4.8)
LYMPHOCYTES NFR BLD: 38.1 % (ref 22–41)
MANUAL DIFF BLD: NORMAL
MCH RBC QN AUTO: 29.1 PG (ref 27–33)
MCH RBC QN AUTO: 30.1 PG (ref 27–33)
MCHC RBC AUTO-ENTMCNC: 34.9 G/DL (ref 33.7–35.3)
MCHC RBC AUTO-ENTMCNC: 36.2 G/DL (ref 33.7–35.3)
MCV RBC AUTO: 83.2 FL (ref 81.4–97.8)
MCV RBC AUTO: 83.3 FL (ref 81.4–97.8)
MICROCYTES BLD QL SMEAR: ABNORMAL
MONOCYTES # BLD AUTO: 0.01 K/UL (ref 0–0.85)
MONOCYTES NFR BLD AUTO: 4.8 % (ref 0–13.4)
MORPHOLOGY BLD-IMP: NORMAL
NEUTROPHILS # BLD AUTO: 0.16 K/UL (ref 1.82–7.42)
NEUTROPHILS # BLD AUTO: ABNORMAL K/UL (ref 1.82–7.42)
NEUTROPHILS NFR BLD: 52.4 % (ref 44–72)
NRBC # BLD AUTO: 0 K/UL
NRBC # BLD AUTO: 0 K/UL
NRBC BLD AUTO-RTO: 0 /100 WBC
NRBC BLD AUTO-RTO: 0 /100 WBC
PLATELET # BLD AUTO: 20 K/UL (ref 164–446)
PLATELET # BLD AUTO: 23 K/UL (ref 164–446)
PLATELET BLD QL SMEAR: NORMAL
PLATELETS.RETICULATED NFR BLD AUTO: 0.3 K/UL (ref 0.6–13.1)
PMV BLD AUTO: 8.5 FL (ref 9–12.9)
PMV BLD AUTO: 8.6 FL (ref 9–12.9)
POTASSIUM SERPL-SCNC: 4.1 MMOL/L (ref 3.6–5.5)
PROT SERPL-MCNC: 6.1 G/DL (ref 6–8.2)
RBC # BLD AUTO: 2.34 M/UL (ref 4.7–6.1)
RBC # BLD AUTO: 2.92 M/UL (ref 4.7–6.1)
RBC BLD AUTO: PRESENT
SODIUM SERPL-SCNC: 131 MMOL/L (ref 135–145)
WBC # BLD AUTO: 0.2 K/UL (ref 4.8–10.8)
WBC # BLD AUTO: 0.3 K/UL (ref 4.8–10.8)

## 2017-05-09 PROCEDURE — 36430 TRANSFUSION BLD/BLD COMPNT: CPT

## 2017-05-09 PROCEDURE — 700101 HCHG RX REV CODE 250: Performed by: STUDENT IN AN ORGANIZED HEALTH CARE EDUCATION/TRAINING PROGRAM

## 2017-05-09 PROCEDURE — 700102 HCHG RX REV CODE 250 W/ 637 OVERRIDE(OP): Performed by: STUDENT IN AN ORGANIZED HEALTH CARE EDUCATION/TRAINING PROGRAM

## 2017-05-09 PROCEDURE — 85025 COMPLETE CBC W/AUTO DIFF WBC: CPT | Mod: 91

## 2017-05-09 PROCEDURE — A9270 NON-COVERED ITEM OR SERVICE: HCPCS | Performed by: INTERNAL MEDICINE

## 2017-05-09 PROCEDURE — 770009 HCHG ROOM/CARE - ONCOLOGY SEMI PRI*

## 2017-05-09 PROCEDURE — 700102 HCHG RX REV CODE 250 W/ 637 OVERRIDE(OP): Performed by: INTERNAL MEDICINE

## 2017-05-09 PROCEDURE — 700105 HCHG RX REV CODE 258: Performed by: INTERNAL MEDICINE

## 2017-05-09 PROCEDURE — A9270 NON-COVERED ITEM OR SERVICE: HCPCS | Performed by: STUDENT IN AN ORGANIZED HEALTH CARE EDUCATION/TRAINING PROGRAM

## 2017-05-09 PROCEDURE — 86923 COMPATIBILITY TEST ELECTRIC: CPT

## 2017-05-09 PROCEDURE — 86644 CMV ANTIBODY: CPT

## 2017-05-09 PROCEDURE — P9016 RBC LEUKOCYTES REDUCED: HCPCS

## 2017-05-09 PROCEDURE — 80053 COMPREHEN METABOLIC PANEL: CPT

## 2017-05-09 PROCEDURE — 36415 COLL VENOUS BLD VENIPUNCTURE: CPT

## 2017-05-09 PROCEDURE — 99232 SBSQ HOSP IP/OBS MODERATE 35: CPT | Mod: GC | Performed by: INTERNAL MEDICINE

## 2017-05-09 PROCEDURE — 700111 HCHG RX REV CODE 636 W/ 250 OVERRIDE (IP): Performed by: INTERNAL MEDICINE

## 2017-05-09 RX ORDER — FLUCONAZOLE 100 MG/1
200 TABLET ORAL DAILY
Status: DISCONTINUED | OUTPATIENT
Start: 2017-05-09 | End: 2017-05-13

## 2017-05-09 RX ORDER — ACYCLOVIR 800 MG/1
800 TABLET ORAL 2 TIMES DAILY
Status: DISCONTINUED | OUTPATIENT
Start: 2017-05-09 | End: 2017-05-10

## 2017-05-09 RX ADMIN — TAMSULOSIN HYDROCHLORIDE 0.4 MG: 0.4 CAPSULE ORAL at 10:20

## 2017-05-09 RX ADMIN — ACETAMINOPHEN 650 MG: 325 TABLET, FILM COATED ORAL at 03:34

## 2017-05-09 RX ADMIN — FLUCONAZOLE 200 MG: 100 TABLET ORAL at 18:14

## 2017-05-09 RX ADMIN — SODIUM CHLORIDE: 9 INJECTION, SOLUTION INTRAVENOUS at 23:56

## 2017-05-09 RX ADMIN — LIDOCAINE 1 PATCH: 50 PATCH CUTANEOUS at 05:33

## 2017-05-09 RX ADMIN — NYSTATIN 500000 UNITS: 500000 SUSPENSION ORAL at 10:20

## 2017-05-09 RX ADMIN — LEVETIRACETAM 750 MG: 250 TABLET, FILM COATED ORAL at 20:07

## 2017-05-09 RX ADMIN — SODIUM CHLORIDE: 9 INJECTION, SOLUTION INTRAVENOUS at 13:54

## 2017-05-09 RX ADMIN — ACYCLOVIR 800 MG: 800 TABLET ORAL at 20:06

## 2017-05-09 RX ADMIN — NYSTATIN 500000 UNITS: 500000 SUSPENSION ORAL at 13:00

## 2017-05-09 RX ADMIN — NYSTATIN 500000 UNITS: 500000 SUSPENSION ORAL at 18:14

## 2017-05-09 RX ADMIN — CEFEPIME 2 G: 2 INJECTION, POWDER, FOR SOLUTION INTRAMUSCULAR; INTRAVENOUS at 21:34

## 2017-05-09 RX ADMIN — CEFEPIME 2 G: 2 INJECTION, POWDER, FOR SOLUTION INTRAMUSCULAR; INTRAVENOUS at 13:53

## 2017-05-09 RX ADMIN — LEVETIRACETAM 750 MG: 250 TABLET, FILM COATED ORAL at 10:20

## 2017-05-09 RX ADMIN — NYSTATIN 500000 UNITS: 500000 SUSPENSION ORAL at 20:06

## 2017-05-09 RX ADMIN — DIPHENHYDRAMINE HCL 25 MG: 25 TABLET ORAL at 03:34

## 2017-05-09 RX ADMIN — CEFEPIME 2 G: 2 INJECTION, POWDER, FOR SOLUTION INTRAMUSCULAR; INTRAVENOUS at 05:33

## 2017-05-09 ASSESSMENT — ENCOUNTER SYMPTOMS
HEARTBURN: 0
NAUSEA: 0
DEPRESSION: 0
DIZZINESS: 0
HEMOPTYSIS: 0
COUGH: 0
FEVER: 0
SHORTNESS OF BREATH: 0
FOCAL WEAKNESS: 0
HEADACHES: 0
BRUISES/BLEEDS EASILY: 0
PALPITATIONS: 0
BLURRED VISION: 0
MYALGIAS: 0
CHILLS: 0
NERVOUS/ANXIOUS: 0
NECK PAIN: 0
ABDOMINAL PAIN: 0
VOMITING: 0
SORE THROAT: 0
DIARRHEA: 1

## 2017-05-09 ASSESSMENT — PAIN SCALES - GENERAL
PAINLEVEL_OUTOF10: 0

## 2017-05-09 NOTE — PROGRESS NOTES
"Pharmacy Chemotherapy Verification    Patient Name: Benjamin Post   Dx: CML       Protocol: Synribo Induction  Omacetaxine (Synribo) 1.25 mg/m2 SQ BID for 14 days  NCCN Guidelines for Chronic Myelogenous Leukemia.V.1.2016  Brice J, et al. Blood. 2012 Sep 27;120(13):2573-80. Epub 2012 Aug 15.  Alfonso ROSADO et al. J Clin Oncol. 2011;30(15s):abstr 6513.    Allergies:  Review of patient's allergies indicates no known allergies.     /66 mmHg  Pulse 92  Temp(Src) 36.8 °C (98.3 °F)  Resp 18  Ht 1.702 m (5' 7\")  Wt 80.3 kg (177 lb 0.5 oz)  BMI 27.72 kg/m2  SpO2 98% Body surface area is 1.95 meters squared.    5/9/17:  ANC~ 0 Plt = 20k   Hgb = 8.8  SCr = 0.94mg/dL CrCl ~ 79mL/min   LFT's = 61/111/156 TBili = 0.7     CML -  Treat through any counts, standing transfusion orders in place.  MD aware of all current lab results.        Drug Order   (Drug name, dose, route, IV Fluid & volume, frequency, number of doses) Cycle: 1 Day 4     Previous treatment: s/p dasatinib and bosutinib (last 4/26/17), both followed  by relapse     Medication = Omacetaxine  Base Dose= 1.25 mg/m2   Calc Dose: Base Dose x 1.95 m2= 2.44 mg  Final Dose = 2.4 mg  Route = subq  Conc & Volume = 3.5 mg/mL = 0.69 mL  Admin Duration = to be given subcutaneously   PT SUPPLIED MEDICATION  q12h on days 1-14      <5% difference, okay to treat with final dose       By my signature below, I confirm this process was performed independently with the BSA and all final chemotherapy dosing calculations congruent. I have reviewed the above chemotherapy order and that my calculation of the final dose and BSA (when applicable) corroborate those calculations of the  pharmacist. Discrepancies of 5% or greater in the written dose have been addressed and documented within the [facility] Progress notes.    Kermit SaezD.            "

## 2017-05-09 NOTE — PROGRESS NOTES
Oncology/Hematology Progress Note               Author: Mira Huertas    Cc:  [facility] Date & Time created: 5/9/2017  2:50 PM     Interval History:  Feeling OK.  Main complaint is being tired.  No fevers.  No bleeding from gums, nose, urine, or stool.  No headaches. No abdominal pain.  Still has diarrhea, perhaps 2 stools today.  No chest pain or shortness of breath.  No cough.  Using nystatin.     PMHx, PSurgHx, SocHx, FAMHx:  All reviewed and unchanged    Review of Systems:  Review of Systems   Constitutional: Positive for malaise/fatigue. Negative for fever and chills.   HENT: Negative for nosebleeds and sore throat.    Eyes: Negative for blurred vision.   Respiratory: Negative for cough and shortness of breath.    Cardiovascular: Negative for chest pain.   Gastrointestinal: Positive for diarrhea. Negative for nausea, vomiting and abdominal pain.   Genitourinary: Negative for dysuria.   Skin: Negative for rash.   Neurological: Negative for focal weakness and headaches.   Endo/Heme/Allergies: Does not bruise/bleed easily.   Psychiatric/Behavioral: The patient is not nervous/anxious.        Physical Exam:  Physical Exam   Constitutional: He is oriented to person, place, and time. He appears well-developed. No distress.   thin   HENT:   thrush on tongue   Eyes: Conjunctivae are normal. No scleral icterus.   Neck: Neck supple.   Cardiovascular: Normal rate and regular rhythm.  Exam reveals no gallop and no friction rub.    Murmur (II/VI throughout, best at RUSB) heard.  Pulmonary/Chest: Effort normal and breath sounds normal. He has no wheezes. He has no rales.   Abdominal: Soft. Bowel sounds are normal. He exhibits distension (mild). There is no tenderness. There is no rebound.   Musculoskeletal: He exhibits no edema.   Lymphadenopathy:     He has no cervical adenopathy.   Neurological: He is alert and oriented to person, place, and time.   Skin: Skin is warm and dry. He is not diaphoretic.   Psychiatric: He has a  normal mood and affect. His behavior is normal.       Labs:        Invalid input(s): [id number]      Recent Labs      17   SODIUM  133*  128*  131*   POTASSIUM  4.6  3.9  4.1   CHLORIDE  103  101  107   CO2  23  20  20   BUN  26*  45*  34*   CREATININE  0.81  1.23  0.94   CALCIUM  9.7  9.3  9.1     Recent Labs      17   ALTSGPT  75*  89*  111*   ASTSGOT  33  46*  61*   ALKPHOSPHAT  218*  179*  156*   TBILIRUBIN  0.6  0.7  0.7   GLUCOSE  133*  128*  109*     Recent Labs      17   0648   RBC  2.82*  2.34*  2.92*   HEMOGLOBIN  8.4*  6.8*  8.8*   HEMATOCRIT  23.7*  19.5*  24.3*   PLATELETCT  33*  23*  20*     Recent Labs      17   0648   WBC  0.3*  0.4*  0.3*  0.3*  0.2*   NEUTSPOLYS   --    --    --   52.40   --    LYMPHOCYTES   --    --    --   38.10   --    MONOCYTES   --    --    --   4.80   --    EOSINOPHILS   --    --    --   0.00   --    BASOPHILS   --    --    --   4.70*   --    ASTSGOT  33  46*   --   61*   --    ALTSGPT  75*  89*   --   111*   --    ALKPHOSPHAT  218*  179*   --   156*   --    TBILIRUBIN  0.6  0.7   --   0.7   --      Recent Labs      17   SODIUM  133*  128*  131*   POTASSIUM  4.6  3.9  4.1   CHLORIDE  103  101  107   CO2  23  20  20   GLUCOSE  133*  128*  109*   BUN  26*  45*  34*   CREATININE  0.81  1.23  0.94   CALCIUM  9.7  9.3  9.1     Hemodynamics:  Temp (24hrs), Av °C (98.6 °F), Min:36.8 °C (98.2 °F), Max:37.4 °C (99.4 °F)  Temperature: 36.8 °C (98.3 °F)  Pulse  Av.4  Min: 60  Max: 116   Blood Pressure : 101/66 mmHg     Respiratory:    Respiration: 18, Pulse Oximetry: 98 %     Work Of Breathing / Effort: Mild;Shallow  RUL Breath Sounds: Clear, RML Breath Sounds: Diminished, RLL Breath Sounds: Diminished, WAGNER Breath Sounds:  Clear, LLL Breath Sounds: Diminished  Fluids:    Intake/Output Summary (Last 24 hours) at 05/01/17 1409  Last data filed at 05/01/17 0838   Gross per 24 hour   Intake   1006 ml   Output    575 ml   Net    431 ml        GI/Nutrition:  Orders Placed This Encounter   Procedures   • Diet Order     Standing Status: Standing      Number of Occurrences: 1      Standing Expiration Date:      Order Specific Question:  Diet:     Answer:  2 Gram Sodium [7]     Order Specific Question:  Texture/Fiber modifications:     Answer:  Dysphagia 2(Pureed/Chopped)specify fluid consistency(question 6) [2]      Comments:  thins ok; pt with no teeth/ nor dentures     Medical Decision Making, by Problem:  Active Hospital Problems    Diagnosis   • Neutropenia (CMS-HCC) [D70.9]   • Pancytopenia (CMS-HCC) [D61.818]   • Acute bilateral low back pain without sciatica [M54.5]   • Bone pain [M89.8X9]   • Transaminitis [R74.0]   • 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  Was treated on dasatinib for a time with response, but then relapse.  Was more recently on bosutinib with response, but again recent relapse.  Sounds like this medicine was stopped by Dr. Vu on 4/26/17.  The plan has been for allo-BMT at [facility], but they need him to get some kind of response to chemo before they can do the transplant.   The plan is to try Synribo.  He got this in the mail, and is on his own home supply while he is monitored here for side effects.  Synribo is given as a SQ shot BID x 14 days with the first cycle, followed by 2 weeks off.  Started on 5/6/17.        Synribo is a high risk medication at this point.  There is a 's warning for cerebral bleeding, and to avoid NSAIDS, blood thinners, etc as well as to use with caution in patients with Platelets<50K.  He has a h/o of a SDH in the recent past.   -- Day 4 of synribo  -- tolerating with main toxicity of  diarrhea  -- monitor for Head aches or AMS changes    -- keep in hospital for first 14 days of the treatment, to monitor for side effects, and monitor counts      2.  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  Not likely to improve unless he can get response to some kind of chemo.  Will transfuse as needed.  Transfuse platelets to keep >10, or higher if bleeding.    3.  Neutropenia--nothing documented in chart but as per Dr. Agustin's note, had fever late in day on 5/7.  BCx and CXR negative, will monitor.  Prophylactic antibiotics will be added    4.  Elevated LFT's-- related to past chemo.  Monitor.    5.  8th rib lesion-- he seems to indicate that he is not in much pain now.  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  If pain continues to be an issue, could consult rad onc to radiate this spot, which would likely provide great pain relief.    6. Anemia-- transfuse 1 unit of PRBC's if <7.5      Discussed with patient and primary hospitalist team.     Highly complex patient with high risk of morbidity and mortality.     Labs reviewed and Medications reviewed  Mahajan catheter: No Mahajan      DVT Prophylaxis: Contraindicated - High bleeding risk

## 2017-05-09 NOTE — PROGRESS NOTES
Chemotherapy Verification - SECONDARY RN       Height = 170.2cm  Weight = 80.3kg  BSA = 1.95 m2       Medication: Omacefaxine  Dose: 1.25 mg/m2  Calculated Dose: 2.44 mg Ordered dose 2.4 mg                             (In mg/m2, AUC, mg/kg)     < 5% difference   I confirm that this process was performed independently.

## 2017-05-09 NOTE — PROGRESS NOTES
Chemotherapy Verification - PRIMARY RN      Height = 170.2 cm  Weight = 80.3 kg  BSA = 1.95 m2       Medication: Omacetaxine (Synribo)    Dose: 1.25 mg/m2  Calculated Dose: 2.4 mg, same as the ordered dose of 2.4 mg                           (In mg/m2, AUC, mg/kg)             I confirm this process was performed independently with the BSA and all final chemotherapy dosing calculations congruent.  Any discrepancies of 5% or greater have been addressed with the chemotherapy pharmacist. The resolution of the discrepancy has been documented in the EPIC progress notes.

## 2017-05-09 NOTE — PROGRESS NOTES
Oncology/Hematology Progress Note               Author: Mira Huertas    Cc:  CML Date & Time created: 5/8/2017  5:37 PM     Interval History:  Feeling OK.  Wonders if the chemotherapy is working.  No headaches or vision changes.  No bleeding.  No fevers. No chest pain, shortness of breath, cough, or abdominal pain.  He had 3 episodes of diarrhea today, nonbloody.      PMHx, PSurgHx, SocHx, FAMHx:  All reviewed and unchanged    Review of Systems:  Review of Systems   Constitutional: Positive for malaise/fatigue. Negative for fever and chills.   HENT: Negative for nosebleeds and sore throat.    Eyes: Negative for blurred vision.   Respiratory: Negative for cough and shortness of breath.    Cardiovascular: Negative for chest pain.   Gastrointestinal: Positive for diarrhea. Negative for nausea, vomiting and abdominal pain.   Genitourinary: Negative for dysuria.   Skin: Negative for rash.   Neurological: Negative for focal weakness and headaches.   Endo/Heme/Allergies: Does not bruise/bleed easily.   Psychiatric/Behavioral: The patient is not nervous/anxious.        Physical Exam:  Physical Exam   Constitutional: He is oriented to person, place, and time. He appears well-developed. No distress.   thin   HENT:   Thick thrush on tongue   Eyes: Conjunctivae are normal. No scleral icterus.   Neck: Neck supple.   Cardiovascular: Normal rate and regular rhythm.  Exam reveals no gallop and no friction rub.    Murmur (II/VI throughout, best at RUSB) heard.  Pulmonary/Chest: Effort normal and breath sounds normal. He has no wheezes. He has no rales.   Abdominal: Soft. Bowel sounds are normal. He exhibits distension (mild). There is no tenderness. There is no rebound.   Musculoskeletal: He exhibits no edema.   Lymphadenopathy:     He has no cervical adenopathy.   Neurological: He is alert and oriented to person, place, and time.   Skin: Skin is warm and dry. He is not diaphoretic.   Psychiatric: He has a normal mood and  affect. His behavior is normal.       Labs:        Invalid input(s): [id number]      Recent Labs      17   SODIUM  131*  133*  128*   POTASSIUM  4.5  4.6  3.9   CHLORIDE  101  103  101   CO2  27  23  20   BUN  19  26*  45*   CREATININE  0.85  0.81  1.23   CALCIUM  9.8  9.7  9.3     Recent Labs      17   234   ALTSGPT   --   75*  89*   ASTSGOT   --   33  46*   ALKPHOSPHAT   --   218*  179*   TBILIRUBIN   --   0.6  0.7   GLUCOSE  100*  133*  128*     Recent Labs      17   0816   RBC  2.73*  2.38*  2.82*   HEMOGLOBIN  7.9*  6.8*  8.4*   HEMATOCRIT  22.7*  20.0*  23.7*   PLATELETCT  15*  10*  33*     Recent Labs      17   0816   WBC  0.3*  0.4*  0.3*   ASTSGOT  33  46*   --    ALTSGPT  75*  89*   --    ALKPHOSPHAT  218*  179*   --    TBILIRUBIN  0.6  0.7   --      Recent Labs      17   SODIUM  131*  133*  128*   POTASSIUM  4.5  4.6  3.9   CHLORIDE  101  103  101   CO2  27  23  20   GLUCOSE  100*  133*  128*   BUN  19  26*  45*   CREATININE  0.85  0.81  1.23   CALCIUM  9.8  9.7  9.3     Hemodynamics:  Temp (24hrs), Av.7 °C (98.1 °F), Min:36.1 °C (97 °F), Max:37.4 °C (99.4 °F)  Temperature: 36.6 °C (97.8 °F)  Pulse  Av.5  Min: 60  Max: 116   Blood Pressure : (!) 90/52 mmHg     Respiratory:    Respiration: 16, Pulse Oximetry: 94 %        RUL Breath Sounds: Clear, RML Breath Sounds: Diminished;Clear, RLL Breath Sounds: Diminished, WAGNER Breath Sounds: Clear, LLL Breath Sounds: Diminished  Fluids:    Intake/Output Summary (Last 24 hours) at 17 1409  Last data filed at 17 0838   Gross per 24 hour   Intake   1006 ml   Output    575 ml   Net    431 ml        GI/Nutrition:  Orders Placed This Encounter   Procedures   • Diet Order     Standing Status: Standing      Number of Occurrences: 1       Standing Expiration Date:      Order Specific Question:  Diet:     Answer:  2 Gram Sodium [7]     Order Specific Question:  Texture/Fiber modifications:     Answer:  Dysphagia 2(Pureed/Chopped)specify fluid consistency(question 6) [2]      Comments:  thins ok; pt with no teeth/ nor dentures     Medical Decision Making, by Problem:  Active Hospital Problems    Diagnosis   • Neutropenia (CMS-HCC) [D70.9]   • Pancytopenia (CMS-HCC) [D61.818]   • Acute bilateral low back pain without sciatica [M54.5]   • Bone pain [M89.8X9]   • Transaminitis [R74.0]   • 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  CML-- multiple relapses.  Was treated on dasatinib for a time with response, but then relapse.  Was more recently on bosutinib with response, but again recent relapse.  Sounds like this medicine was stopped by Dr. Vu on 4/26/17.  The plan has been for [facility]BMT at [facility], but they need him to get some kind of response to chemo before they can do the transplant.   The plan is to try Synribo.  He got this in the mail, and is on his own home supply while he is monitored here for side effects.  Synribo is given as a SQ shot BID x 14 days with the first cycle, followed by 2 weeks off.  Started on 5/6/17.        Synribo is a high risk medication at this point.  There is a 's warning for cerebral bleeding, and to avoid NSAIDS, blood thinners, etc as well as to use with caution in patients with Platelets<50K.  He has a h/o of a SDH in the recent past.   -- Day 3 of synribo  -- tolerating with main toxicity of diarrhea  -- monitor for Head aches or AMS changes    -- keep in hospital for first 14 days of the treatment, to monitor for side effects, and monitor counts      2.  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  Not likely to improve unless he can get response to some kind of chemo.  Will transfuse as needed.  Transfuse platelets to keep >10, or higher if bleeding.    3.   Neutropenia--nothing documented in chart but as per Dr. Agustin's note, had fever late in day on 5/7.  BCx and CXR negative, will monitor.      4.  Elevated LFT's-- related to past chemo.  Monitor.    5.  8th rib lesion-- he seems to indicate that he is not in much pain now.  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  If pain continues to be an issue, could consult rad onc to radiate this spot, which would likely provide great pain relief.    6. Anemia-- transfuse 1 unit of PRBC's if <7.5      Discussed with patient, daughter Gabrielle, and RN.     Highly complex patient with high risk of morbidity and mortality.     Labs reviewed and Medications reviewed  Mahajan catheter: No Mahajan      DVT Prophylaxis: Contraindicated - High bleeding risk

## 2017-05-09 NOTE — PROGRESS NOTES
[facility]val [facility]    Name Benjamin Post       1945   Age/Sex 71 y.o. male   MRN [id number]   Code Status DNR     After 5PM or if no immediate response to page, please call for cross-coverage  Attending/Team: Dr. Chung/Ly Call [phone] to page   1st Call - Day Intern (R1):   Dr Méndez 2nd Call - Day Sr. Resident (R2/R3):            Chief complaint/ reason for interval visit (Primary Diagnosis)   CML/pancytopenia     Interval Problem Daily Status Update  :  On  chemotherapy synribo (today is 4).   Plt more than 10  and Hb more than 7, no fever but he developed diarrhea.   Started with acyclovir and fluconazole today for prophylaxis.         Principal Problem:    CML (chronic myelocytic leukemia)-Accelerated phase (Chronic) POA: Yes      Overview: multiple relapses.      On chemotherapy.   Active Problems:    Pancytopenia (CMS-HCC) (Chronic) POA: Yes     On cefepime.   Bone pain POA: Yes    Pain is controlled.            Review of Systems   Constitutional: Negative for fever and chills.   HENT: Negative for hearing loss.    Respiratory: Negative for cough and hemoptysis.    Cardiovascular: Negative for chest pain and palpitations.   Gastrointestinal: Positive for diarrhea. Negative for heartburn, nausea and abdominal pain.   Genitourinary: Negative for dysuria and urgency.   Musculoskeletal: Negative for myalgias and neck pain.   Skin: Negative for rash.   Neurological: Negative for dizziness and headaches.   Psychiatric/Behavioral: Negative for depression and suicidal ideas.       Consultants/Specialty  Oncology    Disposition  Home after chemotherapy     Quality Measures  EKG reviewed, Labs reviewed, Medications reviewed and Radiology images reviewed  Mahajan catheter: No Mahajan        DVT prophylaxis - mechanical: SCDs (low plt)                Physical Exam       Filed Vitals:    17 0331 17  0530 05/09/17 0737 05/09/17 1600   BP: 111/55 91/56 101/66 101/61   Pulse: 105 89 92 97   Temp: 37.1 °C (98.8 °F) 36.9 °C (98.4 °F) 36.8 °C (98.3 °F) 36.8 °C (98.2 °F)   Resp: 18 20 18 18   Height:       Weight:       SpO2: 100% 100% 98% 95%     Body mass index is 27.72 kg/(m^2).    Oxygen Therapy:  Pulse Oximetry: 95 %, O2 (LPM): 0, O2 Delivery: None (Room Air)    Physical Exam   Constitutional: He is well-developed, well-nourished, and in no distress. No distress.   HENT:   Head: Normocephalic.   Neck: No tracheal deviation present. No thyromegaly present.   Cardiovascular: Normal rate.  Exam reveals no friction rub.    Murmur heard.  Pansystolic murmur on the apex    Pulmonary/Chest: Effort normal. No respiratory distress. He has no wheezes.   Abdominal: Soft. He exhibits no distension. There is no tenderness.   Musculoskeletal: He exhibits no edema.   Neurological: He is alert.   Skin: No erythema.         Lab Data Review:      5/4/2017  1:28 PM    Recent Labs      05/06/17 2340 05/07/17 2342 05/08/17   2359   SODIUM  133*  128*  131*   POTASSIUM  4.6  3.9  4.1   CHLORIDE  103  101  107   CO2  23  20  20   BUN  26*  45*  34*   CREATININE  0.81  1.23  0.94   CALCIUM  9.7  9.3  9.1       Recent Labs      05/06/17   2340 05/07/17   2342 05/08/17   2359   ALTSGPT  75*  89*  111*   ASTSGOT  33  46*  61*   ALKPHOSPHAT  218*  179*  156*   TBILIRUBIN  0.6  0.7  0.7   GLUCOSE  133*  128*  109*       Recent Labs      05/08/17   0816  05/08/17   2359 05/09/17   0648   RBC  2.82*  2.34*  2.92*   HEMOGLOBIN  8.4*  6.8*  8.8*   HEMATOCRIT  23.7*  19.5*  24.3*   PLATELETCT  33*  23*  20*       Recent Labs      05/06/17   2340 05/07/17   2342  05/08/17   0816  05/08/17   2359 05/09/17   0648   WBC  0.3*  0.4*  0.3*  0.3*  0.2*   NEUTSPOLYS   --    --    --   52.40   --    LYMPHOCYTES   --    --    --   38.10   --    MONOCYTES   --    --    --   4.80   --    EOSINOPHILS   --    --    --   0.00   --    BASOPHILS   --     --    --   4.70*   --    ASTSGOT  33  46*   --   61*   --    ALTSGPT  75*  89*   --   111*   --    ALKPHOSPHAT  218*  179*   --   156*   --    TBILIRUBIN  0.6  0.7   --   0.7   --            Assessment/Plan     * CML (chronic myelocytic leukemia)-Accelerated phase (present on admission)  Overview  multiple relapses.    Was treated on dasatinib for a time with response, but then relapse.    Was more recently on bosutinib with response, but again recent relapse.   WBC: 0.3>>0.2 Plt: 15  Hb:7.9>>8.8  Protein electrophoresis: the polyclonal increase in the gamma region which may be indicative of specific immune   response or an early monoclonal protein.     Assessment & Plan  Has completed multiple chemotherapy cycles.       Started on 5/6/17 with chemotherapy (synribo ) BID for 14 days, (today is 4).  Developed diarrhea on 5/9  Labs daily for follow up side effects.   FERNY to r/o MM  Appreciate oncology consult.     Pancytopenia (CMS-HCC) (present on admission)  Overview  - Pancytopenia secondary to CML and chemotherapy  - WBC: 0.5>>0.2  - Given one unit of platelets on 5/4/17.   - Anemia with Hb: 7.7>>7.6>>6.8  - platelet: 11>>7>>28>>20>10>>23  - Hx of Chronic Right Subdural Hematoma    Assessment & Plan  Transfer 1 unit irritated platelets on 4/30, 5/4/17 and 5/8/17  PRBC one unit on 4/30 and 5/8/17   Follow up and keep plt>10 and Hb>7.  Started with cefepim on 5/7/17 due to fever 100.4 and after Urine and blood culture were ordered.   Started with acyclovir and fluconazole on 5/9 for prophylaxis.     Neutropenia (CMS-HCC) (present on admission)  Overview  Chronic   WBC:0.4>>0.3>>0.2      Assessment & Plan  Fever 100.4 on 5/7 on the the day 2 of chemotherapy>>started cefepim.   Urine and blood culture pending.       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    - Pain is controlled, Patient feeling ok.    - Follow up and consider radiation therapy if needed.     Elevated PSA (present on admission)  Overview  PSA: 8.4 on 4/4/17    Assessment & Plan  - work up could be done as out patient.     Transaminitis (present on admission)  Overview  Related to chemo therapy.     Assessment & Plan  AST/ALT: 46/89>>61/111  Follow up.     BPH (benign prostatic hyperplasia) (present on admission)  Assessment & Plan  Stable.  Continue on tamsulosin 0.4 mg daily.     Low vitamin D level (present on admission)  Overview  Vit D: 11 on 4/29/17    Assessment & Plan  Continue supplementation.     Seizure disorder (CMS-HCC) (present on admission)  Assessment & Plan  Stable    Continue on his home dose of keppra

## 2017-05-10 LAB
ALBUMIN SERPL BCP-MCNC: 2.7 G/DL (ref 3.2–4.9)
ALBUMIN/GLOB SERPL: 0.9 G/DL
ALP SERPL-CCNC: 141 U/L (ref 30–99)
ALT SERPL-CCNC: 129 U/L (ref 2–50)
ANION GAP SERPL CALC-SCNC: 5 MMOL/L (ref 0–11.9)
AST SERPL-CCNC: 73 U/L (ref 12–45)
BILIRUB SERPL-MCNC: 0.6 MG/DL (ref 0.1–1.5)
BUN SERPL-MCNC: 22 MG/DL (ref 8–22)
CALCIUM SERPL-MCNC: 8.7 MG/DL (ref 8.5–10.5)
CHLORIDE SERPL-SCNC: 108 MMOL/L (ref 96–112)
CO2 SERPL-SCNC: 20 MMOL/L (ref 20–33)
CREAT SERPL-MCNC: 0.82 MG/DL (ref 0.5–1.4)
ERYTHROCYTE [DISTWIDTH] IN BLOOD BY AUTOMATED COUNT: 41.1 FL (ref 35.9–50)
GFR SERPL CREATININE-BSD FRML MDRD: >60 ML/MIN/1.73 M 2
GLOBULIN SER CALC-MCNC: 3.1 G/DL (ref 1.9–3.5)
GLUCOSE SERPL-MCNC: 108 MG/DL (ref 65–99)
HCT VFR BLD AUTO: 22.1 % (ref 42–52)
HGB BLD-MCNC: 7.8 G/DL (ref 14–18)
MCH RBC QN AUTO: 29.5 PG (ref 27–33)
MCHC RBC AUTO-ENTMCNC: 35.3 G/DL (ref 33.7–35.3)
MCV RBC AUTO: 83.7 FL (ref 81.4–97.8)
NEUTROPHILS # BLD AUTO: ABNORMAL K/UL (ref 1.82–7.42)
NRBC # BLD AUTO: 0 K/UL
NRBC BLD AUTO-RTO: 0 /100 WBC
PLATELET # BLD AUTO: 15 K/UL (ref 164–446)
PMV BLD AUTO: 10 FL (ref 9–12.9)
POTASSIUM SERPL-SCNC: 4.4 MMOL/L (ref 3.6–5.5)
PROT SERPL-MCNC: 5.8 G/DL (ref 6–8.2)
RBC # BLD AUTO: 2.64 M/UL (ref 4.7–6.1)
SODIUM SERPL-SCNC: 133 MMOL/L (ref 135–145)
WBC # BLD AUTO: 0.2 K/UL (ref 4.8–10.8)

## 2017-05-10 PROCEDURE — 36415 COLL VENOUS BLD VENIPUNCTURE: CPT

## 2017-05-10 PROCEDURE — 85025 COMPLETE CBC W/AUTO DIFF WBC: CPT

## 2017-05-10 PROCEDURE — 700102 HCHG RX REV CODE 250 W/ 637 OVERRIDE(OP): Performed by: STUDENT IN AN ORGANIZED HEALTH CARE EDUCATION/TRAINING PROGRAM

## 2017-05-10 PROCEDURE — 99232 SBSQ HOSP IP/OBS MODERATE 35: CPT | Mod: GC | Performed by: INTERNAL MEDICINE

## 2017-05-10 PROCEDURE — 97530 THERAPEUTIC ACTIVITIES: CPT

## 2017-05-10 PROCEDURE — 700102 HCHG RX REV CODE 250 W/ 637 OVERRIDE(OP): Performed by: INTERNAL MEDICINE

## 2017-05-10 PROCEDURE — A9270 NON-COVERED ITEM OR SERVICE: HCPCS | Performed by: INTERNAL MEDICINE

## 2017-05-10 PROCEDURE — 97116 GAIT TRAINING THERAPY: CPT

## 2017-05-10 PROCEDURE — 80053 COMPREHEN METABOLIC PANEL: CPT

## 2017-05-10 PROCEDURE — 700105 HCHG RX REV CODE 258: Performed by: INTERNAL MEDICINE

## 2017-05-10 PROCEDURE — 770009 HCHG ROOM/CARE - ONCOLOGY SEMI PRI*

## 2017-05-10 PROCEDURE — A9270 NON-COVERED ITEM OR SERVICE: HCPCS | Performed by: STUDENT IN AN ORGANIZED HEALTH CARE EDUCATION/TRAINING PROGRAM

## 2017-05-10 PROCEDURE — 700111 HCHG RX REV CODE 636 W/ 250 OVERRIDE (IP): Performed by: INTERNAL MEDICINE

## 2017-05-10 PROCEDURE — 700101 HCHG RX REV CODE 250: Performed by: STUDENT IN AN ORGANIZED HEALTH CARE EDUCATION/TRAINING PROGRAM

## 2017-05-10 PROCEDURE — 97535 SELF CARE MNGMENT TRAINING: CPT

## 2017-05-10 RX ORDER — ACYCLOVIR 800 MG/1
400 TABLET ORAL 2 TIMES DAILY
Status: DISCONTINUED | OUTPATIENT
Start: 2017-05-10 | End: 2017-06-15 | Stop reason: HOSPADM

## 2017-05-10 RX ORDER — LOPERAMIDE HYDROCHLORIDE 2 MG/1
2 CAPSULE ORAL 4 TIMES DAILY PRN
Status: DISCONTINUED | OUTPATIENT
Start: 2017-05-10 | End: 2017-05-15

## 2017-05-10 RX ADMIN — CARVEDILOL 3.12 MG: 3.12 TABLET, FILM COATED ORAL at 17:10

## 2017-05-10 RX ADMIN — TAMSULOSIN HYDROCHLORIDE 0.4 MG: 0.4 CAPSULE ORAL at 07:58

## 2017-05-10 RX ADMIN — NYSTATIN 500000 UNITS: 500000 SUSPENSION ORAL at 21:28

## 2017-05-10 RX ADMIN — ACYCLOVIR 800 MG: 800 TABLET ORAL at 07:58

## 2017-05-10 RX ADMIN — FLUCONAZOLE 200 MG: 100 TABLET ORAL at 07:58

## 2017-05-10 RX ADMIN — NYSTATIN 500000 UNITS: 500000 SUSPENSION ORAL at 13:43

## 2017-05-10 RX ADMIN — NYSTATIN 500000 UNITS: 500000 SUSPENSION ORAL at 07:58

## 2017-05-10 RX ADMIN — CEFEPIME 2 G: 2 INJECTION, POWDER, FOR SOLUTION INTRAMUSCULAR; INTRAVENOUS at 13:43

## 2017-05-10 RX ADMIN — ACYCLOVIR 400 MG: 800 TABLET ORAL at 21:26

## 2017-05-10 RX ADMIN — NYSTATIN 500000 UNITS: 500000 SUSPENSION ORAL at 17:10

## 2017-05-10 RX ADMIN — CEFEPIME 2 G: 2 INJECTION, POWDER, FOR SOLUTION INTRAMUSCULAR; INTRAVENOUS at 05:11

## 2017-05-10 RX ADMIN — LOPERAMIDE HYDROCHLORIDE 2 MG: 2 CAPSULE ORAL at 16:46

## 2017-05-10 RX ADMIN — LEVETIRACETAM 750 MG: 250 TABLET, FILM COATED ORAL at 21:25

## 2017-05-10 RX ADMIN — LIDOCAINE 1 PATCH: 50 PATCH CUTANEOUS at 05:12

## 2017-05-10 RX ADMIN — LEVETIRACETAM 750 MG: 250 TABLET, FILM COATED ORAL at 07:58

## 2017-05-10 RX ADMIN — CARVEDILOL 3.12 MG: 3.12 TABLET, FILM COATED ORAL at 07:58

## 2017-05-10 RX ADMIN — CEFEPIME 2 G: 2 INJECTION, POWDER, FOR SOLUTION INTRAMUSCULAR; INTRAVENOUS at 21:31

## 2017-05-10 ASSESSMENT — ENCOUNTER SYMPTOMS
HEADACHES: 0
NAUSEA: 0
VOMITING: 0
SHORTNESS OF BREATH: 0
NERVOUS/ANXIOUS: 0
FOCAL WEAKNESS: 0
DIZZINESS: 0
HEARTBURN: 0
DEPRESSION: 0
DIARRHEA: 1
MYALGIAS: 0
ABDOMINAL PAIN: 0
CHILLS: 0
FEVER: 0
PALPITATIONS: 0
BRUISES/BLEEDS EASILY: 0
NECK PAIN: 0
HEMOPTYSIS: 0
COUGH: 0
BLURRED VISION: 0

## 2017-05-10 ASSESSMENT — GAIT ASSESSMENTS
DISTANCE (FEET): 120
ASSISTIVE DEVICE: FRONT WHEEL WALKER
GAIT LEVEL OF ASSIST: CONTACT GUARD ASSIST
DEVIATION: DECREASED BASE OF SUPPORT;BRADYKINETIC

## 2017-05-10 ASSESSMENT — PAIN SCALES - GENERAL
PAINLEVEL_OUTOF10: 0

## 2017-05-10 NOTE — PROGRESS NOTES
[facility]    Name Benjamin Post       1945   Age/Sex 71 y.o. male   MRN [id number]   Code Status DNR     After 5PM or if no immediate response to page, please call for cross-coverage  Attending/Team: Dr. Chung/Ly Call [phone] to page   1st Call - Day Intern (R1):   Dr Méndez 2nd Call - Day Sr. Resident (R2/R3):            Chief complaint/ reason for interval visit (Primary Diagnosis)   CML/pancytopenia     Interval Problem Daily Status Update  :  On  chemotherapy synribo (today is 5).   Plt more than 10  and Hb more than 7, no fever but his diarrhea is better.   No changes on his meds today.         Principal Problem:    CML (chronic myelocytic leukemia)-Accelerated phase (Chronic) POA: Yes      Overview: multiple relapses.      On chemotherapy.   Active Problems:    Pancytopenia (CMS-HCC) (Chronic) POA: Yes     On cefepime.   Bone pain POA: Yes    Pain is controlled.            Review of Systems   Constitutional: Negative for fever and chills.   HENT: Negative for hearing loss.    Respiratory: Negative for cough and hemoptysis.    Cardiovascular: Negative for chest pain and palpitations.   Gastrointestinal: Positive for diarrhea. Negative for heartburn, nausea and abdominal pain.   Genitourinary: Negative for dysuria and urgency.   Musculoskeletal: Negative for myalgias and neck pain.   Skin: Negative for rash.   Neurological: Negative for dizziness and headaches.   Psychiatric/Behavioral: Negative for depression and suicidal ideas.       Consultants/Specialty  Oncology    Disposition  Home after chemotherapy     Quality Measures  EKG reviewed, Labs reviewed, Medications reviewed and Radiology images reviewed  Mahajan catheter: No Mahajan        DVT prophylaxis - mechanical: SCDs (low plt)                Physical Exam       Filed Vitals:    05/10/17 0019 05/10/17 0429 05/10/17 0754 05/10/17 0830  "  BP: 119/68 127/72 96/52    Pulse: 111 107 110    Temp: 37.2 °C (98.9 °F) 36.6 °C (97.8 °F) 37.2 °C (98.9 °F)    Resp: 18 18 17    Height:    1.702 m (5' 7\")   Weight:   69.4 kg (153 lb) 69.4 kg (153 lb)   SpO2: 98% 96% 98% 97%     Body mass index is 23.96 kg/(m^2). Weight: 69.4 kg (153 lb)  Oxygen Therapy:  Pulse Oximetry: 97 %, O2 (LPM): 0, O2 Delivery: None (Room Air)    Physical Exam   Constitutional: He is well-developed, well-nourished, and in no distress. No distress.   HENT:   Head: Normocephalic.   Neck: No tracheal deviation present. No thyromegaly present.   Cardiovascular: Normal rate.  Exam reveals no friction rub.    Murmur heard.  Pansystolic murmur on the apex    Pulmonary/Chest: Effort normal. No respiratory distress. He has no wheezes.   Abdominal: Soft. He exhibits no distension. There is no tenderness.   Musculoskeletal: He exhibits no edema.   Neurological: He is alert.   Skin: No erythema.         Lab Data Review:      5/4/2017  1:28 PM    Recent Labs      05/07/17 2342 05/08/17 2359 05/09/17 2357   SODIUM  128*  131*  133*   POTASSIUM  3.9  4.1  4.4   CHLORIDE  101  107  108   CO2  20  20  20   BUN  45*  34*  22   CREATININE  1.23  0.94  0.82   CALCIUM  9.3  9.1  8.7       Recent Labs      05/07/17 2342 05/08/17   2359 05/09/17   2357   ALTSGPT  89*  111*  129*   ASTSGOT  46*  61*  73*   ALKPHOSPHAT  179*  156*  141*   TBILIRUBIN  0.7  0.7  0.6   GLUCOSE  128*  109*  108*       Recent Labs      05/08/17 2359 05/09/17   0648  05/09/17 2357   RBC  2.34*  2.92*  2.64*   HEMOGLOBIN  6.8*  8.8*  7.8*   HEMATOCRIT  19.5*  24.3*  22.1*   PLATELETCT  23*  20*  15*       Recent Labs      05/07/17   2342   05/08/17   2359  05/09/17   0648  05/09/17   2357   WBC  0.4*   < >  0.3*  0.2*  0.2*   NEUTSPOLYS   --    --   52.40   --    --    LYMPHOCYTES   --    --   38.10   --    --    MONOCYTES   --    --   4.80   --    --    EOSINOPHILS   --    --   0.00   --    --    BASOPHILS   --    --  "  4.70*   --    --    ASTSGOT  46*   --   61*   --   73*   ALTSGPT  89*   --   111*   --   129*   ALKPHOSPHAT  179*   --   156*   --   141*   TBILIRUBIN  0.7   --   0.7   --   0.6    < > = values in this interval not displayed.           Assessment/Plan     * CML (chronic myelocytic leukemia)-Accelerated phase (present on admission)  Overview  multiple relapses.    Was treated on dasatinib for a time with response, but then relapse.    Was more recently on bosutinib with response, but again recent relapse.   WBC: 0.3>>0.2 Plt: 15  Hb:7.9>>8.8  Protein electrophoresis: the polyclonal increase in the gamma region which may be indicative of specific immune   response or an early monoclonal protein.     Assessment & Plan  Has completed multiple chemotherapy cycles.       Started on 5/6/17 with chemotherapy (synribo ) BID for 14 days, (today is 5).  Developed diarrhea on 5/9>>gets better.   Labs daily for follow up side effects.   FERNY to r/o MM  Appreciate oncology consult.     Pancytopenia (CMS-HCC) (present on admission)  Overview  - Pancytopenia secondary to CML and chemotherapy  - WBC: 0.5>>0.2  - Given one unit of platelets on 5/4/17.   - Anemia with Hb: 7.7>>7.6>>6.8>>7.8  - platelet: 11>>7>>28>>20>10>>23>>15  - Hx of Chronic Right Subdural Hematoma    Assessment & Plan  Transfer 1 unit irritated platelets on 4/30, 5/4/17 and 5/8/17  PRBC one unit on 4/30 and 5/8/17   Follow up and keep plt>10 and Hb>7.  Started with cefepim on 5/7/17 due to fever 100.4 and after Urine and blood culture were ordered.   Started with acyclovir and fluconazole on 5/9 for prophylaxis.     Neutropenia (CMS-HCC) (present on admission)  Overview  Chronic   WBC:0.4>>0.3>>0.2      Assessment & Plan  Fever 100.4 on 5/7 on the the day 2 of chemotherapy>>started cefepim.   On acyclovir and fluconazole for prophylactic.   Urine and blood culture pending.       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    - Pain is controlled, Patient feeling ok.    - Follow up and consider radiation therapy if needed.     Elevated PSA (present on admission)  Overview  PSA: 8.4 on 4/4/17    Assessment & Plan  - work up could be done as out patient.     Transaminitis (present on admission)  Overview  Related to chemo therapy.     Assessment & Plan  AST/ALT: 46/89>>61/111  Follow up.     BPH (benign prostatic hyperplasia) (present on admission)  Assessment & Plan  Stable.  Continue on tamsulosin 0.4 mg daily.     Low vitamin D level (present on admission)  Overview  Vit D: 11 on 4/29/17    Assessment & Plan  Continue supplementation.     Seizure disorder (CMS-HCC) (present on admission)  Assessment & Plan  Stable    Continue on his home dose of keppra

## 2017-05-10 NOTE — PROGRESS NOTES
Chemotherapy Verification - SECONDARY RN       Height = 170.2 cm  Weight = 80.3 kg  BSA = 1.95 m2       Medication: Omacetaxine  Dose: 1.25 mg/m2  Calculated Dose: 2.43 mg Ordered dose 2.4 mg                             (In mg/m2, AUC, mg/kg)     < 5 % difference              I confirm that this process was performed independently.

## 2017-05-10 NOTE — PROGRESS NOTES
Received report from NOC RN, assumed care of patient at 0700. Patient is awake alert and oriented x 4. He is up with stand by assistance, patient does not call for assistance but bed alarm is in place. Patient having frequent BM's, Patient had stool softeners two nights ago after being constipated. Patient denies pain. Chemo administered subQ. Patient updated on plan of care. Call light within reach, bed in low and locked position.

## 2017-05-10 NOTE — PROGRESS NOTES
Chemotherapy Verification - PRIMARY RN      Height = 170.2 cm Weight = 69.4 kg  BSA = 1.81        Medication: Synribo  Dose: 1.25 mg/m2  Calculated Dose: 2.26 mg                            (In mg/m2, AUC, mg/kg)     Re-weighed patient from earlier, will call Doctor for approval.               I confirm this process was performed independently with the BSA and all final chemotherapy dosing calculations congruent.  Any discrepancies of 5% or greater have been addressed with the chemotherapy pharmacist. The resolution of the discrepancy has been documented in the EPIC progress notes.

## 2017-05-10 NOTE — PROGRESS NOTES
Assumed care at 0700.   Received bedside report from day RN. Pt A/Ox4.   Pt denies pain.   Pt assessed, labs and notes reviewed.   Medicated per MAR.   SubQ chem verified and signed off by 2 chemo certified RNs.   POC discussed; pt agreeable to goals.    Pt board updated and pt informed of phone ext's, and hourly rounding.   Bed alarm is in use and sounding. Pt is up x1 and impulsive at times.   Pt needs are met at this time. Call light and phone within reach.

## 2017-05-10 NOTE — PROGRESS NOTES
Clarified order for chemo with Dr. Huertas via telephone. Ok per Silviano Whittaker to give chemo. Daily weights ordered from here on out. Chemo provided from supplier to family.

## 2017-05-10 NOTE — CARE PLAN
Problem: Pain Management  Goal: Pain level will decrease to patient’s comfort goal  Outcome: PROGRESSING AS EXPECTED  Will continue to have controlled pain levels and improved comfort levels        Problem: Infection  Goal: Will remain free from infection  Outcome: PROGRESSING AS EXPECTED  Will remain on neutropenic precautions until ANC is above 1.0. Will remain afebrile

## 2017-05-10 NOTE — CARE PLAN
Problem: Safety  Goal: Will remain free from injury  Outcome: PROGRESSING AS EXPECTED  Bed alarm in place, call light within reach. Patient does not call and he is up with stand by assistance.     Problem: Mobility  Goal: Risk for activity intolerance will decrease  Outcome: PROGRESSING AS EXPECTED  Patient ambulates well, needs some assistance.

## 2017-05-10 NOTE — THERAPY
"Pt agreeable , SBA-CGA for all transfers, Min-CGA for gait on FLS w/ Fww slow speed narrow AZUCENA with VCS for improved gait pattern, seated exs mentioned above w/ PY UIC post TX. Pt will benifit  from acute post acute rehab with focus on increasing aerobic capacity and endurance/strength training.Physical Therapy Treatment completed.   Bed Mobility:  Supine to Sit: Stand by Assist  Transfers: Sit to Stand: Stand by Assist  Gait: Level Of Assist: Contact Guard Assist with Front-Wheel Walker       Plan of Care: Will benefit from Physical Therapy 2 times per week  Discharge Recommendations: Equipment: Front-Wheel Walker. Post-acute therapy Discharge to home with outpatient or home health for additional skilled therapy services.     See \"Rehab Therapy-Acute\" Patient Summary Report for complete documentation.       "

## 2017-05-10 NOTE — PROGRESS NOTES
"Pharmacy Chemotherapy Verification    Patient Name: Benjamin Post   Dx: CML       Protocol: Synribo Induction  Omacetaxine (Synribo) 1.25 mg/m2 SQ BID for 14 days  NCCN Guidelines for Chronic Myelogenous Leukemia.V.1.2016  Brice J, et al. Blood. 2012 Sep 27;120(13):2573-80. Epub 2012 Aug 15.  Alfonso ROSADO et al. J Clin Oncol. 2011;30(15s):abstr 6513.    Allergies:  Review of patient's allergies indicates no known allergies.     BP 96/52 mmHg  Pulse 110  Temp(Src) 37.2 °C (98.9 °F)  Resp 17  Ht 1.702 m (5' 7\")  Wt 69.4 kg (153 lb)  BMI 23.96 kg/m2  SpO2 97% Body surface area is 1.81 meters squared.    5/9/17 @ 2357  ANC~ 0 Plt = 15k   Hgb = 7.8  SCr = 0.82mg/dL CrCl ~ 79mL/min   LFT's =73/129/141 TBili = 0.6     CML -  Treat through any counts, standing transfusion orders in place.  MD aware of all current lab results.        Drug Order   (Drug name, dose, route, IV Fluid & volume, frequency, number of doses) Cycle: 1 Day 5   Previous treatment: s/p dasatinib and bosutinib (last 4/26/17), both followed  by relapse     Medication = Omacetaxine  Base Dose= 1.25 mg/m2   Calc Dose: Base Dose x 1.81m2 = 2.26mg  Final Dose = 2.4 mg  Route = subq  Conc & Volume = 3.5 mg/mL = 0.69 mL  Admin Duration = to be given subcutaneously   PT SUPPLIED MEDICATION  q12h on days 1-14      >5% difference,   MD aware. Continue with baseline dose.       By my signature below, I confirm this process was performed independently with the BSA and all final chemotherapy dosing calculations congruent. I have reviewed the above chemotherapy order and that my calculation of the final dose and BSA (when applicable) corroborate those calculations of the  pharmacist. Discrepancies of 5% or greater in the written dose have been addressed and documented within the [facility] Progress notes.    Kermit SaezD.            "

## 2017-05-11 LAB
ALBUMIN SERPL BCP-MCNC: 2.7 G/DL (ref 3.2–4.9)
ALBUMIN SERPL-MCNC: 3.09 G/DL (ref 3.75–5.01)
ALBUMIN/GLOB SERPL: 0.9 G/DL
ALP SERPL-CCNC: 145 U/L (ref 30–99)
ALPHA1 GLOB SERPL ELPH-MCNC: 0.4 G/DL (ref 0.19–0.46)
ALPHA2 GLOB SERPL ELPH-MCNC: 0.56 G/DL (ref 0.48–1.05)
ALT SERPL-CCNC: 134 U/L (ref 2–50)
ANION GAP SERPL CALC-SCNC: 6 MMOL/L (ref 0–11.9)
AST SERPL-CCNC: 71 U/L (ref 12–45)
B-GLOBULIN SERPL ELPH-MCNC: 0.65 G/DL (ref 0.48–1.1)
BARCODED ABORH UBTYP: 5100
BARCODED ABORH UBTYP: 9500
BARCODED PRD CODE UBPRD: NORMAL
BARCODED PRD CODE UBPRD: NORMAL
BARCODED UNIT NUM UBUNT: NORMAL
BARCODED UNIT NUM UBUNT: NORMAL
BILIRUB SERPL-MCNC: 0.6 MG/DL (ref 0.1–1.5)
BUN SERPL-MCNC: 16 MG/DL (ref 8–22)
CALCIUM SERPL-MCNC: 8.5 MG/DL (ref 8.5–10.5)
CHLORIDE SERPL-SCNC: 106 MMOL/L (ref 96–112)
CO2 SERPL-SCNC: 18 MMOL/L (ref 20–33)
COMPONENT P 8504P: NORMAL
COMPONENT P 8504P: NORMAL
CREAT SERPL-MCNC: 0.69 MG/DL (ref 0.5–1.4)
ERYTHROCYTE [DISTWIDTH] IN BLOOD BY AUTOMATED COUNT: 40.1 FL (ref 35.9–50)
GAMMA GLOB SERPL ELPH-MCNC: 1.4 G/DL (ref 0.62–1.51)
GFR SERPL CREATININE-BSD FRML MDRD: >60 ML/MIN/1.73 M 2
GLOBULIN SER CALC-MCNC: 3 G/DL (ref 1.9–3.5)
GLUCOSE SERPL-MCNC: 113 MG/DL (ref 65–99)
HCT VFR BLD AUTO: 20.6 % (ref 42–52)
HGB BLD-MCNC: 7.5 G/DL (ref 14–18)
IGA SERPL-MCNC: 276 MG/DL (ref 68–408)
IGG SERPL-MCNC: 1430 MG/DL (ref 768–1632)
IGM SERPL-MCNC: 275 MG/DL (ref 35–263)
INTERPRETATION SERPL IFE-IMP: ABNORMAL
INTERPRETATION SERPL IFE-IMP: ABNORMAL
MCH RBC QN AUTO: 29.6 PG (ref 27–33)
MCHC RBC AUTO-ENTMCNC: 36.4 G/DL (ref 33.7–35.3)
MCV RBC AUTO: 81.4 FL (ref 81.4–97.8)
NEUTROPHILS # BLD AUTO: ABNORMAL K/UL (ref 1.82–7.42)
NRBC # BLD AUTO: 0 K/UL
NRBC BLD AUTO-RTO: 0 /100 WBC
PATHOLOGY STUDY: ABNORMAL
PLATELET # BLD AUTO: 8 K/UL (ref 164–446)
PMV BLD AUTO: 8.8 FL (ref 9–12.9)
POTASSIUM SERPL-SCNC: 3.8 MMOL/L (ref 3.6–5.5)
PRODUCT TYPE UPROD: NORMAL
PRODUCT TYPE UPROD: NORMAL
PROT SERPL-MCNC: 5.7 G/DL (ref 6–8.2)
PROT SERPL-MCNC: 6.1 G/DL (ref 6–8.3)
RBC # BLD AUTO: 2.53 M/UL (ref 4.7–6.1)
SODIUM SERPL-SCNC: 130 MMOL/L (ref 135–145)
UNIT STATUS USTAT: NORMAL
UNIT STATUS USTAT: NORMAL
WBC # BLD AUTO: 0.2 K/UL (ref 4.8–10.8)

## 2017-05-11 PROCEDURE — A9270 NON-COVERED ITEM OR SERVICE: HCPCS | Performed by: STUDENT IN AN ORGANIZED HEALTH CARE EDUCATION/TRAINING PROGRAM

## 2017-05-11 PROCEDURE — 700102 HCHG RX REV CODE 250 W/ 637 OVERRIDE(OP): Performed by: INTERNAL MEDICINE

## 2017-05-11 PROCEDURE — P9034 PLATELETS, PHERESIS: HCPCS

## 2017-05-11 PROCEDURE — 86644 CMV ANTIBODY: CPT

## 2017-05-11 PROCEDURE — 36415 COLL VENOUS BLD VENIPUNCTURE: CPT

## 2017-05-11 PROCEDURE — 700101 HCHG RX REV CODE 250: Performed by: STUDENT IN AN ORGANIZED HEALTH CARE EDUCATION/TRAINING PROGRAM

## 2017-05-11 PROCEDURE — 700105 HCHG RX REV CODE 258: Performed by: INTERNAL MEDICINE

## 2017-05-11 PROCEDURE — 700111 HCHG RX REV CODE 636 W/ 250 OVERRIDE (IP): Performed by: INTERNAL MEDICINE

## 2017-05-11 PROCEDURE — 36430 TRANSFUSION BLD/BLD COMPNT: CPT

## 2017-05-11 PROCEDURE — A9270 NON-COVERED ITEM OR SERVICE: HCPCS | Performed by: INTERNAL MEDICINE

## 2017-05-11 PROCEDURE — 700102 HCHG RX REV CODE 250 W/ 637 OVERRIDE(OP): Performed by: STUDENT IN AN ORGANIZED HEALTH CARE EDUCATION/TRAINING PROGRAM

## 2017-05-11 PROCEDURE — 85025 COMPLETE CBC W/AUTO DIFF WBC: CPT

## 2017-05-11 PROCEDURE — 80053 COMPREHEN METABOLIC PANEL: CPT

## 2017-05-11 PROCEDURE — 99232 SBSQ HOSP IP/OBS MODERATE 35: CPT | Mod: GC | Performed by: INTERNAL MEDICINE

## 2017-05-11 PROCEDURE — 770009 HCHG ROOM/CARE - ONCOLOGY SEMI PRI*

## 2017-05-11 RX ADMIN — CEFEPIME 2 G: 2 INJECTION, POWDER, FOR SOLUTION INTRAMUSCULAR; INTRAVENOUS at 05:05

## 2017-05-11 RX ADMIN — ACETAMINOPHEN 650 MG: 325 TABLET, FILM COATED ORAL at 09:07

## 2017-05-11 RX ADMIN — ACYCLOVIR 400 MG: 800 TABLET ORAL at 08:39

## 2017-05-11 RX ADMIN — CEFEPIME 2 G: 2 INJECTION, POWDER, FOR SOLUTION INTRAMUSCULAR; INTRAVENOUS at 22:03

## 2017-05-11 RX ADMIN — NYSTATIN 500000 UNITS: 500000 SUSPENSION ORAL at 17:07

## 2017-05-11 RX ADMIN — TAMSULOSIN HYDROCHLORIDE 0.4 MG: 0.4 CAPSULE ORAL at 08:38

## 2017-05-11 RX ADMIN — SODIUM CHLORIDE: 9 INJECTION, SOLUTION INTRAVENOUS at 05:05

## 2017-05-11 RX ADMIN — NYSTATIN 500000 UNITS: 500000 SUSPENSION ORAL at 09:00

## 2017-05-11 RX ADMIN — LEVETIRACETAM 750 MG: 250 TABLET, FILM COATED ORAL at 08:38

## 2017-05-11 RX ADMIN — LEVETIRACETAM 750 MG: 250 TABLET, FILM COATED ORAL at 20:44

## 2017-05-11 RX ADMIN — CARVEDILOL 3.12 MG: 3.12 TABLET, FILM COATED ORAL at 17:07

## 2017-05-11 RX ADMIN — NYSTATIN 500000 UNITS: 500000 SUSPENSION ORAL at 20:45

## 2017-05-11 RX ADMIN — SODIUM CHLORIDE: 9 INJECTION, SOLUTION INTRAVENOUS at 17:33

## 2017-05-11 RX ADMIN — CEFEPIME 2 G: 2 INJECTION, POWDER, FOR SOLUTION INTRAMUSCULAR; INTRAVENOUS at 13:50

## 2017-05-11 RX ADMIN — FLUCONAZOLE 200 MG: 100 TABLET ORAL at 08:39

## 2017-05-11 RX ADMIN — LIDOCAINE 1 PATCH: 50 PATCH CUTANEOUS at 05:05

## 2017-05-11 RX ADMIN — CARVEDILOL 3.12 MG: 3.12 TABLET, FILM COATED ORAL at 08:39

## 2017-05-11 RX ADMIN — NYSTATIN 500000 UNITS: 500000 SUSPENSION ORAL at 13:50

## 2017-05-11 RX ADMIN — ACYCLOVIR 400 MG: 800 TABLET ORAL at 20:44

## 2017-05-11 RX ADMIN — DIPHENHYDRAMINE HCL 25 MG: 25 TABLET ORAL at 09:07

## 2017-05-11 ASSESSMENT — ENCOUNTER SYMPTOMS
SHORTNESS OF BREATH: 0
FOCAL WEAKNESS: 0
NAUSEA: 0
PALPITATIONS: 0
FEVER: 0
HEARTBURN: 0
DIZZINESS: 0
DEPRESSION: 0
ABDOMINAL PAIN: 0
CHILLS: 0
NERVOUS/ANXIOUS: 0
NECK PAIN: 0
BRUISES/BLEEDS EASILY: 0
COUGH: 0
HEADACHES: 0
SORE THROAT: 0
MYALGIAS: 0
BACK PAIN: 0
CHILLS: 1
BLURRED VISION: 0
VOMITING: 0
DIARRHEA: 1
WEAKNESS: 1
HEMOPTYSIS: 0

## 2017-05-11 ASSESSMENT — PAIN SCALES - GENERAL
PAINLEVEL_OUTOF10: 0

## 2017-05-11 NOTE — PROGRESS NOTES
Received report from day RN. POC discussed with patient, pt verbalizes understanding and agreement.  A&Ox4. Pt is up with 1x assist, he does not call for help. His room is near the nurses station. Urinal is at bedside. No active signs of bleeding. SQ chemo injection given this evening per orders. Pt is doing his saline rinses. PIV patent. Denies c/o pain. Ate a small snack this evening. Chemo precautions in place. Neutropenic precautions in place. Low grade temps this evening. Temperature turned down in room. Pt is incontinent at times of urine. No BMs yet this shift. Bed alarm on, call light in reach, bed in low position, Q2 hr rounding.

## 2017-05-11 NOTE — PROGRESS NOTES
Chemotherapy Verification - SECONDARY RN       Height = 170.2 cm  Weight = 70.3kg  BSA = 1.82      Medication: Synribo  Dose: 1.25 mg/m2  Calculated Dose: 2.28 mg                            (In mg/m2, AUC, mg/kg)     Ordered dose:  2.4 mg    Dose within 5%  Confirm with oncologist before administering chemo.       I confirm that this process was performed independently.

## 2017-05-11 NOTE — CARE PLAN
Problem: Infection  Goal: Will remain free from infection  Outcome: PROGRESSING AS EXPECTED  Intervention: Assess signs and symptoms of infection  Pt remains free from s/s of infection.  On IV antiviotics.  Q4 VS  Oral temps only  Intervention: Implement standard precautions and perform hand washing before and after patient contact  Good hand hygiene in place.      Problem: Bowel/Gastric:  Goal: Normal bowel function is maintained or improved  Outcome: PROGRESSING AS EXPECTED  Intervention: Educate patient and significant other/support system about diet, fluid intake, medications and activity to promote bowel function  No BMs yet this shift.  Incontinence of stool at times.  Good PO intake

## 2017-05-11 NOTE — PROGRESS NOTES
Met with patient's daughter Gabrielle on 5/9.  The patient currently resides with this daughter and her family. Explained her father's wish to be a DNR and what this means.  Education provided in regards to continuing with current treatment, but should he become critically ill needing CPR and/or ventilator support, this would not be done as per his wish.  She verbalized understanding and wants to do whatever her father wants with regard to his medical condition and will honor his wishes.  Emotional support provided.  Continue to follow.

## 2017-05-11 NOTE — CARE PLAN
Problem: Pain Management  Goal: Pain level will decrease to patient’s comfort goal  Outcome: PROGRESSING AS EXPECTED  Patient declines pain this morning, will continue to assess.     Problem: Bowel/Gastric:  Goal: Normal bowel function is maintained or improved  Outcome: PROGRESSING AS EXPECTED  Less frequent BM. Will assess for need of immodium.

## 2017-05-11 NOTE — PROGRESS NOTES
Lainey from Lab called with critical result of WBC of 0.2 and platelets of 8 at 0042. Critical lab result read back to Lainey.   This critical lab result is within parameters established by [facility] for this patient

## 2017-05-11 NOTE — PROGRESS NOTES
Oncology/Hematology Progress Note               Author: Mira Huertas    Cc:  CML Date & Time created: 5/10/2017  6:43 PM     Interval History:  Doing OK.  Biggest problem today is diarrhea, perhaps 6 loose stools.  No fevers, headache, chest pain, shortness of breath, cough, abdominal pain, or bleeidng anywhere.    PMHx, PSurgHx, SocHx, FAMHx:  All reviewed and unchanged    Review of Systems:  Review of Systems   Constitutional: Positive for malaise/fatigue. Negative for fever and chills.   HENT: Negative for nosebleeds.    Eyes: Negative for blurred vision.   Respiratory: Negative for cough and shortness of breath.    Cardiovascular: Negative for chest pain.   Gastrointestinal: Positive for diarrhea. Negative for nausea, vomiting and abdominal pain.   Genitourinary: Negative for dysuria.   Skin: Negative for rash.   Neurological: Negative for focal weakness and headaches.   Endo/Heme/Allergies: Does not bruise/bleed easily.   Psychiatric/Behavioral: The patient is not nervous/anxious.        Physical Exam:  Physical Exam   Constitutional: He is oriented to person, place, and time. He appears well-developed. No distress.   thin   HENT:   thrush on tongue   Eyes: Conjunctivae are normal. No scleral icterus.   Neck: Neck supple.   Cardiovascular: Normal rate and regular rhythm.  Exam reveals no gallop and no friction rub.    Murmur (II/VI throughout, best at RUSB) heard.  Pulmonary/Chest: Effort normal and breath sounds normal. He has no wheezes. He has no rales.   Abdominal: Soft. Bowel sounds are normal. He exhibits distension (mild). There is no tenderness. There is no rebound.   Musculoskeletal: He exhibits edema (trace bilateral symmetric).   Lymphadenopathy:     He has no cervical adenopathy.   Neurological: He is alert and oriented to person, place, and time.   Skin: Skin is warm and dry. He is not diaphoretic.   Psychiatric: He has a normal mood and affect. His behavior is normal.       Labs:         Invalid input(s): [id number]      Recent Labs      17   SODIUM  128*  131*  133*   POTASSIUM  3.9  4.1  4.4   CHLORIDE  101  107  108   CO2  20  20  20   BUN  45*  34*  22   CREATININE  1.23  0.94  0.82   CALCIUM  9.3  9.1  8.7     Recent Labs      17   ALTSGPT  89*  111*  129*   ASTSGOT  46*  61*  73*   ALKPHOSPHAT  179*  156*  141*   TBILIRUBIN  0.7  0.7  0.6   GLUCOSE  128*  109*  108*     Recent Labs      17   RBC  2.34*  2.92*  2.64*   HEMOGLOBIN  6.8*  8.8*  7.8*   HEMATOCRIT  19.5*  24.3*  22.1*   PLATELETCT  23*  20*  15*     Recent Labs      17   WBC  0.4*   < >  0.3*  0.2*  0.2*   NEUTSPOLYS   --    --   52.40   --    --    LYMPHOCYTES   --    --   38.10   --    --    MONOCYTES   --    --   4.80   --    --    EOSINOPHILS   --    --   0.00   --    --    BASOPHILS   --    --   4.70*   --    --    ASTSGOT  46*   --   61*   --   73*   ALTSGPT  89*   --   111*   --   129*   ALKPHOSPHAT  179*   --   156*   --   141*   TBILIRUBIN  0.7   --   0.7   --   0.6    < > = values in this interval not displayed.     Recent Labs      17   SODIUM  128*  131*  133*   POTASSIUM  3.9  4.1  4.4   CHLORIDE  101  107  108   CO2  20  20  20   GLUCOSE  128*  109*  108*   BUN  45*  34*  22   CREATININE  1.23  0.94  0.82   CALCIUM  9.3  9.1  8.7     Hemodynamics:  Temp (24hrs), Av.9 °C (98.5 °F), Min:36.6 °C (97.8 °F), Max:37.2 °C (98.9 °F)  Temperature: 36.7 °C (98.1 °F)  Pulse  Av.3  Min: 60  Max: 116   Blood Pressure : 119/65 mmHg     Respiratory:    Respiration: 17, Pulse Oximetry: 97 %     Work Of Breathing / Effort: Mild  RUL Breath Sounds: Clear, RML Breath Sounds: Clear, RLL Breath Sounds: Clear, WAGNER Breath Sounds: Clear, LLL Breath Sounds:  Clear  Fluids:    Intake/Output Summary (Last 24 hours) at 05/01/17 1409  Last data filed at 05/01/17 0838   Gross per 24 hour   Intake   1006 ml   Output    575 ml   Net    431 ml     Weight: 69.4 kg (153 lb)  GI/Nutrition:  Orders Placed This Encounter   Procedures   • Diet Order     Standing Status: Standing      Number of Occurrences: 1      Standing Expiration Date:      Order Specific Question:  Diet:     Answer:  2 Gram Sodium [7]     Order Specific Question:  Texture/Fiber modifications:     Answer:  Dysphagia 2(Pureed/Chopped)specify fluid consistency(question 6) [2]      Comments:  thins ok; pt with no teeth/ nor dentures     Medical Decision Making, by Problem:  Active Hospital Problems    Diagnosis   • Neutropenia (CMS-HCC) [D70.9]   • Pancytopenia (CMS-HCC) [D61.818]   • Acute bilateral low back pain without sciatica [M54.5]   • Bone pain [M89.8X9]   • Transaminitis [R74.0]   • 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  Was treated on dasatinib for a time with response, but then relapse.  Was more recently on bosutinib with response, but again recent relapse.  Sounds like this medicine was stopped by Dr. Vu on 4/26/17.  The plan has been for allo-BMT at [facility], but they need him to get some kind of response to chemo before they can do the transplant.   The plan is to try Synribo.  He got this in the mail, and is on his own home supply while he is monitored here for side effects.  Synribo is given as a SQ shot BID x 14 days with the first cycle, followed by 2 weeks off.  Started on 5/6/17.        Synribo is a high risk medication at this point.  There is a 's warning for cerebral bleeding, and to avoid NSAIDS, blood thinners, etc as well as to use with caution in patients with Platelets<50K.  He has a h/o of a SDH in the recent past.   -- Day 5 of synribo  -- tolerating with main toxicity of diarrhea  --  monitor for Headaches or AMS changes    -- keep in hospital for first 14 days of the treatment, to monitor for side effects, and monitor counts      2.  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  Not likely to improve unless he can get response to some kind of chemo.  Will transfuse as needed.  Transfuse platelets to keep >10, or higher if bleeding.    3.  Neutropenia--nothing documented in chart but as per Dr. Agustin's note, had fever late in day on 5/7.  BCx and CXR negative, will monitor.  Prophylactic antibiotics will be added    4.  Elevated LFT's-- related to past chemo.  Monitor.    5.  8th rib lesion-- he seems to indicate that he is not in much pain now.  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  If pain continues to be an issue, could consult rad onc to radiate this spot, which would likely provide great pain relief.    6. Anemia-- transfuse 1 unit of PRBC's if <7.5    7.  Diarrhea.  A common side effect of Synribo. He was started on immodium on 5/10/17.        Discussed with patient and primary hospitalist team.     Highly complex patient with high risk of morbidity and mortality.     Labs reviewed and Medications reviewed  Mahajan catheter: No Mahajan      DVT Prophylaxis: Contraindicated - High bleeding risk

## 2017-05-11 NOTE — PROGRESS NOTES
Oncology/Hematology Progress Note               Author: Mira Huertas    Cc:  CML Date & Time created: 5/11/2017  4:49 PM     Interval History:  Doing OK.  Reports that his main problem is that he is cold, has been cold all day.  No headache, shore throat, chest pain,shortness of breath, cough, or abdominal pain.  Diarrhea is better since starting immodium yesterday.  No bleeding.  Missed AM dose of Synribo because it wasn't brought from home until after noon.      PMHx, PSurgHx, SocHx, FAMHx:  All reviewed and unchanged    Review of Systems:  Review of Systems   Constitutional: Positive for chills and malaise/fatigue. Negative for fever.   HENT: Negative for nosebleeds and sore throat.    Eyes: Negative for blurred vision.   Respiratory: Negative for cough and shortness of breath.    Cardiovascular: Negative for chest pain and leg swelling.   Gastrointestinal: Positive for diarrhea. Negative for nausea, vomiting and abdominal pain.   Genitourinary: Negative for dysuria.   Musculoskeletal: Negative for back pain.   Skin: Negative for rash.   Neurological: Positive for weakness. Negative for focal weakness and headaches.   Endo/Heme/Allergies: Does not bruise/bleed easily.   Psychiatric/Behavioral: The patient is not nervous/anxious.        Physical Exam:  Physical Exam   Constitutional: He is oriented to person, place, and time. He appears well-developed. No distress.   thin   HENT:   thrush on tongue   Eyes: Conjunctivae are normal. No scleral icterus.   Neck: Neck supple.   Cardiovascular: Normal rate and regular rhythm.  Exam reveals no gallop and no friction rub.    Murmur (II/VI throughout, best at RUSB) heard.  Pulmonary/Chest: Effort normal and breath sounds normal. He has no wheezes. He has no rales.   Abdominal: Soft. Bowel sounds are normal. He exhibits no distension. There is no tenderness. There is no rebound.   Musculoskeletal: He exhibits no edema.   Lymphadenopathy:     He has no cervical  adenopathy.   Neurological: He is alert and oriented to person, place, and time.   Skin: Skin is warm and dry. He is not diaphoretic.   Psychiatric: He has a normal mood and affect. His behavior is normal.   Vitals reviewed.      Labs:        Invalid input(s): [id number]      Recent Labs      05/08/17   2359  05/09/17   2357  05/10/17   2347   SODIUM  131*  133*  130*   POTASSIUM  4.1  4.4  3.8   CHLORIDE  107  108  106   CO2  20  20  18*   BUN  34*  22  16   CREATININE  0.94  0.82  0.69   CALCIUM  9.1  8.7  8.5     Recent Labs      05/08/17   2359  05/09/17   2357  05/10/17   2347   ALTSGPT  111*  129*  134*   ASTSGOT  61*  73*  71*   ALKPHOSPHAT  156*  141*  145*   TBILIRUBIN  0.7  0.6  0.6   GLUCOSE  109*  108*  113*     Recent Labs      05/09/17   0648  05/09/17   2357  05/10/17   2347   RBC  2.92*  2.64*  2.53*   HEMOGLOBIN  8.8*  7.8*  7.5*   HEMATOCRIT  24.3*  22.1*  20.6*   PLATELETCT  20*  15*  8*     Recent Labs      05/08/17   2359  05/09/17   0648  05/09/17   2357  05/10/17   2347   WBC  0.3*  0.2*  0.2*  0.2*   NEUTSPOLYS  52.40   --    --    --    LYMPHOCYTES  38.10   --    --    --    MONOCYTES  4.80   --    --    --    EOSINOPHILS  0.00   --    --    --    BASOPHILS  4.70*   --    --    --    ASTSGOT  61*   --   73*  71*   ALTSGPT  111*   --   129*  134*   ALKPHOSPHAT  156*   --   141*  145*   TBILIRUBIN  0.7   --   0.6  0.6     Recent Labs      05/08/17   2359  05/09/17   2357  05/10/17   2347   SODIUM  131*  133*  130*   POTASSIUM  4.1  4.4  3.8   CHLORIDE  107  108  106   CO2  20  20  18*   GLUCOSE  109*  108*  113*   BUN  34*  22  16   CREATININE  0.94  0.82  0.69   CALCIUM  9.1  8.7  8.5     Hemodynamics:  Temp (24hrs), Av.2 °C (98.9 °F), Min:36.8 °C (98.3 °F), Max:37.4 °C (99.4 °F)  Temperature: 37.1 °C (98.8 °F)  Pulse  Av.3  Min: 60  Max: 118   Blood Pressure : 124/70 mmHg     Respiratory:    Respiration: 17, Pulse Oximetry: 99 %     Work Of Breathing / Effort: Mild  RUL Breath  Sounds: Clear, RML Breath Sounds: Clear, RLL Breath Sounds: Clear, WAGNER Breath Sounds: Clear, LLL Breath Sounds: Clear  Fluids:    Intake/Output Summary (Last 24 hours) at 05/01/17 1409  Last data filed at 05/01/17 0838   Gross per 24 hour   Intake   1006 ml   Output    575 ml   Net    431 ml     Weight: 70.3 kg (154 lb 15.7 oz)  GI/Nutrition:  Orders Placed This Encounter   Procedures   • Diet Order     Standing Status: Standing      Number of Occurrences: 1      Standing Expiration Date:      Order Specific Question:  Diet:     Answer:  2 Gram Sodium [7]     Order Specific Question:  Texture/Fiber modifications:     Answer:  Dysphagia 2(Pureed/Chopped)specify fluid consistency(question 6) [2]      Comments:  thins ok; pt with no teeth/ nor dentures     Medical Decision Making, by Problem:  Active Hospital Problems    Diagnosis   • Neutropenia (CMS-HCC) [D70.9]   • Pancytopenia (CMS-HCC) [D61.818]   • Acute bilateral low back pain without sciatica [M54.5]   • Bone pain [M89.8X9]   • Transaminitis [R74.0]   • 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  Was treated on dasatinib for a time with response, but then relapse.  Was more recently on bosutinib with response, but again recent relapse.  Sounds like this medicine was stopped by Dr. Vu on 4/26/17.  The plan has been for [facility]BMT at [facility], but they need him to get some kind of response to chemo before they can do the transplant.   The plan is to try Synribo.  He got this in the mail, and is on his own home supply while he is monitored here for side effects.  Synribo is given as a SQ shot BID x 14 days with the first cycle, followed by 2 weeks off.  Started on 5/6/17.        Synribo is a high risk medication.  There is a 's warning for cerebral bleeding, and to avoid NSAIDS, blood thinners, etc as well as to use with caution in patients with Platelets<50K.  He has a  h/o of a SDH in the recent past.   -- Day 6 of synribo - missed AM dose today as it arrived from patient's home late and he missed doses are not to be made up as per package instructions.   -- tolerating with main toxicity of diarrhea  -- monitor for Headaches or AMS changes    -- keep in hospital for first 14 days of the treatment, to monitor for side effects, and monitor counts      2.  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  Not likely to improve unless he can get response to some kind of chemo.  Will transfuse as needed to keep >10, or higher if bleeding.    3.  Neutropenia--nothing documented in chart but as per Dr. Agustin's note, had fever late in day on 5/7.  BCx and CXR negative, will monitor.  Prophylactic antibiotics will be added    4.  Elevated LFT's-- related to past chemo.  Monitor.    5.  8th rib lesion-- he seems to indicate that he is not in much pain now.  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  If pain continues to be an issue, could consult rad onc to radiate this spot, which would likely provide great pain relief.    6. Anemia-- transfuse 1 unit of PRBC's if <7.5    7.  Diarrhea.  A common side effect of Synribo. He was started on immodium on 5/10/17 with improvement.       Discussed with patient and RN.     Highly complex patient with high risk of morbidity and mortality.     Labs reviewed and Medications reviewed  Mahajan catheter: No Mahajan      DVT Prophylaxis: Contraindicated - High bleeding risk

## 2017-05-11 NOTE — PROGRESS NOTES
[facility] [place] [facility] Interval Note    Name Benjamin Post       1945   Age/Sex 71 y.o. male   MRN [id number]   Code Status DNR     After 5PM or if no immediate response to page, please call for cross-coverage  Attending/Team: Dr. Chung/Ly Call [phone] to page   1st Call - Day Intern (R1):   Dr Méndez 2nd Call - Day Sr. Resident (R2/R3):            Chief complaint/ reason for interval visit (Primary Diagnosis)   CML/pancytopenia     Interval Problem Daily Status Update  :  On  chemotherapy synribo (today is 6).   Plt is 7 today>>>plt transfusion.   No fever and diarrhea gets better with loperamide.    He lost more than 10 Kg last month>>adjust his meds according to his new weight.   Chemotherapy dose according to oncologist.       Principal Problem:    CML (chronic myelocytic leukemia)-Accelerated phase (Chronic) POA: Yes      Overview: multiple relapses.      On chemotherapy.   Active Problems:    Pancytopenia (CMS-HCC) (Chronic) POA: Yes     On cefepime.   Bone pain POA: Yes    Pain is controlled.            Review of Systems   Constitutional: Negative for fever and chills.   HENT: Negative for hearing loss.    Respiratory: Negative for cough and hemoptysis.    Cardiovascular: Negative for chest pain and palpitations.   Gastrointestinal: Positive for diarrhea. Negative for heartburn, nausea and abdominal pain.   Genitourinary: Negative for dysuria and urgency.   Musculoskeletal: Negative for myalgias and neck pain.   Skin: Negative for rash.   Neurological: Negative for dizziness and headaches.   Psychiatric/Behavioral: Negative for depression and suicidal ideas.       Consultants/Specialty  Oncology    Disposition  Home after chemotherapy     Quality Measures  EKG reviewed, Labs reviewed, Medications reviewed and Radiology images reviewed  Mahajan catheter: No Mahajan        DVT prophylaxis - mechanical: SCDs (low  plt)                Physical Exam       Filed Vitals:    05/10/17 2000 05/11/17 0000 05/11/17 0400 05/11/17 0500   BP: 101/56 117/68 104/57    Pulse: 107 109 118    Temp: 37.4 °C (99.4 °F) 37.1 °C (98.7 °F) 36.8 °C (98.3 °F)    Resp: 16 16 16    Height:       Weight:    70.3 kg (154 lb 15.7 oz)   SpO2: 98% 98% 99%      Body mass index is 24.27 kg/(m^2). Weight: 70.3 kg (154 lb 15.7 oz)  Oxygen Therapy:  Pulse Oximetry: 99 %, O2 (LPM): 0, O2 Delivery: None (Room Air)    Physical Exam   Constitutional: He is well-developed, well-nourished, and in no distress. No distress.   HENT:   Head: Normocephalic.   Neck: No tracheal deviation present. No thyromegaly present.   Cardiovascular: Normal rate.  Exam reveals no friction rub.    Murmur heard.  Pansystolic murmur on the apex    Pulmonary/Chest: Effort normal. No respiratory distress. He has no wheezes.   Abdominal: Soft. He exhibits no distension. There is no tenderness.   Musculoskeletal: He exhibits no edema.   Neurological: He is alert.   Skin: No erythema.         Lab Data Review:      5/4/2017  1:28 PM    Recent Labs      05/08/17   2359  05/09/17   2357  05/10/17   2347   SODIUM  131*  133*  130*   POTASSIUM  4.1  4.4  3.8   CHLORIDE  107  108  106   CO2  20  20  18*   BUN  34*  22  16   CREATININE  0.94  0.82  0.69   CALCIUM  9.1  8.7  8.5       Recent Labs      05/08/17   2359 05/09/17   2357  05/10/17   2347   ALTSGPT  111*  129*  134*   ASTSGOT  61*  73*  71*   ALKPHOSPHAT  156*  141*  145*   TBILIRUBIN  0.7  0.6  0.6   GLUCOSE  109*  108*  113*       Recent Labs      05/09/17   0648  05/09/17   2357  05/10/17   2347   RBC  2.92*  2.64*  2.53*   HEMOGLOBIN  8.8*  7.8*  7.5*   HEMATOCRIT  24.3*  22.1*  20.6*   PLATELETCT  20*  15*  8*       Recent Labs      05/08/17 2359 05/09/17   0648  05/09/17   2357  05/10/17   2347   WBC  0.3*  0.2*  0.2*  0.2*   NEUTSPOLYS  52.40   --    --    --    LYMPHOCYTES  38.10   --    --    --    MONOCYTES  4.80   --    --     --    EOSINOPHILS  0.00   --    --    --    BASOPHILS  4.70*   --    --    --    ASTSGOT  61*   --   73*  71*   ALTSGPT  111*   --   129*  134*   ALKPHOSPHAT  156*   --   141*  145*   TBILIRUBIN  0.7   --   0.6  0.6           Assessment/Plan     * CML (chronic myelocytic leukemia)-Accelerated phase (present on admission)  Overview  multiple relapses.    Was treated on dasatinib for a time with response, but then relapse.    Was more recently on bosutinib with response, but again recent relapse.   WBC: 0.3>>0.2 Plt: 15>>7  Hb:7.9>>8.8>>7.2  Protein electrophoresis: the polyclonal increase in the gamma region which may be indicative of specific immune   response or an early monoclonal protein.     Assessment & Plan  Has completed multiple chemotherapy cycles.       Started on 5/6/17 with chemotherapy (synribo ) BID for 14 days, (today is 6).  Developed diarrhea on 5/9>>gets better with loperamide.   Labs daily for follow up side effects.   FERNY high IgM.  Appreciate oncology consult.     Pancytopenia (CMS-HCC) (present on admission)  Overview  - Pancytopenia secondary to CML and chemotherapy  - WBC: 0.5>>0.2  - Given one unit of platelets on 5/4/17.   - Anemia with Hb: 7.7>>7.6>>6.8>>7.8  - platelet: 11>>7>>28>>20>10>>23>>15>>7  - Hx of Chronic Right Subdural Hematoma    Assessment & Plan  Transfer 1 unit irritated platelets on 4/30, 5/4/17,  5/8/17 and 5/11  PRBC one unit on 4/30 and 5/8/17   Follow up and keep plt>10 and Hb>7.  Started with cefepim on 5/7/17 due to fever 100.4 and after Urine and blood culture were ordered.   Started with acyclovir and fluconazole on 5/9 for prophylaxis.     Neutropenia (CMS-HCC) (present on admission)  Overview  Chronic   WBC:0.4>>0.3>>0.2      Assessment & Plan  Fever 100.4 on 5/7 on the the day 2 of chemotherapy>>started cefepim.   On acyclovir and fluconazole for prophylactic.   Urine and blood culture negative till now.     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    - Pain is controlled, Patient feeling ok.    - Follow up and consider radiation therapy if needed.     Elevated PSA (present on admission)  Overview  PSA: 8.4 on 4/4/17    Assessment & Plan  - work up could be done as out patient.     Transaminitis (present on admission)  Overview  Related to chemo therapy.     Assessment & Plan  AST/ALT: 46/89>>61/111>>71/134  Follow up.     BPH (benign prostatic hyperplasia) (present on admission)  Assessment & Plan  Stable.  Continue on tamsulosin 0.4 mg daily.     Low vitamin D level (present on admission)  Overview  Vit D: 11 on 4/29/17    Assessment & Plan  Continue supplementation.     Seizure disorder (CMS-HCC) (present on admission)  Assessment & Plan  Stable    Continue on his home dose of keppra

## 2017-05-11 NOTE — PROGRESS NOTES
Received report from NOC RN, assumed care of patient at 0700. Patient is awake alert and oriented x 4. Patient primarily Albanian speaking. Patient medicated according to MAR. Platelets at 8, order to transfuse under 10. Platelets transfusing at this time. Decreased bowel movements. Patient declines pain this morning. Chemo to be delivered by daughter this AM. POC discussed and patient agreeable. Patient up without calling, bed alarms in place. Patient in room near nursing station. All other needs are met at this time.

## 2017-05-11 NOTE — PROGRESS NOTES
Chemotherapy Verification - SECONDARY RN       Height = 170.2 cm  Weight = 70.3 kg  BSA = 1.82 m2       Medication: Omacetaxine  Dose: 1.25 mg/m2  Calculated Dose: 2.28mg Ordered dose 2.4 mg                            (In mg/m2, AUC, mg/kg)         Difference of 5 % to be clarified with MD by primary RN    I confirm that this process was performed independently.

## 2017-05-12 LAB
ABO GROUP BLD: NORMAL
ALBUMIN SERPL BCP-MCNC: 2.8 G/DL (ref 3.2–4.9)
ALBUMIN/GLOB SERPL: 0.9 G/DL
ALP SERPL-CCNC: 135 U/L (ref 30–99)
ALT SERPL-CCNC: 143 U/L (ref 2–50)
ANION GAP SERPL CALC-SCNC: 7 MMOL/L (ref 0–11.9)
AST SERPL-CCNC: 79 U/L (ref 12–45)
BACTERIA BLD CULT: NORMAL
BACTERIA BLD CULT: NORMAL
BARCODED ABORH UBTYP: 5100
BARCODED PRD CODE UBPRD: NORMAL
BARCODED UNIT NUM UBUNT: NORMAL
BILIRUB SERPL-MCNC: 0.6 MG/DL (ref 0.1–1.5)
BLD GP AB SCN SERPL QL: NORMAL
BUN SERPL-MCNC: 14 MG/DL (ref 8–22)
CALCIUM SERPL-MCNC: 8.6 MG/DL (ref 8.5–10.5)
CHLORIDE SERPL-SCNC: 110 MMOL/L (ref 96–112)
CO2 SERPL-SCNC: 18 MMOL/L (ref 20–33)
COMPONENT R 8504R: NORMAL
CREAT SERPL-MCNC: 0.74 MG/DL (ref 0.5–1.4)
ERYTHROCYTE [DISTWIDTH] IN BLOOD BY AUTOMATED COUNT: 41.1 FL (ref 35.9–50)
GFR SERPL CREATININE-BSD FRML MDRD: >60 ML/MIN/1.73 M 2
GLOBULIN SER CALC-MCNC: 3 G/DL (ref 1.9–3.5)
GLUCOSE SERPL-MCNC: 116 MG/DL (ref 65–99)
HCT VFR BLD AUTO: 18.4 % (ref 42–52)
HGB BLD-MCNC: 6.4 G/DL (ref 14–18)
MCH RBC QN AUTO: 28.8 PG (ref 27–33)
MCHC RBC AUTO-ENTMCNC: 34.8 G/DL (ref 33.7–35.3)
MCV RBC AUTO: 82.9 FL (ref 81.4–97.8)
NEUTROPHILS # BLD AUTO: ABNORMAL K/UL (ref 1.82–7.42)
NRBC # BLD AUTO: 0 K/UL
NRBC BLD AUTO-RTO: 0 /100 WBC
PLATELET # BLD AUTO: 27 K/UL (ref 164–446)
PMV BLD AUTO: 11.2 FL (ref 9–12.9)
POTASSIUM SERPL-SCNC: 3.9 MMOL/L (ref 3.6–5.5)
PRODUCT TYPE UPROD: NORMAL
PROT SERPL-MCNC: 5.8 G/DL (ref 6–8.2)
RBC # BLD AUTO: 2.22 M/UL (ref 4.7–6.1)
RH BLD: NORMAL
SIGNIFICANT IND 70042: NORMAL
SIGNIFICANT IND 70042: NORMAL
SITE SITE: NORMAL
SITE SITE: NORMAL
SODIUM SERPL-SCNC: 135 MMOL/L (ref 135–145)
SOURCE SOURCE: NORMAL
SOURCE SOURCE: NORMAL
UNIT STATUS USTAT: NORMAL
WBC # BLD AUTO: 0.2 K/UL (ref 4.8–10.8)

## 2017-05-12 PROCEDURE — A9270 NON-COVERED ITEM OR SERVICE: HCPCS | Performed by: INTERNAL MEDICINE

## 2017-05-12 PROCEDURE — A9270 NON-COVERED ITEM OR SERVICE: HCPCS | Performed by: STUDENT IN AN ORGANIZED HEALTH CARE EDUCATION/TRAINING PROGRAM

## 2017-05-12 PROCEDURE — 700102 HCHG RX REV CODE 250 W/ 637 OVERRIDE(OP): Performed by: STUDENT IN AN ORGANIZED HEALTH CARE EDUCATION/TRAINING PROGRAM

## 2017-05-12 PROCEDURE — 99232 SBSQ HOSP IP/OBS MODERATE 35: CPT | Mod: GC | Performed by: INTERNAL MEDICINE

## 2017-05-12 PROCEDURE — 86850 RBC ANTIBODY SCREEN: CPT

## 2017-05-12 PROCEDURE — 86900 BLOOD TYPING SEROLOGIC ABO: CPT

## 2017-05-12 PROCEDURE — 700105 HCHG RX REV CODE 258: Performed by: INTERNAL MEDICINE

## 2017-05-12 PROCEDURE — P9016 RBC LEUKOCYTES REDUCED: HCPCS

## 2017-05-12 PROCEDURE — 86901 BLOOD TYPING SEROLOGIC RH(D): CPT

## 2017-05-12 PROCEDURE — 770009 HCHG ROOM/CARE - ONCOLOGY SEMI PRI*

## 2017-05-12 PROCEDURE — 86923 COMPATIBILITY TEST ELECTRIC: CPT

## 2017-05-12 PROCEDURE — 36430 TRANSFUSION BLD/BLD COMPNT: CPT

## 2017-05-12 PROCEDURE — 700111 HCHG RX REV CODE 636 W/ 250 OVERRIDE (IP): Performed by: INTERNAL MEDICINE

## 2017-05-12 PROCEDURE — 700102 HCHG RX REV CODE 250 W/ 637 OVERRIDE(OP): Performed by: INTERNAL MEDICINE

## 2017-05-12 PROCEDURE — 86644 CMV ANTIBODY: CPT

## 2017-05-12 PROCEDURE — 700101 HCHG RX REV CODE 250: Performed by: STUDENT IN AN ORGANIZED HEALTH CARE EDUCATION/TRAINING PROGRAM

## 2017-05-12 RX ADMIN — CARVEDILOL 3.12 MG: 3.12 TABLET, FILM COATED ORAL at 16:01

## 2017-05-12 RX ADMIN — CEFEPIME 2 G: 2 INJECTION, POWDER, FOR SOLUTION INTRAMUSCULAR; INTRAVENOUS at 20:54

## 2017-05-12 RX ADMIN — FLUCONAZOLE 200 MG: 100 TABLET ORAL at 08:37

## 2017-05-12 RX ADMIN — ACETAMINOPHEN 650 MG: 325 TABLET, FILM COATED ORAL at 05:19

## 2017-05-12 RX ADMIN — LEVETIRACETAM 750 MG: 250 TABLET, FILM COATED ORAL at 08:37

## 2017-05-12 RX ADMIN — NYSTATIN 500000 UNITS: 500000 SUSPENSION ORAL at 08:38

## 2017-05-12 RX ADMIN — LIDOCAINE 1 PATCH: 50 PATCH CUTANEOUS at 05:15

## 2017-05-12 RX ADMIN — NYSTATIN 500000 UNITS: 500000 SUSPENSION ORAL at 20:54

## 2017-05-12 RX ADMIN — NYSTATIN 500000 UNITS: 500000 SUSPENSION ORAL at 16:01

## 2017-05-12 RX ADMIN — DIPHENHYDRAMINE HCL 25 MG: 25 TABLET ORAL at 20:54

## 2017-05-12 RX ADMIN — SODIUM CHLORIDE: 9 INJECTION, SOLUTION INTRAVENOUS at 04:49

## 2017-05-12 RX ADMIN — TAMSULOSIN HYDROCHLORIDE 0.4 MG: 0.4 CAPSULE ORAL at 08:38

## 2017-05-12 RX ADMIN — ACYCLOVIR 400 MG: 800 TABLET ORAL at 20:54

## 2017-05-12 RX ADMIN — CEFEPIME 2 G: 2 INJECTION, POWDER, FOR SOLUTION INTRAMUSCULAR; INTRAVENOUS at 15:07

## 2017-05-12 RX ADMIN — ACYCLOVIR 400 MG: 800 TABLET ORAL at 08:36

## 2017-05-12 RX ADMIN — CEFEPIME 2 G: 2 INJECTION, POWDER, FOR SOLUTION INTRAMUSCULAR; INTRAVENOUS at 05:15

## 2017-05-12 RX ADMIN — DIPHENHYDRAMINE HCL 25 MG: 25 TABLET ORAL at 05:20

## 2017-05-12 RX ADMIN — NYSTATIN 500000 UNITS: 500000 SUSPENSION ORAL at 12:04

## 2017-05-12 RX ADMIN — CARVEDILOL 3.12 MG: 3.12 TABLET, FILM COATED ORAL at 08:37

## 2017-05-12 RX ADMIN — LEVETIRACETAM 750 MG: 250 TABLET, FILM COATED ORAL at 20:54

## 2017-05-12 ASSESSMENT — PAIN SCALES - GENERAL
PAINLEVEL_OUTOF10: 0
PAINLEVEL_OUTOF10: 1
PAINLEVEL_OUTOF10: 0
PAINLEVEL_OUTOF10: 0
PAINLEVEL_OUTOF10: 1

## 2017-05-12 ASSESSMENT — ENCOUNTER SYMPTOMS
DIARRHEA: 1
ABDOMINAL PAIN: 0
DIZZINESS: 0
MYALGIAS: 0
CHILLS: 0
FEVER: 0
COUGH: 0
PALPITATIONS: 0
HEARTBURN: 0
NAUSEA: 0
NECK PAIN: 0
DEPRESSION: 0
HEADACHES: 0
HEMOPTYSIS: 0

## 2017-05-12 NOTE — PROGRESS NOTES
Assumed pt care - bedside report received.  Pt finished 1 unit rbc.  Remains in protective and chemo precautions.  Pt is aaox3-4 - fatigued.  piv infiltrated - placed ultrasound guided iv - patent/iv fluid/abx given.  Using urinal and voids without difficulty.  Good po - bm yesterday.  Denies need for pain mediation.  Up x1 - slow and steady.  Fall precautions in place - bed alarm on.  Call light within reach - will continue to round.

## 2017-05-12 NOTE — CARE PLAN
Problem: Infection  Goal: Will remain free from infection  Outcome: PROGRESSING AS EXPECTED  Intervention: Assess signs and symptoms of infection  Pt remains free from s/s of infection.  On IV antiviotics.  Q4 VS  Oral temps only  Intervention: Implement standard precautions and perform hand washing before and after patient contact  Good hand hygiene in place.

## 2017-05-12 NOTE — PROGRESS NOTES
Received report from day RN. POC discussed with patient, pt verbalizes understanding and agreement.  A&Ox4. Pt is up with 1x assist, he does not call for help. His room is near the nurses station. Urinal is at bedside. No active signs of bleeding. SQ chemo injection given this evening per orders. Pt is doing his saline rinses with encouragement from staff. He refused dinner tonight. PIV patent. Denies c/o pain. Chemo precautions in place. Neutropenic precautions in place. Pt is incontinent at times of urine. No BMs yet this shift. Bed alarm on, call light in reach, bed in low position, Q2 hr rounding.

## 2017-05-12 NOTE — PROGRESS NOTES
KURTIS from Lab called with critical result of platelets of 27 and WBC of 0.2 at 0207. Critical lab result read back to KURTIS.   This critical lab result is within parameters established by [facility] for this patient

## 2017-05-12 NOTE — PROGRESS NOTES
Chemotherapy Verification - PRIMARY RN      Height = 170.2 cm Weight = 70 kg  BSA = 1.82m2        Medication: Omacetaxine  Synribo  Dose: 1.25 mg/m2  Calculated Dose: 2.275 mg.  Ordered dose 2.4mg                            (In mg/m2, AUC, mg/kg)                 I confirm this process was performed independently with the BSA and all final chemotherapy dosing calculations congruent.  Any discrepancies of 5% or greater have been addressed with the chemotherapy pharmacist. The resolution of the discrepancy has been documented in the EPIC progress notes.

## 2017-05-12 NOTE — CARE PLAN
Problem: Pain Management  Goal: Pain level will decrease to patient’s comfort goal  Outcome: PROGRESSING AS EXPECTED  Intervention: Follow pain managment plan developed in collaboration with patient and Interdisciplinary Team  Denies c/o pain.  Pain well controlled with lidocaine patches  PRN oxycodone available - pt is aware of this.      Problem: Bowel/Gastric:  Goal: Normal bowel function is maintained or improved  Outcome: PROGRESSING AS EXPECTED  Intervention: Educate patient and significant other/support system about diet, fluid intake, medications and activity to promote bowel function  Bowel movements slowing down  1 small BM this shift  Refused to eat dinner  Encouraging PO fluid intake

## 2017-05-12 NOTE — DISCHARGE PLANNING
Medical Social Work    Pt is anticipated to remain in the hospital while he is undergoing chemo.  He will likely be able to d/c back home with his daughter once medically cleared.    Plan:  SS will remain available to provide support and assist with d/c planning as needed.

## 2017-05-12 NOTE — PROGRESS NOTES
[facility] [place] [facility] Medicine Interval Note    Name Benjamin Post       1945   Age/Sex 71 y.o. male   MRN [id number]   Code Status DNR     After 5PM or if no immediate response to page, please call for cross-coverage  Attending/Team: Dr. Chung/Ly Call [phone] to page   1st Call - Day Intern (R1):   Dr Méndez 2nd Call - Day Sr. Resident (R2/R3):            Chief complaint/ reason for interval visit (Primary Diagnosis)   CML/pancytopenia     Interval Problem Daily Status Update  :  On  chemotherapy synribo (today is 7).   He skipped the dose of chemo last night because the dose was not received by his daughter.   Hb is 6.4  Today>>> RBC one unit transfusion.   No fever and diarrhea is controlled  loperamide.          Principal Problem:    CML (chronic myelocytic leukemia)-Accelerated phase (Chronic) POA: Yes      Overview: multiple relapses.      On chemotherapy.   Active Problems:    Pancytopenia (CMS-HCC) (Chronic) POA: Yes     On cefepime.   Bone pain POA: Yes    Pain is controlled.            Review of Systems   Constitutional: Negative for fever and chills.   HENT: Negative for hearing loss.    Respiratory: Negative for cough and hemoptysis.    Cardiovascular: Negative for chest pain and palpitations.   Gastrointestinal: Positive for diarrhea. Negative for heartburn, nausea and abdominal pain.   Genitourinary: Negative for dysuria and urgency.   Musculoskeletal: Negative for myalgias and neck pain.   Skin: Negative for rash.   Neurological: Negative for dizziness and headaches.   Psychiatric/Behavioral: Negative for depression and suicidal ideas.       Consultants/Specialty  Oncology    Disposition  Home after chemotherapy     Quality Measures  EKG reviewed, Labs reviewed, Medications reviewed and Radiology images reviewed  Mahajan catheter: No Mahajan        DVT prophylaxis - mechanical: SCDs (low plt)                Physical  Exam       Filed Vitals:    05/12/17 0000 05/12/17 0400 05/12/17 0530 05/12/17 0614   BP: 117/63 122/73 110/65 111/62   Pulse: 98 117 107 105   Temp: 36.8 °C (98.3 °F) 37.2 °C (99 °F) 37 °C (98.6 °F) 37 °C (98.6 °F)   Resp: 20 18 16 18   Height:       Weight:       SpO2: 97% 97% 99% 96%     Body mass index is 24.16 kg/(m^2). Weight: 70 kg (154 lb 5.2 oz)  Oxygen Therapy:  Pulse Oximetry: 96 %, O2 (LPM): 0, O2 Delivery: None (Room Air)    Physical Exam   Constitutional: He is well-developed, well-nourished, and in no distress. No distress.   HENT:   Head: Normocephalic.   Neck: No tracheal deviation present. No thyromegaly present.   Cardiovascular: Normal rate.  Exam reveals no friction rub.    Murmur heard.  Pansystolic murmur on the apex    Pulmonary/Chest: Effort normal. No respiratory distress. He has no wheezes.   Abdominal: Soft. He exhibits no distension. There is no tenderness.   Musculoskeletal: He exhibits no edema.   Neurological: He is alert.   Skin: No erythema.         Lab Data Review:      5/4/2017  1:28 PM    Recent Labs      05/09/17   2357  05/10/17   2347  05/11/17   2354   SODIUM  133*  130*  135   POTASSIUM  4.4  3.8  3.9   CHLORIDE  108  106  110   CO2  20  18*  18*   BUN  22  16  14   CREATININE  0.82  0.69  0.74   CALCIUM  8.7  8.5  8.6       Recent Labs      05/09/17   2357  05/10/17   2347  05/11/17   2354   ALTSGPT  129*  134*  143*   ASTSGOT  73*  71*  79*   ALKPHOSPHAT  141*  145*  135*   TBILIRUBIN  0.6  0.6  0.6   GLUCOSE  108*  113*  116*       Recent Labs      05/09/17   2357  05/10/17   2347  05/11/17   2354   RBC  2.64*  2.53*  2.22*   HEMOGLOBIN  7.8*  7.5*  6.4*   HEMATOCRIT  22.1*  20.6*  18.4*   PLATELETCT  15*  8*  27*       Recent Labs      05/09/17   2357  05/10/17   2347  05/11/17   2354   WBC  0.2*  0.2*  0.2*   ASTSGOT  73*  71*  79*   ALTSGPT  129*  134*  143*   ALKPHOSPHAT  141*  145*  135*   TBILIRUBIN  0.6  0.6  0.6           Assessment/Plan     * CML (chronic  myelocytic leukemia)-Accelerated phase (present on admission)  Overview  multiple relapses.    Was treated on dasatinib for a time with response, but then relapse.    Was more recently on bosutinib with response, but again recent relapse.   Pancytopenia (chronic, before starting with chemotherapy)   Protein electrophoresis: the polyclonal increase in the gamma region which may be indicative of specific immune   response or an early monoclonal protein.     Assessment & Plan  Has completed multiple chemotherapy cycles.       Started on 5/6/17 with chemotherapy (synribo ) BID for 14 days, (today is 7).  Developed diarrhea on 5/9>>gets better with loperamide.   Labs daily for follow up side effects.   FERNY high IgM.  Appreciate oncology consult.     Pancytopenia (CMS-HCC) (present on admission)  Overview  - Pancytopenia secondary to CML and chemotherapy  - WBC: 0.5>>0.2  - Given one unit of platelets on 5/4/17.   - Anemia with Hb: 7.7>>7.6>>6.8>>7.8>6.4  - platelet: 11>>7>>28>>20>10>>23>>15>>7>>27  - Hx of Chronic Right Subdural Hematoma    Assessment & Plan  Transfer 1 unit irritated platelets on 4/30, 5/4/17,  5/8/17 and 5/11  PRBC one unit on 4/30, 5/8/17 and 5/12/17  Follow up and keep plt>10 and Hb>7.  Started with cefepim on 5/7/17 due to fever 100.4 and after Urine and blood culture were ordered.   Started with acyclovir and fluconazole on 5/9 for prophylaxis.     Neutropenia (CMS-HCC) (present on admission)  Overview  Chronic   WBC:0.4>>0.3>>0.2      Assessment & Plan  Fever 100.4 on 5/7 on the the day 2 of chemotherapy>>started cefepim.   On acyclovir and fluconazole for prophylactic.   Urine and blood culture negative till now.     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    - Pain is controlled, Patient feeling ok.    - Follow up and consider radiation therapy if needed.     Elevated PSA (present on admission)  Overview  PSA: 8.4 on 4/4/17    Assessment & Plan  - work up could be done as out patient.     Transaminitis (present on admission)  Overview  Related to chemo therapy.     Assessment & Plan  AST/ALT: 46/89>>61/111>>71/134  Follow up.     BPH (benign prostatic hyperplasia) (present on admission)  Assessment & Plan  Stable.  Continue on tamsulosin 0.4 mg daily.     Low vitamin D level (present on admission)  Overview  Vit D: 11 on 4/29/17    Assessment & Plan  Continue supplementation.     Seizure disorder (CMS-HCC) (present on admission)  Assessment & Plan  Stable    Continue on his home dose of keppra

## 2017-05-12 NOTE — PROGRESS NOTES
Chemotherapy Verification - SECONDARY RN       Height = 1.70 m  Weight = 70 kg  BSA = 1.82 m2       Medication: omacetaxine  Dose: 1.25 mg/m2  Calculated Dose: 2.275 mg  Ordered dose: 2.4 mg                             (In mg/m2, AUC, mg/kg)     I confirm that this process was performed independently.

## 2017-05-13 ENCOUNTER — APPOINTMENT (OUTPATIENT)
Dept: RADIOLOGY | Facility: MEDICAL CENTER | Age: 72
DRG: 808 | End: 2017-05-13
Attending: INTERNAL MEDICINE
Payer: MEDICARE

## 2017-05-13 PROBLEM — R19.7 DIARRHEA: Status: ACTIVE | Noted: 2017-05-13

## 2017-05-13 LAB
ALBUMIN SERPL BCP-MCNC: 2.7 G/DL (ref 3.2–4.9)
ALBUMIN/GLOB SERPL: 0.9 G/DL
ALP SERPL-CCNC: 139 U/L (ref 30–99)
ALT SERPL-CCNC: 165 U/L (ref 2–50)
ANION GAP SERPL CALC-SCNC: 5 MMOL/L (ref 0–11.9)
APPEARANCE UR: CLEAR
AST SERPL-CCNC: 81 U/L (ref 12–45)
BILIRUB SERPL-MCNC: 0.6 MG/DL (ref 0.1–1.5)
BILIRUB UR QL STRIP.AUTO: NEGATIVE
BUN SERPL-MCNC: 16 MG/DL (ref 8–22)
CALCIUM SERPL-MCNC: 8.9 MG/DL (ref 8.5–10.5)
CHLORIDE SERPL-SCNC: 108 MMOL/L (ref 96–112)
CO2 SERPL-SCNC: 19 MMOL/L (ref 20–33)
COLOR UR: YELLOW
CREAT SERPL-MCNC: 0.69 MG/DL (ref 0.5–1.4)
ERYTHROCYTE [DISTWIDTH] IN BLOOD BY AUTOMATED COUNT: 40.5 FL (ref 35.9–50)
GFR SERPL CREATININE-BSD FRML MDRD: >60 ML/MIN/1.73 M 2
GLOBULIN SER CALC-MCNC: 3.1 G/DL (ref 1.9–3.5)
GLUCOSE SERPL-MCNC: 120 MG/DL (ref 65–99)
GLUCOSE UR STRIP.AUTO-MCNC: NEGATIVE MG/DL
HCT VFR BLD AUTO: 20.3 % (ref 42–52)
HGB BLD-MCNC: 7.3 G/DL (ref 14–18)
KETONES UR STRIP.AUTO-MCNC: NEGATIVE MG/DL
LEUKOCYTE ESTERASE UR QL STRIP.AUTO: NEGATIVE
MCH RBC QN AUTO: 29.6 PG (ref 27–33)
MCHC RBC AUTO-ENTMCNC: 36 G/DL (ref 33.7–35.3)
MCV RBC AUTO: 82.2 FL (ref 81.4–97.8)
MICRO URNS: ABNORMAL
NEUTROPHILS # BLD AUTO: ABNORMAL K/UL (ref 1.82–7.42)
NITRITE UR QL STRIP.AUTO: NEGATIVE
NRBC # BLD AUTO: 0 K/UL
NRBC BLD AUTO-RTO: 0 /100 WBC
PH UR STRIP.AUTO: 6 [PH]
PLATELET # BLD AUTO: 20 K/UL (ref 164–446)
PMV BLD AUTO: 10.7 FL (ref 9–12.9)
POTASSIUM SERPL-SCNC: 3.4 MMOL/L (ref 3.6–5.5)
PROT SERPL-MCNC: 5.8 G/DL (ref 6–8.2)
PROT UR QL STRIP: 30 MG/DL
RBC # BLD AUTO: 2.47 M/UL (ref 4.7–6.1)
RBC # URNS HPF: ABNORMAL /HPF
RBC UR QL AUTO: ABNORMAL
SODIUM SERPL-SCNC: 132 MMOL/L (ref 135–145)
SP GR UR STRIP.AUTO: 1.01
WBC # BLD AUTO: 0.2 K/UL (ref 4.8–10.8)

## 2017-05-13 PROCEDURE — 51798 US URINE CAPACITY MEASURE: CPT

## 2017-05-13 PROCEDURE — A9270 NON-COVERED ITEM OR SERVICE: HCPCS | Performed by: STUDENT IN AN ORGANIZED HEALTH CARE EDUCATION/TRAINING PROGRAM

## 2017-05-13 PROCEDURE — 80053 COMPREHEN METABOLIC PANEL: CPT

## 2017-05-13 PROCEDURE — A9270 NON-COVERED ITEM OR SERVICE: HCPCS | Performed by: INTERNAL MEDICINE

## 2017-05-13 PROCEDURE — 700102 HCHG RX REV CODE 250 W/ 637 OVERRIDE(OP): Performed by: STUDENT IN AN ORGANIZED HEALTH CARE EDUCATION/TRAINING PROGRAM

## 2017-05-13 PROCEDURE — 36415 COLL VENOUS BLD VENIPUNCTURE: CPT

## 2017-05-13 PROCEDURE — 87493 C DIFF AMPLIFIED PROBE: CPT

## 2017-05-13 PROCEDURE — 700105 HCHG RX REV CODE 258: Performed by: INTERNAL MEDICINE

## 2017-05-13 PROCEDURE — 85025 COMPLETE CBC W/AUTO DIFF WBC: CPT

## 2017-05-13 PROCEDURE — 99232 SBSQ HOSP IP/OBS MODERATE 35: CPT | Mod: GC | Performed by: INTERNAL MEDICINE

## 2017-05-13 PROCEDURE — 76937 US GUIDE VASCULAR ACCESS: CPT

## 2017-05-13 PROCEDURE — 700111 HCHG RX REV CODE 636 W/ 250 OVERRIDE (IP): Performed by: INTERNAL MEDICINE

## 2017-05-13 PROCEDURE — A9270 NON-COVERED ITEM OR SERVICE: HCPCS | Performed by: HOSPITALIST

## 2017-05-13 PROCEDURE — 700102 HCHG RX REV CODE 250 W/ 637 OVERRIDE(OP): Performed by: HOSPITALIST

## 2017-05-13 PROCEDURE — 81001 URINALYSIS AUTO W/SCOPE: CPT

## 2017-05-13 PROCEDURE — 700102 HCHG RX REV CODE 250 W/ 637 OVERRIDE(OP): Performed by: INTERNAL MEDICINE

## 2017-05-13 PROCEDURE — 770009 HCHG ROOM/CARE - ONCOLOGY SEMI PRI*

## 2017-05-13 PROCEDURE — 87324 CLOSTRIDIUM AG IA: CPT

## 2017-05-13 RX ADMIN — CEFEPIME 2 G: 2 INJECTION, POWDER, FOR SOLUTION INTRAMUSCULAR; INTRAVENOUS at 21:33

## 2017-05-13 RX ADMIN — NYSTATIN 500000 UNITS: 500000 SUSPENSION ORAL at 21:33

## 2017-05-13 RX ADMIN — CARVEDILOL 3.12 MG: 3.12 TABLET, FILM COATED ORAL at 17:17

## 2017-05-13 RX ADMIN — LEVETIRACETAM 750 MG: 250 TABLET, FILM COATED ORAL at 21:33

## 2017-05-13 RX ADMIN — CARVEDILOL 3.12 MG: 3.12 TABLET, FILM COATED ORAL at 09:30

## 2017-05-13 RX ADMIN — NYSTATIN 500000 UNITS: 500000 SUSPENSION ORAL at 10:32

## 2017-05-13 RX ADMIN — FLUCONAZOLE 200 MG: 100 TABLET ORAL at 10:31

## 2017-05-13 RX ADMIN — ACYCLOVIR 400 MG: 800 TABLET ORAL at 21:33

## 2017-05-13 RX ADMIN — LEVETIRACETAM 750 MG: 250 TABLET, FILM COATED ORAL at 10:31

## 2017-05-13 RX ADMIN — NYSTATIN 500000 UNITS: 500000 SUSPENSION ORAL at 17:17

## 2017-05-13 RX ADMIN — ACYCLOVIR 400 MG: 800 TABLET ORAL at 10:31

## 2017-05-13 RX ADMIN — CEFEPIME 2 G: 2 INJECTION, POWDER, FOR SOLUTION INTRAMUSCULAR; INTRAVENOUS at 06:28

## 2017-05-13 RX ADMIN — ERGOCALCIFEROL 50000 UNITS: 1.25 CAPSULE ORAL at 10:48

## 2017-05-13 RX ADMIN — TAMSULOSIN HYDROCHLORIDE 0.4 MG: 0.4 CAPSULE ORAL at 10:32

## 2017-05-13 RX ADMIN — NYSTATIN 500000 UNITS: 500000 SUSPENSION ORAL at 14:00

## 2017-05-13 RX ADMIN — CEFEPIME 2 G: 2 INJECTION, POWDER, FOR SOLUTION INTRAMUSCULAR; INTRAVENOUS at 14:30

## 2017-05-13 RX ADMIN — LOPERAMIDE HYDROCHLORIDE 2 MG: 2 CAPSULE ORAL at 17:17

## 2017-05-13 RX ADMIN — SODIUM CHLORIDE: 9 INJECTION, SOLUTION INTRAVENOUS at 15:49

## 2017-05-13 ASSESSMENT — ENCOUNTER SYMPTOMS
MYALGIAS: 0
COUGH: 0
WEAKNESS: 1
CHILLS: 0
HEADACHES: 1
PALPITATIONS: 0
SHORTNESS OF BREATH: 0
SORE THROAT: 0
NECK PAIN: 0
NAUSEA: 0
HEARTBURN: 0
BRUISES/BLEEDS EASILY: 0
HEMOPTYSIS: 0
HEADACHES: 0
DIARRHEA: 1
DIZZINESS: 0
FEVER: 0
DEPRESSION: 0
BACK PAIN: 0
FOCAL WEAKNESS: 0
ABDOMINAL PAIN: 0
NERVOUS/ANXIOUS: 0
VOMITING: 0

## 2017-05-13 ASSESSMENT — PAIN SCALES - GENERAL: PAINLEVEL_OUTOF10: 1

## 2017-05-13 NOTE — PROGRESS NOTES
Assumed pt care - bedside report received.  Remains in protective and chemo precautions - started on R/O cdiff  Pt is aaox3-4 - fatigued.  Daily piv have been infiltrating - made aware - placed ultrasound guided iv - patent/iv fluid/abx given.  Using urinal and voids without difficulty.  Good po - Noc rn stated pt had multiple watery and incontinent bms - informed dr and r/o cdiff - iso placed.  Denies need for pain mediation.  Up x1 - slow and steady.  Fall precautions in place - bed alarm on.  Call light within reach - will continue to round.

## 2017-05-13 NOTE — PROGRESS NOTES
DC'd oral fluconazole after I spoke to Dr. Huertas an oncologist, who recommended to stop fluconazole as the patient LFTs elevated.

## 2017-05-13 NOTE — PROGRESS NOTES
Assisted pt back to bed no s/s of distress. Pain well controlled. Plan of care reviewed. Bed alarms on and active.

## 2017-05-13 NOTE — PROGRESS NOTES
Chemotherapy Verification - SECONDARY RN       Height = 1.702 m  Weight = 70 kg  BSA = 1.82 m2       Medication: omacetaxine  Dose: 1.25 mg/m2  Calculated Dose: 2.275 mg  Ordered dose: 2.4 mg                             (In mg/m2, AUC, mg/kg)     I confirm that this process was performed independently.

## 2017-05-13 NOTE — PROGRESS NOTES
[facility] [place] Internal Medicine Interval Note    Name Benjamin Post       1945   Age/Sex 71 y.o. male   MRN [id number]   Code Status DNR     After 5PM or if no immediate response to page, please call for cross-coverage  Attending/Team: Dr. Chung/Ly Call [phone] to page   1st Call - Day Intern (R1):   Dr Méndez 2nd Call - Day Sr. Resident (R2/R3):            Chief complaint/ reason for interval visit (Primary Diagnosis)   CML/pancytopenia     Interval Problem Daily Status Update  :  On  chemotherapy synribo (today is 8).   Hb and Plt are Sabel no need for transfusion today.   No fever, he still have diarrhea>>cdiff was ordered to r/o         Principal Problem:    CML (chronic myelocytic leukemia)-Accelerated phase (Chronic) POA: Yes      Overview: multiple relapses.      On chemotherapy.   Active Problems:    Pancytopenia (CMS-HCC) (Chronic) POA: Yes     On cefepime.   Bone pain POA: Yes    Pain is controlled.            Review of Systems   Constitutional: Negative for fever and chills.   HENT: Negative for hearing loss.    Respiratory: Negative for cough and hemoptysis.    Cardiovascular: Negative for chest pain and palpitations.   Gastrointestinal: Positive for diarrhea. Negative for heartburn, nausea and abdominal pain.   Genitourinary: Negative for dysuria and urgency.   Musculoskeletal: Negative for myalgias and neck pain.   Skin: Negative for rash.   Neurological: Negative for dizziness and headaches.   Psychiatric/Behavioral: Negative for depression and suicidal ideas.       Consultants/Specialty  Oncology    Disposition  Home after chemotherapy     Quality Measures  EKG reviewed, Labs reviewed, Medications reviewed and Radiology images reviewed  Mahajan catheter: No Mahajan        DVT prophylaxis - mechanical: SCDs (low plt)                Physical Exam       Filed Vitals:    17 2000 17 0000 17 0400 17  0800   BP: 115/70 128/76 112/72 130/70   Pulse: 105 102 99 102   Temp: 37.1 °C (98.7 °F) 36.8 °C (98.3 °F) 37.1 °C (98.7 °F) 36.9 °C (98.5 °F)   Resp: 18 18 18 16   Height:       Weight:       SpO2: 98% 97% 96% 97%     Body mass index is 24.16 kg/(m^2).    Oxygen Therapy:  Pulse Oximetry: 97 %, O2 (LPM): 0, O2 Delivery: None (Room Air)    Physical Exam   Constitutional: He is well-developed, well-nourished, and in no distress. No distress.   HENT:   Head: Normocephalic.   Neck: No tracheal deviation present. No thyromegaly present.   Cardiovascular: Normal rate.  Exam reveals no friction rub.    Murmur heard.  Pansystolic murmur on the apex    Pulmonary/Chest: Effort normal. No respiratory distress. He has no wheezes.   Abdominal: Soft. He exhibits no distension. There is no tenderness.   Musculoskeletal: He exhibits no edema.   Neurological: He is alert.   Skin: No erythema.         Lab Data Review:      5/4/2017  1:28 PM    Recent Labs      05/10/17   2347  05/11/17   2354  05/13/17   0012   SODIUM  130*  135  132*   POTASSIUM  3.8  3.9  3.4*   CHLORIDE  106  110  108   CO2  18*  18*  19*   BUN  16  14  16   CREATININE  0.69  0.74  0.69   CALCIUM  8.5  8.6  8.9       Recent Labs      05/10/17   2347  05/11/17   2354  05/13/17   0012   ALTSGPT  134*  143*  165*   ASTSGOT  71*  79*  81*   ALKPHOSPHAT  145*  135*  139*   TBILIRUBIN  0.6  0.6  0.6   GLUCOSE  113*  116*  120*       Recent Labs      05/10/17   2347  05/11/17   2354  05/13/17   0012   RBC  2.53*  2.22*  2.47*   HEMOGLOBIN  7.5*  6.4*  7.3*   HEMATOCRIT  20.6*  18.4*  20.3*   PLATELETCT  8*  27*  20*       Recent Labs      05/10/17   2347  05/11/17   2354  05/13/17   0012   WBC  0.2*  0.2*  0.2*   ASTSGOT  71*  79*  81*   ALTSGPT  134*  143*  165*   ALKPHOSPHAT  145*  135*  139*   TBILIRUBIN  0.6  0.6  0.6           Assessment/Plan     * CML (chronic myelocytic leukemia)-Accelerated phase (present on admission)  Overview  multiple relapses.    Was treated  on dasatinib for a time with response, but then relapse.    Was more recently on bosutinib with response, but again recent relapse.   Pancytopenia (chronic, before starting with chemotherapy)   Protein electrophoresis: the polyclonal increase in the gamma region which may be indicative of specific immune   response or an early monoclonal protein.     Assessment & Plan  Has completed multiple chemotherapy cycles.       Started on 5/6/17 with chemotherapy (synribo ) BID for 14 days, (today is 8).  Developed diarrhea on 5/9>>gets better with loperamide.   Labs daily for follow up side effects.   FERNY high IgM.  Appreciate oncology consult.     Pancytopenia (CMS-HCC) (present on admission)  Overview  - Pancytopenia secondary to CML and chemotherapy  - WBC: 0.5>>0.2  - Given one unit of platelets on 5/4/17.   - Anemia with Hb: 7.7>>7.6>>6.8>>7.8>6.4>7.8  - platelet: 11>>7>>28>>20>10>>23>>15>>7>>27  - Hx of Chronic Right Subdural Hematoma    Assessment & Plan  Transfer 1 unit irritated platelets on 4/30, 5/4/17,  5/8/17 and 5/11  PRBC one unit on 4/30, 5/8/17 and 5/12/17  Follow up and keep plt>10 and Hb>7.  Started with cefepim on 5/7/17 due to fever 100.4 and after Urine and blood culture were ordered.   Started with acyclovir and fluconazole on 5/9 for prophylaxis.     Neutropenia (CMS-HCC) (present on admission)  Overview  Chronic   WBC:0.4>>0.3>>0.2      Assessment & Plan  Fever 100.4 on 5/7 on the the day 2 of chemotherapy>>started cefepim.   On acyclovir and fluconazole for prophylactic.   Urine and blood culture negative till now.     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    - Pain is controlled, Patient feeling ok.    - Follow up and consider radiation therapy if needed.     Elevated PSA (present on admission)  Overview  PSA: 8.4 on 4/4/17    Assessment & Plan  - work up could be done as out patient.     Diarrhea  Overview  Watery diarrhea  Started after chemotherapy.     Assessment & Plan  Most likely related to chemo  Loperamide PRN  Cdiff was ordered.   monitor electrolytes and continue IV fluid.       Transaminitis (present on admission)  Overview  Related to chemo therapy.     Assessment & Plan  AST/ALT: 46/89>>61/111>>71/134  Follow up.     BPH (benign prostatic hyperplasia) (present on admission)  Assessment & Plan  Stable.  Continue on tamsulosin 0.4 mg daily.     Low vitamin D level (present on admission)  Overview  Vit D: 11 on 4/29/17    Assessment & Plan  Continue supplementation.     Seizure disorder (CMS-HCC) (present on admission)  Assessment & Plan  Stable    Continue on his home dose of keppra

## 2017-05-13 NOTE — CARE PLAN
Problem: Infection  Goal: Will remain free from infection  Outcome: PROGRESSING AS EXPECTED  Intervention: Assess signs and symptoms of infection  Pt remains free from s/s of infection.  On IV antiviotics.  Q4 VS  Oral temps only  Intervention: Implement standard precautions and perform hand washing before and after patient contact  Good hand hygiene in place.  iso for c-diff  Eduction to family

## 2017-05-13 NOTE — PROGRESS NOTES
Oncology/Hematology Progress Note               Author: Mira Huertas    Cc:  CML Date & Time created: 5/13/2017  4:38 PM     Interval History:  Feeling OK.  Is tired.  Has a lot of diarrhea, per RN 5-6 last night and 5-6 today.  Mild headaches, nothing too severe.  No abdmoinal pain, chest pain, shortness of breath, or bleeding.  He does note that it hurts to be and he feels that he hasn't been able to pee very much for the past 2 days.     PMHx, PSurgHx, SocHx, FAMHx:  All reviewed and unchanged    Review of Systems:  Review of Systems   Constitutional: Positive for malaise/fatigue. Negative for fever and chills.   HENT: Negative for nosebleeds and sore throat.    Respiratory: Negative for cough and shortness of breath.    Cardiovascular: Negative for chest pain and leg swelling.   Gastrointestinal: Positive for diarrhea. Negative for nausea, vomiting and abdominal pain.   Genitourinary: Positive for dysuria. Negative for hematuria.        Retention   Musculoskeletal: Negative for back pain.   Skin: Negative for rash.   Neurological: Positive for weakness and headaches. Negative for focal weakness.   Endo/Heme/Allergies: Does not bruise/bleed easily.   Psychiatric/Behavioral: The patient is not nervous/anxious.        Physical Exam:  Physical Exam   Constitutional: He is oriented to person, place, and time. He appears well-developed. No distress.   thin   HENT:   Thick thrush on tongue   Eyes: Conjunctivae are normal. No scleral icterus.   Neck: Neck supple.   Cardiovascular: Normal rate and regular rhythm.  Exam reveals no gallop and no friction rub.    Murmur (II/VI throughout, best at LLSB) heard.  Pulmonary/Chest: Effort normal and breath sounds normal. He has no wheezes. He has no rales.   Abdominal: Soft. Bowel sounds are normal. He exhibits no distension. There is no tenderness. There is no rebound.   Musculoskeletal: He exhibits no edema.   Lymphadenopathy:     He has no cervical adenopathy.    Neurological: He is alert and oriented to person, place, and time.   Skin: Skin is warm and dry. No ecchymosis and no petechiae noted.   Psychiatric: He has a normal mood and affect. His behavior is normal.   Vitals reviewed.      Labs:        Invalid input(s): [id number]      Recent Labs      05/10/17   2347  05/11/17   2354  05/13/17   0012   SODIUM  130*  135  132*   POTASSIUM  3.8  3.9  3.4*   CHLORIDE  106  110  108   CO2  18*  18*  19*   BUN  16  14  16   CREATININE  0.69  0.74  0.69   CALCIUM  8.5  8.6  8.9     Recent Labs      05/10/17   2347  05/11/17   2354  05/13/17   0012   ALTSGPT  134*  143*  165*   ASTSGOT  71*  79*  81*   ALKPHOSPHAT  145*  135*  139*   TBILIRUBIN  0.6  0.6  0.6   GLUCOSE  113*  116*  120*     Recent Labs      05/10/17   2347  05/11/17   2354  05/13/17   0012   RBC  2.53*  2.22*  2.47*   HEMOGLOBIN  7.5*  6.4*  7.3*   HEMATOCRIT  20.6*  18.4*  20.3*   PLATELETCT  8*  27*  20*     Recent Labs      05/10/17   2347  05/11/17   2354  05/13/17   0012   WBC  0.2*  0.2*  0.2*   ASTSGOT  71*  79*  81*   ALTSGPT  134*  143*  165*   ALKPHOSPHAT  145*  135*  139*   TBILIRUBIN  0.6  0.6  0.6     Recent Labs      05/10/17   2347  05/11/17   2354  05/13/17   0012   SODIUM  130*  135  132*   POTASSIUM  3.8  3.9  3.4*   CHLORIDE  106  110  108   CO2  18*  18*  19*   GLUCOSE  113*  116*  120*   BUN  16  14  16   CREATININE  0.69  0.74  0.69   CALCIUM  8.5  8.6  8.9     Hemodynamics:  Temp (24hrs), Av.9 °C (98.5 °F), Min:36.8 °C (98.3 °F), Max:37.1 °C (98.7 °F)  Temperature: 36.9 °C (98.5 °F)  Pulse  Av.8  Min: 60  Max: 118   Blood Pressure : 125/73 mmHg     Respiratory:    Respiration: 16, Pulse Oximetry: 99 %     Work Of Breathing / Effort: Mild  RLL Breath Sounds: Diminished, LLL Breath Sounds: Diminished  Fluids:    Intake/Output Summary (Last 24 hours) at 17 1409  Last data filed at 17 0838   Gross per 24 hour   Intake   1006 ml   Output    575 ml   Net    431 ml         GI/Nutrition:  Orders Placed This Encounter   Procedures   • Diet Order     Standing Status: Standing      Number of Occurrences: 1      Standing Expiration Date:      Order Specific Question:  Diet:     Answer:  2 Gram Sodium [7]     Order Specific Question:  Texture/Fiber modifications:     Answer:  Dysphagia 2(Pureed/Chopped)specify fluid consistency(question 6) [2]      Comments:  thins ok; pt with no teeth/ nor dentures     Medical Decision Making, by Problem:  Active Hospital Problems    Diagnosis   • Neutropenia (CMS-HCC) [D70.9]   • Pancytopenia (CMS-HCC) [D61.818]   • Acute bilateral low back pain without sciatica [M54.5]   • Bone pain [M89.8X9]   • Transaminitis [R74.0]   • 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  Prior treatments included dasatinib and bosutinib.  The plan has been for allo-BMT at [facility], but needs some response to chemo before they can do the transplant, giving Synribo for this.  Synribo is given as a SQ shot BID x 14 days with the first cycle, followed by 2 weeks off.  Started on 5/6/17.        Synribo is a high risk medication.  There is a 's warning for cerebral bleeding, and to avoid NSAIDS, blood thinners, etc as well as to use with caution in patients with Platelets<50K.  He has a h/o of a SDH in the recent past.   -- Day 8 synribo - missed day 6 morning dose  -- tolerating with main toxicity of diarrhea and elevated LFTs  -- monitor for Headaches or AMS changes    -- duration of hospitalization as per Dr. Vu, primary oncologist.  Had been communicated that plan for for 14 day hospitalization to get Synribo but when I spoke with him today he favored treating more likely acute luekemia induction with prolonged stay until counts improve.  Patient is willing to do whatever needs to be done.     2.  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  Not likely to improve unless he can  get response to some kind of chemo.  Will transfuse as needed to keep >10, or higher if bleeding.  3.  Neutropenia--nothing documented in chart but as per Dr. Agustin's note, had fever late in day on 5/7.  BCx and CXR negative, will monitor.    4.  Elevated LFT's.  Worsening.  Potentially due to Synribo but would also like to discontinue/change any other medications that might contribute.  Recommend d/c fluconzole and continue with nystatin alone at this time.    5.  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  If recurrent pain might consider RT.  6. Anemia-- transfuse 1 unit of PRBC's if <7.5  7.  Diarrhea.  A common side effect of Synribo. He was started on immodium on 5/10/17 with improvement.   8. Subjective urinary retention and dysuria.  PVR on 5/13/17 was 33 cc per RN.  Will check UA  9.  Thrush.  Nystatin.  Fluconazole d/c'd as above.     - discussed with [facility] team to d/c fluconzole due to LFTs  - will add UA due to dysuria (previously ordered 5/7 but never collected  - discussed with RN  - discussed with patient duration of stay, as above, still TBD.       Discussed with patient, [facility] team and RN.     Highly complex patient with high risk of morbidity and mortality.     Labs reviewed and Medications reviewed  Mahajan catheter: No Mahajan      DVT Prophylaxis: Contraindicated - High bleeding risk

## 2017-05-14 LAB
ALBUMIN SERPL BCP-MCNC: 2.8 G/DL (ref 3.2–4.9)
ALBUMIN/GLOB SERPL: 0.8 G/DL
ALP SERPL-CCNC: 143 U/L (ref 30–99)
ALT SERPL-CCNC: 171 U/L (ref 2–50)
ANION GAP SERPL CALC-SCNC: 5 MMOL/L (ref 0–11.9)
AST SERPL-CCNC: 80 U/L (ref 12–45)
BILIRUB SERPL-MCNC: 0.6 MG/DL (ref 0.1–1.5)
BUN SERPL-MCNC: 16 MG/DL (ref 8–22)
C DIFF DNA SPEC QL NAA+PROBE: NEGATIVE
C DIFF TOX A+B STL QL IA: NEGATIVE
C DIFF TOX GENS STL QL NAA+PROBE: NEGATIVE
CALCIUM SERPL-MCNC: 9.1 MG/DL (ref 8.5–10.5)
CHLORIDE SERPL-SCNC: 106 MMOL/L (ref 96–112)
CO2 SERPL-SCNC: 19 MMOL/L (ref 20–33)
CREAT SERPL-MCNC: 0.68 MG/DL (ref 0.5–1.4)
ERYTHROCYTE [DISTWIDTH] IN BLOOD BY AUTOMATED COUNT: 40.1 FL (ref 35.9–50)
GFR SERPL CREATININE-BSD FRML MDRD: >60 ML/MIN/1.73 M 2
GLOBULIN SER CALC-MCNC: 3.3 G/DL (ref 1.9–3.5)
GLUCOSE SERPL-MCNC: 125 MG/DL (ref 65–99)
HCT VFR BLD AUTO: 20 % (ref 42–52)
HGB BLD-MCNC: 7.1 G/DL (ref 14–18)
MCH RBC QN AUTO: 29.1 PG (ref 27–33)
MCHC RBC AUTO-ENTMCNC: 35.5 G/DL (ref 33.7–35.3)
MCV RBC AUTO: 82 FL (ref 81.4–97.8)
NEUTROPHILS # BLD AUTO: ABNORMAL K/UL (ref 1.82–7.42)
NRBC # BLD AUTO: 0 K/UL
NRBC BLD AUTO-RTO: 0 /100 WBC
PLATELET # BLD AUTO: 17 K/UL (ref 164–446)
PMV BLD AUTO: 11.3 FL (ref 9–12.9)
POTASSIUM SERPL-SCNC: 3.3 MMOL/L (ref 3.6–5.5)
PROT SERPL-MCNC: 6.1 G/DL (ref 6–8.2)
RBC # BLD AUTO: 2.44 M/UL (ref 4.7–6.1)
SODIUM SERPL-SCNC: 130 MMOL/L (ref 135–145)
WBC # BLD AUTO: 0.1 K/UL (ref 4.8–10.8)

## 2017-05-14 PROCEDURE — A9270 NON-COVERED ITEM OR SERVICE: HCPCS | Performed by: INTERNAL MEDICINE

## 2017-05-14 PROCEDURE — 700101 HCHG RX REV CODE 250: Performed by: STUDENT IN AN ORGANIZED HEALTH CARE EDUCATION/TRAINING PROGRAM

## 2017-05-14 PROCEDURE — 770009 HCHG ROOM/CARE - ONCOLOGY SEMI PRI*

## 2017-05-14 PROCEDURE — 700111 HCHG RX REV CODE 636 W/ 250 OVERRIDE (IP): Performed by: INTERNAL MEDICINE

## 2017-05-14 PROCEDURE — 700102 HCHG RX REV CODE 250 W/ 637 OVERRIDE(OP): Performed by: INTERNAL MEDICINE

## 2017-05-14 PROCEDURE — 700105 HCHG RX REV CODE 258: Performed by: INTERNAL MEDICINE

## 2017-05-14 PROCEDURE — A9270 NON-COVERED ITEM OR SERVICE: HCPCS | Performed by: STUDENT IN AN ORGANIZED HEALTH CARE EDUCATION/TRAINING PROGRAM

## 2017-05-14 PROCEDURE — 99232 SBSQ HOSP IP/OBS MODERATE 35: CPT | Mod: GC | Performed by: INTERNAL MEDICINE

## 2017-05-14 PROCEDURE — 700102 HCHG RX REV CODE 250 W/ 637 OVERRIDE(OP): Performed by: STUDENT IN AN ORGANIZED HEALTH CARE EDUCATION/TRAINING PROGRAM

## 2017-05-14 RX ADMIN — ACYCLOVIR 400 MG: 800 TABLET ORAL at 07:42

## 2017-05-14 RX ADMIN — LEVETIRACETAM 750 MG: 250 TABLET, FILM COATED ORAL at 22:03

## 2017-05-14 RX ADMIN — LEVETIRACETAM 750 MG: 250 TABLET, FILM COATED ORAL at 07:43

## 2017-05-14 RX ADMIN — CARVEDILOL 3.12 MG: 3.12 TABLET, FILM COATED ORAL at 07:43

## 2017-05-14 RX ADMIN — CARVEDILOL 3.12 MG: 3.12 TABLET, FILM COATED ORAL at 17:12

## 2017-05-14 RX ADMIN — LOPERAMIDE HYDROCHLORIDE 2 MG: 2 CAPSULE ORAL at 13:07

## 2017-05-14 RX ADMIN — NYSTATIN 500000 UNITS: 500000 SUSPENSION ORAL at 13:07

## 2017-05-14 RX ADMIN — NYSTATIN 500000 UNITS: 500000 SUSPENSION ORAL at 22:03

## 2017-05-14 RX ADMIN — NYSTATIN 500000 UNITS: 500000 SUSPENSION ORAL at 07:43

## 2017-05-14 RX ADMIN — NYSTATIN 500000 UNITS: 500000 SUSPENSION ORAL at 17:12

## 2017-05-14 RX ADMIN — CEFEPIME 2 G: 2 INJECTION, POWDER, FOR SOLUTION INTRAMUSCULAR; INTRAVENOUS at 05:42

## 2017-05-14 RX ADMIN — TAMSULOSIN HYDROCHLORIDE 0.4 MG: 0.4 CAPSULE ORAL at 07:42

## 2017-05-14 RX ADMIN — ACYCLOVIR 400 MG: 800 TABLET ORAL at 22:03

## 2017-05-14 RX ADMIN — SODIUM CHLORIDE: 9 INJECTION, SOLUTION INTRAVENOUS at 22:08

## 2017-05-14 RX ADMIN — LIDOCAINE 1 PATCH: 50 PATCH CUTANEOUS at 05:42

## 2017-05-14 ASSESSMENT — ENCOUNTER SYMPTOMS
PALPITATIONS: 0
NECK PAIN: 0
DIARRHEA: 1
COUGH: 0
SHORTNESS OF BREATH: 0
CHILLS: 0
NERVOUS/ANXIOUS: 0
DEPRESSION: 0
HEADACHES: 0
MYALGIAS: 0
VOMITING: 0
HEARTBURN: 0
NAUSEA: 0
DIZZINESS: 0
FEVER: 0
ABDOMINAL PAIN: 0
SORE THROAT: 0
BRUISES/BLEEDS EASILY: 0
FOCAL WEAKNESS: 0
BACK PAIN: 0
CONSTIPATION: 0
WEAKNESS: 1
HEMOPTYSIS: 0
COUGH: 1
DIARRHEA: 0

## 2017-05-14 ASSESSMENT — PAIN SCALES - GENERAL
PAINLEVEL_OUTOF10: 0
PAINLEVEL_OUTOF10: 0

## 2017-05-14 NOTE — CARE PLAN
Problem: Infection  Goal: Will remain free from infection  Outcome: PROGRESSING AS EXPECTED  Neutropenic precautions in place, hand hygiene.     Problem: Bowel/Gastric:  Goal: Normal bowel function is maintained or improved  Outcome: PROGRESSING AS EXPECTED  Patient having decreased BM. immodium available.

## 2017-05-14 NOTE — PROGRESS NOTES
[facility] INTERNAL MEDICINE ATTENDING NOTE:      Date & Time note created:   5/14/2017   12:43 PM     Visit Time:   Attending/resident bedside rounds 9-11:30 AM     The patient was evaluated with the resident staff.  I reviewed the resident's note and agree with the resident's findings and plan as documented in the resident's note except as documented in the attending note. Please reference resident daily note for complete information.The chart was reviewed and summarized.  Available labs, imaging, O2 sats, EKGs were reviewed. Available nursing, consultant, and resident notes were reviewed. I am actively involved in the patient's care.                                                                BRIEF DISCUSSION:                                                         Has diarrhea but better after loperamideno abdominal pain . No blood in stool. No distention. Appetite ok.  //CML likely with blast crisis, Anemia, thrombocytopenia and leucopenia: On Chemotherapy Synribo. Planned for BMT in future at [facility]. Monitor for bleeding complications and neutropenic complications.    Transfusions as recommended by Hem/Onc.    Treat Diarrhea with loperamide and monitor. No abdominal signs at this time. C diff negative.  // Neutropenic fever: reported one episode on Oncologist's note.Started on cefepime. CXR, UA ok. Cx neg. No localizing symptoms. Discussed with Oncologist and the plan is stopping Cefepime and observing for now . As the patient seems to have long standing leucopenia and is not expected to rise anytime  would antifungal,antiviral and anti bacterial prophylaxis but d/t his elevated LFT antifungal has been stopped and d/t diarrhea and risk of C diff it was decided to not start Levaquin at this time.  Will keep a very close surveillance.   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 Has elevated PSA and  No Spinal lesions so doubt Prostrate related. CT c/a/p no mass.  SPEP with FERNY  polyclonal  // NATY: Resolved, on IVF, likely prerenal related to BP changes.     // h/o  Hypercalcemia: PTH 6.8, Vit D 22, Likely related to malignancy. PTHrP elevated. PSA elevated as well. CT c/a/p 4/4 s/o possible lesions related to CML. No spinal blastic lesions.    // Valvular Cardiomyopathy, PHTN: No evidence of HF at this time. Cont BB. Monitor fluid status. Outpatient cards follow up.    Echo 3/20: Left ventricular ejection fraction is >70%.  Grade II diastolic dysfunction.Mild-moderate mixed calcific aortic valve disease.  Mean gradient 14 mmHg.Calcific mitral disease with resultant moderate stenosis and moderate regurgitation.Right ventricular systolic pressure is estimated to be 50-55 mmHg.  Normal estimated right atrial pressure.No prior study is available for comparison.    // HTN: BP ok.  // Seizure Disorder: Continue Keppra  // h/o SDH  ----------------------------------------------------------------------------------------------------------------------  Filed Vitals:    05/14/17 0000 05/14/17 0400 05/14/17 0753 05/14/17 1200   BP: 131/81 131/77 141/81 132/77   Pulse: 100 105 105 103   Temp: 37.1 °C (98.7 °F) 37 °C (98.6 °F) 36.9 °C (98.4 °F) 36.3 °C (97.4 °F)   Resp: 18 18 16 18   Height:       Weight:    71.2 kg (156 lb 15.5 oz)   SpO2: 99% 100% 98% 98%     Weight/BMI: Body mass index is 24.58 kg/(m^2).  Pulse Oximetry: 98 %, O2 (LPM): 0, O2 Delivery: None (Room Air)    Intake/Output Summary (Last 24 hours) at 05/14/17 1243  Last data filed at 05/14/17 1000   Gross per 24 hour   Intake   2800 ml   Output   1625 ml   Net   1175 ml       Bianca Chung MD   [employer]

## 2017-05-14 NOTE — PROGRESS NOTES
Oncology/Hematology Progress Note               Author: Mira Huertas    Cc:  CML Date & Time created: 5/14/2017  4:19 PM     Interval History:  Main complaint is pain at the end of penis on urination.  No blood in urine that he has noticed.  No abdominal or pelvic pain. Says he hasn't had any diarrhea today.  Feels tired.  No headaches.  Slight cough.  No shortness of breath or chest pain.     PMHx, PSurgHx, SocHx, FAMHx:  All reviewed and unchanged    Review of Systems:  Review of Systems   Constitutional: Positive for malaise/fatigue. Negative for fever and chills.   HENT: Negative for nosebleeds and sore throat.    Respiratory: Positive for cough. Negative for shortness of breath.    Cardiovascular: Negative for chest pain and leg swelling.   Gastrointestinal: Negative for nausea, vomiting, abdominal pain, diarrhea and constipation.   Genitourinary: Positive for dysuria. Negative for hematuria.   Musculoskeletal: Negative for back pain.   Skin: Negative for rash.   Neurological: Positive for weakness. Negative for focal weakness and headaches.   Endo/Heme/Allergies: Does not bruise/bleed easily.   Psychiatric/Behavioral: The patient is not nervous/anxious.        Physical Exam:  Physical Exam   Constitutional: He is oriented to person, place, and time. He appears well-developed. No distress.   thin   HENT:   Thick thrush on tongue   Eyes: Conjunctivae are normal. No scleral icterus.   Neck: Neck supple.   Cardiovascular: Normal rate and regular rhythm.  Exam reveals no gallop and no friction rub.    Murmur (II/VI throughout, best at LLSB) heard.  Pulmonary/Chest: Effort normal and breath sounds normal. He has no wheezes. He has no rales.   Abdominal: Soft. Bowel sounds are normal. He exhibits no distension. There is no tenderness. There is no rebound.   Genitourinary: Penis normal.   No sores or other abnormality of penis   Musculoskeletal: He exhibits no edema.   Lymphadenopathy:     He has no cervical  adenopathy.   Neurological: He is alert and oriented to person, place, and time.   Skin: Skin is warm and dry. No ecchymosis and no petechiae noted.   Psychiatric: He has a normal mood and affect. His behavior is normal.   Vitals reviewed.      Labs:        Invalid input(s): [id number]      Recent Labs      17   SODIUM  135  132*  130*   POTASSIUM  3.9  3.4*  3.3*   CHLORIDE  110  108  106   CO2  18*  19*  19*   BUN  14  16  16   CREATININE  0.74  0.69  0.68   CALCIUM  8.6  8.9  9.1     Recent Labs      17   ALTSGPT  143*  165*  171*   ASTSGOT  79*  81*  80*   ALKPHOSPHAT  135*  139*  143*   TBILIRUBIN  0.6  0.6  0.6   GLUCOSE  116*  120*  125*     Recent Labs      17   RBC  2.22*  2.47*  2.44*   HEMOGLOBIN  6.4*  7.3*  7.1*   HEMATOCRIT  18.4*  20.3*  20.0*   PLATELETCT  27*  20*  17*     Recent Labs      17   WBC  0.2*  0.2*  0.1*   ASTSGOT  79*  81*  80*   ALTSGPT  143*  165*  171*   ALKPHOSPHAT  135*  139*  143*   TBILIRUBIN  0.6  0.6  0.6     Recent Labs      17   SODIUM  135  132*  130*   POTASSIUM  3.9  3.4*  3.3*   CHLORIDE  110  108  106   CO2  18*  19*  19*   GLUCOSE  116*  120*  125*   BUN  14  16  16   CREATININE  0.74  0.69  0.68   CALCIUM  8.6  8.9  9.1     Hemodynamics:  Temp (24hrs), Av.9 °C (98.4 °F), Min:36.3 °C (97.4 °F), Max:37.1 °C (98.7 °F)  Temperature: 36.3 °C (97.4 °F)  Pulse  Av.3  Min: 60  Max: 118   Blood Pressure : 132/77 mmHg     Respiratory:    Respiration: 18, Pulse Oximetry: 98 %        RUL Breath Sounds: Clear, RML Breath Sounds: Diminished, RLL Breath Sounds: Diminished, WAGNER Breath Sounds: Clear, LLL Breath Sounds: Diminished  Fluids:    Intake/Output Summary (Last 24 hours) at 17 1409  Last data filed at 17 0838   Gross per  24 hour   Intake   1006 ml   Output    575 ml   Net    431 ml     Weight: 71.2 kg (156 lb 15.5 oz)  GI/Nutrition:  Orders Placed This Encounter   Procedures   • Diet Order     Standing Status: Standing      Number of Occurrences: 1      Standing Expiration Date:      Order Specific Question:  Diet:     Answer:  2 Gram Sodium [7]     Order Specific Question:  Texture/Fiber modifications:     Answer:  Dysphagia 2(Pureed/Chopped)specify fluid consistency(question 6) [2]      Comments:  thins ok; pt with no teeth/ nor dentures     Medical Decision Making, by Problem:  Active Hospital Problems    Diagnosis   • Neutropenia (CMS-HCC) [D70.9]   • Pancytopenia (CMS-HCC) [D61.818]   • Acute bilateral low back pain without sciatica [M54.5]   • Bone pain [M89.8X9]   • Transaminitis [R74.0]   • 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  Prior treatments included dasatinib and bosutinib.  The plan has been for allo-BMT at [facility], but needs some response to chemo before they can do the transplant, giving Synribo for this.  Synribo is given as a SQ shot BID x 14 days with the first cycle, followed by 2 weeks off.  Started on 5/6/17.        Synribo is a high risk medication.  There is a 's warning for cerebral bleeding, and to avoid NSAIDS, blood thinners, etc as well as to use with caution in patients with Platelets<50K.  He has a h/o of a SDH in the recent past.   -- continue Synribo.  Aware that actual dose is slightly above BSA-based dose, OK up to 10% difference. If this persists, may need to adjust dose with cycle 2 (comes in pre-filled syringes so can't be adjusted after order arrives)  -- tolerating with main toxicity of diarrhea and elevated LFTs  -- monitor for Headaches or AMS changes    -- duration of hospitalization as per Dr. Vu, primary oncologist.  Had initially been communicated that plan for for 14 day  hospitalization to get Synribo but Dr. Vu now favors treating more like standard acute luekemia induction with prolonged stay until counts improve.  Patient is willing to do whatever needs to be done.     2.  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  Not likely to improve unless he can get response to some kind of chemo.  Will transfuse as needed to keep >10, or higher if bleeding.  3.  Neutropenia--nothing documented in chart but as per Dr. Agustin's note, had fever late in day on 5/7.  BCx and CXR negative.  4.  Elevated LFT's.  Worsening.  Potentially due to Synribo but would also like to discontinue/change any other medications that might contribute.  Recommend d/c fluconzole and continue with nystatin alone at this time.    5.  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  If recurrent pain might consider RT.  6. Anemia-- transfuse 1 unit of PRBC's if <7.5  7.  Diarrhea.  A common side effect of Synribo. He was started on immodium on 5/10/17 with improvement. Antibiotics may have also been contributing.    8. Subjective urinary retention and dysuria.  PVR on 5/13/17 was 33 cc per RN.  UA with only trace blood.    9.  Thrush.  Nystatin.     - antibiotics to be d/c'd as afebrile and cultures negative.  Still at high risk for infection but diarrhea and LFT elevations might be due to antibiotics as well so trying to minimize excess medications.   - he continues to complain of pain at tip of penis on urination, no abnormality on physical exam, no major abnormalities of UA.  Symptomatic management for now  - continue supportive care      Discussed with patient, [facility] team, pharmacist, and RN.     Highly complex patient with high risk of morbidity and mortality.     Labs reviewed and Medications reviewed  Mahajan catheter: No Mahajan      DVT Prophylaxis: Contraindicated - High bleeding risk

## 2017-05-14 NOTE — PROGRESS NOTES
Mariela from Lab called with critical result of WBC 0.1, Plt 17 at 0100. Critical lab result read back to Mariela.   This critical lab result is within parameters established by [facility] for this patient

## 2017-05-14 NOTE — PROGRESS NOTES
Assumed care of pt at 1900. Bedside report received. AAOx4, vital signs noted. Chemo and protective precautions in place. PIV patent, infusing IVF. Up with 1 assist to BSC, unsteady, pt does not always call appropriately, bed alarm on and sounding. POC discussed, call light in reach, pt calls for assistance, all needs met at this time.

## 2017-05-14 NOTE — PROGRESS NOTES
[facility] Internal Medicine Interval Note    Name Benjamin Post       1945   Age/Sex 71 y.o. male   MRN [id number]   Code Status DNR     After 5PM or if no immediate response to page, please call for cross-coverage  Attending/Team: Dr. Chung/Ly Call [phone] to page   1st Call - Day Intern (R1):   Dr Méndez 2nd Call - Day Sr. Resident (R2/R3):            Chief complaint/ reason for interval visit (Primary Diagnosis)   CML/pancytopenia     Interval Problem Daily Status Update  :  On  chemotherapy synribo (today is day 9).   Hb and Plt are stable with no need for transfusion today.   No further fever, diarrhea is improving with loperamide  Patient is c. Diff negative  No other complaints today        Principal Problem:    CML (chronic myelocytic leukemia)-Accelerated phase (Chronic) POA: Yes      Overview: multiple relapses.      On chemotherapy.   Active Problems:    Pancytopenia (CMS-HCC) (Chronic) POA: Yes     On cefepime.   Bone pain POA: Yes    Pain is controlled.            Review of Systems   Constitutional: Negative for fever and chills.   HENT: Negative for hearing loss.    Respiratory: Negative for cough and hemoptysis.    Cardiovascular: Negative for chest pain and palpitations.   Gastrointestinal: Positive for diarrhea. Negative for heartburn, nausea and abdominal pain.        Improving diarrhea   Genitourinary: Negative for dysuria and urgency.   Musculoskeletal: Negative for myalgias and neck pain.   Skin: Negative for rash.   Neurological: Negative for dizziness and headaches.   Psychiatric/Behavioral: Negative for depression and suicidal ideas.       Consultants/Specialty  Oncology - [place]    Disposition  Home after chemotherapy     Quality Measures  EKG reviewed, Labs reviewed, Medications reviewed and Radiology images reviewed  Mahajan catheter: No Mahajan      DVT Prophylaxis: Contraindicated - High bleeding  risk  DVT prophylaxis - mechanical: SCDs (low plt)                Physical Exam       Filed Vitals:    05/14/17 0000 05/14/17 0400 05/14/17 0753 05/14/17 1200   BP: 131/81 131/77 141/81 132/77   Pulse: 100 105 105 103   Temp: 37.1 °C (98.7 °F) 37 °C (98.6 °F) 36.9 °C (98.4 °F) 36.3 °C (97.4 °F)   Resp: 18 18 16 18   Height:       Weight:    71.2 kg (156 lb 15.5 oz)   SpO2: 99% 100% 98% 98%     Body mass index is 24.58 kg/(m^2). Weight: 71.2 kg (156 lb 15.5 oz)  Oxygen Therapy:  Pulse Oximetry: 98 %, O2 (LPM): 0, O2 Delivery: None (Room Air)    Physical Exam   Constitutional: He is well-developed, well-nourished, and in no distress. No distress.   HENT:   Head: Normocephalic.   Neck: No tracheal deviation present. No thyromegaly present.   Cardiovascular: Normal rate.  Exam reveals no friction rub.    Murmur heard.  Pansystolic murmur on the apex    Pulmonary/Chest: Effort normal. No respiratory distress. He has no wheezes.   Abdominal: Soft. He exhibits no distension. There is no tenderness.   Musculoskeletal: He exhibits no edema.   Neurological: He is alert.   Skin: No erythema.         Lab Data Review:      5/4/2017  1:28 PM    Recent Labs      05/11/17 2354 05/13/17 0012 05/13/17   2347   SODIUM  135  132*  130*   POTASSIUM  3.9  3.4*  3.3*   CHLORIDE  110  108  106   CO2  18*  19*  19*   BUN  14  16  16   CREATININE  0.74  0.69  0.68   CALCIUM  8.6  8.9  9.1       Recent Labs      05/11/17 2354 05/13/17   0012  05/13/17   2347   ALTSGPT  143*  165*  171*   ASTSGOT  79*  81*  80*   ALKPHOSPHAT  135*  139*  143*   TBILIRUBIN  0.6  0.6  0.6   GLUCOSE  116*  120*  125*       Recent Labs      05/11/17 2354 05/13/17 0012  05/13/17   2347   RBC  2.22*  2.47*  2.44*   HEMOGLOBIN  6.4*  7.3*  7.1*   HEMATOCRIT  18.4*  20.3*  20.0*   PLATELETCT  27*  20*  17*       Recent Labs      05/11/17   2354  05/13/17 0012  05/13/17 2347   WBC  0.2*  0.2*  0.1*   ASTSGOT  79*  81*  80*   ALTSGPT  143*  165*  171*    ALKPHOSPHAT  135*  139*  143*   TBILIRUBIN  0.6  0.6  0.6           Assessment/Plan     * CML (chronic myelocytic leukemia)-Accelerated phase (present on admission)  Overview  multiple relapses.    Was treated on dasatinib for a time with response, but then relapse.    Was more recently on bosutinib with response, but again recent relapse.   Pancytopenia (chronic, before starting with chemotherapy)   Protein electrophoresis: the polyclonal increase in the gamma region which may be indicative of specific immune   response or an early monoclonal protein.     Assessment & Plan  Has completed multiple chemotherapy cycles.       Started on 5/6/17 with chemotherapy (synribo ) BID for 14 days, (today is 8).  Developed diarrhea on 5/9>>gets better with loperamide.   C, diff negative, can continue loperamide to good effect  Labs daily for follow up side effects.   FERNY high IgM.  Oncology(janny underwood) following, appreciate the recommendations    Pancytopenia (CMS-HCC) (present on admission)  Overview  - Pancytopenia secondary to CML and chemotherapy  - WBC: 0.5>>0.2  - Given one unit of platelets on 5/4/17.   - Anemia with Hb: 7.7>>7.6>>6.8>>7.8>6.4>7.8  - platelet: 11>>7>>28>>20>10>>23>>15>>7>>27  - Hx of Chronic Right Subdural Hematoma    Assessment & Plan  Transfused 1 unit irradiated platelets on 4/30, 5/4/17,  5/8/17 and 5/11  PRBC one unit on 4/30, 5/8/17 and 5/12/17  Follow up and keep plt>10 and Hb>7.  Started with cefepim on 5/7/17 due to fever 100.4, Urine and blood culture have been negative since, will discontinue today (recieved 7 day course)  Started with acyclovir and fluconazole on 5/9 for prophylaxis.     Neutropenia (CMS-HCC) (present on admission)  Overview  Chronic   WBC:0.4>>0.3>>0.2      Assessment & Plan  Fever 100.4 on 5/7 on the the day 2 of chemotherapy>>started cefepim, discontinue as above   On acyclovir and fluconazole for prophylaxis  Urine and blood culture negative  Will hold off on  prophylactic levaquin for now due to high risk for c. 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    - Pain is controlled, Patient feeling ok.    - Follow up and consider radiation therapy if directed by oncology    Elevated PSA (present on admission)  Overview  PSA: 8.4 on 4/4/17    Assessment & Plan  - Stable, can continue workup on outpatient basis    Diarrhea  Overview  Watery diarrhea  Started after chemotherapy.     Assessment & Plan  Most likely related to chemo  Loperamide PRN  Cdiff was ordered.   monitor electrolytes and continue IV fluid.       Transaminitis (present on admission)  Overview  Related to chemo therapy.     Assessment & Plan  AST/ALT: 46/89>>61/111>>71/134>>80/171  No abdominal pain, likely effect of chemotherapy  Discontinue all hepatotoxic drugs as possible  Continue to follow    BPH (benign prostatic hyperplasia) (present on admission)  Assessment & Plan  Stable, no issues voiding  Continue on tamsulosin 0.4 mg daily.     Low vitamin D level (present on admission)  Overview  Vit D: 11 on 4/29/17    Assessment & Plan  Continue supplementation.     Seizure disorder (CMS-HCC) (present on admission)  Assessment & Plan  Stable    Continue on his home dose of keppra

## 2017-05-14 NOTE — PROGRESS NOTES
Chemotherapy Verification - SECONDARY RN       Height = 170.2 cm  Weight = 70 kg  BSA = 1.82 m2       Medication: Omacetaxine  Dose: 1.25 mg/m2  Calculated Dose: 2.275 (Ordered dose: 2.4 mg)  Okay for >5% per MD.                            (In mg/m2, AUC, mg/kg)     I confirm that this process was performed independently.

## 2017-05-14 NOTE — PROGRESS NOTES
Received report from NOC RN, assumed care of patient at 0700. Patient is awake alert and oriented x 4. He is up with one and unsteady on his feet. Patient calling appropriately this morning for assistance. Bed alarm is in place. Patient up to chair and back to bed. Patient denies pain this morning. POC discussed. Call light within reach, bed in low and locked position.

## 2017-05-14 NOTE — PROGRESS NOTES
Chemotherapy Verification - PRIMARY RN      Height = 170.2 cm  Weight = 70 kg  BSA = 1.82 m2      Medication: Omacetaxine  Dose: 1.25 mg/m2  Calculated Dose:  2.275                            (In mg/m2, AUC, mg/kg)     Ordered dose: 2.4 mg   Verified with Dr. Wagner to give when greater than 5%    I confirm this process was performed independently with the BSA and all final chemotherapy dosing calculations congruent.  Any discrepancies of 5% or greater have been addressed with the chemotherapy pharmacist. The resolution of the discrepancy has been documented in the EPIC progress notes.

## 2017-05-15 PROBLEM — R30.0 DYSURIA: Status: ACTIVE | Noted: 2017-05-15

## 2017-05-15 LAB
ABO GROUP BLD: NORMAL
ALBUMIN SERPL BCP-MCNC: 2.8 G/DL (ref 3.2–4.9)
ALBUMIN/GLOB SERPL: 0.9 G/DL
ALP SERPL-CCNC: 151 U/L (ref 30–99)
ALT SERPL-CCNC: 169 U/L (ref 2–50)
ANION GAP SERPL CALC-SCNC: 5 MMOL/L (ref 0–11.9)
APPEARANCE UR: CLEAR
AST SERPL-CCNC: 88 U/L (ref 12–45)
BACTERIA #/AREA URNS HPF: ABNORMAL /HPF
BARCODED ABORH UBTYP: 5100
BARCODED ABORH UBTYP: 5100
BARCODED ABORH UBTYP: 6200
BARCODED PRD CODE UBPRD: NORMAL
BARCODED UNIT NUM UBUNT: NORMAL
BILIRUB SERPL-MCNC: 0.6 MG/DL (ref 0.1–1.5)
BILIRUB UR QL STRIP.AUTO: NEGATIVE
BLD GP AB SCN SERPL QL: NORMAL
BUN SERPL-MCNC: 14 MG/DL (ref 8–22)
CALCIUM SERPL-MCNC: 8.6 MG/DL (ref 8.5–10.5)
CHLORIDE SERPL-SCNC: 107 MMOL/L (ref 96–112)
CO2 SERPL-SCNC: 20 MMOL/L (ref 20–33)
COLOR UR: ABNORMAL
COMPONENT P 8504P: NORMAL
COMPONENT R 8504R: NORMAL
COMPONENT R 8504R: NORMAL
CREAT SERPL-MCNC: 0.61 MG/DL (ref 0.5–1.4)
ERYTHROCYTE [DISTWIDTH] IN BLOOD BY AUTOMATED COUNT: 38.6 FL (ref 35.9–50)
ERYTHROCYTE [DISTWIDTH] IN BLOOD BY AUTOMATED COUNT: 40.1 FL (ref 35.9–50)
GFR SERPL CREATININE-BSD FRML MDRD: >60 ML/MIN/1.73 M 2
GLOBULIN SER CALC-MCNC: 3.1 G/DL (ref 1.9–3.5)
GLUCOSE SERPL-MCNC: 124 MG/DL (ref 65–99)
GLUCOSE UR STRIP.AUTO-MCNC: NEGATIVE MG/DL
HCT VFR BLD AUTO: 19.1 % (ref 42–52)
HCT VFR BLD AUTO: 22.3 % (ref 42–52)
HGB BLD-MCNC: 6.9 G/DL (ref 14–18)
HGB BLD-MCNC: 8.1 G/DL (ref 14–18)
HYALINE CASTS #/AREA URNS LPF: ABNORMAL /LPF
KETONES UR STRIP.AUTO-MCNC: NEGATIVE MG/DL
LEUKOCYTE ESTERASE UR QL STRIP.AUTO: NEGATIVE
MCH RBC QN AUTO: 29.1 PG (ref 27–33)
MCH RBC QN AUTO: 29.8 PG (ref 27–33)
MCHC RBC AUTO-ENTMCNC: 36.1 G/DL (ref 33.7–35.3)
MCHC RBC AUTO-ENTMCNC: 36.3 G/DL (ref 33.7–35.3)
MCV RBC AUTO: 80.6 FL (ref 81.4–97.8)
MCV RBC AUTO: 82 FL (ref 81.4–97.8)
MICRO URNS: ABNORMAL
MUCOUS THREADS #/AREA URNS HPF: ABNORMAL /HPF
NEUTROPHILS # BLD AUTO: ABNORMAL K/UL (ref 1.82–7.42)
NITRITE UR QL STRIP.AUTO: NEGATIVE
NRBC # BLD AUTO: 0 K/UL
NRBC BLD AUTO-RTO: 0 /100 WBC
PH UR STRIP.AUTO: 6 [PH]
PLATELET # BLD AUTO: 10 K/UL (ref 164–446)
PLATELET # BLD AUTO: 32 K/UL (ref 164–446)
PMV BLD AUTO: 10.6 FL (ref 9–12.9)
PMV BLD AUTO: 10.7 FL (ref 9–12.9)
POTASSIUM SERPL-SCNC: 3.2 MMOL/L (ref 3.6–5.5)
PRODUCT TYPE UPROD: NORMAL
PROT SERPL-MCNC: 5.9 G/DL (ref 6–8.2)
PROT UR QL STRIP: NEGATIVE MG/DL
RBC # BLD AUTO: 2.37 M/UL (ref 4.7–6.1)
RBC # BLD AUTO: 2.72 M/UL (ref 4.7–6.1)
RBC # URNS HPF: ABNORMAL /HPF
RBC UR QL AUTO: ABNORMAL
RH BLD: NORMAL
SODIUM SERPL-SCNC: 132 MMOL/L (ref 135–145)
SP GR UR STRIP.AUTO: 1.01
UNIT STATUS USTAT: NORMAL
WBC # BLD AUTO: 0.1 K/UL (ref 4.8–10.8)
WBC # BLD AUTO: 0.1 K/UL (ref 4.8–10.8)
WBC #/AREA URNS HPF: ABNORMAL /HPF

## 2017-05-15 PROCEDURE — 700102 HCHG RX REV CODE 250 W/ 637 OVERRIDE(OP): Performed by: STUDENT IN AN ORGANIZED HEALTH CARE EDUCATION/TRAINING PROGRAM

## 2017-05-15 PROCEDURE — 86901 BLOOD TYPING SEROLOGIC RH(D): CPT

## 2017-05-15 PROCEDURE — 80053 COMPREHEN METABOLIC PANEL: CPT

## 2017-05-15 PROCEDURE — 81001 URINALYSIS AUTO W/SCOPE: CPT

## 2017-05-15 PROCEDURE — 86900 BLOOD TYPING SEROLOGIC ABO: CPT

## 2017-05-15 PROCEDURE — A9270 NON-COVERED ITEM OR SERVICE: HCPCS | Performed by: STUDENT IN AN ORGANIZED HEALTH CARE EDUCATION/TRAINING PROGRAM

## 2017-05-15 PROCEDURE — 700102 HCHG RX REV CODE 250 W/ 637 OVERRIDE(OP): Performed by: INTERNAL MEDICINE

## 2017-05-15 PROCEDURE — A9270 NON-COVERED ITEM OR SERVICE: HCPCS | Performed by: INTERNAL MEDICINE

## 2017-05-15 PROCEDURE — 86850 RBC ANTIBODY SCREEN: CPT

## 2017-05-15 PROCEDURE — 87086 URINE CULTURE/COLONY COUNT: CPT

## 2017-05-15 PROCEDURE — 770009 HCHG ROOM/CARE - ONCOLOGY SEMI PRI*

## 2017-05-15 PROCEDURE — 85025 COMPLETE CBC W/AUTO DIFF WBC: CPT

## 2017-05-15 PROCEDURE — P9034 PLATELETS, PHERESIS: HCPCS

## 2017-05-15 PROCEDURE — 99232 SBSQ HOSP IP/OBS MODERATE 35: CPT | Mod: GC | Performed by: INTERNAL MEDICINE

## 2017-05-15 PROCEDURE — 86644 CMV ANTIBODY: CPT

## 2017-05-15 PROCEDURE — 36415 COLL VENOUS BLD VENIPUNCTURE: CPT

## 2017-05-15 PROCEDURE — 700101 HCHG RX REV CODE 250: Performed by: STUDENT IN AN ORGANIZED HEALTH CARE EDUCATION/TRAINING PROGRAM

## 2017-05-15 PROCEDURE — 85027 COMPLETE CBC AUTOMATED: CPT

## 2017-05-15 PROCEDURE — P9016 RBC LEUKOCYTES REDUCED: HCPCS

## 2017-05-15 PROCEDURE — 86923 COMPATIBILITY TEST ELECTRIC: CPT

## 2017-05-15 PROCEDURE — 36430 TRANSFUSION BLD/BLD COMPNT: CPT

## 2017-05-15 RX ORDER — POTASSIUM CHLORIDE 1.5 G/1.58G
40 POWDER, FOR SOLUTION ORAL ONCE
Status: COMPLETED | OUTPATIENT
Start: 2017-05-15 | End: 2017-05-15

## 2017-05-15 RX ORDER — LOPERAMIDE HYDROCHLORIDE 2 MG/1
2 CAPSULE ORAL PRN
Status: DISCONTINUED | OUTPATIENT
Start: 2017-05-15 | End: 2017-05-25

## 2017-05-15 RX ORDER — PHENAZOPYRIDINE HYDROCHLORIDE 200 MG/1
100 TABLET, FILM COATED ORAL
Status: DISCONTINUED | OUTPATIENT
Start: 2017-05-15 | End: 2017-05-17

## 2017-05-15 RX ADMIN — POTASSIUM CHLORIDE 40 MEQ: 1.5 POWDER, FOR SOLUTION ORAL at 18:49

## 2017-05-15 RX ADMIN — LEVETIRACETAM 750 MG: 250 TABLET, FILM COATED ORAL at 10:08

## 2017-05-15 RX ADMIN — LOPERAMIDE HYDROCHLORIDE 2 MG: 2 CAPSULE ORAL at 14:31

## 2017-05-15 RX ADMIN — ACETAMINOPHEN 650 MG: 325 TABLET, FILM COATED ORAL at 04:51

## 2017-05-15 RX ADMIN — NYSTATIN 500000 UNITS: 500000 SUSPENSION ORAL at 10:08

## 2017-05-15 RX ADMIN — CARVEDILOL 3.12 MG: 3.12 TABLET, FILM COATED ORAL at 18:46

## 2017-05-15 RX ADMIN — LIDOCAINE 1 PATCH: 50 PATCH CUTANEOUS at 10:14

## 2017-05-15 RX ADMIN — ACYCLOVIR 400 MG: 800 TABLET ORAL at 20:34

## 2017-05-15 RX ADMIN — NYSTATIN 500000 UNITS: 500000 SUSPENSION ORAL at 18:46

## 2017-05-15 RX ADMIN — ACYCLOVIR 400 MG: 800 TABLET ORAL at 10:06

## 2017-05-15 RX ADMIN — TAMSULOSIN HYDROCHLORIDE 0.4 MG: 0.4 CAPSULE ORAL at 10:08

## 2017-05-15 RX ADMIN — NYSTATIN 500000 UNITS: 500000 SUSPENSION ORAL at 20:33

## 2017-05-15 RX ADMIN — LEVETIRACETAM 750 MG: 250 TABLET, FILM COATED ORAL at 20:33

## 2017-05-15 RX ADMIN — LOPERAMIDE HYDROCHLORIDE 2 MG: 2 CAPSULE ORAL at 10:09

## 2017-05-15 RX ADMIN — CARVEDILOL 3.12 MG: 3.12 TABLET, FILM COATED ORAL at 10:07

## 2017-05-15 RX ADMIN — DIPHENHYDRAMINE HCL 25 MG: 25 TABLET ORAL at 04:51

## 2017-05-15 RX ADMIN — PHENAZOPYRIDINE 100 MG: 200 TABLET ORAL at 18:46

## 2017-05-15 ASSESSMENT — PAIN SCALES - GENERAL
PAINLEVEL_OUTOF10: 0

## 2017-05-15 ASSESSMENT — ENCOUNTER SYMPTOMS
PALPITATIONS: 0
DIARRHEA: 1
NECK PAIN: 0
HEARTBURN: 0
ABDOMINAL PAIN: 0
FEVER: 0
NAUSEA: 0
MYALGIAS: 0
DIZZINESS: 0
COUGH: 0
DEPRESSION: 0
HEADACHES: 0
HEMOPTYSIS: 0
CHILLS: 0

## 2017-05-15 NOTE — PROGRESS NOTES
Assumed care of patient @ 1900. Bedside report received. Patient AO x4. VSS,  Pain 0/10. Fall precautions in place, PIV infusing. POC discussed with patient, all questions answered, call light within reach, hourly rounding in place.

## 2017-05-15 NOTE — PROGRESS NOTES
Chemotherapy Verification - PRIMARY RN      Height = 1.7m  Weight = 71.2kg  BSA = 1.83m2       Medication: Sinribo  Dose: 1.25mg/m2  Calculated Dose: 2.28mg  (2.4mg= ordered dose)                             (In mg/m2, AUC, mg/kg)                 I confirm this process was performed independently with the BSA and all final chemotherapy dosing calculations congruent.  Any discrepancies of 5% or greater have been addressed with the chemotherapy pharmacist. The resolution of the discrepancy has been documented in the EPIC progress notes.

## 2017-05-15 NOTE — PROGRESS NOTES
Pt is A&O.  Having multiple loose BMs this am.  Administered Loperamide as ordered with some help.  Stopped oatmeal being served at every meal.  Pain to tip of penis.  MDs aware.  No obvious signs of infection or trauma.  Good uop this am.  Pt does not always call, thus bed alarm is on.  Neutropenic precautions in place.  Afebrile.  VSS.

## 2017-05-15 NOTE — DISCHARGE SUMMARY
[facility] Medicine Discharge Summary      Admit Date:  4/28/2017       Discharge Date:   6/15/ 2017    Service:   [facility] Internal Medicine purple Team  Attending Physician(s):   Dr. Juliet MD     Senior Resident(s):  Dr. Sarbjit Kent MD  [place]ant Resident(s):   Dr. Sherman Sears MD      Primary Diagnosis:     CML(chronic Myelocytic Leukemia.     Secondary Diagnoses:                    CML (chronic myelocytic leukemia)-Accelerated phase , POA    Pancytopenia (CMS-HCC) (Chronic    MSSA bacteremia     Fungal infection    Bone pain POA: Yes    Seizure disorder (CMS-HCC) POA: Yes    Transaminitis POA: Yes    BPH (benign prostatic hyperplasia) (Chronic) POA: Yes    Low vitamin D level (Chronic) POA: Yes    Resolved Problems:    diarrhea      Hospital Summary (Brief Narrative):       70 yo  French-speaking  male with hx of CML complicated with lytic lesion on right 8th rib admitted due to worsening back/rib pain for last 4 days before admission,after he finished the chemo regimen on 4/26. On the admission the patient denied falls/injury/trauma, fever, chills, sob/chest pain/cough/n/v/d/abd pain/ble swelling/headache/vision changes.     The patient has CML with multiple relapses, he was treated with dasatinib for a time with response, but then relapsed, recently he was on bosutinib with response, but again recent relapse, he has chronic pancytopenia (chronic, before starting with chemotherapy), the patient was evaluated by the oncologist and after long discussion with the patient and the family and explained to them the disease and the treatment and the risk and benefit of the chemotherapy, the patient was willing to start with chemo therapy which Started on 5/6/17 with chemotherapy (synribo ) BID for 14 days, during the chemotherapy the patient developed fever and cefepime was started and he needed many transfusion of RBC and Plt, and he had diarrhea which responded to loperamide. His blood culture  initially was positive to MSSA, shifted to cefazolin for about 12 days. Fever subsided, Then he  Received the 2nd cycle of Synribo for 4 days. Bone Marrow biopsy done on 6/2 and showed: hypocellular with blast less than 10%, considered responded to chemotherapy.     On 6/10, the  Patient developed another attack of fever with temperature up to 39, but repeat blood cxs are neg from 6/5/17 and 6/10/17, cefepime stopped and started him on  zosyn. Day of start 6/10, plan for at least 6 weeks of IV ABx.  new CXR showed : right middle lobe atelectasis and right lower lobe opacities/possible pneumonia. Small right pleural effusion that has increased in volume.  CT chest done on 6/7 showed Ill-defined nodular densities are present in both lungs, measuring up to 8 mm, possible septic emboli vs. Metastasis. Beta-glucan positive, galactomannan is positive. Possible fungal infection. Oncology and infectious disease were on board during hospitalization. He is on voriconazole and acyclovir.  He is afebrile since 6/12, to be transferred to [facility] level of care for bone marrow transplant as there is no bone marrow transplant in [facility].             Patient /Hospital Summary (Details -- Problem Oriented) :           CML (chronic myelocytic leukemia)-Accelerated phase    Was treated on dasatinib for a time with response, but then relapse. Then treated with bosutinib with response, but again recent relapse.   Pancytopenia (chronic, before starting with chemotherapy)   Protein electrophoresis: the polyclonal increase in the gamma region which may be indicative of specific immune   response or an early monoclonal protein.       During this admission, Started on 5/6/17 with chemotherapy (synribo ) BID for 14 days, then second cycle for 4 days.  Developed diarrhea on 5/9>>, responded to  loperamide.   C diff negative  Oncology was on board    Pancytopenia (CMS-HCC)    Pancytopenia secondary to CML and chemotherapy    received platelet and blood transfusion  Transfused 1 unit irradiated platelets on 4/30, 5/4/17,  5/8/17 and 5/11  Plan was to keep plt>10 and Hb>7.    Neutropenia (CMS-HCC)  Chronic   WBC 0.1  Fever 100.4 on 5/7 on the the day 2 of chemotherapy>>started cefepim, discontinue as above, shifted to cefazolin for 12 days. Blood culture was positive to MSSA. Fever subsided. Developed fever after 2nd cycle of Synribo, shifted to  zosyn. Blood culture came negative in the second time  On acyclovir and fluconazole for prophylaxis  Urine and blood culture negative      MSSA bacteremia     Fever 100.4 on 5/7 on the the day 2 of chemotherapy>>started cefepim, discontinued as above, shifted to cefazolin for 12 days. Blood culture was positive to MSSA. Fever subsided. Developed fever after 2nd cycle of Synribo, shifted to zosyn. Blood culture came negative in the second time  TTE and BREANNA neg for vegetation,    Heart murmur seems to be chronic due to probably TR  To Continue on  zosyn for 6 weeks, day of start 6/10      Fungal infection  Both beta D glucagon and galactomannan antigen are positive. 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  - since 4/26 patient experienced worsening mid-low back bone pain along with specific right 8th rib (confirmed from bone scan) bone pain with no other neurological deficits or other complaints  - Ct imaging shows slight worsening of lucency of rib and of the vertrebral body from 4th of April  - MRI showed  lytic lesions on T5 and 5ft rib   - managed with pain management of bone lytic pain with morphine IV with breakthrough oxycodone PO and lidocaine patch, pain resolved     Hypertension-uncontrolled  BP elevated  On amlodipine and carvedilol and lisinopril  Hydralazine on board    Diarrhea  resolved  Watery  diarrhea  Started after chemotherapy.   Most likely related to chemo. Responded to loperamide    Hx of Chronic Right Subdural Hematoma      Transaminitis  Related to chemo therapy.   No abdominal pain, likely effect of chemotherapy  Discontinued all hepatotoxic drugs as possible      BPH (benign prostatic hyperplasia)    Stable, no issues voiding  Continued on tamsulosin 0.4 mg daily.     Low vitamin D level  Vit D: 11 on 4/29/17  Continued supplementation.     Seizure disorder (CMS-HCC)    Stable    Continued on his home dose of keppra      Consultants:       Infectious disease specialist   oncology    Procedures:        Bone marrow biopsy    Imaging/ Testing:        CT thorax    Small right pleural effusion, slightly increased since prior study.  Hazy and linear bilateral lung based densities favoring atelectasis, infiltrates on the right are not excluded.  Pulmonary nodules, predominantly on the left, given history of malignancy raises concern for metastatic disease. Overall appear stable since prior study.  Cardiomegaly    CT abdomen and Pelvis    Multiple new ill-defined pulmonary nodules involving both lungs, which may be infectious/inflammatory or metastatic.  Septic emboli are a consideration.  Minimal bilateral pleural effusions with associated atelectasis.  Apparent lytic lesions in the T5 vertebral body on the LEFT and adjacent LEFT medial 5th rib, likely metastatic.  Gallbladder wall thickening, concerning for inflammation.  Small amount of peritoneal fluid, of uncertain etiology.  Abnormal for male patient, raising concern for intra-abdominal inflammatory process.  Markedly enlarged heterogeneous prostate.    CT head    Diffuse RIGHT hemispheric dural thickening, as seen previously.  Possibly sequela of prior subdural hematoma, however tumor and infection are also considerations.  No focal cerebritis or intra-axial mass.  Diffuse atrophy.  No gross intracranial hemorrhage however presence of intravenous  contrast may obscure small amounts of subarachnoid hemorrhage    BREANNA    Normal left ventricular size and systolic function. Mild concentric   left ventricular hypertrophy.   Structurally normal mitral valve. Mild mitral regurgitation. No   valvular vegetations.   Aortic sclerosis with possibly mild stenosis. Mild aortic   insufficiency. Vena contracta is 0.3 cm. No valvular vegetations.   Structurally normal tricuspid valve. At least mild tricuspid   regurgitation. No valvular vegetations.   Structurally normal pulmonic valve. No valvular vegetations.    MRI Spine    1.  Diffuse inhomogeneous infiltration of visualized thoracic spine consistent with chronic myeloid leukemia. There is no epidural tumor or spinal canal compromise.  2.  Small multiple lung nodules. This was noted on the previous CT scan dated 6/7/2017.  3.  Fluid along the right lung major fissure.This was noted on the previous CT scan dated    Discharge Medications:         Medication Reconciliation: Completed         Medication List      ASK your doctor about these medications       Instructions    Bosutinib 100 MG Tabs    Take 300 mg by mouth every day.   Dose:  300 mg       carvedilol 3.125 MG Tabs   Last time this was given:  12.5 mg on 6/15/2017  8:31 AM   Commonly known as:  COREG    Take 3.125 mg by mouth 2 times a day, with meals.   Dose:  3.125 mg       levetiracetam 500 MG Tabs   Last time this was given:  750 mg on 6/15/2017  8:32 AM   Commonly known as:  KEPPRA    Take 750 mg by mouth 2 times a day.   Dose:  750 mg       levofloxacin 500 MG tablet   Commonly known as:  LEVAQUIN    Take 500 mg by mouth every day.   Dose:  500 mg       oxycodone immediate-release 5 MG Tabs   Last time this was given:  5 mg on 5/21/2017 12:25 PM   Commonly known as:  ROXICODONE    Take 1 Tab by mouth every four hours as needed (Moderate Pain; (Pain scale 4-6)).   Dose:  5 mg       PRESERVISION AREDS 2 Caps    Take 1 Cap by mouth every day.   Dose:  1 Cap        tamsulosin 0.4 MG capsule   Last time this was given:  0.8 mg on 6/15/2017  8:31 AM   Commonly known as:  FLOMAX    Take 0.4 mg by mouth every morning.   Dose:  0.4 mg           Disposition:   [facility]    Diet:   Dysphagia diet    Activity:   As tolerated    Instructions:         The patient was instructed to return to the ER in the event of worsening symptoms. I have counseled the patient on the importance of compliance and the patient has agreed to proceed with all medical recommendations and follow up plan indicated above.   The patient understands that all medications come with benefits and risks. Risks may include permanent injury or death and these risks can be minimized with close reassessment and monitoring.        Primary Care Provider:      Discharge summary faxed to primary care provider:  Completed  Copy of discharge summary given to the patient: Completed    Follow up appointment details :      To be transferred to  [facility]    Pending Studies:          Aspergillus IgG is pending    Time spent on discharge day patient visit, preparing discharge paperwork and arranging for patient follow up.    Summary of follow up issues:       Discharge Time (Minutes) :    40 min      Condition on Discharge      ______________________________________________________________________    Interval history/exam for day of discharge:       The patient has no fever for the last 2 days, asymptomatic, no chest pain or SOB, the rib pain seems resolving    Filed Vitals:    06/14/17 1624 06/14/17 2005 06/15/17 0420 06/15/17 0800   BP: 166/80 143/74 168/78 167/74   Pulse: 62 73 74 62   Temp: 36.3 °C (97.4 °F) 36.4 °C (97.6 °F) 36.9 °C (98.5 °F) 37 °C (98.6 °F)   Resp: 18 18 17 17   Height:       Weight:  65.9 kg (145 lb 4.5 oz)     SpO2: 100% 96% 97% 95%     Weight/BMI: Body mass index is 22.75 kg/(m^2).  Pulse Oximetry: 95 %, O2 (LPM): 0, O2 Delivery: None (Room Air)    General: Constitutional: He is well-developed,  well-nourished, and in no distress. No distress.   Cardiovascular: Normal rate.  Exam reveals no friction rub.     Murmur heard.  Ejection systolic murmur graded 3/6 with maximal intensity at tricuspid area, no thrill or radiation.    Pulmonary/Chest: Effort normal. No respiratory distress. He has no wheezes. He has no rales.     Most Recent Labs:    Lab Results   Component Value Date/Time    WBC 0.1* 06/15/2017 11:31 AM    RBC 4.29* 06/15/2017 11:31 AM    HEMOGLOBIN 12.0* 06/15/2017 11:31 AM    HEMATOCRIT 34.9* 06/15/2017 11:31 AM    MCV 81.4 06/15/2017 11:31 AM    MCH 28.0 06/15/2017 11:31 AM    MCHC 34.4 06/15/2017 11:31 AM    MPV 9.4 06/15/2017 11:31 AM    NEUTROPHILS-POLYS 52.40 05/08/2017 11:59 PM    LYMPHOCYTES 38.10 05/08/2017 11:59 PM    MONOCYTES 4.80 05/08/2017 11:59 PM    EOSINOPHILS 0.00 05/08/2017 11:59 PM    BASOPHILS 4.70* 05/08/2017 11:59 PM    HYPOCHROMIA 1+ 08/07/2016 02:16 AM    ANISOCYTOSIS 1+ 05/08/2017 11:59 PM      Lab Results   Component Value Date/Time    SODIUM 139 06/15/2017 11:31 AM    POTASSIUM 3.8 06/15/2017 11:31 AM    CHLORIDE 105 06/15/2017 11:31 AM    CO2 26 06/15/2017 11:31 AM    GLUCOSE 120* 06/15/2017 11:31 AM    BUN 14 06/15/2017 11:31 AM    CREATININE 0.71 06/15/2017 11:31 AM      Lab Results   Component Value Date/Time    ALT(SGPT) 38 06/15/2017 11:31 AM    AST(SGOT) 47* 06/15/2017 11:31 AM    ALKALINE PHOSPHATASE 552* 06/15/2017 11:31 AM    TOTAL BILIRUBIN 1.2 06/15/2017 11:31 AM    ALBUMIN 3.4 06/15/2017 11:31 AM    GLOBULIN 4.1* 06/15/2017 11:31 AM    PRE-ALBUMIN 9.0* 06/08/2017 11:52 PM    INR 1.46* 06/08/2017 11:52 PM    MACROCYTOSIS 1+ 04/29/2017 02:15 AM     Lab Results   Component Value Date/Time    PT 18.2* 06/08/2017 11:52 PM    INR 1.46* 06/08/2017 11:52 PM

## 2017-05-15 NOTE — PROGRESS NOTES
Chemotherapy Verification - SECONDARY RN       Height = 170.2 cm  Weight = 71.2 kg  BSA = 1.83 m2       Medication: Omacetaxine  Dose: 1.25 mg/m2  Calculated Dose: 2.3 mg (2.4 mg ordered <5% difference)                            (In mg/m2, AUC, mg/kg)         I confirm that this process was performed independently.

## 2017-05-15 NOTE — PROGRESS NOTES
"  HEMATOLOGY-ONCOLOGY PROGRESS NOTE    CML admitted for Synribo started on 5/6/17 - last day 5/19/17     Subjective:  He continues to have diarrhea atleast 4 times daily. He is receiving imodium only once or twice a day. No abdominal pain, No headaches. He denies any fevers or chills. No N/V. No signs of bleeding.   He reports that his urinary complaints better.     Objective:  Medications reviewed and notable for:  Current Facility-Administered Medications   Medication Dose   • acyclovir (ZOVIRAX) tablet 400 mg  400 mg   • loperamide (IMODIUM) capsule 2 mg  2 mg   • NS infusion     • nystatin (MYCOSTATIN) 854846 UNIT/ML suspension 500,000 Units  5 mL   • Omacetaxine Mepesuccinate 2.4 mg in syringe 0.69 mL Chemo     • acetaminophen (TYLENOL) tablet 650 mg  650 mg   • diphenhydrAMINE (BENADRYL) tablet/capsule 25 mg  25 mg   • pneumococcal 13-Elizabeth Conj Vacc (PREVNAR 13) syringe 0.5 mL  0.5 mL   • Respiratory Care per Protocol     • ondansetron (ZOFRAN) syringe/vial injection 4 mg  4 mg   • ondansetron (ZOFRAN ODT) dispertab 4 mg  4 mg   • morphine (pf) 4 mg/ml injection 2 mg  2 mg   • carvedilol (COREG) tablet 3.125 mg  3.125 mg   • levetiracetam (KEPPRA) tablet 750 mg  750 mg   • oxycodone immediate-release (ROXICODONE) tablet 5 mg  5 mg   • tamsulosin (FLOMAX) capsule 0.4 mg  0.4 mg   • lidocaine (LIDODERM) 5 % 1 Patch  1 Patch   • vitamin D (Ergocalciferol) (DRISDOL) capsule 50,000 Units  50,000 Units       ROS:   Constitutional: N+ fatigue ,  no fevers or chills, no night sweats  Resp: No cough or SOB  Cardio:No chest pain or palpitations  Pschy: No depression or anxiety   Neuro: No headaches, no seizure, no vision changes  GI: no abdominal pain, nausea or vomiting. + diarrhea   : + dysuria but better   All other ROS negative    Blood pressure 130/78, pulse 107, temperature 36.8 °C (98.2 °F), resp. rate 16, height 1.702 m (5' 7\"), weight 71.2 kg (156 lb 15.5 oz), SpO2 99 %.    General:  comfortable, NAD  HEENT: "  PERRLA, Anicteric, + thursh on his tongue, No mucositis   Neck:   supple, no lymphadenopathy  Cor:   regular rate and rhythm, no murmurs, rubs, or gallops  Pulm:   clear to auscultation bilaterally  Abd:   bowel sounds present, soft, nontender, nondistended, no palpable masses or organomegaly  Extremities:  warm, no lower extremity edema  Neurologic:  A&O x 3  Pyschiatric:  Appropriate mood and affect    Labs reviewed and notable for:  Recent Labs      05/13/17   0012  05/13/17   2347  05/15/17   0019   WBC  0.2*  0.1*  0.1*   RBC  2.47*  2.44*  2.37*   HEMOGLOBIN  7.3*  7.1*  6.9*   HEMATOCRIT  20.3*  20.0*  19.1*   MCV  82.2  82.0  80.6*   MCH  29.6  29.1  29.1   MCHC  36.0*  35.5*  36.1*   RDW  40.5  40.1  38.6   PLATELETCT  20*  17*  10*   MPV  10.7  11.3  10.7         .@CMP  Recent Results (from the past 24 hour(s))   COD (ADULT)    Collection Time: 05/15/17 12:17 AM   Result Value Ref Range    Antibody Screen-Cod NEG     ABO Grouping Only B     Rh Grouping Only POS     Component R       RI6                 RedBloodCellsIRR    [id number]   issued       05/15/17   05:50      Product Type Red Blood Cells IRR LR Pheresis     Dispense Status Issued     Unit Number (Barcoded) [id number]     Product Code (Barcoded) J1662D48     Blood Type (Barcoded) 5100    CBC WITH DIFFERENTIAL    Collection Time: 05/15/17 12:19 AM   Result Value Ref Range    Neutrophils (Absolute) Cancel 1.82 - 7.42 K/uL    WBC 0.1 (LL) 4.8 - 10.8 K/uL    RBC 2.37 (L) 4.70 - 6.10 M/uL    Hemoglobin 6.9 (L) 14.0 - 18.0 g/dL    Hematocrit 19.1 (L) 42.0 - 52.0 %    MCV 80.6 (L) 81.4 - 97.8 fL    MCH 29.1 27.0 - 33.0 pg    MCHC 36.1 (H) 33.7 - 35.3 g/dL    RDW 38.6 35.9 - 50.0 fL    Platelet Count 10 (LL) 164 - 446 K/uL    MPV 10.7 9.0 - 12.9 fL    Nucleated RBC 0.00 /100 WBC    NRBC (Absolute) 0.00 K/uL   COMP METABOLIC PANEL    Collection Time: 05/15/17 12:19 AM   Result Value Ref Range    Sodium 132 (L) 135 - 145 mmol/L    Potassium 3.2 (L)  3.6 - 5.5 mmol/L    Chloride 107 96 - 112 mmol/L    Co2 20 20 - 33 mmol/L    Anion Gap 5.0 0.0 - 11.9    Glucose 124 (H) 65 - 99 mg/dL    Bun 14 8 - 22 mg/dL    Creatinine 0.61 0.50 - 1.40 mg/dL    Calcium 8.6 8.5 - 10.5 mg/dL    AST(SGOT) 88 (H) 12 - 45 U/L    ALT(SGPT) 169 (H) 2 - 50 U/L    Alkaline Phosphatase 151 (H) 30 - 99 U/L    Total Bilirubin 0.6 0.1 - 1.5 mg/dL    Albumin 2.8 (L) 3.2 - 4.9 g/dL    Total Protein 5.9 (L) 6.0 - 8.2 g/dL    Globulin 3.1 1.9 - 3.5 g/dL    A-G Ratio 0.9 g/dL   ESTIMATED GFR    Collection Time: 05/15/17 12:19 AM   Result Value Ref Range    GFR If African American >60 >60 mL/min/1.73 m 2    GFR If Non African American >60 >60 mL/min/1.73 m 2           Assessment and Recommendations:  1.  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  Prior treatments included dasatinib and bosutinib.  The plan has been for allo-BMT at [facility], but needs some response to chemo before they can do the transplant, giving Synribo for this.  Synribo is given as a SQ shot BID x 14 days with the first cycle, followed by 2 weeks off.  Started on 5/6/17.        Synribo is a high risk medication.  There is a 's warning for cerebral bleeding, and to avoid NSAIDS, blood thinners, etc as well as to use with caution in patients with Platelets<50K.  He has a h/o of a SDH in the recent past.    -- continue Synribo.  Aware that actual dose is slightly above BSA-based dose, OK up to 10% difference. If this persists, may need to adjust dose with cycle 2 (comes in pre-filled syringes so can't be adjusted after order arrives)  -- tolerating with main toxicity of diarrhea and elevated LFTs  -- monitor for Headaches or AMS changes     -- duration of hospitalization as per Dr. Vu, primary oncologist.  Had initially been communicated that plan for for 14 day hospitalization to get Synribo but Dr. Vu now favors treating more like standard acute luekemia induction with prolonged stay until counts  improve.  Patient is willing to do whatever needs to be done.     2.  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  Not likely to improve unless he can get response to some kind of chemo.  Will transfuse as needed to keep >10, or higher if bleeding.  3.  Neutropenia--nothing documented in chart but as per Dr. Agustin's note, had fever late in day on 5/7.  BCx and CXR negative.  4.  Elevated LFT's.  Worsening.  Potentially due to Synribo but would also like to discontinue/change any other medications that might contribute. D/c fluconzole and continue with nystatin alone at this time.     5.  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  If recurrent pain might consider RT.  6. Anemia-- transfuse 1 unit of PRBC's if <7.5  7.  Diarrhea.  A common side effect of Synribo. He was started on immodium on 5/10/17 with improvement. Antibiotics may have also been contributing.   Last C. Diff check on 5/13/17 negative   8. Subjective urinary retention and dysuria.  PVR on 5/13/17 was 33 cc per RN.  UA with only trace blood.     9.  Thrush.  on Nystatin     Plan :   - Continue Day 10 Synbiro   - Transfusing blood and PLT today   - No signs of active bleeding   - Discussed with nursing staff to give imodium one tablet after each loose BM and if he is exceeding more than 8 to call me.   - Liver enzymes stable, monitor       High complexicity/Drug monitoring   Spent appx 30 minutes going over medical records and discussing with patient   We will continue to follow with you; please call with any questions, [phone].      Ladonna Corado MD  [facility]   [phone]

## 2017-05-15 NOTE — PROGRESS NOTES
"Pharmacy Chemotherapy Verification    Patient Name: Benjamin Post   Dx: CML       Protocol: Synribo Induction  Omacetaxine (Synribo) 1.25 mg/m2 SQ BID for 14 days  NCCN Guidelines for Chronic Myelogenous Leukemia.V.1.2016  Brice J, et al. Blood. 2012 Sep 27;120(13):2573-80. Epub 2012 Aug 15.  Alfonso ROSADO et al. J Clin Oncol. 2011;30(15s):abstr 6513.    Allergies:  Review of patient's allergies indicates no known allergies.     /87 mmHg  Pulse 94  Temp(Src) 36.4 °C (97.6 °F)  Resp 16  Ht 1.702 m (5' 7\")  Wt 71.2 kg (156 lb 15.5 oz)  BMI 24.58 kg/m2  SpO2 99% Body surface area is 1.83 meters squared.    5/15/17   ANC~ 0 Plt = 10k   Hgb = 6.9  SCr = 0.61 mg/dL CrCl ~ 94 mL/min (min Cr 0.7)  LFT's: AST/ALT/AP = 88/169/151 TBili = 0.6  No liver adjustment recommended.    CML -  Treat through any counts, standing transfusion orders in place.  MD aware of all current lab results.      Drug Order   (Drug name, dose, route, IV Fluid & volume, frequency, number of doses) Cycle: 1 Day 10   Previous treatment: s/p dasatinib and bosutinib (last 4/26/17), both followed  by relapse     Medication = Omacetaxine  Base Dose= 1.25 mg/m2   Calc Dose: Base Dose x 1.83 m2 = 2.29 mg  Final Dose = 2.4 mg  Route = subq  Conc & Volume = 3.5 mg/mL = 0.69 mL  Admin Duration = to be given subcutaneously   Patient Supplied Medication  q12h on days 1 to 14      <5% difference, OK to continue with baseline dose if < 10% difference per MDs.        By my signature below, I confirm this process was performed independently with the BSA and all final chemotherapy dosing calculations congruent. I have reviewed the above chemotherapy order and that my calculation of the final dose and BSA (when applicable) corroborate those calculations of the  pharmacist. Discrepancies of 5% or greater in the written dose have been addressed and documented within the [facility] Progress notes.    Phil Bueno PharmD    "

## 2017-05-15 NOTE — PROGRESS NOTES
[facility] [place] [facility] [place]:      Date & Time note created:   5/15/2017   4:14 PM     Visit Time:   Attending/resident bedside rounds 9-11:30 AM     The patient was evaluated with the resident staff.  I reviewed the resident's note and agree with the resident's findings and plan as documented in the resident's note except as documented in the attending note. Please reference resident daily note for complete information.The chart was reviewed and summarized.  Available labs, imaging, O2 sats, EKGs were reviewed. Available nursing, consultant, and resident notes were reviewed. I am actively involved in the patient's care.                                                                BRIEF DISCUSSION:                                                         Has diarrhea but better after loperamideno abdominal pain . No blood in stool. No distention. Appetite ok.  //CML likely with blast crisis, Anemia, thrombocytopenia and leucopenia: On Chemotherapy Synribo. Planned for BMT in future at [facility]. Monitor for bleeding complications and neutropenic complications.    Transfusions as recommended by Hem/Onc.    Treat Diarrhea with loperamide and monitor. No abdominal signs at this time. C diff negative.  Dysuria: UA ok, no discharge, penile exam normal. Use pyridium. Suspect some urethritis ? Medication related.   // Neutropenic fever: reported one episode on Oncologist's note.Started on cefepime. CXR, UA ok. Cx neg. No localizing symptoms. Discussed with Oncologist and the plan is stopping Cefepime and observing for now . As the patient seems to have long standing leucopenia and is not expected to rise anytime  would antifungal,antiviral and anti bacterial prophylaxis but d/t his elevated LFT antifungal has been stopped and d/t diarrhea and risk of C diff it was decided to not start Levaquin at this time.  Will keep a very close surveillance.   // Lytic Rib lesion: Presumed to be secondary to CML(?  Chloroma). Has elevated PSA and  No Spinal lesions so doubt Prostrate related. CT c/a/p no mass.  SPEP with FERNY polyclonal  // NATY: Resolved, on IVF, likely prerenal related to BP changes.     // h/o  Hypercalcemia: PTH 6.8, Vit D 22, Likely related to malignancy. PTHrP elevated. PSA elevated as well. CT c/a/p 4/4 s/o possible lesions related to CML. No spinal blastic lesions.    // Valvular Cardiomyopathy, PHTN: No evidence of HF at this time. Cont BB. Monitor fluid status. Outpatient cards follow up.    Echo 3/20: Left ventricular ejection fraction is >70%.  Grade II diastolic dysfunction.Mild-moderate mixed calcific aortic valve disease.  Mean gradient 14 mmHg.Calcific mitral disease with resultant moderate stenosis and moderate regurgitation.Right ventricular systolic pressure is estimated to be 50-55 mmHg.  Normal estimated right atrial pressure.No prior study is available for comparison.    // HTN: BP ok.  // Seizure Disorder: Continue Keppra  // h/o SDH  ----------------------------------------------------------------------------------------------------------------------  Filed Vitals:    05/15/17 0800 05/15/17 1215 05/15/17 1240 05/15/17 1255   BP: 138/87 138/87 140/84 139/82   Pulse: 94 94 95 97   Temp: 36.4 °C (97.6 °F) 36.4 °C (97.5 °F) 36.7 °C (98 °F) 36.7 °C (98.1 °F)   Resp: 16 16 16 18   Height:       Weight:       SpO2: 99% 99% 99% 99%     Weight/BMI: Body mass index is 24.58 kg/(m^2).  Pulse Oximetry: 99 %, O2 (LPM): 0, O2 Delivery: None (Room Air)    Intake/Output Summary (Last 24 hours) at 05/14/17 1243  Last data filed at 05/14/17 1000   Gross per 24 hour   Intake   2800 ml   Output   1625 ml   Net   1175 ml       Bianca Chung MD   [facility]

## 2017-05-16 ENCOUNTER — APPOINTMENT (OUTPATIENT)
Dept: ONCOLOGY | Facility: MEDICAL CENTER | Age: 72
End: 2017-05-16
Attending: SPECIALIST
Payer: MEDICARE

## 2017-05-16 LAB
ANION GAP SERPL CALC-SCNC: 6 MMOL/L (ref 0–11.9)
BUN SERPL-MCNC: 14 MG/DL (ref 8–22)
CALCIUM SERPL-MCNC: 9 MG/DL (ref 8.5–10.5)
CHLORIDE SERPL-SCNC: 109 MMOL/L (ref 96–112)
CO2 SERPL-SCNC: 20 MMOL/L (ref 20–33)
CREAT SERPL-MCNC: 0.55 MG/DL (ref 0.5–1.4)
ERYTHROCYTE [DISTWIDTH] IN BLOOD BY AUTOMATED COUNT: 40.2 FL (ref 35.9–50)
GFR SERPL CREATININE-BSD FRML MDRD: >60 ML/MIN/1.73 M 2
GLUCOSE SERPL-MCNC: 108 MG/DL (ref 65–99)
HCT VFR BLD AUTO: 21 % (ref 42–52)
HGB BLD-MCNC: 7.7 G/DL (ref 14–18)
MCH RBC QN AUTO: 30 PG (ref 27–33)
MCHC RBC AUTO-ENTMCNC: 36.7 G/DL (ref 33.7–35.3)
MCV RBC AUTO: 81.7 FL (ref 81.4–97.8)
PLATELET # BLD AUTO: 30 K/UL (ref 164–446)
PMV BLD AUTO: 9.1 FL (ref 9–12.9)
POTASSIUM SERPL-SCNC: 3.5 MMOL/L (ref 3.6–5.5)
RBC # BLD AUTO: 2.57 M/UL (ref 4.7–6.1)
SODIUM SERPL-SCNC: 135 MMOL/L (ref 135–145)
WBC # BLD AUTO: 0.1 K/UL (ref 4.8–10.8)

## 2017-05-16 PROCEDURE — 770009 HCHG ROOM/CARE - ONCOLOGY SEMI PRI*

## 2017-05-16 PROCEDURE — 36415 COLL VENOUS BLD VENIPUNCTURE: CPT

## 2017-05-16 PROCEDURE — 700102 HCHG RX REV CODE 250 W/ 637 OVERRIDE(OP): Performed by: INTERNAL MEDICINE

## 2017-05-16 PROCEDURE — A9270 NON-COVERED ITEM OR SERVICE: HCPCS | Performed by: STUDENT IN AN ORGANIZED HEALTH CARE EDUCATION/TRAINING PROGRAM

## 2017-05-16 PROCEDURE — A9270 NON-COVERED ITEM OR SERVICE: HCPCS | Performed by: INTERNAL MEDICINE

## 2017-05-16 PROCEDURE — 700101 HCHG RX REV CODE 250: Performed by: STUDENT IN AN ORGANIZED HEALTH CARE EDUCATION/TRAINING PROGRAM

## 2017-05-16 PROCEDURE — 700102 HCHG RX REV CODE 250 W/ 637 OVERRIDE(OP): Performed by: STUDENT IN AN ORGANIZED HEALTH CARE EDUCATION/TRAINING PROGRAM

## 2017-05-16 PROCEDURE — 80048 BASIC METABOLIC PNL TOTAL CA: CPT

## 2017-05-16 PROCEDURE — 85027 COMPLETE CBC AUTOMATED: CPT

## 2017-05-16 PROCEDURE — 85025 COMPLETE CBC W/AUTO DIFF WBC: CPT

## 2017-05-16 PROCEDURE — 80053 COMPREHEN METABOLIC PANEL: CPT

## 2017-05-16 PROCEDURE — 99231 SBSQ HOSP IP/OBS SF/LOW 25: CPT | Mod: GC | Performed by: INTERNAL MEDICINE

## 2017-05-16 PROCEDURE — 700111 HCHG RX REV CODE 636 W/ 250 OVERRIDE (IP): Performed by: STUDENT IN AN ORGANIZED HEALTH CARE EDUCATION/TRAINING PROGRAM

## 2017-05-16 PROCEDURE — 97116 GAIT TRAINING THERAPY: CPT

## 2017-05-16 PROCEDURE — 700105 HCHG RX REV CODE 258: Performed by: INTERNAL MEDICINE

## 2017-05-16 RX ORDER — POTASSIUM CHLORIDE 1.5 G/1.58G
40 POWDER, FOR SOLUTION ORAL ONCE
Status: COMPLETED | OUTPATIENT
Start: 2017-05-16 | End: 2017-05-16

## 2017-05-16 RX ADMIN — NYSTATIN 500000 UNITS: 500000 SUSPENSION ORAL at 08:43

## 2017-05-16 RX ADMIN — NYSTATIN 500000 UNITS: 500000 SUSPENSION ORAL at 15:33

## 2017-05-16 RX ADMIN — LEVETIRACETAM 750 MG: 250 TABLET, FILM COATED ORAL at 20:30

## 2017-05-16 RX ADMIN — ONDANSETRON 4 MG: 2 INJECTION INTRAMUSCULAR; INTRAVENOUS at 11:12

## 2017-05-16 RX ADMIN — NYSTATIN 500000 UNITS: 500000 SUSPENSION ORAL at 20:30

## 2017-05-16 RX ADMIN — PHENAZOPYRIDINE 100 MG: 200 TABLET ORAL at 11:08

## 2017-05-16 RX ADMIN — NYSTATIN 500000 UNITS: 500000 SUSPENSION ORAL at 17:28

## 2017-05-16 RX ADMIN — SODIUM CHLORIDE: 9 INJECTION, SOLUTION INTRAVENOUS at 17:23

## 2017-05-16 RX ADMIN — ACYCLOVIR 400 MG: 800 TABLET ORAL at 20:30

## 2017-05-16 RX ADMIN — POTASSIUM CHLORIDE 40 MEQ: 1.5 POWDER, FOR SOLUTION ORAL at 11:08

## 2017-05-16 RX ADMIN — LEVETIRACETAM 750 MG: 250 TABLET, FILM COATED ORAL at 08:42

## 2017-05-16 RX ADMIN — CARVEDILOL 3.12 MG: 3.12 TABLET, FILM COATED ORAL at 08:41

## 2017-05-16 RX ADMIN — ACYCLOVIR 400 MG: 800 TABLET ORAL at 08:43

## 2017-05-16 RX ADMIN — TAMSULOSIN HYDROCHLORIDE 0.4 MG: 0.4 CAPSULE ORAL at 08:42

## 2017-05-16 RX ADMIN — LIDOCAINE 1 PATCH: 50 PATCH CUTANEOUS at 05:15

## 2017-05-16 RX ADMIN — CARVEDILOL 3.12 MG: 3.12 TABLET, FILM COATED ORAL at 17:28

## 2017-05-16 RX ADMIN — PHENAZOPYRIDINE 100 MG: 200 TABLET ORAL at 08:41

## 2017-05-16 RX ADMIN — PHENAZOPYRIDINE 100 MG: 200 TABLET ORAL at 17:28

## 2017-05-16 ASSESSMENT — ENCOUNTER SYMPTOMS
DIZZINESS: 0
MYALGIAS: 0
HEADACHES: 0
DEPRESSION: 0
HEARTBURN: 0
CHILLS: 0
PALPITATIONS: 0
NAUSEA: 0
FEVER: 0
DIARRHEA: 1
COUGH: 0
HEMOPTYSIS: 0
ABDOMINAL PAIN: 0
NECK PAIN: 0

## 2017-05-16 ASSESSMENT — PAIN SCALES - GENERAL: PAINLEVEL_OUTOF10: 7

## 2017-05-16 ASSESSMENT — GAIT ASSESSMENTS
DISTANCE (FEET): 150
GAIT LEVEL OF ASSIST: STAND BY ASSIST
ASSISTIVE DEVICE: FRONT WHEEL WALKER
DEVIATION: INCREASED BASE OF SUPPORT

## 2017-05-16 NOTE — PROGRESS NOTES
Chemotherapy Verification - SECONDARY RN   Cycle #1 Day # 11 (am and pm dose)      Height = 170.2 cm  Weight = 71.2 kg  BSA = 1.83 m2       Medication: omacetaxine  Dose: 1.25 mg/m2  Calculated Dose: 2.29 mg (ordered= 2.4 mg)                            (In mg/m2, AUC, mg/kg)     Medication: omacetaxine  Dose: 1.25 mg/m2  Calculated Dose: 2.29 mg (ordered=2.4mg)                            (In mg/m2, AUC, mg/kg)    I confirm that this process was performed independently.

## 2017-05-16 NOTE — ASSESSMENT & PLAN NOTE
Pyridium stopped due to no analgesic effect  Norco started 1-2 tabs 5 mg Q6 prn  Denies any hematuria  Perineal exam did not illicit tenderness. Cannot complete a rectal exam due to neutropenia.   Likely not prostatitis however cannot be ruled out due without complete rectal exam.   UA negative for nitrites, LE, or WBC casts  Norco for pain control  UA: WBC -ve, trace occult blood , - RBCs, -ve LE or nitrites  CTM

## 2017-05-16 NOTE — PROGRESS NOTES
Patient is alert and oriented. Patient has decrease appetite Zofran given for nausea. Family did bring some home made soup in . Chemo therapy given this am. Patient tolerated well call light within reach hourly rounding in practice. Bed alarm on.

## 2017-05-16 NOTE — PROGRESS NOTES
Ankita from Lab called with critical result of WBC-0.1, Platelets-30 at 0713. Critical lab result read back to Ankita.   Dr. Collazo notified of critical lab result at 0723.  Critical lab result read back by Dr. Cole.

## 2017-05-16 NOTE — PROGRESS NOTES
"  HEMATOLOGY-ONCOLOGY PROGRESS NOTE    CML admitted for Synribo started on 5/6/17 - last day 5/19/17     Subjective: No overnight events . As per nursing staff he had only one small mucous BM.   He complains of being fatigued. No bleeding problems. He denies any nausea or vomiting. Urinary c/o stable. No blood.     Objective:  Medications reviewed and notable for:  Current Facility-Administered Medications   Medication Dose   • loperamide (IMODIUM) capsule 2 mg  2 mg   • phenazopyridine (PYRIDIUM) tablet 100 mg  100 mg   • acyclovir (ZOVIRAX) tablet 400 mg  400 mg   • NS infusion     • nystatin (MYCOSTATIN) [id number] UNIT/ML suspension 500,000 Units  5 mL   • Omacetaxine Mepesuccinate 2.4 mg in syringe 0.69 mL Chemo     • acetaminophen (TYLENOL) tablet 650 mg  650 mg   • diphenhydrAMINE (BENADRYL) tablet/capsule 25 mg  25 mg   • pneumococcal 13-Elizabeth Conj Vacc (PREVNAR 13) syringe 0.5 mL  0.5 mL   • Respiratory Care per Protocol     • ondansetron (ZOFRAN) syringe/vial injection 4 mg  4 mg   • ondansetron (ZOFRAN ODT) dispertab 4 mg  4 mg   • morphine (pf) 4 mg/ml injection 2 mg  2 mg   • carvedilol (COREG) tablet 3.125 mg  3.125 mg   • levetiracetam (KEPPRA) tablet 750 mg  750 mg   • oxycodone immediate-release (ROXICODONE) tablet 5 mg  5 mg   • tamsulosin (FLOMAX) capsule 0.4 mg  0.4 mg   • lidocaine (LIDODERM) 5 % 1 Patch  1 Patch   • vitamin D (Ergocalciferol) (DRISDOL) capsule 50,000 Units  50,000 Units       ROS:   Constitutional: + fatigue, no fevers or chills, no night sweats  Resp: No cough or SOB  Cardio:No chest pain or palpitations  Pschy: No depression or anxiety   Neuro: No headaches, no seizure, no vision changes  GI: no abdominal pain, nausea or vomiting. Diarrhea resolved.   All other ROS negative    Blood pressure 138/85, pulse 112, temperature 37.1 °C (98.7 °F), resp. rate 16, height 1.702 m (5' 7\"), weight 71.2 kg (156 lb 15.5 oz), SpO2 97 %.      General:  PERRLA, EOMI, Thursh present   Neck: "   supple, no lymphadenopathy  Cor:   regular rate and rhythm, no murmurs, rubs, or gallops  Pulm:   clear to auscultation bilaterally  Abd:   bowel sounds present, soft, nontender, nondistended, no palpable masses or organomegaly  Extremities:  warm, no lower extremity edema  Neurologic:  A&O x 3  Pyschiatric:  Appropriate mood and affect    Labs reviewed and notable for:  Recent Labs      05/13/17   2347  05/15/17   0019  05/15/17   1414   WBC  0.1*  0.1*  0.1*   RBC  2.44*  2.37*  2.72*   HEMOGLOBIN  7.1*  6.9*  8.1*   HEMATOCRIT  20.0*  19.1*  22.3*   MCV  82.0  80.6*  82.0   MCH  29.1  29.1  29.8   MCHC  35.5*  36.1*  36.3*   RDW  40.1  38.6  40.1   PLATELETCT  17*  10*  32*   MPV  11.3  10.7  10.6         .@CMP  Recent Results (from the past 24 hour(s))   URINALYSIS    Collection Time: 05/15/17 12:32 PM   Result Value Ref Range    Micro Urine Req Microscopic     Color Lt. Yellow     Character Clear     Specific Gravity 1.008 <1.035    Ph 6.0 5.0-8.0    Glucose Negative Negative mg/dL    Ketones Negative Negative mg/dL    Protein Negative Negative mg/dL    Bilirubin Negative Negative    Nitrite Negative Negative    Leukocyte Esterase Negative Negative    Occult Blood Trace (A) Negative   URINE MICROSCOPIC (W/UA)    Collection Time: 05/15/17 12:32 PM   Result Value Ref Range    WBC 0-2 (A) /hpf    RBC 0-2 (A) /hpf    Bacteria Few (A) None /hpf    Mucous Threads Few /hpf    Hyaline Cast 0-2 /lpf   CBC WITHOUT DIFFERENTIAL    Collection Time: 05/15/17  2:14 PM   Result Value Ref Range    WBC 0.1 (LL) 4.8 - 10.8 K/uL    RBC 2.72 (L) 4.70 - 6.10 M/uL    Hemoglobin 8.1 (L) 14.0 - 18.0 g/dL    Hematocrit 22.3 (L) 42.0 - 52.0 %    MCV 82.0 81.4 - 97.8 fL    MCH 29.8 27.0 - 33.0 pg    MCHC 36.3 (H) 33.7 - 35.3 g/dL    RDW 40.1 35.9 - 50.0 fL    Platelet Count 32 (LL) 164 - 446 K/uL    MPV 10.6 9.0 - 12.9 fL       Diagnostic imaging:      Assessment and Recommendations:  1.  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   Prior treatments included dasatinib and bosutinib.  The plan has been for allo-BMT at [facility], but needs some response to chemo before they can do the transplant, giving Synribo for this.  Synribo is given as a SQ shot BID x 14 days with the first cycle, followed by 2 weeks off.  Started on 5/6/17.        Synribo is a high risk medication.  There is a 's warning for cerebral bleeding, and to avoid NSAIDS, blood thinners, etc as well as to use with caution in patients with Platelets<50K.  He has a h/o of a SDH in the recent past.    -- continue Synribo.  Aware that actual dose is slightly above BSA-based dose, OK up to 10% difference. If this persists, may need to adjust dose with cycle 2 (comes in pre-filled syringes so can't be adjusted after order arrives)  -- tolerating with main toxicity of diarrhea and elevated LFTs  -- monitor for Headaches or AMS changes     -- duration of hospitalization as per Dr. Vu, primary oncologist.  Had initially been communicated that plan for for 14 day hospitalization to get Synribo but Dr. Vu now favors treating more like standard acute luekemia induction with prolonged stay until counts improve.  Patient is willing to do whatever needs to be done.     2.  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  Not likely to improve unless he can get response to some kind of chemo.  Will transfuse as needed to keep >10, or higher if bleeding.  3.  Neutropenia--nothing documented in chart but as per Dr. Agustin's note, had fever late in day on 5/7.  BCx and CXR negative.  4.  Elevated LFT's.  Worsening.  Potentially due to Synribo but would also like to discontinue/change any other medications that might contribute. D/c fluconzole and continue with nystatin alone at this time.     5.  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  If recurrent pain might consider RT.  6. Anemia-- transfuse 1 unit of PRBC's if <7.5  7.  Diarrhea.  A common side effect of Synribo. He was started  on immodium on 5/10/17 with improvement. Antibiotics may have also been contributing.   Last C. Diff check on 5/13/17 negative . Resolved.    8. Subjective urinary retention and dysuria.  PVR on 5/13/17 was 33 cc per RN.  UA with only trace blood.     9.  Thrush.  on Nystatin     Plan:   - Clinically stable   - Proceed with Day 11   - Continue supportive measures.   - I do not see any labs today. Ordered CBC, CMP.     High complexicity/Drug monitoring     We will continue to follow with you; please call with any questions, [phone].      Ladonna Corado MD  [facility]   [phone]

## 2017-05-16 NOTE — DISCHARGE PLANNING
Medical Social Work    Pt is currently on day 10-11 for his first cycle of chemotherapy.  Pt is anticipated to remain in the hospital while he is undergoing chemo and will likely be able to d/c home with his daughter once completed and medically cleared.    Plan:  SS will remain available to provide support and assist with d/c planning as needed.

## 2017-05-16 NOTE — PROGRESS NOTES
Chemotherapy Verification - PRIMARY RN  Cycle 1 day 11- AM and PM dose    Height = 170.2 cm Weight = 71.2 kg  BSA = 1.83 m2      Medication: Omacetaxine  Dose: 1.25 mg / m2  Calculated Dose: 2.3 mg, ordered dose 2.4 mg                             (In mg/m2, AUC, mg/kg)         I confirm this process was performed independently with the BSA and all final chemotherapy dosing calculations congruent.  Any discrepancies of 5% or greater have been addressed with the chemotherapy pharmacist. The resolution of the discrepancy has been documented in the EPIC progress notes.

## 2017-05-16 NOTE — THERAPY
"Pt making good progress with functional mobility, balance, gait. Does continue to fatigue with activity. Family present and supportive. Encouraged seated exercises daily, provided with exercise handout in Maltese. PT to follow while in house.     Physical Therapy Treatment completed.   Bed Mobility:  Supine to Sit:  (in chair pre/post PT session)  Transfers: Sit to Stand: Supervised  Gait: Level Of Assist: Stand by Assist with Front-Wheel Walker       Plan of Care: Will benefit from Physical Therapy 2 times per week  Discharge Recommendations: Equipment: Will Continue to Assess for Equipment Needs. Post-acute therapy Discharge to home with outpatient or home health for additional skilled therapy services.     See \"Rehab Therapy-Acute\" Patient Summary Report for complete documentation.       "

## 2017-05-16 NOTE — PROGRESS NOTES
[facility] [place] [facility] [place]    Name Benjamin Post 1945   Age/Sex 71 y.o. male   MRN [id number]   Code Status DNR     After 5PM or if no immediate response to page, please call for cross-coverage  Attending/Team: Dr. Chung/Ly Call [phone] to page   1st Call - Day Intern (R1):   Dr Cowart 2nd Call - Day Sr. Resident (R2/R3):            Chief complaint/ reason for interval visit (Primary Diagnosis)   CML/pancytopenia     Interval Problem Daily Status Update  :  Principal Problem:    CML (chronic myelocytic leukemia)-Accelerated phase (Chronic) POA: Yes      Overview: Day 9 of Synribo      1 U PRBC transfused due to anemia. Tolerated well. Repeat CBC is 6.9 ->8.1      No fevers overnight (WBC 0.1)      No signs of bleeding present ( Plts 10)        Active Problems:    Pancytopenia (CMS-HCC) (Chronic) POA: Yes      Overview: - Pancytopenia secondary to CML and chemotherapy      - WBC: 0.5>>0.2      - Given one unit of platelets on 17.       - Anemia with Hb: 7.7>>7.6>>6.8>>7.8>6.4>7.8      - platelet: 11>>7>>28>>20>10>>23>>15>>7>>27      - Hx of Chronic Right Subdural Hematoma    Neutropenia (CMS-HCC) POA: Yes      Overview: Chronic       WBC:0.1      Afebrile overnight          Bone pain POA: Yes      Overview: Stable      Denies any chest pain today      Negative PE for tenderness      Did not use pain medications    Acute bilateral low back pain without sciatica POA: Yes    Elevated PSA (Chronic) POA: Yes      Overview: PSA: 8.4 on 17    Diarrhea POA: No      Overview: Patient complains of 6 BM today.      Denies blood in stool.       Educated that this is a AE of chemotherapy      Patient expressed understanding.     Seizure disorder (CMS-HCC) POA: Yes    Transaminitis POA: Yes      Overview: Related to chemo therapy. AST/ALT: 46/89>>61/111>>71/134>>80/171 >>88/169    BPH (benign prostatic hyperplasia)  (Chronic) POA: Yes    Low vitamin D level (Chronic) POA: Yes      Overview: Vit D: 11 on 4/29/17    Dysuria POA: Unknown      Overview: Complained today      Denies any hematuria      Endorses urethral discharge      Afebrile.       UA negative for nitrites, LE, or WBC casts      Pyridium started.   Resolved Problems:    * No resolved hospital problems. *          Review of Systems   Constitutional: Negative for fever and chills.   HENT: Negative for hearing loss.    Respiratory: Negative for cough and hemoptysis.    Cardiovascular: Negative for chest pain and palpitations.   Gastrointestinal: Positive for diarrhea. Negative for heartburn, nausea and abdominal pain.        Worsening diarrhea. 6 episodes in the AM   Genitourinary: Negative for dysuria and urgency.   Musculoskeletal: Negative for myalgias and neck pain.   Skin: Negative for rash.   Neurological: Negative for dizziness and headaches.   Psychiatric/Behavioral: Negative for depression and suicidal ideas.       Consultants/Specialty  Oncology - [place]    Disposition  Home after chemotherapy     Quality Measures  EKG reviewed, Labs reviewed, Medications reviewed and Radiology images reviewed  Mahajan catheter: No Mahajan      DVT Prophylaxis: Contraindicated - High bleeding risk  DVT prophylaxis - mechanical: SCDs (low plt)                Physical Exam       Filed Vitals:    05/15/17 1215 05/15/17 1240 05/15/17 1255 05/15/17 1600   BP: 138/87 140/84 139/82 143/89   Pulse: 94 95 97 112   Temp: 36.4 °C (97.5 °F) 36.7 °C (98 °F) 36.7 °C (98.1 °F) 37.2 °C (98.9 °F)   Resp: 16 16 18 18   Height:       Weight:       SpO2: 99% 99% 99% 98%     Body mass index is 24.58 kg/(m^2).    Oxygen Therapy:  Pulse Oximetry: 98 %, O2 (LPM): 0, O2 Delivery: None (Room Air)    Physical Exam   Constitutional: He is well-developed, well-nourished, and in no distress. No distress.   HENT:   Head: Normocephalic.   Neck: No tracheal deviation present. No thyromegaly present.    Cardiovascular: Normal rate.  Exam reveals no friction rub.    Murmur heard.  Pansystolic murmur on the apex    Pulmonary/Chest: Effort normal. No respiratory distress. He has no wheezes.   Abdominal: Soft. He exhibits no distension. There is no tenderness.   Musculoskeletal: He exhibits no edema.   Neurological: He is alert.   Skin: No erythema.         Lab Data Review:      5/4/2017  1:28 PM    Recent Labs      05/13/17   0012  05/13/17   2347  05/15/17   0019   SODIUM  132*  130*  132*   POTASSIUM  3.4*  3.3*  3.2*   CHLORIDE  108  106  107   CO2  19*  19*  20   BUN  16  16  14   CREATININE  0.69  0.68  0.61   CALCIUM  8.9  9.1  8.6       Recent Labs      05/13/17   0012  05/13/17   2347  05/15/17   0019   ALTSGPT  165*  171*  169*   ASTSGOT  81*  80*  88*   ALKPHOSPHAT  139*  143*  151*   TBILIRUBIN  0.6  0.6  0.6   GLUCOSE  120*  125*  124*       Recent Labs      05/13/17   2347  05/15/17   0019  05/15/17   1414   RBC  2.44*  2.37*  2.72*   HEMOGLOBIN  7.1*  6.9*  8.1*   HEMATOCRIT  20.0*  19.1*  22.3*   PLATELETCT  17*  10*  32*       Recent Labs      05/13/17   0012  05/13/17   2347  05/15/17   0019  05/15/17   1414   WBC  0.2*  0.1*  0.1*  0.1*   ASTSGOT  81*  80*  88*   --    ALTSGPT  165*  171*  169*   --    ALKPHOSPHAT  139*  143*  151*   --    TBILIRUBIN  0.6  0.6  0.6   --            Assessment/Plan     * CML (chronic myelocytic leukemia)-Accelerated phase (present on admission)  Overview  Day 9 of Synribo  1 U PRBC transfused due to anemia. Tolerated well. Repeat CBC is 6.9 ->8.1  No fevers overnight (WBC 0.1)  No signs of bleeding present ( Plts 10)      Assessment & Plan  Has completed multiple chemotherapy cycles.     multiple relapses.    Was treated on dasatinib for a time with response, but then relapse.    Was more recently on bosutinib with response, but again recent relapse.   Pancytopenia (chronic, before starting with chemotherapy)   Protein electrophoresis: the polyclonal increase in the  gamma region which may be indicative of specific immune   response or an early monoclonal protein.     Started on 5/6/17 with chemotherapy (synribo ) BID for 14 days,   Developed diarrhea on 5/9>>gets better with loperamide.   C, diff negative, can continue loperamide to good effect  Labs daily for follow up side effects.   FERNY high IgM.  Oncology(janny underwood) following, appreciate the recommendations  BMT planned for the future    Pancytopenia (CMS-HCC) (present on admission)  Overview  - Pancytopenia secondary to CML and chemotherapy  - WBC: 0.5>>0.2  - Given one unit of platelets on 5/4/17.   - Anemia with Hb: 7.7>>7.6>>6.8>>7.8>6.4>7.8  - platelet: 11>>7>>28>>20>10>>23>>15>>7>>27  - Hx of Chronic Right Subdural Hematoma    Assessment & Plan  Transfused 1 unit irradiated platelets on 4/30, 5/4/17,  5/8/17 and 5/11  PRBC one unit on 4/30, 5/8/17 and 5/12/17  Follow up and keep plt>10 and Hb>7.  Started with cefepim on 5/7/17 due to fever 100.4, Urine and blood culture have been negative since, will discontinue today (recieved 7 day course)  Started with acyclovir and fluconazole on 5/9 for prophylaxis.     Neutropenia (CMS-HCC) (present on admission)  Overview  Chronic   WBC:0.1  Afebrile overnight      Assessment & Plan  On acyclovir   Urine and blood culture negative  Will hold off on prophylactic levaquin for now due to high risk for c. 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    - Pain is controlled, Patient feeling ok.    - Follow up and consider radiation therapy if directed by oncology    Elevated PSA (present on admission)  Overview  PSA: 8.4 on 4/4/17    Assessment & Plan  - Stable, can continue workup on outpatient basis    Diarrhea  Overview  Patient complains of 6 BM today.  Denies blood in stool.   Educated that this is a AE of chemotherapy  Patient expressed understanding.     Assessment & Plan  Most likely related to chemo  Loperamide PRN  Cdiff was ordered.   monitor electrolytes and continue IV fluid.       Transaminitis (present on admission)  Overview  Related to chemo therapy. AST/ALT: 46/89>>61/111>>71/134>>80/171 >>88/169    Assessment & Plan  No abdominal pain, likely effect of chemotherapy  Discontinue all hepatotoxic drugs as possible  Continue to follow    BPH (benign prostatic hyperplasia) (present on admission)  Assessment & Plan  Stable, no issues voiding  Continue on tamsulosin 0.4 mg daily.     Low vitamin D level (present on admission)  Overview  Vit D: 11 on 4/29/17    Assessment & Plan  Continue supplementation.     Dysuria  Assessment & Plan  Pyridium for pain control  UA: WBC -ve, trace occult blood , - RBCs, -ve LE or nitrites  CTM      Seizure disorder (CMS-HCC) (present on admission)  Assessment & Plan  Stable    Continue on his home dose of keppra

## 2017-05-16 NOTE — CARE PLAN
Problem: Safety  Goal: Will remain free from falls  Intervention: Assess risk factors for falls  Patient at high risk for falls bed alarm in place      Problem: Mobility  Goal: Risk for activity intolerance will decrease  Intervention: Assess and monitor signs of activity intolerance  Patient calls for assistance to the bathroom call light within reach

## 2017-05-16 NOTE — PROGRESS NOTES
"Pharmacy Chemotherapy Verification    Patient Name: Benjamin Post   Dx: CML       Protocol: Synribo Induction  Omacetaxine (Synribo) 1.25 mg/m2 SQ BID for 14 days  NCCN Guidelines for Chronic Myelogenous Leukemia.V.1.2016  Brice J, et al. Blood. 2012 Sep 27;120(13):2573-80. Epub 2012 Aug 15.  Alfonso ROSADO et al. J Clin Oncol. 2011;30(15s):abstr 6513.    Allergies:  Review of patient's allergies indicates no known allergies.     /85 mmHg  Pulse 106  Temp(Src) 36.6 °C (97.8 °F)  Resp 18  Ht 1.702 m (5' 7\")  Wt 71.2 kg (156 lb 15.5 oz)  BMI 24.58 kg/m2  SpO2 97% Body surface area is 1.83 meters squared.    5/16/17   ANC~ 0 Plt = 30k   Hgb = 7.7  SCr = 0.55 mg/dL CrCl ~ 94 mL/min (min Cr 0.7)  CML -  Treat through any counts, standing transfusion orders in place.  MD aware of all current lab results.     5/15/17: LFT's: AST/ALT/AP = 88/169/151 TBili = 0.6  No liver adjustment recommended.    Drug Order   (Drug name, dose, route, IV Fluid & volume, frequency, number of doses) Cycle: 1 Day 10-11.  Final Dose #28 will be at 0900 on May 20 (note that patient skipped a dose on May 11 due to med availability)  Previous treatment: s/p dasatinib and bosutinib (last 4/26/17), both followed  by relapse     Medication = Omacetaxine  Base Dose= 1.25 mg/m2   Calc Dose: Base Dose x 1.83 m2 = 2.29 mg  Final Dose = 2.4 mg  Route = subq  Conc & Volume = 3.5 mg/mL = 0.69 mL  Admin Duration = to be given subcutaneously   Patient Supplied Medication  q12h on days 1 to 14      <5% difference, OK to continue with baseline dose if < 10% difference per MDs.        By my signature below, I confirm this process was performed independently with the BSA and all final chemotherapy dosing calculations congruent. I have reviewed the above chemotherapy order and that my calculation of the final dose and BSA (when applicable) corroborate those calculations of the  pharmacist. Discrepancies of 5% or greater in the written dose " have been addressed and documented within the EPIC Progress notes.    Phil Bueno, PharmD

## 2017-05-17 LAB
ALBUMIN SERPL BCP-MCNC: 3 G/DL (ref 3.2–4.9)
ALBUMIN/GLOB SERPL: 1 G/DL
ALP SERPL-CCNC: 167 U/L (ref 30–99)
ALT SERPL-CCNC: 184 U/L (ref 2–50)
ANION GAP SERPL CALC-SCNC: 6 MMOL/L (ref 0–11.9)
AST SERPL-CCNC: 106 U/L (ref 12–45)
BACTERIA UR CULT: NORMAL
BILIRUB SERPL-MCNC: 0.8 MG/DL (ref 0.1–1.5)
BUN SERPL-MCNC: 14 MG/DL (ref 8–22)
CALCIUM SERPL-MCNC: 8.5 MG/DL (ref 8.5–10.5)
CHLORIDE SERPL-SCNC: 108 MMOL/L (ref 96–112)
CO2 SERPL-SCNC: 20 MMOL/L (ref 20–33)
CREAT SERPL-MCNC: 0.62 MG/DL (ref 0.5–1.4)
ERYTHROCYTE [DISTWIDTH] IN BLOOD BY AUTOMATED COUNT: 39.9 FL (ref 35.9–50)
GFR SERPL CREATININE-BSD FRML MDRD: >60 ML/MIN/1.73 M 2
GLOBULIN SER CALC-MCNC: 3.1 G/DL (ref 1.9–3.5)
GLUCOSE SERPL-MCNC: 112 MG/DL (ref 65–99)
HCT VFR BLD AUTO: 21.5 % (ref 42–52)
HGB BLD-MCNC: 7.7 G/DL (ref 14–18)
MCH RBC QN AUTO: 29.3 PG (ref 27–33)
MCHC RBC AUTO-ENTMCNC: 35.8 G/DL (ref 33.7–35.3)
MCV RBC AUTO: 81.7 FL (ref 81.4–97.8)
NEUTROPHILS # BLD AUTO: ABNORMAL K/UL (ref 1.82–7.42)
NRBC # BLD AUTO: 0 K/UL
NRBC BLD AUTO-RTO: 0 /100 WBC
PLATELET # BLD AUTO: 24 K/UL (ref 164–446)
PMV BLD AUTO: 9.7 FL (ref 9–12.9)
POTASSIUM SERPL-SCNC: 3.6 MMOL/L (ref 3.6–5.5)
PROT SERPL-MCNC: 6.1 G/DL (ref 6–8.2)
RBC # BLD AUTO: 2.63 M/UL (ref 4.7–6.1)
SIGNIFICANT IND 70042: NORMAL
SITE SITE: NORMAL
SODIUM SERPL-SCNC: 134 MMOL/L (ref 135–145)
SOURCE SOURCE: NORMAL
WBC # BLD AUTO: 0.1 K/UL (ref 4.8–10.8)

## 2017-05-17 PROCEDURE — 87591 N.GONORRHOEAE DNA AMP PROB: CPT

## 2017-05-17 PROCEDURE — 770009 HCHG ROOM/CARE - ONCOLOGY SEMI PRI*

## 2017-05-17 PROCEDURE — 700102 HCHG RX REV CODE 250 W/ 637 OVERRIDE(OP): Performed by: STUDENT IN AN ORGANIZED HEALTH CARE EDUCATION/TRAINING PROGRAM

## 2017-05-17 PROCEDURE — 99232 SBSQ HOSP IP/OBS MODERATE 35: CPT | Mod: GC | Performed by: INTERNAL MEDICINE

## 2017-05-17 PROCEDURE — 80053 COMPREHEN METABOLIC PANEL: CPT

## 2017-05-17 PROCEDURE — 36415 COLL VENOUS BLD VENIPUNCTURE: CPT

## 2017-05-17 PROCEDURE — 700102 HCHG RX REV CODE 250 W/ 637 OVERRIDE(OP): Performed by: INTERNAL MEDICINE

## 2017-05-17 PROCEDURE — A9270 NON-COVERED ITEM OR SERVICE: HCPCS | Performed by: STUDENT IN AN ORGANIZED HEALTH CARE EDUCATION/TRAINING PROGRAM

## 2017-05-17 PROCEDURE — A9270 NON-COVERED ITEM OR SERVICE: HCPCS | Performed by: INTERNAL MEDICINE

## 2017-05-17 PROCEDURE — 85025 COMPLETE CBC W/AUTO DIFF WBC: CPT

## 2017-05-17 PROCEDURE — 84153 ASSAY OF PSA TOTAL: CPT

## 2017-05-17 PROCEDURE — 87491 CHLMYD TRACH DNA AMP PROBE: CPT

## 2017-05-17 PROCEDURE — 700101 HCHG RX REV CODE 250: Performed by: STUDENT IN AN ORGANIZED HEALTH CARE EDUCATION/TRAINING PROGRAM

## 2017-05-17 RX ORDER — HYDROCODONE BITARTRATE AND ACETAMINOPHEN 5; 325 MG/1; MG/1
1-2 TABLET ORAL EVERY 6 HOURS PRN
Status: DISCONTINUED | OUTPATIENT
Start: 2017-05-17 | End: 2017-06-15 | Stop reason: HOSPADM

## 2017-05-17 RX ADMIN — NYSTATIN 500000 UNITS: 500000 SUSPENSION ORAL at 18:34

## 2017-05-17 RX ADMIN — PHENAZOPYRIDINE 100 MG: 200 TABLET ORAL at 09:08

## 2017-05-17 RX ADMIN — LEVETIRACETAM 750 MG: 250 TABLET, FILM COATED ORAL at 09:08

## 2017-05-17 RX ADMIN — ACYCLOVIR 400 MG: 800 TABLET ORAL at 09:07

## 2017-05-17 RX ADMIN — LIDOCAINE 1 PATCH: 50 PATCH CUTANEOUS at 05:21

## 2017-05-17 RX ADMIN — ACYCLOVIR 400 MG: 800 TABLET ORAL at 20:47

## 2017-05-17 RX ADMIN — CARVEDILOL 3.12 MG: 3.12 TABLET, FILM COATED ORAL at 09:07

## 2017-05-17 RX ADMIN — LEVETIRACETAM 750 MG: 250 TABLET, FILM COATED ORAL at 20:47

## 2017-05-17 RX ADMIN — NYSTATIN 500000 UNITS: 500000 SUSPENSION ORAL at 09:08

## 2017-05-17 RX ADMIN — TAMSULOSIN HYDROCHLORIDE 0.4 MG: 0.4 CAPSULE ORAL at 09:09

## 2017-05-17 RX ADMIN — NYSTATIN 500000 UNITS: 500000 SUSPENSION ORAL at 20:47

## 2017-05-17 RX ADMIN — CARVEDILOL 3.12 MG: 3.12 TABLET, FILM COATED ORAL at 18:34

## 2017-05-17 ASSESSMENT — ENCOUNTER SYMPTOMS
DIZZINESS: 0
HEADACHES: 0
COUGH: 0
HEARTBURN: 0
DIARRHEA: 1
FEVER: 0
NAUSEA: 0
DEPRESSION: 0
ABDOMINAL PAIN: 0
NECK PAIN: 0
MYALGIAS: 0
CHILLS: 0
PALPITATIONS: 0
HEMOPTYSIS: 0

## 2017-05-17 NOTE — PROGRESS NOTES
Assumed care of patient at 1900. Patient resting in bed. Reviewed safety measures with patient. Patient demonstrates ability to push call light. Bed alarm on, non skid socks in place, bed in low and locked position. POC reviewed, patient agreeable. Denies SOB, CP, pain or nausea. Call light within reach. Will continue to monitor.

## 2017-05-17 NOTE — PROGRESS NOTES
Chemotherapy Verification - SECONDARY RN       Height = 1.7m  Weight = 71.2kg  BSA = 1.83m2       Medication: Synribo  Dose: 1.25m2  Calculated Dose: 2.28mg (2.4mg = ordered dose)                             (In mg/m2, AUC, mg/kg)               I confirm that this process was performed independently.

## 2017-05-17 NOTE — PROGRESS NOTES
Pt is A&O.  Up to br with sba to void orangish-red uop.  (on Pyridium).  Pt has fair appetite today but he is trying.  Afebrile.  VSS.  Neutropenic precautions in place.  Stool sent to R/O C-diff again as stool has changed consistancy and pt has had an increased amount out since yesterday.  Pt does not consistently call for assistance so bed/chair alarm is on for safety.  Tolerating SQ chemo well thus far.  No scar tissue or obvious bleeding noted.  POC reviewed with pt and MD.

## 2017-05-17 NOTE — PROGRESS NOTES
Chemotherapy Verification - SECONDARY RN       Height = 170.2 cm  Weight = 71.2 kg  BSA = 1.83m2      Medication: Omacetaxine  Dose: 1.25mg/m2  Calculated Dose: 2.29 mg ordered Dose: 2.4 mg                            (In mg/m2, AUC, mg/kg)       I confirm that this process was performed independently.

## 2017-05-17 NOTE — PROGRESS NOTES
[facility] [place] [facility] [place]    Name Benjamin Post 1945   Age/Sex 71 y.o. male   MRN [id number]   Code Status DNR     After 5PM or if no immediate response to page, please call for cross-coverage  Attending/Team: Dr. Chung/Ly Call [phone] to page   1st Call - Day Intern (R1):   Dr Cowart 2nd Call - Day Sr. Resident (R2/R3):            Chief complaint/ reason for interval visit (Primary Diagnosis)   CML/pancytopenia     Interval Problem Daily Status Update  :  Principal Problem:    CML (chronic myelocytic leukemia)-Accelerated phase (Chronic) POA: Yes      Overview: Day 9 of Synribo      1 U PRBC transfused due to anemia. Tolerated well. Repeat CBC is 6.9 ->8.1      No fevers overnight (WBC 0.1)      No signs of bleeding present ( Plts 10)        Active Problems:    Pancytopenia (CMS-HCC) (Chronic) POA: Yes      Overview: - Pancytopenia secondary to CML and chemotherapy      - WBC: 0.5>>0.2      - Given one unit of platelets on 17.       - Anemia with Hb: 7.7>>7.6>>6.8>>7.8>6.4>8.2>7.7      - platelet: 11>>7>>28>>20>10>>23>>15>>7>>27>>32>>30      - Hx of Chronic Right Subdural Hematoma    Neutropenia (CMS-HCC) POA: Yes      Overview: Chronic       WBC:0.1      Afebrile overnight          Bone pain POA: Yes      Overview: Stable      Denies any chest pain today      Negative PE for tenderness      Did not use pain medications    Acute bilateral low back pain without sciatica POA: Yes    Elevated PSA (Chronic) POA: Yes      Overview: PSA: 8.4 on 17    Diarrhea POA: No      Overview: Denies blood in stool.       Educated that this is a AE of chemotherapy      Patient expressed understanding.     Seizure disorder (CMS-HCC) POA: Yes    Transaminitis POA: Yes      Overview: Related to chemo therapy. AST/ALT: 46/89>>61/111>>71/134>>80/171 >>88/169    BPH (benign prostatic hyperplasia) (Chronic) POA: Yes    Low  vitamin D level (Chronic) POA: Yes      Overview: Vit D: 11 on 4/29/17    Dysuria POA: Unknown      Overview: Day 2 of Pyridium without relief      Will wait another day and re-evluate. If it does not take effect by day       2-3, it will not be effective.       Denies any hematuria      Afebrile.       UA negative for nitrites, LE, or WBC casts         Resolved Problems:    * No resolved hospital problems. *          Review of Systems   Constitutional: Negative for fever and chills.   HENT: Negative for hearing loss.    Respiratory: Negative for cough and hemoptysis.    Cardiovascular: Negative for chest pain and palpitations.   Gastrointestinal: Positive for diarrhea. Negative for heartburn, nausea and abdominal pain.        Worsening diarrhea. 6 episodes in the AM   Genitourinary: Negative for dysuria and urgency.   Musculoskeletal: Negative for myalgias and neck pain.   Skin: Negative for rash.   Neurological: Negative for dizziness and headaches.   Psychiatric/Behavioral: Negative for depression and suicidal ideas.       Consultants/Specialty  Oncology - [place]    Disposition  Home after chemotherapy     Quality Measures  EKG reviewed, Labs reviewed, Medications reviewed and Radiology images reviewed  Mahajan catheter: No Mahajan      DVT Prophylaxis: Contraindicated - High bleeding risk  DVT prophylaxis - mechanical: SCDs (low plt)                Physical Exam       Filed Vitals:    05/16/17 0400 05/16/17 0800 05/16/17 1100 05/16/17 1600   BP: 138/85 141/85 150/89 128/80   Pulse: 112 106 100 105   Temp: 37.1 °C (98.7 °F) 36.6 °C (97.8 °F) 36.8 °C (98.3 °F) 36.8 °C (98.2 °F)   Resp: 16 18 18 18   Height:       Weight:       SpO2: 97% 97% 96% 97%     Body mass index is 24.58 kg/(m^2).    Oxygen Therapy:  Pulse Oximetry: 97 %, O2 (LPM): 0, O2 Delivery: None (Room Air)    Physical Exam   Constitutional: He is well-developed, well-nourished, and in no distress. No distress.   HENT:   Head: Normocephalic.   Neck: No  tracheal deviation present. No thyromegaly present.   Cardiovascular: Normal rate.  Exam reveals no friction rub.    Murmur heard.  Pansystolic murmur on the apex    Pulmonary/Chest: Effort normal. No respiratory distress. He has no wheezes.   Abdominal: Soft. He exhibits no distension. There is no tenderness.   Musculoskeletal: He exhibits no edema.   Neurological: He is alert.   Skin: No erythema.         Lab Data Review:      5/4/2017  1:28 PM    Recent Labs      05/13/17   2347  05/15/17   0019  05/16/17   0632   SODIUM  130*  132*  135   POTASSIUM  3.3*  3.2*  3.5*   CHLORIDE  106  107  109   CO2  19*  20  20   BUN  16  14  14   CREATININE  0.68  0.61  0.55   CALCIUM  9.1  8.6  9.0       Recent Labs      05/13/17   2347  05/15/17   0019  05/16/17   0632   ALTSGPT  171*  169*   --    ASTSGOT  80*  88*   --    ALKPHOSPHAT  143*  151*   --    TBILIRUBIN  0.6  0.6   --    GLUCOSE  125*  124*  108*       Recent Labs      05/15/17   0019  05/15/17   1414  05/16/17   0632   RBC  2.37*  2.72*  2.57*   HEMOGLOBIN  6.9*  8.1*  7.7*   HEMATOCRIT  19.1*  22.3*  21.0*   PLATELETCT  10*  32*  30*       Recent Labs      05/13/17   2347  05/15/17   0019  05/15/17   1414  05/16/17   0632   WBC  0.1*  0.1*  0.1*  0.1*   ASTSGOT  80*  88*   --    --    ALTSGPT  171*  169*   --    --    ALKPHOSPHAT  143*  151*   --    --    TBILIRUBIN  0.6  0.6   --    --            Assessment/Plan     * CML (chronic myelocytic leukemia)-Accelerated phase (present on admission)  Overview  Day 10 of Synribo  CBC Hb 7.7  No fevers overnight (WBC 0.1)  No signs of bleeding present ( Plts 10)      Assessment & Plan  Has completed multiple chemotherapy cycles.     multiple relapses.    Was treated on dasatinib for a time with response, but then relapse.    Was more recently on bosutinib with response, but again recent relapse.   Pancytopenia (chronic, before starting with chemotherapy)   Protein electrophoresis: the polyclonal increase in the gamma  region which may be indicative of specific immune   response or an early monoclonal protein.     Started on 5/6/17 with chemotherapy (synribo ) BID for 14 days,   Developed diarrhea on 5/9>>gets better with loperamide.   C, diff negative, can continue loperamide to good effect  Labs daily for follow up side effects.   FERNY high IgM.  Oncology(janny underwood) following, appreciate the recommendations  BMT planned for the future    Pancytopenia (CMS-HCC) (present on admission)  Overview  - Pancytopenia secondary to CML and chemotherapy  - WBC: 0.5>>0.2  - Given one unit of platelets on 5/4/17.   - Anemia with Hb: 7.7>>7.6>>6.8>>7.8>6.4>8.2>7.7  - platelet: 11>>7>>28>>20>10>>23>>15>>7>>27>>32>>30  - Hx of Chronic Right Subdural Hematoma    Assessment & Plan  Transfused 1 unit irradiated platelets on 4/30, 5/4/17,  5/8/17, 5/11, 5/15  PRBC one unit on 4/30, 5/8/17 and 5/12/17  Follow up and keep plt>10 and Hb>7.  Started with cefepim on 5/7/17 due to fever 100.4, Urine and blood culture have been negative since, will discontinue today (recieved 7 day course)  Started with acyclovir and fluconazole on 5/9 for prophylaxis.     Neutropenia (CMS-HCC) (present on admission)  Overview  Chronic   WBC:0.1  Afebrile overnight      Assessment & Plan  On acyclovir   Urine and blood culture negative  Will hold off on prophylactic levaquin for now due to high risk for c. 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    - Pain is controlled, Patient feeling ok.    - Follow up and consider radiation therapy if directed by oncology    Elevated PSA (present on admission)  Overview  PSA: 8.4 on 4/4/17    Assessment & Plan  - Stable, can continue workup on outpatient basis    Diarrhea  Overview  Denies blood in stool.   Educated that this is a AE of chemotherapy  Patient expressed understanding.     Assessment & Plan  Most likely related to chemo  Loperamide PRN  Cdiff was ordered.   monitor electrolytes and continue IV fluid.       Transaminitis (present on admission)  Overview  Related to chemo therapy. AST/ALT: 46/89>>61/111>>71/134>>80/171 >>88/169    Assessment & Plan  No abdominal pain, likely effect of chemotherapy  Discontinue all hepatotoxic drugs as possible  Continue to follow    BPH (benign prostatic hyperplasia) (present on admission)  Assessment & Plan  Stable, no issues voiding  Continue on tamsulosin 0.4 mg daily.     Low vitamin D level (present on admission)  Overview  Vit D: 11 on 4/29/17    Assessment & Plan  Continue supplementation.     Dysuria  Assessment & Plan  Pyridium for pain control  UA: WBC -ve, trace occult blood , - RBCs, -ve LE or nitrites  CTM      Seizure disorder (CMS-HCC) (present on admission)  Assessment & Plan  Stable    Continue on his home dose of keppra

## 2017-05-17 NOTE — CARE PLAN
Problem: Communication  Goal: The ability to communicate needs accurately and effectively will improve  Outcome: PROGRESSING SLOWER THAN EXPECTED  Vietnamese speaking, understands some English. Able to communicate with patient throughout shift in English.     Problem: Safety  Goal: Will remain free from injury  Outcome: PROGRESSING SLOWER THAN EXPECTED  Patient intermittently uses call light, call light education reinforced frequently. RHONDA on, activated. Bed in low, locked position. Non-skid socks in place.     Problem: Infection  Goal: Will remain free from infection  Outcome: PROGRESSING AS EXPECTED  Protective ISOLATION maintained. Thorough hand hygiene completed. Patient educated on infection prevention. No central line. Will continue to monitor.     Problem: Bowel/Gastric:  Goal: Normal bowel function is maintained or improved  Outcome: PROGRESSING SLOWER THAN EXPECTED  Frequent diarrhea noted, smell noted to be similar to CDIF, CDIF negative 5/13. Will continue to monitor. Patient having episodes on incontinence throughout shift.

## 2017-05-17 NOTE — PROGRESS NOTES
"Pharmacy Chemotherapy Verification    Patient Name: Benjamin Post   Dx: CML       Protocol: Synribo Induction  Omacetaxine (Synribo) 1.25 mg/m2 SQ BID for 14 days  NCCN Guidelines for Chronic Myelogenous Leukemia.V.1.2016  Brice J, et al. Blood. 2012 Sep 27;120(13):2573-80. Epub 2012 Aug 15.  Alfonso ROSADO et al. J Clin Oncol. 2011;30(15s):abstr 6513.    Allergies:  Review of patient's allergies indicates no known allergies.     /80 mmHg  Pulse 109  Temp(Src) 37.1 °C (98.8 °F)  Resp 18  Ht 1.702 m (5' 7\")  Wt 71.2 kg (156 lb 15.5 oz)  BMI 24.58 kg/m2  SpO2 98% Body surface area is 1.83 meters squared.    Labs 5/16/17   ANC~ 0 Plt = 24k   Hgb = 7.7  SCr = 0.62 mg/dL CrCl ~ 93.5 mL/min (min Cr 0.7)  CML -  Treat through any counts, standing transfusion orders in place.  MD aware of all current lab results.     Labs 5/15/17  AST/ALT/AP = 88/169/151 TBili = 0.6  No hepaticadjustment recommended.    Drug Order   (Drug name, dose, route, IV Fluid & volume, frequency, number of doses) Cycle: 1 Day 11-12  Final Dose #28 will be at 0900 on May 20 (note that patient skipped a dose on May 11 due to med availability)  Previous treatment: s/p dasatinib and bosutinib (last 4/26/17), both followed  by relapse     Medication = Omacetaxine  Base Dose= 1.25 mg/m2   Calc Dose: Base Dose x 1.83 m2 = 2.29 mg  Final Dose = 2.4 mg  Route = subq  Conc & Volume = 3.5 mg/mL = 0.69 mL  Admin Duration = to be given subcutaneously   Patient Supplied Medication  q12h on days 1 to 14      <5% difference, OK to continue with baseline dose if < 10% difference per MDs.        By my signature below, I confirm this process was performed independently with the BSA and all final chemotherapy dosing calculations congruent. I have reviewed the above chemotherapy order and that my calculation of the final dose and BSA (when applicable) corroborate those calculations of the  pharmacist. Discrepancies of 5% or greater in the written " dose have been addressed and documented within the EPIC Progress notes.    Dorene Holt, JabierD

## 2017-05-17 NOTE — PROGRESS NOTES
[facility] Interval Note    Name Benjamin Post 1945   Age/Sex 71 y.o. male   MRN [id number]   Code Status DNR     After 5PM or if no immediate response to page, please call for cross-coverage  Attending/Team: Dr. Chung/Ly Call [phone] to page   1st Call - Day Intern (R1):   Dr Cowart 2nd Call - Day Sr. Resident (R2/R3):            Chief complaint/ reason for interval visit (Primary Diagnosis)   CML/pancytopenia     Interval Problem Daily Status Update  :  Principal Problem:    CML (chronic myelocytic leukemia)-Accelerated phase (Chronic) POA: Yes      Overview: Day 11 of Synribo      CBC Hb 7.7      No fevers overnight       No signs of bleeding present         Active Problems:    Pancytopenia (CMS-HCC) (Chronic) POA: Yes      Overview: - Pancytopenia secondary to CML and chemotherapy      - WBC: 0.5>>0.2      - Given one unit of platelets on 17.       - Anemia with Hb: 7.7>>7.6>>6.8>>7.8>6.4>8.2>7.7>7.7      - platelet: 11>>7>>28>>20>10>>23>>15>>7>>27>>32>>30>>24      - Hx of Chronic Right Subdural Hematoma    Neutropenia (CMS-HCC) POA: Yes      Overview: Chronic       WBC:0.1      Afebrile overnight          Bone pain POA: Yes      Overview: Stable      Negative PE for tenderness over the rib cage.    Acute bilateral low back pain without sciatica POA: Yes    Elevated PSA (Chronic) POA: Yes      Overview: PSA: 8.4 on 17    Diarrhea POA: No      Overview: Denies blood in stool.       Educated that this is a AE of chemotherapy      Patient expressed understanding.     Seizure disorder (CMS-HCC) POA: Yes    Transaminitis POA: Yes      Overview: Related to chemo therapy. AST/ALT: 46/89>>61/111>>71/134>>80/171 >>88/169    BPH (benign prostatic hyperplasia) (Chronic) POA: Yes      Overview: On flomax    Low vitamin D level (Chronic) POA: Yes      Overview: Vit D: 11 on 17    Dysuria POA: Unknown       Overview: Pyridium stopped due to no analgesic effect      Norco started 1-2 tabs 5 mg Q6 prn      Denies any hematuria      Perineal exam did not illicit tenderness. Cannot complete a rectal exam       due to neutropenia.       Likely not prostatitis however cannot be ruled out due without complete       rectal exam.       UA negative for nitrites, LE, or WBC casts         Resolved Problems:    * No resolved hospital problems. *          Review of Systems   Constitutional: Negative for fever and chills.   HENT: Negative for hearing loss.    Respiratory: Negative for cough and hemoptysis.    Cardiovascular: Negative for chest pain and palpitations.   Gastrointestinal: Positive for diarrhea. Negative for heartburn, nausea and abdominal pain.        Stable diarrhea    Genitourinary: Negative for dysuria and urgency.        Dysuria without hematuria   Musculoskeletal: Negative for myalgias and neck pain.   Skin: Negative for rash.   Neurological: Negative for dizziness and headaches.   Psychiatric/Behavioral: Negative for depression and suicidal ideas.       Consultants/Specialty  Oncology - [place]    Disposition  Home after chemotherapy     Quality Measures  EKG reviewed, Labs reviewed, Medications reviewed and Radiology images reviewed  Mahajan catheter: No Mahajan      DVT Prophylaxis: Contraindicated - High bleeding risk  DVT prophylaxis - mechanical: SCDs (low plt)                Physical Exam       Filed Vitals:    05/16/17 2009 05/17/17 0018 05/17/17 0400 05/17/17 0800   BP: 148/87 135/76 151/80 161/93   Pulse: 101 103 109 106   Temp: 36.9 °C (98.4 °F) 36.8 °C (98.2 °F) 37.1 °C (98.8 °F) 37 °C (98.6 °F)   Resp: 18 18 18 18   Height:       Weight:       SpO2: 97% 99% 98% 96%     Body mass index is 24.58 kg/(m^2).    Oxygen Therapy:  Pulse Oximetry: 96 %, O2 (LPM): 0, O2 Delivery: None (Room Air)    Physical Exam   Constitutional: He is well-developed, well-nourished, and in no distress. No distress.   HENT:    Head: Normocephalic.   Neck: No tracheal deviation present. No thyromegaly present.   Cardiovascular: Normal rate.  Exam reveals no friction rub.    Murmur heard.  Pansystolic murmur on the apex    Pulmonary/Chest: Effort normal. No respiratory distress. He has no wheezes.   Abdominal: Soft. He exhibits no distension. There is no tenderness.   Genitourinary:   No perineal tenderness present.   No signs of balanitis.    Musculoskeletal: He exhibits no edema.   Neurological: He is alert.   Skin: No erythema.         Lab Data Review:      5/4/2017  1:28 PM    Recent Labs      05/15/17   0019  05/16/17   0632  05/16/17   2352   SODIUM  132*  135  134*   POTASSIUM  3.2*  3.5*  3.6   CHLORIDE  107  109  108   CO2  20  20  20   BUN  14  14  14   CREATININE  0.61  0.55  0.62   CALCIUM  8.6  9.0  8.5       Recent Labs      05/15/17   0019  05/16/17   0632  05/16/17   2352   ALTSGPT  169*   --   184*   ASTSGOT  88*   --   106*   ALKPHOSPHAT  151*   --   167*   TBILIRUBIN  0.6   --   0.8   GLUCOSE  124*  108*  112*       Recent Labs      05/15/17   1414  05/16/17   0632  05/16/17   2352   RBC  2.72*  2.57*  2.63*   HEMOGLOBIN  8.1*  7.7*  7.7*   HEMATOCRIT  22.3*  21.0*  21.5*   PLATELETCT  32*  30*  24*       Recent Labs      05/15/17   0019  05/15/17   1414  05/16/17   0632  05/16/17   2352   WBC  0.1*  0.1*  0.1*  0.1*   ASTSGOT  88*   --    --   106*   ALTSGPT  169*   --    --   184*   ALKPHOSPHAT  151*   --    --   167*   TBILIRUBIN  0.6   --    --   0.8           Assessment/Plan     * CML (chronic myelocytic leukemia)-Accelerated phase (present on admission)  Overview  Day 11 of Synribo  CBC Hb 7.7  No fevers overnight   No signs of bleeding present       Assessment & Plan  Has completed multiple chemotherapy cycles.     multiple relapses.    Was treated on dasatinib for a time with response, but then relapse.    Was more recently on bosutinib with response, but again recent relapse.   Pancytopenia (chronic, before  starting with chemotherapy)   Protein electrophoresis: the polyclonal increase in the gamma region which may be indicative of specific immune   response or an early monoclonal protein.     Started on 5/6/17 with chemotherapy (synribo ) BID for 14 days,   Developed diarrhea on 5/9>>gets better with loperamide.   C, diff negative, can continue loperamide to good effect  Labs daily for follow up side effects.   FERNY high IgM.  Oncology(flaquito) following, appreciate the recommendations  BMT planned for the future    Pancytopenia (CMS-HCC) (present on admission)  Overview  - Pancytopenia secondary to CML and chemotherapy  - WBC: 0.5>>0.2  - Given one unit of platelets on 5/4/17.   - Anemia with Hb: 7.7>>7.6>>6.8>>7.8>6.4>8.2>7.7>7.7  - platelet: 11>>7>>28>>20>10>>23>>15>>7>>27>>32>>30>>24  - Hx of Chronic Right Subdural Hematoma    Assessment & Plan  Transfused 1 unit irradiated platelets on 4/30, 5/4/17,  5/8/17, 5/11, 5/15  PRBC one unit on 4/30, 5/8/17 and 5/12/17  Follow up and keep plt>10 and Hb>7.  Started with cefepim on 5/7/17 due to fever 100.4, Urine and blood culture have been negative since, will discontinue today (recieved 7 day course)  Started with acyclovir and fluconazole on 5/9 for prophylaxis.     Neutropenia (CMS-HCC) (present on admission)  Overview  Chronic   WBC:0.1  Afebrile overnight      Assessment & Plan  On acyclovir   Urine and blood culture negative  Will hold off on prophylactic levaquin for now due to high risk for c. Diff and rising LFTs    Bone pain (present on admission)  Overview  Stable  Negative PE for tenderness over the rib cage.    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    - Pain is controlled, Patient feeling ok.    - Follow up and consider radiation therapy if directed by oncology    Elevated PSA (present on admission)  Overview  PSA: 8.4 on 4/4/17    Assessment & Plan  - Stable, can continue workup on outpatient basis    Diarrhea  Overview  Denies blood in stool.   Educated that this is a AE of chemotherapy  Patient expressed understanding.     Assessment & Plan  Most likely related to chemo  Loperamide PRN  Cdiff was ordered.   monitor electrolytes and continue IV fluid.       Transaminitis (present on admission)  Overview  Related to chemo therapy. AST/ALT: 46/89>>61/111>>71/134>>80/171 >>88/169    Assessment & Plan  No abdominal pain, likely effect of chemotherapy  Discontinue all hepatotoxic drugs as possible  Continue to follow    BPH (benign prostatic hyperplasia) (present on admission)  Assessment & Plan  Stable, no issues voiding  Continue on tamsulosin 0.4 mg daily.     Low vitamin D level (present on admission)  Overview  Vit D: 11 on 4/29/17    Assessment & Plan  Continue supplementation.     Dysuria  Assessment & Plan  Norco for pain control  UA: WBC -ve, trace occult blood , - RBCs, -ve LE or nitrites  CTM      Seizure disorder (CMS-HCC) (present on admission)  Assessment & Plan  Stable    Continue on his home dose of keppra

## 2017-05-17 NOTE — PROGRESS NOTES
"  HEMATOLOGY-ONCOLOGY PROGRESS NOTE    CML admitted for Synribo started on 5/6/17 - last day 5/19/17     Subjective:  He denies any bleeding. No abdominal pain, nausea or vomiting. He remains fatigued.   He had diarrhea started again 5-6 times last night, according to CNA and nurse it was small volume but fowl smelling. He denies any fevers or chills.     Objective:  Medications reviewed and notable for:  Current Facility-Administered Medications   Medication Dose   • loperamide (IMODIUM) capsule 2 mg  2 mg   • phenazopyridine (PYRIDIUM) tablet 100 mg  100 mg   • acyclovir (ZOVIRAX) tablet 400 mg  400 mg   • NS infusion     • nystatin (MYCOSTATIN) 930386 UNIT/ML suspension 500,000 Units  5 mL   • Omacetaxine Mepesuccinate 2.4 mg in syringe 0.69 mL Chemo     • acetaminophen (TYLENOL) tablet 650 mg  650 mg   • diphenhydrAMINE (BENADRYL) tablet/capsule 25 mg  25 mg   • pneumococcal 13-Elizabeth Conj Vacc (PREVNAR 13) syringe 0.5 mL  0.5 mL   • Respiratory Care per Protocol     • ondansetron (ZOFRAN) syringe/vial injection 4 mg  4 mg   • ondansetron (ZOFRAN ODT) dispertab 4 mg  4 mg   • morphine (pf) 4 mg/ml injection 2 mg  2 mg   • carvedilol (COREG) tablet 3.125 mg  3.125 mg   • levetiracetam (KEPPRA) tablet 750 mg  750 mg   • oxycodone immediate-release (ROXICODONE) tablet 5 mg  5 mg   • tamsulosin (FLOMAX) capsule 0.4 mg  0.4 mg   • lidocaine (LIDODERM) 5 % 1 Patch  1 Patch   • vitamin D (Ergocalciferol) (DRISDOL) capsule 50,000 Units  50,000 Units       ROS:   Constitutional: +  fatigue, no fevers or chills, no night sweats  Resp: No cough or SOB  Cardio:No chest pain or palpitations  Pschy: No depression or anxiety   Neuro: No headaches, no seizure, no vision changes  GI: no abdominal pain, nausea or vomiting. No diarrhea or constipation   All other ROS negative    Blood pressure 151/80, pulse 109, temperature 37.1 °C (98.8 °F), resp. rate 18, height 1.702 m (5' 7\"), weight 71.2 kg (156 lb 15.5 oz), SpO2 98 " %.    General:  comfortable, NAD  HEENT:  sclera anicteric, pupils equal, round, reactive to light, + thursh   Neck:   supple, no lymphadenopathy  Cor:   regular rate and rhythm, no murmurs, rubs, or gallops  Pulm:   clear to auscultation bilaterally  Abd:   bowel sounds present, soft, nontender, nondistended, no palpable masses or organomegaly  Extremities:  warm, no lower extremity edema  Neurologic:  A&O x 3  Pyschiatric:  Appropriate mood and affect    Labs reviewed and notable for:  Recent Labs      05/15/17   1414  05/16/17   0632  05/16/17   2352   WBC  0.1*  0.1*  0.1*   RBC  2.72*  2.57*  2.63*   HEMOGLOBIN  8.1*  7.7*  7.7*   HEMATOCRIT  22.3*  21.0*  21.5*   MCV  82.0  81.7  81.7   MCH  29.8  30.0  29.3   MCHC  36.3*  36.7*  35.8*   RDW  40.1  40.2  39.9   PLATELETCT  32*  30*  24*   MPV  10.6  9.1  9.7         .@CMP  Recent Results (from the past 24 hour(s))   COMP METABOLIC PANEL    Collection Time: 05/16/17 11:52 PM   Result Value Ref Range    Sodium 134 (L) 135 - 145 mmol/L    Potassium 3.6 3.6 - 5.5 mmol/L    Chloride 108 96 - 112 mmol/L    Co2 20 20 - 33 mmol/L    Anion Gap 6.0 0.0 - 11.9    Glucose 112 (H) 65 - 99 mg/dL    Bun 14 8 - 22 mg/dL    Creatinine 0.62 0.50 - 1.40 mg/dL    Calcium 8.5 8.5 - 10.5 mg/dL    AST(SGOT) 106 (H) 12 - 45 U/L    ALT(SGPT) 184 (H) 2 - 50 U/L    Alkaline Phosphatase 167 (H) 30 - 99 U/L    Total Bilirubin 0.8 0.1 - 1.5 mg/dL    Albumin 3.0 (L) 3.2 - 4.9 g/dL    Total Protein 6.1 6.0 - 8.2 g/dL    Globulin 3.1 1.9 - 3.5 g/dL    A-G Ratio 1.0 g/dL   CBC WITH DIFFERENTIAL    Collection Time: 05/16/17 11:52 PM   Result Value Ref Range    WBC 0.1 (LL) 4.8 - 10.8 K/uL    RBC 2.63 (L) 4.70 - 6.10 M/uL    Hemoglobin 7.7 (L) 14.0 - 18.0 g/dL    Hematocrit 21.5 (L) 42.0 - 52.0 %    MCV 81.7 81.4 - 97.8 fL    MCH 29.3 27.0 - 33.0 pg    MCHC 35.8 (H) 33.7 - 35.3 g/dL    RDW 39.9 35.9 - 50.0 fL    Platelet Count 24 (LL) 164 - 446 K/uL    MPV 9.7 9.0 - 12.9 fL    Nucleated RBC 0.00  /100 WBC    Neutrophils (Absolute) #C #NM 1.82 - 7.42 K/uL    NRBC (Absolute) 0.00 K/uL   ESTIMATED GFR    Collection Time: 05/16/17 11:52 PM   Result Value Ref Range    GFR If African American >60 >60 mL/min/1.73 m 2    GFR If Non African American >60 >60 mL/min/1.73 m 2       Diagnostic imaging:      Assessment and Recommendations:  1.  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  Prior treatments included dasatinib and bosutinib.  The plan has been for [facility]BMT at [facility], but needs some response to chemo before they can do the transplant, giving Synribo for this.  Synribo is given as a SQ shot BID x 14 days with the first cycle, followed by 2 weeks off.  Started on 5/6/17.        Synribo is a high risk medication.  There is a 's warning for cerebral bleeding, and to avoid NSAIDS, blood thinners, etc as well as to use with caution in patients with Platelets<50K.  He has a h/o of a SDH in the recent past.    -- continue Synribo.  Aware that actual dose is slightly above BSA-based dose, OK up to 10% difference. If this persists, may need to adjust dose with cycle 2 (comes in pre-filled syringes so can't be adjusted after order arrives)  -- tolerating with main toxicity of diarrhea and elevated LFTs  -- monitor for Headaches or AMS changes     -- duration of hospitalization as per Dr. Vu, primary oncologist.  Had initially been communicated that plan for for 14 day hospitalization to get Synribo but Dr. Vu now favors treating more like standard acute luekemia induction with prolonged stay until counts improve.  Patient is willing to do whatever needs to be done.     2.  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  Not likely to improve unless he can get response to some kind of chemo.  Will transfuse as needed to keep >10, or higher if bleeding.  3.  Neutropenia--nothing documented in chart but as per Dr. Agustin's note, had fever late in day on 5/7.  BCx and CXR negative.  4.  Elevated  LFT's.  Worsening.  Potentially due to Synribo but would also like to discontinue/change any other medications that might contribute. D/c fluconzole and continue with nystatin alone at this time.     5.  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  If recurrent pain might consider RT.  6. Anemia-- transfuse 1 unit of PRBC's if <7.5  7.  Diarrhea.  A common side effect of Synribo. He was started on immodium on 5/10/17 with improvement. Antibiotics may have also been contributing.   Last C. Diff check on 5/13/17 negative . Resolved.  restarted again on 5/17/17. Checked another C. Diff   8. Subjective urinary retention and dysuria.  PVR on 5/13/17 was 33 cc per RN.  UA with only trace blood.     9.  Thrush.  on Nystatin      Plan:   - His liver enzymes continues to rise slowly likely due to Synbiro. No dose adjustment needed.   - To help with blast crisis, we have to treat him aggressively   - Proceed with Day 12   -  Avoid any meds that can cause hepatotoxicity   - Continue supportive measures with transfusion   - Diarrhea - check another C. Diff since it appears to be fowl smelling even though small amounts-  if negative - aggressive bowel regimen start with imodium after each loose BM if exceeding > 6 tablets daily - will start Lomotil   - Discussed with [facility] nursing staff - plan       High complexicity/Drug monitoring     We will continue to follow with you; please call with any questions, [phone].      Ladonna Corado MD  [facility]   [phone]

## 2017-05-18 LAB
ALBUMIN SERPL BCP-MCNC: 3.1 G/DL (ref 3.2–4.9)
ALBUMIN/GLOB SERPL: 1.1 G/DL
ALP SERPL-CCNC: 172 U/L (ref 30–99)
ALT SERPL-CCNC: 169 U/L (ref 2–50)
ANION GAP SERPL CALC-SCNC: 7 MMOL/L (ref 0–11.9)
AST SERPL-CCNC: 92 U/L (ref 12–45)
BILIRUB SERPL-MCNC: 0.9 MG/DL (ref 0.1–1.5)
BUN SERPL-MCNC: 14 MG/DL (ref 8–22)
C DIFF DNA SPEC QL NAA+PROBE: NEGATIVE
C DIFF TOX A+B STL QL IA: NEGATIVE
C DIFF TOX GENS STL QL NAA+PROBE: NEGATIVE
CALCIUM SERPL-MCNC: 8.4 MG/DL (ref 8.5–10.5)
CHLORIDE SERPL-SCNC: 109 MMOL/L (ref 96–112)
CO2 SERPL-SCNC: 21 MMOL/L (ref 20–33)
CREAT SERPL-MCNC: 0.6 MG/DL (ref 0.5–1.4)
ERYTHROCYTE [DISTWIDTH] IN BLOOD BY AUTOMATED COUNT: 39.6 FL (ref 35.9–50)
GFR SERPL CREATININE-BSD FRML MDRD: >60 ML/MIN/1.73 M 2
GLOBULIN SER CALC-MCNC: 2.7 G/DL (ref 1.9–3.5)
GLUCOSE SERPL-MCNC: 110 MG/DL (ref 65–99)
HCT VFR BLD AUTO: 19.4 % (ref 42–52)
HGB BLD-MCNC: 6.9 G/DL (ref 14–18)
MCH RBC QN AUTO: 29 PG (ref 27–33)
MCHC RBC AUTO-ENTMCNC: 35.6 G/DL (ref 33.7–35.3)
MCV RBC AUTO: 81.5 FL (ref 81.4–97.8)
NEUTROPHILS # BLD AUTO: ABNORMAL K/UL (ref 1.82–7.42)
NRBC # BLD AUTO: 0 K/UL
NRBC BLD AUTO-RTO: 0 /100 WBC
PLATELET # BLD AUTO: 15 K/UL (ref 164–446)
PMV BLD AUTO: 9.2 FL (ref 9–12.9)
POTASSIUM SERPL-SCNC: 3.4 MMOL/L (ref 3.6–5.5)
PROT SERPL-MCNC: 5.8 G/DL (ref 6–8.2)
RBC # BLD AUTO: 2.38 M/UL (ref 4.7–6.1)
SODIUM SERPL-SCNC: 137 MMOL/L (ref 135–145)
WBC # BLD AUTO: 0.1 K/UL (ref 4.8–10.8)

## 2017-05-18 PROCEDURE — A9270 NON-COVERED ITEM OR SERVICE: HCPCS | Performed by: INTERNAL MEDICINE

## 2017-05-18 PROCEDURE — P9016 RBC LEUKOCYTES REDUCED: HCPCS

## 2017-05-18 PROCEDURE — 99231 SBSQ HOSP IP/OBS SF/LOW 25: CPT | Mod: GC | Performed by: INTERNAL MEDICINE

## 2017-05-18 PROCEDURE — 86644 CMV ANTIBODY: CPT

## 2017-05-18 PROCEDURE — 700102 HCHG RX REV CODE 250 W/ 637 OVERRIDE(OP): Performed by: INTERNAL MEDICINE

## 2017-05-18 PROCEDURE — 87493 C DIFF AMPLIFIED PROBE: CPT

## 2017-05-18 PROCEDURE — 36430 TRANSFUSION BLD/BLD COMPNT: CPT

## 2017-05-18 PROCEDURE — 85025 COMPLETE CBC W/AUTO DIFF WBC: CPT

## 2017-05-18 PROCEDURE — A9270 NON-COVERED ITEM OR SERVICE: HCPCS | Performed by: STUDENT IN AN ORGANIZED HEALTH CARE EDUCATION/TRAINING PROGRAM

## 2017-05-18 PROCEDURE — 770009 HCHG ROOM/CARE - ONCOLOGY SEMI PRI*

## 2017-05-18 PROCEDURE — 83735 ASSAY OF MAGNESIUM: CPT

## 2017-05-18 PROCEDURE — 700105 HCHG RX REV CODE 258: Performed by: INTERNAL MEDICINE

## 2017-05-18 PROCEDURE — 36415 COLL VENOUS BLD VENIPUNCTURE: CPT

## 2017-05-18 PROCEDURE — 700102 HCHG RX REV CODE 250 W/ 637 OVERRIDE(OP): Performed by: STUDENT IN AN ORGANIZED HEALTH CARE EDUCATION/TRAINING PROGRAM

## 2017-05-18 PROCEDURE — 87324 CLOSTRIDIUM AG IA: CPT

## 2017-05-18 PROCEDURE — 84153 ASSAY OF PSA TOTAL: CPT

## 2017-05-18 PROCEDURE — 700101 HCHG RX REV CODE 250: Performed by: STUDENT IN AN ORGANIZED HEALTH CARE EDUCATION/TRAINING PROGRAM

## 2017-05-18 PROCEDURE — 80053 COMPREHEN METABOLIC PANEL: CPT

## 2017-05-18 PROCEDURE — 86923 COMPATIBILITY TEST ELECTRIC: CPT

## 2017-05-18 RX ORDER — POTASSIUM CHLORIDE 1.5 G/1.58G
20 POWDER, FOR SOLUTION ORAL 2 TIMES DAILY
Status: DISCONTINUED | OUTPATIENT
Start: 2017-05-18 | End: 2017-05-22

## 2017-05-18 RX ADMIN — DIPHENHYDRAMINE HCL 25 MG: 25 TABLET ORAL at 05:39

## 2017-05-18 RX ADMIN — LIDOCAINE 1 PATCH: 50 PATCH CUTANEOUS at 09:32

## 2017-05-18 RX ADMIN — LOPERAMIDE HYDROCHLORIDE 2 MG: 2 CAPSULE ORAL at 21:30

## 2017-05-18 RX ADMIN — TAMSULOSIN HYDROCHLORIDE 0.4 MG: 0.4 CAPSULE ORAL at 09:31

## 2017-05-18 RX ADMIN — SODIUM CHLORIDE: 9 INJECTION, SOLUTION INTRAVENOUS at 16:35

## 2017-05-18 RX ADMIN — ACYCLOVIR 400 MG: 800 TABLET ORAL at 09:30

## 2017-05-18 RX ADMIN — POTASSIUM CHLORIDE 20 MEQ: 1.5 POWDER, FOR SOLUTION ORAL at 09:31

## 2017-05-18 RX ADMIN — NYSTATIN 500000 UNITS: 500000 SUSPENSION ORAL at 11:49

## 2017-05-18 RX ADMIN — ACETAMINOPHEN 650 MG: 325 TABLET, FILM COATED ORAL at 05:39

## 2017-05-18 RX ADMIN — LEVETIRACETAM 750 MG: 250 TABLET, FILM COATED ORAL at 09:30

## 2017-05-18 RX ADMIN — CARVEDILOL 3.12 MG: 3.12 TABLET, FILM COATED ORAL at 09:30

## 2017-05-18 RX ADMIN — ACYCLOVIR 400 MG: 800 TABLET ORAL at 21:21

## 2017-05-18 RX ADMIN — NYSTATIN 500000 UNITS: 500000 SUSPENSION ORAL at 16:35

## 2017-05-18 RX ADMIN — LEVETIRACETAM 750 MG: 250 TABLET, FILM COATED ORAL at 21:21

## 2017-05-18 RX ADMIN — NYSTATIN 500000 UNITS: 500000 SUSPENSION ORAL at 09:31

## 2017-05-18 RX ADMIN — CARVEDILOL 3.12 MG: 3.12 TABLET, FILM COATED ORAL at 16:35

## 2017-05-18 ASSESSMENT — ENCOUNTER SYMPTOMS
FEVER: 0
DIZZINESS: 0
ABDOMINAL PAIN: 0
HEARTBURN: 0
MYALGIAS: 0
CHILLS: 0
DIARRHEA: 1
NAUSEA: 0
COUGH: 0
HEMOPTYSIS: 0
NECK PAIN: 0
DEPRESSION: 0
PALPITATIONS: 0
HEADACHES: 0

## 2017-05-18 ASSESSMENT — PAIN SCALES - GENERAL: PAINLEVEL_OUTOF10: 0

## 2017-05-18 NOTE — PROGRESS NOTES
Bedside report received from night shift, assumed care of pt at 0715. Pt in bed resting comfortably with no s/sx of pain or discomfort.  Blood transfusion complete. Chemo administered per order. Pt tolerated well. Pt with multiple loose stools this am. Pt calls appropriately for medication or assistance as needed. Call light within reach, all appropriate fall precautions in place and personal belongings within reach; hourly rounding in place. No needs at this time. See flowsheets for further assessment details.

## 2017-05-18 NOTE — CARE PLAN
Problem: Safety  Goal: Will remain free from falls  Intervention: Assess risk factors for falls  Pt assessed for fall risk. Encouraged to call for assistance.       Problem: Discharge Barriers/Planning  Goal: Patient’s continuum of care needs will be met  Intervention: Collaborate with Transitional Care Team and Interdisciplinary Team to meet discharge needs  Discussed dc planning with multidisciplinary team.

## 2017-05-18 NOTE — PROGRESS NOTES
[facility] [place] Medicine Interval Note    Name Benjamin Post 1945   Age/Sex 71 y.o. male   MRN [id number]   Code Status DNR     After 5PM or if no immediate response to page, please call for cross-coverage  Attending/Team: Dr. Chung/Ly Call [phone] to page   1st Call - Day Intern (R1):   Dr Cowart 2nd Call - Day Sr. Resident (R2/R3):            Chief complaint/ reason for interval visit (Primary Diagnosis)   CML/pancytopenia     Interval Problem Daily Status Update  :  Principal Problem:    CML (chronic myelocytic leukemia)-Accelerated phase (Chronic) POA: Yes      Overview: Day 11 of Synribo      CBC Hb 7.7      No fevers overnight       No signs of bleeding present         Active Problems:    Pancytopenia (CMS-HCC) (Chronic) POA: Yes      Overview: - Pancytopenia secondary to CML and chemotherapy      - WBC: 0.5>>0.2      - Given one unit of platelets on 17.       - Anemia with Hb: 7.7>>7.6>>6.8>>7.8>6.4>8.2>7.7>7.7      - platelet: 11>>7>>28>>20>10>>23>>15>>7>>27>>32>>30>>24      - Hx of Chronic Right Subdural Hematoma    Neutropenia (CMS-HCC) POA: Yes      Overview: Chronic       WBC:0.1      Afebrile overnight          Bone pain POA: Yes      Overview: Stable      Negative PE for tenderness over the rib cage.    Acute bilateral low back pain without sciatica POA: Yes    Elevated PSA (Chronic) POA: Yes      Overview: PSA: 8.4 on 17    Diarrhea POA: No      Overview: Denies blood in stool.       Educated that this is a AE of chemotherapy      Patient expressed understanding.     Seizure disorder (CMS-HCC) POA: Yes    Transaminitis POA: Yes      Overview: Related to chemo therapy. AST/ALT: 46/89>>61/111>>71/134>>80/171 >>88/169    BPH (benign prostatic hyperplasia) (Chronic) POA: Yes      Overview: On flomax    Low vitamin D level (Chronic) POA: Yes      Overview: Vit D: 11 on 17    Dysuria POA: Unknown       Overview: Pyridium stopped due to no analgesic effect      Norco started 1-2 tabs 5 mg Q6 prn      Denies any hematuria      Perineal exam did not illicit tenderness. Cannot complete a rectal exam       due to neutropenia.       Likely not prostatitis however cannot be ruled out due without complete       rectal exam.       UA negative for nitrites, LE, or WBC casts         Resolved Problems:    * No resolved hospital problems. *          Review of Systems   Constitutional: Negative for fever and chills.   HENT: Negative for hearing loss.    Respiratory: Negative for cough and hemoptysis.    Cardiovascular: Negative for chest pain and palpitations.   Gastrointestinal: Positive for diarrhea. Negative for heartburn, nausea and abdominal pain.        Stable diarrhea    Genitourinary: Negative for dysuria and urgency.        Dysuria without hematuria   Musculoskeletal: Negative for myalgias and neck pain.   Skin: Negative for rash.   Neurological: Negative for dizziness and headaches.   Psychiatric/Behavioral: Negative for depression and suicidal ideas.       Consultants/Specialty  Oncology - Van [facility]eter/[facility]ak    Disposition  Remain inpatient for chemotherapy monitoring    Quality Measures  EKG reviewed, Labs reviewed, Medications reviewed and Radiology images reviewed  Mahajan catheter: No Mahajan      DVT Prophylaxis: Contraindicated - High bleeding risk  DVT prophylaxis - mechanical: SCDs (low plt)                Physical Exam       Filed Vitals:    05/18/17 0630 05/18/17 0800 05/18/17 0930 05/18/17 1200   BP: 145/82 142/75 158/83 156/81   Pulse: 97 96 98 92   Temp: 36.9 °C (98.4 °F) 36.8 °C (98.3 °F) 36.4 °C (97.5 °F) 36.4 °C (97.5 °F)   Resp: 18 18 18 18   Height:       Weight:       SpO2: 94% 94% 93% 96%     Body mass index is 24.58 kg/(m^2).    Oxygen Therapy:  Pulse Oximetry: 96 %, O2 (LPM): 0, O2 Delivery: None (Room Air)    Physical Exam   Constitutional: He is well-developed, well-nourished, and in no  distress. No distress.   HENT:   Head: Normocephalic.   Neck: No tracheal deviation present. No thyromegaly present.   Cardiovascular: Normal rate.  Exam reveals no friction rub.    Murmur heard.  Pansystolic murmur on the apex    Pulmonary/Chest: Effort normal. No respiratory distress. He has no wheezes.   Abdominal: Soft. He exhibits no distension. There is no tenderness.   Genitourinary:   No perineal tenderness present.   No signs of balanitis.    Musculoskeletal: He exhibits no edema.   Neurological: He is alert.   Skin: No erythema.         Lab Data Review:      5/4/2017  1:28 PM    Recent Labs      05/16/17   0632  05/16/17   2352 05/17/17   2346   SODIUM  135  134*  137   POTASSIUM  3.5*  3.6  3.4*   CHLORIDE  109  108  109   CO2  20  20  21   BUN  14  14  14   CREATININE  0.55  0.62  0.60   CALCIUM  9.0  8.5  8.4*       Recent Labs      05/16/17   0632  05/16/17 2352 05/17/17   2346   ALTSGPT   --   184*  169*   ASTSGOT   --   106*  92*   ALKPHOSPHAT   --   167*  172*   TBILIRUBIN   --   0.8  0.9   GLUCOSE  108*  112*  110*       Recent Labs      05/16/17   0632  05/16/17   2352  05/17/17   2346   RBC  2.57*  2.63*  2.38*   HEMOGLOBIN  7.7*  7.7*  6.9*   HEMATOCRIT  21.0*  21.5*  19.4*   PLATELETCT  30*  24*  15*       Recent Labs      05/16/17 0632  05/16/17 2352 05/17/17   2346   WBC  0.1*  0.1*  0.1*   ASTSGOT   --   106*  92*   ALTSGPT   --   184*  169*   ALKPHOSPHAT   --   167*  172*   TBILIRUBIN   --   0.8  0.9           Assessment/Plan     * CML (chronic myelocytic leukemia)-Accelerated phase (present on admission)  Overview  Day 11 of Synribo  CBC Hb 7.7  No fevers overnight   No signs of bleeding present       Assessment & Plan  Has completed multiple chemotherapy cycles.     multiple relapses.    Was treated on dasatinib for a time with response, but then relapse.    Was more recently on bosutinib with response, but again recent relapse.   Pancytopenia (chronic, before starting with  chemotherapy)   Protein electrophoresis: the polyclonal increase in the gamma region which may be indicative of specific immune   response or an early monoclonal protein.     Started on 5/6/17 with chemotherapy (synribo ) BID for 14 days,   Developed diarrhea on 5/9>>improving with loperamide.   C, diff negative, can continue loperamide to good effect  Labs daily for follow up side effects.   FERNY high IgM.  Oncology(van meter/Reganti) following, appreciate the recommendations  BMT planned for the future    Pancytopenia (CMS-HCC) (present on admission)  Overview  - Pancytopenia secondary to CML and chemotherapy  - WBC: 0.5>>0.2  - Given one unit of platelets on 5/4/17.   - Anemia with Hb: 7.7>>7.6>>6.8>>7.8>6.4>8.2>7.7>7.7  - platelet: 11>>7>>28>>20>10>>23>>15>>7>>27>>32>>30>>24  - Hx of Chronic Right Subdural Hematoma    Assessment & Plan  Transfused 1 unit irradiated platelets on 4/30, 5/4/17,  5/8/17, 5/11, 5/15  PRBC one unit on 4/30, 5/8/17, 5/12/17, 5/18  Follow up and keep plt>10 and Hb>7.  Started with cefepim on 5/7/17 due to fever 100.4, Urine and blood culture have been negative since, completed 7 day course  On acyclovir on 5/9 for prophylaxis.     Neutropenia (CMS-HCC) (present on admission)  Overview  Chronic   WBC:0.1  Afebrile overnight      Assessment & Plan  On acyclovir  Urine and blood culture negative  Will hold off on prophylactic levaquin and fluconazole for now due to high risk for c. Diff and elevated LFTs    Bone pain (present on admission)  Overview  Stable  Negative PE for tenderness over the rib cage.    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    - Pain is controlled, Patient feeling ok.    - Follow up and consider radiation therapy if directed by oncology    Elevated PSA (present on admission)  Overview  PSA: 8.4 on 4/4/17    Assessment & Plan  - Stable, can continue workup on outpatient basis  Will trend once as acute prostatitis is possible given his penile pain, rectal exam is contraindicated in such patients    Diarrhea  Overview  Denies blood in stool.   Educated that this is a AE of chemotherapy  Patient expressed understanding.     Assessment & Plan  Most likely related to chemo  Loperamide PRN  Cdiff negative  monitor electrolytes and continue IV fluid.       Transaminitis (present on admission)  Overview  Related to chemo therapy. AST/ALT: 46/89>>61/111>>71/134>>80/171 >>88/169    Assessment & Plan  No abdominal pain, likely effect of chemotherapy  Discontinue all hepatotoxic drugs as possible  Now slightly downtrending  Continue to follow    BPH (benign prostatic hyperplasia) (present on admission)  Assessment & Plan  Stable,  Complains of some dysuria, will get repeat PSA to assess for prostatitis as rectal examination is contraindicated in neutropenic patients  Continue on tamsulosin 0.4 mg daily.     Low vitamin D level (present on admission)  Overview  Vit D: 11 on 4/29/17    Assessment & Plan  Vit D 11 on 4/29/2017   Continue supplementation.     Dysuria  Assessment & Plan  Norco for pain control  UA: WBC -ve, trace occult blood , - RBCs, -ve LE or nitrites  CTM      Seizure disorder (CMS-HCC) (present on admission)  Assessment & Plan  Stable    Continue on his home dose of keppra

## 2017-05-18 NOTE — PROGRESS NOTES
TECH from Lab called with critical result of WBC 0.1, PLT 15 at 0030. Critical lab result read back to TECH.   This critical lab result is within parameters established by [facility] for this patient

## 2017-05-18 NOTE — PROGRESS NOTES
"Pharmacy Chemotherapy Verification    Patient Name: Benjamin Post   Dx: CML       Protocol: Synribo Induction  Omacetaxine (Synribo) 1.25 mg/m2 SQ BID for 14 days  NCCN Guidelines for Chronic Myelogenous Leukemia.V.1.2016  Brice J, et al. Blood. 2012 Sep 27;120(13):2573-80. Epub 2012 Aug 15.  Alfonso ROSADO et al. J Clin Oncol. 2011;30(15s):abstr 6513.    Allergies:  Review of patient's allergies indicates no known allergies.     /75 mmHg  Pulse 96  Temp(Src) 36.8 °C (98.3 °F)  Resp 18  Ht 1.702 m (5' 7\")  Wt 71.2 kg (156 lb 15.5 oz)  BMI 24.58 kg/m2  SpO2 94% Body surface area is 1.83 meters squared.    Labs 5/17/17   ANC~ 0 Plt = 15k   Hgb = 6.9  SCr = 0.6 mg/dL CrCl ~ 93.5 mL/min (min Cr 0.7)  CML -  Treat through any counts, standing transfusion orders in place.  MD aware of all current lab results.     Per Synribo package insert, \"If a patient experiences Grade 4 neutropenia or Grade 3 thrombocytopenia during a cycle, delay starting the next cycle until ANC is greater than or equal to 1 x 10^9/L and platelet count is greater than or equal to 50 x 10^9/L. Also, for the next cycle, reduce the number of dosing days by 2 days.\"    AST/ALT/AP = 92/169/172 TBili = 0.9  No hepaticadjustment recommended.    Drug Order   (Drug name, dose, route, IV Fluid & volume, frequency, number of doses) Cycle: 1 Days 12-13  Final Dose #28 will be at 0900 on May 20 (note that patient skipped a dose on May 11 due to med availability)  Previous treatment: s/p dasatinib and bosutinib (last 4/26/17), both followed  by relapse     Medication = Omacetaxine  Base Dose= 1.25 mg/m2   Calc Dose: Base Dose x 1.83 m2 = 2.29 mg  Final Dose = 2.4 mg  Route = subq  Conc & Volume = 3.5 mg/mL = 0.69 mL  Admin Duration = to be given subcutaneously   Patient Supplied Medication  q12h on days 1 to 14      <5% difference, OK to continue with baseline dose if < 10% difference per MDs.        By my signature below, I confirm this process was " performed independently with the BSA and all final chemotherapy dosing calculations congruent. I have reviewed the above chemotherapy order and that my calculation of the final dose and BSA (when applicable) corroborate those calculations of the  pharmacist. Discrepancies of 5% or greater in the written dose have been addressed and documented within the EPIC Progress notes.    Dorene Holt, PharmD

## 2017-05-18 NOTE — PROGRESS NOTES
"HEMATOLOGY-ONCOLOGY PROGRESS NOTE    CML admitted for Synribo started on 5/6/17 - last day 5/19/17     Subjective:  No overnight events. He is fatigued. No bleeding. He denies any abdominal pain, nausea or vomiting. He has loose stools could not tell how many but definitely > 5 . No fevers or chills.   Objective:  Medications reviewed and notable for:  Current Facility-Administered Medications   Medication Dose   • hydrocodone-acetaminophen (NORCO) 5-325 MG per tablet 1-2 Tab  1-2 Tab   • loperamide (IMODIUM) capsule 2 mg  2 mg   • acyclovir (ZOVIRAX) tablet 400 mg  400 mg   • NS infusion     • nystatin (MYCOSTATIN) 358467 UNIT/ML suspension 500,000 Units  5 mL   • Omacetaxine Mepesuccinate 2.4 mg in syringe 0.69 mL Chemo     • acetaminophen (TYLENOL) tablet 650 mg  650 mg   • diphenhydrAMINE (BENADRYL) tablet/capsule 25 mg  25 mg   • pneumococcal 13-Elizabeth Conj Vacc (PREVNAR 13) syringe 0.5 mL  0.5 mL   • Respiratory Care per Protocol     • ondansetron (ZOFRAN) syringe/vial injection 4 mg  4 mg   • ondansetron (ZOFRAN ODT) dispertab 4 mg  4 mg   • morphine (pf) 4 mg/ml injection 2 mg  2 mg   • carvedilol (COREG) tablet 3.125 mg  3.125 mg   • levetiracetam (KEPPRA) tablet 750 mg  750 mg   • oxycodone immediate-release (ROXICODONE) tablet 5 mg  5 mg   • tamsulosin (FLOMAX) capsule 0.4 mg  0.4 mg   • lidocaine (LIDODERM) 5 % 1 Patch  1 Patch   • vitamin D (Ergocalciferol) (DRISDOL) capsule 50,000 Units  50,000 Units       ROS:   Constitutional: +  fatigue, no fevers or chills, no night sweats  Resp: No cough or SOB  Cardio:No chest pain or palpitations  Pschy: No depression or anxiety   Neuro: No headaches, no seizure, no vision changes  GI: no abdominal pain, nausea or vomiting. + diarrhea   All other ROS negative    Blood pressure 145/82, pulse 97, temperature 36.9 °C (98.4 °F), resp. rate 18, height 1.702 m (5' 7\"), weight 71.2 kg (156 lb 15.5 oz), SpO2 94 %.    General:  comfortable, NAD  HEENT:  sclera anicteric, " pupils equal, round, reactive to light, oral cavity and oropharynx clear, mucous membranes moist  Neck:   supple, no lymphadenopathy  Cor:   regular rate and rhythm, no murmurs, rubs, or gallops  Pulm:   clear to auscultation bilaterally  Abd:   bowel sounds present, soft, nontender, nondistended, no palpable masses or organomegaly  Extremities:  warm, no lower extremity edema  Neurologic:  A&O x 3  Pyschiatric:  Appropriate mood and affect    Labs reviewed and notable for:  Recent Labs      05/16/17   0632  05/16/17   2352  05/17/17   2346   WBC  0.1*  0.1*  0.1*   RBC  2.57*  2.63*  2.38*   HEMOGLOBIN  7.7*  7.7*  6.9*   HEMATOCRIT  21.0*  21.5*  19.4*   MCV  81.7  81.7  81.5   MCH  30.0  29.3  29.0   MCHC  36.7*  35.8*  35.6*   RDW  40.2  39.9  39.6   PLATELETCT  30*  24*  15*   MPV  9.1  9.7  9.2         .@CMP  Recent Results (from the past 24 hour(s))   CBC WITH DIFFERENTIAL    Collection Time: 05/17/17 11:46 PM   Result Value Ref Range    WBC 0.1 (LL) 4.8 - 10.8 K/uL    RBC 2.38 (L) 4.70 - 6.10 M/uL    Hemoglobin 6.9 (L) 14.0 - 18.0 g/dL    Hematocrit 19.4 (L) 42.0 - 52.0 %    MCV 81.5 81.4 - 97.8 fL    MCH 29.0 27.0 - 33.0 pg    MCHC 35.6 (H) 33.7 - 35.3 g/dL    RDW 39.6 35.9 - 50.0 fL    Platelet Count 15 (LL) 164 - 446 K/uL    MPV 9.2 9.0 - 12.9 fL    Nucleated RBC 0.00 /100 WBC    NRBC (Absolute) 0.00 K/uL    Neutrophils (Absolute) Cancel 1.82 - 7.42 K/uL   COMP METABOLIC PANEL    Collection Time: 05/17/17 11:46 PM   Result Value Ref Range    Sodium 137 135 - 145 mmol/L    Potassium 3.4 (L) 3.6 - 5.5 mmol/L    Chloride 109 96 - 112 mmol/L    Co2 21 20 - 33 mmol/L    Anion Gap 7.0 0.0 - 11.9    Glucose 110 (H) 65 - 99 mg/dL    Bun 14 8 - 22 mg/dL    Creatinine 0.60 0.50 - 1.40 mg/dL    Calcium 8.4 (L) 8.5 - 10.5 mg/dL    AST(SGOT) 92 (H) 12 - 45 U/L    ALT(SGPT) 169 (H) 2 - 50 U/L    Alkaline Phosphatase 172 (H) 30 - 99 U/L    Total Bilirubin 0.9 0.1 - 1.5 mg/dL    Albumin 3.1 (L) 3.2 - 4.9 g/dL    Total  Protein 5.8 (L) 6.0 - 8.2 g/dL    Globulin 2.7 1.9 - 3.5 g/dL    A-G Ratio 1.1 g/dL   ESTIMATED GFR    Collection Time: 05/17/17 11:46 PM   Result Value Ref Range    GFR If African American >60 >60 mL/min/1.73 m 2    GFR If Non African American >60 >60 mL/min/1.73 m 2             Assessment and Recommendations:  1.  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  Prior treatments included dasatinib and bosutinib.  The plan has been for [facility]BMT at [facility], but needs some response to chemo before they can do the transplant, giving Synribo for this.  Synribo is given as a SQ shot BID x 14 days with the first cycle, followed by 2 weeks off.  Started on 5/6/17.        Synribo is a high risk medication.  There is a 's warning for cerebral bleeding, and to avoid NSAIDS, blood thinners, etc as well as to use with caution in patients with Platelets<50K.  He has a h/o of a SDH in the recent past.    -- continue Synribo.  Aware that actual dose is slightly above BSA-based dose, OK up to 10% difference. If this persists, may need to adjust dose with cycle 2 (comes in pre-filled syringes so can't be adjusted after order arrives)  -- tolerating with main toxicity of diarrhea and elevated LFTs  -- monitor for Headaches or AMS changes     -- duration of hospitalization as per Dr. Vu, primary oncologist.  Had initially been communicated that plan for for 14 day hospitalization to get Synribo but Dr. Vu now favors treating more like standard acute luekemia induction with prolonged stay until counts improve.  Patient is willing to do whatever needs to be done.     2.  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  Not likely to improve unless he can get response to some kind of chemo.  Will transfuse as needed to keep >10, or higher if bleeding.  3.  Neutropenia--nothing documented in chart but as per Dr. Agustin's note, had fever late in day on 5/7.  BCx and CXR negative.  4.  Elevated LFT's.  Worsening.   Potentially due to Synribo but would also like to discontinue/change any other medications that might contribute. D/c fluconzole and continue with nystatin alone at this time.     5.  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  If recurrent pain might consider RT.  6. Anemia-- transfuse 1 unit of PRBC's if <7.5  7.  Diarrhea.  A common side effect of Synribo. He was started on immodium on 5/10/17 with improvement. Antibiotics may have also been contributing.   Last C. Diff check on 5/13/17 negative . Resolved.  restarted again on 5/17/17. Repeat C. Diff 5/17/17 - not sent yet.    8. Subjective urinary retention and dysuria.  PVR on 5/13/17 was 33 cc per RN.  UA with only trace blood.     9.  Thrush.  on Nystatin   Improving     Plan:   - I reviewed his labs stable LFT's   - Proceed with Synribo today as planned.   - C. Diff was sent not done yet since then it was sent within a week of last sample. Nursing staff discussed with lab since the stool has changed. They will do it today.   - K low replaced.  - Transfuse PRBC;s today     High complexicity/Drug monitoring     We will continue to follow with you; please call with any questions, [phone].      Ladonna Corado MD  [facility]   [phone]

## 2017-05-18 NOTE — PROGRESS NOTES
Chemotherapy Verification - PRIMARY RN      Height = 170.2cm  Weight = 71.2kg  BSA = 1.83m^2       Medication: Synribo  Dose: 1.25mg/m^2  Calculated Dose: 2.2875mg                             (In mg/m2, AUC, mg/kg)         I confirm this process was performed independently with the BSA and all final chemotherapy dosing calculations congruent.  Any discrepancies of 5% or greater have been addressed with the chemotherapy pharmacist. The resolution of the discrepancy has been documented in the EPIC progress notes.

## 2017-05-18 NOTE — PROGRESS NOTES
Chemotherapy Verification - SECONDARY RN       Height = 1.7m  Weight = 71.2kg  BSA = 1.83m2       Medication: Synribo  Dose: 1.25mg/m2  Calculated Dose: 2.28mg  (2.4mg = ordered dose)                            (In mg/m2, AUC, mg/kg)               I confirm that this process was performed independently.

## 2017-05-19 LAB
ABO GROUP BLD: NORMAL
ALBUMIN SERPL BCP-MCNC: 3 G/DL (ref 3.2–4.9)
ALBUMIN/GLOB SERPL: 1 G/DL
ALP SERPL-CCNC: 161 U/L (ref 30–99)
ALT SERPL-CCNC: 167 U/L (ref 2–50)
ANION GAP SERPL CALC-SCNC: 7 MMOL/L (ref 0–11.9)
AST SERPL-CCNC: 84 U/L (ref 12–45)
BARCODED ABORH UBTYP: 5100
BARCODED PRD CODE UBPRD: NORMAL
BARCODED UNIT NUM UBUNT: NORMAL
BILIRUB SERPL-MCNC: 0.8 MG/DL (ref 0.1–1.5)
BLD GP AB SCN SERPL QL: NORMAL
BUN SERPL-MCNC: 12 MG/DL (ref 8–22)
C TRACH DNA SPEC QL NAA+PROBE: NEGATIVE
CALCIUM SERPL-MCNC: 8.6 MG/DL (ref 8.5–10.5)
CHLORIDE SERPL-SCNC: 107 MMOL/L (ref 96–112)
CO2 SERPL-SCNC: 21 MMOL/L (ref 20–33)
COMPONENT R 8504R: NORMAL
CREAT SERPL-MCNC: 0.56 MG/DL (ref 0.5–1.4)
ERYTHROCYTE [DISTWIDTH] IN BLOOD BY AUTOMATED COUNT: 39 FL (ref 35.9–50)
GFR SERPL CREATININE-BSD FRML MDRD: >60 ML/MIN/1.73 M 2
GLOBULIN SER CALC-MCNC: 2.9 G/DL (ref 1.9–3.5)
GLUCOSE SERPL-MCNC: 104 MG/DL (ref 65–99)
HCT VFR BLD AUTO: 21.6 % (ref 42–52)
HGB BLD-MCNC: 7.9 G/DL (ref 14–18)
MAGNESIUM SERPL-MCNC: 1.9 MG/DL (ref 1.5–2.5)
MCH RBC QN AUTO: 29.6 PG (ref 27–33)
MCHC RBC AUTO-ENTMCNC: 36.6 G/DL (ref 33.7–35.3)
MCV RBC AUTO: 80.9 FL (ref 81.4–97.8)
N GONORRHOEA DNA SPEC QL NAA+PROBE: NEGATIVE
NEUTROPHILS # BLD AUTO: ABNORMAL K/UL (ref 1.82–7.42)
NRBC # BLD AUTO: 0 K/UL
NRBC BLD AUTO-RTO: 0 /100 WBC
PLATELET # BLD AUTO: 8 K/UL (ref 164–446)
PMV BLD AUTO: 9 FL (ref 9–12.9)
POTASSIUM SERPL-SCNC: 3.4 MMOL/L (ref 3.6–5.5)
PRODUCT TYPE UPROD: NORMAL
PROT SERPL-MCNC: 5.9 G/DL (ref 6–8.2)
PSA FREE MFR SERPL: 21 %
PSA FREE SERPL-MCNC: 0.7 NG/ML
PSA SERPL-MCNC: 3.4 NG/ML (ref 0–4)
RBC # BLD AUTO: 2.67 M/UL (ref 4.7–6.1)
RH BLD: NORMAL
SODIUM SERPL-SCNC: 135 MMOL/L (ref 135–145)
SPECIMEN SOURCE: NORMAL
UNIT STATUS USTAT: NORMAL
WBC # BLD AUTO: 0.1 K/UL (ref 4.8–10.8)

## 2017-05-19 PROCEDURE — A9270 NON-COVERED ITEM OR SERVICE: HCPCS | Performed by: STUDENT IN AN ORGANIZED HEALTH CARE EDUCATION/TRAINING PROGRAM

## 2017-05-19 PROCEDURE — 700102 HCHG RX REV CODE 250 W/ 637 OVERRIDE(OP): Performed by: INTERNAL MEDICINE

## 2017-05-19 PROCEDURE — A9270 NON-COVERED ITEM OR SERVICE: HCPCS | Performed by: INTERNAL MEDICINE

## 2017-05-19 PROCEDURE — 700105 HCHG RX REV CODE 258: Performed by: INTERNAL MEDICINE

## 2017-05-19 PROCEDURE — 36415 COLL VENOUS BLD VENIPUNCTURE: CPT

## 2017-05-19 PROCEDURE — 99232 SBSQ HOSP IP/OBS MODERATE 35: CPT | Mod: GC | Performed by: INTERNAL MEDICINE

## 2017-05-19 PROCEDURE — 36430 TRANSFUSION BLD/BLD COMPNT: CPT

## 2017-05-19 PROCEDURE — 86901 BLOOD TYPING SEROLOGIC RH(D): CPT

## 2017-05-19 PROCEDURE — 86850 RBC ANTIBODY SCREEN: CPT

## 2017-05-19 PROCEDURE — 85025 COMPLETE CBC W/AUTO DIFF WBC: CPT

## 2017-05-19 PROCEDURE — 700102 HCHG RX REV CODE 250 W/ 637 OVERRIDE(OP): Performed by: STUDENT IN AN ORGANIZED HEALTH CARE EDUCATION/TRAINING PROGRAM

## 2017-05-19 PROCEDURE — 80053 COMPREHEN METABOLIC PANEL: CPT

## 2017-05-19 PROCEDURE — 700101 HCHG RX REV CODE 250: Performed by: STUDENT IN AN ORGANIZED HEALTH CARE EDUCATION/TRAINING PROGRAM

## 2017-05-19 PROCEDURE — 770009 HCHG ROOM/CARE - ONCOLOGY SEMI PRI*

## 2017-05-19 PROCEDURE — 86900 BLOOD TYPING SEROLOGIC ABO: CPT

## 2017-05-19 PROCEDURE — 84154 ASSAY OF PSA FREE: CPT

## 2017-05-19 PROCEDURE — P9034 PLATELETS, PHERESIS: HCPCS

## 2017-05-19 RX ORDER — FINASTERIDE 5 MG/1
5 TABLET, FILM COATED ORAL DAILY
Status: DISCONTINUED | OUTPATIENT
Start: 2017-05-20 | End: 2017-05-22

## 2017-05-19 RX ORDER — DIPHENOXYLATE HYDROCHLORIDE AND ATROPINE SULFATE 2.5; .025 MG/1; MG/1
1 TABLET ORAL 4 TIMES DAILY PRN
Status: DISCONTINUED | OUTPATIENT
Start: 2017-05-19 | End: 2017-06-15 | Stop reason: HOSPADM

## 2017-05-19 RX ADMIN — ACYCLOVIR 400 MG: 800 TABLET ORAL at 20:30

## 2017-05-19 RX ADMIN — ACETAMINOPHEN 650 MG: 325 TABLET, FILM COATED ORAL at 09:41

## 2017-05-19 RX ADMIN — POTASSIUM CHLORIDE 20 MEQ: 1.5 POWDER, FOR SOLUTION ORAL at 09:30

## 2017-05-19 RX ADMIN — NYSTATIN 500000 UNITS: 500000 SUSPENSION ORAL at 16:27

## 2017-05-19 RX ADMIN — LEVETIRACETAM 750 MG: 250 TABLET, FILM COATED ORAL at 09:30

## 2017-05-19 RX ADMIN — CARVEDILOL 3.12 MG: 3.12 TABLET, FILM COATED ORAL at 16:27

## 2017-05-19 RX ADMIN — DIPHENHYDRAMINE HCL 25 MG: 25 TABLET ORAL at 09:41

## 2017-05-19 RX ADMIN — NYSTATIN 500000 UNITS: 500000 SUSPENSION ORAL at 20:32

## 2017-05-19 RX ADMIN — LEVETIRACETAM 750 MG: 250 TABLET, FILM COATED ORAL at 20:32

## 2017-05-19 RX ADMIN — CARVEDILOL 3.12 MG: 3.12 TABLET, FILM COATED ORAL at 09:30

## 2017-05-19 RX ADMIN — LOPERAMIDE HYDROCHLORIDE 2 MG: 2 CAPSULE ORAL at 20:31

## 2017-05-19 RX ADMIN — NYSTATIN 500000 UNITS: 500000 SUSPENSION ORAL at 12:55

## 2017-05-19 RX ADMIN — TAMSULOSIN HYDROCHLORIDE 0.4 MG: 0.4 CAPSULE ORAL at 09:30

## 2017-05-19 RX ADMIN — ACYCLOVIR 400 MG: 800 TABLET ORAL at 09:30

## 2017-05-19 RX ADMIN — NYSTATIN 500000 UNITS: 500000 SUSPENSION ORAL at 09:30

## 2017-05-19 RX ADMIN — LIDOCAINE 1 PATCH: 50 PATCH CUTANEOUS at 09:30

## 2017-05-19 RX ADMIN — SODIUM CHLORIDE: 9 INJECTION, SOLUTION INTRAVENOUS at 12:53

## 2017-05-19 ASSESSMENT — ENCOUNTER SYMPTOMS
HEADACHES: 0
HEMOPTYSIS: 0
COUGH: 0
FEVER: 0
DIARRHEA: 1
DEPRESSION: 0
NAUSEA: 0
NECK PAIN: 0
DIZZINESS: 0
MYALGIAS: 0
HEARTBURN: 0
CHILLS: 0
PALPITATIONS: 0
ABDOMINAL PAIN: 0

## 2017-05-19 ASSESSMENT — PAIN SCALES - GENERAL
PAINLEVEL_OUTOF10: 0
PAINLEVEL_OUTOF10: 0

## 2017-05-19 NOTE — CARE PLAN
Problem: Safety  Goal: Will remain free from falls  Intervention: Assess risk factors for falls  Pt assessed for fall risk. Encouraged to call for assistance.       Problem: Discharge Barriers/Planning  Goal: Patient’s continuum of care needs will be met  Intervention: Collaborate with Transitional Care Team and Interdisciplinary Team to meet discharge needs  Discussed dc planning with multidisciplinary team and pt

## 2017-05-19 NOTE — PROGRESS NOTES
"  HEMATOLOGY-ONCOLOGY PROGRESS NOTE    CML admitted for Synribo started on 5/6/17 - last day 5/19/17    Subjective: No overnight events. He continues to have loose BM about 7-8 yesterday. Since we were waiting for C. Diff he did not get imodium as scheduled. He denies any fevers or chills. No abdominal pain.     Objective:  Medications reviewed and notable for:  Current Facility-Administered Medications   Medication Dose   • potassium chloride (KLOR-CON) 20 MEQ packet 20 mEq  20 mEq   • hydrocodone-acetaminophen (NORCO) 5-325 MG per tablet 1-2 Tab  1-2 Tab   • loperamide (IMODIUM) capsule 2 mg  2 mg   • acyclovir (ZOVIRAX) tablet 400 mg  400 mg   • NS infusion     • nystatin (MYCOSTATIN) 752672 UNIT/ML suspension 500,000 Units  5 mL   • Omacetaxine Mepesuccinate 2.4 mg in syringe 0.69 mL Chemo     • acetaminophen (TYLENOL) tablet 650 mg  650 mg   • diphenhydrAMINE (BENADRYL) tablet/capsule 25 mg  25 mg   • pneumococcal 13-Elizabeth Conj Vacc (PREVNAR 13) syringe 0.5 mL  0.5 mL   • Respiratory Care per Protocol     • ondansetron (ZOFRAN) syringe/vial injection 4 mg  4 mg   • ondansetron (ZOFRAN ODT) dispertab 4 mg  4 mg   • morphine (pf) 4 mg/ml injection 2 mg  2 mg   • carvedilol (COREG) tablet 3.125 mg  3.125 mg   • levetiracetam (KEPPRA) tablet 750 mg  750 mg   • oxycodone immediate-release (ROXICODONE) tablet 5 mg  5 mg   • tamsulosin (FLOMAX) capsule 0.4 mg  0.4 mg   • lidocaine (LIDODERM) 5 % 1 Patch  1 Patch   • vitamin D (Ergocalciferol) (DRISDOL) capsule 50,000 Units  50,000 Units       ROS:   Constitutional: +  fatigue, no fevers or chills, no night sweats  Resp: No cough or SOB  Cardio:No chest pain or palpitations  Pschy: No depression or anxiety   Neuro: No headaches, no seizure, no vision changes  GI: no abdominal pain, nausea or vomiting. +  diarrhea   All other ROS negative    Blood pressure 143/72, pulse 102, temperature 36.8 °C (98.2 °F), resp. rate 20, height 1.702 m (5' 7\"), weight 71.2 kg (156 lb 15.5 " oz), SpO2 94 %.    General:  comfortable, NAD  HEENT:  PERRLA, EOMI, Anicteric   Neck:   supple, no lymphadenopathy  Cor:   regular rate and rhythm, no murmurs, rubs, or gallops  Pulm:   clear to auscultation bilaterally  Abd:   bowel sounds present, soft, nontender, nondistended, no palpable masses or organomegaly  Extremities:  warm, no lower extremity edema  Neurologic:  A&O x 3  Pyschiatric:  Appropriate mood and affect    Labs reviewed and notable for:  Recent Labs      05/16/17   2352  05/17/17   2346  05/18/17   2347   WBC  0.1*  0.1*  0.1*   RBC  2.63*  2.38*  2.67*   HEMOGLOBIN  7.7*  6.9*  7.9*   HEMATOCRIT  21.5*  19.4*  21.6*   MCV  81.7  81.5  80.9*   MCH  29.3  29.0  29.6   MCHC  35.8*  35.6*  36.6*   RDW  39.9  39.6  39.0   PLATELETCT  24*  15*  8*   MPV  9.7  9.2  9.0         .@CMP  Recent Results (from the past 24 hour(s))   COD (ADULT)    Collection Time: 05/18/17 11:44 PM   Result Value Ref Range    ABO Grouping Only B     Rh Grouping Only POS     Antibody Screen-Cod NEG    CBC WITH DIFFERENTIAL    Collection Time: 05/18/17 11:47 PM   Result Value Ref Range    Neutrophils (Absolute) Cancel 1.82 - 7.42 K/uL    WBC 0.1 (LL) 4.8 - 10.8 K/uL    RBC 2.67 (L) 4.70 - 6.10 M/uL    Hemoglobin 7.9 (L) 14.0 - 18.0 g/dL    Hematocrit 21.6 (L) 42.0 - 52.0 %    MCV 80.9 (L) 81.4 - 97.8 fL    MCH 29.6 27.0 - 33.0 pg    MCHC 36.6 (H) 33.7 - 35.3 g/dL    RDW 39.0 35.9 - 50.0 fL    Platelet Count 8 (LL) 164 - 446 K/uL    MPV 9.0 9.0 - 12.9 fL    Nucleated RBC 0.00 /100 WBC    NRBC (Absolute) 0.00 K/uL   COMP METABOLIC PANEL    Collection Time: 05/18/17 11:47 PM   Result Value Ref Range    Sodium 135 135 - 145 mmol/L    Potassium 3.4 (L) 3.6 - 5.5 mmol/L    Chloride 107 96 - 112 mmol/L    Co2 21 20 - 33 mmol/L    Anion Gap 7.0 0.0 - 11.9    Glucose 104 (H) 65 - 99 mg/dL    Bun 12 8 - 22 mg/dL    Creatinine 0.56 0.50 - 1.40 mg/dL    Calcium 8.6 8.5 - 10.5 mg/dL    AST(SGOT) 84 (H) 12 - 45 U/L    ALT(SGPT) 167 (H) 2 -  50 U/L    Alkaline Phosphatase 161 (H) 30 - 99 U/L    Total Bilirubin 0.8 0.1 - 1.5 mg/dL    Albumin 3.0 (L) 3.2 - 4.9 g/dL    Total Protein 5.9 (L) 6.0 - 8.2 g/dL    Globulin 2.9 1.9 - 3.5 g/dL    A-G Ratio 1.0 g/dL   ESTIMATED GFR    Collection Time: 05/18/17 11:47 PM   Result Value Ref Range    GFR If African American >60 >60 mL/min/1.73 m 2    GFR If Non African American >60 >60 mL/min/1.73 m 2         Assessment and Recommendations:  1.  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  Prior treatments included dasatinib and bosutinib.  The plan has been for [facility]BMT at [facility], but needs some response to chemo before they can do the transplant, giving Synribo for this.  Synribo is given as a SQ shot BID x 14 days with the first cycle, followed by 2 weeks off.  Started on 5/6/17.        Synribo is a high risk medication.  There is a 's warning for cerebral bleeding, and to avoid NSAIDS, blood thinners, etc as well as to use with caution in patients with Platelets<50K.  He has a h/o of a SDH in the recent past.    -- continue Synribo.  Aware that actual dose is slightly above BSA-based dose, OK up to 10% difference. If this persists, may need to adjust dose with cycle 2 (comes in pre-filled syringes so can't be adjusted after order arrives)  -- tolerating with main toxicity of diarrhea and elevated LFTs  -- monitor for Headaches or AMS changes     -- duration of hospitalization as per Dr. Vu, primary oncologist.  Had initially been communicated that plan for for 14 day hospitalization to get Synribo but Dr. Vu now favors treating more like standard acute luekemia induction with prolonged stay until counts improve.  Patient is willing to do whatever needs to be done.     2.  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  Not likely to improve unless he can get response to some kind of chemo.  Will transfuse as needed to keep >10, or higher if bleeding.  3.  Neutropenia--nothing documented  in chart but as per Dr. Agustin's note, had fever late in day on 5/7.  BCx and CXR negative.  4.  Elevated LFT's.  Worsening.  Potentially due to Synribo but would also like to discontinue/change any other medications that might contribute. D/c fluconzole and continue with nystatin alone at this time.     5.  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  If recurrent pain might consider RT.  6. Anemia-- transfuse 1 unit of PRBC's if <7.5  7.  Diarrhea.  A common side effect of Synribo. He was started on immodium on 5/10/17 with improvement. Antibiotics may have also been contributing.   Last C. Diff check on 5/13/17 negative . Resolved.  restarted again on 5/17/17. Repeat C. Diff 5/18/17 neg.   8. Subjective urinary retention and dysuria.  PVR on 5/13/17 was 33 cc per RN.  UA with only trace blood.     9.  Thrush.  on Nystatin   Improving     Plan:   He will complete last dose Synbrio today as planned  Transfuse PLT today   -His liver enzymes stable.   - Repeat C. Diff negative   - I placed an order Lomotil to help with diarrhea. Discussed with nursing staff [place] to start with imodium , if no help after 4  BM switch to lomotil   - His K level still 3.4. Ordered Mg level       High complexicity/Drug monitoring     We will continue to follow with you; please call with any questions, [phone].      Ladonna Corado MD  [facility]   [phone]

## 2017-05-19 NOTE — PROGRESS NOTES
Bedside report received from night shift, assumed care of pt at 0715. Pt in bed resting comfortably with no s/sx of pain or discomfort. Pt with multiple loose stools this am, inc. Chemo this am as well as plts.  Pt calls appropriately for medication or assistance as needed. Call light within reach, all appropriate fall precautions in place and personal belongings within reach; hourly rounding in place. No needs at this time. See flowsheets for further assessment details.

## 2017-05-19 NOTE — PROGRESS NOTES
"Pharmacy Chemotherapy Verification    Patient Name: Benjamin Post   Dx: CML       Protocol: Synribo Induction  Omacetaxine (Synribo) 1.25 mg/m2 SQ BID for 14 days  NCCN Guidelines for Chronic Myelogenous Leukemia.V.1.2016  Brice J, et al. Blood. 2012 Sep 27;120(13):2573-80. Epub 2012 Aug 15.  Alfonso ROSADO et al. J Clin Oncol. 2011;30(15s):abstr 6513.    Allergies:  Review of patient's allergies indicates no known allergies.     /93 mmHg  Pulse 101  Temp(Src) 36.7 °C (98.1 °F)  Resp 20  Ht 1.702 m (5' 7\")  Wt 71.2 kg (156 lb 15.5 oz)  BMI 24.58 kg/m2  SpO2 99% Body surface area is 1.83 meters squared.    Labs 5/18/17   ANC~ 0 Plt = 8k   Hgb = 7.9  SCr = 0.56 mg/dL CrCl ~ 93.5 mL/min (min Cr 0.7)  CML -  Treat through any counts, standing transfusion orders in place.  MD aware of all current lab results.     Per Synribo package insert, \"If a patient experiences Grade 4 neutropenia or Grade 3 thrombocytopenia during a cycle, delay starting the next cycle until ANC is greater than or equal to 1 x 10^9/L and platelet count is greater than or equal to 50 x 10^9/L. Also, for the next cycle, reduce the number of dosing days by 2 days.\"    AST/ALT/AP = 84/167/161 TBili = 0.8  No hepatic adjustment recommended.    Drug Order   (Drug name, dose, route, IV Fluid & volume, frequency, number of doses) Cycle: 1 Days 13-14  Final Dose #28 will be at 0900 on May 20 (note that patient skipped a dose on May 11 due to med availability)  Previous treatment: s/p dasatinib and bosutinib (last 4/26/17), both followed  by relapse     Medication = Omacetaxine  Base Dose= 1.25 mg/m2   Calc Dose: Base Dose x 1.83 m2 = 2.29 mg  Final Dose = 2.4 mg  Route = subq  Conc & Volume = 3.5 mg/mL = 0.69 mL  Admin Duration = to be given subcutaneously   Patient Supplied Medication  q12h on days 1 to 14      <5% difference, OK to continue with baseline dose if < 10% difference per MDs.        By my signature below, I confirm this process was " performed independently with the BSA and all final chemotherapy dosing calculations congruent. I have reviewed the above chemotherapy order and that my calculation of the final dose and BSA (when applicable) corroborate those calculations of the  pharmacist. Discrepancies of 5% or greater in the written dose have been addressed and documented within the EPIC Progress notes.    Dorene Holt, PharmD

## 2017-05-19 NOTE — PROGRESS NOTES
Assumed care of patient at 1900. Patient sitting up for meal, no new complaints. POC reviewed, patient agreeable. RHONDA on, strip alarm on. Call light within reach. Will continue to monitor.

## 2017-05-19 NOTE — PROGRESS NOTES
Chemotherapy Verification - PRIMARY RN      Height = 170.2cm  Weight = 71.2kg  BSA = 1.83m^2       Medication: Synribo  Dose: 1.25mg/m^2  Calculated Dose: 2.28mg                             (In mg/m2, AUC, mg/kg)         I confirm this process was performed independently with the BSA and all final chemotherapy dosing calculations congruent.  Any discrepancies of 5% or greater have been addressed with the chemotherapy pharmacist. The resolution of the discrepancy has been documented in the EPIC progress notes.

## 2017-05-19 NOTE — PROGRESS NOTES
[facility] [place]    Name Benjamin Post 1945   Age/Sex 71 y.o. male   MRN [id number]   Code Status DNR     After 5PM or if no immediate response to page, please call for cross-coverage  Attending/Team: Dr. Chung/Ly Call [phone] to page   1st Call - Day Intern (R1):   Dr Cowart 2nd Call - Day Sr. Resident (R2/R3):            Chief complaint/ reason for interval visit (Primary Diagnosis)   CML/pancytopenia     Interval Problem Daily Status Update  :  Principal Problem:    CML (chronic myelocytic leukemia)-Accelerated phase (Chronic) POA: Yes      Overview: Day 11 of Synribo      CBC Hb 7.7      No fevers overnight       No signs of bleeding present         Active Problems:    Pancytopenia (CMS-HCC) (Chronic) POA: Yes      Overview: - Pancytopenia secondary to CML and chemotherapy      - WBC: 0.5>>0.2      - Given one unit of platelets on 17.       - Anemia with Hb: 7.7>>7.6>>6.8>>7.8>6.4>8.2>7.7>7.7      - platelet: 11>>7>>28>>20>10>>23>>15>>7>>27>>32>>30>>24      - Hx of Chronic Right Subdural Hematoma    Neutropenia (CMS-HCC) POA: Yes      Overview: Chronic       WBC:0.1      Afebrile overnight          Bone pain POA: Yes      Overview: Stable      Negative PE for tenderness over the rib cage.    Acute bilateral low back pain without sciatica POA: Yes    Elevated PSA (Chronic) POA: Yes      Overview: PSA: 8.4 on 17    Diarrhea POA: No      Overview: Denies blood in stool.       Educated that this is a AE of chemotherapy      Patient expressed understanding.     Seizure disorder (CMS-HCC) POA: Yes    Transaminitis POA: Yes      Overview: Related to chemo therapy. AST/ALT: 46/89>>61/111>>71/134>>80/171 >>88/169    BPH (benign prostatic hyperplasia) (Chronic) POA: Yes      Overview: On flomax    Low vitamin D level (Chronic) POA: Yes      Overview: Vit D: 11 on 17    Dysuria POA: Unknown       Overview: Pyridium stopped due to no analgesic effect      Norco started 1-2 tabs 5 mg Q6 prn      Denies any hematuria      Perineal exam did not illicit tenderness. Cannot complete a rectal exam       due to neutropenia.       Likely not prostatitis however cannot be ruled out due without complete       rectal exam.       UA negative for nitrites, LE, or WBC casts         Resolved Problems:    * No resolved hospital problems. *          Review of Systems   Constitutional: Negative for fever and chills.   HENT: Negative for hearing loss.    Respiratory: Negative for cough and hemoptysis.    Cardiovascular: Negative for chest pain and palpitations.   Gastrointestinal: Positive for diarrhea. Negative for heartburn, nausea and abdominal pain.        Stable diarrhea    Genitourinary: Negative for dysuria and urgency.        Dysuria without hematuria   Musculoskeletal: Negative for myalgias and neck pain.   Skin: Negative for rash.   Neurological: Negative for dizziness and headaches.   Psychiatric/Behavioral: Negative for depression and suicidal ideas.       Consultants/Specialty  Oncology - [facility]    Disposition  Remain inpatient for chemotherapy monitoring    Quality Measures  EKG reviewed, Labs reviewed, Medications reviewed and Radiology images reviewed  Mahajan catheter: No Mahajan      DVT Prophylaxis: Contraindicated - High bleeding risk  DVT prophylaxis - mechanical: SCDs (low plt)                Physical Exam       Filed Vitals:    05/19/17 0800 05/19/17 1100 05/19/17 1115 05/19/17 1300   BP:  172/90 160/83 163/80   Pulse:  88 91 95   Temp:  36.9 °C (98.4 °F) 36.7 °C (98.1 °F) 37 °C (98.6 °F)   Resp:  20 20 18   Height:       Weight:       SpO2: 99% 99% 98% 96%     Body mass index is 24.58 kg/(m^2).    Oxygen Therapy:  Pulse Oximetry: 96 %, O2 (LPM): 0, O2 Delivery: None (Room Air)    Physical Exam   Constitutional: He is well-developed, well-nourished, and in no distress. No distress.   HENT:    Head: Normocephalic.   Neck: No tracheal deviation present. No thyromegaly present.   Cardiovascular: Normal rate.  Exam reveals no friction rub.    Murmur heard.  Pansystolic murmur on the apex    Pulmonary/Chest: Effort normal. No respiratory distress. He has no wheezes.   Abdominal: Soft. He exhibits no distension. There is no tenderness.   Genitourinary:   No perineal tenderness present.   No signs of balanitis.    Musculoskeletal: He exhibits no edema.   Neurological: He is alert.   Skin: No erythema.         Lab Data Review:      5/4/2017  1:28 PM    Recent Labs      05/16/17 2352 05/17/17 2346 05/18/17   2344  05/18/17 2347   SODIUM  134*  137   --   135   POTASSIUM  3.6  3.4*   --   3.4*   CHLORIDE  108  109   --   107   CO2  20  21   --   21   BUN  14  14   --   12   CREATININE  0.62  0.60   --   0.56   MAGNESIUM   --    --   1.9   --    CALCIUM  8.5  8.4*   --   8.6       Recent Labs      05/16/17 2352 05/17/17 2346 05/18/17 2347   ALTSGPT  184*  169*  167*   ASTSGOT  106*  92*  84*   ALKPHOSPHAT  167*  172*  161*   TBILIRUBIN  0.8  0.9  0.8   GLUCOSE  112*  110*  104*       Recent Labs      05/16/17 2352 05/17/17 2346  05/18/17 2347   RBC  2.63*  2.38*  2.67*   HEMOGLOBIN  7.7*  6.9*  7.9*   HEMATOCRIT  21.5*  19.4*  21.6*   PLATELETCT  24*  15*  8*       Recent Labs      05/16/17 2352 05/17/17 2346 05/18/17 2347   WBC  0.1*  0.1*  0.1*   ASTSGOT  106*  92*  84*   ALTSGPT  184*  169*  167*   ALKPHOSPHAT  167*  172*  161*   TBILIRUBIN  0.8  0.9  0.8           Assessment/Plan     * CML (chronic myelocytic leukemia)-Accelerated phase (present on admission)  Overview  Day 11 of Synribo  CBC Hb 7.7  No fevers overnight   No signs of bleeding present       Assessment & Plan  Has completed multiple chemotherapy cycles.     multiple relapses.    Was treated on dasatinib for a time with response, but then relapse.    Was more recently on bosutinib with response, but again recent  relapse.   Pancytopenia (chronic, before starting with chemotherapy)   Protein electrophoresis: the polyclonal increase in the gamma region which may be indicative of specific immune   response or an early monoclonal protein.     Started on 5/6/17 with chemotherapy (synribo ) BID for 14 days,   Developed diarrhea on 5/9>>improving with loperamide.   C, diff negative, can continue loperamide to good effect  Labs daily for follow up side effects.   FERNY high IgM.  Oncology(van meter/Reg[facility]) following, appreciate the recommendations  BMT planned for the future    Pancytopenia (CMS-HCC) (present on admission)  Overview  - Pancytopenia secondary to CML and chemotherapy  - WBC: 0.5>>0.2  - Given one unit of platelets on 5/4/17.   - Anemia with Hb: 7.7>>7.6>>6.8>>7.8>6.4>8.2>7.7>7.7  - platelet: 11>>7>>28>>20>10>>23>>15>>7>>27>>32>>30>>24  - Hx of Chronic Right Subdural Hematoma    Assessment & Plan  Transfused 1 unit irradiated platelets on 4/30, 5/4/17,  5/8/17, 5/11, 5/15, 5/19  PRBC one unit on 4/30, 5/8/17, 5/12/17, 5/18  Follow up and keep plt>10 and Hb>7.  Started with cefepim on 5/7/17 due to fever 100.4, Urine and blood culture have been negative since, completed 7 day course  On acyclovir on 5/9 for prophylaxis.     Neutropenia (CMS-HCC) (present on admission)  Overview  Chronic   WBC:0.1  Afebrile overnight      Assessment & Plan  On acyclovir  Urine and blood culture negative  Will hold off on prophylactic levaquin and fluconazole for now due to high risk for c. Diff and elevated LFTs    Bone pain (present on admission)  Overview  Stable  Negative PE for tenderness over the rib cage.    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    - Pain is controlled, Patient feeling ok.    - Follow up and consider radiation therapy if directed by oncology    Elevated PSA (present on admission)  Overview  PSA: 8.4 on 4/4/17    Assessment & Plan  - Stable, can continue workup on outpatient basis  Will trend once as acute prostatitis is possible given his penile pain, rectal exam is contraindicated in such patients    Diarrhea  Overview  Denies blood in stool.   Educated that this is a AE of chemotherapy  Patient expressed understanding.     Assessment & Plan  Most likely related to chemo  Significantly improved on Loperamide PRN  Cdiff negative x2  monitor electrolytes and continue IV fluid.       Transaminitis (present on admission)  Overview  Related to chemo therapy. AST/ALT: 46/89>>61/111>>71/134>>80/171 >>88/169    Assessment & Plan  No abdominal pain, likely effect of chemotherapy  Discontinue all hepatotoxic drugs as possible  Now slightly downtrending  Continue to follow    BPH (benign prostatic hyperplasia) (present on admission)  Assessment & Plan  Stable,  Complains of some dysuria, will get repeat PSA to assess for prostatitis as rectal examination is contraindicated in neutropenic patients  Continue on tamsulosin 0.4 mg daily.   Added finasteride as well    Low vitamin D level (present on admission)  Overview  Vit D: 11 on 4/29/17    Assessment & Plan  Vit D 11 on 4/29/2017   Continue supplementation.     Dysuria  Assessment & Plan  Norco for pain control  UA: WBC -ve, trace occult blood , - RBCs, -ve LE or nitrites  CTM      Seizure disorder (CMS-HCC) (present on admission)  Assessment & Plan  Stable    Continue on his home dose of keppra

## 2017-05-19 NOTE — PROGRESS NOTES
TECH from Lab called with critical result of WBC 0.1, PLT 8 at 0023. Critical lab result read back to TECH.   This critical lab result is within parameters established by [facility] for this patient      ROGER JERONIMO team paged, blood bank called- will not have available needed platelets until 0600.

## 2017-05-20 PROBLEM — R97.20 ELEVATED PSA: Chronic | Status: RESOLVED | Noted: 2017-05-08 | Resolved: 2017-05-20

## 2017-05-20 PROBLEM — R10.9 ABDOMINAL PAIN: Status: ACTIVE | Noted: 2017-05-20

## 2017-05-20 LAB
ALBUMIN SERPL BCP-MCNC: 3.2 G/DL (ref 3.2–4.9)
ALBUMIN/GLOB SERPL: 1.1 G/DL
ALP SERPL-CCNC: 179 U/L (ref 30–99)
ALT SERPL-CCNC: 156 U/L (ref 2–50)
ANION GAP SERPL CALC-SCNC: 7 MMOL/L (ref 0–11.9)
AST SERPL-CCNC: 88 U/L (ref 12–45)
BILIRUB SERPL-MCNC: 1 MG/DL (ref 0.1–1.5)
BUN SERPL-MCNC: 10 MG/DL (ref 8–22)
CALCIUM SERPL-MCNC: 8.3 MG/DL (ref 8.5–10.5)
CHLORIDE SERPL-SCNC: 104 MMOL/L (ref 96–112)
CO2 SERPL-SCNC: 22 MMOL/L (ref 20–33)
CREAT SERPL-MCNC: 0.55 MG/DL (ref 0.5–1.4)
ERYTHROCYTE [DISTWIDTH] IN BLOOD BY AUTOMATED COUNT: 39.3 FL (ref 35.9–50)
GFR SERPL CREATININE-BSD FRML MDRD: >60 ML/MIN/1.73 M 2
GLOBULIN SER CALC-MCNC: 3 G/DL (ref 1.9–3.5)
GLUCOSE SERPL-MCNC: 101 MG/DL (ref 65–99)
HCT VFR BLD AUTO: 21.2 % (ref 42–52)
HGB BLD-MCNC: 7.7 G/DL (ref 14–18)
LACTATE BLD-SCNC: 0.6 MMOL/L (ref 0.5–2)
MCH RBC QN AUTO: 29.5 PG (ref 27–33)
MCHC RBC AUTO-ENTMCNC: 36.3 G/DL (ref 33.7–35.3)
MCV RBC AUTO: 81.2 FL (ref 81.4–97.8)
NEUTROPHILS # BLD AUTO: ABNORMAL K/UL (ref 1.82–7.42)
NRBC # BLD AUTO: 0 K/UL
NRBC BLD AUTO-RTO: 0 /100 WBC
PLATELET # BLD AUTO: 22 K/UL (ref 164–446)
PMV BLD AUTO: 8.9 FL (ref 9–12.9)
POTASSIUM SERPL-SCNC: 3.4 MMOL/L (ref 3.6–5.5)
PROT SERPL-MCNC: 6.2 G/DL (ref 6–8.2)
PSA FREE MFR SERPL: 23 %
PSA FREE SERPL-MCNC: 0.9 NG/ML
PSA SERPL-MCNC: 3.9 NG/ML (ref 0–4)
RBC # BLD AUTO: 2.61 M/UL (ref 4.7–6.1)
SODIUM SERPL-SCNC: 133 MMOL/L (ref 135–145)
WBC # BLD AUTO: 0.1 K/UL (ref 4.8–10.8)

## 2017-05-20 PROCEDURE — 84154 ASSAY OF PSA FREE: CPT

## 2017-05-20 PROCEDURE — 85025 COMPLETE CBC W/AUTO DIFF WBC: CPT

## 2017-05-20 PROCEDURE — A9270 NON-COVERED ITEM OR SERVICE: HCPCS | Performed by: INTERNAL MEDICINE

## 2017-05-20 PROCEDURE — 99231 SBSQ HOSP IP/OBS SF/LOW 25: CPT | Mod: GC | Performed by: INTERNAL MEDICINE

## 2017-05-20 PROCEDURE — 700102 HCHG RX REV CODE 250 W/ 637 OVERRIDE(OP): Performed by: INTERNAL MEDICINE

## 2017-05-20 PROCEDURE — 770009 HCHG ROOM/CARE - ONCOLOGY SEMI PRI*

## 2017-05-20 PROCEDURE — 83605 ASSAY OF LACTIC ACID: CPT

## 2017-05-20 PROCEDURE — 700102 HCHG RX REV CODE 250 W/ 637 OVERRIDE(OP): Performed by: HOSPITALIST

## 2017-05-20 PROCEDURE — A9270 NON-COVERED ITEM OR SERVICE: HCPCS | Performed by: HOSPITALIST

## 2017-05-20 PROCEDURE — 700102 HCHG RX REV CODE 250 W/ 637 OVERRIDE(OP): Performed by: STUDENT IN AN ORGANIZED HEALTH CARE EDUCATION/TRAINING PROGRAM

## 2017-05-20 PROCEDURE — 700101 HCHG RX REV CODE 250: Performed by: STUDENT IN AN ORGANIZED HEALTH CARE EDUCATION/TRAINING PROGRAM

## 2017-05-20 PROCEDURE — 36415 COLL VENOUS BLD VENIPUNCTURE: CPT

## 2017-05-20 PROCEDURE — 80048 BASIC METABOLIC PNL TOTAL CA: CPT

## 2017-05-20 PROCEDURE — A9270 NON-COVERED ITEM OR SERVICE: HCPCS | Performed by: STUDENT IN AN ORGANIZED HEALTH CARE EDUCATION/TRAINING PROGRAM

## 2017-05-20 PROCEDURE — 700105 HCHG RX REV CODE 258: Performed by: INTERNAL MEDICINE

## 2017-05-20 RX ORDER — POTASSIUM CHLORIDE 1.5 G/1.58G
40 POWDER, FOR SOLUTION ORAL ONCE
Status: CANCELLED | OUTPATIENT
Start: 2017-05-20 | End: 2017-05-20

## 2017-05-20 RX ADMIN — ACYCLOVIR 400 MG: 800 TABLET ORAL at 20:52

## 2017-05-20 RX ADMIN — TAMSULOSIN HYDROCHLORIDE 0.4 MG: 0.4 CAPSULE ORAL at 10:13

## 2017-05-20 RX ADMIN — CARVEDILOL 3.12 MG: 3.12 TABLET, FILM COATED ORAL at 07:57

## 2017-05-20 RX ADMIN — ACYCLOVIR 400 MG: 800 TABLET ORAL at 10:13

## 2017-05-20 RX ADMIN — ERGOCALCIFEROL 50000 UNITS: 1.25 CAPSULE ORAL at 10:13

## 2017-05-20 RX ADMIN — LEVETIRACETAM 750 MG: 250 TABLET, FILM COATED ORAL at 20:52

## 2017-05-20 RX ADMIN — SODIUM CHLORIDE: 9 INJECTION, SOLUTION INTRAVENOUS at 16:55

## 2017-05-20 RX ADMIN — LEVETIRACETAM 750 MG: 250 TABLET, FILM COATED ORAL at 10:12

## 2017-05-20 RX ADMIN — FINASTERIDE 5 MG: 5 TABLET, FILM COATED ORAL at 10:13

## 2017-05-20 RX ADMIN — LIDOCAINE 1 PATCH: 50 PATCH CUTANEOUS at 10:20

## 2017-05-20 RX ADMIN — CARVEDILOL 3.12 MG: 3.12 TABLET, FILM COATED ORAL at 16:54

## 2017-05-20 RX ADMIN — LOPERAMIDE HYDROCHLORIDE 2 MG: 2 CAPSULE ORAL at 20:52

## 2017-05-20 RX ADMIN — LOPERAMIDE HYDROCHLORIDE 2 MG: 2 CAPSULE ORAL at 10:17

## 2017-05-20 RX ADMIN — POTASSIUM CHLORIDE 20 MEQ: 1.5 POWDER, FOR SOLUTION ORAL at 20:52

## 2017-05-20 ASSESSMENT — PAIN SCALES - GENERAL
PAINLEVEL_OUTOF10: 0

## 2017-05-20 NOTE — PROGRESS NOTES
Assumed care of pt at 1900. Bedside report received. AAOx4, Hypertensive and tachycardic. Denies pain or nausea, reports diarrhea, medicated per MAR. Chemo this evening. Bed alarm on and sounding, pt calls appropriately. POC discussed, call light in reach, pt calls for assistance, all needs met at this time.

## 2017-05-20 NOTE — PROGRESS NOTES
"  HEMATOLOGY-ONCOLOGY PROGRESS NOTE      CML admitted for Synribo started on 5/6/17 - last day 5/20/17    Subjective:  No overnight events.   He denies any abdominal pain, nausea or vomiting. His diarrhea is better well controlled imodium.   He had only few BM yesterday and none today  No bleeding     Objective:  Medications reviewed and notable for:  Current Facility-Administered Medications   Medication Dose   • diphenoxylate-atropine (LOMOTIL) 2.5-0.025 MG per tablet 1 Tab  1 Tab   • finasteride (PROSCAR) tablet 5 mg  5 mg   • potassium chloride (KLOR-CON) 20 MEQ packet 20 mEq  20 mEq   • hydrocodone-acetaminophen (NORCO) 5-325 MG per tablet 1-2 Tab  1-2 Tab   • loperamide (IMODIUM) capsule 2 mg  2 mg   • acyclovir (ZOVIRAX) tablet 400 mg  400 mg   • NS infusion     • nystatin (MYCOSTATIN) 689493 UNIT/ML suspension 500,000 Units  5 mL   • Omacetaxine Mepesuccinate 2.4 mg in syringe 0.69 mL Chemo     • acetaminophen (TYLENOL) tablet 650 mg  650 mg   • diphenhydrAMINE (BENADRYL) tablet/capsule 25 mg  25 mg   • pneumococcal 13-Elizabeth Conj Vacc (PREVNAR 13) syringe 0.5 mL  0.5 mL   • Respiratory Care per Protocol     • ondansetron (ZOFRAN) syringe/vial injection 4 mg  4 mg   • ondansetron (ZOFRAN ODT) dispertab 4 mg  4 mg   • morphine (pf) 4 mg/ml injection 2 mg  2 mg   • carvedilol (COREG) tablet 3.125 mg  3.125 mg   • levetiracetam (KEPPRA) tablet 750 mg  750 mg   • oxycodone immediate-release (ROXICODONE) tablet 5 mg  5 mg   • tamsulosin (FLOMAX) capsule 0.4 mg  0.4 mg   • lidocaine (LIDODERM) 5 % 1 Patch  1 Patch   • vitamin D (Ergocalciferol) (DRISDOL) capsule 50,000 Units  50,000 Units       ROS:   I did do 10 point system review which is negative except as mentioned above     Blood pressure 163/93, pulse 113, temperature 36.8 °C (98.2 °F), resp. rate 18, height 1.702 m (5' 7\"), weight 71.2 kg (156 lb 15.5 oz), SpO2 96 %.    General:  comfortable, NAD  HEENT:  sclera anicteric, pupils equal, round, reactive to " light, oral cavity and oropharynx clear, mucous membranes moist  Neck:   supple, no lymphadenopathy  Cor:   regular rate and rhythm, no murmurs, rubs, or gallops  Pulm:   clear to auscultation bilaterally  Abd:   bowel sounds present, soft, nontender, nondistended, no palpable masses or organomegaly  Extremities:  warm, no lower extremity edema  Neurologic:  A&O x 3  Pyschiatric:  Appropriate mood and affect    Labs reviewed and notable for:  Recent Labs      05/17/17   2346  05/18/17   2347  05/19/17   2352   WBC  0.1*  0.1*  0.1*   RBC  2.38*  2.67*  2.61*   HEMOGLOBIN  6.9*  7.9*  7.7*   HEMATOCRIT  19.4*  21.6*  21.2*   MCV  81.5  80.9*  81.2*   MCH  29.0  29.6  29.5   MCHC  35.6*  36.6*  36.3*   RDW  39.6  39.0  39.3   PLATELETCT  15*  8*  22*   MPV  9.2  9.0  8.9*         .@CMP  Recent Results (from the past 24 hour(s))   CBC WITH DIFFERENTIAL    Collection Time: 05/19/17 11:52 PM   Result Value Ref Range    Neutrophils (Absolute) Cancel 1.82 - 7.42 K/uL    WBC 0.1 (LL) 4.8 - 10.8 K/uL    RBC 2.61 (L) 4.70 - 6.10 M/uL    Hemoglobin 7.7 (L) 14.0 - 18.0 g/dL    Hematocrit 21.2 (L) 42.0 - 52.0 %    MCV 81.2 (L) 81.4 - 97.8 fL    MCH 29.5 27.0 - 33.0 pg    MCHC 36.3 (H) 33.7 - 35.3 g/dL    RDW 39.3 35.9 - 50.0 fL    Platelet Count 22 (LL) 164 - 446 K/uL    MPV 8.9 (L) 9.0 - 12.9 fL    Nucleated RBC 0.00 /100 WBC    NRBC (Absolute) 0.00 K/uL   COMP METABOLIC PANEL    Collection Time: 05/19/17 11:52 PM   Result Value Ref Range    Sodium 133 (L) 135 - 145 mmol/L    Potassium 3.4 (L) 3.6 - 5.5 mmol/L    Chloride 104 96 - 112 mmol/L    Co2 22 20 - 33 mmol/L    Anion Gap 7.0 0.0 - 11.9    Glucose 101 (H) 65 - 99 mg/dL    Bun 10 8 - 22 mg/dL    Creatinine 0.55 0.50 - 1.40 mg/dL    Calcium 8.3 (L) 8.5 - 10.5 mg/dL    AST(SGOT) 88 (H) 12 - 45 U/L    ALT(SGPT) 156 (H) 2 - 50 U/L    Alkaline Phosphatase 179 (H) 30 - 99 U/L    Total Bilirubin 1.0 0.1 - 1.5 mg/dL    Albumin 3.2 3.2 - 4.9 g/dL    Total Protein 6.2 6.0 - 8.2  g/dL    Globulin 3.0 1.9 - 3.5 g/dL    A-G Ratio 1.1 g/dL   ESTIMATED GFR    Collection Time: 05/19/17 11:52 PM   Result Value Ref Range    GFR If African American >60 >60 mL/min/1.73 m 2    GFR If Non African American >60 >60 mL/min/1.73 m 2         Assessment and Recommendations:  1.  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  Prior treatments included dasatinib and bosutinib.  The plan has been for allo-BMT at [facility], but needs some response to chemo before they can do the transplant, giving Synribo for this.  Synribo is given as a SQ shot BID x 14 days with the first cycle, followed by 2 weeks off.  Started on 5/6/17.        Synribo is a high risk medication.  There is a 's warning for cerebral bleeding, and to avoid NSAIDS, blood thinners, etc as well as to use with caution in patients with Platelets<50K.  He has a h/o of a SDH in the recent past.    -- continue Synribo.  Aware that actual dose is slightly above BSA-based dose, OK up to 10% difference. If this persists, may need to adjust dose with cycle 2 (comes in pre-filled syringes so can't be adjusted after order arrives)  -- tolerating with main toxicity of diarrhea and elevated LFTs  -- monitor for Headaches or AMS changes     -- duration of hospitalization as per Dr. Vu, primary oncologist.  Had initially been communicated that plan for for 14 day hospitalization to get Synribo but Dr. Vu now favors treating more like standard acute luekemia induction with prolonged stay until counts improve.  Patient is willing to do whatever needs to be done. Plan to keep him.     2.  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  Not likely to improve unless he can get response to some kind of chemo.  Will transfuse as needed to keep >10, or higher if bleeding.  3.  Neutropenia--nothing documented in chart but as per Dr. Agustin's note, had fever late in day on 5/7.  BCx and CXR negative.  4.  Elevated LFT's.  Worsening.  Potentially due  to Synribo but would also like to discontinue/change any other medications that might contribute. D/c fluconzole and continue with nystatin alone at this time.     5.  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  If recurrent pain might consider RT.  6. Anemia-- transfuse 1 unit of PRBC's if <7.5  7.  Diarrhea.  A common side effect of Synribo. He was started on immodium on 5/10/17 with improvement. Antibiotics may have also been contributing.   Last C. Diff check on 5/13/17 negative . Resolved.  restarted again on 5/17/17. Repeat C. Diff 5/18/17 neg.  Well controlled Imodium   8. Subjective urinary retention and dysuria.  PVR on 5/13/17 was 33 cc per RN.  UA with only trace blood.     9.  Thrush.  on Nystatin   Improving     Plan:   - Clinically stable   - Will complete last dose of chemo today   - Continue supportive measures   - No transfusions today     High complexicity/Drug monitoring     We will continue to follow with you; please call with any questions, [phone].      Ladonna Corado MD  [facility]   [phone]

## 2017-05-20 NOTE — CARE PLAN
Problem: Bowel/Gastric:  Goal: Normal bowel function is maintained or improved  Outcome: PROGRESSING AS EXPECTED  Patient able to control BMs with PRN immodium. Still having frequent loose stools but much improved.     Problem: Mobility  Goal: Risk for activity intolerance will decrease  Outcome: PROGRESSING AS EXPECTED  Patient up with standby assist. Up frequently throughout day.     Problem: Urinary Elimination:  Goal: Ability to reestablish a normal urinary elimination pattern will improve  Outcome: PROGRESSING AS EXPECTED

## 2017-05-20 NOTE — PROGRESS NOTES
[facility] INTERNAL MEDICINE ATTENDING NOTE:      Date & Time note created:   5/20/2017   1:27 PM     Visit Time:   Attending/resident bedside rounds 9-11:30 AM     The patient was evaluated with the resident staff.  I reviewed the resident's note and agree with the resident's findings and plan as documented in the resident's note except as documented in the attending note. Please reference resident daily note for complete information.The chart was reviewed and summarized.  Available labs, imaging, O2 sats, EKGs were reviewed. Available nursing, consultant, and resident notes were reviewed. I am actively involved in the patient's care.                                                                BRIEF DISCUSSION:                                                           //CML likely with blast crisis, Anemia, thrombocytopenia and leucopenia: On Chemotherapy Synribo. Planned for BMT in future at [facility]. Monitor for bleeding complications and neutropenic complications.    Transfusions as recommended by Hem/Onc.      Diarrhea, likeley medication related.  Bedside nurse reports improvement with antidiarrheal. Mild abdominal pain reported by the patient, monitor closely for worsening or change in status. Get a CT Abd/pelvis. Nurse and resident educated on this plan. C diff negative.  Dysuria: UA ok, no discharge, penile exam normal. No benefit to pyridium. Suspect some urethritis ? Medication related Slightly better with Tamsulosin and Finestaride..      // Neutropenic fever: reported one episode on Oncologist's note.Started on cefepime. CXR, UA ok. Cx neg. No localizing symptoms. Discussed with Oncologist and the planned to stop Cefepime and observe for now . As the patient seems to have long standing leucopenia and is not expected to rise anytime  would benefit from  antifungal,antiviral and anti bacterial prophylaxis but d/t his elevated LFT antifungal has been stopped and d/t diarrhea and risk of C diff it was  decided to not start Levaquin at this time.  Will keep a very close surveillance.    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 Has elevated PSA and  No Spinal lesions so doubt Prostrate related. CT c/a/p no mass.  SPEP with FERNY polyclonal  // NATY: Resolved, on IVF, likely prerenal related to BP changes.     // h/o  Hypercalcemia: PTH 6.8, Vit D 22, Likely related to malignancy. PTHrP elevated. PSA elevated as well. CT c/a/p 4/4 s/o possible lesions related to CML. No spinal blastic lesions.    // Valvular Cardiomyopathy, PHTN: No evidence of HF at this time. Cont BB. Monitor fluid status. Outpatient cards follow up.    Echo 3/20: Left ventricular ejection fraction is >70%.  Grade II diastolic dysfunction.Mild-moderate mixed calcific aortic valve disease.  Mean gradient 14 mmHg.Calcific mitral disease with resultant moderate stenosis and moderate regurgitation.Right ventricular systolic pressure is estimated to be 50-55 mmHg.  Normal estimated right atrial pressure.No prior study is available for comparison.    // HTN: BP ok.  // Seizure Disorder: Continue Keppra  // h/o SDH  ----------------------------------------------------------------------------------------------------------------------  Filed Vitals:    05/20/17 0000 05/20/17 0400 05/20/17 0800 05/20/17 1200   BP: 153/94 163/93 157/81 158/93   Pulse: 102 113 108 105   Temp: 36.8 °C (98.3 °F) 36.8 °C (98.2 °F) 36.3 °C (97.4 °F) 36.8 °C (98.2 °F)   Resp: 20 18 18 18   Height:       Weight:       SpO2: 98% 96% 98% 94%     Weight/BMI: Body mass index is 24.58 kg/(m^2).  Pulse Oximetry: 94 %, O2 (LPM): 0, O2 Delivery: None (Room Air)    Intake/Output Summary (Last 24 hours) at 05/20/17 1327  Last data filed at 05/20/17 1000   Gross per 24 hour   Intake    422 ml   Output   1900 ml   Net  -1478 ml       Bianca Chung MD   [facility] Hospital[facility]

## 2017-05-20 NOTE — PROGRESS NOTES
Samira from Lab called with critical result of WBC 0.1 and platelets 22,000 at 0022. Critical lab result read back to Samira.   This critical lab result is within parameters established by [facility] policy for this patient.

## 2017-05-20 NOTE — PROGRESS NOTES
Chemotherapy Verification - PRIMARY RN      Height = 170.2 cm  Weight = 71.2 kg  BSA = 1.83 m2       Medication: Omacetaxine  Dose: 1.25 mg/m2  Calculated Dose: 2.3 mg (Ordered dose: 2.4 mg)                            (In mg/m2, AUC, mg/kg)     I confirm this process was performed independently with the BSA and all final chemotherapy dosing calculations congruent.  Any discrepancies of 5% or greater have been addressed with the chemotherapy pharmacist. The resolution of the discrepancy has been documented in the EPIC progress notes.

## 2017-05-20 NOTE — PROGRESS NOTES
"Pharmacy Chemotherapy Verification    Patient Name: Benjamin Post   Dx: CML       Protocol: Synribo Induction  Omacetaxine (Synribo) 1.25 mg/m2 SQ BID for 14 days  NCCN Guidelines for Chronic Myelogenous Leukemia.V.1.2016  Brice J, et al. Blood. 2012 Sep 27;120(13):2573-80. Epub 2012 Aug 15.  Alfonso ROSADO et al. J Clin Oncol. 2011;30(15s):abstr 6513.    Allergies:  Review of patient's allergies indicates no known allergies.     /93 mmHg  Pulse 105  Temp(Src) 36.8 °C (98.2 °F)  Resp 18  Ht 1.702 m (5' 7\")  Wt 71.2 kg (156 lb 15.5 oz)  BMI 24.58 kg/m2  SpO2 94% Body surface area is 1.83 meters squared.    Labs 5/19/17   ANC~ 0 Plt = 22k   Hgb = 7.7  SCr = 0.55 mg/dL CrCl ~ 93.5 mL/min (min Cr 0.7)  CML -  Treat through any counts, standing transfusion orders in place.  MD aware of all current lab results.     Per Synribo package insert, \"If a patient experiences Grade 4 neutropenia or Grade 3 thrombocytopenia during a cycle, delay starting the next cycle until ANC is greater than or equal to 1 x 10^9/L and platelet count is greater than or equal to 50 x 10^9/L. Also, for the next cycle, reduce the number of dosing days by 2 days.\"    AST/ALT/AP = 84/167/161 TBili = 0.8  No hepatic adjustment recommended.    Drug Order   (Drug name, dose, route, IV Fluid & volume, frequency, number of doses) Cycle: 1 Days 14  Final Dose #28 will be at 0900 on May 20 (note that patient skipped a dose on May 11 due to med availability)  Previous treatment: s/p dasatinib and bosutinib (last 4/26/17), both followed  by relapse     Medication = Omacetaxine  Base Dose= 1.25 mg/m2   Calc Dose: Base Dose x 1.83 m2 = 2.29 mg  Final Dose = 2.4 mg  Route = subq  Conc & Volume = 3.5 mg/mL = 0.69 mL  Admin Duration = to be given subcutaneously   Patient Supplied Medication  q12h on days 1 to 14      <5% difference, OK to continue with baseline dose if < 10% difference per MDs.        By my signature below, I confirm this process was " performed independently with the BSA and all final chemotherapy dosing calculations congruent. I have reviewed the above chemotherapy order and that my calculation of the final dose and BSA (when applicable) corroborate those calculations of the  pharmacist. Discrepancies of 5% or greater in the written dose have been addressed and documented within the EPIC Progress notes.    Dorene Holt, PharmD

## 2017-05-20 NOTE — PROGRESS NOTES
Chemotherapy Verification - SECONDARY RN       Height = 170.2 cm  Weight = 71.2 kg  BSA = 1.83 mg2       Medication: Omacetaxine  Dose: 1.25 mg/m2  Calculated Dose: 2.3 mg (2.4 mg ordered)                             (In mg/m2, AUC, mg/kg)       I confirm that this process was performed independently.

## 2017-05-20 NOTE — PROGRESS NOTES
Care assumed at 0700. Patient resting in bed. Denies pain. Assessment as charted. Bed alarm on. Diarrhea improving with oral immodium. Will continue to monitor.

## 2017-05-21 PROBLEM — M89.8X9 BONE PAIN: Status: RESOLVED | Noted: 2017-04-29 | Resolved: 2017-05-21

## 2017-05-21 PROBLEM — R30.0 DYSURIA: Status: RESOLVED | Noted: 2017-05-15 | Resolved: 2017-05-21

## 2017-05-21 PROBLEM — R10.9 ABDOMINAL PAIN: Status: RESOLVED | Noted: 2017-05-20 | Resolved: 2017-05-21

## 2017-05-21 LAB
ANION GAP SERPL CALC-SCNC: 8 MMOL/L (ref 0–11.9)
BUN SERPL-MCNC: 9 MG/DL (ref 8–22)
CALCIUM SERPL-MCNC: 8.5 MG/DL (ref 8.5–10.5)
CHLORIDE SERPL-SCNC: 107 MMOL/L (ref 96–112)
CO2 SERPL-SCNC: 20 MMOL/L (ref 20–33)
CREAT SERPL-MCNC: 0.57 MG/DL (ref 0.5–1.4)
ERYTHROCYTE [DISTWIDTH] IN BLOOD BY AUTOMATED COUNT: 38.2 FL (ref 35.9–50)
ERYTHROCYTE [DISTWIDTH] IN BLOOD BY AUTOMATED COUNT: 39.2 FL (ref 35.9–50)
GFR SERPL CREATININE-BSD FRML MDRD: >60 ML/MIN/1.73 M 2
GLUCOSE SERPL-MCNC: 106 MG/DL (ref 65–99)
HCT VFR BLD AUTO: 20.3 % (ref 42–52)
HCT VFR BLD AUTO: 23.6 % (ref 42–52)
HGB BLD-MCNC: 7.3 G/DL (ref 14–18)
HGB BLD-MCNC: 8.5 G/DL (ref 14–18)
MCH RBC QN AUTO: 29 PG (ref 27–33)
MCH RBC QN AUTO: 29.6 PG (ref 27–33)
MCHC RBC AUTO-ENTMCNC: 36 G/DL (ref 33.7–35.3)
MCHC RBC AUTO-ENTMCNC: 36 G/DL (ref 33.7–35.3)
MCV RBC AUTO: 80.5 FL (ref 81.4–97.8)
MCV RBC AUTO: 82.2 FL (ref 81.4–97.8)
NEUTROPHILS # BLD AUTO: ABNORMAL K/UL (ref 1.82–7.42)
NRBC # BLD AUTO: 0 K/UL
NRBC # BLD AUTO: 0 K/UL
NRBC BLD AUTO-RTO: 0 /100 WBC
NRBC BLD AUTO-RTO: 0 /100 WBC
PLATELET # BLD AUTO: 11 K/UL (ref 164–446)
PLATELET # BLD AUTO: 16 K/UL (ref 164–446)
PMV BLD AUTO: 8.7 FL (ref 9–12.9)
PMV BLD AUTO: 9.1 FL (ref 9–12.9)
POTASSIUM SERPL-SCNC: 3.3 MMOL/L (ref 3.6–5.5)
RBC # BLD AUTO: 2.47 M/UL (ref 4.7–6.1)
RBC # BLD AUTO: 2.93 M/UL (ref 4.7–6.1)
SODIUM SERPL-SCNC: 135 MMOL/L (ref 135–145)
WBC # BLD AUTO: 0.1 K/UL (ref 4.8–10.8)
WBC # BLD AUTO: 0.1 K/UL (ref 4.8–10.8)

## 2017-05-21 PROCEDURE — P9016 RBC LEUKOCYTES REDUCED: HCPCS

## 2017-05-21 PROCEDURE — A9270 NON-COVERED ITEM OR SERVICE: HCPCS | Performed by: HOSPITALIST

## 2017-05-21 PROCEDURE — 700102 HCHG RX REV CODE 250 W/ 637 OVERRIDE(OP): Performed by: INTERNAL MEDICINE

## 2017-05-21 PROCEDURE — A9270 NON-COVERED ITEM OR SERVICE: HCPCS | Performed by: INTERNAL MEDICINE

## 2017-05-21 PROCEDURE — 700102 HCHG RX REV CODE 250 W/ 637 OVERRIDE(OP): Performed by: STUDENT IN AN ORGANIZED HEALTH CARE EDUCATION/TRAINING PROGRAM

## 2017-05-21 PROCEDURE — 700111 HCHG RX REV CODE 636 W/ 250 OVERRIDE (IP): Performed by: STUDENT IN AN ORGANIZED HEALTH CARE EDUCATION/TRAINING PROGRAM

## 2017-05-21 PROCEDURE — 36430 TRANSFUSION BLD/BLD COMPNT: CPT

## 2017-05-21 PROCEDURE — 99231 SBSQ HOSP IP/OBS SF/LOW 25: CPT | Mod: GC | Performed by: INTERNAL MEDICINE

## 2017-05-21 PROCEDURE — 700102 HCHG RX REV CODE 250 W/ 637 OVERRIDE(OP): Performed by: HOSPITALIST

## 2017-05-21 PROCEDURE — 85025 COMPLETE CBC W/AUTO DIFF WBC: CPT | Mod: 91

## 2017-05-21 PROCEDURE — 86644 CMV ANTIBODY: CPT

## 2017-05-21 PROCEDURE — 700105 HCHG RX REV CODE 258: Performed by: INTERNAL MEDICINE

## 2017-05-21 PROCEDURE — A9270 NON-COVERED ITEM OR SERVICE: HCPCS | Performed by: STUDENT IN AN ORGANIZED HEALTH CARE EDUCATION/TRAINING PROGRAM

## 2017-05-21 PROCEDURE — 86923 COMPATIBILITY TEST ELECTRIC: CPT

## 2017-05-21 PROCEDURE — 700101 HCHG RX REV CODE 250: Performed by: STUDENT IN AN ORGANIZED HEALTH CARE EDUCATION/TRAINING PROGRAM

## 2017-05-21 PROCEDURE — 36415 COLL VENOUS BLD VENIPUNCTURE: CPT

## 2017-05-21 PROCEDURE — 770009 HCHG ROOM/CARE - ONCOLOGY SEMI PRI*

## 2017-05-21 PROCEDURE — 80048 BASIC METABOLIC PNL TOTAL CA: CPT

## 2017-05-21 RX ORDER — LABETALOL HYDROCHLORIDE 5 MG/ML
10 INJECTION, SOLUTION INTRAVENOUS ONCE
Status: COMPLETED | OUTPATIENT
Start: 2017-05-21 | End: 2017-05-21

## 2017-05-21 RX ORDER — HYDRALAZINE HYDROCHLORIDE 20 MG/ML
10 INJECTION INTRAMUSCULAR; INTRAVENOUS EVERY 6 HOURS PRN
Status: DISCONTINUED | OUTPATIENT
Start: 2017-05-21 | End: 2017-06-13

## 2017-05-21 RX ADMIN — DIPHENHYDRAMINE HCL 25 MG: 25 TABLET ORAL at 05:01

## 2017-05-21 RX ADMIN — ACETAMINOPHEN 650 MG: 325 TABLET, FILM COATED ORAL at 05:01

## 2017-05-21 RX ADMIN — ACYCLOVIR 400 MG: 800 TABLET ORAL at 09:27

## 2017-05-21 RX ADMIN — ACYCLOVIR 400 MG: 800 TABLET ORAL at 21:05

## 2017-05-21 RX ADMIN — POTASSIUM CHLORIDE 20 MEQ: 1.5 POWDER, FOR SOLUTION ORAL at 09:26

## 2017-05-21 RX ADMIN — HYDRALAZINE HYDROCHLORIDE 10 MG: 20 INJECTION INTRAMUSCULAR; INTRAVENOUS at 18:18

## 2017-05-21 RX ADMIN — HYDROCODONE BITARTRATE AND ACETAMINOPHEN 2 TABLET: 5; 325 TABLET ORAL at 09:27

## 2017-05-21 RX ADMIN — FINASTERIDE 5 MG: 5 TABLET, FILM COATED ORAL at 09:28

## 2017-05-21 RX ADMIN — LOPERAMIDE HYDROCHLORIDE 2 MG: 2 CAPSULE ORAL at 09:28

## 2017-05-21 RX ADMIN — HYDROCODONE BITARTRATE AND ACETAMINOPHEN 2 TABLET: 5; 325 TABLET ORAL at 16:30

## 2017-05-21 RX ADMIN — POTASSIUM CHLORIDE 20 MEQ: 1.5 POWDER, FOR SOLUTION ORAL at 21:00

## 2017-05-21 RX ADMIN — SODIUM CHLORIDE: 9 INJECTION, SOLUTION INTRAVENOUS at 17:39

## 2017-05-21 RX ADMIN — CARVEDILOL 3.12 MG: 3.12 TABLET, FILM COATED ORAL at 09:28

## 2017-05-21 RX ADMIN — NYSTATIN 500000 UNITS: 500000 SUSPENSION ORAL at 21:05

## 2017-05-21 RX ADMIN — LEVETIRACETAM 750 MG: 250 TABLET, FILM COATED ORAL at 21:05

## 2017-05-21 RX ADMIN — NYSTATIN 500000 UNITS: 500000 SUSPENSION ORAL at 09:28

## 2017-05-21 RX ADMIN — NYSTATIN 500000 UNITS: 500000 SUSPENSION ORAL at 17:20

## 2017-05-21 RX ADMIN — TAMSULOSIN HYDROCHLORIDE 0.4 MG: 0.4 CAPSULE ORAL at 09:28

## 2017-05-21 RX ADMIN — CARVEDILOL 3.12 MG: 3.12 TABLET, FILM COATED ORAL at 17:20

## 2017-05-21 RX ADMIN — LIDOCAINE 1 PATCH: 50 PATCH CUTANEOUS at 09:26

## 2017-05-21 RX ADMIN — LABETALOL HYDROCHLORIDE 10 MG: 5 INJECTION, SOLUTION INTRAVENOUS at 13:16

## 2017-05-21 RX ADMIN — LEVETIRACETAM 750 MG: 250 TABLET, FILM COATED ORAL at 09:27

## 2017-05-21 RX ADMIN — OXYCODONE HYDROCHLORIDE 5 MG: 5 TABLET ORAL at 12:25

## 2017-05-21 ASSESSMENT — PAIN SCALES - GENERAL
PAINLEVEL_OUTOF10: 5
PAINLEVEL_OUTOF10: 5
PAINLEVEL_OUTOF10: 0
PAINLEVEL_OUTOF10: 5
PAINLEVEL_OUTOF10: 0

## 2017-05-21 ASSESSMENT — ENCOUNTER SYMPTOMS
ABDOMINAL PAIN: 0
DIARRHEA: 1
DEPRESSION: 0
HEADACHES: 0
MYALGIAS: 0
NECK PAIN: 0
FEVER: 0
PALPITATIONS: 0
NAUSEA: 0
HEARTBURN: 0
ABDOMINAL PAIN: 1
COUGH: 0
CHILLS: 0
HEMOPTYSIS: 0
DIZZINESS: 0

## 2017-05-21 NOTE — PROGRESS NOTES
Patient's BP is elevated, no PRNs available. UNMELYSSA garcia MD stated she would put in orders. 1 time labetalol ordered and administered. See flowsheets for VS.

## 2017-05-21 NOTE — CARE PLAN
Problem: Safety  Goal: Will remain free from injury  Outcome: PROGRESSING AS EXPECTED  Bed alarm on, bed in lowest position, call light and personal belongings within reach, educated to call if in need of assistance, non skid socks, room near nurses station        Problem: Urinary Elimination:  Goal: Ability to reestablish a normal urinary elimination pattern will improve  Outcome: PROGRESSING AS EXPECTED  Urinal at bedside

## 2017-05-21 NOTE — PROGRESS NOTES
HEMATOLOGY-ONCOLOGY PROGRESS NOTE    CML admitted for Synribo started on 5/6/17 - completed on  5/20/17  Day 15   Subjective:  No overnight events.   He denies any fevers or chills.   He had only two BM yesterday, imodium helping,.   No HA     Objective:  Medications reviewed and notable for:  Current Facility-Administered Medications   Medication Dose   • diphenoxylate-atropine (LOMOTIL) 2.5-0.025 MG per tablet 1 Tab  1 Tab   • finasteride (PROSCAR) tablet 5 mg  5 mg   • potassium chloride (KLOR-CON) 20 MEQ packet 20 mEq  20 mEq   • hydrocodone-acetaminophen (NORCO) 5-325 MG per tablet 1-2 Tab  1-2 Tab   • loperamide (IMODIUM) capsule 2 mg  2 mg   • acyclovir (ZOVIRAX) tablet 400 mg  400 mg   • NS infusion     • nystatin (MYCOSTATIN) 629046 UNIT/ML suspension 500,000 Units  5 mL   • acetaminophen (TYLENOL) tablet 650 mg  650 mg   • diphenhydrAMINE (BENADRYL) tablet/capsule 25 mg  25 mg   • pneumococcal 13-Elizabeth Conj Vacc (PREVNAR 13) syringe 0.5 mL  0.5 mL   • Respiratory Care per Protocol     • ondansetron (ZOFRAN) syringe/vial injection 4 mg  4 mg   • ondansetron (ZOFRAN ODT) dispertab 4 mg  4 mg   • morphine (pf) 4 mg/ml injection 2 mg  2 mg   • carvedilol (COREG) tablet 3.125 mg  3.125 mg   • levetiracetam (KEPPRA) tablet 750 mg  750 mg   • oxycodone immediate-release (ROXICODONE) tablet 5 mg  5 mg   • tamsulosin (FLOMAX) capsule 0.4 mg  0.4 mg   • lidocaine (LIDODERM) 5 % 1 Patch  1 Patch   • vitamin D (Ergocalciferol) (DRISDOL) capsule 50,000 Units  50,000 Units       ROS:   Constitutional: +  fatigue, no fevers or chills, no night sweats  Resp: No cough or SOB  Cardio:No chest pain or palpitations  Pschy: No depression or anxiety   Neuro: No headaches, no seizure, no vision changes  GI: no abdominal pain, nausea or vomiting. Diarrhea much better   : his urinary complains improved.   All other ROS negative    Blood pressure 145/70, pulse 103, temperature 36.1 °C (97 °F), resp. rate 18, height 1.702 m (5'  "7\"), weight 71.2 kg (156 lb 15.5 oz), SpO2 97 %.      General:  comfortable, NAD  HEENT:  sclera anicteric, pupils equal, round, reactive to light, oral cavity and oropharynx clear, mucous membranes moist, Thursh resolved.   Neck:   supple, no lymphadenopathy  Cor:   regular rate and rhythm, no murmurs, rubs, or gallops  Pulm:   clear to auscultation bilaterally  Abd:   bowel sounds present, soft, nontender, nondistended, no palpable masses or organomegaly  Extremities:  warm, no lower extremity edema  Neurologic:  A&O x 3  Pyschiatric:  Appropriate mood and affect    Labs reviewed and notable for:  Recent Labs      05/18/17   2347  05/19/17   2352  05/20/17   2349   WBC  0.1*  0.1*  0.1*   RBC  2.67*  2.61*  2.47*   HEMOGLOBIN  7.9*  7.7*  7.3*   HEMATOCRIT  21.6*  21.2*  20.3*   MCV  80.9*  81.2*  82.2   MCH  29.6  29.5  29.6   MCHC  36.6*  36.3*  36.0*   RDW  39.0  39.3  39.2   PLATELETCT  8*  22*  16*   MPV  9.0  8.9*  8.7*         .@CMP  Recent Results (from the past 24 hour(s))   CBC WITH DIFFERENTIAL    Collection Time: 05/20/17 11:49 PM   Result Value Ref Range    Neutrophils (Absolute) Cancel 1.82 - 7.42 K/uL    WBC 0.1 (LL) 4.8 - 10.8 K/uL    RBC 2.47 (L) 4.70 - 6.10 M/uL    Hemoglobin 7.3 (L) 14.0 - 18.0 g/dL    Hematocrit 20.3 (L) 42.0 - 52.0 %    MCV 82.2 81.4 - 97.8 fL    MCH 29.6 27.0 - 33.0 pg    MCHC 36.0 (H) 33.7 - 35.3 g/dL    RDW 39.2 35.9 - 50.0 fL    Platelet Count 16 (LL) 164 - 446 K/uL    MPV 8.7 (L) 9.0 - 12.9 fL    Nucleated RBC 0.00 /100 WBC    NRBC (Absolute) 0.00 K/uL   BASIC METABOLIC PANEL    Collection Time: 05/20/17 11:49 PM   Result Value Ref Range    Sodium 135 135 - 145 mmol/L    Potassium 3.3 (L) 3.6 - 5.5 mmol/L    Chloride 107 96 - 112 mmol/L    Co2 20 20 - 33 mmol/L    Glucose 106 (H) 65 - 99 mg/dL    Bun 9 8 - 22 mg/dL    Creatinine 0.57 0.50 - 1.40 mg/dL    Calcium 8.5 8.5 - 10.5 mg/dL    Anion Gap 8.0 0.0 - 11.9   LACTIC ACID    Collection Time: 05/20/17 11:49 PM   Result " Value Ref Range    Lactic Acid 0.6 0.5 - 2.0 mmol/L   ESTIMATED GFR    Collection Time: 05/20/17 11:49 PM   Result Value Ref Range    GFR If African American >60 >60 mL/min/1.73 m 2    GFR If Non African American >60 >60 mL/min/1.73 m 2       Diagnostic imaging:      Assessment and Recommendations:   1.  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  Prior treatments included dasatinib and bosutinib.  The plan has been for allo-BMT at [facility], but needs some response to chemo before they can do the transplant, giving Synribo for this.  Synribo is given as a SQ shot BID x 14 days with the first cycle, followed by 2 weeks off.  Started on 5/6/17- completed on 5/20/17 .        Synribo is a high risk medication.  There is a 's warning for cerebral bleeding, and to avoid NSAIDS, blood thinners, etc as well as to use with caution in patients with Platelets<50K.  He has a h/o of a SDH in the recent past.    -- continue Synribo.  Aware that actual dose is slightly above BSA-based dose, OK up to 10% difference. If this persists, may need to adjust dose with cycle 2 (comes in pre-filled syringes so can't be adjusted after order arrives)  -- tolerating with main toxicity of diarrhea and elevated LFTs  -- monitor for Headaches or AMS changes     -- duration of hospitalization as per Dr. Vu, primary oncologist.  Had initially been communicated that plan for for 14 day hospitalization to get Synribo but Dr. Vu now favors treating more like standard acute luekemia induction with prolonged stay until counts improve.  Patient is willing to do whatever needs to be done. Plan to keep him.     2.  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  Not likely to improve unless he can get response to some kind of chemo.  Will transfuse as needed to keep >10, or higher if bleeding.  3.  Neutropenia--nothing documented in chart but as per Dr. Agustin's note, had fever late in day on 5/7.  BCx and CXR negative.  4.   Elevated LFT's.  Worsening.  Potentially due to Synribo but would also like to discontinue/change any other medications that might contribute. D/c fluconzole and continue with nystatin alone at this time.  Stable.     5.  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  If recurrent pain might consider RT.  6. Anemia-- transfuse 1 unit of PRBC's if <7.5  7.  Diarrhea.  A common side effect of Synribo. He was started on immodium on 5/10/17 with improvement. Antibiotics may have also been contributing.   Last C. Diff check on 5/13/17 negative . Resolved.  restarted again on 5/17/17. Repeat C. Diff 5/18/17 neg.  Well controlled Imodium    8. Subjective urinary retention and dysuria.  PVR on 5/13/17 was 33 cc per RN.  UA with only trace blood.     9.  Thrush.  on Nystatin   Improved     Plan:   - Clinically and hemodynamically stable   - Continue supportive measures   - As per primary oncology Dr. Vu, plan to keep him at hospital close monitoring during [place] period and until we start seeing improvement in counts.   - Transfuse PRBC's today       High complexicity/Drug monitoring     We will continue to follow with you; please call with any questions, [phone].      Ladonna Corado MD  [facility]   [phone]

## 2017-05-21 NOTE — CARE PLAN
Problem: Bowel/Gastric:  Goal: Normal bowel function is maintained or improved  Intervention: Educate patient and significant other/support system about diet, fluid intake, medications and activity to promote bowel function  Imodium administered per MAR. CNA reported small but formed stool.

## 2017-05-21 NOTE — PROGRESS NOTES
[facility] [place] [facility] [place]val [place]    Name Benjamin Post       1945   Age/Sex 71 y.o. male   MRN [id number]   Code Status DNR     After 5PM or if no immediate response to page, please call for cross-coverage  Attending/Team: Dr. Chung/Ly Call [phone] to page   1st Call - Day Intern (R1):   Dr Cowart 2nd Call - Day Sr. Resident (R2/R3):            Chief complaint/ reason for interval visit (Primary Diagnosis)   CML/pancytopenia       Patient complained of abdominal pain in the AM which was followed and self resolved. PE was benign, BS present throughout. The patient denied any symptoms later on in the day.     Interval Problem Daily Status Update  :  Principal Problem:    CML (chronic myelocytic leukemia)-Accelerated phase (Chronic) POA: Yes      Overview: Completed syrinbo      CBC Hb 7.7      No fevers overnight       No signs of bleeding present         Active Problems:    Pancytopenia (CMS-HCC) (Chronic) POA: Yes      Overview: - Pancytopenia secondary to CML and chemotherapy          Neutropenia (CMS-HCC) POA: Yes      Overview: Chronic       WBC:0.1      Afebrile overnight          Bone pain POA: Yes      Overview: Stable      Negative PE for tenderness over the rib cage.    Acute bilateral low back pain without sciatica POA: Yes    Diarrhea POA: No      Overview: Denies blood in stool.       Educated that this is a AE of chemotherapy      Patient expressed understanding.     Seizure disorder (CMS-HCC) POA: Yes    Transaminitis POA: Yes      Overview: Related to chemo therapy. AST/ALT: 46/89>>61/111>>71/134>>80/171 >>88/169    BPH (benign prostatic hyperplasia) (Chronic) POA: Yes      Overview: On flomax    Low vitamin D level (Chronic) POA: Yes      Overview: Vit D: 11 on 17    Dysuria POA: Unknown      Overview: Stable           Abdominal pain POA: No      Overview: New onset today. Resolved by the afternoon  time. Afebrile, soft abdomen without tenderness in the afternoon.   Resolved Problems:    Elevated PSA (Chronic) POA: Yes      Overview: PSA: 8.4 on 4/4/17  >>>>>4 on 5/18          Review of Systems   Constitutional: Negative for fever and chills.   HENT: Negative for hearing loss.    Respiratory: Negative for cough and hemoptysis.    Cardiovascular: Negative for chest pain and palpitations.   Gastrointestinal: Positive for abdominal pain and diarrhea. Negative for nausea.        Stable diarrhea    Genitourinary: Negative for dysuria and urgency.        Dysuria without hematuria   Musculoskeletal: Negative for myalgias and neck pain.   Skin: Negative for rash.   Neurological: Negative for dizziness and headaches.   Psychiatric/Behavioral: Negative for depression and suicidal ideas.       Consultants/Specialty  Oncology - Van Meter/Mak    Disposition  Remain inpatient for chemotherapy monitoring    Quality Measures  EKG reviewed, Labs reviewed, Medications reviewed and Radiology images reviewed  Mahajan catheter: No Mahajan      DVT Prophylaxis: Contraindicated - High bleeding risk  DVT prophylaxis - mechanical: SCDs (low plt)                Physical Exam       Filed Vitals:    05/21/17 0400 05/21/17 0548 05/21/17 0603 05/21/17 0900   BP: 146/76 145/70 155/85 171/81   Pulse: 104 103 103 94   Temp: 36.7 °C (98.1 °F) 36.1 °C (97 °F) 36.9 °C (98.5 °F) 36.9 °C (98.4 °F)   Resp: 18 18 18 16   Height:       Weight:       SpO2: 97% 97% 95% 95%     Body mass index is 24.58 kg/(m^2).    Oxygen Therapy:  Pulse Oximetry: 95 %, O2 (LPM): 0, O2 Delivery: None (Room Air)    Physical Exam   Constitutional: He is well-developed, well-nourished, and in no distress. No distress.   HENT:   Head: Normocephalic.   Neck: No tracheal deviation present. No thyromegaly present.   Cardiovascular: Normal rate.  Exam reveals no friction rub.    Murmur heard.  Pansystolic murmur on the apex    Pulmonary/Chest: Effort normal. No respiratory  distress. He has no wheezes.   Abdominal: Soft. He exhibits no distension. There is no tenderness.   Genitourinary:   No perineal tenderness present.   No signs of balanitis.    Musculoskeletal: He exhibits no edema.   Neurological: He is alert.   Skin: No erythema.         Lab Data Review:      5/4/2017  1:28 PM    Recent Labs      05/18/17   2344  05/18/17 2347 05/19/17 2352 05/20/17   2349   SODIUM   --   135  133*  135   POTASSIUM   --   3.4*  3.4*  3.3*   CHLORIDE   --   107  104  107   CO2   --   21 22 20   BUN   --   12  10  9   CREATININE   --   0.56  0.55  0.57   MAGNESIUM  1.9   --    --    --    CALCIUM   --   8.6  8.3*  8.5       Recent Labs      05/18/17 2347 05/19/17 2352 05/20/17 2349   ALTSGPT  167*  156*   --    ASTSGOT  84*  88*   --    ALKPHOSPHAT  161*  179*   --    TBILIRUBIN  0.8  1.0   --    GLUCOSE  104*  101*  106*       Recent Labs      05/18/17 2347 05/19/17 2352 05/20/17   2349   RBC  2.67*  2.61*  2.47*   HEMOGLOBIN  7.9*  7.7*  7.3*   HEMATOCRIT  21.6*  21.2*  20.3*   PLATELETCT  8*  22*  16*       Recent Labs      05/18/17 2347 05/19/17 2352 05/20/17 2349   WBC  0.1*  0.1*  0.1*   ASTSGOT  84*  88*   --    ALTSGPT  167*  156*   --    ALKPHOSPHAT  161*  179*   --    TBILIRUBIN  0.8  1.0   --            Assessment/Plan     * CML (chronic myelocytic leukemia)-Accelerated phase (present on admission)  Overview  HBG was less than 7.5 so 1 U PRBC transfused in the AM without AE.  No fevers overnight.         Assessment & Plan  Has completed multiple chemotherapy cycles.     multiple relapses.    Was treated on dasatinib for a time with response, but then relapse.    Was more recently on bosutinib with response, but again recent relapse.   Pancytopenia (chronic, before starting with chemotherapy)   Protein electrophoresis: the polyclonal increase in the gamma region which may be indicative of specific immune   response or an early monoclonal protein.     Started  on 5/6/17 with chemotherapy (synribo ) BID for 14 days which is completed on 5/20  Developed diarrhea as a complication which is being treated with Imodium   C, diff negative, can continue loperamide to good effect  BMT planned for the future    Pancytopenia (CMS-HCC) (present on admission)  Overview  - Pancytopenia secondary to CML and chemotherapy      Assessment & Plan  Transfused 1 unit irradiated platelets on 4/30, 5/4/17,  5/8/17, 5/11, 5/15, 5/19  PRBC one unit on 4/30, 5/8/17, 5/12/17, 5/18  Follow up and keep plt>10 and Hb>7.  Started with cefepim on 5/7/17 due to fever 100.4, Urine and blood culture have been negative since, completed 7 day course  On acyclovir on 5/9 for prophylaxis.     Neutropenia (CMS-HCC) (present on admission)  Overview  Chronic   WBC:0.1  Afebrile overnight      Assessment & Plan  On acyclovir  Urine and blood culture negative  Will hold off on prophylactic levaquin and fluconazole for now due to high risk for c. Diff and elevated LFTs    Bone pain (present on admission)  Overview  Stable  Negative PE for tenderness over the rib cage.    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    - Pain is controlled, Patient feeling ok.    - Follow up and consider radiation therapy if directed by oncology    Diarrhea  Overview  Denies blood in stool.   Educated that this is a AE of chemotherapy  Patient expressed understanding.     Assessment & Plan  Most likely related to chemo  Significantly improved on Loperamide PRN  Cdiff negative x2  monitor  electrolytes and continue IV fluid.       Transaminitis (present on admission)  Overview  Related to chemo therapy. AST/ALT: 46/89>>61/111>>71/134>>80/171 >>88/169    Assessment & Plan  No abdominal pain, likely effect of chemotherapy  Discontinue all hepatotoxic drugs as possible  Now slightly downtrending  Continue to follow    BPH (benign prostatic hyperplasia) (present on admission)  Assessment & Plan  Stable,  Complains of some dysuria, repeat PSA and perineal exam negative for prostatitis  Continue on tamsulosin 0.4 mg daily.   Added finasteride as well    Low vitamin D level (present on admission)  Overview  Vit D: 11 on 4/29/17    Assessment & Plan  Vit D 11 on 4/29/2017   Continue supplementation.     Dysuria  Assessment & Plan  Pyridium stopped due to no analgesic effect  Norco started 1-2 tabs 5 mg Q6 prn  Denies any hematuria  Perineal exam did not illicit tenderness. Cannot complete a rectal exam due to neutropenia.   Likely not prostatitis however cannot be ruled out due without complete rectal exam.   UA negative for nitrites, LE, or WBC casts  Norco for pain control  UA: WBC -ve, trace occult blood , - RBCs, -ve LE or nitrites  CTM      Abdominal pain  Assessment & Plan  Monitor progression    Seizure disorder (CMS-HCC) (present on admission)  Assessment & Plan  Stable    Continue on his home dose of keppra

## 2017-05-21 NOTE — PROGRESS NOTES
Received shift report from day shift RN. Pt is AO3 and declines any pain. Discussed POC. Assessed and administered evening medications. Bed alarm on, bed in lowest position, call light and personal belongings within reach, educated to call if in need of assistance, non skid socks, room near nurses station, all needs met at this time.

## 2017-05-21 NOTE — PROGRESS NOTES
Lab called with critical result of wbc at 0.1. Critical lab result read back to Lab.   This critical lab result is within parameters established by [facility] policy for this patient

## 2017-05-21 NOTE — PROGRESS NOTES
Report received from ROGER Bauer. Pt is AAOx3, no family at bedside. Assessment completed. RBC transfusion complete, changed to NS TKO and CBC ordered per nursing communication and protocol. Pt complaining of pain 5/10 in feet.  Medicated per MAR, patient encouraged to elevate his feet. Pt ambulates with standby assistance. Pt educated regarding plan of care, including labs, safety, pain management as needed, PO ABX. All questions answered. Call light and personal belongings in reach. No additional needs at this time. Bed alarm in use.

## 2017-05-21 NOTE — CARE PLAN
Problem: Discharge Barriers/Planning  Goal: Patient’s continuum of care needs will be met  Intervention: Assess potential discharge barriers on admission and throughout hospital stay  Patient received 1 unit CMV neg. Irradiated PRBCs today, post CBC ordered and drawn. Patient to stay in house until his heme labs stabilize.

## 2017-05-21 NOTE — PROGRESS NOTES
Lab called with critical result of Plt at 16. Critical lab result read back to Lab.   This critical lab result is within parameters established by [facility] policy for this patient

## 2017-05-21 NOTE — PROGRESS NOTES
[facility] [place] [facility] [place]val [place]    Name Benjamin Post       1945   Age/Sex 71 y.o. male   MRN [id number]   Code Status DNR     After 5PM or if no immediate response to page, please call for cross-coverage  Attending/Team: Dr. Chung/Ly Call [phone] to page   1st Call - Day Intern (R1):   Dr Cowart 2nd Call - Day Sr. Resident (R2/R3):            Chief complaint/ reason for interval visit (Primary Diagnosis)   CML/pancytopenia     Interval Problem Daily Status Update  :  Principal Problem:    CML (chronic myelocytic leukemia)-Accelerated phase (Chronic) POA: Yes      Overview: HBG was less than 7.5 so 1 U PRBC transfused in the AM without AE.      No fevers overnight.               Active Problems:    Pancytopenia (CMS-HCC) (Chronic) POA: Yes      Overview: - Pancytopenia secondary to CML and chemotherapy          Neutropenia (CMS-HCC) POA: Yes      Overview: Chronic       WBC:0.1      Afebrile overnight          Acute bilateral low back pain without sciatica POA: Yes    Diarrhea POA: No      Overview: Stable, 2 episodes yesterday, nonbloody     Seizure disorder (CMS-HCC) POA: Yes    Transaminitis POA: Yes      Overview: Related to chemo therapy. AST/ALT: 46/89>>61/111>>71/134>>80/171 >>88/169    BPH (benign prostatic hyperplasia) (Chronic) POA: Yes      Overview: On flomax    Low vitamin D level (Chronic) POA: Yes      Overview: Vit D: 11 on 17  Resolved Problems:    Bone pain POA: Yes      Overview: Stable      Negative PE for tenderness over the rib cage.    Elevated PSA (Chronic) POA: Yes      Overview: PSA: 8.4 on 17  >>>>>4 on     Dysuria POA: Unknown      Overview: Stable           Abdominal pain POA: No      Overview: Resolved.           Review of Systems   Constitutional: Negative for fever and chills.   HENT: Negative for hearing loss.    Respiratory: Negative for cough and hemoptysis.     Cardiovascular: Negative for chest pain and palpitations.   Gastrointestinal: Positive for diarrhea. Negative for heartburn, nausea and abdominal pain.        Stable diarrhea    Genitourinary: Negative for dysuria and urgency.        Dysuria resolved   Musculoskeletal: Negative for myalgias and neck pain.   Skin: Negative for rash.   Neurological: Negative for dizziness and headaches.   Psychiatric/Behavioral: Negative for depression and suicidal ideas.       Consultants/Specialty  Oncology - Van Meter/Mak    Disposition  Remain inpatient for chemotherapy monitoring    Quality Measures  EKG reviewed, Labs reviewed, Medications reviewed and Radiology images reviewed  Mahajan catheter: No Mahajan      DVT Prophylaxis: Contraindicated - High bleeding risk  DVT prophylaxis - mechanical: SCDs (low plt)                Physical Exam       Filed Vitals:    05/21/17 0400 05/21/17 0548 05/21/17 0603 05/21/17 0900   BP: 146/76 145/70 155/85 171/81   Pulse: 104 103 103 94   Temp: 36.7 °C (98.1 °F) 36.1 °C (97 °F) 36.9 °C (98.5 °F) 36.9 °C (98.4 °F)   Resp: 18 18 18 16   Height:       Weight:       SpO2: 97% 97% 95% 95%     Body mass index is 24.58 kg/(m^2).    Oxygen Therapy:  Pulse Oximetry: 95 %, O2 (LPM): 0, O2 Delivery: None (Room Air)    Physical Exam   Constitutional: He is well-developed, well-nourished, and in no distress. No distress.   HENT:   Head: Normocephalic.   Neck: No tracheal deviation present. No thyromegaly present.   Cardiovascular: Normal rate.  Exam reveals no friction rub.    Murmur heard.  Pansystolic murmur on the apex    Pulmonary/Chest: Effort normal. No respiratory distress. He has no wheezes.   Abdominal: Soft. He exhibits no distension. There is no tenderness.   Musculoskeletal: He exhibits no edema.   Neurological: He is alert.   Skin: No erythema.         Lab Data Review:      5/4/2017  1:28 PM    Recent Labs      05/18/17   2344  05/18/17   2347  05/19/17   2352  05/20/17   2349   SODIUM   --    135  133*  135   POTASSIUM   --   3.4*  3.4*  3.3*   CHLORIDE   --   107  104  107   CO2   --   21 22  20   BUN   --   12  10  9   CREATININE   --   0.56  0.55  0.57   MAGNESIUM  1.9   --    --    --    CALCIUM   --   8.6  8.3*  8.5       Recent Labs      05/18/17 2347 05/19/17 2352 05/20/17   2349   ALTSGPT  167*  156*   --    ASTSGOT  84*  88*   --    ALKPHOSPHAT  161*  179*   --    TBILIRUBIN  0.8  1.0   --    GLUCOSE  104*  101*  106*       Recent Labs      05/18/17 2347 05/19/17 2352 05/20/17 2349   RBC  2.67*  2.61*  2.47*   HEMOGLOBIN  7.9*  7.7*  7.3*   HEMATOCRIT  21.6*  21.2*  20.3*   PLATELETCT  8*  22*  16*       Recent Labs      05/18/17 2347 05/19/17 2352 05/20/17 2349   WBC  0.1*  0.1*  0.1*   ASTSGOT  84*  88*   --    ALTSGPT  167*  156*   --    ALKPHOSPHAT  161*  179*   --    TBILIRUBIN  0.8  1.0   --            Assessment/Plan     * CML (chronic myelocytic leukemia)-Accelerated phase (present on admission)  Overview  HBG was less than 7.5 so 1 U PRBC transfused in the AM without AE.  No fevers overnight.         Assessment & Plan  Has completed multiple chemotherapy cycles.     multiple relapses.    Was treated on dasatinib for a time with response, but then relapse.    Was more recently on bosutinib with response, but again recent relapse.   Pancytopenia (chronic, before starting with chemotherapy)   Protein electrophoresis: the polyclonal increase in the gamma region which may be indicative of specific immune   response or an early monoclonal protein.     Started on 5/6/17 with chemotherapy (synribo ) BID for 14 days which is completed on 5/20  Developed diarrhea as a complication which is being treated with Imodium   C, diff negative, can continue loperamide to good effect  BMT planned for the future    Pancytopenia (CMS-HCC) (present on admission)  Overview  - Pancytopenia secondary to CML and chemotherapy      Assessment & Plan  Transfused 1 unit irradiated platelets  on 4/30, 5/4/17,  5/8/17, 5/11, 5/15, 5/19  PRBC one unit on 4/30, 5/8/17, 5/12/17, 5/18  Follow up and keep plt>10 and Hb>7.  Started with cefepim on 5/7/17 due to fever 100.4, Urine and blood culture have been negative since, completed 7 day course  On acyclovir on 5/9 for prophylaxis.     Neutropenia (CMS-HCC) (present on admission)  Overview  Chronic   WBC:0.1  Afebrile overnight      Assessment & Plan  On acyclovir  Urine and blood culture negative  Will hold off on prophylactic levaquin and fluconazole for now due to high risk for c. Diff and elevated LFTs    Diarrhea  Overview  Stable, 2 episodes yesterday, nonbloody     Assessment & Plan  Most likely related to chemo  Significantly improved on Loperamide PRN  Cdiff negative x2  monitor electrolytes and continue IV fluid.       Transaminitis (present on admission)  Overview  Related to chemo therapy. AST/ALT: 46/89>>61/111>>71/134>>80/171 >>88/169    Assessment & Plan  No abdominal pain, likely effect of chemotherapy  Discontinue all hepatotoxic drugs as possible  Now slightly downtrending  Continue to follow    BPH (benign prostatic hyperplasia) (present on admission)  Assessment & Plan  Stable,  Complains of some dysuria, repeat PSA and perineal exam negative for prostatitis  Continue on tamsulosin 0.4 mg daily.   Added finasteride as well    Low vitamin D level (present on admission)  Overview  Vit D: 11 on 4/29/17    Assessment & Plan  Vit D 11 on 4/29/2017   Continue supplementation.     Seizure disorder (CMS-HCC) (present on admission)  Assessment & Plan  Stable    Continue on his home dose of keppra

## 2017-05-22 LAB
ANION GAP SERPL CALC-SCNC: 9 MMOL/L (ref 0–11.9)
BARCODED ABORH UBTYP: 5100
BARCODED ABORH UBTYP: 600
BARCODED PRD CODE UBPRD: NORMAL
BARCODED PRD CODE UBPRD: NORMAL
BARCODED UNIT NUM UBUNT: NORMAL
BARCODED UNIT NUM UBUNT: NORMAL
BUN SERPL-MCNC: 8 MG/DL (ref 8–22)
CALCIUM SERPL-MCNC: 8.4 MG/DL (ref 8.5–10.5)
CHLORIDE SERPL-SCNC: 108 MMOL/L (ref 96–112)
CO2 SERPL-SCNC: 22 MMOL/L (ref 20–33)
COMPONENT P 8504P: NORMAL
COMPONENT P 8504P: NORMAL
CREAT SERPL-MCNC: 0.6 MG/DL (ref 0.5–1.4)
ERYTHROCYTE [DISTWIDTH] IN BLOOD BY AUTOMATED COUNT: 39.1 FL (ref 35.9–50)
GFR SERPL CREATININE-BSD FRML MDRD: >60 ML/MIN/1.73 M 2
GLUCOSE SERPL-MCNC: 100 MG/DL (ref 65–99)
HCT VFR BLD AUTO: 23.2 % (ref 42–52)
HGB BLD-MCNC: 8.3 G/DL (ref 14–18)
MCH RBC QN AUTO: 28.7 PG (ref 27–33)
MCHC RBC AUTO-ENTMCNC: 35.8 G/DL (ref 33.7–35.3)
MCV RBC AUTO: 80.3 FL (ref 81.4–97.8)
NEUTROPHILS # BLD AUTO: ABNORMAL K/UL (ref 1.82–7.42)
NRBC # BLD AUTO: 0 K/UL
NRBC BLD AUTO-RTO: 0 /100 WBC
PLATELET # BLD AUTO: 10 K/UL (ref 164–446)
PMV BLD AUTO: 8.3 FL (ref 9–12.9)
POTASSIUM SERPL-SCNC: 3.4 MMOL/L (ref 3.6–5.5)
PRODUCT TYPE UPROD: NORMAL
PRODUCT TYPE UPROD: NORMAL
RBC # BLD AUTO: 2.89 M/UL (ref 4.7–6.1)
SODIUM SERPL-SCNC: 139 MMOL/L (ref 135–145)
UNIT STATUS USTAT: NORMAL
UNIT STATUS USTAT: NORMAL
WBC # BLD AUTO: 0.2 K/UL (ref 4.8–10.8)

## 2017-05-22 PROCEDURE — 36430 TRANSFUSION BLD/BLD COMPNT: CPT

## 2017-05-22 PROCEDURE — 700102 HCHG RX REV CODE 250 W/ 637 OVERRIDE(OP): Performed by: HOSPITALIST

## 2017-05-22 PROCEDURE — 80053 COMPREHEN METABOLIC PANEL: CPT

## 2017-05-22 PROCEDURE — 700102 HCHG RX REV CODE 250 W/ 637 OVERRIDE(OP): Performed by: STUDENT IN AN ORGANIZED HEALTH CARE EDUCATION/TRAINING PROGRAM

## 2017-05-22 PROCEDURE — 700102 HCHG RX REV CODE 250 W/ 637 OVERRIDE(OP): Performed by: INTERNAL MEDICINE

## 2017-05-22 PROCEDURE — 86644 CMV ANTIBODY: CPT | Mod: 91

## 2017-05-22 PROCEDURE — A9270 NON-COVERED ITEM OR SERVICE: HCPCS | Performed by: INTERNAL MEDICINE

## 2017-05-22 PROCEDURE — 700111 HCHG RX REV CODE 636 W/ 250 OVERRIDE (IP): Performed by: STUDENT IN AN ORGANIZED HEALTH CARE EDUCATION/TRAINING PROGRAM

## 2017-05-22 PROCEDURE — 99232 SBSQ HOSP IP/OBS MODERATE 35: CPT | Mod: GC | Performed by: INTERNAL MEDICINE

## 2017-05-22 PROCEDURE — 85025 COMPLETE CBC W/AUTO DIFF WBC: CPT

## 2017-05-22 PROCEDURE — P9034 PLATELETS, PHERESIS: HCPCS

## 2017-05-22 PROCEDURE — A9270 NON-COVERED ITEM OR SERVICE: HCPCS | Performed by: STUDENT IN AN ORGANIZED HEALTH CARE EDUCATION/TRAINING PROGRAM

## 2017-05-22 PROCEDURE — 36415 COLL VENOUS BLD VENIPUNCTURE: CPT

## 2017-05-22 PROCEDURE — 770009 HCHG ROOM/CARE - ONCOLOGY SEMI PRI*

## 2017-05-22 PROCEDURE — 700101 HCHG RX REV CODE 250: Performed by: STUDENT IN AN ORGANIZED HEALTH CARE EDUCATION/TRAINING PROGRAM

## 2017-05-22 PROCEDURE — A9270 NON-COVERED ITEM OR SERVICE: HCPCS | Performed by: HOSPITALIST

## 2017-05-22 RX ORDER — FINASTERIDE 5 MG/1
5 TABLET, FILM COATED ORAL DAILY
Status: DISCONTINUED | OUTPATIENT
Start: 2017-05-23 | End: 2017-06-15 | Stop reason: HOSPADM

## 2017-05-22 RX ORDER — CARVEDILOL 6.25 MG/1
6.25 TABLET ORAL 2 TIMES DAILY WITH MEALS
Status: DISCONTINUED | OUTPATIENT
Start: 2017-05-22 | End: 2017-05-23

## 2017-05-22 RX ORDER — POTASSIUM CHLORIDE 20 MEQ/1
40 TABLET, EXTENDED RELEASE ORAL 2 TIMES DAILY
Status: DISCONTINUED | OUTPATIENT
Start: 2017-05-22 | End: 2017-05-24

## 2017-05-22 RX ORDER — POTASSIUM CHLORIDE 1.5 G/1.58G
40 POWDER, FOR SOLUTION ORAL 2 TIMES DAILY
Status: DISCONTINUED | OUTPATIENT
Start: 2017-05-22 | End: 2017-05-22

## 2017-05-22 RX ORDER — TAMSULOSIN HYDROCHLORIDE 0.4 MG/1
0.8 CAPSULE ORAL EVERY MORNING
Status: DISCONTINUED | OUTPATIENT
Start: 2017-05-23 | End: 2017-06-15 | Stop reason: HOSPADM

## 2017-05-22 RX ADMIN — CARVEDILOL 6.25 MG: 6.25 TABLET, FILM COATED ORAL at 17:22

## 2017-05-22 RX ADMIN — ACYCLOVIR 400 MG: 800 TABLET ORAL at 19:48

## 2017-05-22 RX ADMIN — LEVETIRACETAM 750 MG: 250 TABLET, FILM COATED ORAL at 09:03

## 2017-05-22 RX ADMIN — POTASSIUM CHLORIDE 40 MEQ: 1500 TABLET, EXTENDED RELEASE ORAL at 21:00

## 2017-05-22 RX ADMIN — FINASTERIDE 5 MG: 5 TABLET, FILM COATED ORAL at 09:03

## 2017-05-22 RX ADMIN — CARVEDILOL 3.12 MG: 3.12 TABLET, FILM COATED ORAL at 09:02

## 2017-05-22 RX ADMIN — TAMSULOSIN HYDROCHLORIDE 0.4 MG: 0.4 CAPSULE ORAL at 09:04

## 2017-05-22 RX ADMIN — HYDRALAZINE HYDROCHLORIDE 10 MG: 20 INJECTION INTRAMUSCULAR; INTRAVENOUS at 19:49

## 2017-05-22 RX ADMIN — ACYCLOVIR 400 MG: 800 TABLET ORAL at 09:02

## 2017-05-22 RX ADMIN — LEVETIRACETAM 750 MG: 250 TABLET, FILM COATED ORAL at 21:00

## 2017-05-22 RX ADMIN — LIDOCAINE 1 PATCH: 50 PATCH CUTANEOUS at 09:00

## 2017-05-22 ASSESSMENT — ENCOUNTER SYMPTOMS
ORTHOPNEA: 0
PHOTOPHOBIA: 0
HEMOPTYSIS: 0
DIZZINESS: 0
DEPRESSION: 0
MYALGIAS: 0
PALPITATIONS: 0
DOUBLE VISION: 0
ABDOMINAL PAIN: 0
TINGLING: 0
DIARRHEA: 1
FEVER: 0
SPUTUM PRODUCTION: 0
NAUSEA: 0
SHORTNESS OF BREATH: 0
WEAKNESS: 0
BRUISES/BLEEDS EASILY: 1
HEADACHES: 0
VOMITING: 0
TREMORS: 0
HEARTBURN: 0
WEIGHT LOSS: 1
CHILLS: 0
NECK PAIN: 0
BLURRED VISION: 0
COUGH: 0

## 2017-05-22 ASSESSMENT — PAIN SCALES - GENERAL
PAINLEVEL_OUTOF10: 0
PAINLEVEL_OUTOF10: 0

## 2017-05-22 NOTE — PROGRESS NOTES
Pt is A&O.  Up without calling appropriately.  Bed alarm is on.  Denies pain or nausea and diarrhea.  Appetite is fair for breakfast.  Pt is trying to eat more.  Lidocaine patch for R flank pain.  Neutropenic precautions in place. Afebrile.

## 2017-05-22 NOTE — PROGRESS NOTES
Oncology/Hematology Progress Note               Author: Pop Jorgensen Date & Time created: 5/22/2017  10:35 AM   Cc: cml with blast crisis  Interval History:  He is doing ok.  He does not have new issues.  He feels good.    Review of Systems:  Review of Systems   Constitutional: Positive for weight loss and malaise/fatigue. Negative for fever and chills.   HENT: Negative for ear pain and hearing loss.    Eyes: Negative for blurred vision, double vision and photophobia.   Respiratory: Negative for cough, hemoptysis, sputum production and shortness of breath.    Cardiovascular: Negative for chest pain, palpitations and orthopnea.   Gastrointestinal: Negative for heartburn, nausea, vomiting and abdominal pain.   Genitourinary: Negative for dysuria, urgency and frequency.   Musculoskeletal: Negative for myalgias and neck pain.   Skin: Negative for rash.   Neurological: Negative for dizziness, tingling, tremors, weakness and headaches.   Endo/Heme/Allergies: Bruises/bleeds easily.   Psychiatric/Behavioral: Negative for depression.       Physical Exam:  Physical Exam   Constitutional: He is oriented to person, place, and time. He appears well-developed.   HENT:   Head: Normocephalic.   Eyes: Pupils are equal, round, and reactive to light.   Cardiovascular: Normal rate and regular rhythm.    Pulmonary/Chest: Effort normal and breath sounds normal.   Abdominal: Bowel sounds are normal. He exhibits no distension. There is no tenderness. There is no rebound.   Musculoskeletal: He exhibits no edema.   Neurological: He is alert and oriented to person, place, and time.   Psychiatric: He has a normal mood and affect. His behavior is normal. Judgment and thought content normal.       Labs:        Invalid input(s): [id number]      Recent Labs      05/19/17   2352  05/20/17   2349  05/21/17   2351   SODIUM  133*  135  139   POTASSIUM  3.4*  3.3*  3.4*   CHLORIDE  104  107  108   CO2  22  20  22   BUN  10  9  8   CREATININE   0.55  0.57  0.60   CALCIUM  8.3*  8.5  8.4*     Recent Labs      17   235   ALTSGPT  156*   --    --    ASTSGOT  88*   --    --    ALKPHOSPHAT  179*   --    --    TBILIRUBIN  1.0   --    --    GLUCOSE  101*  106*  100*     Recent Labs      17   RBC  2.47*  2.93*  2.89*   HEMOGLOBIN  7.3*  8.5*  8.3*   HEMATOCRIT  20.3*  23.6*  23.2*   PLATELETCT  16*  11*  10*     Recent Labs      17   235   WBC  0.1*  0.1*  0.1*  0.2*   ASTSGOT  88*   --    --    --    ALTSGPT  156*   --    --    --    ALKPHOSPHAT  179*   --    --    --    TBILIRUBIN  1.0   --    --    --      Recent Labs      17   SODIUM  133*  135  139   POTASSIUM  3.4*  3.3*  3.4*   CHLORIDE  104  107  108   CO2    22   GLUCOSE  101*  106*  100*   BUN  10  9  8   CREATININE  0.55  0.57  0.60   CALCIUM  8.3*  8.5  8.4*     Hemodynamics:  Temp (24hrs), Av.7 °C (98 °F), Min:36.3 °C (97.4 °F), Max:36.9 °C (98.4 °F)  Temperature: 36.7 °C (98.1 °F)  Pulse  Av.1  Min: 60  Max: 118   Blood Pressure : (!) 163/79 mmHg     Respiratory:    Respiration: 16, Pulse Oximetry: 95 %     Work Of Breathing / Effort: Mild  RUL Breath Sounds: Diminished, RML Breath Sounds: Diminished, RLL Breath Sounds: Diminished, WAGNER Breath Sounds: Diminished, LLL Breath Sounds: Diminished  Fluids:    Intake/Output Summary (Last 24 hours) at 17 1035  Last data filed at 05/22/17 0750   Gross per 24 hour   Intake    240 ml   Output   1950 ml   Net  -1710 ml     Weight: 70.6 kg (155 lb 10.3 oz)  GI/Nutrition:  Orders Placed This Encounter   Procedures   • Diet Order     Standing Status: Standing      Number of Occurrences: 1      Standing Expiration Date:      Order Specific Question:  Diet:     Answer:  2 Gram Sodium [7]     Order Specific Question:  Texture/Fiber modifications:     Answer:   Dysphagia 2(Pureed/Chopped)specify fluid consistency(question 6) [2]      Comments:  thins ok; pt with no teeth/ nor dentures     Medical Decision Making, by Problem:  Active Hospital Problems    Diagnosis   • *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  CML-- Day 16:  multiple relapses, now in blast crisis.  Prior treatments included dasatinib and bosutinib.  The plan has been for allo-BMT at [facility], but needs some response to chemo before they can do the transplant, giving Synribo for this.  Synribo is given as a SQ shot BID x 14 days with the first cycle, followed by 2 weeks off.  Started on 5/6/17- completed on 5/20/17.    Synribo is a high risk medication.  There is a 's warning for cerebral bleeding, and to avoid NSAIDS, blood thinners, etc as well as to use with caution in patients with Platelets<50K.  He has a h/o of a SDH in the recent past.      Dr. Vu, primary oncologist  favors treating more like standard acute luekemia induction with prolonged stay until counts improve.        2.  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  Not likely to improve unless he can get response to some kind of chemo.  Will transfuse as needed to keep >10, or higher if bleeding.    3.  Neutropenia--nothing documented in chart but as per Dr. Agustin's note, had fever late in day on 5/7.  BCx and CXR negative.    4.  Elevated LFT's.  Worsening.  Potentially due to Synribo but would also like to discontinue/change any other medications that might contribute. D/c fluconzole and continue with nystatin alone at this time.  Stable.       5.  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  If recurrent pain might consider RT.    6. Anemia-- transfuse 1 unit of PRBC's if <7.5.    7.  Diarrhea.  A common side effect of Synribo. He was started on immodium on 5/10/17 with improvement. Antibiotics may have also been contributing.   Last C. Diff check on 5/13/17 negative . Resolved.  restarted again on 5/17/17. Repeat C. Diff 5/18/17 neg.  Well controlled Imodium          5/22/17     -follow transfusion parameters  -h/h stable today  -watch for fevers  -will order cmp to lfts     Medications reviewed, Labs reviewed and Radiology images reviewed

## 2017-05-22 NOTE — PROGRESS NOTES
[facility] Interval Note    Name Benjamin Post       1945   Age/Sex 71 y.o. male   MRN [id number]   Code Status DNR     After 5PM or if no immediate response to page, please call for cross-coverage  Attending/Team: Dr. Santiago  Call [phone] to page   1st Call - Day Intern (R1):   Dr Cowart 2nd Call - Day Sr. Resident (R2/R3):            Chief complaint/ reason for interval visit (Primary Diagnosis)   CML/pancytopenia     Interval Problem Daily Status Update  :  Principal Problem:    CML (chronic myelocytic leukemia)-Accelerated phase (Chronic) POA: Yes      Overview: No fevers overnight.       Platelets were 10K today. 1 unit transfused      WBC increased from 0.1 to 0.2.      Patient is currently being treated as an acute leukemia instead of       accelerated phase. Anticipate prolonged stay until counts improve        Active Problems:    Pancytopenia (CMS-HCC) (Chronic) POA: Yes      Overview: - Pancytopenia secondary to CML and chemotherapy          Neutropenia (CMS-HCC) POA: Yes      Overview: Chronic       WBC:0.1      Afebrile overnight          Acute bilateral low back pain without sciatica POA: Yes    Diarrhea POA: No      Overview: Stable          Seizure disorder (CMS-HCC) POA: Yes    Transaminitis POA: Yes      Overview: Related to chemo therapy. AST/ALT: 46/89>>61/111>>71/134>>80/171 >>88/169    BPH (benign prostatic hyperplasia) (Chronic) POA: Yes      Overview: On flomax    Low vitamin D level (Chronic) POA: Yes      Overview: Vit D: 11 on 17  Resolved Problems:    Bone pain POA: Yes      Overview: Stable      Negative PE for tenderness over the rib cage.    Elevated PSA (Chronic) POA: Yes      Overview: PSA: 8.4 on 17  >>>>>4 on     Dysuria POA: Unknown      Overview: Stable           Abdominal pain POA: No      Overview: Resolved.           Review of Systems   Constitutional:  Negative for fever and chills.   HENT: Negative for hearing loss.    Respiratory: Negative for cough and hemoptysis.    Cardiovascular: Negative for chest pain and palpitations.   Gastrointestinal: Positive for diarrhea. Negative for heartburn, nausea and abdominal pain.        Stable diarrhea    Genitourinary: Negative for dysuria and urgency.        Dysuria resolved   Musculoskeletal: Negative for myalgias and neck pain.   Skin: Negative for rash.   Neurological: Negative for dizziness and headaches.   Psychiatric/Behavioral: Negative for depression and suicidal ideas.       Consultants/Specialty  Oncology - Van Meter/Mak    Disposition  Remain inpatient for chemotherapy monitoring    Quality Measures  EKG reviewed, Labs reviewed, Medications reviewed and Radiology images reviewed  Mahajan catheter: No Mahajan      DVT Prophylaxis: Contraindicated - High bleeding risk  DVT prophylaxis - mechanical: SCDs (low plt)                Physical Exam       Filed Vitals:    05/22/17 0750 05/22/17 0900 05/22/17 1130 05/22/17 1331   BP: 163/79  163/79 166/84   Pulse: 101  101 100   Temp: 36.7 °C (98.1 °F)  36.7 °C (98.1 °F) 36.7 °C (98.1 °F)   Resp: 16  16 18   Height:       Weight:  70.6 kg (155 lb 10.3 oz)     SpO2: 95%  93% 100%     Body mass index is 24.37 kg/(m^2). Weight: 70.6 kg (155 lb 10.3 oz)  Oxygen Therapy:  Pulse Oximetry: 100 %, O2 (LPM): 0, O2 Delivery: None (Room Air)    Physical Exam   Constitutional: He is well-developed, well-nourished, and in no distress. No distress.   HENT:   Head: Normocephalic.   Neck: No tracheal deviation present. No thyromegaly present.   Cardiovascular: Normal rate.  Exam reveals no friction rub.    Murmur heard.  Pansystolic murmur on the apex    Pulmonary/Chest: Effort normal. No respiratory distress. He has no wheezes.   Abdominal: Soft. He exhibits no distension. There is no tenderness.   Musculoskeletal: He exhibits no edema.   Neurological: He is alert.   Skin: No erythema.          Lab Data Review:      5/4/2017  1:28 PM    Recent Labs      05/19/17 2352 05/20/17 2349 05/21/17   2351   SODIUM  133*  135  139   POTASSIUM  3.4*  3.3*  3.4*   CHLORIDE  104  107  108   CO2  22  20  22   BUN  10  9  8   CREATININE  0.55  0.57  0.60   CALCIUM  8.3*  8.5  8.4*       Recent Labs      05/19/17 2352 05/20/17 2349 05/21/17   2351   ALTSGPT  156*   --    --    ASTSGOT  88*   --    --    ALKPHOSPHAT  179*   --    --    TBILIRUBIN  1.0   --    --    GLUCOSE  101*  106*  100*       Recent Labs      05/20/17 2349 05/21/17 1218 05/21/17 2351   RBC  2.47*  2.93*  2.89*   HEMOGLOBIN  7.3*  8.5*  8.3*   HEMATOCRIT  20.3*  23.6*  23.2*   PLATELETCT  16*  11*  10*       Recent Labs      05/19/17 2352 05/20/17 2349 05/21/17 1218 05/21/17 2351   WBC  0.1*  0.1*  0.1*  0.2*   ASTSGOT  88*   --    --    --    ALTSGPT  156*   --    --    --    ALKPHOSPHAT  179*   --    --    --    TBILIRUBIN  1.0   --    --    --            Assessment/Plan     * CML (chronic myelocytic leukemia)-Accelerated phase (present on admission)  Overview  No fevers overnight.   Platelets were 10K today. 1 unit transfused  WBC increased from 0.1 to 0.2.  Patient is currently being treated as an acute leukemia instead of accelerated phase. Anticipate prolonged stay until counts improve      Assessment & Plan  Has completed multiple chemotherapy cycles.     multiple relapses.    Was treated on dasatinib for a time with response, but then relapse.    Was more recently on bosutinib with response, but again recent relapse.   Pancytopenia (chronic, before starting with chemotherapy)   Protein electrophoresis: the polyclonal increase in the gamma region which may be indicative of specific immune   response or an early monoclonal protein.     Started on 5/6/17 with chemotherapy (synribo ) BID for 14 days which is completed on 5/20  Developed diarrhea as a complication which is being treated with Imodium   C, diff  negative, can continue loperamide to good effect  BMT planned for the future    Pancytopenia (CMS-HCC) (present on admission)  Overview  - Pancytopenia secondary to CML and chemotherapy      Assessment & Plan  Transfused 1 unit irradiated platelets on 4/30, 5/4/17,  5/8/17, 5/11, 5/15, 5/19  PRBC one unit on 4/30, 5/8/17, 5/12/17, 5/18  Follow up and keep plt>10 and Hb>7.  Started with cefepim on 5/7/17 due to fever 100.4, Urine and blood culture have been negative since, completed 7 day course  On acyclovir on 5/9 for prophylaxis.     Neutropenia (CMS-HCC) (present on admission)  Overview  Chronic   WBC:0.1  Afebrile overnight      Assessment & Plan  On acyclovir  Urine and blood culture negative  Will hold off on prophylactic levaquin and fluconazole for now due to high risk for c. Diff and elevated LFTs    Diarrhea  Overview  Stable      Assessment & Plan  Most likely related to chemo  Significantly improved on Loperamide PRN  Cdiff negative x2  monitor electrolytes and continue IV fluid.       Transaminitis (present on admission)  Overview  Related to chemo therapy. AST/ALT: 46/89>>61/111>>71/134>>80/171 >>88/169    Assessment & Plan  No abdominal pain, likely effect of chemotherapy  Discontinue all hepatotoxic drugs as possible  Now slightly downtrending  Continue to follow    BPH (benign prostatic hyperplasia) (present on admission)  Assessment & Plan  Stable,  Complains of some dysuria, repeat PSA and perineal exam negative for prostatitis  Continue on tamsulosin 0.4 mg daily.   Added finasteride as well    Low vitamin D level (present on admission)  Overview  Vit D: 11 on 4/29/17    Assessment & Plan  Vit D 11 on 4/29/2017   Continue supplementation.     Seizure disorder (CMS-HCC) (present on admission)  Assessment & Plan  Stable    Continue on his home dose of keppra

## 2017-05-23 ENCOUNTER — APPOINTMENT (OUTPATIENT)
Dept: ONCOLOGY | Facility: MEDICAL CENTER | Age: 72
End: 2017-05-23
Attending: SPECIALIST
Payer: MEDICARE

## 2017-05-23 PROBLEM — I10 HYPERTENSION: Status: ACTIVE | Noted: 2017-05-23

## 2017-05-23 LAB
ALBUMIN SERPL BCP-MCNC: 3.4 G/DL (ref 3.2–4.9)
ALBUMIN/GLOB SERPL: 1.1 G/DL
ALP SERPL-CCNC: 192 U/L (ref 30–99)
ALT SERPL-CCNC: 108 U/L (ref 2–50)
ANION GAP SERPL CALC-SCNC: 7 MMOL/L (ref 0–11.9)
AST SERPL-CCNC: 59 U/L (ref 12–45)
BILIRUB SERPL-MCNC: 1.2 MG/DL (ref 0.1–1.5)
BUN SERPL-MCNC: 8 MG/DL (ref 8–22)
CALCIUM SERPL-MCNC: 8.4 MG/DL (ref 8.5–10.5)
CHLORIDE SERPL-SCNC: 106 MMOL/L (ref 96–112)
CO2 SERPL-SCNC: 25 MMOL/L (ref 20–33)
CREAT SERPL-MCNC: 0.62 MG/DL (ref 0.5–1.4)
ERYTHROCYTE [DISTWIDTH] IN BLOOD BY AUTOMATED COUNT: 38.1 FL (ref 35.9–50)
GFR SERPL CREATININE-BSD FRML MDRD: >60 ML/MIN/1.73 M 2
GLOBULIN SER CALC-MCNC: 3 G/DL (ref 1.9–3.5)
GLUCOSE SERPL-MCNC: 97 MG/DL (ref 65–99)
HCT VFR BLD AUTO: 21.2 % (ref 42–52)
HGB BLD-MCNC: 7.6 G/DL (ref 14–18)
MCH RBC QN AUTO: 28.7 PG (ref 27–33)
MCHC RBC AUTO-ENTMCNC: 35.8 G/DL (ref 33.7–35.3)
MCV RBC AUTO: 80 FL (ref 81.4–97.8)
NEUTROPHILS # BLD AUTO: ABNORMAL K/UL (ref 1.82–7.42)
NRBC # BLD AUTO: 0 K/UL
NRBC BLD AUTO-RTO: 0 /100 WBC
PLATELET # BLD AUTO: 27 K/UL (ref 164–446)
PMV BLD AUTO: 9.5 FL (ref 9–12.9)
POTASSIUM SERPL-SCNC: 3.2 MMOL/L (ref 3.6–5.5)
PROT SERPL-MCNC: 6.4 G/DL (ref 6–8.2)
RBC # BLD AUTO: 2.65 M/UL (ref 4.7–6.1)
SODIUM SERPL-SCNC: 138 MMOL/L (ref 135–145)
WBC # BLD AUTO: 0.3 K/UL (ref 4.8–10.8)

## 2017-05-23 PROCEDURE — 85025 COMPLETE CBC W/AUTO DIFF WBC: CPT

## 2017-05-23 PROCEDURE — 700111 HCHG RX REV CODE 636 W/ 250 OVERRIDE (IP): Performed by: STUDENT IN AN ORGANIZED HEALTH CARE EDUCATION/TRAINING PROGRAM

## 2017-05-23 PROCEDURE — 83735 ASSAY OF MAGNESIUM: CPT

## 2017-05-23 PROCEDURE — 99232 SBSQ HOSP IP/OBS MODERATE 35: CPT | Mod: GC | Performed by: INTERNAL MEDICINE

## 2017-05-23 PROCEDURE — A9270 NON-COVERED ITEM OR SERVICE: HCPCS | Performed by: INTERNAL MEDICINE

## 2017-05-23 PROCEDURE — 80053 COMPREHEN METABOLIC PANEL: CPT

## 2017-05-23 PROCEDURE — 700102 HCHG RX REV CODE 250 W/ 637 OVERRIDE(OP): Performed by: INTERNAL MEDICINE

## 2017-05-23 PROCEDURE — 700101 HCHG RX REV CODE 250: Performed by: STUDENT IN AN ORGANIZED HEALTH CARE EDUCATION/TRAINING PROGRAM

## 2017-05-23 PROCEDURE — 770009 HCHG ROOM/CARE - ONCOLOGY SEMI PRI*

## 2017-05-23 PROCEDURE — 36415 COLL VENOUS BLD VENIPUNCTURE: CPT

## 2017-05-23 PROCEDURE — A9270 NON-COVERED ITEM OR SERVICE: HCPCS | Performed by: STUDENT IN AN ORGANIZED HEALTH CARE EDUCATION/TRAINING PROGRAM

## 2017-05-23 PROCEDURE — 700102 HCHG RX REV CODE 250 W/ 637 OVERRIDE(OP): Performed by: STUDENT IN AN ORGANIZED HEALTH CARE EDUCATION/TRAINING PROGRAM

## 2017-05-23 RX ORDER — CARVEDILOL 12.5 MG/1
12.5 TABLET ORAL 2 TIMES DAILY WITH MEALS
Status: DISCONTINUED | OUTPATIENT
Start: 2017-05-23 | End: 2017-06-11

## 2017-05-23 RX ADMIN — CARVEDILOL 12.5 MG: 12.5 TABLET, FILM COATED ORAL at 17:25

## 2017-05-23 RX ADMIN — FINASTERIDE 5 MG: 5 TABLET, FILM COATED ORAL at 09:13

## 2017-05-23 RX ADMIN — NYSTATIN 500000 UNITS: 500000 SUSPENSION ORAL at 14:13

## 2017-05-23 RX ADMIN — ACYCLOVIR 400 MG: 800 TABLET ORAL at 20:26

## 2017-05-23 RX ADMIN — CARVEDILOL 6.25 MG: 6.25 TABLET, FILM COATED ORAL at 09:14

## 2017-05-23 RX ADMIN — HYDRALAZINE HYDROCHLORIDE 10 MG: 20 INJECTION INTRAMUSCULAR; INTRAVENOUS at 14:20

## 2017-05-23 RX ADMIN — LIDOCAINE 1 PATCH: 50 PATCH CUTANEOUS at 09:23

## 2017-05-23 RX ADMIN — LOPERAMIDE HYDROCHLORIDE 2 MG: 2 CAPSULE ORAL at 10:21

## 2017-05-23 RX ADMIN — NYSTATIN 500000 UNITS: 500000 SUSPENSION ORAL at 09:14

## 2017-05-23 RX ADMIN — TAMSULOSIN HYDROCHLORIDE 0.8 MG: 0.4 CAPSULE ORAL at 09:14

## 2017-05-23 RX ADMIN — NYSTATIN 500000 UNITS: 500000 SUSPENSION ORAL at 17:25

## 2017-05-23 RX ADMIN — LEVETIRACETAM 750 MG: 250 TABLET, FILM COATED ORAL at 09:13

## 2017-05-23 RX ADMIN — LEVETIRACETAM 750 MG: 250 TABLET, FILM COATED ORAL at 20:25

## 2017-05-23 RX ADMIN — POTASSIUM CHLORIDE 40 MEQ: 1500 TABLET, EXTENDED RELEASE ORAL at 09:14

## 2017-05-23 RX ADMIN — ACYCLOVIR 400 MG: 800 TABLET ORAL at 09:14

## 2017-05-23 ASSESSMENT — ENCOUNTER SYMPTOMS
COUGH: 0
DIARRHEA: 1
DIZZINESS: 0
PALPITATIONS: 0
CHILLS: 0
NAUSEA: 0
DEPRESSION: 0
MYALGIAS: 0
HEMOPTYSIS: 0
NECK PAIN: 0
HEARTBURN: 0
FEVER: 0
HEADACHES: 0
ABDOMINAL PAIN: 0

## 2017-05-23 ASSESSMENT — PAIN SCALES - GENERAL
PAINLEVEL_OUTOF10: 0
PAINLEVEL_OUTOF10: 0

## 2017-05-23 NOTE — CARE PLAN
Problem: Venous Thromboembolism (VTW)/Deep Vein Thrombosis (DVT) Prevention:  Goal: Patient will participate in Venous Thrombosis (VTE)/Deep Vein Thrombosis (DVT)Prevention Measures  Outcome: PROGRESSING AS EXPECTED  Pt up to chair and to bathroom with sba; pharmacologic prophylaxis contraindicated r/t thrombocytopenia    Problem: Urinary Elimination:  Goal: Ability to reestablish a normal urinary elimination pattern will improve  Outcome: PROGRESSING AS EXPECTED  Pt voiding without difficulty; occasional incontinence not noted today

## 2017-05-23 NOTE — PROGRESS NOTES
Assumed pt care at change of shift. Labs and orders noted. Pt with HTN episodes lats 48 hrs- addressed with medical team, orders received,IVF DCd. Diarrhea x2 post breakfast; resolved for now with Lomotil. Plan of care updated with family at bedside. Questions answered. Adequate pain relief with Lidocaine patch per pt. Safety precautions in place. Call light and belongings within reach. Continuing hourly rounding and assessing for additional needs

## 2017-05-23 NOTE — PROGRESS NOTES
[facility] [place] [facility] Interval Note    Name Benjamin Post       1945   Age/Sex 71 y.o. male   MRN [id number]   Code Status DNR     After 5PM or if no immediate response to page, please call for cross-coverage  Attending/Team: Dr. Santiago  Call [phone] to page   1st Call - Day Intern (R1):   Dr Cowart 2nd Call - Day Sr. Resident (R2/R3):            Chief complaint/ reason for interval visit (Primary Diagnosis)   CML/pancytopenia     Interval Problem Daily Status Update  :  Principal Problem:    CML (chronic myelocytic leukemia)-Accelerated phase (Chronic) POA: Yes      Overview: No fevers overnight.       WBC increasing      Patient's WBC count slowly building up        Active Problems:    Pancytopenia (CMS-HCC) (Chronic) POA: Yes      Overview: - Pancytopenia secondary to CML and chemotherapy      - WBC count slowly tapering up.                Neutropenia (CMS-HCC) POA: Yes      Overview: Chronic       WBC:0.1      Afebrile overnight          Acute bilateral low back pain without sciatica POA: Yes    Diarrhea POA: No      Overview: Stable          Seizure disorder (CMS-HCC) POA: Yes    Transaminitis POA: Yes      Overview: Related to chemo therapy. AST/ALT: 46/89>>61/111>>71/134>>80/171 >>88/169    BPH (benign prostatic hyperplasia) (Chronic) POA: Yes      Overview: On flomax    Low vitamin D level (Chronic) POA: Yes      Overview: Vit D: 11 on 17    Hypertension POA: Unknown  Resolved Problems:    Bone pain POA: Yes      Overview: Stable      Negative PE for tenderness over the rib cage.    Elevated PSA (Chronic) POA: Yes      Overview: PSA: 8.4 on 17  >>>>>4 on     Dysuria POA: Unknown      Overview: Stable           Abdominal pain POA: No      Overview: Resolved.           Review of Systems   Constitutional: Negative for fever and chills.   HENT: Negative for hearing loss.    Respiratory: Negative for  cough and hemoptysis.    Cardiovascular: Negative for chest pain and palpitations.   Gastrointestinal: Positive for diarrhea. Negative for heartburn, nausea and abdominal pain.        Stable diarrhea    Genitourinary: Negative for dysuria and urgency.        Dysuria resolved   Musculoskeletal: Negative for myalgias and neck pain.   Skin: Negative for rash.   Neurological: Negative for dizziness and headaches.   Psychiatric/Behavioral: Negative for depression and suicidal ideas.       Consultants/Specialty  Oncology - Van Meter/Mak    Disposition  Remain inpatient for chemotherapy monitoring    Quality Measures  EKG reviewed, Labs reviewed, Medications reviewed and Radiology images reviewed  Mahajan catheter: No Mahajan      DVT Prophylaxis: Contraindicated - High bleeding risk  DVT prophylaxis - mechanical: SCDs (low plt)                Physical Exam       Filed Vitals:    05/23/17 0000 05/23/17 0500 05/23/17 0737 05/23/17 1300   BP: 164/80 161/75 169/84 170/81   Pulse: 101 90 92 86   Temp: 37.1 °C (98.7 °F) 36.6 °C (97.8 °F) 36.9 °C (98.4 °F) 36.8 °C (98.2 °F)   Resp: 16 16 16 18   Height:       Weight:       SpO2: 97% 96% 96% 97%     Body mass index is 24.37 kg/(m^2).    Oxygen Therapy:  Pulse Oximetry: 97 %, O2 (LPM): 0, O2 Delivery: None (Room Air)    Physical Exam   Constitutional: He is well-developed, well-nourished, and in no distress. No distress.   HENT:   Head: Normocephalic.   Neck: No tracheal deviation present. No thyromegaly present.   Cardiovascular: Normal rate.  Exam reveals no friction rub.    Murmur heard.  Pansystolic murmur on the apex    Pulmonary/Chest: Effort normal. No respiratory distress. He has no wheezes.   Abdominal: Soft. He exhibits no distension. There is no tenderness.   Musculoskeletal: He exhibits no edema.   Neurological: He is alert.   Skin: No erythema.         Lab Data Review:      5/4/2017  1:28 PM    Recent Labs      05/20/17   2349  05/21/17   2351  05/22/17   3686    SODIUM  135  139  138   POTASSIUM  3.3*  3.4*  3.2*   CHLORIDE  107  108  106   CO2  20  22  25   BUN  9  8  8   CREATININE  0.57  0.60  0.62   CALCIUM  8.5  8.4*  8.4*       Recent Labs      05/20/17   2349  05/21/17 2351 05/22/17   2344   ALTSGPT   --    --   108*   ASTSGOT   --    --   59*   ALKPHOSPHAT   --    --   192*   TBILIRUBIN   --    --   1.2   GLUCOSE  106*  100*  97       Recent Labs      05/21/17   1218  05/21/17 2351 05/22/17   2344   RBC  2.93*  2.89*  2.65*   HEMOGLOBIN  8.5*  8.3*  7.6*   HEMATOCRIT  23.6*  23.2*  21.2*   PLATELETCT  11*  10*  27*       Recent Labs      05/21/17 1218 05/21/17 2351 05/22/17   2344   WBC  0.1*  0.2*  0.3*   ASTSGOT   --    --   59*   ALTSGPT   --    --   108*   ALKPHOSPHAT   --    --   192*   TBILIRUBIN   --    --   1.2           Assessment/Plan     * CML (chronic myelocytic leukemia)-Accelerated phase (present on admission)  Overview  No fevers overnight.   WBC increasing  Patient's WBC count slowly building up      Assessment & Plan  Has completed multiple chemotherapy cycles.     multiple relapses.    Was treated on dasatinib for a time with response, but then relapse.    Was more recently on bosutinib with response, but again recent relapse.   Pancytopenia (chronic, before starting with chemotherapy)   Protein electrophoresis: the polyclonal increase in the gamma region which may be indicative of specific immune   response or an early monoclonal protein.     Started on 5/6/17 with chemotherapy (synribo ) BID for 14 days which is completed on 5/20  Developed diarrhea as a complication which is being treated with Imodium   C, diff negative, can continue loperamide to good effect  BMT planned for the future    Pancytopenia (CMS-HCC) (present on admission)  Overview  - Pancytopenia secondary to CML and chemotherapy  - WBC count slowly tapering up.        Assessment & Plan  Transfused multiple platelet and PRBCs   Follow up and keep plt>10 and  Hb>7.5.  Started with cefepim on 5/7/17 due to fever 100.4, Urine and blood culture have been negative since, completed 7 day course  On acyclovir on 5/9 for prophylaxis.     Neutropenia (CMS-HCC) (present on admission)  Overview  Chronic   WBC:0.1  Afebrile overnight      Assessment & Plan  On acyclovir  Urine and blood culture negative  Will hold off on prophylactic levaquin and fluconazole for now due to high risk for c. Diff and elevated LFTs    Diarrhea  Overview  Stable      Assessment & Plan  Most likely related to chemo  Significantly improved on Loperamide PRN  Cdiff negative x2  monitor electrolytes and continue IV fluid.       Transaminitis (present on admission)  Overview  Related to chemo therapy. AST/ALT: 46/89>>61/111>>71/134>>80/171 >>88/169    Assessment & Plan  No abdominal pain, likely effect of chemotherapy  Discontinue all hepatotoxic drugs as possible  Continue to follow    BPH (benign prostatic hyperplasia) (present on admission)  Assessment & Plan  Stable,  Complains of some dysuria, repeat PSA and perineal exam negative for prostatitis  Continue on tamsulosin 0.4 mg daily.   Added finasteride as well    Low vitamin D level (present on admission)  Overview  Vit D: 11 on 4/29/17    Assessment & Plan  Vit D 11 on 4/29/2017   Continue supplementation.     Seizure disorder (CMS-HCC) (present on admission)  Assessment & Plan  Stable    Continue on his home dose of keppra  Had 1 episode last year after dental procedure.

## 2017-05-24 LAB
ABO GROUP BLD: NORMAL
ALBUMIN SERPL BCP-MCNC: 3.2 G/DL (ref 3.2–4.9)
ALBUMIN/GLOB SERPL: 1 G/DL
ALP SERPL-CCNC: 190 U/L (ref 30–99)
ALT SERPL-CCNC: 96 U/L (ref 2–50)
ANION GAP SERPL CALC-SCNC: 7 MMOL/L (ref 0–11.9)
AST SERPL-CCNC: 48 U/L (ref 12–45)
BARCODED ABORH UBTYP: 5100
BARCODED PRD CODE UBPRD: NORMAL
BARCODED UNIT NUM UBUNT: NORMAL
BILIRUB SERPL-MCNC: 1.3 MG/DL (ref 0.1–1.5)
BLD GP AB SCN SERPL QL: NORMAL
BUN SERPL-MCNC: 11 MG/DL (ref 8–22)
CALCIUM SERPL-MCNC: 8.5 MG/DL (ref 8.5–10.5)
CHLORIDE SERPL-SCNC: 106 MMOL/L (ref 96–112)
CO2 SERPL-SCNC: 23 MMOL/L (ref 20–33)
COMPONENT R 8504R: NORMAL
CREAT SERPL-MCNC: 0.67 MG/DL (ref 0.5–1.4)
ERYTHROCYTE [DISTWIDTH] IN BLOOD BY AUTOMATED COUNT: 37.5 FL (ref 35.9–50)
GFR SERPL CREATININE-BSD FRML MDRD: >60 ML/MIN/1.73 M 2
GLOBULIN SER CALC-MCNC: 3.2 G/DL (ref 1.9–3.5)
GLUCOSE SERPL-MCNC: 104 MG/DL (ref 65–99)
HCT VFR BLD AUTO: 20.6 % (ref 42–52)
HGB BLD-MCNC: 7.4 G/DL (ref 14–18)
MAGNESIUM SERPL-MCNC: 1.8 MG/DL (ref 1.5–2.5)
MCH RBC QN AUTO: 28.9 PG (ref 27–33)
MCHC RBC AUTO-ENTMCNC: 35.9 G/DL (ref 33.7–35.3)
MCV RBC AUTO: 80.5 FL (ref 81.4–97.8)
NEUTROPHILS # BLD AUTO: ABNORMAL K/UL (ref 1.82–7.42)
NRBC # BLD AUTO: 0 K/UL
NRBC BLD AUTO-RTO: 0 /100 WBC
PLATELET # BLD AUTO: 19 K/UL (ref 164–446)
PMV BLD AUTO: 10.3 FL (ref 9–12.9)
POTASSIUM SERPL-SCNC: 3.2 MMOL/L (ref 3.6–5.5)
PRODUCT TYPE UPROD: NORMAL
PROT SERPL-MCNC: 6.4 G/DL (ref 6–8.2)
RBC # BLD AUTO: 2.56 M/UL (ref 4.7–6.1)
RH BLD: NORMAL
SODIUM SERPL-SCNC: 136 MMOL/L (ref 135–145)
UNIT STATUS USTAT: NORMAL
WBC # BLD AUTO: 0.1 K/UL (ref 4.8–10.8)

## 2017-05-24 PROCEDURE — 86900 BLOOD TYPING SEROLOGIC ABO: CPT

## 2017-05-24 PROCEDURE — 99232 SBSQ HOSP IP/OBS MODERATE 35: CPT | Mod: GC | Performed by: INTERNAL MEDICINE

## 2017-05-24 PROCEDURE — A9270 NON-COVERED ITEM OR SERVICE: HCPCS | Performed by: INTERNAL MEDICINE

## 2017-05-24 PROCEDURE — 700102 HCHG RX REV CODE 250 W/ 637 OVERRIDE(OP): Performed by: INTERNAL MEDICINE

## 2017-05-24 PROCEDURE — P9016 RBC LEUKOCYTES REDUCED: HCPCS

## 2017-05-24 PROCEDURE — 85025 COMPLETE CBC W/AUTO DIFF WBC: CPT

## 2017-05-24 PROCEDURE — 700102 HCHG RX REV CODE 250 W/ 637 OVERRIDE(OP): Performed by: STUDENT IN AN ORGANIZED HEALTH CARE EDUCATION/TRAINING PROGRAM

## 2017-05-24 PROCEDURE — 36415 COLL VENOUS BLD VENIPUNCTURE: CPT

## 2017-05-24 PROCEDURE — 36430 TRANSFUSION BLD/BLD COMPNT: CPT

## 2017-05-24 PROCEDURE — 80053 COMPREHEN METABOLIC PANEL: CPT

## 2017-05-24 PROCEDURE — 700101 HCHG RX REV CODE 250: Performed by: STUDENT IN AN ORGANIZED HEALTH CARE EDUCATION/TRAINING PROGRAM

## 2017-05-24 PROCEDURE — A9270 NON-COVERED ITEM OR SERVICE: HCPCS | Performed by: STUDENT IN AN ORGANIZED HEALTH CARE EDUCATION/TRAINING PROGRAM

## 2017-05-24 PROCEDURE — 770009 HCHG ROOM/CARE - ONCOLOGY SEMI PRI*

## 2017-05-24 PROCEDURE — 86923 COMPATIBILITY TEST ELECTRIC: CPT

## 2017-05-24 PROCEDURE — 86901 BLOOD TYPING SEROLOGIC RH(D): CPT

## 2017-05-24 PROCEDURE — 700111 HCHG RX REV CODE 636 W/ 250 OVERRIDE (IP): Performed by: STUDENT IN AN ORGANIZED HEALTH CARE EDUCATION/TRAINING PROGRAM

## 2017-05-24 PROCEDURE — 86850 RBC ANTIBODY SCREEN: CPT

## 2017-05-24 PROCEDURE — 86644 CMV ANTIBODY: CPT

## 2017-05-24 RX ORDER — POTASSIUM CHLORIDE 1.5 G/1.58G
20 POWDER, FOR SOLUTION ORAL ONCE
Status: COMPLETED | OUTPATIENT
Start: 2017-05-24 | End: 2017-05-24

## 2017-05-24 RX ORDER — POTASSIUM CHLORIDE 750 MG/1
40 TABLET, FILM COATED, EXTENDED RELEASE ORAL 2 TIMES DAILY
Status: DISCONTINUED | OUTPATIENT
Start: 2017-05-24 | End: 2017-05-31

## 2017-05-24 RX ORDER — AMLODIPINE BESYLATE 5 MG/1
5 TABLET ORAL
Status: DISCONTINUED | OUTPATIENT
Start: 2017-05-24 | End: 2017-06-03

## 2017-05-24 RX ADMIN — TAMSULOSIN HYDROCHLORIDE 0.8 MG: 0.4 CAPSULE ORAL at 08:31

## 2017-05-24 RX ADMIN — HYDRALAZINE HYDROCHLORIDE 10 MG: 20 INJECTION INTRAMUSCULAR; INTRAVENOUS at 16:01

## 2017-05-24 RX ADMIN — NYSTATIN 500000 UNITS: 500000 SUSPENSION ORAL at 13:25

## 2017-05-24 RX ADMIN — ACYCLOVIR 400 MG: 800 TABLET ORAL at 20:36

## 2017-05-24 RX ADMIN — DIPHENHYDRAMINE HCL 25 MG: 25 TABLET ORAL at 04:29

## 2017-05-24 RX ADMIN — LIDOCAINE 1 PATCH: 50 PATCH CUTANEOUS at 08:57

## 2017-05-24 RX ADMIN — NYSTATIN 500000 UNITS: 500000 SUSPENSION ORAL at 08:39

## 2017-05-24 RX ADMIN — POTASSIUM CHLORIDE 40 MEQ: 1500 TABLET, EXTENDED RELEASE ORAL at 08:38

## 2017-05-24 RX ADMIN — FINASTERIDE 5 MG: 5 TABLET, FILM COATED ORAL at 08:39

## 2017-05-24 RX ADMIN — ACYCLOVIR 400 MG: 800 TABLET ORAL at 08:39

## 2017-05-24 RX ADMIN — AMLODIPINE BESYLATE 5 MG: 5 TABLET ORAL at 13:25

## 2017-05-24 RX ADMIN — ACETAMINOPHEN 650 MG: 325 TABLET, FILM COATED ORAL at 04:29

## 2017-05-24 RX ADMIN — LEVETIRACETAM 750 MG: 250 TABLET, FILM COATED ORAL at 20:36

## 2017-05-24 RX ADMIN — HYDRALAZINE HYDROCHLORIDE 10 MG: 20 INJECTION INTRAMUSCULAR; INTRAVENOUS at 08:34

## 2017-05-24 RX ADMIN — DIPHENOXYLATE HYDROCHLORIDE AND ATROPINE SULFATE 1 TABLET: 2.5; .025 TABLET ORAL at 10:23

## 2017-05-24 RX ADMIN — MAGNESIUM GLUCONATE 500 MG ORAL TABLET 400 MG: 500 TABLET ORAL at 08:26

## 2017-05-24 RX ADMIN — CARVEDILOL 12.5 MG: 12.5 TABLET, FILM COATED ORAL at 17:43

## 2017-05-24 RX ADMIN — CARVEDILOL 12.5 MG: 12.5 TABLET, FILM COATED ORAL at 08:26

## 2017-05-24 RX ADMIN — POTASSIUM CHLORIDE 40 MEQ: 750 TABLET, FILM COATED, EXTENDED RELEASE ORAL at 20:36

## 2017-05-24 RX ADMIN — LOPERAMIDE HYDROCHLORIDE 2 MG: 2 CAPSULE ORAL at 09:46

## 2017-05-24 RX ADMIN — POTASSIUM CHLORIDE 20 MEQ: 1.5 POWDER, FOR SOLUTION ORAL at 08:26

## 2017-05-24 RX ADMIN — LEVETIRACETAM 750 MG: 250 TABLET, FILM COATED ORAL at 08:57

## 2017-05-24 ASSESSMENT — ENCOUNTER SYMPTOMS
ORTHOPNEA: 0
ABDOMINAL PAIN: 0
CHILLS: 0
HEADACHES: 0
PALPITATIONS: 0
PHOTOPHOBIA: 0
DOUBLE VISION: 0
HEARTBURN: 0
HEMOPTYSIS: 0
MYALGIAS: 0
TREMORS: 0
NECK PAIN: 0
DIZZINESS: 0
BLURRED VISION: 0
TINGLING: 0
DEPRESSION: 0
VOMITING: 0
FEVER: 0
WEAKNESS: 1
NAUSEA: 0
DIARRHEA: 1
COUGH: 0

## 2017-05-24 ASSESSMENT — PAIN SCALES - GENERAL
PAINLEVEL_OUTOF10: 0
PAINLEVEL_OUTOF10: 0

## 2017-05-24 ASSESSMENT — LIFESTYLE VARIABLES: SUBSTANCE_ABUSE: 0

## 2017-05-24 NOTE — CARE PLAN
Problem: Pain Management  Goal: Pain level will decrease to patient’s comfort goal  Outcome: PROGRESSING AS EXPECTED  Denies pain. Will continue to monitor.     Problem: Safety  Goal: Will remain free from injury  Outcome: PROGRESSING AS EXPECTED  Patient noted as high fall risk. Patient RHONDA on. Safety protocols reviewed. Patient instructed to call for assistance, intermittently gets up without calling. Non skid socks in place. Bed in low and locked position. Call light within reach. Room near nurses station, door to remain open.     Problem: Infection  Goal: Will remain free from infection  Outcome: PROGRESSING AS EXPECTED  Protective iso maintained    Problem: Bowel/Gastric:  Goal: Normal bowel function is maintained or improved  Outcome: PROGRESSING AS EXPECTED  Diarrhea noted, anti- diarrheals available. Will continue to monitor.     Problem: Urinary Elimination:  Goal: Ability to reestablish a normal urinary elimination pattern will improve  Outcome: PROGRESSING AS EXPECTED  Frequency/urgency. Urinal at bedside. Frequent toileting. Will continue to monitor.

## 2017-05-24 NOTE — PROGRESS NOTES
Oncology/Hematology Progress Note               Author: Pop Jorgensen Date & Time created: 5/24/2017  9:42 AM   Cc:  leukemia  Interval History:  He feels good.  He has no complaints.  He just wants to get better.    Review of Systems:  Review of Systems   Constitutional: Positive for malaise/fatigue.   HENT: Negative for hearing loss.    Eyes: Negative for blurred vision, double vision and photophobia.   Respiratory: Negative for cough and hemoptysis.    Cardiovascular: Negative for chest pain, palpitations and orthopnea.   Gastrointestinal: Positive for diarrhea. Negative for heartburn, nausea and vomiting.   Genitourinary: Negative for dysuria and urgency.   Skin: Negative for itching and rash.   Neurological: Positive for weakness. Negative for dizziness, tingling, tremors and headaches.   Psychiatric/Behavioral: Negative for depression, suicidal ideas and substance abuse.       Physical Exam:  Physical Exam   Constitutional: He is oriented to person, place, and time. He appears well-developed.   HENT:   Head: Normocephalic.   Cardiovascular: Normal rate and regular rhythm.    Pulmonary/Chest: Effort normal and breath sounds normal.   Abdominal: Soft. Bowel sounds are normal. He exhibits no distension.   Musculoskeletal: He exhibits no edema.   Neurological: He is alert and oriented to person, place, and time.   Psychiatric: He has a normal mood and affect. His behavior is normal. Judgment and thought content normal.       Labs:        Invalid input(s): [id number]      Recent Labs      05/21/17   2351 05/22/17   2344 05/23/17 2352   SODIUM  139  138  136   POTASSIUM  3.4*  3.2*  3.2*   CHLORIDE  108  106  106   CO2  22  25  23   BUN  8  8  11   CREATININE  0.60  0.62  0.67   MAGNESIUM   --    --   1.8   CALCIUM  8.4*  8.4*  8.5     Recent Labs      05/21/17   2351 05/22/17   2344 05/23/17 2352   ALTSGPT   --   108*  96*   ASTSGOT   --   59*  48*   ALKPHOSPHAT   --   192*  190*   TBILIRUBIN   --    1.2  1.3   GLUCOSE  100*  97  104*     Recent Labs      17   23417   2352   RBC  2.89*  2.65*  2.56*   HEMOGLOBIN  8.3*  7.6*  7.4*   HEMATOCRIT  23.2*  21.2*  20.6*   PLATELETCT  10*  27*  19*     Recent Labs      17   235   WBC  0.2*  0.3*  0.1*   ASTSGOT   --   59*  48*   ALTSGPT   --   108*  96*   ALKPHOSPHAT   --   192*  190*   TBILIRUBIN   --   1.2  1.3     Recent Labs      17   SODIUM  139  138  136   POTASSIUM  3.4*  3.2*  3.2*   CHLORIDE  108  106  106   CO2  22  25  23   GLUCOSE  100*  97  104*   BUN  8  8  11   CREATININE  0.60  0.62  0.67   CALCIUM  8.4*  8.4*  8.5     Hemodynamics:  Temp (24hrs), Av.7 °C (98.1 °F), Min:36.4 °C (97.5 °F), Max:37.2 °C (98.9 °F)  Temperature: 37.1 °C (98.7 °F)  Pulse  Av.2  Min: 60  Max: 118   Blood Pressure : 145/60 mmHg     Respiratory:    Respiration: 16, Pulse Oximetry: 98 %     Work Of Breathing / Effort: Mild  RUL Breath Sounds: Diminished, RML Breath Sounds: Diminished, RLL Breath Sounds: Diminished, WAGNER Breath Sounds: Diminished, LLL Breath Sounds: Diminished  Fluids:    Intake/Output Summary (Last 24 hours) at 17 0004  Last data filed at 17 2300   Gross per 24 hour   Intake    360 ml   Output   1325 ml   Net   -965 ml     Weight: 68.6 kg (151 lb 3.8 oz)  GI/Nutrition:  Orders Placed This Encounter   Procedures   • Diet Order     Standing Status: Standing      Number of Occurrences: 1      Standing Expiration Date:      Order Specific Question:  Diet:     Answer:  2 Gram Sodium [7]     Order Specific Question:  Texture/Fiber modifications:     Answer:  Dysphagia 2(Pureed/Chopped)specify fluid consistency(question 6) [2]      Comments:  thins ok; pt with no teeth/ nor dentures     Medical Decision Making, by Problem:  Active Hospital Problems    Diagnosis   • *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) [D70.9]   • Pancytopenia (CMS-LTAC, located within [facility]) [D61.818]   • Diarrhea [R19.7]   • Acute bilateral low back pain without sciatica [M54.5]   • Seizure disorder (CMS-LTAC, located within [facility]) [G40.909]   • Hypertension [I10]   • BPH (benign prostatic hyperplasia) [N40.0]   • Low vitamin D level [E55.9]   • Transaminitis [R74.0]     Pmhx unchanged    Plan:  1.  CML-- Day 18:  multiple relapses, now in blast crisis.  Prior treatments included dasatinib and bosutinib.  The plan has been for allo-BMT at [facility], but needs some response to chemo before they can do the transplant, giving Synribo for this.  Synribo is given as a SQ shot BID x 14 days with the first cycle, followed by 2 weeks off.  Started on 5/6/17- completed on 5/20/17.    Synribo is a high risk medication.  There is a 's warning for cerebral bleeding, and to avoid NSAIDS, blood thinners, etc as well as to use with caution in patients with Platelets<50K.  He has a h/o of a SDH in the recent past.      2.  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  Not likely to improve unless he can get response to some kind of chemo.  Will transfuse as needed to keep >10, or higher if bleeding.    3.  Neutropenia--nothing documented in chart but as per Dr. Agustin's note, had fever late in day on 5/7.  BCx and CXR negative.    4.  Elevated LFT's.  This is trending back down.    5.  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  If recurrent pain might consider RT.    6. Anemia-- transfuse 1 unit of PRBC's if <7.5.    7.  Diarrhea.  A common side effect of Synribo. He was started on immodium on 5/10/17 with improvement. Antibiotics may have also been contributing.   Last C. Diff check on 5/13/17 negative . Resolved.  restarted again on 5/17/17. Repeat C. Diff 5/18/17 neg.  Well controlled Imodium     8.  HTN:  Primary team addressing his lytes and bp.     REC 5/24/17    --no new changes  --continue supportive measures  --answered questions.      Labs reviewed, Radiology images  reviewed and Medications reviewed

## 2017-05-24 NOTE — PROGRESS NOTES
Assumed care of patient at 1900. Patient resting in bed. POC reviewed, patient agreeable. Patient reminded to use call light before getting out of bed, patient smiled and laughed. Safety measures reviewed in depth. ISO status maintained. Will continue to monitor.

## 2017-05-24 NOTE — PROGRESS NOTES
Assumed pt care at change of shift. Labs and orders noted. 1 unit RBC s transfusion now complete. Pt tolerated well. Intermittent HTN remains; medicated PRN per MAR. No loose stools throughout NOC shift. Plan of care updated with pt and questions answered. Pt up to chair for meals with sba. Neutropenic precautions in place. Continuing hourly rounding and assessing for additional needs

## 2017-05-24 NOTE — PROGRESS NOTES
Oncology/Hematology Progress Note               Author: Pop Jorgensen Date & Time created: 5/24/2017  12:04 AM   Cc:  leukemia  Interval History:  He feels good.  He seems to always be in good spirits.    Review of Systems:  Review of Systems   Constitutional: Positive for malaise/fatigue.   HENT: Negative for hearing loss.    Eyes: Negative for blurred vision, double vision and photophobia.   Respiratory: Negative for cough and hemoptysis.    Cardiovascular: Negative for chest pain, palpitations and orthopnea.   Gastrointestinal: Negative for heartburn, nausea and vomiting.   Genitourinary: Negative for dysuria and urgency.   Skin: Negative for itching and rash.   Neurological: Positive for weakness. Negative for dizziness, tingling, tremors and headaches.   Psychiatric/Behavioral: Negative for depression, suicidal ideas and substance abuse.       Physical Exam:  Physical Exam   Constitutional: He is oriented to person, place, and time. He appears well-developed.   HENT:   Head: Normocephalic.   Cardiovascular: Normal rate and regular rhythm.    Pulmonary/Chest: Effort normal and breath sounds normal.   Abdominal: Soft. Bowel sounds are normal. He exhibits no distension.   Neurological: He is alert and oriented to person, place, and time.   Psychiatric: He has a normal mood and affect. His behavior is normal.       Labs:        Invalid input(s): [id number]      Recent Labs      05/21/17   2351  05/22/17   2344   SODIUM  139  138   POTASSIUM  3.4*  3.2*   CHLORIDE  108  106   CO2  22  25   BUN  8  8   CREATININE  0.60  0.62   CALCIUM  8.4*  8.4*     Recent Labs      05/21/17   2351  05/22/17   2344   ALTSGPT   --   108*   ASTSGOT   --   59*   ALKPHOSPHAT   --   192*   TBILIRUBIN   --   1.2   GLUCOSE  100*  97     Recent Labs      05/21/17   1218  05/21/17   2351  05/22/17   2344   RBC  2.93*  2.89*  2.65*   HEMOGLOBIN  8.5*  8.3*  7.6*   HEMATOCRIT  23.6*  23.2*  21.2*   PLATELETCT  11*  10*  27*     Recent  Labs      17   1218  17   2351  17   2344   WBC  0.1*  0.2*  0.3*   ASTSGOT   --    --   59*   ALTSGPT   --    --   108*   ALKPHOSPHAT   --    --   192*   TBILIRUBIN   --    --   1.2     Recent Labs      17   2351  17   2344   SODIUM  139  138   POTASSIUM  3.4*  3.2*   CHLORIDE  108  106   CO2  22  25   GLUCOSE  100*  97   BUN  8  8   CREATININE  0.60  0.62   CALCIUM  8.4*  8.4*     Hemodynamics:  Temp (24hrs), Av.6 °C (97.9 °F), Min:36.4 °C (97.5 °F), Max:36.9 °C (98.4 °F)  Temperature: 36.4 °C (97.6 °F)  Pulse  Av.3  Min: 60  Max: 118   Blood Pressure : 159/74 mmHg     Respiratory:    Respiration: 18, Pulse Oximetry: 96 %     Work Of Breathing / Effort: Mild  RUL Breath Sounds: Diminished, RML Breath Sounds: Diminished, RLL Breath Sounds: Diminished, WAGNER Breath Sounds: Diminished, LLL Breath Sounds: Diminished  Fluids:    Intake/Output Summary (Last 24 hours) at 17 0004  Last data filed at 17 2300   Gross per 24 hour   Intake    360 ml   Output   1325 ml   Net   -965 ml     Weight: 68.6 kg (151 lb 3.8 oz)  GI/Nutrition:  Orders Placed This Encounter   Procedures   • Diet Order     Standing Status: Standing      Number of Occurrences: 1      Standing Expiration Date:      Order Specific Question:  Diet:     Answer:  2 Gram Sodium [7]     Order Specific Question:  Texture/Fiber modifications:     Answer:  Dysphagia 2(Pureed/Chopped)specify fluid consistency(question 6) [2]      Comments:  thins ok; pt with no teeth/ nor dentures     Medical Decision Making, by Problem:  Active Hospital Problems    Diagnosis   • *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]   • Neutropenia (CMS-HCC) [D70.9]   • Pancytopenia (CMS-HCC) [D61.818]   • Diarrhea [R19.7]   • Acute bilateral low back pain without sciatica [M54.5]   • Seizure disorder (CMS-HCC) [G40.909]   • Hypertension [I10]   • BPH (benign prostatic hyperplasia) [N40.0]   • Low vitamin D level [E55.9]   •  Transaminitis [R74.0]     Pmhx unchanged    Plan:  1.  CML-- Day 17:  multiple relapses, now in blast crisis.  Prior treatments included dasatinib and bosutinib.  The plan has been for allo-BMT at [facility], but needs some response to chemo before they can do the transplant, giving Synribo for this.  Synribo is given as a SQ shot BID x 14 days with the first cycle, followed by 2 weeks off.  Started on 5/6/17- completed on 5/20/17.    Synribo is a high risk medication.  There is a 's warning for cerebral bleeding, and to avoid NSAIDS, blood thinners, etc as well as to use with caution in patients with Platelets<50K.  He has a h/o of a SDH in the recent past.      2.  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  Not likely to improve unless he can get response to some kind of chemo.  Will transfuse as needed to keep >10, or higher if bleeding.    3.  Neutropenia--nothing documented in chart but as per Dr. Agustin's note, had fever late in day on 5/7.  BCx and CXR negative.    4.  Elevated LFT's.  His labs is stable.      5.  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  If recurrent pain might consider RT.    6. Anemia-- transfuse 1 unit of PRBC's if <7.5.    7.  Diarrhea.  A common side effect of Synribo. He was started on immodium on 5/10/17 with improvement. Antibiotics may have also been contributing.   Last C. Diff check on 5/13/17 negative . Resolved.  restarted again on 5/17/17. Repeat C. Diff 5/18/17 neg.  Well controlled Imodium      REC 5/23/17    --no new changes  --continue supportive measures      Labs reviewed, Radiology images reviewed and Medications reviewed

## 2017-05-24 NOTE — CARE PLAN
Problem: Safety  Goal: Will remain free from injury  Outcome: PROGRESSING AS EXPECTED  Bed locked and in lowest position. Call light and belongings within reach. Safety strip in place and sounding; pt reminded to call before getting oob, as he seldomly does    Problem: Skin Integrity  Goal: Risk for impaired skin integrity will decrease  Intervention: Assess risk factors for impaired skin integrity and/or pressure ulcers  Pt up in chair and to bathroom frequently throughout the day. Skin assessment complete. No areas of breakdown noted      Problem: Urinary Elimination:  Goal: Ability to reestablish a normal urinary elimination pattern will improve  Outcome: PROGRESSING AS EXPECTED  Pt voiding in urinal w/out difficulty. No episodes of incontinence alst 24 hrs

## 2017-05-24 NOTE — ASSESSMENT & PLAN NOTE
BP elevated  Amlodipine restarted with 10 mg, carvedilol increased back to 12.5 mg BID yesterday  Lisinopril 10 mg added today  Hydralazine on board

## 2017-05-24 NOTE — PROGRESS NOTES
TECH from Lab called with critical result of WBC 0.1 PLT 19 at 0100. Critical lab result read back to TECH.   This critical lab result is within parameters established by [facility] for this patient

## 2017-05-24 NOTE — PROGRESS NOTES
[facility] Internal Medicine Interval Note    Name Benjamin Post       1945   Age/Sex 71 y.o. male   MRN [id number]   Code Status DNR     After 5PM or if no immediate response to page, please call for cross-coverage  Attending/Team: Dr. Santiago  Call [phone] to page   1st Call - Day Intern (R1):   Dr Cowart 2nd Call - Day Sr. Resident (R2/R3):            Chief complaint/ reason for interval visit (Primary Diagnosis)   CML/pancytopenia     Interval Problem Daily Status Update  :  Principal Problem:    CML (chronic myelocytic leukemia)-Accelerated phase (Chronic) POA: Yes      Overview: No fevers overnight.       WBC increasing      Patient's WBC count slowly building up        Active Problems:    Pancytopenia (CMS-HCC) (Chronic) POA: Yes      Overview: - Pancytopenia secondary to CML and chemotherapy      - WBC count slowly tapering up.                Neutropenia (CMS-HCC) POA: Yes      Overview: Chronic       WBC:0.1      Afebrile overnight          Acute bilateral low back pain without sciatica POA: Yes    Diarrhea POA: No      Overview: Stable          Seizure disorder (CMS-HCC) POA: Yes    Transaminitis POA: Yes      Overview: Related to chemo therapy. AST/ALT: 46/89>>61/111>>71/134>>80/171 >>88/169    BPH (benign prostatic hyperplasia) (Chronic) POA: Yes      Overview: On flomax    Low vitamin D level (Chronic) POA: Yes      Overview: Vit D: 11 on 17    Hypertension POA: Unknown  Resolved Problems:    Bone pain POA: Yes      Overview: Stable      Negative PE for tenderness over the rib cage.    Elevated PSA (Chronic) POA: Yes      Overview: PSA: 8.4 on 17  >>>>>4 on     Dysuria POA: Unknown      Overview: Stable           Abdominal pain POA: No      Overview: Resolved.           Review of Systems   Constitutional: Negative for fever and chills.   HENT: Negative for hearing loss.    Respiratory: Negative for  cough and hemoptysis.    Cardiovascular: Negative for chest pain and palpitations.   Gastrointestinal: Positive for diarrhea. Negative for heartburn, nausea and abdominal pain.        Stable diarrhea    Genitourinary: Negative for dysuria and urgency.   Musculoskeletal: Negative for myalgias and neck pain.   Skin: Negative for rash.   Neurological: Negative for dizziness and headaches.   Psychiatric/Behavioral: Negative for depression and suicidal ideas.       Consultants/Specialty  Oncology - [facility]    Disposition  Remain inpatient for chemotherapy monitoring    Quality Measures  EKG reviewed, Labs reviewed, Medications reviewed and Radiology images reviewed  Mahajan catheter: No Mahajan      DVT Prophylaxis: Contraindicated - High bleeding risk  DVT prophylaxis - mechanical: SCDs (low plt)                Physical Exam       Filed Vitals:    05/24/17 0913 05/24/17 1153 05/24/17 1331 05/24/17 1449   BP: 145/60 167/81 176/84 144/74   Pulse: 86 80 70 92   Temp: 37.1 °C (98.7 °F) 36.4 °C (97.5 °F) 36.3 °C (97.3 °F) 36.5 °C (97.7 °F)   Resp: 16 18 16 18   Height:       Weight:       SpO2: 98% 99% 96% 98%     Body mass index is 23.68 kg/(m^2).    Oxygen Therapy:  Pulse Oximetry: 98 %, O2 (LPM): 0, O2 Delivery: None (Room Air)    Physical Exam   Constitutional: He is well-developed, well-nourished, and in no distress. No distress.   HENT:   Head: Normocephalic.   Neck: No tracheal deviation present. No thyromegaly present.   Cardiovascular: Normal rate.  Exam reveals no friction rub.    Murmur heard.  Pansystolic murmur on the apex    Pulmonary/Chest: Effort normal. No respiratory distress. He has no wheezes.   Abdominal: Soft. He exhibits no distension. There is no tenderness.   Musculoskeletal: He exhibits no edema.   Neurological: He is alert.   Skin: No erythema.         Lab Data Review:      5/4/2017  1:28 PM    Recent Labs      05/21/17   2351  05/22/17   2344  05/23/17   2352   SODIUM  139  138  136    POTASSIUM  3.4*  3.2*  3.2*   CHLORIDE  108  106  106   CO2  22  25  23   BUN  8  8  11   CREATININE  0.60  0.62  0.67   MAGNESIUM   --    --   1.8   CALCIUM  8.4*  8.4*  8.5       Recent Labs      05/21/17 2351 05/22/17 2344 05/23/17 2352   ALTSGPT   --   108*  96*   ASTSGOT   --   59*  48*   ALKPHOSPHAT   --   192*  190*   TBILIRUBIN   --   1.2  1.3   GLUCOSE  100*  97  104*       Recent Labs      05/21/17 2351 05/22/17 2344 05/23/17 2352   RBC  2.89*  2.65*  2.56*   HEMOGLOBIN  8.3*  7.6*  7.4*   HEMATOCRIT  23.2*  21.2*  20.6*   PLATELETCT  10*  27*  19*       Recent Labs      05/21/17 2351 05/22/17 2344 05/23/17 2352   WBC  0.2*  0.3*  0.1*   ASTSGOT   --   59*  48*   ALTSGPT   --   108*  96*   ALKPHOSPHAT   --   192*  190*   TBILIRUBIN   --   1.2  1.3           Assessment/Plan     * CML (chronic myelocytic leukemia)-Accelerated phase (present on admission)  Overview  No fevers overnight.   WBC lowering down to 0.1 again.     Assessment & Plan  Has completed multiple chemotherapy cycles.     multiple relapses.    Was treated on dasatinib for a time with response, but then relapse.    Was more recently on bosutinib with response, but again recent relapse.   Pancytopenia (chronic, before starting with chemotherapy)   Protein electrophoresis: the polyclonal increase in the gamma region which may be indicative of specific immune   response or an early monoclonal protein.     Started on 5/6/17 with chemotherapy (synribo ) BID for 14 days which is completed on 5/20  Developed diarrhea as a complication which is being treated with Imodium   C, diff negative, can continue loperamide to good effect  BMT planned for the future    Pancytopenia (CMS-HCC) (present on admission)  Overview  - Pancytopenia secondary to CML and chemotherapy  - WBC count slowly tapering up.        Assessment & Plan  Transfused multiple platelet and PRBCs   Follow up and keep plt>10 and Hb>7.5.  Started with cefepim on  5/7/17 due to fever 100.4, Urine and blood culture have been negative since, completed 7 day course  On acyclovir on 5/9 for prophylaxis.     Neutropenia (CMS-HCC) (present on admission)  Overview  Chronic   WBC:0.1  Afebrile overnight      Assessment & Plan  On acyclovir  Urine and blood culture negative  Will hold off on prophylactic levaquin and fluconazole for now due to high risk for c. Diff and elevated LFTs    Diarrhea  Overview  Stable      Assessment & Plan  Most likely related to chemo  Significantly improved on Loperamide PRN  Cdiff negative x2  monitor electrolytes and continue IV fluid.       Transaminitis (present on admission)  Overview  Related to chemo therapy. AST/ALT: 46/89>>61/111>>71/134>>80/171 >>88/169    Assessment & Plan  No abdominal pain, likely effect of chemotherapy  Discontinue all hepatotoxic drugs as possible  Continue to follow    BPH (benign prostatic hyperplasia) (present on admission)  Assessment & Plan  Stable,  Complains of some dysuria, repeat PSA and perineal exam negative for prostatitis  Continue on tamsulosin 0.8 mg daily.   Added finasteride as well    Low vitamin D level (present on admission)  Overview  Vit D: 11 on 4/29/17    Assessment & Plan  Vit D 11 on 4/29/2017   Continue supplementation.     Hypertension  Assessment & Plan  On Coreg  Norvasc 5 added on 5/24 for better BP control.   PRN hydralazine 10 mg onboard.      Seizure disorder (CMS-HCC) (present on admission)  Assessment & Plan  Stable    Continue on his home dose of keppra  Had 1 episode last year after dental procedure.

## 2017-05-24 NOTE — THERAPY
PT tx attempted; pt reporting he was too tired and wanted to attempt eating his lunch. Will re-attempt as able.

## 2017-05-24 NOTE — PROGRESS NOTES
Pt with repeated HTN episode. [facility] team paged as too soon to administer another Hydralazine dose and no other PRNs available. Awaiting call back    Call back received; order for Norvasc received and administered

## 2017-05-25 ENCOUNTER — APPOINTMENT (OUTPATIENT)
Dept: RADIOLOGY | Facility: MEDICAL CENTER | Age: 72
DRG: 808 | End: 2017-05-25
Attending: INTERNAL MEDICINE
Payer: MEDICARE

## 2017-05-25 LAB
ALBUMIN SERPL BCP-MCNC: 3.5 G/DL (ref 3.2–4.9)
ALBUMIN/GLOB SERPL: 1.1 G/DL
ALP SERPL-CCNC: 204 U/L (ref 30–99)
ALT SERPL-CCNC: 93 U/L (ref 2–50)
ANION GAP SERPL CALC-SCNC: 7 MMOL/L (ref 0–11.9)
APPEARANCE UR: CLEAR
AST SERPL-CCNC: 52 U/L (ref 12–45)
BARCODED ABORH UBTYP: 5100
BARCODED PRD CODE UBPRD: NORMAL
BARCODED UNIT NUM UBUNT: NORMAL
BILIRUB SERPL-MCNC: 1.3 MG/DL (ref 0.1–1.5)
BILIRUB UR QL STRIP.AUTO: NEGATIVE
BUN SERPL-MCNC: 13 MG/DL (ref 8–22)
CALCIUM SERPL-MCNC: 8.6 MG/DL (ref 8.5–10.5)
CHLORIDE SERPL-SCNC: 107 MMOL/L (ref 96–112)
CO2 SERPL-SCNC: 22 MMOL/L (ref 20–33)
COLOR UR: NORMAL
COMPONENT P 8504P: NORMAL
COMPONENT P 8504P: NORMAL
CREAT SERPL-MCNC: 0.72 MG/DL (ref 0.5–1.4)
ERYTHROCYTE [DISTWIDTH] IN BLOOD BY AUTOMATED COUNT: 37.9 FL (ref 35.9–50)
ERYTHROCYTE [DISTWIDTH] IN BLOOD BY AUTOMATED COUNT: 38.9 FL (ref 35.9–50)
GFR SERPL CREATININE-BSD FRML MDRD: >60 ML/MIN/1.73 M 2
GLOBULIN SER CALC-MCNC: 3.1 G/DL (ref 1.9–3.5)
GLUCOSE SERPL-MCNC: 98 MG/DL (ref 65–99)
GLUCOSE UR STRIP.AUTO-MCNC: NEGATIVE MG/DL
HCT VFR BLD AUTO: 24.6 % (ref 42–52)
HCT VFR BLD AUTO: 26.8 % (ref 42–52)
HGB BLD-MCNC: 8.6 G/DL (ref 14–18)
HGB BLD-MCNC: 9.5 G/DL (ref 14–18)
INR PPP: 1.12 (ref 0.87–1.13)
KETONES UR STRIP.AUTO-MCNC: NEGATIVE MG/DL
LACTATE BLD-SCNC: 1 MMOL/L (ref 0.5–2)
LEUKOCYTE ESTERASE UR QL STRIP.AUTO: NEGATIVE
MCH RBC QN AUTO: 28.6 PG (ref 27–33)
MCH RBC QN AUTO: 28.8 PG (ref 27–33)
MCHC RBC AUTO-ENTMCNC: 35 G/DL (ref 33.7–35.3)
MCHC RBC AUTO-ENTMCNC: 35.4 G/DL (ref 33.7–35.3)
MCV RBC AUTO: 81.2 FL (ref 81.4–97.8)
MCV RBC AUTO: 81.7 FL (ref 81.4–97.8)
MICRO URNS: NORMAL
NEUTROPHILS # BLD AUTO: ABNORMAL K/UL (ref 1.82–7.42)
NITRITE UR QL STRIP.AUTO: NEGATIVE
NRBC # BLD AUTO: 0 K/UL
NRBC # BLD AUTO: ABNORMAL K/UL
NRBC BLD AUTO-RTO: 0 /100 WBC
NRBC BLD AUTO-RTO: 0 /100 WBC
PH UR STRIP.AUTO: 7.5 [PH]
PLATELET # BLD AUTO: 13 K/UL (ref 164–446)
PLATELET # BLD AUTO: 9 K/UL (ref 164–446)
PMV BLD AUTO: 9.5 FL (ref 9–12.9)
PMV BLD AUTO: 9.6 FL (ref 9–12.9)
POTASSIUM SERPL-SCNC: 4.2 MMOL/L (ref 3.6–5.5)
PRODUCT TYPE UPROD: NORMAL
PROT SERPL-MCNC: 6.6 G/DL (ref 6–8.2)
PROT UR QL STRIP: NEGATIVE MG/DL
PROTHROMBIN TIME: 14.8 SEC (ref 12–14.6)
RBC # BLD AUTO: 3.01 M/UL (ref 4.7–6.1)
RBC # BLD AUTO: 3.3 M/UL (ref 4.7–6.1)
RBC UR QL AUTO: NEGATIVE
SODIUM SERPL-SCNC: 136 MMOL/L (ref 135–145)
SP GR UR STRIP.AUTO: 1.01
UNIT STATUS USTAT: NORMAL
WBC # BLD AUTO: 0.1 K/UL (ref 4.8–10.8)
WBC # BLD AUTO: 0.1 K/UL (ref 4.8–10.8)

## 2017-05-25 PROCEDURE — A9270 NON-COVERED ITEM OR SERVICE: HCPCS | Performed by: INTERNAL MEDICINE

## 2017-05-25 PROCEDURE — 700102 HCHG RX REV CODE 250 W/ 637 OVERRIDE(OP): Performed by: INTERNAL MEDICINE

## 2017-05-25 PROCEDURE — 99232 SBSQ HOSP IP/OBS MODERATE 35: CPT | Mod: GC | Performed by: INTERNAL MEDICINE

## 2017-05-25 PROCEDURE — 83605 ASSAY OF LACTIC ACID: CPT

## 2017-05-25 PROCEDURE — 87186 SC STD MICRODIL/AGAR DIL: CPT

## 2017-05-25 PROCEDURE — 700111 HCHG RX REV CODE 636 W/ 250 OVERRIDE (IP): Performed by: STUDENT IN AN ORGANIZED HEALTH CARE EDUCATION/TRAINING PROGRAM

## 2017-05-25 PROCEDURE — A9270 NON-COVERED ITEM OR SERVICE: HCPCS | Performed by: STUDENT IN AN ORGANIZED HEALTH CARE EDUCATION/TRAINING PROGRAM

## 2017-05-25 PROCEDURE — 85025 COMPLETE CBC W/AUTO DIFF WBC: CPT

## 2017-05-25 PROCEDURE — 81003 URINALYSIS AUTO W/O SCOPE: CPT

## 2017-05-25 PROCEDURE — 71010 DX-CHEST-PORTABLE (1 VIEW): CPT

## 2017-05-25 PROCEDURE — 700111 HCHG RX REV CODE 636 W/ 250 OVERRIDE (IP): Performed by: INTERNAL MEDICINE

## 2017-05-25 PROCEDURE — 700111 HCHG RX REV CODE 636 W/ 250 OVERRIDE (IP): Performed by: PHARMACIST

## 2017-05-25 PROCEDURE — 36415 COLL VENOUS BLD VENIPUNCTURE: CPT

## 2017-05-25 PROCEDURE — 87077 CULTURE AEROBIC IDENTIFY: CPT

## 2017-05-25 PROCEDURE — 770009 HCHG ROOM/CARE - ONCOLOGY SEMI PRI*

## 2017-05-25 PROCEDURE — 700105 HCHG RX REV CODE 258: Performed by: INTERNAL MEDICINE

## 2017-05-25 PROCEDURE — 700102 HCHG RX REV CODE 250 W/ 637 OVERRIDE(OP): Performed by: STUDENT IN AN ORGANIZED HEALTH CARE EDUCATION/TRAINING PROGRAM

## 2017-05-25 PROCEDURE — 87040 BLOOD CULTURE FOR BACTERIA: CPT

## 2017-05-25 PROCEDURE — 700105 HCHG RX REV CODE 258: Performed by: PHARMACIST

## 2017-05-25 PROCEDURE — 85610 PROTHROMBIN TIME: CPT

## 2017-05-25 RX ORDER — ACETAMINOPHEN 325 MG/1
650 TABLET ORAL EVERY 4 HOURS PRN
Status: DISCONTINUED | OUTPATIENT
Start: 2017-05-25 | End: 2017-06-15 | Stop reason: HOSPADM

## 2017-05-25 RX ORDER — METRONIDAZOLE 500 MG/1
500 TABLET ORAL EVERY 8 HOURS
Status: DISCONTINUED | OUTPATIENT
Start: 2017-05-25 | End: 2017-05-28

## 2017-05-25 RX ORDER — ACETAMINOPHEN 325 MG/1
650 TABLET ORAL EVERY 6 HOURS PRN
Status: DISCONTINUED | OUTPATIENT
Start: 2017-05-25 | End: 2017-06-15 | Stop reason: HOSPADM

## 2017-05-25 RX ADMIN — MAGNESIUM GLUCONATE 500 MG ORAL TABLET 400 MG: 500 TABLET ORAL at 17:16

## 2017-05-25 RX ADMIN — ACYCLOVIR 400 MG: 800 TABLET ORAL at 21:00

## 2017-05-25 RX ADMIN — CEFEPIME 2 G: 2 INJECTION, POWDER, FOR SOLUTION INTRAMUSCULAR; INTRAVENOUS at 19:22

## 2017-05-25 RX ADMIN — POTASSIUM CHLORIDE 40 MEQ: 750 TABLET, FILM COATED, EXTENDED RELEASE ORAL at 21:00

## 2017-05-25 RX ADMIN — POTASSIUM CHLORIDE 40 MEQ: 750 TABLET, FILM COATED, EXTENDED RELEASE ORAL at 08:03

## 2017-05-25 RX ADMIN — LEVETIRACETAM 750 MG: 250 TABLET, FILM COATED ORAL at 21:00

## 2017-05-25 RX ADMIN — DIPHENHYDRAMINE HCL 25 MG: 25 TABLET ORAL at 21:00

## 2017-05-25 RX ADMIN — METRONIDAZOLE 500 MG: 500 TABLET ORAL at 23:44

## 2017-05-25 RX ADMIN — FINASTERIDE 5 MG: 5 TABLET, FILM COATED ORAL at 08:03

## 2017-05-25 RX ADMIN — ACYCLOVIR 400 MG: 800 TABLET ORAL at 08:03

## 2017-05-25 RX ADMIN — VANCOMYCIN HYDROCHLORIDE 1700 MG: 100 INJECTION, POWDER, LYOPHILIZED, FOR SOLUTION INTRAVENOUS at 23:55

## 2017-05-25 RX ADMIN — CARVEDILOL 12.5 MG: 12.5 TABLET, FILM COATED ORAL at 08:03

## 2017-05-25 RX ADMIN — ACETAMINOPHEN 650 MG: 325 TABLET, FILM COATED ORAL at 17:16

## 2017-05-25 RX ADMIN — NYSTATIN 500000 UNITS: 500000 SUSPENSION ORAL at 08:03

## 2017-05-25 RX ADMIN — AMLODIPINE BESYLATE 5 MG: 5 TABLET ORAL at 08:03

## 2017-05-25 RX ADMIN — HYDRALAZINE HYDROCHLORIDE 10 MG: 20 INJECTION INTRAMUSCULAR; INTRAVENOUS at 09:32

## 2017-05-25 RX ADMIN — LOPERAMIDE HYDROCHLORIDE 2 MG: 2 CAPSULE ORAL at 09:25

## 2017-05-25 RX ADMIN — ACETAMINOPHEN 650 MG: 325 TABLET, FILM COATED ORAL at 22:55

## 2017-05-25 RX ADMIN — TAMSULOSIN HYDROCHLORIDE 0.8 MG: 0.4 CAPSULE ORAL at 08:03

## 2017-05-25 RX ADMIN — LEVETIRACETAM 750 MG: 250 TABLET, FILM COATED ORAL at 08:03

## 2017-05-25 RX ADMIN — CARVEDILOL 12.5 MG: 12.5 TABLET, FILM COATED ORAL at 17:16

## 2017-05-25 ASSESSMENT — ENCOUNTER SYMPTOMS
NAUSEA: 0
DIZZINESS: 0
DIARRHEA: 1
WEAKNESS: 1
HEMOPTYSIS: 0
TINGLING: 0
PALPITATIONS: 0
ORTHOPNEA: 0
VOMITING: 0
COUGH: 0
BLURRED VISION: 0
MYALGIAS: 0
SENSORY CHANGE: 0
HEADACHES: 0
TREMORS: 0
CONSTIPATION: 0
NECK PAIN: 0
CHILLS: 0
FEVER: 0
HEARTBURN: 0
DEPRESSION: 0
SHORTNESS OF BREATH: 0
DOUBLE VISION: 0
PHOTOPHOBIA: 0
ABDOMINAL PAIN: 0

## 2017-05-25 ASSESSMENT — LIFESTYLE VARIABLES: SUBSTANCE_ABUSE: 0

## 2017-05-25 ASSESSMENT — PAIN SCALES - GENERAL
PAINLEVEL_OUTOF10: 0
PAINLEVEL_OUTOF10: 0

## 2017-05-25 NOTE — CARE PLAN
Problem: Safety  Goal: Will remain free from injury  Outcome: PROGRESSING AS EXPECTED  Bed alarm on, bed in lowest position, call light and personal belongings within reach, educated to call if in need of assistance, non skid socks, room near nurses station    Problem: Urinary Elimination:  Goal: Ability to reestablish a normal urinary elimination pattern will improve  Outcome: PROGRESSING AS EXPECTED  Bedside urinal

## 2017-05-25 NOTE — PROGRESS NOTES
Assumed care of pt at 0650. Assessment completed. Pt awake, alert and oriented x4. Pt is up SBA. Denies pain. Patient has had 3 episodes of loose stools this shift.  Understands plan of care. All needs met, bed in lowest locked position, call light within reach. Bed alarm on. Hourly rounding in place.

## 2017-05-25 NOTE — THERAPY
Pt refused again today and stated he would participate tomorrow. RN states that the pt hs been getting out of bed and moving around. Will reattempt as able.

## 2017-05-25 NOTE — PROGRESS NOTES
[facility] [place] [facility] Interval Note    Name Benjamin Post       1945   Age/Sex 71 y.o. male   MRN [id number]   Code Status DNR     After 5PM or if no immediate response to page, please call for cross-coverage  Attending/Team: Dr. Santiago  Call [phone] to page   1st Call - Day Intern (R1):   Dr Cowart 2nd Call - Day Sr. Resident (R2/R3):            Chief complaint/ reason for interval visit (Primary Diagnosis)   CML/pancytopenia     Interval Problem Daily Status Update  :  Principal Problem:    CML (chronic myelocytic leukemia)-Accelerated phase (Chronic) POA: Yes      Overview: No fevers overnight.       WBC 0.1. Patient may need more chemotherapy as per Dr. Jorgensen.   Active Problems:    Pancytopenia (CMS-HCC) (Chronic) POA: Yes      Overview: - Pancytopenia secondary to CML and chemotherapy                      Neutropenia (CMS-HCC) POA: Yes      Overview: Chronic       WBC:0.1      Afebrile overnight          Acute bilateral low back pain without sciatica POA: Yes    Diarrhea POA: No      Overview: Stable. ~2-5 loose BMs/day          Seizure disorder (CMS-HCC) POA: Yes    Transaminitis POA: Yes      Overview: Related to chemo therapy. AST/ALT: 46/89>>61/111>>71/134>>80/171 >>88/169    BPH (benign prostatic hyperplasia) (Chronic) POA: Yes      Overview: On flomax    Low vitamin D level (Chronic) POA: Yes      Overview: Vit D: 11 on 17    Hypertension POA: Unknown  Resolved Problems:    Bone pain POA: Yes      Overview: Stable      Negative PE for tenderness over the rib cage.    Elevated PSA (Chronic) POA: Yes      Overview: PSA: 8.4 on 17  >>>>>4 on     Dysuria POA: Unknown      Overview: Stable           Abdominal pain POA: No      Overview: Resolved.           Review of Systems   Constitutional: Negative for fever and chills.   HENT: Negative for hearing loss.    Respiratory: Negative for cough and  hemoptysis.    Cardiovascular: Negative for chest pain and palpitations.   Gastrointestinal: Positive for diarrhea. Negative for heartburn, nausea and abdominal pain.        Stable diarrhea    Genitourinary: Negative for dysuria and urgency.   Musculoskeletal: Negative for myalgias and neck pain.   Skin: Negative for rash.   Neurological: Negative for dizziness and headaches.   Psychiatric/Behavioral: Negative for depression and suicidal ideas.       Consultants/Specialty  Oncology - Van Meter/Mak    Disposition  Remain inpatient for chemotherapy monitoring    Quality Measures  EKG reviewed, Labs reviewed, Medications reviewed and Radiology images reviewed  Mahajan catheter: No Mahajan      DVT Prophylaxis: Contraindicated - High bleeding risk  DVT prophylaxis - mechanical: SCDs (low plt)                Physical Exam       Filed Vitals:    05/25/17 0000 05/25/17 0500 05/25/17 0800 05/25/17 1200   BP: 158/80 166/82 177/81 138/66   Pulse: 89 84 77 77   Temp: 36.8 °C (98.3 °F) 37.1 °C (98.8 °F) 36.6 °C (97.9 °F) 36.8 °C (98.2 °F)   Resp: 16 16 16 17   Height:       Weight:       SpO2: 99% 94% 98% 98%     Body mass index is 23.2 kg/(m^2). Weight: 67.2 kg (148 lb 2.4 oz)  Oxygen Therapy:  Pulse Oximetry: 98 %, O2 (LPM): 0, O2 Delivery: None (Room Air)    Physical Exam   Constitutional: He is well-developed, well-nourished, and in no distress. No distress.   HENT:   Head: Normocephalic.   Neck: No tracheal deviation present. No thyromegaly present.   Cardiovascular: Normal rate.  Exam reveals no friction rub.    Murmur heard.  Pansystolic murmur on the apex    Pulmonary/Chest: Effort normal. No respiratory distress. He has no wheezes.   Abdominal: Soft. He exhibits no distension. There is no tenderness.   Musculoskeletal: He exhibits no edema.   Neurological: He is alert.   Skin: No erythema.         Lab Data Review:      5/4/2017  1:28 PM    Recent Labs      05/22/17   2344  05/23/17   2352  05/24/17   2339   SODIUM  138   136  136   POTASSIUM  3.2*  3.2*  4.2   CHLORIDE  106  106  107   CO2  25  23  22   BUN  8  11  13   CREATININE  0.62  0.67  0.72   MAGNESIUM   --   1.8   --    CALCIUM  8.4*  8.5  8.6       Recent Labs      05/22/17 2344 05/23/17 2352 05/24/17   2339   ALTSGPT  108*  96*  93*   ASTSGOT  59*  48*  52*   ALKPHOSPHAT  192*  190*  204*   TBILIRUBIN  1.2  1.3  1.3   GLUCOSE  97  104*  98       Recent Labs      05/22/17 2344 05/23/17 2352 05/24/17   2339   RBC  2.65*  2.56*  3.01*   HEMOGLOBIN  7.6*  7.4*  8.6*   HEMATOCRIT  21.2*  20.6*  24.6*   PLATELETCT  27*  19*  13*       Recent Labs      05/22/17 2344 05/23/17 2352 05/24/17   2339   WBC  0.3*  0.1*  0.1*   ASTSGOT  59*  48*  52*   ALTSGPT  108*  96*  93*   ALKPHOSPHAT  192*  190*  204*   TBILIRUBIN  1.2  1.3  1.3           Assessment/Plan     * CML (chronic myelocytic leukemia)-Accelerated phase (present on admission)  Overview  No fevers overnight.   WBC 0.1. Patient may need more chemotherapy as per Dr. Jorgensen.     Assessment & Plan  Has completed multiple chemotherapy cycles.     multiple relapses.    Was treated on dasatinib for a time with response, but then relapse.    Was more recently on bosutinib with response, but again recent relapse.   Pancytopenia (chronic, before starting with chemotherapy)   Protein electrophoresis: the polyclonal increase in the gamma region which may be indicative of specific immune   response or an early monoclonal protein.     Started on 5/6/17 with chemotherapy (synribo ) BID for 14 days which is completed on 5/20  Developed diarrhea as a complication which is being treated with Imodium   C, diff negative, can continue loperamide to good effect  BMT planned for the future    Pancytopenia (CMS-HCC) (present on admission)  Overview  - Pancytopenia secondary to CML and chemotherapy          Assessment & Plan  Transfused multiple platelet and PRBCs   Follow up and keep plt>10 and Hb>7.5.  Started with cefepim on  5/7/17 due to fever 100.4, Urine and blood culture have been negative since, completed 7 day course  On acyclovir on 5/9 for prophylaxis.     Neutropenia (CMS-HCC) (present on admission)  Overview  Chronic   WBC:0.1  Afebrile overnight      Assessment & Plan  On acyclovir  Urine and blood culture negative  Will hold off on prophylactic levaquin and fluconazole for now due to high risk for c. Diff and elevated LFTs    Diarrhea  Overview  Stable. ~2-5 loose BMs/day      Assessment & Plan  Most likely related to chemo  Significantly improved on Loperamide PRN  Cdiff negative x2  monitor electrolytes and continue IV fluid.       Transaminitis (present on admission)  Overview  Related to chemo therapy. AST/ALT: 46/89>>61/111>>71/134>>80/171 >>88/169    Assessment & Plan  No abdominal pain, likely effect of chemotherapy  Discontinue all hepatotoxic drugs as possible  Continue to follow    BPH (benign prostatic hyperplasia) (present on admission)  Assessment & Plan  Stable,  Complains of some dysuria, repeat PSA and perineal exam negative for prostatitis  Continue on tamsulosin 0.8 mg daily.   Added finasteride as well    Low vitamin D level (present on admission)  Overview  Vit D: 11 on 4/29/17    Assessment & Plan  Vit D 11 on 4/29/2017   Continue supplementation.     Hypertension  Assessment & Plan  On Coreg  Norvasc 5 added on 5/24 for better BP control.   PRN hydralazine 10 mg onboard.      Seizure disorder (CMS-HCC) (present on admission)  Assessment & Plan  Stable    Continue on his home dose of keppra  Had 1 episode last year after dental procedure.

## 2017-05-25 NOTE — PROGRESS NOTES
[facility] [place] [facility] Interval Note    Name Benjamin Post       1945   Age/Sex 71 y.o. male   MRN [id number]   Code Status DNR     After 5PM or if no immediate response to page, please call for cross-coverage  Attending/Team: Pamela/rudolph Call [phone] to page   1st Call - Day Intern (R1):   Dr. Cowart 2nd Call - Day Sr. Resident (R2/R3):   Dr. Conley         Chief complaint/ reason for interval visit (Primary Diagnosis)   CML w/ pancytopenia    Interval Problem Daily Status Update      Patient had no acute events overnight. He has had no pain throughout his body, including no urinary complaints. He states that his diarrhea has decreased, which is also confirmed by his nurse. His blood pressure remains elevated. He denies fever, chills, chest pain, SOB, abdominal pain, urinary complaints, muscle pain, or changes in sensory/motor function.     Significant labs: WBC-->0.1, Hgb 8.6, plt: 13, AST: 52, ALT: 93, Alk Phos: 204.   No interval imaging.   Principal Problem:    CML (chronic myelocytic leukemia)-Accelerated phase (Chronic) POA: Yes      Overview: No fevers overnight.       WBC 0.1. Patient may need more chemotherapy as per Dr. Jorgensen.   Active Problems:    Pancytopenia (CMS-HCC) (Chronic) POA: Yes      Overview: - Pancytopenia secondary to CML and chemotherapy                      Neutropenia (CMS-HCC) POA: Yes      Overview: Chronic       WBC:0.1      Afebrile overnight          Acute bilateral low back pain without sciatica POA: Yes    Diarrhea POA: No      Overview: Stable. ~2-5 loose BMs/day          Seizure disorder (CMS-HCC) POA: Yes    Transaminitis POA: Yes      Overview: Related to chemo therapy. AST/ALT: 46/89>>61/111>>71/134>>80/171 >>88/169    BPH (benign prostatic hyperplasia) (Chronic) POA: Yes      Overview: On flomax    Low vitamin D level (Chronic) POA: Yes      Overview: Vit D: 11 on 17    Hypertension POA: Unknown  Resolved Problems:     Bone pain POA: Yes      Overview: Stable      Negative PE for tenderness over the rib cage.    Elevated PSA (Chronic) POA: Yes      Overview: PSA: 8.4 on 4/4/17  >>>>>4 on 5/18    Dysuria POA: Unknown      Overview: Stable           Abdominal pain POA: No      Overview: Resolved.       Review of Systems   Constitutional: Negative for fever and chills.   HENT: Negative for congestion and hearing loss.    Respiratory: Negative for cough, hemoptysis and shortness of breath.    Cardiovascular: Negative for chest pain and palpitations.   Gastrointestinal: Positive for diarrhea. Negative for heartburn, nausea, vomiting, abdominal pain and constipation.        Diarrhea has decreased.   Genitourinary: Negative for dysuria, urgency and frequency.   Musculoskeletal: Negative for myalgias and neck pain.   Skin: Negative for rash.   Neurological: Negative for dizziness, sensory change and headaches.       Consultants/Specialty  Oncology    Disposition  Inpatient for chemotherapy monitoring.    Quality Measures  EKG reviewed, Labs reviewed, Medications reviewed and Radiology images reviewed  Mahajan catheter: No Mahajan      DVT Prophylaxis: Contraindicated - High bleeding risk  DVT prophylaxis - mechanical: SCDs (low plt)                Physical Exam       Filed Vitals:    05/25/17 0000 05/25/17 0500 05/25/17 0800 05/25/17 1200   BP: 158/80 166/82 177/81 138/66   Pulse: 89 84 77 77   Temp: 36.8 °C (98.3 °F) 37.1 °C (98.8 °F) 36.6 °C (97.9 °F) 36.8 °C (98.2 °F)   Resp: 16 16 16 17   Height:       Weight:       SpO2: 99% 94% 98% 98%     Body mass index is 23.2 kg/(m^2). Weight: 67.2 kg (148 lb 2.4 oz)  Oxygen Therapy:  Pulse Oximetry: 98 %, O2 (LPM): 0, O2 Delivery: None (Room Air)    Physical Exam   Constitutional: He is oriented to person, place, and time and well-developed, well-nourished, and in no distress. No distress.   HENT:   Head: Normocephalic.   Eyes: EOM are normal.   Neck: No tracheal deviation present. No thyromegaly  present.   Cardiovascular: Normal rate and regular rhythm.  Exam reveals no friction rub.    Murmur heard.  Pansystolic murmur heard at left sternal border.   Pulmonary/Chest: Effort normal. No respiratory distress. He has no wheezes.   Abdominal: Soft. Bowel sounds are normal. He exhibits no distension. There is no tenderness.   Musculoskeletal: He exhibits no edema.   Neurological: He is alert and oriented to person, place, and time.   Skin: Skin is warm and dry. No erythema.         Lab Data Review:      5/25/2017  2:23 PM    Recent Labs      05/22/17   2344  05/23/17 2352 05/24/17   2339   SODIUM  138  136  136   POTASSIUM  3.2*  3.2*  4.2   CHLORIDE  106  106  107   CO2  25 23 22   BUN  8  11  13   CREATININE  0.62  0.67  0.72   MAGNESIUM   --   1.8   --    CALCIUM  8.4*  8.5  8.6       Recent Labs      05/22/17   2344 05/23/17 2352 05/24/17   2339   ALTSGPT  108*  96*  93*   ASTSGOT  59*  48*  52*   ALKPHOSPHAT  192*  190*  204*   TBILIRUBIN  1.2  1.3  1.3   GLUCOSE  97  104*  98       Recent Labs      05/22/17 2344 05/23/17 2352 05/24/17   2339   RBC  2.65*  2.56*  3.01*   HEMOGLOBIN  7.6*  7.4*  8.6*   HEMATOCRIT  21.2*  20.6*  24.6*   PLATELETCT  27*  19*  13*       Recent Labs      05/22/17 2344 05/23/17 2352 05/24/17   2339   WBC  0.3*  0.1*  0.1*   ASTSGOT  59*  48*  52*   ALTSGPT  108*  96*  93*   ALKPHOSPHAT  192*  190*  204*   TBILIRUBIN  1.2  1.3  1.3           Assessment/Plan     1. CML--accelerated phase  -Afebrile overnight, no acute complaints  -WBC decreased again from 0.3-->0.1  -Has completed multiple chemo cycles w/ multiple past relapses.   -Started on Synribo BID X 14 days on 5/6/17 (completed on 5/20/17)  Plan  -Will continue to consult with oncology. Will likely receive another cycle of chemotherapy (synribo) next week  -Planned has been for BMT at [facility], but needs response to chemo before possible transplant.  -Will continue to follow CBC labs    2. Pancytopenia  2/2 CML and chemotherapy  -Has received multiple platelet and PRBC transfusions  -On acyclovir started on 5/9/17 for prophylaxis  -Completed a seven day course of cefepim due to neutropenic fever on 5/7/17.  Plan  -Will transfuse PRBC if Hgb <7.5 and Platelets if <10,000  -Continue daily CBC draws.    3. Diarrhea  -Has improved per patient and nursing staff  -Likely a complication of Synribo  -C diff negative  Plan  -Continue loperamide PRN    4. BPH  -Dysuria during admission, but no longer has urinary complaints.   -PSA and perineal exam were negative for prostatitis  -Past history of BPH and prostate cancer  -Will continue on tamsulosin and finasteride.     5. HTN  -Continue coreg and Norvasc 5mg daily  -Hydralazine 10 mg PRN       Assessment & plan notes cannot be loaded without a specified hospital service.

## 2017-05-25 NOTE — PROGRESS NOTES
Lainey from Lab called with critical result of WBC at 0.1 and Plt at 13. Critical lab result read back to Lainey.   This critical lab result is within parameters established by [facility] policy for this patient

## 2017-05-25 NOTE — PROGRESS NOTES
Received shift report from day shift RN. Pt is AO3 and declines any pain. Discussed POC. Assessed and administered evening medications. Bed alarm on, bed in lowest position, call light and personal belongings within reach, educated to call if in need of assistance, room near nurses station, non skid socks, all needs met at this time.

## 2017-05-25 NOTE — PROGRESS NOTES
Oncology/Hematology Progress Note               Author: Pop Jorgensen Date & Time created: 5/25/2017  12:27 PM   Cc:  leukemia  Interval History:  He feels good.  He has no complaints.  His stools are stable.    Review of Systems:  Review of Systems   Constitutional: Positive for malaise/fatigue.   HENT: Negative for hearing loss.    Eyes: Negative for blurred vision, double vision and photophobia.   Respiratory: Negative for cough and hemoptysis.    Cardiovascular: Negative for chest pain, palpitations and orthopnea.   Gastrointestinal: Positive for diarrhea. Negative for heartburn, nausea and vomiting.   Genitourinary: Negative for dysuria and urgency.   Skin: Negative for itching and rash.   Neurological: Positive for weakness. Negative for dizziness, tingling, tremors and headaches.   Psychiatric/Behavioral: Negative for depression, suicidal ideas and substance abuse.       Physical Exam:  Physical Exam   Constitutional: He is oriented to person, place, and time. He appears well-developed.   HENT:   Head: Normocephalic.   Eyes: Conjunctivae are normal. No scleral icterus.   Cardiovascular: Normal rate and regular rhythm.    Pulmonary/Chest: Effort normal and breath sounds normal.   Abdominal: Soft. Bowel sounds are normal. He exhibits no distension. There is no tenderness. There is no rebound.   Musculoskeletal: He exhibits no edema.   Neurological: He is alert and oriented to person, place, and time.   Psychiatric: He has a normal mood and affect. His behavior is normal. Judgment and thought content normal.       Labs:        Invalid input(s): [id number]      Recent Labs      05/22/17   2344 05/23/17 2352 05/24/17 2339   SODIUM  138  136  136   POTASSIUM  3.2*  3.2*  4.2   CHLORIDE  106  106  107   CO2  25  23  22   BUN  8  11  13   CREATININE  0.62  0.67  0.72   MAGNESIUM   --   1.8   --    CALCIUM  8.4*  8.5  8.6     Recent Labs      05/22/17   2344  05/23/17   2352  05/24/17   2339   ALTSGPT   108*  96*  93*   ASTSGOT  59*  48*  52*   ALKPHOSPHAT  192*  190*  204*   TBILIRUBIN  1.2  1.3  1.3   GLUCOSE  97  104*  98     Recent Labs      17   2339   RBC  2.65*  2.56*  3.01*   HEMOGLOBIN  7.6*  7.4*  8.6*   HEMATOCRIT  21.2*  20.6*  24.6*   PLATELETCT  27*  19*  13*     Recent Labs      17   2339   WBC  0.3*  0.1*  0.1*   ASTSGOT  59*  48*  52*   ALTSGPT  108*  96*  93*   ALKPHOSPHAT  192*  190*  204*   TBILIRUBIN  1.2  1.3  1.3     Recent Labs      17   2339   SODIUM  138  136  136   POTASSIUM  3.2*  3.2*  4.2   CHLORIDE  106  106  107   CO2  25  23  22   GLUCOSE  97  104*  98   BUN  8  11  13   CREATININE  0.62  0.67  0.72   CALCIUM  8.4*  8.5  8.6     Hemodynamics:  Temp (24hrs), Av.7 °C (98.1 °F), Min:36.3 °C (97.3 °F), Max:37.1 °C (98.8 °F)  Temperature: 36.8 °C (98.2 °F)  Pulse  Av.1  Min: 60  Max: 118   Blood Pressure : 138/66 mmHg     Respiratory:    Respiration: 17, Pulse Oximetry: 98 %     Work Of Breathing / Effort: Mild  RUL Breath Sounds: Diminished, RML Breath Sounds: Diminished, RLL Breath Sounds: Diminished, WAGNER Breath Sounds: Diminished, LLL Breath Sounds: Diminished  Fluids:    Intake/Output Summary (Last 24 hours) at 17 0004  Last data filed at 17 2300   Gross per 24 hour   Intake    360 ml   Output   1325 ml   Net   -965 ml     Weight: 67.2 kg (148 lb 2.4 oz)  GI/Nutrition:  Orders Placed This Encounter   Procedures   • Diet Order     Standing Status: Standing      Number of Occurrences: 1      Standing Expiration Date:      Order Specific Question:  Diet:     Answer:  2 Gram Sodium [7]     Order Specific Question:  Texture/Fiber modifications:     Answer:  Dysphagia 2(Pureed/Chopped)specify fluid consistency(question 6) [2]      Comments:  thins ok; pt with no teeth/ nor dentures     Order Specific Question:  Consistency/Fluid modifications:      Answer:  Thin Liquids [3]     Medical Decision Making, by Problem:  Active Hospital Problems    Diagnosis   • *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]   • Neutropenia (CMS-HCC) [D70.9]   • Pancytopenia (CMS-HCC) [D61.818]   • Diarrhea [R19.7]   • Acute bilateral low back pain without sciatica [M54.5]   • Seizure disorder (CMS-HCC) [G40.909]   • Hypertension [I10]   • BPH (benign prostatic hyperplasia) [N40.0]   • Low vitamin D level [E55.9]   • Transaminitis [R74.0]     Pmhx unchanged    Plan:  1.  CML-- Day 19:  multiple relapses, now in blast crisis.  Prior treatments included dasatinib and bosutinib.  The plan has been for allo-BMT at [facility], but needs some response to chemo before they can do the transplant, giving Synribo for this.  Synribo is given as a SQ shot BID x 14 days with the first cycle, followed by 2 weeks off.  Started on 5/6/17- completed on 5/20/17.    Synribo is a high risk medication.  There is a 's warning for cerebral bleeding, and to avoid NSAIDS, blood thinners, etc as well as to use with caution in patients with Platelets<50K.  He has a h/o of a SDH in the recent past.      2.  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  Not likely to improve unless he can get response to some kind of chemo.  Will transfuse as needed to keep >10, or higher if bleeding.    3.  Neutropenia--nothing documented in chart but as per Dr. Agustin's note, had fever late in day on 5/7.  BCx and CXR negative.    4.  Elevated LFT's.  This is trending back down.    5.  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  If recurrent pain might consider RT.    6. Anemia-- transfuse 1 unit of PRBC's if <7.5.    7.  Diarrhea.  A common side effect of Synribo. He was started on immodium on 5/10/17 with improvement. Antibiotics may have also been contributing.   Last C. Diff check on 5/13/17 negative . Resolved.  restarted again on 5/17/17. Repeat C. Diff neg.  Well controlled Imodium     8.  HTN:   Primary team addressing his lytes and bp.         DAY 19 from start of treatment   REC 5/25/17    --no new changes  --may need platelets tomorrow  --continue supportive measures  --answered questions with team  --will be due for next cycle next week      Labs reviewed, Radiology images reviewed and Medications reviewed

## 2017-05-26 PROBLEM — D70.9 FEVER AND NEUTROPENIA (HCC): Status: ACTIVE | Noted: 2017-05-26

## 2017-05-26 PROBLEM — R50.81 FEVER AND NEUTROPENIA (HCC): Status: ACTIVE | Noted: 2017-05-26

## 2017-05-26 LAB
ALBUMIN SERPL BCP-MCNC: 3.5 G/DL (ref 3.2–4.9)
ALBUMIN SERPL BCP-MCNC: 3.7 G/DL (ref 3.2–4.9)
ALBUMIN/GLOB SERPL: 1.1 G/DL
ALBUMIN/GLOB SERPL: 1.1 G/DL
ALP SERPL-CCNC: 220 U/L (ref 30–99)
ALP SERPL-CCNC: 226 U/L (ref 30–99)
ALT SERPL-CCNC: 81 U/L (ref 2–50)
ALT SERPL-CCNC: 88 U/L (ref 2–50)
ANION GAP SERPL CALC-SCNC: 6 MMOL/L (ref 0–11.9)
ANION GAP SERPL CALC-SCNC: 7 MMOL/L (ref 0–11.9)
AST SERPL-CCNC: 33 U/L (ref 12–45)
AST SERPL-CCNC: 40 U/L (ref 12–45)
BILIRUB SERPL-MCNC: 1.4 MG/DL (ref 0.1–1.5)
BILIRUB SERPL-MCNC: 1.6 MG/DL (ref 0.1–1.5)
BUN SERPL-MCNC: 19 MG/DL (ref 8–22)
BUN SERPL-MCNC: 19 MG/DL (ref 8–22)
CALCIUM SERPL-MCNC: 8.9 MG/DL (ref 8.5–10.5)
CALCIUM SERPL-MCNC: 9 MG/DL (ref 8.5–10.5)
CHLORIDE SERPL-SCNC: 104 MMOL/L (ref 96–112)
CHLORIDE SERPL-SCNC: 105 MMOL/L (ref 96–112)
CO2 SERPL-SCNC: 21 MMOL/L (ref 20–33)
CO2 SERPL-SCNC: 22 MMOL/L (ref 20–33)
CREAT SERPL-MCNC: 0.75 MG/DL (ref 0.5–1.4)
CREAT SERPL-MCNC: 0.76 MG/DL (ref 0.5–1.4)
ERYTHROCYTE [DISTWIDTH] IN BLOOD BY AUTOMATED COUNT: 37.4 FL (ref 35.9–50)
GFR SERPL CREATININE-BSD FRML MDRD: >60 ML/MIN/1.73 M 2
GFR SERPL CREATININE-BSD FRML MDRD: >60 ML/MIN/1.73 M 2
GLOBULIN SER CALC-MCNC: 3.3 G/DL (ref 1.9–3.5)
GLOBULIN SER CALC-MCNC: 3.5 G/DL (ref 1.9–3.5)
GLUCOSE SERPL-MCNC: 106 MG/DL (ref 65–99)
GLUCOSE SERPL-MCNC: 117 MG/DL (ref 65–99)
HCT VFR BLD AUTO: 26 % (ref 42–52)
HGB BLD-MCNC: 9.3 G/DL (ref 14–18)
LACTATE BLD-SCNC: 0.8 MMOL/L (ref 0.5–2)
LACTATE BLD-SCNC: 0.9 MMOL/L (ref 0.5–2)
MAGNESIUM SERPL-MCNC: 1.9 MG/DL (ref 1.5–2.5)
MCH RBC QN AUTO: 28.7 PG (ref 27–33)
MCHC RBC AUTO-ENTMCNC: 35.8 G/DL (ref 33.7–35.3)
MCV RBC AUTO: 80.2 FL (ref 81.4–97.8)
NEUTROPHILS # BLD AUTO: ABNORMAL K/UL (ref 1.82–7.42)
NRBC # BLD AUTO: 0 K/UL
NRBC BLD AUTO-RTO: 0 /100 WBC
PLATELET # BLD AUTO: 9 K/UL (ref 164–446)
PMV BLD AUTO: 11.1 FL (ref 9–12.9)
POTASSIUM SERPL-SCNC: 4.3 MMOL/L (ref 3.6–5.5)
POTASSIUM SERPL-SCNC: 4.6 MMOL/L (ref 3.6–5.5)
PROT SERPL-MCNC: 6.8 G/DL (ref 6–8.2)
PROT SERPL-MCNC: 7.2 G/DL (ref 6–8.2)
RBC # BLD AUTO: 3.24 M/UL (ref 4.7–6.1)
SODIUM SERPL-SCNC: 132 MMOL/L (ref 135–145)
SODIUM SERPL-SCNC: 133 MMOL/L (ref 135–145)
WBC # BLD AUTO: 0.1 K/UL (ref 4.8–10.8)

## 2017-05-26 PROCEDURE — 700102 HCHG RX REV CODE 250 W/ 637 OVERRIDE(OP): Performed by: INTERNAL MEDICINE

## 2017-05-26 PROCEDURE — 36430 TRANSFUSION BLD/BLD COMPNT: CPT

## 2017-05-26 PROCEDURE — 85025 COMPLETE CBC W/AUTO DIFF WBC: CPT

## 2017-05-26 PROCEDURE — 700105 HCHG RX REV CODE 258: Performed by: INTERNAL MEDICINE

## 2017-05-26 PROCEDURE — A9270 NON-COVERED ITEM OR SERVICE: HCPCS | Performed by: STUDENT IN AN ORGANIZED HEALTH CARE EDUCATION/TRAINING PROGRAM

## 2017-05-26 PROCEDURE — A9270 NON-COVERED ITEM OR SERVICE: HCPCS | Performed by: INTERNAL MEDICINE

## 2017-05-26 PROCEDURE — 700111 HCHG RX REV CODE 636 W/ 250 OVERRIDE (IP): Performed by: INTERNAL MEDICINE

## 2017-05-26 PROCEDURE — 700111 HCHG RX REV CODE 636 W/ 250 OVERRIDE (IP)

## 2017-05-26 PROCEDURE — 700102 HCHG RX REV CODE 250 W/ 637 OVERRIDE(OP): Performed by: STUDENT IN AN ORGANIZED HEALTH CARE EDUCATION/TRAINING PROGRAM

## 2017-05-26 PROCEDURE — 770009 HCHG ROOM/CARE - ONCOLOGY SEMI PRI*

## 2017-05-26 PROCEDURE — 700101 HCHG RX REV CODE 250: Performed by: STUDENT IN AN ORGANIZED HEALTH CARE EDUCATION/TRAINING PROGRAM

## 2017-05-26 PROCEDURE — 99232 SBSQ HOSP IP/OBS MODERATE 35: CPT | Mod: GC | Performed by: INTERNAL MEDICINE

## 2017-05-26 PROCEDURE — 83735 ASSAY OF MAGNESIUM: CPT

## 2017-05-26 PROCEDURE — 36415 COLL VENOUS BLD VENIPUNCTURE: CPT

## 2017-05-26 PROCEDURE — P9034 PLATELETS, PHERESIS: HCPCS

## 2017-05-26 PROCEDURE — 700105 HCHG RX REV CODE 258

## 2017-05-26 PROCEDURE — 83605 ASSAY OF LACTIC ACID: CPT

## 2017-05-26 PROCEDURE — 80053 COMPREHEN METABOLIC PANEL: CPT | Mod: 91

## 2017-05-26 RX ORDER — SODIUM CHLORIDE 9 MG/ML
INJECTION, SOLUTION INTRAVENOUS CONTINUOUS
Status: DISCONTINUED | OUTPATIENT
Start: 2017-05-26 | End: 2017-05-31

## 2017-05-26 RX ADMIN — VANCOMYCIN HYDROCHLORIDE 1000 MG: 100 INJECTION, POWDER, LYOPHILIZED, FOR SOLUTION INTRAVENOUS at 12:56

## 2017-05-26 RX ADMIN — ACETAMINOPHEN 650 MG: 325 TABLET, FILM COATED ORAL at 04:41

## 2017-05-26 RX ADMIN — TAMSULOSIN HYDROCHLORIDE 0.8 MG: 0.4 CAPSULE ORAL at 08:27

## 2017-05-26 RX ADMIN — METRONIDAZOLE 500 MG: 500 TABLET ORAL at 04:41

## 2017-05-26 RX ADMIN — ACYCLOVIR 400 MG: 800 TABLET ORAL at 08:27

## 2017-05-26 RX ADMIN — ACETAMINOPHEN 650 MG: 325 TABLET, FILM COATED ORAL at 10:02

## 2017-05-26 RX ADMIN — CEFEPIME 2 G: 2 INJECTION, POWDER, FOR SOLUTION INTRAMUSCULAR; INTRAVENOUS at 04:46

## 2017-05-26 RX ADMIN — FINASTERIDE 5 MG: 5 TABLET, FILM COATED ORAL at 08:27

## 2017-05-26 RX ADMIN — SODIUM CHLORIDE: 9 INJECTION, SOLUTION INTRAVENOUS at 02:15

## 2017-05-26 RX ADMIN — CARVEDILOL 12.5 MG: 12.5 TABLET, FILM COATED ORAL at 08:27

## 2017-05-26 RX ADMIN — CEFEPIME 2 G: 2 INJECTION, POWDER, FOR SOLUTION INTRAMUSCULAR; INTRAVENOUS at 14:38

## 2017-05-26 RX ADMIN — MAGNESIUM GLUCONATE 500 MG ORAL TABLET 400 MG: 500 TABLET ORAL at 08:27

## 2017-05-26 RX ADMIN — LEVETIRACETAM 750 MG: 250 TABLET, FILM COATED ORAL at 20:52

## 2017-05-26 RX ADMIN — LIDOCAINE 1 PATCH: 50 PATCH CUTANEOUS at 08:33

## 2017-05-26 RX ADMIN — POTASSIUM CHLORIDE 40 MEQ: 750 TABLET, FILM COATED, EXTENDED RELEASE ORAL at 08:27

## 2017-05-26 RX ADMIN — DIPHENHYDRAMINE HCL 25 MG: 25 TABLET ORAL at 10:02

## 2017-05-26 RX ADMIN — POTASSIUM CHLORIDE 40 MEQ: 750 TABLET, FILM COATED, EXTENDED RELEASE ORAL at 20:52

## 2017-05-26 RX ADMIN — LEVETIRACETAM 750 MG: 250 TABLET, FILM COATED ORAL at 08:39

## 2017-05-26 RX ADMIN — CARVEDILOL 12.5 MG: 12.5 TABLET, FILM COATED ORAL at 17:38

## 2017-05-26 RX ADMIN — CEFEPIME 2 G: 2 INJECTION, POWDER, FOR SOLUTION INTRAMUSCULAR; INTRAVENOUS at 20:56

## 2017-05-26 RX ADMIN — METRONIDAZOLE 500 MG: 500 TABLET ORAL at 14:38

## 2017-05-26 RX ADMIN — ACYCLOVIR 400 MG: 800 TABLET ORAL at 20:51

## 2017-05-26 RX ADMIN — METRONIDAZOLE 500 MG: 500 TABLET ORAL at 20:52

## 2017-05-26 RX ADMIN — AMLODIPINE BESYLATE 5 MG: 5 TABLET ORAL at 08:27

## 2017-05-26 RX ADMIN — NYSTATIN 500000 UNITS: 500000 SUSPENSION ORAL at 08:27

## 2017-05-26 ASSESSMENT — ENCOUNTER SYMPTOMS
HEADACHES: 0
COUGH: 0
ABDOMINAL PAIN: 0
PHOTOPHOBIA: 0
HEARTBURN: 0
DIZZINESS: 0
WEAKNESS: 1
DEPRESSION: 0
FEVER: 0
NECK PAIN: 0
CHILLS: 0
TREMORS: 0
BLURRED VISION: 0
DOUBLE VISION: 0
DIARRHEA: 1
MYALGIAS: 0
VOMITING: 0
PALPITATIONS: 0
NAUSEA: 0
HEMOPTYSIS: 0
TINGLING: 0
ORTHOPNEA: 0

## 2017-05-26 ASSESSMENT — LIFESTYLE VARIABLES: SUBSTANCE_ABUSE: 0

## 2017-05-26 ASSESSMENT — PAIN SCALES - GENERAL
PAINLEVEL_OUTOF10: 0
PAINLEVEL_OUTOF10: 0

## 2017-05-26 NOTE — PROGRESS NOTES
Pt has temp of 100.1F, and is exhibiting rigors from chills. Updated MD. Medicated with tylenol. CXR, UA and blood cultures ordered per MD. Per MD, to recheck temp at 1800, and call team with result.     Addend to add: Swelling noted to RFA below PIV site, site is warm, mildly reddened and tender to touch. Updated MD. Per MD, to watch at this time, and apply heat pack.

## 2017-05-26 NOTE — PROGRESS NOTES
Cross-Coverage Note:  -------------------------------    Visited the patient and assessed on bedside at 8 pm: He was afebrile and vitals stable. Since his platelet count has dropped to 9, ordered for Platelet transfusion- CMV negative and Irradiated sample- since patient is immunocompromised.    However patient spiked temp of 101 F again around at 10 pm: Ordered for Lactate and PT/INR  Tylenol 650 mg stat given.

## 2017-05-26 NOTE — PROGRESS NOTES
started PT on vanc and flagyl as CXR showed PNA for MRSA and anerobic coverage.  Pt already on cefipime

## 2017-05-26 NOTE — PROGRESS NOTES
Oncology/Hematology Progress Note               Author: Pop Jorgensen Date & Time created: 5/26/2017  10:27 AM   Cc:  leukemia  Interval History:  He did spike a temperature last night.  He has no localizing symptoms.    Review of Systems:  Review of Systems   Constitutional: Positive for malaise/fatigue.   HENT: Negative for hearing loss.    Eyes: Negative for blurred vision, double vision and photophobia.   Respiratory: Negative for cough and hemoptysis.    Cardiovascular: Negative for chest pain, palpitations and orthopnea.   Gastrointestinal: Positive for diarrhea. Negative for heartburn, nausea and vomiting.   Genitourinary: Negative for dysuria and urgency.   Skin: Negative for itching and rash.   Neurological: Positive for weakness. Negative for dizziness, tingling, tremors and headaches.   Psychiatric/Behavioral: Negative for depression, suicidal ideas and substance abuse.       Physical Exam:  Physical Exam   Constitutional: He is oriented to person, place, and time. He appears well-developed.   HENT:   Head: Normocephalic.   Eyes: Conjunctivae are normal. No scleral icterus.   Cardiovascular: Normal rate and regular rhythm.    Pulmonary/Chest: Effort normal and breath sounds normal.   Abdominal: Soft. Bowel sounds are normal. He exhibits no distension. There is no tenderness. There is no rebound.   Musculoskeletal: He exhibits no edema.   Lymphadenopathy:     He has no cervical adenopathy.   Neurological: He is alert and oriented to person, place, and time.   Skin: Skin is warm. No rash noted. No erythema.   Psychiatric: He has a normal mood and affect. His behavior is normal. Judgment and thought content normal.       Labs:        Invalid input(s): [id number]      Recent Labs      05/23/17   2352  05/24/17   2339  05/26/17   0734   SODIUM  136  136  132*   POTASSIUM  3.2*  4.2  4.3   CHLORIDE  106  107  104   CO2  23  22  21   BUN  11  13  19   CREATININE  0.67  0.72  0.76   MAGNESIUM  1.8   --    1.9   CALCIUM  8.5  8.6  8.9     Recent Labs      17   0734   ALTSGPT  96*  93*  88*   ASTSGOT  48*  52*  40   ALKPHOSPHAT  190*  204*  220*   TBILIRUBIN  1.3  1.3  1.6*   GLUCOSE  104*  98  117*     Recent Labs      17   0413   RBC  3.01*  3.30*   --   3.24*   HEMOGLOBIN  8.6*  9.5*   --   9.3*   HEMATOCRIT  24.6*  26.8*   --   26.0*   PLATELETCT  13*  9*   --   9*   PROTHROMBTM   --    --   14.8*   --    INR   --    --   1.12   --      Recent Labs      17   0734   WBC  0.1*  0.1*  0.1*  0.1*   --    ASTSGOT  48*  52*   --    --   40   ALTSGPT  96*  93*   --    --   88*   ALKPHOSPHAT  190*  204*   --    --   220*   TBILIRUBIN  1.3  1.3   --    --   1.6*     Recent Labs      17   0734   SODIUM  136  136  132*   POTASSIUM  3.2*  4.2  4.3   CHLORIDE  106  107  104   CO2  23  22  21   GLUCOSE  104*  98  117*   BUN  11  13  19   CREATININE  0.67  0.72  0.76   CALCIUM  8.5  8.6  8.9     Hemodynamics:  Temp (24hrs), Av.9 °C (100.3 °F), Min:36.8 °C (98.2 °F), Max:39.3 °C (102.7 °F)  Temperature: 37.1 °C (98.8 °F)  Pulse  Av.2  Min: 60  Max: 118   Blood Pressure : 156/81 mmHg     Respiratory:    Respiration: 18, Pulse Oximetry: 99 %     Work Of Breathing / Effort: Mild  RUL Breath Sounds: Diminished, RML Breath Sounds: Diminished, RLL Breath Sounds: Diminished, WAGNER Breath Sounds: Diminished, LLL Breath Sounds: Diminished  Fluids:    Intake/Output Summary (Last 24 hours) at 17 0004  Last data filed at 17 2300   Gross per 24 hour   Intake    360 ml   Output   1325 ml   Net   -965 ml        GI/Nutrition:  Orders Placed This Encounter   Procedures   • Diet Order     Standing Status: Standing      Number of Occurrences: 1      Standing Expiration Date:      Order Specific Question:  Diet:     Answer:   2 Gram Sodium [7]     Order Specific Question:  Texture/Fiber modifications:     Answer:  Dysphagia 2(Pureed/Chopped)specify fluid consistency(question 6) [2]      Comments:  thins ok; pt with no teeth/ nor dentures     Order Specific Question:  Consistency/Fluid modifications:     Answer:  Thin Liquids [3]     Medical Decision Making, by Problem:  Active Hospital Problems    Diagnosis   • *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  CML-- Day 19:  multiple relapses, now in blast crisis.  Prior treatments included dasatinib and bosutinib.  The plan has been for allo-BMT at [facility], but needs some response to chemo before they can do the transplant, giving Synribo for this.  Synribo is given as a SQ shot BID x 14 days with the first cycle, followed by 2 weeks off.  Started on 5/6/17- completed on 5/20/17.    Synribo is a high risk medication.  There is a 's warning for cerebral bleeding, and to avoid NSAIDS, blood thinners, etc as well as to use with caution in patients with Platelets<50K.  He has a h/o of a SDH in the recent past.      2.  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  Not likely to improve unless he can get response to some kind of chemo.  Will transfuse as needed to keep >10, or higher if bleeding.    3.  Neutropenia-- NF 5/26/17 started vanco / cefepime.  CXR revealing new infiltrate    4.  Elevated LFT's.  stable    5.  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  If recurrent pain might consider RT.    6. Anemia-- transfuse 1 unit of PRBC's if <7.5.    7.  Diarrhea.  A common side effect of Synribo. He was started on immodium  on 5/10/17 with improvement. Antibiotics may have also been contributing.   Last C. Diff check on 5/13/17 negative . Resolved.  restarted again on 5/17/17. Repeat C. Diff neg.  Well controlled Imodium         DAY 20 from start of treatment   REC 5/26/17    --Agree with antibiotics.  Follow final culture identification of staph  --platelet transfusion this am (spoke with nurse)    High complexity/ high drug toxicity risk/   Labs reviewed, Radiology images reviewed and Medications reviewed

## 2017-05-26 NOTE — PROGRESS NOTES
Received report from NOC RN, assumed care of patient at 0700. Patient is awake alert and oriented x 4. Patient is up with assistance of 1, unsteady gait at this time. Patient does not call, bed alarm in place and patients room in front of nursing station. Platelets ordered for patient, NOC RN concerned with fevers, will discuss with Oncology today. POC discussed with patient, patient sleepy this morning after being woken up last night. Discuss safety with patient and patient agreeable. Call light within reach but patient does not use it. PIV infusing. Pt showered. No other needs at this time.

## 2017-05-26 NOTE — PROGRESS NOTES
[facility] Internal Medicine Interval Note    Name Benjamin Post       1945   Age/Sex 71 y.o. male   MRN [id number]   Code Status DNR     After 5PM or if no immediate response to page, please call for cross-coverage  Attending/Team: Dr. Santiago  Call [phone] to page   1st Call - Day Intern (R1):   Dr Cowart 2nd Call - Day Sr. Resident (R2/R3):            Chief complaint/ reason for interval visit (Primary Diagnosis)   CML/pancytopenia     Interval Problem Daily Status Update  :  Principal Problem:    Fever and neutropenia (CMS-[facility]) POA: Unknown      Overview: Tmax up to 102.7 overnight      BC x 2 show Staphylococcus growth, coagulase test pending till tomorrow      Benign physical examination. PIV site area is clean and normal without       inflammation.       CXR shows a questionable right basilar consolidation      UA negative      Tylenol on board.      Cefepime, flagyl oral, and IV vancomycin added to antiviral agent      Will monitor closely.   Active Problems:    CML (chronic myelocytic leukemia)-Accelerated phase (Chronic) POA: Yes      Overview:  Had fevers overnight.       WBC 0.1. Patient may need more chemotherapy as per Dr. Jorgensen.     Neutropenia (CMS-[facility]) POA: Yes      Overview:           Acute bilateral low back pain without sciatica POA: Yes    Diarrhea POA: No      Overview: Loperamide stopped on  due to initiation of antibacterials.           Seizure disorder (CMS-[facility]) POA: Yes    Transaminitis POA: Yes      Overview: Related to chemo therapy. AST/ALT: 46/89>>61/111>>71/134>>80/171 >>88/169    BPH (benign prostatic hyperplasia) (Chronic) POA: Yes      Overview: On flomax    Low vitamin D level (Chronic) POA: Yes      Overview: Vit D: 11 on 17    Hypertension POA: Unknown  Resolved Problems:    Bone pain POA: Yes      Overview: Stable      Negative PE for tenderness over the rib cage.    Elevated PSA  (Chronic) POA: Yes      Overview: PSA: 8.4 on 4/4/17  >>>>>4 on 5/18    Dysuria POA: Unknown      Overview: Stable           Abdominal pain POA: No      Overview: Resolved.           Review of Systems   Constitutional: Negative for fever and chills.   HENT: Negative for hearing loss.    Respiratory: Negative for cough and hemoptysis.    Cardiovascular: Negative for chest pain and palpitations.   Gastrointestinal: Positive for diarrhea. Negative for heartburn, nausea and abdominal pain.        Stable diarrhea    Genitourinary: Negative for dysuria and urgency.   Musculoskeletal: Negative for myalgias and neck pain.   Skin: Negative for rash.   Neurological: Negative for dizziness and headaches.   Psychiatric/Behavioral: Negative for depression and suicidal ideas.       Consultants/Specialty  Oncology - [facility]/Mak    Disposition  Remain inpatient for chemotherapy monitoring    Quality Measures  EKG reviewed, Labs reviewed, Medications reviewed and Radiology images reviewed  Mahajan catheter: No Mahajan      DVT Prophylaxis: Contraindicated - High bleeding risk  DVT prophylaxis - mechanical: SCDs (low plt)                Physical Exam       Filed Vitals:    05/26/17 0741 05/26/17 1006 05/26/17 1030 05/26/17 1243   BP: 147/69 156/81 134/64 122/64   Pulse: 77 86 73 73   Temp: 37.2 °C (99 °F) 37.1 °C (98.8 °F) 37.4 °C (99.3 °F) 36.9 °C (98.4 °F)   Resp: 18 18 18 20   Height:       Weight:       SpO2: 98% 99% 96% 97%     Body mass index is 23.2 kg/(m^2).    Oxygen Therapy:  Pulse Oximetry: 97 %, O2 (LPM): 0, O2 Delivery: None (Room Air)    Physical Exam   Constitutional: He is well-developed, well-nourished, and in no distress. No distress.   HENT:   Head: Normocephalic.   Neck: No tracheal deviation present. No thyromegaly present.   Cardiovascular: Normal rate.  Exam reveals no friction rub.    Murmur heard.  Pansystolic murmur on the apex    Pulmonary/Chest: Effort normal. No respiratory distress. He has no wheezes.    Abdominal: Soft. He exhibits no distension. There is no tenderness.   Musculoskeletal: He exhibits no edema.   Neurological: He is alert.   Skin: No erythema.         Lab Data Review:      5/4/2017  1:28 PM    Recent Labs      05/23/17 2352 05/24/17 2339 05/26/17   0734   SODIUM  136  136  132*   POTASSIUM  3.2*  4.2  4.3   CHLORIDE  106  107  104   CO2  23  22  21   BUN  11  13  19   CREATININE  0.67  0.72  0.76   MAGNESIUM  1.8   --   1.9   CALCIUM  8.5  8.6  8.9       Recent Labs      05/23/17 2352 05/24/17 2339 05/26/17   0734   ALTSGPT  96*  93*  88*   ASTSGOT  48*  52*  40   ALKPHOSPHAT  190*  204*  220*   TBILIRUBIN  1.3  1.3  1.6*   GLUCOSE  104*  98  117*       Recent Labs      05/24/17 2339 05/25/17 1851 05/25/17 2215  05/26/17   0413   RBC  3.01*  3.30*   --   3.24*   HEMOGLOBIN  8.6*  9.5*   --   9.3*   HEMATOCRIT  24.6*  26.8*   --   26.0*   PLATELETCT  13*  9*   --   9*   PROTHROMBTM   --    --   14.8*   --    INR   --    --   1.12   --        Recent Labs      05/23/17 2352 05/24/17 2339 05/25/17 1851 05/26/17 0413  05/26/17   0734   WBC  0.1*  0.1*  0.1*  0.1*   --    ASTSGOT  48*  52*   --    --   40   ALTSGPT  96*  93*   --    --   88*   ALKPHOSPHAT  190*  204*   --    --   220*   TBILIRUBIN  1.3  1.3   --    --   1.6*           Assessment/Plan     * Fever and neutropenia (CMS-HCC)  Assessment & Plan  Patient developed a 102F temp on 5/25. Cefepime, flagyl, and vancomycin added to his antiviral regimen. Blood cultures drawn which show a preliminary report of staphylococcus. Temperature managed with tylenol. Patient's vital signs remained stable and he did not have any complaints. Loperamide stopped due to the concern for CDiff. Oncology aware and onboard.     Pancytopenia (CMS-HCC)  Overview  - Pancytopenia secondary to CML and chemotherapy          Assessment & Plan  Transfused multiple platelet and PRBCs   Follow up and keep plt>10 and Hb>7.5.  Started with cefepim  on 5/7/17 due to fever 100.4, Urine and blood culture have been negative since, completed 7 day course  On acyclovir on 5/9 for prophylaxis.     CML (chronic myelocytic leukemia)-Accelerated phase (present on admission)  Overview   Had fevers overnight.   WBC 0.1. Patient may need more chemotherapy as per Dr. Jorgensen.     Assessment & Plan  Has completed multiple chemotherapy cycles.     multiple relapses.    Was treated on dasatinib for a time with response, but then relapse.    Was more recently on bosutinib with response, but again recent relapse.   Pancytopenia (chronic, before starting with chemotherapy)   Protein electrophoresis: the polyclonal increase in the gamma region which may be indicative of specific immune   response or an early monoclonal protein.     Started on 5/6/17 with chemotherapy (synribo ) BID for 14 days which is completed on 5/20  Developed diarrhea as a complication which is being treated with Imodium   C, diff negative, can continue loperamide to good effect  BMT planned for the future    Neutropenia (CMS-HCC) (present on admission)  Overview        Assessment & Plan  On acyclovir, cefepime, vancomycin, and flagyl  Blood cultures show a preliminary report of possible Staph growth.     Diarrhea  Overview  Loperamide stopped on 5/25 due to initiation of antibacterials.       Assessment & Plan  Most likely related to chemo  Significantly improved on Loperamide PRN but had to be stopped due to initiation of antibacterials.   Cdiff negative x2  monitor electrolytes and continue IV fluid.       Transaminitis (present on admission)  Overview  Related to chemo therapy. AST/ALT: 46/89>>61/111>>71/134>>80/171 >>88/169    Assessment & Plan  No abdominal pain, likely effect of chemotherapy  Discontinue all hepatotoxic drugs as possible  Continue to follow    BPH (benign prostatic hyperplasia) (present on admission)  Assessment & Plan  Stable,  Complains of some dysuria, repeat PSA and perineal exam  negative for prostatitis  Continue on tamsulosin 0.8 mg daily.   Added finasteride as well    Low vitamin D level (present on admission)  Overview  Vit D: 11 on 4/29/17    Assessment & Plan  Vit D 11 on 4/29/2017   Continue supplementation.     Hypertension  Assessment & Plan  On Coreg  Norvasc 5 added on 5/24 for better BP control.   PRN hydralazine 10 mg onboard.      Seizure disorder (CMS-HCC) (present on admission)  Assessment & Plan  Stable    Continue on his home dose of keppra  Had 1 episode last year after dental procedure.

## 2017-05-26 NOTE — CARE PLAN
Problem: Safety  Goal: Will remain free from injury  Outcome: PROGRESSING AS EXPECTED  Bed alarm in place, call light within reach. Reinforced calling for assistance to the restroom, patient agreeable but non compliant.     Problem: Mobility  Goal: Risk for activity intolerance will decrease  Outcome: PROGRESSING AS EXPECTED  Pt. Must ambulate with RN or CNA, unsteady gait. PT on board.

## 2017-05-26 NOTE — PROGRESS NOTES
New onset fevers are very concerning. The patient does report mild chills. Pan cultures have been drawn. Patient is continued on his current antibiotic regimen along with addition of Cefepime 2 G Q8hr. Will sign off to night team to follow CXR, UA results, and differentiated WBC count.

## 2017-05-26 NOTE — PROGRESS NOTES
Praveena from Lab called with result of positive blood cultures at 0744. Critical lab result read back to Praveena.   JAQUELINE

## 2017-05-26 NOTE — PROGRESS NOTES
Lab called with critical result of WBC at 0.1 and Plt at 9. Critical lab result read back to Lab.   This critical lab result is within parameters established by [facility] policy for this patient

## 2017-05-26 NOTE — PROGRESS NOTES
"Pharmacy Kinetics 71 y.o. male on vancomycin day # 1 2017    Currently on Vancomycin 1700 mg IV x 1 loading dose    Indication for Treatment: PNA; bacteremia    Pertinent history per medical record: Admitted on 2017 for complaints of total body pain and general malaise and fatigue for 2 days. Mid back pain worse with movement and deep breathing. Patient was neutropenic, however no s/s of infection present, thus antibiotics discontinued. Patient started on omacetaxine (Synribo) x 14 days on  (completed treatment ) for relapsed CML. Patient spiked a fever . CXR dictates \"New right basilar consolidation suggestive of pneumonia.\" Empiric Vanco + BETANCOURT added to Cefepime for possible PNA.     Other antibiotics: Metronidazole 500 mg PO Q8h, Cefepime 2 g IV Q8h    Allergies: Review of patient's allergies indicates no known allergies.     List concerns for renal function : Age, BUN:SCr>20:1, abnormal LFTs, SIRS    Pertinent cultures to date:   17 - blood (peripheral) x 2: possible staph species  17 - Cdiff PCR/Toxin: Negative  17 - N gonorrhoeae/C trachomatis PCR: Negative  5/15/17 - urine cx: Negative  17 - Cdiff PCR/Toxin: Negative    Recent Labs      17   2352  17   2339  17   1851  17   0413   WBC  0.1*  0.1*  0.1*  0.1*     Recent Labs      17   2352  17   2339  17   0734   BUN  11  13  19   CREATININE  0.67  0.72  0.76   ALBUMIN  3.2  3.5  3.5     Intake/Output Summary (Last 24 hours) at 17 0850  Last data filed at 17 1700   Gross per 24 hour   Intake      0 ml   Output    850 ml   Net   -850 ml      Blood pressure 147/69, pulse 77, temperature 37.2 °C (99 °F), resp. rate 18, height 1.702 m (5' 7\"), weight 67.2 kg (148 lb 2.4 oz), SpO2 98 %. Temp (24hrs), Av °C (100.4 °F), Min:36.8 °C (98.2 °F), Max:39.3 °C (102.7 °F)    A/P   1. Vancomycin dose change: Start vancomycin 1000 mg IV Q12h (~15 mg/kg; due @ 00, 12)  2. Next " vancomycin level: 5/26 @ 1130  3. Goal trough: 16-20 mcg/mL  4. A/P: Patient remains neutropenic; lactic acid WNL. Vitals relatively stable since last night. Patient sating in the 90s on room air. Preliminary blood cultures growing possible staph species in both sets. Patient has received vanco at this facility previously, tolerating Q12h dosing with similar renal indices, yielding a trough of 12.9 mcg/mL. Given new concern for bacteremia, will initiate vancomycin 15 mg/kg IV Q12h as outlined above and check a steady state level tomorrow @ 1130. Pharmacy to monitor and adjust as needed.     Dorene Holt, JabierD

## 2017-05-26 NOTE — ASSESSMENT & PLAN NOTE
Blood culture came negative  TTE neg for vegetation,    heart murmur seems to be chronic due to probably TR  To continue on ABX for at least 6 weeks  BREANNA today

## 2017-05-26 NOTE — PROGRESS NOTES
"Pharmacy Kinetics 71 y.o. male on vancomycin day # 2 2017    Currently on Vancomycin 1700 mg iv LD given at 2355    Indication for Treatment: PNA    Pertinent history per medical record: Admitted on 2017 for neutropenia. Pt presents to ED with c/o generalized discomfort and body aches. Pt with complicated oncology PMH. (last chemo 17)    Other antibiotics: acyclovir 400 mg po bid, cefepime 2 gm iv q8h, Flagyl 500 mg po q8h    Allergies: Review of patient's allergies indicates no known allergies.     List concerns for renal function: age, SIRS 3/4 -- elevated HR, elevated temp., abnormal WBC     2017 23:39   AST(SGOT) 52 (H)   ALT(SGPT) 93 (H)   Alkaline Phosphatase 204 (H)     Pertinent cultures to date:   None at this time    Recent Labs      17   2352  17   2339  17   1851  17   0413   WBC  0.1*  0.1*  0.1*  0.1*     Recent Labs      17   2352  17   2339   BUN  11  13   CREATININE  0.67  0.72   ALBUMIN  3.2  3.5     Intake/Output Summary (Last 24 hours) at 17 0541  Last data filed at 17 1700   Gross per 24 hour   Intake      0 ml   Output    850 ml   Net   -850 ml      Blood pressure 160/68, pulse 101, temperature 38.3 °C (100.9 °F), resp. rate 20, height 1.702 m (5' 7\"), weight 67.2 kg (148 lb 2.4 oz), SpO2 95 %. Temp (24hrs), Av °C (100.4 °F), Min:36.6 °C (97.9 °F), Max:39.3 °C (102.7 °F)      A/P   1. Vancomycin dose change: new start. pulse dose  2. Next vancomycin level: 24 hour VR at 0000   3. Goal trough: 16-20 mcg/ml  4. Comments: Concern for renal function. Pt febrile. Loading dose 1700 mg (25 mg/kg x 67.2 kg) iv once given at 2355. Will pulse dose. 24 hour VR ordered. Pharmacy will monitor and make adjustments when appropriate.     Elizabeth Dominguez, PharmD      "

## 2017-05-27 LAB
ERYTHROCYTE [DISTWIDTH] IN BLOOD BY AUTOMATED COUNT: 38.3 FL (ref 35.9–50)
HCT VFR BLD AUTO: 22.6 % (ref 42–52)
HGB BLD-MCNC: 7.9 G/DL (ref 14–18)
MCH RBC QN AUTO: 29 PG (ref 27–33)
MCHC RBC AUTO-ENTMCNC: 35 G/DL (ref 33.7–35.3)
MCV RBC AUTO: 83.1 FL (ref 81.4–97.8)
NEUTROPHILS # BLD AUTO: ABNORMAL K/UL (ref 1.82–7.42)
NRBC # BLD AUTO: 0 K/UL
NRBC BLD AUTO-RTO: 0 /100 WBC
PLATELET # BLD AUTO: 22 K/UL (ref 164–446)
PLATELETS.RETICULATED NFR BLD AUTO: 1 K/UL (ref 0.6–13.1)
PMV BLD AUTO: 10.3 FL (ref 9–12.9)
RBC # BLD AUTO: 2.72 M/UL (ref 4.7–6.1)
VANCOMYCIN TROUGH SERPL-MCNC: 12.6 UG/ML (ref 10–20)
WBC # BLD AUTO: 0.1 K/UL (ref 4.8–10.8)
WBC STL QL MICRO: NORMAL

## 2017-05-27 PROCEDURE — 770009 HCHG ROOM/CARE - ONCOLOGY SEMI PRI*

## 2017-05-27 PROCEDURE — 36415 COLL VENOUS BLD VENIPUNCTURE: CPT

## 2017-05-27 PROCEDURE — A9270 NON-COVERED ITEM OR SERVICE: HCPCS | Performed by: INTERNAL MEDICINE

## 2017-05-27 PROCEDURE — A9270 NON-COVERED ITEM OR SERVICE: HCPCS | Performed by: STUDENT IN AN ORGANIZED HEALTH CARE EDUCATION/TRAINING PROGRAM

## 2017-05-27 PROCEDURE — 80053 COMPREHEN METABOLIC PANEL: CPT

## 2017-05-27 PROCEDURE — 700111 HCHG RX REV CODE 636 W/ 250 OVERRIDE (IP): Performed by: INTERNAL MEDICINE

## 2017-05-27 PROCEDURE — 85055 RETICULATED PLATELET ASSAY: CPT

## 2017-05-27 PROCEDURE — A9270 NON-COVERED ITEM OR SERVICE: HCPCS | Performed by: HOSPITALIST

## 2017-05-27 PROCEDURE — 700105 HCHG RX REV CODE 258

## 2017-05-27 PROCEDURE — 700111 HCHG RX REV CODE 636 W/ 250 OVERRIDE (IP)

## 2017-05-27 PROCEDURE — 700102 HCHG RX REV CODE 250 W/ 637 OVERRIDE(OP): Performed by: INTERNAL MEDICINE

## 2017-05-27 PROCEDURE — 700105 HCHG RX REV CODE 258: Performed by: INTERNAL MEDICINE

## 2017-05-27 PROCEDURE — 80202 ASSAY OF VANCOMYCIN: CPT

## 2017-05-27 PROCEDURE — 700102 HCHG RX REV CODE 250 W/ 637 OVERRIDE(OP): Performed by: HOSPITALIST

## 2017-05-27 PROCEDURE — 700102 HCHG RX REV CODE 250 W/ 637 OVERRIDE(OP): Performed by: STUDENT IN AN ORGANIZED HEALTH CARE EDUCATION/TRAINING PROGRAM

## 2017-05-27 PROCEDURE — 85025 COMPLETE CBC W/AUTO DIFF WBC: CPT

## 2017-05-27 PROCEDURE — 89055 LEUKOCYTE ASSESSMENT FECAL: CPT

## 2017-05-27 PROCEDURE — 99233 SBSQ HOSP IP/OBS HIGH 50: CPT | Mod: GC | Performed by: INTERNAL MEDICINE

## 2017-05-27 RX ADMIN — ACETAMINOPHEN 650 MG: 325 TABLET, FILM COATED ORAL at 17:11

## 2017-05-27 RX ADMIN — CARVEDILOL 12.5 MG: 12.5 TABLET, FILM COATED ORAL at 17:12

## 2017-05-27 RX ADMIN — POTASSIUM CHLORIDE 40 MEQ: 750 TABLET, FILM COATED, EXTENDED RELEASE ORAL at 09:25

## 2017-05-27 RX ADMIN — METRONIDAZOLE 500 MG: 500 TABLET ORAL at 20:24

## 2017-05-27 RX ADMIN — CEFEPIME 2 G: 2 INJECTION, POWDER, FOR SOLUTION INTRAMUSCULAR; INTRAVENOUS at 06:05

## 2017-05-27 RX ADMIN — VANCOMYCIN HYDROCHLORIDE 1000 MG: 100 INJECTION, POWDER, LYOPHILIZED, FOR SOLUTION INTRAVENOUS at 11:52

## 2017-05-27 RX ADMIN — CARVEDILOL 12.5 MG: 12.5 TABLET, FILM COATED ORAL at 09:17

## 2017-05-27 RX ADMIN — AMLODIPINE BESYLATE 5 MG: 5 TABLET ORAL at 09:17

## 2017-05-27 RX ADMIN — VANCOMYCIN HYDROCHLORIDE 1000 MG: 100 INJECTION, POWDER, LYOPHILIZED, FOR SOLUTION INTRAVENOUS at 00:33

## 2017-05-27 RX ADMIN — CEFEPIME 2 G: 2 INJECTION, POWDER, FOR SOLUTION INTRAMUSCULAR; INTRAVENOUS at 20:24

## 2017-05-27 RX ADMIN — ACYCLOVIR 400 MG: 800 TABLET ORAL at 20:24

## 2017-05-27 RX ADMIN — CEFEPIME 2 G: 2 INJECTION, POWDER, FOR SOLUTION INTRAMUSCULAR; INTRAVENOUS at 14:40

## 2017-05-27 RX ADMIN — LEVETIRACETAM 750 MG: 250 TABLET, FILM COATED ORAL at 20:23

## 2017-05-27 RX ADMIN — MAGNESIUM GLUCONATE 500 MG ORAL TABLET 400 MG: 500 TABLET ORAL at 09:20

## 2017-05-27 RX ADMIN — ACYCLOVIR 400 MG: 800 TABLET ORAL at 09:17

## 2017-05-27 RX ADMIN — TAMSULOSIN HYDROCHLORIDE 0.8 MG: 0.4 CAPSULE ORAL at 09:21

## 2017-05-27 RX ADMIN — METRONIDAZOLE 500 MG: 500 TABLET ORAL at 14:40

## 2017-05-27 RX ADMIN — METRONIDAZOLE 500 MG: 500 TABLET ORAL at 06:05

## 2017-05-27 RX ADMIN — LEVETIRACETAM 750 MG: 250 TABLET, FILM COATED ORAL at 09:19

## 2017-05-27 RX ADMIN — POTASSIUM CHLORIDE 40 MEQ: 750 TABLET, FILM COATED, EXTENDED RELEASE ORAL at 21:00

## 2017-05-27 RX ADMIN — ERGOCALCIFEROL 50000 UNITS: 1.25 CAPSULE ORAL at 09:24

## 2017-05-27 RX ADMIN — FINASTERIDE 5 MG: 5 TABLET, FILM COATED ORAL at 09:19

## 2017-05-27 ASSESSMENT — ENCOUNTER SYMPTOMS
DOUBLE VISION: 0
DIZZINESS: 0
BLURRED VISION: 0
DEPRESSION: 0
TINGLING: 0
WEAKNESS: 1
HEARTBURN: 0
PHOTOPHOBIA: 0
VOMITING: 0
DIARRHEA: 1
ABDOMINAL PAIN: 0
COUGH: 0
TREMORS: 0
HEMOPTYSIS: 0
PALPITATIONS: 0
CHILLS: 0
HEADACHES: 0
NAUSEA: 0
NECK PAIN: 0
MYALGIAS: 0
FEVER: 0
ORTHOPNEA: 0

## 2017-05-27 ASSESSMENT — LIFESTYLE VARIABLES: SUBSTANCE_ABUSE: 0

## 2017-05-27 ASSESSMENT — PAIN SCALES - GENERAL
PAINLEVEL_OUTOF10: 0
PAINLEVEL_OUTOF10: 0

## 2017-05-27 NOTE — PROGRESS NOTES
[facility] Interval Note    Name Benjamin Post       1945   Age/Sex 71 y.o. male   MRN [id number]   Code Status DNR     After 5PM or if no immediate response to page, please call for cross-coverage  Attending/Team: Dr. Santiago  Call [phone] to page   1st Call - Day Intern (R1):   Dr Cowart 2nd Call - Day Sr. Resident (R2/R3):            Chief complaint/ reason for interval visit (Primary Diagnosis)   CML/pancytopenia     No fevers overnight. Stool WBCs ordered.    Interval Problem Daily Status Update  :  Principal Problem:    Fever and neutropenia (CMS-HCC) POA: Unknown      Overview: Tmax up to 102.7 . None thereafter.      BC x 2 show Staphylococcus growth on Bactec. Final culture reports are       pending.       Benign physical examination. PIV site area is clean and normal without       inflammation.       CXR shows a questionable right basilar consolidation      UA negative      Tylenol on board.      Cefepime, flagyl oral, and IV vancomycin added to antiviral agent      Will monitor closely.   Active Problems:    CML (chronic myelocytic leukemia)-Accelerated phase (Chronic) POA: Yes      Overview:  Had fevers overnight.       WBC 0.1. Patient may need more chemotherapy as per Dr. Jorgensen.     Neutropenia (CMS-HCC) POA: Yes      Overview:           Acute bilateral low back pain without sciatica POA: Yes    Diarrhea POA: No      Overview: Loperamide stopped on  due to initiation of antibacterials.           Seizure disorder (CMS-HCC) POA: Yes    Transaminitis POA: Yes      Overview: Related to chemo therapy. AST/ALT: 46/89>>61/111>>71/134>>80/171 >>88/169    BPH (benign prostatic hyperplasia) (Chronic) POA: Yes      Overview: On flomax    Low vitamin D level (Chronic) POA: Yes      Overview: Vit D: 11 on 17    Hypertension POA: Unknown  Resolved Problems:    Bone pain POA: Yes      Overview:  Stable      Negative PE for tenderness over the rib cage.    Elevated PSA (Chronic) POA: Yes      Overview: PSA: 8.4 on 4/4/17  >>>>>4 on 5/18    Dysuria POA: Unknown      Overview: Stable           Abdominal pain POA: No      Overview: Resolved.           Review of Systems   Constitutional: Negative for fever and chills.   HENT: Negative for hearing loss.    Respiratory: Negative for cough and hemoptysis.    Cardiovascular: Negative for chest pain and palpitations.   Gastrointestinal: Positive for diarrhea. Negative for heartburn, nausea and abdominal pain.        Stable diarrhea    Genitourinary: Negative for dysuria and urgency.   Musculoskeletal: Negative for myalgias and neck pain.   Skin: Negative for rash.   Neurological: Negative for dizziness and headaches.   Psychiatric/Behavioral: Negative for depression and suicidal ideas.       Consultants/Specialty  Oncology - Van Meter/Mak    Disposition  Remain inpatient for chemotherapy monitoring    Quality Measures  EKG reviewed, Labs reviewed, Medications reviewed and Radiology images reviewed  Mahajan catheter: No Mahajan      DVT Prophylaxis: Contraindicated - High bleeding risk  DVT prophylaxis - mechanical: SCDs (low plt)                Physical Exam       Filed Vitals:    05/26/17 2015 05/27/17 0000 05/27/17 0342 05/27/17 0800   BP: 146/52 146/65 149/61 138/49   Pulse: 87 91 82 79   Temp: 37.1 °C (98.7 °F) 37 °C (98.6 °F) 37.2 °C (99 °F) 37.2 °C (99 °F)   Resp: 18 20 20 18   Height:       Weight:       SpO2: 96% 98% 99% 97%     Body mass index is 23.2 kg/(m^2).    Oxygen Therapy:  Pulse Oximetry: 97 %, O2 (LPM): 0, O2 Delivery: None (Room Air)    Physical Exam   Constitutional: He is well-developed, well-nourished, and in no distress. No distress.   HENT:   Head: Normocephalic.   Neck: No tracheal deviation present. No thyromegaly present.   Cardiovascular: Normal rate.  Exam reveals no friction rub.    Murmur heard.  Pansystolic murmur on the apex     Pulmonary/Chest: Effort normal. No respiratory distress. He has no wheezes.   Abdominal: Soft. He exhibits no distension. There is no tenderness.   Musculoskeletal: He exhibits no edema.   Neurological: He is alert.   Skin: No erythema.         Lab Data Review:      5/4/2017  1:28 PM    Recent Labs      05/24/17 2339 05/26/17   0734  05/26/17   1608   SODIUM  136  132*  133*   POTASSIUM  4.2  4.3  4.6   CHLORIDE  107  104  105   CO2  22  21  22   BUN  13  19  19   CREATININE  0.72  0.76  0.75   MAGNESIUM   --   1.9   --    CALCIUM  8.6  8.9  9.0       Recent Labs      05/24/17 2339 05/26/17   0734  05/26/17   1608   ALTSGPT  93*  88*  81*   ASTSGOT  52*  40  33   ALKPHOSPHAT  204*  220*  226*   TBILIRUBIN  1.3  1.6*  1.4   GLUCOSE  98  117*  106*       Recent Labs      05/25/17 1851 05/25/17 2215  05/26/17   0413  05/27/17   0329   RBC  3.30*   --   3.24*  2.72*   HEMOGLOBIN  9.5*   --   9.3*  7.9*   HEMATOCRIT  26.8*   --   26.0*  22.6*   PLATELETCT  9*   --   9*  22*   PROTHROMBTM   --   14.8*   --    --    INR   --   1.12   --    --        Recent Labs      05/24/17 2339 05/25/17 1851 05/26/17 0413  05/26/17   0734  05/26/17   1608  05/27/17   0329   WBC  0.1*  0.1*  0.1*   --    --   0.1*   ASTSGOT  52*   --    --   40  33   --    ALTSGPT  93*   --    --   88*  81*   --    ALKPHOSPHAT  204*   --    --   220*  226*   --    TBILIRUBIN  1.3   --    --   1.6*  1.4   --            Assessment/Plan     * Fever and neutropenia (CMS-[facility])  Assessment & Plan  Patient developed a 102F temp on 5/25. Cefepime, flagyl, and vancomycin added to his antiviral regimen. Blood cultures drawn which show a preliminary report of staphylococcus. Temperature managed with tylenol. Patient's vital signs remained stable and he did not have any complaints. Loperamide stopped due to the concern for CDiff. Oncology aware and onboard.     Pancytopenia (CMS-HCC)  Overview  - Pancytopenia secondary to CML and  chemotherapy          Assessment & Plan  Transfused multiple platelet and PRBCs   Follow up and keep plt>10 and Hb>7.5.  Started with cefepim on 5/7/17 due to fever 100.4, Urine and blood culture have been negative since, completed 7 day course  On acyclovir on 5/9 for prophylaxis.     CML (chronic myelocytic leukemia)-Accelerated phase (present on admission)  Overview   Had fevers overnight.   WBC 0.1. Patient may need more chemotherapy as per Dr. Jorgensen.     Assessment & Plan  Has completed multiple chemotherapy cycles.     multiple relapses.    Was treated on dasatinib for a time with response, but then relapse.    Was more recently on bosutinib with response, but again recent relapse.   Pancytopenia (chronic, before starting with chemotherapy)   Protein electrophoresis: the polyclonal increase in the gamma region which may be indicative of specific immune   response or an early monoclonal protein.     Started on 5/6/17 with chemotherapy (synribo ) BID for 14 days which is completed on 5/20  Developed diarrhea as a complication which is being treated with Imodium   C, diff negative, can continue loperamide to good effect  BMT planned for the future    Neutropenia (CMS-HCC) (present on admission)  Overview        Assessment & Plan  On acyclovir, cefepime, vancomycin, and flagyl  Blood cultures show a preliminary report of possible Staph growth.     Diarrhea  Overview  Loperamide stopped on 5/25 due to initiation of antibacterials.       Assessment & Plan  Most likely related to chemo  Significantly improved on Loperamide PRN but had to be stopped due to initiation of antibacterials.   Cdiff negative x2  monitor electrolytes and continue IV fluid.       Transaminitis (present on admission)  Overview  Related to chemo therapy. AST/ALT: 46/89>>61/111>>71/134>>80/171 >>88/169    Assessment & Plan  No abdominal pain, likely effect of chemotherapy  Discontinue all hepatotoxic drugs as possible  Continue to follow    BPH  (benign prostatic hyperplasia) (present on admission)  Assessment & Plan  Stable,  Complains of some dysuria, repeat PSA and perineal exam negative for prostatitis  Continue on tamsulosin 0.8 mg daily.   Added finasteride as well    Low vitamin D level (present on admission)  Overview  Vit D: 11 on 4/29/17    Assessment & Plan  Vit D 11 on 4/29/2017   Continue supplementation.     Hypertension  Assessment & Plan  On Coreg  Norvasc 5 added on 5/24 for better BP control.   PRN hydralazine 10 mg onboard.      Seizure disorder (CMS-HCC) (present on admission)  Assessment & Plan  Stable    Continue on his home dose of keppra  Had 1 episode last year after dental procedure.

## 2017-05-27 NOTE — PROGRESS NOTES
Oncology/Hematology Progress Note               Author: Pop Jorgensen Date & Time created: 5/27/2017  10:28 AM   Cc:  leukemia  Interval History:  He feels better.  No new complaints today.    Review of Systems:  Review of Systems   Constitutional: Positive for malaise/fatigue.   HENT: Negative for hearing loss.    Eyes: Negative for blurred vision, double vision and photophobia.   Respiratory: Negative for cough and hemoptysis.    Cardiovascular: Negative for chest pain, palpitations and orthopnea.   Gastrointestinal: Positive for diarrhea. Negative for heartburn, nausea and vomiting.   Genitourinary: Negative for dysuria and urgency.   Skin: Negative for itching and rash.   Neurological: Positive for weakness. Negative for dizziness, tingling, tremors and headaches.   Psychiatric/Behavioral: Negative for depression, suicidal ideas and substance abuse.       Physical Exam:  Physical Exam   Constitutional: He is oriented to person, place, and time. He appears well-developed.   HENT:   Head: Normocephalic.   Eyes: Conjunctivae are normal. No scleral icterus.   Cardiovascular: Normal rate, regular rhythm and normal heart sounds.    Pulmonary/Chest: Effort normal and breath sounds normal. No respiratory distress.   Abdominal: Soft. Bowel sounds are normal. He exhibits no distension. There is no tenderness. There is no rebound.   Musculoskeletal: He exhibits no edema.   Lymphadenopathy:     He has no cervical adenopathy.   Neurological: He is alert and oriented to person, place, and time. No cranial nerve deficit.   Skin: Skin is warm. No rash noted. No erythema.   Psychiatric: He has a normal mood and affect. His behavior is normal. Judgment and thought content normal.       Labs:        Invalid input(s): [id number]      Recent Labs      05/24/17   2339  05/26/17   0734  05/26/17   1608   SODIUM  136  132*  133*   POTASSIUM  4.2  4.3  4.6   CHLORIDE  107  104  105   CO2  22  21  22   BUN  13  19  19    CREATININE  0.72  0.76  0.75   MAGNESIUM   --   1.9   --    CALCIUM  8.6  8.9  9.0     Recent Labs      1734  17   1608   ALTSGPT  93*  88*  81*   ASTSGOT  52*  40  33   ALKPHOSPHAT  204*  220*  226*   TBILIRUBIN  1.3  1.6*  1.4   GLUCOSE  98  117*  106*     Recent Labs      17   0329   RBC  3.30*   --   3.24*  2.72*   HEMOGLOBIN  9.5*   --   9.3*  7.9*   HEMATOCRIT  26.8*   --   26.0*  22.6*   PLATELETCT  9*   --   9*  22*   PROTHROMBTM   --   14.8*   --    --    INR   --   1.12   --    --      Recent Labs      17   1608  17   0329   WBC  0.1*  0.1*  0.1*   --    --   0.1*   ASTSGOT  52*   --    --   40  33   --    ALTSGPT  93*   --    --   88*  81*   --    ALKPHOSPHAT  204*   --    --   220*  226*   --    TBILIRUBIN  1.3   --    --   1.6*  1.4   --      Recent Labs      17   1608   SODIUM  136  132*  133*   POTASSIUM  4.2  4.3  4.6   CHLORIDE  107  104  105   CO2  22  21  22   GLUCOSE  98  117*  106*   BUN  13  19  19   CREATININE  0.72  0.76  0.75   CALCIUM  8.6  8.9  9.0     Hemodynamics:  Temp (24hrs), Av.1 °C (98.8 °F), Min:36.9 °C (98.4 °F), Max:37.4 °C (99.3 °F)  Temperature: 37.2 °C (99 °F)  Pulse  Av.1  Min: 60  Max: 118   Blood Pressure : 138/49 mmHg     Respiratory:    Respiration: 18, Pulse Oximetry: 97 %     Work Of Breathing / Effort: Mild  RUL Breath Sounds: Diminished, RML Breath Sounds: Diminished, RLL Breath Sounds: Diminished, WAGNER Breath Sounds: Diminished, LLL Breath Sounds: Diminished  Fluids:    Intake/Output Summary (Last 24 hours) at 17 0004  Last data filed at 17 2300   Gross per 24 hour   Intake    360 ml   Output   1325 ml   Net   -965 ml        GI/Nutrition:  Orders Placed This Encounter   Procedures   • Diet Order     Standing Status: Standing      Number  of Occurrences: 1      Standing Expiration Date:      Order Specific Question:  Diet:     Answer:  2 Gram Sodium [7]     Order Specific Question:  Texture/Fiber modifications:     Answer:  Dysphagia 2(Pureed/Chopped)specify fluid consistency(question 6) [2]      Comments:  thins ok; pt with no teeth/ nor dentures     Order Specific Question:  Consistency/Fluid modifications:     Answer:  Thin Liquids [3]     Medical Decision Making, by Problem:  Active Hospital Problems    Diagnosis   • *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]   • Neutropenia (CMS-HCC) [D70.9]   • Pancytopenia (CMS-HCC) [D61.818]   • Diarrhea [R19.7]   • Acute bilateral low back pain without sciatica [M54.5]   • Seizure disorder (CMS-HCC) [G40.909]   • Hypertension [I10]   • BPH (benign prostatic hyperplasia) [N40.0]   • Low vitamin D level [E55.9]   • Transaminitis [R74.0]     Pmhx unchanged    Plan:  1.  CML-- Day 21:  multiple relapses, now in blast crisis.  Prior treatments included dasatinib and bosutinib.  The plan has been for allo-BMT at [facility], but needs some response to chemo before they can do the transplant, giving Synribo for this.  Synribo is given as a SQ shot BID x 14 days with the first cycle, followed by 2 weeks off.  Started on 5/6/17- completed on 5/20/17.    Synribo is a high risk medication.  There is a 's warning for cerebral bleeding, and to avoid NSAIDS, blood thinners, etc as well as to use with caution in patients with Platelets<50K.  He has a h/o of a SDH in the recent past.      2.  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  Not likely to improve unless he can get response to some kind of chemo.  Will transfuse as needed to keep >10, or higher if bleeding.    3.  Neutropenia-- NF 5/26/17 started vanco / cefepime.  CXR revealing new infiltrate.     4.  Elevated LFT's.  stable    5.  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  If recurrent pain might consider RT.    6. Anemia-- transfuse 1  unit of PRBC's if <7.5.    7.  Diarrhea.  A common side effect of Synribo. He was started on immodium on 5/10/17 with improvement. Antibiotics may have also been contributing.   Last C. Diff check on 5/13/17 negative . Resolved.  restarted again on 5/17/17. Repeat C. Diff neg.  Well controlled Imodium         DAY 21 from start of treatment   REC 5/27/17    --he is doing better.  --fever curve better  --no need for any blood products today    High complexity/ high drug toxicity risk/   Labs reviewed, Radiology images reviewed and Medications reviewed

## 2017-05-27 NOTE — PROGRESS NOTES
kamlesh from Lab called with critical result of WBC at 0.1 and Plt at 22. Critical lab result read back to Kamlesh.   This critical lab result is within parameters established by [facility] policy for this patient

## 2017-05-27 NOTE — PROGRESS NOTES
"Pharmacy Kinetics 71 y.o. male on vancomycin day # 2 2017    Currently on Vancomycin 1000 mg IV q12hr    Indication for Treatment: PNA; bacteremia    Pertinent history per medical record: Admitted on 2017 for complaints of total body pain and general malaise and fatigue for 2 days. Mid back pain worse with movement and deep breathing. Patient was neutropenic, however no s/s of infection present, thus antibiotics discontinued. Patient started on omacetaxine (Synribo) x 14 days on  (completed treatment ) for relapsed CML. Patient spiked a fever . CXR dictates \"New right basilar consolidation suggestive of pneumonia.\" Empiric Vanco + BETANCOURT added to Cefepime for possible PNA.     Other antibiotics: Metronidazole 500 mg PO Q8h, Cefepime 2 g IV Q8h    Allergies: Review of patient's allergies indicates no known allergies.     List concerns for renal function : Age, BUN:SCr>20:1, abnormal LFTs, SIRS    Pertinent cultures to date:    17 - blood (peripheral) x 2: possible staph species  17 - Cdiff PCR/Toxin: Negative  17 - N gonorrhoeae/C trachomatis PCR: Negative  5/15/17 - urine cx: Negative  17 - Cdiff PCR/Toxin: Negative    Recent Labs      17   2339  17   1851  17   0413  17   0329   WBC  0.1*  0.1*  0.1*  0.1*     Recent Labs      17   2339  17   0734  17   1608   BUN  13  19  19   CREATININE  0.72  0.76  0.75   ALBUMIN  3.5  3.5  3.7     Intake/Output Summary (Last 24 hours) at 17 0819  Last data filed at 17 0342   Gross per 24 hour   Intake   1843 ml   Output   1025 ml   Net    818 ml      Blood pressure 149/61, pulse 82, temperature 37.2 °C (99 °F), resp. rate 20, height 1.702 m (5' 7\"), weight 67.2 kg (148 lb 2.4 oz), SpO2 99 %. Temp (24hrs), Av.1 °C (98.7 °F), Min:36.9 °C (98.4 °F), Max:37.4 °C (99.3 °F)    A/P   1. Vancomycin dose change: Start vancomycin 1200 mg IV Q12h (~17.8 mg/kg; due @ 00, 12)  2. Next vancomycin " level: today @ 1130  3. Goal trough: 16-20 mcg/mL  4. A/P: Patient afebrile overnight, remains neutropenic. Lactic acid WNL, vital signs stable. Patient sating in the 90s on room air. Preliminary blood cultures growing staph aureus in both sets; sensitivities pending. Steady state vanco level resulted therapeutic at 12.6 mcg/mL. As this level likely will not trend to therapeutic range on current dose, will increase dose to ~17 mg/kg IV Q12h as outlined above and check a level 5/29 @ 1130. Follow for sensitivities of cultures. Recommend ID consult in the setting of staph aureus bacteremia. Pharmacy to monitor and adjust as needed.     Dorene Holt, JabierD

## 2017-05-27 NOTE — PROGRESS NOTES
Pt is A&O.  Up to BR to have a loose BM.  Neutropenic precautions in place.  Afebrile.  VSS.  Bed alarm on.  Pt does not call before getting up.  Gait is unsteady.  Door is open for safety.  No s+s of bleeding anywhere.  Denies pain and nausea.

## 2017-05-28 LAB
ABO GROUP BLD: NORMAL
ALBUMIN SERPL BCP-MCNC: 3.4 G/DL (ref 3.2–4.9)
ALBUMIN/GLOB SERPL: 1 G/DL
ALP SERPL-CCNC: 221 U/L (ref 30–99)
ALT SERPL-CCNC: 57 U/L (ref 2–50)
ANION GAP SERPL CALC-SCNC: 6 MMOL/L (ref 0–11.9)
AST SERPL-CCNC: 25 U/L (ref 12–45)
BACTERIA BLD CULT: ABNORMAL
BARCODED ABORH UBTYP: 7300
BARCODED PRD CODE UBPRD: NORMAL
BARCODED UNIT NUM UBUNT: NORMAL
BILIRUB SERPL-MCNC: 1 MG/DL (ref 0.1–1.5)
BLD GP AB SCN SERPL QL: NORMAL
BUN SERPL-MCNC: 20 MG/DL (ref 8–22)
CALCIUM SERPL-MCNC: 8.6 MG/DL (ref 8.5–10.5)
CHLORIDE SERPL-SCNC: 106 MMOL/L (ref 96–112)
CO2 SERPL-SCNC: 21 MMOL/L (ref 20–33)
COMPONENT R 8504R: NORMAL
CREAT SERPL-MCNC: 0.69 MG/DL (ref 0.5–1.4)
ERYTHROCYTE [DISTWIDTH] IN BLOOD BY AUTOMATED COUNT: 37.6 FL (ref 35.9–50)
GFR SERPL CREATININE-BSD FRML MDRD: >60 ML/MIN/1.73 M 2
GLOBULIN SER CALC-MCNC: 3.4 G/DL (ref 1.9–3.5)
GLUCOSE SERPL-MCNC: 107 MG/DL (ref 65–99)
HCT VFR BLD AUTO: 21.8 % (ref 42–52)
HGB BLD-MCNC: 7.4 G/DL (ref 14–18)
MCH RBC QN AUTO: 28.6 PG (ref 27–33)
MCHC RBC AUTO-ENTMCNC: 33.9 G/DL (ref 33.7–35.3)
MCV RBC AUTO: 84.2 FL (ref 81.4–97.8)
NEUTROPHILS # BLD AUTO: ABNORMAL K/UL (ref 1.82–7.42)
NRBC # BLD AUTO: 0 K/UL
NRBC BLD AUTO-RTO: 0 /100 WBC
PLATELET # BLD AUTO: 15 K/UL (ref 164–446)
PMV BLD AUTO: 10 FL (ref 9–12.9)
POTASSIUM SERPL-SCNC: 4.3 MMOL/L (ref 3.6–5.5)
PRODUCT TYPE UPROD: NORMAL
PROT SERPL-MCNC: 6.8 G/DL (ref 6–8.2)
RBC # BLD AUTO: 2.59 M/UL (ref 4.7–6.1)
RH BLD: NORMAL
SIGNIFICANT IND 70042: ABNORMAL
SIGNIFICANT IND 70042: ABNORMAL
SITE SITE: ABNORMAL
SITE SITE: ABNORMAL
SODIUM SERPL-SCNC: 133 MMOL/L (ref 135–145)
SOURCE SOURCE: ABNORMAL
SOURCE SOURCE: ABNORMAL
UNIT STATUS USTAT: NORMAL
WBC # BLD AUTO: 0.1 K/UL (ref 4.8–10.8)

## 2017-05-28 PROCEDURE — 36430 TRANSFUSION BLD/BLD COMPNT: CPT

## 2017-05-28 PROCEDURE — 700111 HCHG RX REV CODE 636 W/ 250 OVERRIDE (IP): Performed by: INTERNAL MEDICINE

## 2017-05-28 PROCEDURE — 86923 COMPATIBILITY TEST ELECTRIC: CPT

## 2017-05-28 PROCEDURE — 86850 RBC ANTIBODY SCREEN: CPT

## 2017-05-28 PROCEDURE — A9270 NON-COVERED ITEM OR SERVICE: HCPCS | Performed by: INTERNAL MEDICINE

## 2017-05-28 PROCEDURE — 700105 HCHG RX REV CODE 258

## 2017-05-28 PROCEDURE — 700102 HCHG RX REV CODE 250 W/ 637 OVERRIDE(OP): Performed by: STUDENT IN AN ORGANIZED HEALTH CARE EDUCATION/TRAINING PROGRAM

## 2017-05-28 PROCEDURE — 99233 SBSQ HOSP IP/OBS HIGH 50: CPT | Mod: GC | Performed by: INTERNAL MEDICINE

## 2017-05-28 PROCEDURE — 770009 HCHG ROOM/CARE - ONCOLOGY SEMI PRI*

## 2017-05-28 PROCEDURE — 700105 HCHG RX REV CODE 258: Performed by: INTERNAL MEDICINE

## 2017-05-28 PROCEDURE — 86901 BLOOD TYPING SEROLOGIC RH(D): CPT

## 2017-05-28 PROCEDURE — 87071 CULTURE AEROBIC QUANT OTHER: CPT

## 2017-05-28 PROCEDURE — 700102 HCHG RX REV CODE 250 W/ 637 OVERRIDE(OP): Performed by: INTERNAL MEDICINE

## 2017-05-28 PROCEDURE — A9270 NON-COVERED ITEM OR SERVICE: HCPCS | Performed by: STUDENT IN AN ORGANIZED HEALTH CARE EDUCATION/TRAINING PROGRAM

## 2017-05-28 PROCEDURE — 700101 HCHG RX REV CODE 250: Performed by: STUDENT IN AN ORGANIZED HEALTH CARE EDUCATION/TRAINING PROGRAM

## 2017-05-28 PROCEDURE — 86644 CMV ANTIBODY: CPT

## 2017-05-28 PROCEDURE — P9016 RBC LEUKOCYTES REDUCED: HCPCS

## 2017-05-28 PROCEDURE — 86900 BLOOD TYPING SEROLOGIC ABO: CPT

## 2017-05-28 PROCEDURE — 700111 HCHG RX REV CODE 636 W/ 250 OVERRIDE (IP)

## 2017-05-28 RX ORDER — LOPERAMIDE HYDROCHLORIDE 2 MG/1
2 CAPSULE ORAL 4 TIMES DAILY PRN
Status: DISCONTINUED | OUTPATIENT
Start: 2017-05-28 | End: 2017-06-15 | Stop reason: HOSPADM

## 2017-05-28 RX ADMIN — FINASTERIDE 5 MG: 5 TABLET, FILM COATED ORAL at 08:43

## 2017-05-28 RX ADMIN — MAGNESIUM GLUCONATE 500 MG ORAL TABLET 400 MG: 500 TABLET ORAL at 08:44

## 2017-05-28 RX ADMIN — CARVEDILOL 12.5 MG: 12.5 TABLET, FILM COATED ORAL at 08:43

## 2017-05-28 RX ADMIN — AMLODIPINE BESYLATE 5 MG: 5 TABLET ORAL at 08:44

## 2017-05-28 RX ADMIN — LEVETIRACETAM 750 MG: 250 TABLET, FILM COATED ORAL at 10:07

## 2017-05-28 RX ADMIN — ACYCLOVIR 400 MG: 800 TABLET ORAL at 08:43

## 2017-05-28 RX ADMIN — LEVETIRACETAM 750 MG: 250 TABLET, FILM COATED ORAL at 20:55

## 2017-05-28 RX ADMIN — CEFEPIME 2 G: 2 INJECTION, POWDER, FOR SOLUTION INTRAMUSCULAR; INTRAVENOUS at 15:53

## 2017-05-28 RX ADMIN — SODIUM CHLORIDE: 9 INJECTION, SOLUTION INTRAVENOUS at 18:31

## 2017-05-28 RX ADMIN — POTASSIUM CHLORIDE 40 MEQ: 750 TABLET, FILM COATED, EXTENDED RELEASE ORAL at 20:55

## 2017-05-28 RX ADMIN — NYSTATIN 500000 UNITS: 500000 SUSPENSION ORAL at 08:44

## 2017-05-28 RX ADMIN — VANCOMYCIN HYDROCHLORIDE 1200 MG: 100 INJECTION, POWDER, LYOPHILIZED, FOR SOLUTION INTRAVENOUS at 12:56

## 2017-05-28 RX ADMIN — METRONIDAZOLE 500 MG: 500 TABLET ORAL at 05:13

## 2017-05-28 RX ADMIN — TAMSULOSIN HYDROCHLORIDE 0.8 MG: 0.4 CAPSULE ORAL at 08:44

## 2017-05-28 RX ADMIN — CEFEPIME 2 G: 2 INJECTION, POWDER, FOR SOLUTION INTRAMUSCULAR; INTRAVENOUS at 05:13

## 2017-05-28 RX ADMIN — CARVEDILOL 12.5 MG: 12.5 TABLET, FILM COATED ORAL at 18:03

## 2017-05-28 RX ADMIN — LIDOCAINE 1 PATCH: 50 PATCH CUTANEOUS at 08:48

## 2017-05-28 RX ADMIN — CEFTRIAXONE SODIUM 2 G: 2 INJECTION, POWDER, FOR SOLUTION INTRAMUSCULAR; INTRAVENOUS at 21:00

## 2017-05-28 RX ADMIN — ACYCLOVIR 400 MG: 800 TABLET ORAL at 20:55

## 2017-05-28 RX ADMIN — VANCOMYCIN HYDROCHLORIDE 1200 MG: 100 INJECTION, POWDER, LYOPHILIZED, FOR SOLUTION INTRAVENOUS at 00:35

## 2017-05-28 RX ADMIN — ACETAMINOPHEN 650 MG: 325 TABLET, FILM COATED ORAL at 08:45

## 2017-05-28 RX ADMIN — DIPHENHYDRAMINE HCL 25 MG: 25 TABLET ORAL at 08:44

## 2017-05-28 RX ADMIN — POTASSIUM CHLORIDE 40 MEQ: 750 TABLET, FILM COATED, EXTENDED RELEASE ORAL at 08:44

## 2017-05-28 ASSESSMENT — LIFESTYLE VARIABLES: SUBSTANCE_ABUSE: 0

## 2017-05-28 ASSESSMENT — ENCOUNTER SYMPTOMS
ABDOMINAL PAIN: 0
FEVER: 0
HEMOPTYSIS: 0
CHILLS: 0
DIZZINESS: 0
DIARRHEA: 1
TREMORS: 0
ORTHOPNEA: 0
NECK PAIN: 0
COUGH: 0
WEAKNESS: 1
MYALGIAS: 0
HEADACHES: 0
PHOTOPHOBIA: 0
BLURRED VISION: 0
DEPRESSION: 0
VOMITING: 0
TINGLING: 0
HEARTBURN: 0
DOUBLE VISION: 0
PALPITATIONS: 0
NAUSEA: 0

## 2017-05-28 ASSESSMENT — PAIN SCALES - GENERAL
PAINLEVEL_OUTOF10: 0

## 2017-05-28 NOTE — PROGRESS NOTES
Oncology/Hematology Progress Note               Author: Pop Jorgensen Date & Time created: 5/28/2017  12:15 PM   Cc:  leukemia  Interval History:  He has no new issues.  He is doing ok.    Review of Systems:  Review of Systems   Constitutional: Positive for malaise/fatigue.   HENT: Negative for hearing loss.    Eyes: Negative for blurred vision, double vision and photophobia.   Respiratory: Negative for cough and hemoptysis.    Cardiovascular: Negative for chest pain, palpitations and orthopnea.   Gastrointestinal: Positive for diarrhea. Negative for heartburn, nausea and vomiting.   Genitourinary: Negative for dysuria and urgency.   Skin: Negative for itching and rash.   Neurological: Positive for weakness. Negative for dizziness, tingling, tremors and headaches.   Psychiatric/Behavioral: Negative for depression, suicidal ideas and substance abuse.       Physical Exam:  Physical Exam   Constitutional: He is oriented to person, place, and time. He appears well-developed.   HENT:   Head: Normocephalic.   Eyes: Conjunctivae are normal. No scleral icterus.   Cardiovascular: Normal rate, regular rhythm and normal heart sounds.    Pulmonary/Chest: Effort normal and breath sounds normal. No respiratory distress.   Abdominal: Soft. Bowel sounds are normal. He exhibits no distension. There is no tenderness.   Musculoskeletal: He exhibits no edema.   Neurological: He is alert and oriented to person, place, and time. No cranial nerve deficit.   Skin: Skin is warm. No rash noted. No erythema.   Psychiatric: He has a normal mood and affect. His behavior is normal. Judgment and thought content normal.       Labs:        Invalid input(s): [id number]      Recent Labs      05/26/17   0734  05/26/17   1608  05/27/17   2353   SODIUM  132*  133*  133*   POTASSIUM  4.3  4.6  4.3   CHLORIDE  104  105  106   CO2  21  22  21   BUN  19  19  20   CREATININE  0.76  0.75  0.69   MAGNESIUM  1.9   --    --    CALCIUM  8.9  9.0  8.6      Recent Labs      17   16017   2353   ALTSGPT  88*  81*  57*   ASTSGOT  40  33  25   ALKPHOSPHAT  220*  226*  221*   TBILIRUBIN  1.6*  1.4  1.0   GLUCOSE  117*  106*  107*     Recent Labs      17   2353   RBC   --   3.24*  2.72*  2.59*   HEMOGLOBIN   --   9.3*  7.9*  7.4*   HEMATOCRIT   --   26.0*  22.6*  21.8*   PLATELETCT   --   9*  22*  15*   PROTHROMBTM  14.8*   --    --    --    INR  1.12   --    --    --      Recent Labs      17   2353   WBC  0.1*   --    --   0.1*  0.1*   ASTSGOT   --   40  33   --   25   ALTSGPT   --   88*  81*   --   57*   ALKPHOSPHAT   --   220*  226*   --   221*   TBILIRUBIN   --   1.6*  1.4   --   1.0     Recent Labs      17   2353   SODIUM  132*  133*  133*   POTASSIUM  4.3  4.6  4.3   CHLORIDE  104  105  106   CO2  21  22  21   GLUCOSE  117*  106*  107*   BUN  19  19  20   CREATININE  0.76  0.75  0.69   CALCIUM  8.9  9.0  8.6     Hemodynamics:  Temp (24hrs), Av.9 °C (98.5 °F), Min:36.3 °C (97.4 °F), Max:38 °C (100.4 °F)  Temperature: 36.9 °C (98.4 °F)  Pulse  Av.7  Min: 60  Max: 118   Blood Pressure : 126/59 mmHg     Respiratory:    Respiration: 18, Pulse Oximetry: 97 %     Work Of Breathing / Effort: Mild  RUL Breath Sounds: Diminished, RML Breath Sounds: Diminished, RLL Breath Sounds: Diminished, WAGNER Breath Sounds: Diminished, LLL Breath Sounds: Diminished  Fluids:    Intake/Output Summary (Last 24 hours) at 17 0004  Last data filed at 17 2300   Gross per 24 hour   Intake    360 ml   Output   1325 ml   Net   -965 ml     Weight: 64.5 kg (142 lb 3.2 oz)  GI/Nutrition:  Orders Placed This Encounter   Procedures   • Diet Order     Standing Status: Standing      Number of Occurrences: 1      Standing Expiration Date:      Order Specific Question:  Diet:     Answer:   2 Gram Sodium [7]     Order Specific Question:  Texture/Fiber modifications:     Answer:  Dysphagia 2(Pureed/Chopped)specify fluid consistency(question 6) [2]      Comments:  thins ok; pt with no teeth/ nor dentures     Order Specific Question:  Consistency/Fluid modifications:     Answer:  Thin Liquids [3]     Medical Decision Making, by Problem:  Active Hospital Problems    Diagnosis   • *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]   • Neutropenia (CMS-HCC) [D70.9]   • Pancytopenia (CMS-HCC) [D61.818]   • Diarrhea [R19.7]   • Acute bilateral low back pain without sciatica [M54.5]   • Seizure disorder (CMS-[facility]) [G40.909]   • Hypertension [I10]   • BPH (benign prostatic hyperplasia) [N40.0]   • Low vitamin D level [E55.9]   • Transaminitis [R74.0]     Pmhx unchanged    Plan:  1.  CML-- Day 22:  multiple relapses, now in blast crisis.  Prior treatments included dasatinib and bosutinib.  The plan has been for allo-BMT at [facility], but needs some response to chemo before they can do the transplant, giving Synribo for this.  Synribo is given as a SQ shot BID x 14 days with the first cycle, followed by 2 weeks off.  Started on 5/6/17- completed on 5/20/17.    Synribo is a high risk medication.  There is a 's warning for cerebral bleeding, and to avoid NSAIDS, blood thinners, etc as well as to use with caution in patients with Platelets<50K.  He has a h/o of a SDH in the recent past.      2.  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  Not likely to improve unless he can get response to some kind of chemo.  Will transfuse as needed to keep >10, or higher if bleeding.    3.  Neutropenia-- NF 5/26/17 started vanco / cefepime.  CXR revealing new infiltrate.     4.  Elevated LFT's.  stable    5.  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  If recurrent pain might consider RT.    6. Anemia-- transfuse 1 unit of PRBC's if <7.5.    7.  Diarrhea.  A common side effect of Synribo. He was started on  immodium on 5/10/17 with improvement. Antibiotics may have also been contributing.   Last C. Diff check on 5/13/17 negative . Resolved.  restarted again on 5/17/17. Repeat C. Diff neg.  Well controlled Imodium         DAY 22 from start of treatment   REC 5/27/17    --he is getting blood  --follow blood culture sensitivites  --Dr Vu to take over in am from Oncology    High complexity/ high drug toxicity risk/   Labs reviewed, Radiology images reviewed and Medications reviewed

## 2017-05-28 NOTE — PROGRESS NOTES
"Pharmacy Kinetics 71 y.o. male on vancomycin day # 3 2017    Currently on Vancomycin 1200 mg IV q12hr    Indication for Treatment: PNA; bacteremia    Pertinent history per medical record: Admitted on 2017 for complaints of total body pain and general malaise and fatigue for 2 days. Mid back pain worse with movement and deep breathing. Patient was neutropenic, however no s/s of infection present, thus antibiotics discontinued. Patient started on omacetaxine (Synribo) x 14 days on  (completed treatment ) for relapsed CML. Patient spiked a fever . CXR dictates \"New right basilar consolidation suggestive of pneumonia.\" Empiric Vanco + BETANCOURT added to Cefepime for possible PNA.     Other antibiotics: Metronidazole 500 mg PO Q8h, Cefepime 2 g IV Q8h    Allergies: Review of patient's allergies indicates no known allergies.     List concerns for renal function : Age, BUN:SCr>20:1, abnormal LFTs, SIRS    Pertinent cultures to date:    17 - blood (peripheral) x 2: Staph aureus  17 - Cdiff PCR/Toxin: Negative  17 - N gonorrhoeae/C trachomatis PCR: Negative  5/15/17 - urine cx: Negative  17 - Cdiff PCR/Toxin: Negative    Recent Labs      17   1851  17   0413  17   0329  17   2353   WBC  0.1*  0.1*  0.1*  0.1*     Recent Labs      17   0734  17   1608  17   2353   BUN  19  19  20   CREATININE  0.76  0.75  0.69   ALBUMIN  3.5  3.7  3.4     Recent Labs      17   1132   VANCOTROUGH  12.6     Intake/Output Summary (Last 24 hours) at 17 1052  Last data filed at 17 0746   Gross per 24 hour   Intake      0 ml   Output   2025 ml   Net  -2025 ml      Blood pressure 126/59, pulse 65, temperature 36.9 °C (98.4 °F), resp. rate 18, height 1.702 m (5' 7\"), weight 64.2 kg (141 lb 8.6 oz), SpO2 97 %. Temp (24hrs), Av.9 °C (98.5 °F), Min:36.3 °C (97.4 °F), Max:38 °C (100.4 °F)    A/P   1. Vancomycin dose change: Continue vancomycin 1200 mg IV " Q12h (~17.8 mg/kg; due @ 00, 12)  2. Next vancomycin level: 5/29 @ 1130  3. Goal trough: 16-20 mcg/mL  4. A/P: Patient afebrile overnight, remains neutropenic. Vital signs stable. Patient sating in the 90s on room air. Sensitivities pending on staph aureus growing from both sets of blood cultures. Steady state vanco level resulted subtherapeutic yesterday @ 12.6 mcg/mL, thus vancomycin dose increased to 17 mg/kg IV Q12h as outlined above. Renal indices stable, there are no new concerns for accumulation/clearance at this time. Will continue with this dose and check a steady state level tomorrow @ 1130. Recommend ID consult in the setting of staph aureus bacteremia. Pharmacy to monitor and adjust as needed.     Dorene Holt, JabierD

## 2017-05-28 NOTE — PROGRESS NOTES
[facility] Internal Medicine Interval Note    Name Benjamin Post       1945   Age/Sex 71 y.o. male   MRN [id number]   Code Status DNR     After 5PM or if no immediate response to page, please call for cross-coverage  Attending/Team: Dr. Santiago  Call [phone] to page   1st Call - Day Intern (R1):   Dr Cowart 2nd Call - Day Sr. Resident (R2/R3):            Chief complaint/ reason for interval visit (Primary Diagnosis)   CML/pancytopenia     Interval Problem Daily Status Update  :  S : No significant overnight events. Pt denies any acute complaints.      Today's Changes :   - No fevers overnight.   - No more diarrhea for past 12 hrs after he got  3 episodes yesterday morning.   - Stool WBC's negative. Will start him on PRN Loperamide if recurrence of diarrhea.  - Spoke to Microbiology regarding final report of Blood Cx which are tentatively due till this afternoon.   Checked if they can do PBP-2 test for MRSA confirmation which can be reported in 30 minutes but kits unavailable in the lab at this time. Till now as per Microbiology its possible MSSA.   - Will De-escalate the Abx after seeing the FINAL report today.  - Will place another PIV line before sending the existing PIV tip for Cultures.  - Oncology Consult  to start from tomm and we will follow his recs if any changes on the Chemotherapy.  - Pt HBG today 7.4 >> 7.9 yesterday , PLTS 15 today >> 22 yesterday. As per the threshold for transfusions per Oncology ( HBG 7.5 & PLTS 10 ) pt will receive 1 U PRBC ( CMV negative and irradiated).        Principal Problem:    Fever and neutropenia (CMS-HCC) POA: Unknown      Overview: Tmax up to 102.7 . None thereafter.      BC x 2 show Staphylococcus growth on Bactec. Final culture reports are       pending.       Benign physical examination. PIV site area is clean and normal without       inflammation.       CXR shows a  questionable right basilar consolidation      UA negative      Tylenol on board.      Cefepime, flagyl oral, and IV vancomycin added to antiviral agent      Will monitor closely.   Active Problems:    CML (chronic myelocytic leukemia)-Accelerated phase (Chronic) POA: Yes      Overview:  Had fevers overnight.       WBC 0.1. Patient may need more chemotherapy as per Dr. Jorgensen.     Neutropenia (CMS-[facility]) POA: Yes      Overview:           Acute bilateral low back pain without sciatica POA: Yes    Diarrhea POA: No      Overview: Loperamide stopped on 5/25 due to initiation of antibacterials.           Seizure disorder (CMS-[facility]) POA: Yes    Transaminitis POA: Yes      Overview: Related to chemo therapy. AST/ALT: 46/89>>61/111>>71/134>>80/171 >>88/169    BPH (benign prostatic hyperplasia) (Chronic) POA: Yes      Overview: On flomax    Low vitamin D level (Chronic) POA: Yes      Overview: Vit D: 11 on 4/29/17    Hypertension POA: Unknown  Resolved Problems:    Bone pain POA: Yes      Overview: Stable      Negative PE for tenderness over the rib cage.    Elevated PSA (Chronic) POA: Yes      Overview: PSA: 8.4 on 4/4/17  >>>>>4 on 5/18    Dysuria POA: Unknown      Overview: Stable           Abdominal pain POA: No      Overview: Resolved.           Review of Systems   Constitutional: Negative for fever and chills.   HENT: Negative for hearing loss.    Respiratory: Negative for cough and hemoptysis.    Cardiovascular: Negative for chest pain and palpitations.   Gastrointestinal: Positive for diarrhea. Negative for heartburn, nausea and abdominal pain.        Stable diarrhea    Genitourinary: Negative for dysuria and urgency.   Musculoskeletal: Negative for myalgias and neck pain.   Skin: Negative for rash.   Neurological: Negative for dizziness and headaches.   Psychiatric/Behavioral: Negative for depression and suicidal ideas.       Consultants/Specialty  Oncology - Van Meter/Mak    Disposition  Remain inpatient for  chemotherapy monitoring    Quality Measures  EKG reviewed, Labs reviewed, Medications reviewed and Radiology images reviewed  Mahajan catheter: No Mahajan      DVT Prophylaxis: Contraindicated - High bleeding risk  DVT prophylaxis - mechanical: SCDs (low plt)            Physical Exam       Filed Vitals:    05/28/17 0340 05/28/17 0746 05/28/17 0927 05/28/17 1002   BP: 127/54 140/59 129/60 126/59   Pulse: 75 82 69 65   Temp: 36.3 °C (97.4 °F) 37.3 °C (99.1 °F) 37.2 °C (99 °F) 36.9 °C (98.4 °F)   Resp: 18 16 18 18   Height:       Weight:       SpO2: 97% 98% 98% 97%     Body mass index is 22.16 kg/(m^2). Weight: 64.2 kg (141 lb 8.6 oz)  Oxygen Therapy:  Pulse Oximetry: 97 %, O2 (LPM): 0, O2 Delivery: None (Room Air)    Physical Exam   Constitutional: He is well-developed, well-nourished, and in no distress. No distress.   HENT:   Head: Normocephalic.   Neck: No tracheal deviation present. No thyromegaly present.   Cardiovascular: Normal rate.  Exam reveals no friction rub.    Murmur heard.  Pansystolic murmur on the apex    Pulmonary/Chest: Effort normal. No respiratory distress. He has no wheezes.   Abdominal: Soft. He exhibits no distension. There is no tenderness.   Musculoskeletal: He exhibits no edema.   Neurological: He is alert.   Skin: No erythema.         Lab Data Review:      5/4/2017  1:28 PM    Recent Labs      05/26/17   0734  05/26/17   1608  05/27/17   2353   SODIUM  132*  133*  133*   POTASSIUM  4.3  4.6  4.3   CHLORIDE  104  105  106   CO2  21  22  21   BUN  19  19  20   CREATININE  0.76  0.75  0.69   MAGNESIUM  1.9   --    --    CALCIUM  8.9  9.0  8.6       Recent Labs      05/26/17   0734  05/26/17   1608  05/27/17   2353   ALTSGPT  88*  81*  57*   ASTSGOT  40  33  25   ALKPHOSPHAT  220*  226*  221*   TBILIRUBIN  1.6*  1.4  1.0   GLUCOSE  117*  106*  107*       Recent Labs      05/25/17   2215  05/26/17   0413  05/27/17   0329  05/27/17   1648   RBC   --   3.24*  2.72*  2.59*   HEMOGLOBIN   --   9.3*   7.9*  7.4*   HEMATOCRIT   --   26.0*  22.6*  21.8*   PLATELETCT   --   9*  22*  15*   PROTHROMBTM  14.8*   --    --    --    INR  1.12   --    --    --        Recent Labs      05/26/17   0413  05/26/17   0734  05/26/17   1608  05/27/17   0329  05/27/17   2353   WBC  0.1*   --    --   0.1*  0.1*   ASTSGOT   --   40  33   --   25   ALTSGPT   --   88*  81*   --   57*   ALKPHOSPHAT   --   220*  226*   --   221*   TBILIRUBIN   --   1.6*  1.4   --   1.0           Assessment/Plan     * Fever and neutropenia (CMS-[facility])  Assessment & Plan  Patient developed a 102F temp on 5/25. Cefepime, flagyl, and vancomycin added to his antiviral regimen. Blood cultures drawn which show a preliminary report of staphylococcus. Temperature managed with tylenol. Patient's vital signs remained stable and he did not have any complaints. Loperamide stopped due to the concern for CDiff. Oncology aware and onboard.     Pancytopenia (CMS-[facility])  Overview  - Pancytopenia secondary to CML and chemotherapy  Assessment & Plan  Transfused multiple platelet and PRBCs    Follow up and keep plt>10 and Hb>7.5.  Started with cefepim on 5/7/17 due to fever 100.4, Urine and blood culture have been negative since, completed 7 day course  On acyclovir on 5/9 for prophylaxis.       CML (chronic myelocytic leukemia)-Accelerated phase (present on admission)  Overview   Had fevers overnight.    WBC 0.1. Patient may need more chemotherapy as per Dr. Jorgensen.     Assessment & Plan  Has completed multiple chemotherapy cycles.     multiple relapses.     Was treated on dasatinib for a time with response, but then relapse.     Was more recently on bosutinib with response, but again recent relapse.   Pancytopenia (chronic, before starting with chemotherapy)    Protein electrophoresis: the polyclonal increase in the gamma region which may be indicative of specific immune   response or an early monoclonal protein.     Started on 5/6/17 with chemotherapy (synribo ) BID for 14  days which is completed on 5/20  Developed diarrhea as a complication which is being treated with Imodium    C, diff negative, can continue loperamide to good effect  BMT planned for the future    Neutropenia (CMS-HCC) (present on admission)  Overview        Assessment & Plan  On acyclovir, cefepime, vancomycin, and flagyl  Blood cultures show a preliminary report of possible Staph growth.     Diarrhea  Overview  Loperamide stopped on 5/25 due to initiation of antibacterials.       Assessment & Plan  Most likely related to chemo  Significantly improved on Loperamide PRN but had to be stopped due to initiation of antibacterials.    Cdiff negative x2  monitor electrolytes and continue IV fluid.       Transaminitis (present on admission)  Overview  Related to chemo therapy. AST/ALT: 46/89>>61/111>>71/134>>80/171 >>88/169    Assessment & Plan  No abdominal pain, likely effect of chemotherapy  Discontinue all hepatotoxic drugs as possible  Continue to follow    BPH (benign prostatic hyperplasia) (present on admission)  Assessment & Plan  Stable,  Complains of some dysuria, repeat PSA and perineal exam negative for prostatitis  Continue on tamsulosin 0.8 mg daily.    Added finasteride as well    Low vitamin D level (present on admission)  Overview  Vit D: 11 on 4/29/17    Assessment & Plan  Vit D 11 on 4/29/2017    Continue supplementation.     Hypertension  Assessment & Plan  On Coreg  Norvasc 5 added on 5/24 for better BP control.    PRN hydralazine 10 mg onboard.      Seizure disorder (CMS-HCC) (present on admission)  Assessment & Plan  Stable    Continue on his home dose of keppra  Had 1 episode last year after dental procedure.

## 2017-05-28 NOTE — PROGRESS NOTES
Bedside report received from noc RN, assumed pt care, assessment completed, pt aox4, pt ambulates shuffled gait up SBA, bed alarm verified, pt denies pain at present time, applied lidocaine patch, reviewed POC with patient and agreeable, PIV IVF infusing, bed in low position, call light and personal belongings within reach, hourly rounding in place, pt needs met.

## 2017-05-29 PROCEDURE — A9270 NON-COVERED ITEM OR SERVICE: HCPCS | Performed by: STUDENT IN AN ORGANIZED HEALTH CARE EDUCATION/TRAINING PROGRAM

## 2017-05-29 PROCEDURE — 700102 HCHG RX REV CODE 250 W/ 637 OVERRIDE(OP): Performed by: INTERNAL MEDICINE

## 2017-05-29 PROCEDURE — 700105 HCHG RX REV CODE 258: Performed by: INTERNAL MEDICINE

## 2017-05-29 PROCEDURE — 97530 THERAPEUTIC ACTIVITIES: CPT

## 2017-05-29 PROCEDURE — A9270 NON-COVERED ITEM OR SERVICE: HCPCS | Performed by: INTERNAL MEDICINE

## 2017-05-29 PROCEDURE — 700102 HCHG RX REV CODE 250 W/ 637 OVERRIDE(OP): Performed by: STUDENT IN AN ORGANIZED HEALTH CARE EDUCATION/TRAINING PROGRAM

## 2017-05-29 PROCEDURE — 770009 HCHG ROOM/CARE - ONCOLOGY SEMI PRI*

## 2017-05-29 PROCEDURE — 700111 HCHG RX REV CODE 636 W/ 250 OVERRIDE (IP): Performed by: INTERNAL MEDICINE

## 2017-05-29 PROCEDURE — 99233 SBSQ HOSP IP/OBS HIGH 50: CPT | Mod: GC | Performed by: INTERNAL MEDICINE

## 2017-05-29 PROCEDURE — 97116 GAIT TRAINING THERAPY: CPT

## 2017-05-29 PROCEDURE — 97535 SELF CARE MNGMENT TRAINING: CPT

## 2017-05-29 RX ADMIN — SODIUM CHLORIDE: 9 INJECTION, SOLUTION INTRAVENOUS at 05:06

## 2017-05-29 RX ADMIN — CEFTRIAXONE SODIUM 2 G: 2 INJECTION, POWDER, FOR SOLUTION INTRAMUSCULAR; INTRAVENOUS at 20:59

## 2017-05-29 RX ADMIN — POTASSIUM CHLORIDE 40 MEQ: 750 TABLET, FILM COATED, EXTENDED RELEASE ORAL at 10:06

## 2017-05-29 RX ADMIN — FINASTERIDE 5 MG: 5 TABLET, FILM COATED ORAL at 10:04

## 2017-05-29 RX ADMIN — CARVEDILOL 12.5 MG: 12.5 TABLET, FILM COATED ORAL at 10:04

## 2017-05-29 RX ADMIN — LEVETIRACETAM 750 MG: 250 TABLET, FILM COATED ORAL at 10:05

## 2017-05-29 RX ADMIN — AMLODIPINE BESYLATE 5 MG: 5 TABLET ORAL at 10:03

## 2017-05-29 RX ADMIN — ACYCLOVIR 400 MG: 800 TABLET ORAL at 10:02

## 2017-05-29 RX ADMIN — LEVETIRACETAM 750 MG: 250 TABLET, FILM COATED ORAL at 20:59

## 2017-05-29 RX ADMIN — POTASSIUM CHLORIDE 40 MEQ: 750 TABLET, FILM COATED, EXTENDED RELEASE ORAL at 20:59

## 2017-05-29 RX ADMIN — MAGNESIUM GLUCONATE 500 MG ORAL TABLET 400 MG: 500 TABLET ORAL at 10:05

## 2017-05-29 RX ADMIN — ACYCLOVIR 400 MG: 800 TABLET ORAL at 20:59

## 2017-05-29 RX ADMIN — CARVEDILOL 12.5 MG: 12.5 TABLET, FILM COATED ORAL at 17:32

## 2017-05-29 RX ADMIN — TAMSULOSIN HYDROCHLORIDE 0.8 MG: 0.4 CAPSULE ORAL at 10:06

## 2017-05-29 ASSESSMENT — ENCOUNTER SYMPTOMS
CARDIOVASCULAR NEGATIVE: 1
MUSCULOSKELETAL NEGATIVE: 1
COUGH: 0
CHILLS: 0
HEARTBURN: 0
FEVER: 0
PALPITATIONS: 0
RESPIRATORY NEGATIVE: 1
HEMOPTYSIS: 0
BRUISES/BLEEDS EASILY: 0
HEADACHES: 0
DIARRHEA: 1
DEPRESSION: 0
WEAKNESS: 1
NAUSEA: 0
NECK PAIN: 0
DIZZINESS: 0
ABDOMINAL PAIN: 0
MYALGIAS: 0
DEPRESSION: 1

## 2017-05-29 ASSESSMENT — GAIT ASSESSMENTS
ASSISTIVE DEVICE: FRONT WHEEL WALKER
DEVIATION: DECREASED HEEL STRIKE;DECREASED BASE OF SUPPORT
DISTANCE (FEET): 160
GAIT LEVEL OF ASSIST: STAND BY ASSIST

## 2017-05-29 ASSESSMENT — PAIN SCALES - GENERAL: PAINLEVEL_OUTOF10: 0

## 2017-05-29 ASSESSMENT — COGNITIVE AND FUNCTIONAL STATUS - GENERAL
MOVING TO AND FROM BED TO CHAIR: A LITTLE
WALKING IN HOSPITAL ROOM: A LITTLE
CLIMB 3 TO 5 STEPS WITH RAILING: A LITTLE
SUGGESTED CMS G CODE MODIFIER MOBILITY: CK
TURNING FROM BACK TO SIDE WHILE IN FLAT BAD: A LITTLE
STANDING UP FROM CHAIR USING ARMS: A LITTLE
MOBILITY SCORE: 19

## 2017-05-29 NOTE — PROGRESS NOTES
Oncology/Hematology Progress Note               Author: LEVAR Ronni Mirella Date & Time created: 5/29/2017  8:57 AM     Interval History:  CC: Advanced CML: Accelerated vs blast crisis  Meeting at bedside with patient and daughter  Told family before we committ to second round of omacetaxine patient needs to decide if he wants to stay in hospital  Blood counts are no better; patient is depressed; refusing blood draws  If he stayed in hospital for further treatment next week he will be here for at least another months  Told patient there is <20% chance of improvement    Review of Systems:  Review of Systems   Constitutional: Positive for malaise/fatigue. Negative for fever.   Respiratory: Negative.    Cardiovascular: Negative.    Gastrointestinal: Negative for abdominal pain.   Musculoskeletal: Negative.    Skin: Negative for rash.   Neurological: Positive for weakness. Negative for headaches.   Endo/Heme/Allergies: Does not bruise/bleed easily.   Psychiatric/Behavioral: Positive for depression.       Physical Exam:  Physical Exam   Constitutional: He is oriented to person, place, and time. No distress.   HENT:   Mouth/Throat: No oropharyngeal exudate.   Eyes: No scleral icterus.   Neck: Normal range of motion.   Cardiovascular: Normal rate.    Pulmonary/Chest: Effort normal.   Abdominal: Soft. Bowel sounds are normal.   Musculoskeletal: He exhibits no edema.   Neurological: He is oriented to person, place, and time.       Labs:        Invalid input(s): [id number]      Recent Labs      05/26/17   1608  05/27/17   2353   SODIUM  133*  133*   POTASSIUM  4.6  4.3   CHLORIDE  105  106   CO2  22  21   BUN  19  20   CREATININE  0.75  0.69   CALCIUM  9.0  8.6     Recent Labs      05/26/17   1608  05/27/17   2353   ALTSGPT  81*  57*   ASTSGOT  33  25   ALKPHOSPHAT  226*  221*   TBILIRUBIN  1.4  1.0   GLUCOSE  106*  107*     Recent Labs      05/27/17   0329  05/27/17   2353   RBC  2.72*  2.59*   HEMOGLOBIN  7.9*  7.4*    HEMATOCRIT  22.6*  21.8*   PLATELETCT  22*  15*     Recent Labs      17   1608  17   0329  17   2353   WBC   --   0.1*  0.1*   ASTSGOT  33   --   25   ALTSGPT  81*   --   57*   ALKPHOSPHAT  226*   --   221*   TBILIRUBIN  1.4   --   1.0     Recent Labs      17   1608  17   2353   SODIUM  133*  133*   POTASSIUM  4.6  4.3   CHLORIDE  105  106   CO2  22  21   GLUCOSE  106*  107*   BUN  19  20   CREATININE  0.75  0.69   CALCIUM  9.0  8.6     Hemodynamics:  Temp (24hrs), Av.7 °C (98.1 °F), Min:36.3 °C (97.4 °F), Max:37.2 °C (99 °F)  Temperature: 36.8 °C (98.2 °F)  Pulse  Av.4  Min: 60  Max: 118   Blood Pressure : 138/79 mmHg     Respiratory:    Respiration: 18, Pulse Oximetry: 99 %     Work Of Breathing / Effort: Mild  RUL Breath Sounds: Diminished, RML Breath Sounds: Diminished, RLL Breath Sounds: Diminished, WAGNER Breath Sounds: Diminished, LLL Breath Sounds: Diminished  Fluids:    Intake/Output Summary (Last 24 hours) at 17 0857  Last data filed at 17 0120   Gross per 24 hour   Intake   1020 ml   Output   1350 ml   Net   -330 ml     Weight: 64.5 kg (142 lb 3.2 oz)  GI/Nutrition:  Orders Placed This Encounter   Procedures   • Diet Order     Standing Status: Standing      Number of Occurrences: 1      Standing Expiration Date:      Order Specific Question:  Diet:     Answer:  2 Gram Sodium [7]     Order Specific Question:  Texture/Fiber modifications:     Answer:  Dysphagia 2(Pureed/Chopped)specify fluid consistency(question 6) [2]      Comments:  thins ok; pt with no teeth/ nor dentures     Order Specific Question:  Consistency/Fluid modifications:     Answer:  Thin Liquids [3]     Medical Decision Making, by Problem:  Active Hospital Problems    Diagnosis   • *Fever and neutropenia (CMS-HCC) [D70.9, R50.81]   • Neutropenia (CMS-HCC) [D70.9]   • 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]   • Diarrhea [R19.7]   • Acute bilateral low back pain without  sciatica [M54.5]   • Seizure disorder (CMS-HCC) [G40.909]   • Hypertension [I10]   • BPH (benign prostatic hyperplasia) [N40.0]   • Low vitamin D level [E55.9]   • Transaminitis [R74.0]       Plan:  CML: will decide about aggressiveness of treatment   Do we continue treatment or go home with hospice  Highly complex  Long conversation    Labs reviewed and Medications reviewed

## 2017-05-29 NOTE — PROGRESS NOTES
Pt is A&O.  Up to ambulate halls with PT.  Tolerated well.  Took all po meds this am.  Cooperating with care.  Long conversation with MD and family ythis am regarding POC.  Pt and his daughter to discuss options over the next few days ten decide on moving forward with chemo VS home with hospice.  Pt appetite fair for breakfast.

## 2017-05-29 NOTE — PROGRESS NOTES
Assumed care of patient @ 1900. Bedside report received. Patient AO x3. VSS,  Pain 0/10. Fall precautions in place, PIV infusing, PT seems withdrawn but when questioned he said he was oh with a flat affect. POC discussed with patient, all questions answered, call light within reach, hourly rounding in place.

## 2017-05-29 NOTE — THERAPY
"Pt willing to mobilise, OOB SBA, trasfers CGA-SBA, gait w/FWW CGA-SBA on FLS narrow AZUCENA shrot stride, sat eob post Tx and needed SBA to recline in bed. Pt will benifit from acute post acute rehab.Physical Therapy Treatment completed.   Bed Mobility:  Supine to Sit: Stand by Assist  Transfers: Sit to Stand: Supervised  Gait: Level Of Assist: Stand by Assist with Front-Wheel Walker       Plan of Care: Will benefit from Physical Therapy 2 times per week  Discharge Recommendations: Equipment: Front-Wheel Walker. Post-acute therapy Discharge to a transitional care facility for continued skilled therapy services.     See \"Rehab Therapy-Acute\" Patient Summary Report for complete documentation.       "

## 2017-05-29 NOTE — PROGRESS NOTES
[facility] [place] Internal Medicine Interval Note    Name Benjamin Post       1945   Age/Sex 71 y.o. male   MRN [id number]   Code Status DNR     After 5PM or if no immediate response to page, please call for cross-coverage  Attending/Team: Dr. Santiago  Call [phone] to page   1st Call - Day Intern (R1):   Dr Sears  2nd Call - Day Sr. Resident (R2/R3):            Chief complaint/ reason for interval visit (Primary Diagnosis)   CML/pancytopenia     Interval Problem Daily Status Update  :  S : No significant overnight events. Pt denies any acute complaints.      Today's Changes :   - No fevers overnight.   - No more diarrhea for past 12 hrs after he got  3 episodes of watery stool yesterday.   - the patient refusing blood draw for lab  - Stool WBC's negative. He is on loperamide, did not receive it since .  - blood culture on  shows MSSA, started ceftriaxone yesterday  -  PIV tip culture is negative  - The patient met with Dr. Santillan, hematologist today.          Principal Problem:    Fever and neutropenia (CMS-HCC) POA: Unknown      Overview: Tmax up to 102.7 . None thereafter.      BC x 2 show Staphylococcus growth on Bactec. Final culture reports are       pending.       Benign physical examination. PIV site area is clean and normal without       inflammation.       CXR shows a questionable right basilar consolidation      UA negative      Tylenol on board.      Cefepime, flagyl oral, and IV vancomycin added to antiviral agent      Will monitor closely.   Active Problems:    CML (chronic myelocytic leukemia)-Accelerated phase (Chronic) POA: Yes      Overview:  Had fevers overnight.       WBC 0.1. Patient may need more chemotherapy as per Dr. Jorgensen.     Neutropenia (CMS-HCC) POA: Yes      Overview:           Acute bilateral low back pain without sciatica POA: Yes    Diarrhea POA: No      Overview: Loperamide stopped on  5/25 due to initiation of antibacterials.           Seizure disorder (CMS-HCC) POA: Yes    Transaminitis POA: Yes      Overview: Related to chemo therapy. AST/ALT: 46/89>>61/111>>71/134>>80/171 >>88/169    BPH (benign prostatic hyperplasia) (Chronic) POA: Yes      Overview: On flomax    Low vitamin D level (Chronic) POA: Yes      Overview: Vit D: 11 on 4/29/17    Hypertension POA: Unknown  Resolved Problems:    Bone pain POA: Yes      Overview: Stable      Negative PE for tenderness over the rib cage.    Elevated PSA (Chronic) POA: Yes      Overview: PSA: 8.4 on 4/4/17  >>>>>4 on 5/18    Dysuria POA: Unknown      Overview: Stable           Abdominal pain POA: No      Overview: Resolved.           Review of Systems   Constitutional: Negative for fever and chills.   HENT: Negative for hearing loss.    Respiratory: Negative for cough and hemoptysis.    Cardiovascular: Negative for chest pain and palpitations.   Gastrointestinal: Positive for diarrhea. Negative for heartburn, nausea and abdominal pain.        Stable diarrhea    Genitourinary: Negative for dysuria and urgency.   Musculoskeletal: Negative for myalgias and neck pain.   Skin: Negative for rash.   Neurological: Negative for dizziness and headaches.   Psychiatric/Behavioral: Negative for depression and suicidal ideas.       Consultants/Specialty  Oncology - Van Meter/Mak    Disposition  Remain inpatient for chemotherapy monitoring    Quality Measures  EKG reviewed, Labs reviewed, Medications reviewed and Radiology images reviewed  Mahajan catheter: No Mahajan      DVT Prophylaxis: Contraindicated - High bleeding risk  DVT prophylaxis - mechanical: SCDs (low plt)            Physical Exam       Filed Vitals:    05/29/17 0423 05/29/17 0800 05/29/17 1030 05/29/17 1200   BP: 152/63 138/79  143/87   Pulse: 68 79  67   Temp: 37.1 °C (98.7 °F) 36.8 °C (98.2 °F)  37.3 °C (99.1 °F)   Resp: 18 18 18   Height:       Weight:       SpO2: 98% 99% 96% 98%     Body mass  index is 22.27 kg/(m^2).    Oxygen Therapy:  Pulse Oximetry: 98 %, O2 (LPM): 0, O2 Delivery: None (Room Air)    Physical Exam   Constitutional: He is well-developed, well-nourished, and in no distress. No distress.   HENT:   Head: Normocephalic.   Neck: No tracheal deviation present. No thyromegaly present.   Cardiovascular: Normal rate.  Exam reveals no friction rub.    Murmur heard.  Pansystolic murmur on the apex    Pulmonary/Chest: Effort normal. No respiratory distress. He has no wheezes.   Abdominal: Soft. He exhibits no distension. There is no tenderness.   Musculoskeletal: He exhibits no edema.   Neurological: He is alert.   Skin: No erythema.         Lab Data Review:      5/4/2017  1:28 PM    Recent Labs      05/26/17   1608  05/27/17   2353   SODIUM  133*  133*   POTASSIUM  4.6  4.3   CHLORIDE  105  106   CO2  22  21   BUN  19  20   CREATININE  0.75  0.69   CALCIUM  9.0  8.6       Recent Labs      05/26/17   1608  05/27/17   2353   ALTSGPT  81*  57*   ASTSGOT  33  25   ALKPHOSPHAT  226*  221*   TBILIRUBIN  1.4  1.0   GLUCOSE  106*  107*       Recent Labs      05/27/17   0329  05/27/17   2353   RBC  2.72*  2.59*   HEMOGLOBIN  7.9*  7.4*   HEMATOCRIT  22.6*  21.8*   PLATELETCT  22*  15*       Recent Labs      05/26/17   1608  05/27/17   0329  05/27/17   2353   WBC   --   0.1*  0.1*   ASTSGOT  33   --   25   ALTSGPT  81*   --   57*   ALKPHOSPHAT  226*   --   221*   TBILIRUBIN  1.4   --   1.0           Assessment/Plan     * Fever and neutropenia (CMS-HCC)  Assessment & Plan  Patient developed a 102F temp on 5/25. Cefepime, flagyl, and vancomycin added to his antiviral regimen. Blood cultures drawn which show MSSA. Fever subsided and switched to ceftriaxone only.    Pancytopenia (CMS-HCC)  Overview  - Pancytopenia secondary to CML and chemotherapy  Assessment & Plan  Transfused multiple platelet and PRBCs    Follow up and keep plt>10 and Hb>7.5.  Switched to ceftriaxone  On acyclovir on 5/9 for prophylaxis.    He is refusing lab draw      CML (chronic myelocytic leukemia)-Accelerated phase (present on admission)  Overview   WBC 0.1. Patient may need more chemotherapy as per Dr. Jorgensen.     Assessment & Plan  Has completed multiple chemotherapy cycles.     multiple relapses.     Was treated on dasatinib for a time with response, but then relapse.     Was more recently on bosutinib with response, but again recent relapse.   Pancytopenia (chronic, before starting with chemotherapy)    Protein electrophoresis: the polyclonal increase in the gamma region which may be indicative of specific immune   response or an early monoclonal protein.     Started on 5/6/17 with chemotherapy (synribo ) BID for 14 days which is completed on 5/20  Developed diarrhea as a complication which is being treated with Imodium    C, diff negative, can continue loperamide to good effect  Dr. Santillan discussed the plan with the patient about going through treatment or hospice referral with this poor prognosis. The patient has not decided yet.    Neutropenia (CMS-HCC) (present on admission)  Overview        Assessment & Plan  On acyclovir, cefepime, vancomycin, and flagyl  Blood cultures show a preliminary report of possible Staph growth.     Diarrhea  Overview  Loperamide stopped on 5/25 due to initiation of antibacterials.       Assessment & Plan  Most likely related to chemo  Significantly improved on Loperamide PRN but had to be stopped due to initiation of antibacterials.    Cdiff negative x2  monitor electrolytes and continue IV fluid.       Transaminitis (present on admission)  Overview  Related to chemo therapy. AST/ALT: 46/89>>61/111>>71/134>>80/171 >>88/169    Assessment & Plan  No abdominal pain, likely effect of chemotherapy  Discontinue all hepatotoxic drugs as possible  Continue to follow    BPH (benign prostatic hyperplasia) (present on admission)  Assessment & Plan  Stable,  Complains of some dysuria, repeat PSA and perineal exam  negative for prostatitis  Continue on tamsulosin 0.8 mg daily.    Added finasteride as well    Low vitamin D level (present on admission)  Overview  Vit D: 11 on 4/29/17    Assessment & Plan  Vit D 11 on 4/29/2017    Continue supplementation.     Hypertension  Assessment & Plan  On Coreg  Norvasc 5 added on 5/24 for better BP control.    PRN hydralazine 10 mg onboard.      Seizure disorder (CMS-HCC) (present on admission)  Assessment & Plan  Stable    Continue on his home dose of keppra  Had 1 episode last year after dental procedure.

## 2017-05-30 ENCOUNTER — APPOINTMENT (OUTPATIENT)
Dept: ONCOLOGY | Facility: MEDICAL CENTER | Age: 72
End: 2017-05-30
Attending: SPECIALIST
Payer: MEDICARE

## 2017-05-30 LAB
CC # CATH TIP CULT: NORMAL /ML
ERYTHROCYTE [DISTWIDTH] IN BLOOD BY AUTOMATED COUNT: 35.4 FL (ref 35.9–50)
HCT VFR BLD AUTO: 27.3 % (ref 42–52)
HGB BLD-MCNC: 9.7 G/DL (ref 14–18)
MCH RBC QN AUTO: 28.2 PG (ref 27–33)
MCHC RBC AUTO-ENTMCNC: 35.5 G/DL (ref 33.7–35.3)
MCV RBC AUTO: 79.4 FL (ref 81.4–97.8)
NEUTROPHILS # BLD AUTO: ABNORMAL K/UL (ref 1.82–7.42)
NRBC # BLD AUTO: 0 K/UL
NRBC BLD AUTO-RTO: 0 /100 WBC
PLATELET # BLD AUTO: 7 K/UL (ref 164–446)
PMV BLD AUTO: 8.5 FL (ref 9–12.9)
RBC # BLD AUTO: 3.44 M/UL (ref 4.7–6.1)
SIGNIFICANT IND 70042: NORMAL
SITE SITE: NORMAL
SOURCE SOURCE: NORMAL
WBC # BLD AUTO: 0.1 K/UL (ref 4.8–10.8)

## 2017-05-30 PROCEDURE — A9270 NON-COVERED ITEM OR SERVICE: HCPCS | Performed by: INTERNAL MEDICINE

## 2017-05-30 PROCEDURE — A9270 NON-COVERED ITEM OR SERVICE: HCPCS | Performed by: STUDENT IN AN ORGANIZED HEALTH CARE EDUCATION/TRAINING PROGRAM

## 2017-05-30 PROCEDURE — 700102 HCHG RX REV CODE 250 W/ 637 OVERRIDE(OP): Performed by: INTERNAL MEDICINE

## 2017-05-30 PROCEDURE — 86644 CMV ANTIBODY: CPT

## 2017-05-30 PROCEDURE — P9034 PLATELETS, PHERESIS: HCPCS

## 2017-05-30 PROCEDURE — 85025 COMPLETE CBC W/AUTO DIFF WBC: CPT

## 2017-05-30 PROCEDURE — 700111 HCHG RX REV CODE 636 W/ 250 OVERRIDE (IP): Performed by: INTERNAL MEDICINE

## 2017-05-30 PROCEDURE — 700102 HCHG RX REV CODE 250 W/ 637 OVERRIDE(OP): Performed by: STUDENT IN AN ORGANIZED HEALTH CARE EDUCATION/TRAINING PROGRAM

## 2017-05-30 PROCEDURE — 36415 COLL VENOUS BLD VENIPUNCTURE: CPT

## 2017-05-30 PROCEDURE — 36430 TRANSFUSION BLD/BLD COMPNT: CPT

## 2017-05-30 PROCEDURE — 99232 SBSQ HOSP IP/OBS MODERATE 35: CPT | Mod: GC | Performed by: INTERNAL MEDICINE

## 2017-05-30 PROCEDURE — 770009 HCHG ROOM/CARE - ONCOLOGY SEMI PRI*

## 2017-05-30 RX ORDER — CEFAZOLIN SODIUM 2 G/100ML
2 INJECTION, SOLUTION INTRAVENOUS EVERY 8 HOURS
Status: DISCONTINUED | OUTPATIENT
Start: 2017-05-30 | End: 2017-06-10

## 2017-05-30 RX ADMIN — MAGNESIUM GLUCONATE 500 MG ORAL TABLET 400 MG: 500 TABLET ORAL at 09:46

## 2017-05-30 RX ADMIN — ACYCLOVIR 400 MG: 800 TABLET ORAL at 09:43

## 2017-05-30 RX ADMIN — AMLODIPINE BESYLATE 5 MG: 5 TABLET ORAL at 09:43

## 2017-05-30 RX ADMIN — CEFAZOLIN SODIUM 2 G: 2 INJECTION, SOLUTION INTRAVENOUS at 23:38

## 2017-05-30 RX ADMIN — LEVETIRACETAM 750 MG: 250 TABLET, FILM COATED ORAL at 09:44

## 2017-05-30 RX ADMIN — POTASSIUM CHLORIDE 40 MEQ: 750 TABLET, FILM COATED, EXTENDED RELEASE ORAL at 09:46

## 2017-05-30 RX ADMIN — CARVEDILOL 12.5 MG: 12.5 TABLET, FILM COATED ORAL at 16:55

## 2017-05-30 RX ADMIN — CEFAZOLIN SODIUM 2 G: 2 INJECTION, SOLUTION INTRAVENOUS at 16:55

## 2017-05-30 RX ADMIN — TAMSULOSIN HYDROCHLORIDE 0.8 MG: 0.4 CAPSULE ORAL at 09:46

## 2017-05-30 RX ADMIN — POTASSIUM CHLORIDE 40 MEQ: 750 TABLET, FILM COATED, EXTENDED RELEASE ORAL at 21:57

## 2017-05-30 RX ADMIN — CARVEDILOL 12.5 MG: 12.5 TABLET, FILM COATED ORAL at 09:44

## 2017-05-30 RX ADMIN — ACYCLOVIR 400 MG: 800 TABLET ORAL at 21:56

## 2017-05-30 RX ADMIN — LEVETIRACETAM 750 MG: 250 TABLET, FILM COATED ORAL at 21:56

## 2017-05-30 RX ADMIN — FINASTERIDE 5 MG: 5 TABLET, FILM COATED ORAL at 09:44

## 2017-05-30 ASSESSMENT — ENCOUNTER SYMPTOMS
MUSCULOSKELETAL NEGATIVE: 1
NAUSEA: 0
RESPIRATORY NEGATIVE: 1
GASTROINTESTINAL NEGATIVE: 1
HEADACHES: 0
NECK PAIN: 0
FOCAL WEAKNESS: 0
DEPRESSION: 0
PALPITATIONS: 0
HEMOPTYSIS: 0
BRUISES/BLEEDS EASILY: 1
SPEECH CHANGE: 0
COUGH: 0
WEAKNESS: 1
DIZZINESS: 0
CHILLS: 0
MYALGIAS: 0
DIARRHEA: 0
FEVER: 0
CARDIOVASCULAR NEGATIVE: 1
HEARTBURN: 0
ABDOMINAL PAIN: 0

## 2017-05-30 ASSESSMENT — PAIN SCALES - GENERAL
PAINLEVEL_OUTOF10: 0
PAINLEVEL_OUTOF10: 0

## 2017-05-30 NOTE — PROGRESS NOTES
Assumed care of patient @ 1900. Bedside report received. Patient AO x3. VSS,  Pain 0/10. Fall precautions in place, PIV infusing, pt stated (I'm mucho sad) very withdrawn. POC discussed with patient, all questions answered, call light within reach, hourly rounding in place.

## 2017-05-30 NOTE — PROGRESS NOTES
Oncology/Hematology Progress Note               Author: LEVAR Rudolph Mirella Date & Time created: 5/30/2017  9:12 AM     Interval History:  CC: Accelerated/Blast crisis CML  Long discussion with patient,  and nurse today  Patient trying to make a decision about whether to embark on further chemotherapy with omacetaxine or go home with hospice  Chances of response are small  He does not want to inconvience his daughter    Review of Systems:  Review of Systems   Constitutional: Positive for malaise/fatigue. Negative for fever.   Respiratory: Negative.    Cardiovascular: Negative.    Gastrointestinal: Negative.    Musculoskeletal: Negative.    Skin: Negative for rash.   Neurological: Positive for weakness. Negative for dizziness, speech change and focal weakness.   Endo/Heme/Allergies: Bruises/bleeds easily.       Physical Exam:  Physical Exam   Constitutional: No distress.   HENT:   Mouth/Throat: No oropharyngeal exudate.   Eyes: Pupils are equal, round, and reactive to light. No scleral icterus.   Neck: Normal range of motion.   Pulmonary/Chest: Effort normal.   Abdominal: Soft. Bowel sounds are normal.   Musculoskeletal: Normal range of motion.   Lymphadenopathy:     He has no cervical adenopathy.   Neurological: He is alert.   Skin: Skin is warm.       Labs:        Invalid input(s): [id number]      Recent Labs      05/27/17 2353   SODIUM  133*   POTASSIUM  4.3   CHLORIDE  106   CO2  21   BUN  20   CREATININE  0.69   CALCIUM  8.6     Recent Labs      05/27/17 2353   ALTSGPT  57*   ASTSGOT  25   ALKPHOSPHAT  221*   TBILIRUBIN  1.0   GLUCOSE  107*     Recent Labs      05/27/17 2353 05/30/17   0006   RBC  2.59*  3.44*   HEMOGLOBIN  7.4*  9.7*   HEMATOCRIT  21.8*  27.3*   PLATELETCT  15*  7*     Recent Labs      05/27/17 2353 05/30/17   0006   WBC  0.1*  0.1*   ASTSGOT  25   --    ALTSGPT  57*   --    ALKPHOSPHAT  221*   --    TBILIRUBIN  1.0   --      Recent Labs      05/27/17 2353   SODIUM  133*    POTASSIUM  4.3   CHLORIDE  106   CO2  21   GLUCOSE  107*   BUN  20   CREATININE  0.69   CALCIUM  8.6     Hemodynamics:  Temp (24hrs), Av.1 °C (98.7 °F), Min:36.4 °C (97.5 °F), Max:37.4 °C (99.4 °F)  Temperature: 36.4 °C (97.5 °F)  Pulse  Av.2  Min: 60  Max: 118   Blood Pressure : 136/72 mmHg     Respiratory:    Respiration: 16, Pulse Oximetry: 97 %     Work Of Breathing / Effort: Mild  RUL Breath Sounds: Diminished, RML Breath Sounds: Diminished, RLL Breath Sounds: Diminished, WAGNER Breath Sounds: Diminished, LLL Breath Sounds: Diminished  Fluids:    Intake/Output Summary (Last 24 hours) at 17 0912  Last data filed at 17 0600   Gross per 24 hour   Intake      0 ml   Output   1550 ml   Net  -1550 ml     Weight: 64.4 kg (141 lb 15.6 oz)  GI/Nutrition:  Orders Placed This Encounter   Procedures   • Diet Order     Standing Status: Standing      Number of Occurrences: 1      Standing Expiration Date:      Order Specific Question:  Diet:     Answer:  2 Gram Sodium [7]     Order Specific Question:  Texture/Fiber modifications:     Answer:  Dysphagia 2(Pureed/Chopped)specify fluid consistency(question 6) [2]      Comments:  thins ok; pt with no teeth/ nor dentures     Order Specific Question:  Consistency/Fluid modifications:     Answer:  Thin Liquids [3]     Medical Decision Making, by Problem:  Active Hospital Problems    Diagnosis   • *Fever and neutropenia (CMS-HCC) [D70.9, R50.81]   • Neutropenia (CMS-HCC) [D70.9]   • 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]   • Diarrhea [R19.7]   • Acute bilateral low back pain without sciatica [M54.5]   • Seizure disorder (CMS-HCC) [G40.909]   • Hypertension [I10]   • BPH (benign prostatic hyperplasia) [N40.0]   • Low vitamin D level [E55.9]   • Transaminitis [R74.0]       Plan:  CML/pancytopenia  Agrees to accept plt infusion  Will decide soon about hospice    High complexity    Labs reviewed

## 2017-05-30 NOTE — PROGRESS NOTES
Pt is A&O.  Up to a chair this am.  Long discussion with MD and pt regarding plan of care.  Pt still wants to discuss DCing home to daughter's house with hospice with daughter.  Pt is allowing this RN to hang platelets today.  However he does not want anymore labs drawn.  Neutropenic precautions in place.  Afebrile.  VSS.  Appetite is fair for breakfast.  Bed alarm is on and sounding as pt does not always call for asst.

## 2017-05-30 NOTE — PROGRESS NOTES
[facility] [place] Internal Medicine Interval Note    Name Benjamin Post       1945   Age/Sex 71 y.o. male   MRN [id number]   Code Status DNR     After 5PM or if no immediate response to page, please call for cross-coverage  Attending/Team: Dr. Santiago  Call [phone] to page   1st Call - Day Intern (R1):   Dr Sears  2nd Call - Day Sr. Resident (R2/R3):            Chief complaint/ reason for interval visit (Primary Diagnosis)   CML/pancytopenia     Interval Problem Daily Status Update  :  S : No significant overnight events. Pt denies any acute complaints.      Today's Changes :   - No fevers overnight.   - diarrhea resolved.   - the patient refusing labs  - still in discussion with hematologist about the choices, going through treatment vs hospice          Principal Problem:    Fever and neutropenia (CMS-[facility]) POA: Unknown      Overview: Tmax up to 102.7 . None thereafter.      BC x 2 show Staphylococcus growth on Bactec. Final culture reports are       pending.       Benign physical examination. PIV site area is clean and normal without       inflammation.       CXR shows a questionable right basilar consolidation      UA negative      Tylenol on board.      Cefepime, flagyl oral, and IV vancomycin added to antiviral agent      Will monitor closely.   Active Problems:    CML (chronic myelocytic leukemia)-Accelerated phase (Chronic) POA: Yes      Overview:  Had fevers overnight.       WBC 0.1. Patient may need more chemotherapy as per Dr. Jorgensen.     Neutropenia (CMS-[facility]) POA: Yes      Overview:           Acute bilateral low back pain without sciatica POA: Yes    Diarrhea POA: No      Overview: Loperamide stopped on  due to initiation of antibacterials.           Seizure disorder (CMS-[facility]) POA: Yes    Transaminitis POA: Yes      Overview: Related to chemo therapy. AST/ALT: 46/89>>61/111>>71/134>>80/171 >>88/169    BPH (benign  prostatic hyperplasia) (Chronic) POA: Yes      Overview: On flomax    Low vitamin D level (Chronic) POA: Yes      Overview: Vit D: 11 on 4/29/17    Hypertension POA: Unknown  Resolved Problems:    Bone pain POA: Yes      Overview: Stable      Negative PE for tenderness over the rib cage.    Elevated PSA (Chronic) POA: Yes      Overview: PSA: 8.4 on 4/4/17  >>>>>4 on 5/18    Dysuria POA: Unknown      Overview: Stable           Abdominal pain POA: No      Overview: Resolved.           Review of Systems   Constitutional: Negative for fever and chills.   HENT: Negative for hearing loss.    Respiratory: Negative for cough and hemoptysis.    Cardiovascular: Negative for chest pain and palpitations.   Gastrointestinal: Negative for heartburn, nausea, abdominal pain and diarrhea.   Genitourinary: Negative for dysuria and urgency.   Musculoskeletal: Negative for myalgias and neck pain.   Skin: Negative for rash.   Neurological: Negative for dizziness and headaches.   Psychiatric/Behavioral: Negative for depression and suicidal ideas.       Consultants/Specialty  Oncology - Van Meter/Mak    Disposition  Inpatient for further chemotherapy vs hospice referral, the patient has not decided yet    Quality Measures  EKG reviewed, Labs reviewed, Medications reviewed and Radiology images reviewed  Mahajan catheter: No Mahajan      DVT Prophylaxis: Contraindicated - High bleeding risk  DVT prophylaxis - mechanical: SCDs (low plt)            Physical Exam       Filed Vitals:    05/30/17 1228 05/30/17 1245 05/30/17 1300 05/30/17 1515   BP: 120/61 154/69 141/77 132/77   Pulse: 63 65 71 66   Temp: 37.1 °C (98.8 °F) 37 °C (98.6 °F) 37.1 °C (98.8 °F) 36.8 °C (98.2 °F)   Resp: 18 18 18 16   Height:       Weight:       SpO2: 95% 95% 95% 95%     Body mass index is 22.23 kg/(m^2). Weight: 64.4 kg (141 lb 15.6 oz)  Oxygen Therapy:  Pulse Oximetry: 95 %, O2 (LPM): 0, O2 Delivery: None (Room Air)    Physical Exam   Constitutional: He is  well-developed, well-nourished, and in no distress. No distress.   HENT:   Head: Normocephalic.   Neck: No tracheal deviation present. No thyromegaly present.   Cardiovascular: Normal rate.  Exam reveals no friction rub.    Murmur heard.  Pansystolic murmur on the apex    Pulmonary/Chest: Effort normal. No respiratory distress. He has no wheezes.   Abdominal: Soft. He exhibits no distension. There is no tenderness.   Musculoskeletal: He exhibits no edema.   Neurological: He is alert.   Skin: No erythema.         Lab Data Review:      5/4/2017  1:28 PM    Recent Labs      05/27/17   2353   SODIUM  133*   POTASSIUM  4.3   CHLORIDE  106   CO2  21   BUN  20   CREATININE  0.69   CALCIUM  8.6       Recent Labs      05/27/17 2353   ALTSGPT  57*   ASTSGOT  25   ALKPHOSPHAT  221*   TBILIRUBIN  1.0   GLUCOSE  107*       Recent Labs      05/27/17 2353 05/30/17   0006   RBC  2.59*  3.44*   HEMOGLOBIN  7.4*  9.7*   HEMATOCRIT  21.8*  27.3*   PLATELETCT  15*  7*       Recent Labs      05/27/17 2353  05/30/17   0006   WBC  0.1*  0.1*   ASTSGOT  25   --    ALTSGPT  57*   --    ALKPHOSPHAT  221*   --    TBILIRUBIN  1.0   --            Assessment/Plan       CML (chronic myelocytic leukemia)-Accelerated phase  - Has completed multiple chemotherapy cycles.     multiple relapses.    Was treated on dasatinib for a time with response, but then relapse, and bosutinib.  Pancytopenia (chronic, before starting with chemotherapy)   Protein electrophoresis: the polyclonal increase in the gamma region which may be indicative of specific immune   response or an early monoclonal protein.     Started on 5/6/17 with chemotherapy (synribo ) BID for 14 days which is completed on 5/20  Developed diarrhea as a complication treated with loperamide    - Dr. Santillan hematologist discussed the choices with the patient, chemotherapy vs hospice. The patient has not decided yet. The prognosis with chemotherapy is poor.      Pancytopenia  (CMS-[facility])  Transfused multiple platelet and PRBCs   Follow up and keep plt>10 and Hb>7.5.  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  Blood culture showed MSSA    Transaminitis  No abdominal pain, likely effect of chemotherapy  Discontinue all hepatotoxic drugs as possible  Continue to follow      Diarrhea  Most likely related to chemo  Resolved with loperamide       Hypertension  Controlled   On Coreg  Norvasc 5 added on 5/24 for better BP control.   PRN hydralazine 10 mg onboard.        Seizure disorder (CMS-[facility])  Stable    Continue on his home dose of keppra  Had 1 episode last year after dental procedure.     BPH (benign prostatic hyperplasia)  Stable.   Continue on tamsulosin 0.4    Low vitamin D level  Continue vit D2 weekly.

## 2017-05-31 PROCEDURE — 85025 COMPLETE CBC W/AUTO DIFF WBC: CPT

## 2017-05-31 PROCEDURE — A9270 NON-COVERED ITEM OR SERVICE: HCPCS | Performed by: INTERNAL MEDICINE

## 2017-05-31 PROCEDURE — 700102 HCHG RX REV CODE 250 W/ 637 OVERRIDE(OP): Performed by: INTERNAL MEDICINE

## 2017-05-31 PROCEDURE — 700105 HCHG RX REV CODE 258: Performed by: INTERNAL MEDICINE

## 2017-05-31 PROCEDURE — 700111 HCHG RX REV CODE 636 W/ 250 OVERRIDE (IP): Performed by: INTERNAL MEDICINE

## 2017-05-31 PROCEDURE — 770009 HCHG ROOM/CARE - ONCOLOGY SEMI PRI*

## 2017-05-31 PROCEDURE — A9270 NON-COVERED ITEM OR SERVICE: HCPCS | Performed by: STUDENT IN AN ORGANIZED HEALTH CARE EDUCATION/TRAINING PROGRAM

## 2017-05-31 PROCEDURE — 99232 SBSQ HOSP IP/OBS MODERATE 35: CPT | Mod: GC | Performed by: INTERNAL MEDICINE

## 2017-05-31 PROCEDURE — 36415 COLL VENOUS BLD VENIPUNCTURE: CPT

## 2017-05-31 PROCEDURE — 700102 HCHG RX REV CODE 250 W/ 637 OVERRIDE(OP): Performed by: STUDENT IN AN ORGANIZED HEALTH CARE EDUCATION/TRAINING PROGRAM

## 2017-05-31 RX ADMIN — MAGNESIUM GLUCONATE 500 MG ORAL TABLET 400 MG: 500 TABLET ORAL at 08:16

## 2017-05-31 RX ADMIN — CEFAZOLIN SODIUM 2 G: 2 INJECTION, SOLUTION INTRAVENOUS at 14:13

## 2017-05-31 RX ADMIN — CEFAZOLIN SODIUM 2 G: 2 INJECTION, SOLUTION INTRAVENOUS at 05:58

## 2017-05-31 RX ADMIN — ACYCLOVIR 400 MG: 800 TABLET ORAL at 08:15

## 2017-05-31 RX ADMIN — SODIUM CHLORIDE: 9 INJECTION, SOLUTION INTRAVENOUS at 11:28

## 2017-05-31 RX ADMIN — AMLODIPINE BESYLATE 5 MG: 5 TABLET ORAL at 08:15

## 2017-05-31 RX ADMIN — CARVEDILOL 12.5 MG: 12.5 TABLET, FILM COATED ORAL at 08:15

## 2017-05-31 RX ADMIN — CEFAZOLIN SODIUM 2 G: 2 INJECTION, SOLUTION INTRAVENOUS at 20:05

## 2017-05-31 RX ADMIN — FINASTERIDE 5 MG: 5 TABLET, FILM COATED ORAL at 08:15

## 2017-05-31 RX ADMIN — CARVEDILOL 12.5 MG: 12.5 TABLET, FILM COATED ORAL at 17:53

## 2017-05-31 RX ADMIN — LEVETIRACETAM 750 MG: 250 TABLET, FILM COATED ORAL at 20:05

## 2017-05-31 RX ADMIN — LEVETIRACETAM 750 MG: 250 TABLET, FILM COATED ORAL at 08:15

## 2017-05-31 RX ADMIN — ACYCLOVIR 400 MG: 800 TABLET ORAL at 20:05

## 2017-05-31 RX ADMIN — TAMSULOSIN HYDROCHLORIDE 0.8 MG: 0.4 CAPSULE ORAL at 08:16

## 2017-05-31 ASSESSMENT — ENCOUNTER SYMPTOMS
SENSORY CHANGE: 0
HEMOPTYSIS: 0
DIARRHEA: 0
NAUSEA: 0
ABDOMINAL PAIN: 0
FEVER: 0
PALPITATIONS: 0
WEAKNESS: 1
CARDIOVASCULAR NEGATIVE: 1
HEADACHES: 0
DIZZINESS: 0
BRUISES/BLEEDS EASILY: 0
RESPIRATORY NEGATIVE: 1
COUGH: 0
MYALGIAS: 0
DEPRESSION: 0
HEARTBURN: 0
CHILLS: 0
NECK PAIN: 0

## 2017-05-31 ASSESSMENT — PAIN SCALES - GENERAL
PAINLEVEL_OUTOF10: 0
PAINLEVEL_OUTOF10: 0
PAINLEVEL_OUTOF10: 1
PAINLEVEL_OUTOF10: 0

## 2017-05-31 NOTE — PROGRESS NOTES
Pharmacy Pharmacotherapy Consult for LOS >30 days    Admit Date: 4/28/2017      Medications were reviewed for appropriateness and ongoing need.     Current Facility-Administered Medications   Medication Dose Route Frequency Provider Last Rate Last Dose   • ceFAZolin (ANCEF) IVPB 2 g  2 g Intravenous Q8HRS Sherman Sears M.D.   2 g at 05/31/17 0558   • loperamide (IMODIUM) capsule 2 mg  2 mg Oral 4X/DAY PRN Jo Ann Conley M.D.       • NS infusion   Intravenous Continuous Radha Croft M.D. 100 mL/hr at 05/31/17 1128     • magnesium oxide (MAG-OX) tablet 400 mg  400 mg Oral DAILY Romero Cowart M.D.   400 mg at 05/31/17 0816   • acetaminophen (TYLENOL) tablet 650 mg  650 mg Oral Q6HRS PRN Lit Santiago M.D.   650 mg at 05/28/17 0845   • acetaminophen (TYLENOL) tablet 650 mg  650 mg Oral Q4HRS PRN Pooja Bowman M.D.   650 mg at 05/27/17 1711   • amlodipine (NORVASC) tablet 5 mg  5 mg Oral Q DAY Romero Cowart M.D.   5 mg at 05/31/17 0815   • carvedilol (COREG) tablet 12.5 mg  12.5 mg Oral BID WITH MEALS Romero Cowart M.D.   12.5 mg at 05/31/17 0815   • tamsulosin (FLOMAX) capsule 0.8 mg  0.8 mg Oral QAM Jo Ann Conley M.D.   0.8 mg at 05/31/17 0816   • finasteride (PROSCAR) tablet 5 mg  5 mg Oral DAILY Jo Ann Conley M.D.   5 mg at 05/31/17 0815   • hydrALAZINE (APRESOLINE) injection 10 mg  10 mg Intravenous Q6HRS PRN Jose Oneill M.D.   10 mg at 05/25/17 0932   • diphenoxylate-atropine (LOMOTIL) 2.5-0.025 MG per tablet 1 Tab  1 Tab Oral 4X/DAY PRN Ladonna Corado M.D.   1 Tab at 05/24/17 1023   • hydrocodone-acetaminophen (NORCO) 5-325 MG per tablet 1-2 Tab  1-2 Tab Oral Q6HRS PRN Romero Cowart M.D.   2 Tab at 05/21/17 1630   • acyclovir (ZOVIRAX) tablet 400 mg  400 mg Oral BID Flynn Méndez M.D.   400 mg at 05/31/17 0815   • nystatin (MYCOSTATIN) 303761 UNIT/ML suspension 500,000 Units  5 mL Oral 4X/DAY Bautista Agustin M.D.   500,000 Units at 05/28/17 0844   • diphenhydrAMINE  (BENADRYL) tablet/capsule 25 mg  25 mg Oral PRN Joseph Narvaez M.D.   25 mg at 05/28/17 0844   • pneumococcal 13-Elizabeth Conj Vacc (PREVNAR 13) syringe 0.5 mL  0.5 mL Intramuscular Once PRN Gerry Soto M.D.       • Respiratory Care per Protocol   Nebulization Continuous RT Rc Boss D.O.       • ondansetron (ZOFRAN) syringe/vial injection 4 mg  4 mg Intravenous Q4HRS PRN Rc Boss D.O.   4 mg at 05/16/17 1112   • ondansetron (ZOFRAN ODT) dispertab 4 mg  4 mg Oral Q4HRS PRN Rc Boss D.O.   4 mg at 05/01/17 1955   • morphine (pf) 4 mg/ml injection 2 mg  2 mg Intravenous Q4HRS PRN Rc Boss D.O.   2 mg at 04/29/17 1230   • levetiracetam (KEPPRA) tablet 750 mg  750 mg Oral BID Rc Boss D.O.   750 mg at 05/31/17 0815   • oxycodone immediate-release (ROXICODONE) tablet 5 mg  5 mg Oral Q4HRS PRN Rc Boss D.O.   5 mg at 05/21/17 1225   • lidocaine (LIDODERM) 5 % 1 Patch  1 Patch Transdermal Q24HR Rc Boss D.O.   Stopped at 05/29/17 0900   • vitamin D (Ergocalciferol) (DRISDOL) capsule 50,000 Units  50,000 Units Oral Q7 DAYS Gerry Soto M.D.   50,000 Units at 05/27/17 0924       Recommendations:  1. Medications reviewed for ongoing need and appropriateness.   2. Recommend discontinuation of IV morphine as it has not been used since order placed.   3. Discussed above with provider.   4. No further recommendations at this time.     Dorene Holt, JabierD

## 2017-05-31 NOTE — PROGRESS NOTES
[facility] Internal Medicine Interval Note    Name Benjamin Post       1945   Age/Sex 71 y.o. male   MRN [id number]   Code Status DNR     After 5PM or if no immediate response to page, please call for cross-coverage  Attending/Team: Dr. Santiago  Call [phone] to page   1st Call - Day Intern (R1):   Dr Sears  2nd Call - Day Sr. Resident (R2/R3):            Chief complaint/ reason for interval visit (Primary Diagnosis)   CML/pancytopenia     Interval Problem Daily Status Update  :  S : No significant overnight events. Pt denies any acute complaints. Received platelet transfusion  yesterday      Today's Changes :   - still refusing labs  - the patient and daughter discussed the choices with the hematologist, Dr. Vu and the patient has decided to receive another cycle of chemotherapy.          Principal Problem:    Fever and neutropenia (CMS-[facility]) POA: Unknown      Overview: Tmax up to 102.7 . None thereafter.      BC x 2 show Staphylococcus growth on Bactec. Final culture reports are       pending.       Benign physical examination. PIV site area is clean and normal without       inflammation.       CXR shows a questionable right basilar consolidation      UA negative      Tylenol on board.      Cefepime, flagyl oral, and IV vancomycin added to antiviral agent      Will monitor closely.   Active Problems:    CML (chronic myelocytic leukemia)-Accelerated phase (Chronic) POA: Yes      Overview:  Had fevers overnight.       WBC 0.1. Patient may need more chemotherapy as per Dr. Jorgensen.     Neutropenia (CMS-[facility]) POA: Yes      Overview:           Acute bilateral low back pain without sciatica POA: Yes    Diarrhea POA: No      Overview: Loperamide stopped on  due to initiation of antibacterials.           Seizure disorder (CMS-[facility]) POA: Yes    Transaminitis POA: Yes      Overview: Related to chemo therapy. AST/ALT:  46/89>>61/111>>71/134>>80/171 >>88/169    BPH (benign prostatic hyperplasia) (Chronic) POA: Yes      Overview: On flomax    Low vitamin D level (Chronic) POA: Yes      Overview: Vit D: 11 on 4/29/17    Hypertension POA: Unknown  Resolved Problems:    Bone pain POA: Yes      Overview: Stable      Negative PE for tenderness over the rib cage.    Elevated PSA (Chronic) POA: Yes      Overview: PSA: 8.4 on 4/4/17  >>>>>4 on 5/18    Dysuria POA: Unknown      Overview: Stable           Abdominal pain POA: No      Overview: Resolved.           Review of Systems   Constitutional: Negative for fever and chills.   HENT: Negative for hearing loss.    Respiratory: Negative for cough and hemoptysis.    Cardiovascular: Negative for chest pain and palpitations.   Gastrointestinal: Negative for heartburn, nausea, abdominal pain and diarrhea.   Genitourinary: Negative for dysuria and urgency.   Musculoskeletal: Negative for myalgias and neck pain.   Skin: Negative for rash.   Neurological: Negative for dizziness and headaches.   Psychiatric/Behavioral: Negative for depression and suicidal ideas.       Consultants/Specialty  Oncology - Van Meter/Mak    Disposition  Inpatient for further chemotherapy vs hospice referral, the patient has not decided yet    Quality Measures  EKG reviewed, Labs reviewed, Medications reviewed and Radiology images reviewed  Mahajan catheter: No Amhajan      DVT Prophylaxis: Contraindicated - High bleeding risk  DVT prophylaxis - mechanical: SCDs (low plt)            Physical Exam       Filed Vitals:    05/31/17 0000 05/31/17 0500 05/31/17 0752 05/31/17 1149   BP: 135/65 134/61 125/70 116/59   Pulse: 72 74 77 71   Temp: 37.2 °C (99 °F) 37.3 °C (99.1 °F) 36.8 °C (98.2 °F) 37 °C (98.6 °F)   Resp: 16 18 16 18   Height:       Weight:       SpO2: 100% 100% 97% 99%     Body mass index is 22.23 kg/(m^2).    Oxygen Therapy:  Pulse Oximetry: 99 %, O2 (LPM): 0, O2 Delivery: None (Room Air)    Physical Exam    Constitutional: He is well-developed, well-nourished, and in no distress. No distress.   HENT:   Head: Normocephalic.   Neck: No tracheal deviation present. No thyromegaly present.   Cardiovascular: Normal rate.  Exam reveals no friction rub.    Murmur heard.  Pansystolic murmur on the apex    Pulmonary/Chest: Effort normal. No respiratory distress. He has no wheezes.   Abdominal: Soft. He exhibits no distension. There is no tenderness.   Musculoskeletal: He exhibits no edema.   Neurological: He is alert.   Skin: No erythema.         Lab Data Review:      5/4/2017  1:28 PM    No results for input(s): SODIUM, POTASSIUM, CHLORIDE, CO2, BUN, CREATININE, MAGNESIUM, PHOSPHORUS, CALCIUM in the last 72 hours.    No results for input(s): ALTSGPT, ASTSGOT, ALKPHOSPHAT, TBILIRUBIN, DBILIRUBIN, GAMMAGT, AMYLASE, LIPASE, ALB, PREALBUMIN, GLUCOSE in the last 72 hours.    Recent Labs      05/30/17   0006   RBC  3.44*   HEMOGLOBIN  9.7*   HEMATOCRIT  27.3*   PLATELETCT  7*       Recent Labs      05/30/17   0006   WBC  0.1*           Assessment/Plan       CML (chronic myelocytic leukemia)-Accelerated phase  - Has completed multiple chemotherapy cycles, multiple relapses.  now he is in accelerated or blastic crisis   Was treated on dasatinib for a time with response, but then relapse, and bosutinib.  Pancytopenia (chronic, before starting with chemotherapy)   Protein electrophoresis: the polyclonal increase in the gamma region which may be indicative of specific immune   response or an early monoclonal protein.     Started on 5/6/17 with chemotherapy (synribo ) BID for 14 days which is completed on 5/20  Developed diarrhea as a complication treated with loperamide    - Dr. Santillan hematologist discussed the choices with the patient, chemotherapy vs hospice. The patient has decided to go through another cycle of chemotherapy. There is very low chance of response per hematology.    Pancytopenia (CMS-HCC)  Transfused multiple  platelet and PRBCs   Follow up and keep plt>10 and Hb>7.5.  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  Blood culture showed MSSA  - ID consulted     Transaminitis  No abdominal pain, likely effect of chemotherapy  Discontinue all hepatotoxic drugs as possible  Continue to follow      Diarrhea  Most likely related to chemo  Resolved with loperamide       Hypertension  Controlled   On Coreg  Norvasc 5 added on 5/24 for better BP control.   PRN hydralazine 10 mg onboard.        Seizure disorder (CMS-HCC)  Stable    Continue on his home dose of keppra  Had 1 episode last year after dental procedure.     BPH (benign prostatic hyperplasia)  Stable.   Continue on tamsulosin 0.4    Low vitamin D level  Continue vit D2 weekly.

## 2017-05-31 NOTE — PROGRESS NOTES
Assumed care of patient @ 1900. Bedside report received. Patient AO x3. VSS,  Pain 0/10. Fall precautions in place, PIV infusing, pt is withdrawn, family at bedside. POC discussed with patient, all questions answered, call light within reach, hourly rounding in place.

## 2017-05-31 NOTE — PROGRESS NOTES
Oncology/Hematology Progress Note               Author: LEVAR Flynnpantera Date & Time created: 2017  11:30 AM     Interval History:  CC: Accelerated or Blast crisis CML  Meeting with patient and daughter  I expressed to patient the low likelihood of response with omacetaxine  He still wants to try another round      Review of Systems:  Review of Systems   Constitutional: Positive for malaise/fatigue. Negative for fever and chills.   Respiratory: Negative.    Cardiovascular: Negative.    Gastrointestinal: Negative for nausea, abdominal pain and diarrhea.   Musculoskeletal: Negative for myalgias.   Neurological: Positive for weakness. Negative for sensory change.   Endo/Heme/Allergies: Does not bruise/bleed easily.       Physical Exam:  Physical Exam   Constitutional: No distress.   HENT:   Mouth/Throat: No oropharyngeal exudate.   Eyes: No scleral icterus.   Cardiovascular: Normal rate and normal heart sounds.    Pulmonary/Chest: Effort normal and breath sounds normal.   Abdominal: Soft. Bowel sounds are normal.   Musculoskeletal: He exhibits no edema.   Lymphadenopathy:     He has cervical adenopathy.   Neurological: He is alert.   Skin: Skin is warm.       Labs:        Invalid input(s): [id number]      No results for input(s): SODIUM, POTASSIUM, CHLORIDE, CO2, BUN, CREATININE, MAGNESIUM, PHOSPHORUS, CALCIUM in the last 72 hours.  No results for input(s): ALTSGPT, ASTSGOT, ALKPHOSPHAT, TBILIRUBIN, DBILIRUBIN, GAMMAGT, AMYLASE, LIPASE, ALB, PREALBUMIN, GLUCOSE in the last 72 hours.  Recent Labs      17   0006   RBC  3.44*   HEMOGLOBIN  9.7*   HEMATOCRIT  27.3*   PLATELETCT  7*     Recent Labs      17   0006   WBC  0.1*         Hemodynamics:  Temp (24hrs), Av °C (98.6 °F), Min:36.4 °C (97.5 °F), Max:37.3 °C (99.1 °F)  Temperature: 36.8 °C (98.2 °F)  Pulse  Av.7  Min: 60  Max: 118   Blood Pressure : 125/70 mmHg     Respiratory:    Respiration: 16, Pulse Oximetry: 97 %     Work Of Breathing /  Effort: Mild  RUL Breath Sounds: Diminished, RML Breath Sounds: Diminished, RLL Breath Sounds: Diminished, WAGNER Breath Sounds: Diminished, LLL Breath Sounds: Diminished  Fluids:    Intake/Output Summary (Last 24 hours) at 05/31/17 1130  Last data filed at 05/30/17 1600   Gross per 24 hour   Intake      0 ml   Output    900 ml   Net   -900 ml        GI/Nutrition:  Orders Placed This Encounter   Procedures   • Diet Order     Standing Status: Standing      Number of Occurrences: 1      Standing Expiration Date:      Order Specific Question:  Diet:     Answer:  2 Gram Sodium [7]     Order Specific Question:  Texture/Fiber modifications:     Answer:  Dysphagia 2(Pureed/Chopped)specify fluid consistency(question 6) [2]      Comments:  thins ok; pt with no teeth/ nor dentures     Order Specific Question:  Consistency/Fluid modifications:     Answer:  Thin Liquids [3]     Medical Decision Making, by Problem:  Active Hospital Problems    Diagnosis   • *Fever and neutropenia (CMS-HCC) [D70.9, R50.81]   • Neutropenia (CMS-HCC) [D70.9]   • 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]   • Diarrhea [R19.7]   • Acute bilateral low back pain without sciatica [M54.5]   • Seizure disorder (CMS-HCC) [G40.909]   • Hypertension [I10]   • BPH (benign prostatic hyperplasia) [N40.0]   • Low vitamin D level [E55.9]   • Transaminitis [R74.0]       Plan:  CML: poor prognosis  Reluctantly will order another round of omacetaxine to start on Monday  Patient could die in hospital    Labs reviewed

## 2017-05-31 NOTE — CARE PLAN
Problem: Pain Management  Goal: Pain level will decrease to patient’s comfort goal  Intervention: Follow pain managment plan developed in collaboration with patient and Interdisciplinary Team  Pt denies pain at present time. PRN medication available.      Problem: Safety  Goal: Will remain free from injury  Outcome: PROGRESSING AS EXPECTED  Pt calls appropriately for assistance, bed alarms verified on, bed in low position, call light and personal belongings within reach, hourly rounding in place, pt needs met.

## 2017-05-31 NOTE — PROGRESS NOTES
Bedside report received from noc RN, assumed pt care, assessment completed, pt aox4 flat affect this morning, pt ambulates up SBA, pt medicated per MAR, pt denies pain at present time, reviewed POC and agreeable, still pending daughter's decision regarding POC, bed alarms verified on, bed in low position, call light and personal belongings within reach, hourly rounding in place, pt needs met.

## 2017-06-01 LAB
ERYTHROCYTE [DISTWIDTH] IN BLOOD BY AUTOMATED COUNT: 35.2 FL (ref 35.9–50)
HCT VFR BLD AUTO: 20.1 % (ref 42–52)
HGB BLD-MCNC: 7.1 G/DL (ref 14–18)
MCH RBC QN AUTO: 28.3 PG (ref 27–33)
MCHC RBC AUTO-ENTMCNC: 35.3 G/DL (ref 33.7–35.3)
MCV RBC AUTO: 80.1 FL (ref 81.4–97.8)
NEUTROPHILS # BLD AUTO: ABNORMAL K/UL (ref 1.82–7.42)
NRBC # BLD AUTO: 0 K/UL
NRBC BLD AUTO-RTO: 0 /100 WBC
PLATELET # BLD AUTO: 18 K/UL (ref 164–446)
PMV BLD AUTO: 10.7 FL (ref 9–12.9)
RBC # BLD AUTO: 2.51 M/UL (ref 4.7–6.1)
WBC # BLD AUTO: 0.1 K/UL (ref 4.8–10.8)

## 2017-06-01 PROCEDURE — 700102 HCHG RX REV CODE 250 W/ 637 OVERRIDE(OP): Performed by: STUDENT IN AN ORGANIZED HEALTH CARE EDUCATION/TRAINING PROGRAM

## 2017-06-01 PROCEDURE — A9270 NON-COVERED ITEM OR SERVICE: HCPCS | Performed by: INTERNAL MEDICINE

## 2017-06-01 PROCEDURE — 700102 HCHG RX REV CODE 250 W/ 637 OVERRIDE(OP): Performed by: INTERNAL MEDICINE

## 2017-06-01 PROCEDURE — 99232 SBSQ HOSP IP/OBS MODERATE 35: CPT | Mod: GC | Performed by: INTERNAL MEDICINE

## 2017-06-01 PROCEDURE — A9270 NON-COVERED ITEM OR SERVICE: HCPCS | Performed by: STUDENT IN AN ORGANIZED HEALTH CARE EDUCATION/TRAINING PROGRAM

## 2017-06-01 PROCEDURE — 700111 HCHG RX REV CODE 636 W/ 250 OVERRIDE (IP): Performed by: INTERNAL MEDICINE

## 2017-06-01 PROCEDURE — 770009 HCHG ROOM/CARE - ONCOLOGY SEMI PRI*

## 2017-06-01 RX ORDER — SODIUM CHLORIDE 9 MG/ML
INJECTION, SOLUTION INTRAVENOUS CONTINUOUS
Status: DISCONTINUED | OUTPATIENT
Start: 2017-06-01 | End: 2017-06-03

## 2017-06-01 RX ADMIN — AMLODIPINE BESYLATE 5 MG: 5 TABLET ORAL at 08:08

## 2017-06-01 RX ADMIN — ACYCLOVIR 400 MG: 800 TABLET ORAL at 08:09

## 2017-06-01 RX ADMIN — NYSTATIN 500000 UNITS: 500000 SUSPENSION ORAL at 08:09

## 2017-06-01 RX ADMIN — CARVEDILOL 12.5 MG: 12.5 TABLET, FILM COATED ORAL at 17:20

## 2017-06-01 RX ADMIN — MAGNESIUM GLUCONATE 500 MG ORAL TABLET 400 MG: 500 TABLET ORAL at 08:08

## 2017-06-01 RX ADMIN — NYSTATIN 500000 UNITS: 500000 SUSPENSION ORAL at 20:49

## 2017-06-01 RX ADMIN — ACYCLOVIR 400 MG: 800 TABLET ORAL at 20:50

## 2017-06-01 RX ADMIN — NYSTATIN 500000 UNITS: 500000 SUSPENSION ORAL at 13:23

## 2017-06-01 RX ADMIN — LEVETIRACETAM 750 MG: 250 TABLET, FILM COATED ORAL at 20:50

## 2017-06-01 RX ADMIN — FINASTERIDE 5 MG: 5 TABLET, FILM COATED ORAL at 08:09

## 2017-06-01 RX ADMIN — CEFAZOLIN SODIUM 2 G: 2 INJECTION, SOLUTION INTRAVENOUS at 20:53

## 2017-06-01 RX ADMIN — NYSTATIN 500000 UNITS: 500000 SUSPENSION ORAL at 17:20

## 2017-06-01 RX ADMIN — CARVEDILOL 12.5 MG: 12.5 TABLET, FILM COATED ORAL at 05:56

## 2017-06-01 RX ADMIN — TAMSULOSIN HYDROCHLORIDE 0.8 MG: 0.4 CAPSULE ORAL at 08:08

## 2017-06-01 RX ADMIN — CEFAZOLIN SODIUM 2 G: 2 INJECTION, SOLUTION INTRAVENOUS at 13:23

## 2017-06-01 RX ADMIN — CEFAZOLIN SODIUM 2 G: 2 INJECTION, SOLUTION INTRAVENOUS at 05:56

## 2017-06-01 RX ADMIN — LEVETIRACETAM 750 MG: 250 TABLET, FILM COATED ORAL at 11:38

## 2017-06-01 ASSESSMENT — ENCOUNTER SYMPTOMS
WEAKNESS: 1
HEMOPTYSIS: 0
NECK PAIN: 0
COUGH: 0
BRUISES/BLEEDS EASILY: 1
DEPRESSION: 0
CHILLS: 0
HEARTBURN: 0
MYALGIAS: 1
MYALGIAS: 0
HEADACHES: 0
PALPITATIONS: 0
ABDOMINAL PAIN: 0
DIZZINESS: 0
NAUSEA: 0
FEVER: 0
CARDIOVASCULAR NEGATIVE: 1
DIARRHEA: 0
RESPIRATORY NEGATIVE: 1
ABDOMINAL PAIN: 1

## 2017-06-01 ASSESSMENT — PAIN SCALES - GENERAL
PAINLEVEL_OUTOF10: 0
PAINLEVEL_OUTOF10: 1

## 2017-06-01 NOTE — CARE PLAN
Problem: Communication  Goal: The ability to communicate needs accurately and effectively will improve  Intervention: Educate patient and significant other/support system about the plan of care, procedures, treatments, medications and allow for questions  Pt and pt's daughter educated on POC      Problem: Mobility  Goal: Risk for activity intolerance will decrease  Intervention: Assess and monitor signs of activity intolerance  Pt up with one assist; BA in place for pt safety.

## 2017-06-01 NOTE — PROGRESS NOTES
Received report from off going nurse. Nurse stated no sig ,med event during shift. Pt is resting quietly in bed. Has no c/o pain at this time. Bed alarm on. Will continue to monitor.

## 2017-06-01 NOTE — PROGRESS NOTES
[facility] Internal Medicine Interval Note    Name Benjamin Post       1945   Age/Sex 71 y.o. male   MRN [id number]   Code Status DNR     After 5PM or if no immediate response to page, please call for cross-coverage  Attending/Team: Dr. Santiago  Call [phone] to page   1st Call - Day Intern (R1):   Dr Sears  2nd Call - Day Sr. Resident (R2/R3):            Chief complaint/ reason for interval visit (Primary Diagnosis)   CML/pancytopenia     Interval Problem Daily Status Update  :  S : No significant overnight events. Pt denies any acute complaints.       Today's Changes :   - the patient and daughter discussed the choices with the hematologist, Dr. Vu and the patient has decided to receive another cycle of chemotherapy.  - Hematologist opinion is to due bone marrow biopsy before going through omacetaxine treatment.   - to transfuse him with platelet before the procedure          Principal Problem:    Fever and neutropenia (CMS-HCC) POA: fever subsided, still neutropenic       Overview: Tmax up to 102.7 . None thereafter.      BC x 2 show Staphylococcus growth on Bactec. MSSA positive        On cefazolin iv  Active Problems:    CML (chronic myelocytic leukemia)-Accelerated phase (Chronic) POA: Yes      Overview:  To do BM biopsy and then another cycle of chemotherapy    Neutropenia (CMS-HCC) POA: Yes      Overview:           Acute bilateral low back pain without sciatica POA: Yes    Diarrhea POA: No      Overview: Loperamide stopped on  due to initiation of antibacterials.           Seizure disorder (CMS-HCC) POA: Yes    Transaminitis POA: Yes      Overview: Related to chemo therapy. AST/ALT: 46/89>>61/111>>71/134>>80/171 >>88/169    BPH (benign prostatic hyperplasia) (Chronic) POA: Yes      Overview: On flomax    Low vitamin D level (Chronic) POA: Yes      Overview: Vit D: 11 on 17    Hypertension POA:  Unknown  Resolved Problems:    Bone pain POA: Yes      Overview: Stable      Negative PE for tenderness over the rib cage.    Elevated PSA (Chronic) POA: Yes      Overview: PSA: 8.4 on 4/4/17  >>>>>4 on 5/18    Dysuria POA: Unknown      Overview: Stable           Abdominal pain POA: No      Overview: Resolved.           Review of Systems   Constitutional: Negative for fever and chills.   HENT: Negative for hearing loss.    Respiratory: Negative for cough and hemoptysis.    Cardiovascular: Negative for chest pain and palpitations.   Gastrointestinal: Negative for heartburn, nausea, abdominal pain and diarrhea.   Genitourinary: Negative for dysuria and urgency.   Musculoskeletal: Negative for myalgias and neck pain.   Skin: Negative for rash.   Neurological: Negative for dizziness and headaches.   Psychiatric/Behavioral: Negative for depression and suicidal ideas.       Consultants/Specialty  Oncology - Dr. Mirella LAKHANI    Disposition  Inpatient for further chemotherapy cycle     Quality Measures  EKG reviewed, Labs reviewed, Medications reviewed and Radiology images reviewed  Mahajan catheter: No Mahajan      DVT Prophylaxis: Contraindicated - High bleeding risk  DVT prophylaxis - mechanical: SCDs (low plt)            Physical Exam       Filed Vitals:    06/01/17 0000 06/01/17 0500 06/01/17 0800 06/01/17 1200   BP: 137/58 112/62 118/65 126/63   Pulse: 67 70 73 77   Temp: 36.6 °C (97.9 °F) 37.3 °C (99.2 °F) 37.1 °C (98.8 °F) 37 °C (98.6 °F)   Resp: 18 16 16 16   Height:       Weight:       SpO2: 100% 98% 97% 96%     Body mass index is 22.23 kg/(m^2).    Oxygen Therapy:  Pulse Oximetry: 96 %, O2 (LPM): 0, O2 Delivery: None (Room Air)    Physical Exam   Constitutional: He is well-developed, well-nourished, and in no distress. No distress.   HENT:   Head: Normocephalic.   Neck: No tracheal deviation present. No thyromegaly present.   Cardiovascular: Normal rate.  Exam reveals no friction rub.    Murmur heard.  Pansystolic  murmur on the apex    Pulmonary/Chest: Effort normal. No respiratory distress. He has no wheezes.   Abdominal: Soft. He exhibits no distension. There is no tenderness.   Musculoskeletal: He exhibits no edema.   Neurological: He is alert.   Skin: No erythema.         Lab Data Review:      5/4/2017  1:28 PM    No results for input(s): SODIUM, POTASSIUM, CHLORIDE, CO2, BUN, CREATININE, MAGNESIUM, PHOSPHORUS, CALCIUM in the last 72 hours.    No results for input(s): ALTSGPT, ASTSGOT, ALKPHOSPHAT, TBILIRUBIN, DBILIRUBIN, GAMMAGT, AMYLASE, LIPASE, ALB, PREALBUMIN, GLUCOSE in the last 72 hours.    Recent Labs      05/30/17   0006  05/31/17   2334   RBC  3.44*  2.51*   HEMOGLOBIN  9.7*  7.1*   HEMATOCRIT  27.3*  20.1*   PLATELETCT  7*  18*       Recent Labs      05/30/17   0006  05/31/17   2334   WBC  0.1*  0.1*           Assessment/Plan       CML (chronic myelocytic leukemia)-Accelerated phase  - Has completed multiple chemotherapy cycles, multiple relapses.  now he is in accelerated or blastic crisis   Was treated on dasatinib for a time with response, but then relapse, and bosutinib.  Pancytopenia (chronic, before starting with chemotherapy)   Protein electrophoresis: the polyclonal increase in the gamma region which may be indicative of specific immune   response or an early monoclonal protein.     Started on 5/6/17 with chemotherapy (synribo ) BID for 14 days which is completed on 5/20  Developed diarrhea as a complication treated with loperamide    - Dr. Santillan hematologist discussed the choices with the patient, chemotherapy vs hospice. The patient has decided to go through another cycle of chemotherapy. There is very low chance of response per hematology. Today the plan is to do bone marrow biopsy before starting the chemotherapy. To give him platelet transfusion today. BM biopsy or aspiration will be tomorrow probably.    Pancytopenia (CMS-HCC)  Transfused multiple platelet and PRBCs   Follow up and keep plt>10  and Hb>7.5.  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  Blood culture showed MSSA  - today is day 8th on antibiotic. Probably will need 15 days    Transaminitis  No abdominal pain, likely effect of chemotherapy  Discontinue all hepatotoxic drugs as possible  Continue to follow      Diarrhea  Most likely related to chemo  Resolved with loperamide       Hypertension  Controlled   On Coreg  Norvasc 5 added on 5/24 for better BP control.   PRN hydralazine 10 mg onboard.        Seizure disorder (CMS-HCC)  Stable    Continue on his home dose of keppra  Had 1 episode last year after dental procedure.     BPH (benign prostatic hyperplasia)  Stable.   Continue on tamsulosin 0.4    Low vitamin D level  Continue vit D2 weekly.

## 2017-06-01 NOTE — PROGRESS NOTES
Oncology/Hematology Progress Note               Author: LEVAR Vu Date & Time created: 2017  12:08 PM     Interval History:  CC: Accelerated/Blast crisis CML  Today I called and talked to Dr. Lew at [facility]; he asks that I get a new bone marrow before another trial of omacetaxine: ? Is the marrow empty or packed?  Will write for marrow; explained to patient    Review of Systems:  Review of Systems   Constitutional: Positive for malaise/fatigue. Negative for fever and chills.   Respiratory: Negative.    Cardiovascular: Negative.    Gastrointestinal: Positive for abdominal pain.   Musculoskeletal: Positive for myalgias.   Skin: Negative for rash.   Neurological: Positive for weakness.   Endo/Heme/Allergies: Bruises/bleeds easily.       Physical Exam:  Physical Exam   Constitutional: He is oriented to person, place, and time. No distress.   HENT:   Head: Normocephalic.   Mouth/Throat: No oropharyngeal exudate.   Eyes: Pupils are equal, round, and reactive to light. No scleral icterus.   Neck: Normal range of motion.   Cardiovascular: Normal rate.    Pulmonary/Chest: Effort normal.   Abdominal: Soft. Bowel sounds are normal.   Lymphadenopathy:     He has no cervical adenopathy.   Neurological: He is oriented to person, place, and time.   Skin: Skin is warm.       Labs:        Invalid input(s): [id number]      No results for input(s): SODIUM, POTASSIUM, CHLORIDE, CO2, BUN, CREATININE, MAGNESIUM, PHOSPHORUS, CALCIUM in the last 72 hours.  No results for input(s): ALTSGPT, ASTSGOT, ALKPHOSPHAT, TBILIRUBIN, DBILIRUBIN, GAMMAGT, AMYLASE, LIPASE, ALB, PREALBUMIN, GLUCOSE in the last 72 hours.  Recent Labs      17   0006  17   2334   RBC  3.44*  2.51*   HEMOGLOBIN  9.7*  7.1*   HEMATOCRIT  27.3*  20.1*   PLATELETCT  7*  18*     Recent Labs      17   0006  17   2334   WBC  0.1*  0.1*         Hemodynamics:  Temp (24hrs), Av.1 °C (98.8 °F), Min:36.6 °C (97.9 °F), Max:37.3 °C (99.2  °F)  Temperature: 37.1 °C (98.8 °F)  Pulse  Av.5  Min: 60  Max: 118   Blood Pressure : 118/65 mmHg     Respiratory:    Respiration: 16, Pulse Oximetry: 97 %     Work Of Breathing / Effort: Mild  RUL Breath Sounds: Diminished, RML Breath Sounds: Diminished, RLL Breath Sounds: Diminished, WAGNER Breath Sounds: Diminished, LLL Breath Sounds: Diminished  Fluids:    Intake/Output Summary (Last 24 hours) at 17 1208  Last data filed at 17 0500   Gross per 24 hour   Intake    696 ml   Output   1100 ml   Net   -404 ml        GI/Nutrition:  Orders Placed This Encounter   Procedures   • Diet Order     Standing Status: Standing      Number of Occurrences: 1      Standing Expiration Date:      Order Specific Question:  Diet:     Answer:  2 Gram Sodium [7]     Order Specific Question:  Texture/Fiber modifications:     Answer:  Dysphagia 2(Pureed/Chopped)specify fluid consistency(question 6) [2]      Comments:  thins ok; pt with no teeth/ nor dentures     Order Specific Question:  Consistency/Fluid modifications:     Answer:  Thin Liquids [3]     Medical Decision Making, by Problem:  Active Hospital Problems    Diagnosis   • *Fever and neutropenia (CMS-HCC) [D70.9, R50.81]   • Neutropenia (CMS-HCC) [D70.9]   • 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]   • Diarrhea [R19.7]   • Acute bilateral low back pain without sciatica [M54.5]   • Seizure disorder (CMS-HCC) [G40.909]   • Hypertension [I10]   • BPH (benign prostatic hyperplasia) [N40.0]   • Low vitamin D level [E55.9]   • Transaminitis [R74.0]       Plan:  CML: wrote for bone marrow bx; give plts before  Hold omacetaxine before considering repeat treatment    Labs reviewed

## 2017-06-01 NOTE — PROGRESS NOTES
Pt a/o x 4. Denies pain or n/v/d. Pt up with SBA; pt not calling for help getting out of bed, bed alarm placed. Neutropenic precautions in place. q4h vs taking place. Call light is within reach and rounding taking place every hour.

## 2017-06-02 LAB
ABO GROUP BLD: NORMAL
ALBUMIN SERPL BCP-MCNC: 3.2 G/DL (ref 3.2–4.9)
ALBUMIN/GLOB SERPL: 1 G/DL
ALP SERPL-CCNC: 251 U/L (ref 30–99)
ALT SERPL-CCNC: 26 U/L (ref 2–50)
ANION GAP SERPL CALC-SCNC: 4 MMOL/L (ref 0–11.9)
AST SERPL-CCNC: 24 U/L (ref 12–45)
BARCODED ABORH UBTYP: 1700
BARCODED ABORH UBTYP: 5100
BARCODED ABORH UBTYP: 5100
BARCODED ABORH UBTYP: 6200
BARCODED PRD CODE UBPRD: NORMAL
BARCODED UNIT NUM UBUNT: NORMAL
BILIRUB SERPL-MCNC: 0.7 MG/DL (ref 0.1–1.5)
BLD GP AB SCN SERPL QL: NORMAL
BUN SERPL-MCNC: 13 MG/DL (ref 8–22)
CALCIUM SERPL-MCNC: 8.3 MG/DL (ref 8.5–10.5)
CHLORIDE SERPL-SCNC: 103 MMOL/L (ref 96–112)
CO2 SERPL-SCNC: 24 MMOL/L (ref 20–33)
COMPONENT P 8504P: NORMAL
COMPONENT P 8504P: NORMAL
COMPONENT R 8504R: NORMAL
COMPONENT R 8504R: NORMAL
CREAT SERPL-MCNC: 0.6 MG/DL (ref 0.5–1.4)
ERYTHROCYTE [DISTWIDTH] IN BLOOD BY AUTOMATED COUNT: 33.8 FL (ref 35.9–50)
GFR SERPL CREATININE-BSD FRML MDRD: >60 ML/MIN/1.73 M 2
GLOBULIN SER CALC-MCNC: 3.2 G/DL (ref 1.9–3.5)
GLUCOSE SERPL-MCNC: 108 MG/DL (ref 65–99)
HCT VFR BLD AUTO: 20.3 % (ref 42–52)
HGB BLD-MCNC: 7.1 G/DL (ref 14–18)
MCH RBC QN AUTO: 27.6 PG (ref 27–33)
MCHC RBC AUTO-ENTMCNC: 35 G/DL (ref 33.7–35.3)
MCV RBC AUTO: 79 FL (ref 81.4–97.8)
NEUTROPHILS # BLD AUTO: ABNORMAL K/UL (ref 1.82–7.42)
NRBC # BLD AUTO: 0 K/UL
NRBC BLD AUTO-RTO: 0 /100 WBC
PLATELET # BLD AUTO: 14 K/UL (ref 164–446)
PMV BLD AUTO: 9 FL (ref 9–12.9)
POTASSIUM SERPL-SCNC: 3.9 MMOL/L (ref 3.6–5.5)
PRODUCT TYPE UPROD: NORMAL
PROT SERPL-MCNC: 6.4 G/DL (ref 6–8.2)
RBC # BLD AUTO: 2.57 M/UL (ref 4.7–6.1)
RH BLD: NORMAL
SODIUM SERPL-SCNC: 131 MMOL/L (ref 135–145)
UNIT STATUS USTAT: NORMAL
WBC # BLD AUTO: 0.1 K/UL (ref 4.8–10.8)

## 2017-06-02 PROCEDURE — A9270 NON-COVERED ITEM OR SERVICE: HCPCS | Performed by: STUDENT IN AN ORGANIZED HEALTH CARE EDUCATION/TRAINING PROGRAM

## 2017-06-02 PROCEDURE — A9270 NON-COVERED ITEM OR SERVICE: HCPCS | Performed by: INTERNAL MEDICINE

## 2017-06-02 PROCEDURE — 700102 HCHG RX REV CODE 250 W/ 637 OVERRIDE(OP): Performed by: INTERNAL MEDICINE

## 2017-06-02 PROCEDURE — 88185 FLOWCYTOMETRY/TC ADD-ON: CPT | Mod: 91

## 2017-06-02 PROCEDURE — 700111 HCHG RX REV CODE 636 W/ 250 OVERRIDE (IP): Performed by: INTERNAL MEDICINE

## 2017-06-02 PROCEDURE — 36430 TRANSFUSION BLD/BLD COMPNT: CPT

## 2017-06-02 PROCEDURE — 88313 SPECIAL STAINS GROUP 2: CPT

## 2017-06-02 PROCEDURE — 86900 BLOOD TYPING SEROLOGIC ABO: CPT

## 2017-06-02 PROCEDURE — 38221 DX BONE MARROW BIOPSIES: CPT | Performed by: HOSPITALIST

## 2017-06-02 PROCEDURE — 86850 RBC ANTIBODY SCREEN: CPT

## 2017-06-02 PROCEDURE — 88305 TISSUE EXAM BY PATHOLOGIST: CPT

## 2017-06-02 PROCEDURE — 88184 FLOWCYTOMETRY/ TC 1 MARKER: CPT

## 2017-06-02 PROCEDURE — 80053 COMPREHEN METABOLIC PANEL: CPT

## 2017-06-02 PROCEDURE — 07DR3ZX EXTRACTION OF ILIAC BONE MARROW, PERCUTANEOUS APPROACH, DIAGNOSTIC: ICD-10-PCS | Performed by: HOSPITALIST

## 2017-06-02 PROCEDURE — 36415 COLL VENOUS BLD VENIPUNCTURE: CPT

## 2017-06-02 PROCEDURE — 86644 CMV ANTIBODY: CPT

## 2017-06-02 PROCEDURE — 700102 HCHG RX REV CODE 250 W/ 637 OVERRIDE(OP): Performed by: STUDENT IN AN ORGANIZED HEALTH CARE EDUCATION/TRAINING PROGRAM

## 2017-06-02 PROCEDURE — 88360 TUMOR IMMUNOHISTOCHEM/MANUAL: CPT

## 2017-06-02 PROCEDURE — 86901 BLOOD TYPING SEROLOGIC RH(D): CPT

## 2017-06-02 PROCEDURE — 700101 HCHG RX REV CODE 250: Performed by: STUDENT IN AN ORGANIZED HEALTH CARE EDUCATION/TRAINING PROGRAM

## 2017-06-02 PROCEDURE — 770009 HCHG ROOM/CARE - ONCOLOGY SEMI PRI*

## 2017-06-02 PROCEDURE — 99232 SBSQ HOSP IP/OBS MODERATE 35: CPT | Mod: GC,25 | Performed by: INTERNAL MEDICINE

## 2017-06-02 PROCEDURE — 85025 COMPLETE CBC W/AUTO DIFF WBC: CPT

## 2017-06-02 PROCEDURE — P9016 RBC LEUKOCYTES REDUCED: HCPCS

## 2017-06-02 PROCEDURE — 88237 TISSUE CULTURE BONE MARROW: CPT

## 2017-06-02 PROCEDURE — 88341 IMHCHEM/IMCYTCHM EA ADD ANTB: CPT

## 2017-06-02 PROCEDURE — 88311 DECALCIFY TISSUE: CPT

## 2017-06-02 PROCEDURE — 97116 GAIT TRAINING THERAPY: CPT

## 2017-06-02 PROCEDURE — P9034 PLATELETS, PHERESIS: HCPCS

## 2017-06-02 PROCEDURE — 86923 COMPATIBILITY TEST ELECTRIC: CPT

## 2017-06-02 PROCEDURE — 700111 HCHG RX REV CODE 636 W/ 250 OVERRIDE (IP): Performed by: HOSPITALIST

## 2017-06-02 PROCEDURE — 94760 N-INVAS EAR/PLS OXIMETRY 1: CPT

## 2017-06-02 PROCEDURE — 88342 IMHCHEM/IMCYTCHM 1ST ANTB: CPT

## 2017-06-02 PROCEDURE — 97530 THERAPEUTIC ACTIVITIES: CPT

## 2017-06-02 RX ORDER — MIDAZOLAM HYDROCHLORIDE 1 MG/ML
.5-2 INJECTION INTRAMUSCULAR; INTRAVENOUS PRN
Status: DISPENSED | OUTPATIENT
Start: 2017-06-02 | End: 2017-06-02

## 2017-06-02 RX ORDER — SODIUM CHLORIDE 9 MG/ML
500 INJECTION, SOLUTION INTRAVENOUS
Status: ACTIVE | OUTPATIENT
Start: 2017-06-02 | End: 2017-06-02

## 2017-06-02 RX ADMIN — FINASTERIDE 5 MG: 5 TABLET, FILM COATED ORAL at 10:22

## 2017-06-02 RX ADMIN — LIDOCAINE 1 PATCH: 50 PATCH CUTANEOUS at 09:55

## 2017-06-02 RX ADMIN — DIPHENHYDRAMINE HCL 25 MG: 25 TABLET ORAL at 01:02

## 2017-06-02 RX ADMIN — CEFAZOLIN SODIUM 2 G: 2 INJECTION, SOLUTION INTRAVENOUS at 22:40

## 2017-06-02 RX ADMIN — TAMSULOSIN HYDROCHLORIDE 0.8 MG: 0.4 CAPSULE ORAL at 10:22

## 2017-06-02 RX ADMIN — ACYCLOVIR 400 MG: 800 TABLET ORAL at 21:18

## 2017-06-02 RX ADMIN — LEVETIRACETAM 750 MG: 250 TABLET, FILM COATED ORAL at 10:22

## 2017-06-02 RX ADMIN — AMLODIPINE BESYLATE 5 MG: 5 TABLET ORAL at 10:21

## 2017-06-02 RX ADMIN — ACETAMINOPHEN 650 MG: 325 TABLET, FILM COATED ORAL at 01:02

## 2017-06-02 RX ADMIN — CEFAZOLIN SODIUM 2 G: 2 INJECTION, SOLUTION INTRAVENOUS at 05:46

## 2017-06-02 RX ADMIN — LEVETIRACETAM 750 MG: 250 TABLET, FILM COATED ORAL at 21:18

## 2017-06-02 RX ADMIN — NYSTATIN 500000 UNITS: 500000 SUSPENSION ORAL at 10:23

## 2017-06-02 RX ADMIN — ACYCLOVIR 400 MG: 800 TABLET ORAL at 10:22

## 2017-06-02 RX ADMIN — DIPHENHYDRAMINE HCL 25 MG: 25 TABLET ORAL at 10:54

## 2017-06-02 RX ADMIN — CARVEDILOL 12.5 MG: 12.5 TABLET, FILM COATED ORAL at 05:46

## 2017-06-02 RX ADMIN — ACETAMINOPHEN 650 MG: 325 TABLET, FILM COATED ORAL at 10:54

## 2017-06-02 RX ADMIN — CARVEDILOL 12.5 MG: 12.5 TABLET, FILM COATED ORAL at 18:29

## 2017-06-02 RX ADMIN — NYSTATIN 500000 UNITS: 500000 SUSPENSION ORAL at 18:29

## 2017-06-02 RX ADMIN — MIDAZOLAM 2 MG: 1 INJECTION INTRAMUSCULAR; INTRAVENOUS at 13:24

## 2017-06-02 RX ADMIN — CEFAZOLIN SODIUM 2 G: 2 INJECTION, SOLUTION INTRAVENOUS at 15:29

## 2017-06-02 RX ADMIN — MAGNESIUM GLUCONATE 500 MG ORAL TABLET 400 MG: 500 TABLET ORAL at 10:22

## 2017-06-02 RX ADMIN — FENTANYL CITRATE 50 MCG: 50 INJECTION, SOLUTION INTRAMUSCULAR; INTRAVENOUS at 13:24

## 2017-06-02 ASSESSMENT — COGNITIVE AND FUNCTIONAL STATUS - GENERAL
STANDING UP FROM CHAIR USING ARMS: A LITTLE
SUGGESTED CMS G CODE MODIFIER MOBILITY: CK
CLIMB 3 TO 5 STEPS WITH RAILING: A LITTLE
MOBILITY SCORE: 19
TURNING FROM BACK TO SIDE WHILE IN FLAT BAD: A LITTLE
WALKING IN HOSPITAL ROOM: A LITTLE
MOVING TO AND FROM BED TO CHAIR: A LITTLE

## 2017-06-02 ASSESSMENT — ENCOUNTER SYMPTOMS
DIZZINESS: 0
FEVER: 0
DEPRESSION: 0
MYALGIAS: 0
HEADACHES: 0
ABDOMINAL PAIN: 0
NAUSEA: 0
HEMOPTYSIS: 0
DIARRHEA: 0
PALPITATIONS: 0
COUGH: 0
HEARTBURN: 0
CHILLS: 0
NECK PAIN: 0

## 2017-06-02 ASSESSMENT — GAIT ASSESSMENTS
GAIT LEVEL OF ASSIST: STAND BY ASSIST
ASSISTIVE DEVICE: FRONT WHEEL WALKER
DEVIATION: DECREASED BASE OF SUPPORT;DECREASED HEEL STRIKE;DECREASED TOE OFF
DISTANCE (FEET): 150

## 2017-06-02 ASSESSMENT — PAIN SCALES - GENERAL
PAINLEVEL_OUTOF10: 0
PAINLEVEL_OUTOF10: 0

## 2017-06-02 NOTE — PROGRESS NOTES
1 unit irradiated, CMV negative platelets transfusion initiated as ordered. Pt pre-medicated and tolerating well thus far. Continuing frequent monitoring

## 2017-06-02 NOTE — PROGRESS NOTES
Cariessa from Lab called with critical result of WBC of 0.1 and Platelet of 14 at 0034. Critical lab result read back to Cariessa. This critical lab result is within parameters established by  for this patient.

## 2017-06-02 NOTE — CARE PLAN
Problem: Safety  Goal: Will remain free from falls  Intervention: Implement fall precautions  Bed alarm activated, call light within reach, treaded slippers are in place, bed rails closest to the bathroom lowered, hourly rounding and scheduled toileting in place.       Problem: Knowledge Deficit  Goal: Knowledge of disease process/condition, treatment plan, diagnostic tests, and medications will improve  Outcome: PROGRESSING AS EXPECTED  The patient has been educated regarding his general POC and POC for the shift. The patient has also been educated regarding pending procedures. The patient verbalizes understanding and has no questions at this time.

## 2017-06-02 NOTE — THERAPY
"Pt agreeable, SBA for all mobility but was UIC pre/post Tx, gait as above was slow with narrow AZUCENA diminished cadance and stride, Pt sat UIC post Tx and had no c/o increased pain. Pt will benifit from acute post acute rehab.Physical Therapy Treatment completed.   Bed Mobility:  Supine to Sit: Stand by Assist  Transfers: Sit to Stand: Supervised  Gait: Level Of Assist: Stand by Assist with Front-Wheel Walker       Plan of Care: Will benefit from Physical Therapy 2 times per week  Discharge Recommendations: Equipment: Front-Wheel Walker. Post-acute therapy Discharge to a transitional care facility for continued skilled therapy services.     See \"Rehab Therapy-Acute\" Patient Summary Report for complete documentation.       "

## 2017-06-02 NOTE — PROGRESS NOTES
Assumed pt care at change of shift. Labs and orders noted. Pt aa&o x3, unsure of day. Plan of care updated and questions addressed. Pt denies pain. NPO pending BM Bx at 13:00. Consent signed and in pt chart. [facility]. Pt to receive 1 unit platelets this am. Call light and belongings within reach. Pt up to chair c sba. Safety precautions in place. Continuing hourly rounding and assessing for additional needs

## 2017-06-02 NOTE — PROCEDURES
Procedure- bone marrow biopsy  Indication- CML    Consent obtained prior to procedure  concious sedation achieved with versed and fentanyl 2/50  Patient prepped and draped in sterile fashion  Lidocaine used for anesthesia  Jamshidi introduced into the ____right and left____ posterior iliac crests  Heparinized and nonheparinized samples unable to be obtained. This was a dry tap   Core biopsy x2 was obtained.   Core sample then obtained from the same incision site  No complications  EBL 0cc

## 2017-06-02 NOTE — PROGRESS NOTES
Assumed care of the patient at 1915. The patient is AOx4 with no complaints of pain or discomfort at this time. The patient has been updated on the POC for the shift, NPO after midnight and BM biopsy at 1300 on 06/2/2017. The patient has no further questions at this time. Standing transfusion orders in place and will be implemented PRN. Call light within reach. Bed alarm activated. Hourly rounding in place.

## 2017-06-02 NOTE — PROGRESS NOTES
[facility] [place] Internal Medicine Interval Note    Name Benjamin Post       1945   Age/Sex 71 y.o. male   MRN [id number]   Code Status DNR     After 5PM or if no immediate response to page, please call for cross-coverage  Attending/Team: Dr. Santiago  Call [phone] to page   1st Call - Day Intern (R1):   Dr Sears  2nd Call - Day Sr. Resident (R2/R3):            Chief complaint/ reason for interval visit (Primary Diagnosis)   CML/pancytopenia     Interval Problem Daily Status Update  :  S : No significant overnight events. Pt denies any acute complaints.     Today's Changes :   Received platelet transfusion  Yesterday and 1 U PRBC today . He is scheduled for Bed side BM Bx today. He is NPO for the procedure.     HBG today 7.1 >> 7.1 yesterday  PLTS today 14 >> 18 yesterday    As discussed by  with  at [facility] , BM needs to be assessed if Hypo/hypercellular before restarting Synribo.            Principal Problem:    Fever and neutropenia (CMS-HCC) POA: Unknown      Overview: Tmax up to 102.7 . None thereafter.      BC x 2 show Staphylococcus growth on Bactec. Final culture reports are       pending.       Benign physical examination. PIV site area is clean and normal without       inflammation.       CXR shows a questionable right basilar consolidation      UA negative      Tylenol on board.      Cefepime, flagyl oral, and IV vancomycin added to antiviral agent      Will monitor closely.   Active Problems:    CML (chronic myelocytic leukemia)-Accelerated phase (Chronic) POA: Yes      Overview:  Had fevers overnight.       WBC 0.1. Patient may need more chemotherapy as per Dr. Jorgensen.     Neutropenia (CMS-HCC) POA: Yes      Overview:           Acute bilateral low back pain without sciatica POA: Yes    Diarrhea POA: No      Overview: Loperamide stopped on  due to initiation of antibacterials.           Seizure  disorder (CMS-HCC) POA: Yes    Transaminitis POA: Yes      Overview: Related to chemo therapy. AST/ALT: 46/89>>61/111>>71/134>>80/171 >>88/169    BPH (benign prostatic hyperplasia) (Chronic) POA: Yes      Overview: On flomax    Low vitamin D level (Chronic) POA: Yes      Overview: Vit D: 11 on 4/29/17    Hypertension POA: Unknown  Resolved Problems:    Bone pain POA: Yes      Overview: Stable      Negative PE for tenderness over the rib cage.    Elevated PSA (Chronic) POA: Yes      Overview: PSA: 8.4 on 4/4/17  >>>>>4 on 5/18    Dysuria POA: Unknown      Overview: Stable           Abdominal pain POA: No      Overview: Resolved.           Review of Systems   Constitutional: Negative for fever and chills.   HENT: Negative for hearing loss.    Respiratory: Negative for cough and hemoptysis.    Cardiovascular: Negative for chest pain and palpitations.   Gastrointestinal: Negative for heartburn, nausea, abdominal pain and diarrhea.   Genitourinary: Negative for dysuria and urgency.   Musculoskeletal: Negative for myalgias and neck pain.   Skin: Negative for rash.   Neurological: Negative for dizziness and headaches.   Psychiatric/Behavioral: Negative for depression and suicidal ideas.       Consultants/Specialty  Oncology - Van Meter/Mak    Disposition  Inpatient for further chemotherapy vs hospice referral, the patient has not decided yet    Quality Measures  EKG reviewed, Labs reviewed, Medications reviewed and Radiology images reviewed  Mahajan catheter: No Mahajan      DVT Prophylaxis: Contraindicated - High bleeding risk  DVT prophylaxis - mechanical: SCDs (low plt)            Physical Exam       Filed Vitals:    06/02/17 0831 06/02/17 0900 06/02/17 1118 06/02/17 1133   BP: 124/73  141/76 145/73   Pulse: 61  67 64   Temp: 36.3 °C (97.4 °F)  36.6 °C (97.8 °F) 36.8 °C (98.2 °F)   Resp: 16 18 17   Height:       Weight:       SpO2: 100% 99% 98% 96%     Body mass index is 22.23 kg/(m^2).    Oxygen Therapy:  Pulse  Oximetry: 96 %, O2 (LPM): 0, O2 Delivery: None (Room Air)    Physical Exam   Constitutional: He is well-developed, well-nourished, and in no distress. No distress.   HENT:   Head: Normocephalic.   Neck: No tracheal deviation present. No thyromegaly present.   Cardiovascular: Normal rate.  Exam reveals no friction rub.    Murmur heard.  Pansystolic murmur on the apex    Pulmonary/Chest: Effort normal. No respiratory distress. He has no wheezes.   Abdominal: Soft. He exhibits no distension. There is no tenderness.   Musculoskeletal: He exhibits no edema.   Neurological: He is alert.   Skin: No erythema.         Lab Data Review:      5/4/2017  1:28 PM    Recent Labs      06/02/17   0003   SODIUM  131*   POTASSIUM  3.9   CHLORIDE  103   CO2  24   BUN  13   CREATININE  0.60   CALCIUM  8.3*       Recent Labs      06/02/17   0003   ALTSGPT  26   ASTSGOT  24   ALKPHOSPHAT  251*   TBILIRUBIN  0.7   GLUCOSE  108*       Recent Labs      05/31/17   2334  06/02/17   0003   RBC  2.51*  2.57*   HEMOGLOBIN  7.1*  7.1*   HEMATOCRIT  20.1*  20.3*   PLATELETCT  18*  14*       Recent Labs      05/31/17   2334  06/02/17   0003   WBC  0.1*  0.1*   ASTSGOT   --   24   ALTSGPT   --   26   ALKPHOSPHAT   --   251*   TBILIRUBIN   --   0.7           Assessment/Plan       CML (chronic myelocytic leukemia)-Accelerated phase  - Has completed multiple chemotherapy cycles, multiple relapses.  now he is in accelerated or blastic crisis   Was treated on dasatinib for a time with response, but then relapse, and bosutinib.  Pancytopenia (chronic, before starting with chemotherapy)   Protein electrophoresis: the polyclonal increase in the gamma region which may be indicative of specific immune   response or an early monoclonal protein.     Started on 5/6/17 with chemotherapy (synribo ) BID for 14 days which is completed on 5/20  Developed diarrhea as a complication treated with loperamide  Dr. Santillan hematologist discussed the choices with the patient,  chemotherapy vs hospice. The patient has decided to go through another cycle of chemotherapy. There is very low chance of response per hematology.    - Discussion by  with  at [facility] , BM needs to be assessed if Hypo/hypercellular before restarting Synribo. Scheduled for BM Bx today.    Pancytopenia (CMS-HCC)  Transfused multiple platelet and PRBCs   Follow up and keep plt>10 and Hb>7.5.  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  Blood culture showed MSSA  - ID consulted     Transaminitis  No abdominal pain, likely effect of chemotherapy  Discontinue all hepatotoxic drugs as possible  Continue to follow      Diarrhea  Most likely related to chemo  Resolved with loperamide       Hypertension  Controlled   On Coreg  Norvasc 5 added on 5/24 for better BP control.   PRN hydralazine 10 mg onboard.        Seizure disorder (CMS-HCC)  Stable    Continue on his home dose of keppra  Had 1 episode last year after dental procedure.     BPH (benign prostatic hyperplasia)  Stable.   Continue on tamsulosin 0.4    Low vitamin D level  Continue vit D2 weekly.

## 2017-06-02 NOTE — PROGRESS NOTES
ACLS RN at bedside to monitor patient during conscious sedation for a bone marrow biopsy. The patient was placed on 2L NC and versed and fentanyl were administered per MD order. Patient tolerated procedure well and his vital signs remained stable throughout. Patient drowsy post-procedure, but easily arousable to voice and following commands. Report given to patient's RN post-procedure.

## 2017-06-03 LAB
ALBUMIN SERPL BCP-MCNC: 3.1 G/DL (ref 3.2–4.9)
ALBUMIN/GLOB SERPL: 1 G/DL
ALP SERPL-CCNC: 258 U/L (ref 30–99)
ALT SERPL-CCNC: 33 U/L (ref 2–50)
ANION GAP SERPL CALC-SCNC: 8 MMOL/L (ref 0–11.9)
AST SERPL-CCNC: 30 U/L (ref 12–45)
BILIRUB SERPL-MCNC: 0.6 MG/DL (ref 0.1–1.5)
BUN SERPL-MCNC: 16 MG/DL (ref 8–22)
CALCIUM SERPL-MCNC: 8.4 MG/DL (ref 8.5–10.5)
CHLORIDE SERPL-SCNC: 103 MMOL/L (ref 96–112)
CO2 SERPL-SCNC: 25 MMOL/L (ref 20–33)
CREAT SERPL-MCNC: 0.62 MG/DL (ref 0.5–1.4)
ERYTHROCYTE [DISTWIDTH] IN BLOOD BY AUTOMATED COUNT: 35.8 FL (ref 35.9–50)
GFR SERPL CREATININE-BSD FRML MDRD: >60 ML/MIN/1.73 M 2
GLOBULIN SER CALC-MCNC: 3.1 G/DL (ref 1.9–3.5)
GLUCOSE SERPL-MCNC: 105 MG/DL (ref 65–99)
HCT VFR BLD AUTO: 22.4 % (ref 42–52)
HGB BLD-MCNC: 7.9 G/DL (ref 14–18)
MCH RBC QN AUTO: 28.4 PG (ref 27–33)
MCHC RBC AUTO-ENTMCNC: 35.3 G/DL (ref 33.7–35.3)
MCV RBC AUTO: 80.6 FL (ref 81.4–97.8)
NEUTROPHILS # BLD AUTO: ABNORMAL K/UL (ref 1.82–7.42)
NRBC # BLD AUTO: 0 K/UL
NRBC BLD AUTO-RTO: 0 /100 WBC
PLATELET # BLD AUTO: 29 K/UL (ref 164–446)
PMV BLD AUTO: 10.4 FL (ref 9–12.9)
POTASSIUM SERPL-SCNC: 4.3 MMOL/L (ref 3.6–5.5)
PROT SERPL-MCNC: 6.2 G/DL (ref 6–8.2)
RBC # BLD AUTO: 2.78 M/UL (ref 4.7–6.1)
SODIUM SERPL-SCNC: 136 MMOL/L (ref 135–145)
WBC # BLD AUTO: 0.1 K/UL (ref 4.8–10.8)

## 2017-06-03 PROCEDURE — A9270 NON-COVERED ITEM OR SERVICE: HCPCS | Performed by: INTERNAL MEDICINE

## 2017-06-03 PROCEDURE — 700102 HCHG RX REV CODE 250 W/ 637 OVERRIDE(OP): Performed by: INTERNAL MEDICINE

## 2017-06-03 PROCEDURE — 700111 HCHG RX REV CODE 636 W/ 250 OVERRIDE (IP): Performed by: INTERNAL MEDICINE

## 2017-06-03 PROCEDURE — 700101 HCHG RX REV CODE 250: Performed by: STUDENT IN AN ORGANIZED HEALTH CARE EDUCATION/TRAINING PROGRAM

## 2017-06-03 PROCEDURE — 700102 HCHG RX REV CODE 250 W/ 637 OVERRIDE(OP): Performed by: STUDENT IN AN ORGANIZED HEALTH CARE EDUCATION/TRAINING PROGRAM

## 2017-06-03 PROCEDURE — 770009 HCHG ROOM/CARE - ONCOLOGY SEMI PRI*

## 2017-06-03 PROCEDURE — 99232 SBSQ HOSP IP/OBS MODERATE 35: CPT | Mod: GC | Performed by: INTERNAL MEDICINE

## 2017-06-03 PROCEDURE — 80053 COMPREHEN METABOLIC PANEL: CPT

## 2017-06-03 PROCEDURE — 700102 HCHG RX REV CODE 250 W/ 637 OVERRIDE(OP): Performed by: HOSPITALIST

## 2017-06-03 PROCEDURE — A9270 NON-COVERED ITEM OR SERVICE: HCPCS | Performed by: STUDENT IN AN ORGANIZED HEALTH CARE EDUCATION/TRAINING PROGRAM

## 2017-06-03 PROCEDURE — 85025 COMPLETE CBC W/AUTO DIFF WBC: CPT | Mod: 91

## 2017-06-03 PROCEDURE — 36415 COLL VENOUS BLD VENIPUNCTURE: CPT

## 2017-06-03 PROCEDURE — A9270 NON-COVERED ITEM OR SERVICE: HCPCS | Performed by: HOSPITALIST

## 2017-06-03 RX ADMIN — NYSTATIN 500000 UNITS: 500000 SUSPENSION ORAL at 17:54

## 2017-06-03 RX ADMIN — ACYCLOVIR 400 MG: 800 TABLET ORAL at 22:06

## 2017-06-03 RX ADMIN — MAGNESIUM GLUCONATE 500 MG ORAL TABLET 400 MG: 500 TABLET ORAL at 09:53

## 2017-06-03 RX ADMIN — FINASTERIDE 5 MG: 5 TABLET, FILM COATED ORAL at 09:53

## 2017-06-03 RX ADMIN — ERGOCALCIFEROL 50000 UNITS: 1.25 CAPSULE ORAL at 06:03

## 2017-06-03 RX ADMIN — NYSTATIN 500000 UNITS: 500000 SUSPENSION ORAL at 09:53

## 2017-06-03 RX ADMIN — LEVETIRACETAM 750 MG: 250 TABLET, FILM COATED ORAL at 10:04

## 2017-06-03 RX ADMIN — LIDOCAINE 1 PATCH: 50 PATCH CUTANEOUS at 09:53

## 2017-06-03 RX ADMIN — CEFAZOLIN SODIUM 2 G: 2 INJECTION, SOLUTION INTRAVENOUS at 22:08

## 2017-06-03 RX ADMIN — NYSTATIN 500000 UNITS: 500000 SUSPENSION ORAL at 22:06

## 2017-06-03 RX ADMIN — NYSTATIN 500000 UNITS: 500000 SUSPENSION ORAL at 06:03

## 2017-06-03 RX ADMIN — CEFAZOLIN SODIUM 2 G: 2 INJECTION, SOLUTION INTRAVENOUS at 14:45

## 2017-06-03 RX ADMIN — CARVEDILOL 12.5 MG: 12.5 TABLET, FILM COATED ORAL at 17:54

## 2017-06-03 RX ADMIN — CEFAZOLIN SODIUM 2 G: 2 INJECTION, SOLUTION INTRAVENOUS at 06:03

## 2017-06-03 RX ADMIN — TAMSULOSIN HYDROCHLORIDE 0.8 MG: 0.4 CAPSULE ORAL at 09:53

## 2017-06-03 RX ADMIN — ACYCLOVIR 400 MG: 800 TABLET ORAL at 09:53

## 2017-06-03 RX ADMIN — LEVETIRACETAM 750 MG: 250 TABLET, FILM COATED ORAL at 22:07

## 2017-06-03 RX ADMIN — CARVEDILOL 12.5 MG: 12.5 TABLET, FILM COATED ORAL at 06:03

## 2017-06-03 ASSESSMENT — PAIN SCALES - GENERAL
PAINLEVEL_OUTOF10: 0

## 2017-06-03 ASSESSMENT — ENCOUNTER SYMPTOMS
FEVER: 0
DIZZINESS: 0
DIARRHEA: 0
PALPITATIONS: 0
NECK PAIN: 0
ABDOMINAL PAIN: 0
CHILLS: 0
DEPRESSION: 0
HEADACHES: 0
MYALGIAS: 0
HEARTBURN: 0
NAUSEA: 0
COUGH: 0
HEMOPTYSIS: 0

## 2017-06-03 NOTE — PROGRESS NOTES
[facility] [place] Internal Medicine Interval Note    Name Benjamin Post       1945   Age/Sex 71 y.o. male   MRN [id number]   Code Status DNR     After 5PM or if no immediate response to page, please call for cross-coverage  Attending/Team: Dr. Santiago  Call [phone] to page   1st Call - Day Intern (R1):   Dr Sears  2nd Call - Day Sr. Resident (R2/R3):            Chief complaint/ reason for interval visit (Primary Diagnosis)   CML/pancytopenia     Interval Problem Daily Status Update  :  S : No significant overnight events. Pt denies any acute complaints.     Today's Changes :   BM biopsy was done yesterday with no complications, waiting the biopsy.     HBG today 7.9  PLTS today 29      Principal Problem:    Fever and neutropenia (CMS-[facility]) POA:        Overview: Tmax up to 102.7 . None thereafter.      BC x 2 show Staphylococcus growth on Bactec. Final culture reports are       pending.       Benign physical examination. PIV site area is clean and normal without       inflammation.       CXR shows a questionable right basilar consolidation      UA negative      Tylenol on board.      Cefepime, flagyl oral, and IV vancomycin added to antiviral agent      Will monitor closely.   Active Problems:    CML (chronic myelocytic leukemia)-Accelerated phase (Chronic) POA: Yes      Overview:  Had fevers overnight.       WBC 0.1. Patient may need more chemotherapy as per Dr. Jorgensen.     Neutropenia (CMS-[facility]) POA: Yes      Overview:           Acute bilateral low back pain without sciatica POA: Yes    Diarrhea POA: No      Overview: Loperamide stopped on  due to initiation of antibacterials.           Seizure disorder (CMS-[facility]) POA: Yes    Transaminitis POA: Yes      Overview: Related to chemo therapy. AST/ALT: 46/89>>61/111>>71/134>>80/171 >>88/169    BPH (benign prostatic hyperplasia) (Chronic) POA: Yes      Overview: On flomax    Low  vitamin D level (Chronic) POA: Yes      Overview: Vit D: 11 on 4/29/17    Hypertension POA: Unknown  Resolved Problems:    Bone pain POA: Yes      Overview: Stable      Negative PE for tenderness over the rib cage.    Elevated PSA (Chronic) POA: Yes      Overview: PSA: 8.4 on 4/4/17  >>>>>4 on 5/18    Dysuria POA: Unknown      Overview: Stable           Abdominal pain POA: No      Overview: Resolved.           Review of Systems   Constitutional: Negative for fever and chills.   HENT: Negative for hearing loss.    Respiratory: Negative for cough and hemoptysis.    Cardiovascular: Negative for chest pain and palpitations.   Gastrointestinal: Negative for heartburn, nausea, abdominal pain and diarrhea.   Genitourinary: Negative for dysuria and urgency.   Musculoskeletal: Negative for myalgias and neck pain.   Skin: Negative for rash.   Neurological: Negative for dizziness and headaches.   Psychiatric/Behavioral: Negative for depression and suicidal ideas.       Consultants/Specialty  Oncology - Van Meter/Mak    Disposition  Inpatient for further chemotherapy vs hospice referral, the patient has not decided yet    Quality Measures  EKG reviewed, Labs reviewed, Medications reviewed and Radiology images reviewed  Mahajan catheter: No Mahajan      DVT Prophylaxis: Contraindicated - High bleeding risk  DVT prophylaxis - mechanical: SCDs (low plt)            Physical Exam       Filed Vitals:    06/02/17 2000 06/03/17 0000 06/03/17 0400 06/03/17 0800   BP: 142/73 124/62 126/72 96/50   Pulse: 74 73 72 68   Temp: 37.3 °C (99.1 °F) 36.9 °C (98.4 °F) 36.7 °C (98 °F) 36.9 °C (98.4 °F)   Resp: 16 18 20 18   Height:       Weight:       SpO2: 97% 97% 100% 96%     Body mass index is 21.58 kg/(m^2). Weight: 62.5 kg (137 lb 12.6 oz)  Oxygen Therapy:  Pulse Oximetry: 96 %, O2 (LPM): 0, O2 Delivery: None (Room Air)    Physical Exam   Constitutional: He is well-developed, well-nourished, and in no distress. No distress.   HENT:   Head:  Normocephalic.   Neck: No tracheal deviation present. No thyromegaly present.   Cardiovascular: Normal rate.  Exam reveals no friction rub.    Murmur heard.  Pansystolic murmur on the apex    Pulmonary/Chest: Effort normal. No respiratory distress. He has no wheezes.   Abdominal: Soft. He exhibits no distension. There is no tenderness.   Musculoskeletal: He exhibits no edema.   Neurological: He is alert.   Skin: No erythema.         Lab Data Review:      5/4/2017  1:28 PM    Recent Labs      06/02/17   0003  06/03/17   0003   SODIUM  131*  136   POTASSIUM  3.9  4.3   CHLORIDE  103  103   CO2  24  25   BUN  13  16   CREATININE  0.60  0.62   CALCIUM  8.3*  8.4*       Recent Labs      06/02/17   0003  06/03/17   0003   ALTSGPT  26  33   ASTSGOT  24  30   ALKPHOSPHAT  251*  258*   TBILIRUBIN  0.7  0.6   GLUCOSE  108*  105*       Recent Labs      05/31/17   2334  06/02/17   0003  06/03/17   0003   RBC  2.51*  2.57*  2.78*   HEMOGLOBIN  7.1*  7.1*  7.9*   HEMATOCRIT  20.1*  20.3*  22.4*   PLATELETCT  18*  14*  29*       Recent Labs      05/31/17   2334  06/02/17   0003  06/03/17   0003   WBC  0.1*  0.1*  0.1*   ASTSGOT   --   24  30   ALTSGPT   --   26  33   ALKPHOSPHAT   --   251*  258*   TBILIRUBIN   --   0.7  0.6           Assessment/Plan       CML (chronic myelocytic leukemia)-Accelerated phase  - Has completed multiple chemotherapy cycles, multiple relapses.  now he is in accelerated or blastic crisis   Was treated on dasatinib for a time with response, but then relapse, and bosutinib.  Pancytopenia (chronic, before starting with chemotherapy)        Started on 5/6/17 with chemotherapy (synribo ) BID for 14 days which is completed on 5/20  - Discussion by  with  at [facility] and the decision was to do BM biopsy to see the cellularity of BM before starting another cycle of chemotherapy. Biopsy was done yesterday and waiting the result.     Pancytopenia (CMS-HCC)  Transfused multiple platelet and PRBCs  in the past.  Follow up and keep plt>10 and Hb>7.5.  Platelet 29, HB 7.9  Received platelet transfusion before BM.    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 No indication for neopogen.    Transaminitis  Alk.phophatase 258  No abdominal pain, likely effect of chemotherapy  Discontinued all hepatotoxic drugs as possible  Continue to follow      Diarrhea  Most likely related to chemo  Resolved with loperamide       Hypertension  Bp decreased today to 96/50  To discontinue amlodipine and continue carvedilol 12.5 mg BID        Seizure disorder (CMS-HCC)  Stable    Continue on his home dose of keppra  Had 1 episode last year after dental procedure.     BPH (benign prostatic hyperplasia)  Stable.   Continue on tamsulosin 0.4    Low vitamin D level  Continue vit D2 weekly.

## 2017-06-03 NOTE — CARE PLAN
Problem: Safety  Goal: Will remain free from injury  Outcome: PROGRESSING AS EXPECTED  Bed locked and in lowest position. Call light and belongings within reach. Safety strip in place and sounding. Pt remains free from injury    Problem: Mobility  Goal: Risk for activity intolerance will decrease  Outcome: PROGRESSING AS EXPECTED  Pt up in room and to bathroom with sba; generalized weakness; PT involved and actively treating

## 2017-06-03 NOTE — PROGRESS NOTES
Assumed care of the patient at 1915. The patient is AOx3 (disoriented to time), but has no complaints of pain or discomfort at this time. The patient is recovering from a BM biopsy. There are two biopsy sites located on the patient's right and left iliac crest. Bandages in place. The patient has been updated on the POC for the shift. All questions answered at this time. Call light within reach, personal belongings and urinal are at bedside. The patients bed alarm is in place and activated.

## 2017-06-03 NOTE — CARE PLAN
Problem: Safety  Goal: Will remain free from falls  Intervention: Implement fall precautions  Bed alarm in place, call light within reach, bed rails closest to the bathroom are lowered, IV pole on the same side as the bathroom, treaded socks are on the patient, hourly rounding and toileting offered.       Problem: Infection  Goal: Will remain free from infection  Intervention: Implement standard precautions and perform hand washing before and after patient contact  Standard and neutropenic precautions have been implemented. Hand washing performed before and after all patient care.

## 2017-06-03 NOTE — CARE PLAN
Problem: Safety  Goal: Will remain free from injury  Intervention: Provide assistance with mobility  Bed locked and in lowest position. Call light and belongings within reach. Safety strip in place and sounding. Pt remains free from accidental injury      Problem: Acute Care of the Febrile Neutropenia Patient  Goal: Optimal Outcome for the Febrile Neutropenia Patient  Intervention: Vital Signs Q 4 hours, oral temps only  Q4 VS and neutropenic precautions in place. Pt remains afebrile thus far this shift. No s&s of infection noted  Intervention: No fresh flowers or plants  Neutropenic precautions reviewed with pt and family at bedside. Reinforcement needed. Appropriate signs and precautions in place

## 2017-06-03 NOTE — PROGRESS NOTES
Samira from Lab called with critical result of WBC of 0.1 and Platelet of 29 at 0107. Critical lab result read back to Samira. This critical lab result is within parameters established by  for this patient.

## 2017-06-03 NOTE — PROGRESS NOTES
Assumed pt care at change of shift. Pt fatigued .Denies pain, n/v, n/t. HTN this am- medical team aware. Mid lower back bx sites x2 with adhesive dressings CDI. Plan of care reviewed with family at bedside and questions addressed. Pt up in room and to bathroom with 1 assist. Call light and belongings within reach. Continuing hourly rounding and assessing for additional needs

## 2017-06-04 LAB
ALBUMIN SERPL BCP-MCNC: 3.1 G/DL (ref 3.2–4.9)
ALBUMIN/GLOB SERPL: 0.9 G/DL
ALP SERPL-CCNC: 262 U/L (ref 30–99)
ALT SERPL-CCNC: 28 U/L (ref 2–50)
ANION GAP SERPL CALC-SCNC: 7 MMOL/L (ref 0–11.9)
AST SERPL-CCNC: 33 U/L (ref 12–45)
BILIRUB SERPL-MCNC: 0.6 MG/DL (ref 0.1–1.5)
BUN SERPL-MCNC: 14 MG/DL (ref 8–22)
CALCIUM SERPL-MCNC: 8.5 MG/DL (ref 8.5–10.5)
CHLORIDE SERPL-SCNC: 103 MMOL/L (ref 96–112)
CO2 SERPL-SCNC: 24 MMOL/L (ref 20–33)
CREAT SERPL-MCNC: 0.62 MG/DL (ref 0.5–1.4)
ERYTHROCYTE [DISTWIDTH] IN BLOOD BY AUTOMATED COUNT: 35.1 FL (ref 35.9–50)
GFR SERPL CREATININE-BSD FRML MDRD: >60 ML/MIN/1.73 M 2
GLOBULIN SER CALC-MCNC: 3.4 G/DL (ref 1.9–3.5)
GLUCOSE SERPL-MCNC: 105 MG/DL (ref 65–99)
HCT VFR BLD AUTO: 20.7 % (ref 42–52)
HGB BLD-MCNC: 7.4 G/DL (ref 14–18)
MCH RBC QN AUTO: 28.5 PG (ref 27–33)
MCHC RBC AUTO-ENTMCNC: 35.7 G/DL (ref 33.7–35.3)
MCV RBC AUTO: 79.6 FL (ref 81.4–97.8)
NEUTROPHILS # BLD AUTO: ABNORMAL K/UL (ref 1.82–7.42)
NRBC # BLD AUTO: 0 K/UL
NRBC BLD AUTO-RTO: 0 /100 WBC
PLATELET # BLD AUTO: 17 K/UL (ref 164–446)
PMV BLD AUTO: 11.1 FL (ref 9–12.9)
POTASSIUM SERPL-SCNC: 4 MMOL/L (ref 3.6–5.5)
PROT SERPL-MCNC: 6.5 G/DL (ref 6–8.2)
RBC # BLD AUTO: 2.6 M/UL (ref 4.7–6.1)
SODIUM SERPL-SCNC: 134 MMOL/L (ref 135–145)
WBC # BLD AUTO: 0.1 K/UL (ref 4.8–10.8)

## 2017-06-04 PROCEDURE — A9270 NON-COVERED ITEM OR SERVICE: HCPCS | Performed by: INTERNAL MEDICINE

## 2017-06-04 PROCEDURE — P9016 RBC LEUKOCYTES REDUCED: HCPCS

## 2017-06-04 PROCEDURE — 85025 COMPLETE CBC W/AUTO DIFF WBC: CPT

## 2017-06-04 PROCEDURE — A9270 NON-COVERED ITEM OR SERVICE: HCPCS | Performed by: STUDENT IN AN ORGANIZED HEALTH CARE EDUCATION/TRAINING PROGRAM

## 2017-06-04 PROCEDURE — 700111 HCHG RX REV CODE 636 W/ 250 OVERRIDE (IP): Performed by: INTERNAL MEDICINE

## 2017-06-04 PROCEDURE — 86923 COMPATIBILITY TEST ELECTRIC: CPT

## 2017-06-04 PROCEDURE — 700102 HCHG RX REV CODE 250 W/ 637 OVERRIDE(OP): Performed by: STUDENT IN AN ORGANIZED HEALTH CARE EDUCATION/TRAINING PROGRAM

## 2017-06-04 PROCEDURE — 700101 HCHG RX REV CODE 250: Performed by: STUDENT IN AN ORGANIZED HEALTH CARE EDUCATION/TRAINING PROGRAM

## 2017-06-04 PROCEDURE — 770009 HCHG ROOM/CARE - ONCOLOGY SEMI PRI*

## 2017-06-04 PROCEDURE — 700102 HCHG RX REV CODE 250 W/ 637 OVERRIDE(OP): Performed by: INTERNAL MEDICINE

## 2017-06-04 PROCEDURE — 99232 SBSQ HOSP IP/OBS MODERATE 35: CPT | Mod: GC | Performed by: INTERNAL MEDICINE

## 2017-06-04 PROCEDURE — 36430 TRANSFUSION BLD/BLD COMPNT: CPT

## 2017-06-04 PROCEDURE — 86644 CMV ANTIBODY: CPT

## 2017-06-04 PROCEDURE — 36415 COLL VENOUS BLD VENIPUNCTURE: CPT

## 2017-06-04 RX ADMIN — LOPERAMIDE HYDROCHLORIDE 2 MG: 2 CAPSULE ORAL at 18:13

## 2017-06-04 RX ADMIN — TAMSULOSIN HYDROCHLORIDE 0.8 MG: 0.4 CAPSULE ORAL at 09:40

## 2017-06-04 RX ADMIN — ACYCLOVIR 400 MG: 800 TABLET ORAL at 09:41

## 2017-06-04 RX ADMIN — CARVEDILOL 12.5 MG: 12.5 TABLET, FILM COATED ORAL at 18:13

## 2017-06-04 RX ADMIN — LEVETIRACETAM 750 MG: 250 TABLET, FILM COATED ORAL at 21:27

## 2017-06-04 RX ADMIN — FINASTERIDE 5 MG: 5 TABLET, FILM COATED ORAL at 09:40

## 2017-06-04 RX ADMIN — ACETAMINOPHEN 650 MG: 325 TABLET, FILM COATED ORAL at 01:51

## 2017-06-04 RX ADMIN — MAGNESIUM GLUCONATE 500 MG ORAL TABLET 400 MG: 500 TABLET ORAL at 09:40

## 2017-06-04 RX ADMIN — NYSTATIN 500000 UNITS: 500000 SUSPENSION ORAL at 09:40

## 2017-06-04 RX ADMIN — CEFAZOLIN SODIUM 2 G: 2 INJECTION, SOLUTION INTRAVENOUS at 21:32

## 2017-06-04 RX ADMIN — CEFAZOLIN SODIUM 2 G: 2 INJECTION, SOLUTION INTRAVENOUS at 05:23

## 2017-06-04 RX ADMIN — CEFAZOLIN SODIUM 2 G: 2 INJECTION, SOLUTION INTRAVENOUS at 14:02

## 2017-06-04 RX ADMIN — ACYCLOVIR 400 MG: 800 TABLET ORAL at 21:27

## 2017-06-04 RX ADMIN — LEVETIRACETAM 750 MG: 250 TABLET, FILM COATED ORAL at 09:40

## 2017-06-04 RX ADMIN — LIDOCAINE 1 PATCH: 50 PATCH CUTANEOUS at 09:48

## 2017-06-04 RX ADMIN — DIPHENHYDRAMINE HCL 25 MG: 25 TABLET ORAL at 01:51

## 2017-06-04 RX ADMIN — CARVEDILOL 12.5 MG: 12.5 TABLET, FILM COATED ORAL at 09:41

## 2017-06-04 ASSESSMENT — ENCOUNTER SYMPTOMS
NAUSEA: 0
CARDIOVASCULAR NEGATIVE: 1
HEARTBURN: 0
FEVER: 0
COUGH: 0
PALPITATIONS: 0
CHILLS: 0
HEMOPTYSIS: 0
DIARRHEA: 0
RESPIRATORY NEGATIVE: 1
DIZZINESS: 0
NEUROLOGICAL NEGATIVE: 1
BRUISES/BLEEDS EASILY: 0
HEADACHES: 0
ABDOMINAL PAIN: 0
MYALGIAS: 0
NECK PAIN: 0
MUSCULOSKELETAL NEGATIVE: 1
DEPRESSION: 0

## 2017-06-04 ASSESSMENT — PAIN SCALES - GENERAL
PAINLEVEL_OUTOF10: 0

## 2017-06-04 NOTE — CARE PLAN
Problem: Mobility  Goal: Risk for activity intolerance will decrease  Outcome: PROGRESSING AS EXPECTED  Pt up with nursing and PT. Ambulating without difficulty    Problem: Urinary Elimination:  Goal: Ability to reestablish a normal urinary elimination pattern will improve  Outcome: PROGRESSING AS EXPECTED  Pt up to bathroom with assistance and voiding without difficulty    Problem: Acute Care of the Chemotherapy Patient  Goal: Optimal Outcome for the Chemotherapy Patient  Intervention: Review WBC, ANC, platelet count, kidney function and liver function tests before chemotherapy administration  Labs and orders reviewed and appropriateness prior to chemo administration addressed with MD- please see notes

## 2017-06-04 NOTE — PROGRESS NOTES
[facility] [place] [facility] Interval Note    Name Benjamin Post       1945   Age/Sex 71 y.o. male   MRN [id number]   Code Status DNR     After 5PM or if no immediate response to page, please call for cross-coverage  Attending/Team: Dr. Santiago  Call [phone] to page   1st Call - Day Intern (R1):   Dr Sears  2nd Call - Day Sr. Resident (R2/R3):            Chief complaint/ reason for interval visit (Primary Diagnosis)   CML/pancytopenia     Interval Problem Daily Status Update  :  S : No significant overnight events. Pt denies any acute complaints. HB 7.4, received one unit of blood.    Today's Changes :   HB 7.4  PLT 17  Wbc 0.1  Waiting bone marrow biopsy result. Chemotherapy to be started today with omacetaxine per oncology.      Principal Problem:    Fever and neutropenia: fever subsided, still neutropenic on cefazolin         Active Problems:    CML (chronic myelocytic leukemia)-Accelerated phase (Chronic) POA: Yes      Overview:  Had fevers overnight.       WBC 0.1. Patient may need more chemotherapy as per Dr. Jorgensen.     Neutropenia (CMS-HCC) POA: Yes      Overview:           Acute bilateral low back pain without sciatica POA: resolved     Diarrhea POA: resolved       Overview: Loperamide stopped on  due to initiation of antibacterials.           Seizure disorder (CMS-HCC) POA: Yes    Transaminitis POA: Yes      Overview: Related to chemo therapy. AST/ALT: 46/89>>61/111>>71/134>>80/171 >>88/169    BPH (benign prostatic hyperplasia) (Chronic) POA: Yes      Overview: On flomax    Low vitamin D level (Chronic) POA: Yes      Overview: Vit D: 11 on 17    Hypertension POA: Unknown  Resolved Problems:    Bone pain POA: Yes      Overview: Stable      Negative PE for tenderness over the rib cage.    Elevated PSA (Chronic) POA: Yes      Overview: PSA: 8.4 on 17  >>>>>4 on     Dysuria POA: Unknown      Overview:  Stable           Abdominal pain POA: No      Overview: Resolved.           Review of Systems   Constitutional: Negative for fever and chills.   HENT: Negative for hearing loss.    Respiratory: Negative for cough and hemoptysis.    Cardiovascular: Negative for chest pain and palpitations.   Gastrointestinal: Negative for heartburn, nausea, abdominal pain and diarrhea.   Genitourinary: Negative for dysuria and urgency.   Musculoskeletal: Negative for myalgias and neck pain.   Skin: Negative for rash.   Neurological: Negative for dizziness and headaches.   Psychiatric/Behavioral: Negative for depression and suicidal ideas.       Consultants/Specialty  Oncology - Van Meter/Mak    Disposition  Inpatient for further chemotherapy vs hospice referral, the patient has not decided yet    Quality Measures  EKG reviewed, Labs reviewed, Medications reviewed and Radiology images reviewed  Mahajan catheter: No Mahajan      DVT Prophylaxis: Contraindicated - High bleeding risk  DVT prophylaxis - mechanical: SCDs (low plt)            Physical Exam       Filed Vitals:    06/04/17 0224 06/04/17 0400 06/04/17 0520 06/04/17 0800   BP: 126/69 141/72 125/57 138/75   Pulse: 60 68 61 69   Temp: 36.8 °C (98.3 °F) 36.7 °C (98.1 °F) 36.7 °C (98.1 °F) 36.7 °C (98.1 °F)   Resp: 17 16 16 16   Height:       Weight:       SpO2: 99% 98% 97% 99%     Body mass index is 21.58 kg/(m^2).    Oxygen Therapy:  Pulse Oximetry: 99 %, O2 (LPM): 0, O2 Delivery: None (Room Air)    Physical Exam   Constitutional: He is well-developed, well-nourished, and in no distress. No distress.   HENT:   Head: Normocephalic.   Neck: No tracheal deviation present. No thyromegaly present.   Cardiovascular: Normal rate.  Exam reveals no friction rub.    Murmur heard.  Pansystolic murmur on the apex    Pulmonary/Chest: Effort normal. No respiratory distress. He has no wheezes.   Abdominal: Soft. He exhibits no distension. There is no tenderness.   Musculoskeletal: He exhibits no  edema.   Neurological: He is alert.   Skin: No erythema.         Lab Data Review:      5/4/2017  1:28 PM    Recent Labs      06/02/17   0003  06/03/17   0003  06/03/17   2351   SODIUM  131*  136  134*   POTASSIUM  3.9  4.3  4.0   CHLORIDE  103  103  103   CO2  24  25  24   BUN  13  16  14   CREATININE  0.60  0.62  0.62   CALCIUM  8.3*  8.4*  8.5       Recent Labs      06/02/17   0003  06/03/17   0003  06/03/17   2351   ALTSGPT  26  33  28   ASTSGOT  24  30  33   ALKPHOSPHAT  251*  258*  262*   TBILIRUBIN  0.7  0.6  0.6   GLUCOSE  108*  105*  105*       Recent Labs      06/02/17   0003  06/03/17   0003  06/03/17   2351   RBC  2.57*  2.78*  2.60*   HEMOGLOBIN  7.1*  7.9*  7.4*   HEMATOCRIT  20.3*  22.4*  20.7*   PLATELETCT  14*  29*  17*       Recent Labs      06/02/17   0003  06/03/17   0003  06/03/17   2351   WBC  0.1*  0.1*  0.1*   ASTSGOT  24  30  33   ALTSGPT  26  33  28   ALKPHOSPHAT  251*  258*  262*   TBILIRUBIN  0.7  0.6  0.6           Assessment/Plan       CML (chronic myelocytic leukemia)-Accelerated phase  - Has completed multiple chemotherapy cycles, multiple relapses.  now he is in accelerated or blastic crisis   Was treated on dasatinib for a time with response, but then relapse, and bosutinib.  Pancytopenia (chronic, before starting with chemotherapy)        Started on 5/6/17 with chemotherapy (synribo ) BID for 14 days which is completed on 5/20  - to start another cycle of omacetaxine today per oncology.  - waiting BM biopsy result    Pancytopenia (CMS-HCC)  Transfused multiple platelet and PRBCs in the past.  Received one unit of blood yesterday  Follow up and keep plt>10 and Hb>7.5.  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 No indication for neopogen.    Transaminitis  Alk.phophatase 262  No abdominal pain, likely effect of chemotherapy  Discontinued all hepatotoxic drugs as possible  Continue to follow      Diarrhea  Most likely related to chemo  Resolved with loperamide       Hypertension  Bp decreased today to 96/50 yesterday,  Improved later to 141/57  discontinued amlodipine and continue carvedilol 12.5 mg BID        Seizure disorder (CMS-HCC)  Stable    Continue on his home dose of keppra  Had 1 episode last year after dental procedure.     BPH (benign prostatic hyperplasia)  Stable.   Continue on tamsulosin 0.4    Low vitamin D level  Continue vit D2 weekly.

## 2017-06-04 NOTE — PROGRESS NOTES
Assumed pt care at change of shift. Labs and orders noted. Chemotherapy Synribo cycle 2 day 1 as ordered today. Pt denies pain, n/v, n/t. Up in room and to bathroom with sba. IV antibiotics as ordered. Pt afebrile, with no complaints. Continuing hourly rounding and assessing for additional needs

## 2017-06-04 NOTE — PROGRESS NOTES
"Pharmacy Chemotherapy Calculations    Dx: CML  Cycle: 2 days 1- 14    Previous treatment = C1 May 6-20, 2017. Hopeful for allo-BMT at [facility] if responds.    Regimen and Dosing Reference  Omacetaxine (Synribo)  Omacetaxine 1.25 mg/m2 Subcutaneously twice daily for 14 consecutive days of a 28 day treatment cycle  Continue until hematologic response is achieved.  Then maintenance 1.25 mg/m2 subcutaneously twice daily for 7 consecutive days of a 28 day treatment cycle until no longer achieving clinical treatment benefit    Brice DOWLING et al Phase 2 study of subcutaneous Omacetaxine Mepesuccinate after TKI faillure in patients with Chronic Phase CML with  Mutation Blood, 2012 120(13):2573-80  NCCN Guidelines for Chronic Myelogenous Leukemia V.1.2016      Blood pressure 138/75, pulse 69, temperature 36.7 °C (98.1 °F), resp. rate 16, height 1.702 m (5' 7\"), weight 62.5 kg (137 lb 12.6 oz), SpO2 99 %.     6/3/17:  ANC~ 0 Plt = 17k   Hgb = 7.4     SCr = 0.62mg/dL CrCl ~ 83mL/min   LFT's = 33/28/262 TBili = 0.6       MD and patient aware of all labs and risks involved in treatment, ok to proceed per Dr. Vu.    Omacetaxine 1.25 mg/m2 x 1.72m2 = 2.15 mg Subcutaneous    >5% diff, Dr. Vu aware. Continue with baseline dose for initial treatment of cycle 2.    If continues past the first 8 doses, dose will be adjusted for current weight.   Also may consider 12 day course based on counts per d/w/ Dr. Vu.    ok to treat with final written dose =  2.4 mg Subcutaneous twice daily.   --patient own med, stored in inpatient pharmacy to be dispensed daily. Refrigeration required.     AVA Linares Pharm.D.        "

## 2017-06-04 NOTE — PROGRESS NOTES
Chemotherapy Verification - SECONDARY RN       Height = 170.2 cm  Weight = 63.2 kg  BSA = 1.73 m2       Medication: omacetxine  Dose: 1.25 mg m2  Calculated Dose: 2.16 mg ordered= 2.4 mg (set dose per MD)                             (In mg/m2, AUC, mg/kg)       I confirm that this process was performed independently.

## 2017-06-04 NOTE — CARE PLAN
Problem: Communication  Goal: The ability to communicate needs accurately and effectively will improve  Outcome: PROGRESSING AS EXPECTED  Patient is able to communicate effectively,  used as needed. Patient can express needs, hourly rounding in place, will continue to monitor for continuing patient needs.    Problem: Bowel/Gastric:  Goal: Will not experience complications related to bowel motility  Outcome: PROGRESSING AS EXPECTED  Patient had a medium soft, brown BM today, no constipation or diarrhea noted. Will continue to monitor bowel status and intervene accordingly.

## 2017-06-04 NOTE — PROGRESS NOTES
Oncology/Hematology Progress Note               Author: LEVAR Flynnpantera Date & Time created: 2017  10:34 AM     Interval History:  CC: Accelerated/Blast crisis CML  Will start second 14 day course of Omacetaxine today because drug will .  If bone marrow empty, then stop drug then send to [facility]  If marrow packed, will continue Omacetaxine but my office will have to reorder at lower dose due to weight    Review of Systems:  Review of Systems   Constitutional: Negative for fever and chills.   Respiratory: Negative.    Cardiovascular: Negative.    Gastrointestinal: Negative for abdominal pain.   Genitourinary: Negative.    Musculoskeletal: Negative.    Skin: Negative for rash.   Neurological: Negative.    Endo/Heme/Allergies: Does not bruise/bleed easily.       Physical Exam:  Physical Exam   Constitutional: He is oriented to person, place, and time. No distress.   HENT:   Mouth/Throat: No oropharyngeal exudate.   Eyes: Pupils are equal, round, and reactive to light. No scleral icterus.   Neck: Normal range of motion.   Cardiovascular: Normal rate and normal heart sounds.    Pulmonary/Chest: Effort normal.   Abdominal: Soft. Bowel sounds are normal.   Lymphadenopathy:     He has no cervical adenopathy.   Neurological: He is alert and oriented to person, place, and time.   Skin: Skin is warm.       Labs:        Invalid input(s): [id number]      Recent Labs      17   0003  17   0003  17   2351   SODIUM  131*  136  134*   POTASSIUM  3.9  4.3  4.0   CHLORIDE  103  103  103   CO2  24  25  24   BUN  13  16  14   CREATININE  0.60  0.62  0.62   CALCIUM  8.3*  8.4*  8.5     Recent Labs      17   0003  17   0003  17   2351   ALTSGPT  26  33  28   ASTSGOT  24  30  33   ALKPHOSPHAT  251*  258*  262*   TBILIRUBIN  0.7  0.6  0.6   GLUCOSE  108*  105*  105*     Recent Labs      17   0003  17   0003  17   2351   RBC  2.57*  2.78*  2.60*   HEMOGLOBIN  7.1*  7.9*  7.4*    HEMATOCRIT  20.3*  22.4*  20.7*   PLATELETCT  14*  29*  17*     Recent Labs      17   0003  17   0003  17   2351   WBC  0.1*  0.1*  0.1*   ASTSGOT  24  30  33   ALTSGPT  26  33  28   ALKPHOSPHAT  251*  258*  262*   TBILIRUBIN  0.7  0.6  0.6     Recent Labs      17   0003  17   0003  17   2351   SODIUM  131*  136  134*   POTASSIUM  3.9  4.3  4.0   CHLORIDE  103  103  103   CO2  24  25  24   GLUCOSE  108*  105*  105*   BUN  13  16  14   CREATININE  0.60  0.62  0.62   CALCIUM  8.3*  8.4*  8.5     Hemodynamics:  Temp (24hrs), Av.8 °C (98.2 °F), Min:36.7 °C (98.1 °F), Max:36.9 °C (98.5 °F)  Temperature: 36.7 °C (98.1 °F)  Pulse  Av.8  Min: 58  Max: 118   Blood Pressure : 138/75 mmHg     Respiratory:    Respiration: 16, Pulse Oximetry: 99 %     Work Of Breathing / Effort: Mild  RUL Breath Sounds: Clear, RML Breath Sounds: Clear, RLL Breath Sounds: Diminished, WAGNER Breath Sounds: Clear, LLL Breath Sounds: Diminished  Fluids:    Intake/Output Summary (Last 24 hours) at 17 1034  Last data filed at 17 1000   Gross per 24 hour   Intake   2057 ml   Output   1675 ml   Net    382 ml     Weight: 63.2 kg (139 lb 5.3 oz)  GI/Nutrition:  Orders Placed This Encounter   Procedures   • DIET ORDER     Standing Status: Standing      Number of Occurrences: 1      Standing Expiration Date:      Order Specific Question:  Diet:     Answer:  Regular [1]     Order Specific Question:  Texture/Fiber modifications:     Answer:  Dysphagia 2(Pureed/Chopped)specify fluid consistency(question 6) [2]      Comments:  pt with absolutely no teeth     Order Specific Question:  Consistency/Fluid modifications:     Answer:  Thin Liquids [3]     Medical Decision Making, by Problem:  Active Hospital Problems    Diagnosis   • *Fever and neutropenia (CMS-HCC) [D70.9, R50.81]   • Neutropenia (CMS-HCC) [D70.9]   • 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]   • Diarrhea [R19.7]   • Acute  bilateral low back pain without sciatica [M54.5]   • Seizure disorder (CMS-HCC) [G40.909]   • Hypertension [I10]   • BPH (benign prostatic hyperplasia) [N40.0]   • Low vitamin D level [E55.9]   • Transaminitis [R74.0]       Plan:  Advanced CML: restart Omacetaxine but await marrow report  Continue Omacetaxine if marrow packed; discontinue if marrow empty  Guarded prognosis    Labs reviewed

## 2017-06-04 NOTE — PROGRESS NOTES
Received report and assumed patient care at change of shift. Patient is resting in bed, A/O x4. Patient reports 0/10 pain at this time, medications provided per MAR.    PIV assessed, patent with no s/s of infection or infiltration at this time. IV ABX given per MAR.     Lower back bone marrow biopsy sites with dressings clean dry and intact.     Pt ambulatory to the bathroom with x1 assistance.    Plan of care discussed, questions answered. Bed is in the lowest position and locked, call light within reach, non-skid socks in place, hourly rounding. Patient reports no further needs and this time.

## 2017-06-04 NOTE — PROGRESS NOTES
"Pharmacy Chemotherapy Verification    Patient Name: Benjamin Post   Dx: CML       Protocol: Synribo Induction   Omacetaxine (Synribo) 1.25 mg/m2 SQ BID for 14 days  Q28 days until hematological response  NCCN Guidelines for Chronic Myelogenous Leukemia.V.1.2016  Brice J, et al. Blood. 2012 Sep 27;120(13):2573-80. Epub 2012 Aug 15.  Alfonso ROSADO et al. J Clin Oncol. 2011;30(15s):abstr 6513.    Allergies:  Review of patient's allergies indicates no known allergies.     /75 mmHg  Pulse 69  Temp(Src) 36.7 °C (98.1 °F)  Resp 16  Ht 1.702 m (5' 7\")  Wt 63.2 kg (139 lb 5.3 oz)  BMI 21.82 kg/m2  SpO2 99% Body surface area is 1.73 meters squared.    ANC~ 0 Plt = 17k   Hgb = 7.4   SCr = 0.62mg/dL CrCl ~ 83mL/min  (min SCr 0.7)  LFT's = WNL, except  TBili = 0.6    **MD aware of all labs, okay to proceed with treatment**     Drug Order   (Drug name, dose, route, IV Fluid & volume, frequency, number of doses) Cycle: 2 Day 1 of 14     Previous treatment: C1 May 6-20, 2017   Medication = Omacetaxine  Base Dose= 1.25 mg/m2   Calc Dose: Base Dose x 1.72 m2= 2.15 mg  Final Dose = 2.4 mg  Route = subq  Conc & Volume = 3.5 mg/mL = 0.69 mL  Admin Duration = subcutaneous injection   Use Pateint's Own Medication       >5% difference, okay to treat with final dose per Dr Vu to avoid drug waste. If medication reordered, will decrease dose       By my signature below, I confirm this process was performed independently with the BSA and all final chemotherapy dosing calculations congruent. I have reviewed the above chemotherapy order and that my calculation of the final dose and BSA (when applicable) corroborate those calculations of the  pharmacist. Discrepancies of 5% or greater in the written dose have been addressed and documented within the [facility] Progress notes.    Amy Herndon, PharmD             "

## 2017-06-04 NOTE — PROGRESS NOTES
Chemotherapy Verification - PRIMARY RN      Height = 170.2 cm  Weight = 63.2 kg  BSA = 1.73 m2       Medication: Omecetaxine  Dose: 1.25 mg/m2  Calculated Dose: 2.16 mg Ordered dose 2.4 mg- flat rate for all doses    > 5 % difference. MD Vu aware. Continue with this dosage for existent medication supply; dose will be adjusted when reordered.                      I confirm this process was performed independently with the BSA and all final chemotherapy dosing calculations congruent.  Any discrepancies of 5% or greater have been addressed with the chemotherapy pharmacist. The resolution of the discrepancy has been documented in the EPIC progress notes.

## 2017-06-04 NOTE — PROGRESS NOTES
Polina from Lab called with critical result of WBC: 0.1, Plts: 17 at 0102. Critical lab result read back to Polina.   This critical lab result is within parameters established by [facility] for this patient

## 2017-06-05 LAB
ALBUMIN SERPL BCP-MCNC: 3.4 G/DL (ref 3.2–4.9)
ALBUMIN/GLOB SERPL: 0.9 G/DL
ALP SERPL-CCNC: 289 U/L (ref 30–99)
ALT SERPL-CCNC: 30 U/L (ref 2–50)
ANION GAP SERPL CALC-SCNC: 5 MMOL/L (ref 0–11.9)
AST SERPL-CCNC: 36 U/L (ref 12–45)
BILIRUB SERPL-MCNC: 0.7 MG/DL (ref 0.1–1.5)
BUN SERPL-MCNC: 16 MG/DL (ref 8–22)
CALCIUM SERPL-MCNC: 8.5 MG/DL (ref 8.5–10.5)
CHLORIDE SERPL-SCNC: 100 MMOL/L (ref 96–112)
CO2 SERPL-SCNC: 26 MMOL/L (ref 20–33)
CREAT SERPL-MCNC: 0.72 MG/DL (ref 0.5–1.4)
ERYTHROCYTE [DISTWIDTH] IN BLOOD BY AUTOMATED COUNT: 36.2 FL (ref 35.9–50)
GFR SERPL CREATININE-BSD FRML MDRD: >60 ML/MIN/1.73 M 2
GLOBULIN SER CALC-MCNC: 3.6 G/DL (ref 1.9–3.5)
GLUCOSE SERPL-MCNC: 108 MG/DL (ref 65–99)
HCT VFR BLD AUTO: 23.6 % (ref 42–52)
HGB BLD-MCNC: 8.3 G/DL (ref 14–18)
MCH RBC QN AUTO: 27.9 PG (ref 27–33)
MCHC RBC AUTO-ENTMCNC: 35.2 G/DL (ref 33.7–35.3)
MCV RBC AUTO: 79.5 FL (ref 81.4–97.8)
NEUTROPHILS # BLD AUTO: ABNORMAL K/UL (ref 1.82–7.42)
NRBC # BLD AUTO: 0 K/UL
NRBC BLD AUTO-RTO: 0 /100 WBC
PLATELET # BLD AUTO: 14 K/UL (ref 164–446)
PMV BLD AUTO: 11.5 FL (ref 9–12.9)
POTASSIUM SERPL-SCNC: 4.2 MMOL/L (ref 3.6–5.5)
PROT SERPL-MCNC: 7 G/DL (ref 6–8.2)
RBC # BLD AUTO: 2.97 M/UL (ref 4.7–6.1)
SODIUM SERPL-SCNC: 131 MMOL/L (ref 135–145)
WBC # BLD AUTO: 0.2 K/UL (ref 4.8–10.8)

## 2017-06-05 PROCEDURE — A9270 NON-COVERED ITEM OR SERVICE: HCPCS | Performed by: INTERNAL MEDICINE

## 2017-06-05 PROCEDURE — 99232 SBSQ HOSP IP/OBS MODERATE 35: CPT | Mod: GC | Performed by: INTERNAL MEDICINE

## 2017-06-05 PROCEDURE — 700102 HCHG RX REV CODE 250 W/ 637 OVERRIDE(OP): Performed by: INTERNAL MEDICINE

## 2017-06-05 PROCEDURE — 80053 COMPREHEN METABOLIC PANEL: CPT | Mod: 91

## 2017-06-05 PROCEDURE — 700111 HCHG RX REV CODE 636 W/ 250 OVERRIDE (IP): Performed by: INTERNAL MEDICINE

## 2017-06-05 PROCEDURE — A9270 NON-COVERED ITEM OR SERVICE: HCPCS | Performed by: STUDENT IN AN ORGANIZED HEALTH CARE EDUCATION/TRAINING PROGRAM

## 2017-06-05 PROCEDURE — 700102 HCHG RX REV CODE 250 W/ 637 OVERRIDE(OP): Performed by: STUDENT IN AN ORGANIZED HEALTH CARE EDUCATION/TRAINING PROGRAM

## 2017-06-05 PROCEDURE — 85025 COMPLETE CBC W/AUTO DIFF WBC: CPT

## 2017-06-05 PROCEDURE — 36415 COLL VENOUS BLD VENIPUNCTURE: CPT

## 2017-06-05 PROCEDURE — 87040 BLOOD CULTURE FOR BACTERIA: CPT | Mod: 91

## 2017-06-05 PROCEDURE — 770009 HCHG ROOM/CARE - ONCOLOGY SEMI PRI*

## 2017-06-05 RX ADMIN — ACYCLOVIR 400 MG: 800 TABLET ORAL at 08:36

## 2017-06-05 RX ADMIN — FINASTERIDE 5 MG: 5 TABLET, FILM COATED ORAL at 08:36

## 2017-06-05 RX ADMIN — CARVEDILOL 12.5 MG: 12.5 TABLET, FILM COATED ORAL at 08:36

## 2017-06-05 RX ADMIN — CEFAZOLIN SODIUM 2 G: 2 INJECTION, SOLUTION INTRAVENOUS at 05:09

## 2017-06-05 RX ADMIN — CEFAZOLIN SODIUM 2 G: 2 INJECTION, SOLUTION INTRAVENOUS at 14:23

## 2017-06-05 RX ADMIN — CEFAZOLIN SODIUM 2 G: 2 INJECTION, SOLUTION INTRAVENOUS at 20:41

## 2017-06-05 RX ADMIN — TAMSULOSIN HYDROCHLORIDE 0.8 MG: 0.4 CAPSULE ORAL at 08:36

## 2017-06-05 RX ADMIN — ACYCLOVIR 400 MG: 800 TABLET ORAL at 20:41

## 2017-06-05 RX ADMIN — MAGNESIUM GLUCONATE 500 MG ORAL TABLET 400 MG: 500 TABLET ORAL at 08:36

## 2017-06-05 RX ADMIN — CARVEDILOL 12.5 MG: 12.5 TABLET, FILM COATED ORAL at 17:58

## 2017-06-05 RX ADMIN — LEVETIRACETAM 750 MG: 250 TABLET, FILM COATED ORAL at 08:49

## 2017-06-05 RX ADMIN — LEVETIRACETAM 750 MG: 250 TABLET, FILM COATED ORAL at 20:41

## 2017-06-05 ASSESSMENT — ENCOUNTER SYMPTOMS
FEVER: 0
HEARTBURN: 0
MUSCULOSKELETAL NEGATIVE: 1
CONSTITUTIONAL NEGATIVE: 1
NECK PAIN: 0
GASTROINTESTINAL NEGATIVE: 1
MYALGIAS: 0
DIZZINESS: 0
BRUISES/BLEEDS EASILY: 0
DEPRESSION: 0
NAUSEA: 0
PALPITATIONS: 0
HEMOPTYSIS: 0
CHILLS: 0
COUGH: 0
HEADACHES: 0
CARDIOVASCULAR NEGATIVE: 1
DIARRHEA: 0
RESPIRATORY NEGATIVE: 1
NEUROLOGICAL NEGATIVE: 1
ABDOMINAL PAIN: 0

## 2017-06-05 ASSESSMENT — PAIN SCALES - GENERAL
PAINLEVEL_OUTOF10: 0

## 2017-06-05 NOTE — PROGRESS NOTES
Pt refusing to have blood cultures draw at this time, per pt wants to be poked only once, will reschedule to routine in the morning per pt request.

## 2017-06-05 NOTE — PROGRESS NOTES
Oncology/Hematology Progress Note               Author:  Wayne Wilsoncherry Date & Time created: 2017  10:38 AM     Interval History:  CC: Accelerated/Blast crisis CML, started second 14 day course of Omacetaxine today because drug will .  If bone marrow empty, then stop drug then send to [facility].  If marrow packed, will continue Omacetaxine but my office will have to reorder at lower dose due to weight  As of  continue waiting for BMB results  No events overnight    Review of Systems:  Review of Systems   Constitutional: Negative.  Negative for fever and chills.   HENT: Negative.    Respiratory: Negative.    Cardiovascular: Negative.    Gastrointestinal: Negative.  Negative for abdominal pain.   Genitourinary: Negative.    Musculoskeletal: Negative.    Skin: Negative for rash.   Neurological: Negative.    Endo/Heme/Allergies: Negative.  Does not bruise/bleed easily.       Physical Exam:  Physical Exam   Constitutional: He is oriented to person, place, and time. He appears well-developed and well-nourished. No distress.   HENT:   Head: Normocephalic and atraumatic.   Mouth/Throat: No oropharyngeal exudate.   Eyes: Pupils are equal, round, and reactive to light. No scleral icterus.   Neck: Normal range of motion.   Cardiovascular: Normal rate and normal heart sounds.    Pulmonary/Chest: Effort normal. No respiratory distress. He has no wheezes. He has no rales.   Abdominal: Soft. Bowel sounds are normal.   Lymphadenopathy:     He has no cervical adenopathy.   Neurological: He is alert and oriented to person, place, and time.   Skin: Skin is warm.       Labs:        Invalid input(s): [id number]      Recent Labs      17   0003  17   2351  17   0916   SODIUM  136  134*  131*   POTASSIUM  4.3  4.0  4.2   CHLORIDE  103  103  100   CO2  25  24  26   BUN  16  14  16   CREATININE  0.62  0.62  0.72   CALCIUM  8.4*  8.5  8.5     Recent Labs      17   0003  17   2351  17   0916    ALTSGPT  33  28  30   ASTSGOT  30  33  36   ALKPHOSPHAT  258*  262*  289*   TBILIRUBIN  0.6  0.6  0.7   GLUCOSE  105*  105*  108*     Recent Labs      17   0003  17   2357   RBC  2.78*  2.60*  2.97*   HEMOGLOBIN  7.9*  7.4*  8.3*   HEMATOCRIT  22.4*  20.7*  23.6*   PLATELETCT  29*  17*  14*     Recent Labs      17   0003  17   23517   0916   WBC  0.1*  0.1*  0.2*   --    ASTSGOT  30  33   --   36   ALTSGPT  33  28   --   30   ALKPHOSPHAT  258*  262*   --   289*   TBILIRUBIN  0.6  0.6   --   0.7     Recent Labs      17   0003  17   0916   SODIUM  136  134*  131*   POTASSIUM  4.3  4.0  4.2   CHLORIDE  103  103  100   CO2  25  24  26   GLUCOSE  105*  105*  108*   BUN  16  14  16   CREATININE  0.62  0.62  0.72   CALCIUM  8.4*  8.5  8.5     Hemodynamics:  Temp (24hrs), Av.8 °C (98.2 °F), Min:36.4 °C (97.6 °F), Max:36.9 °C (98.5 °F)  Temperature: 36.9 °C (98.5 °F)  Pulse  Av.5  Min: 58  Max: 118   Blood Pressure : 109/58 mmHg     Respiratory:    Respiration: 18, Pulse Oximetry: 97 %     Work Of Breathing / Effort: Mild  RUL Breath Sounds: Clear, RML Breath Sounds: Clear, RLL Breath Sounds: Diminished, WAGNER Breath Sounds: Clear, LLL Breath Sounds: Diminished  Fluids:    Intake/Output Summary (Last 24 hours) at 17 1034  Last data filed at 17 1000   Gross per 24 hour   Intake   2057 ml   Output   1675 ml   Net    382 ml        GI/Nutrition:  Orders Placed This Encounter   Procedures   • DIET ORDER     Standing Status: Standing      Number of Occurrences: 1      Standing Expiration Date:      Order Specific Question:  Diet:     Answer:  Regular [1]     Order Specific Question:  Texture/Fiber modifications:     Answer:  Dysphagia 2(Pureed/Chopped)specify fluid consistency(question 6) [2]      Comments:  pt with absolutely no teeth     Order Specific Question:  Consistency/Fluid modifications:     Answer:  Thin  Liquids [3]     Medical Decision Making, by Problem:  Active Hospital Problems    Diagnosis   • *Fever and neutropenia (CMS-HCC) [D70.9, R50.81]   • Neutropenia (CMS-HCC) [D70.9]   • 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]   • Diarrhea [R19.7]   • Acute bilateral low back pain without sciatica [M54.5]   • Seizure disorder (CMS-HCC) [G40.909]   • Hypertension [I10]   • BPH (benign prostatic hyperplasia) [N40.0]   • Low vitamin D level [E55.9]   • Transaminitis [R74.0]       Plan:  Advanced CML: restart Omacetaxine but await marrow report  Continue Omacetaxine if marrow packed; discontinue if marrow empty  Guarded prognosis  Transfuse  PRBC if hemoglobin <8 g/dl or Platelets if <10 or bleeding    Labs reviewed

## 2017-06-05 NOTE — PROGRESS NOTES
[facility] Interval Note    Name Benjamin Post       1945   Age/Sex 71 y.o. male   MRN [id number]   Code Status DNR     After 5PM or if no immediate response to page, please call for cross-coverage  Attending/Team: Dr. Melissa / rudolph  Call [phone] to page   1st Call - Day Intern (R1):   Dr Sherman Sears  2nd Call - Day Sr. Resident (R2/R3):   Dr. Sarbjit Kent         Chief complaint/ reason for interval visit (Primary Diagnosis)   CML/pancytopenia     Interval Problem Daily Status Update  :  S : No significant overnight events. Pt denies any acute complaints. HB 8.3, received omacetaxine     Today's Changes :     Yesterday labs  HB 8.3  PLT 14  Wbc 0.2  Waiting bone marrow biopsy result. Chemotherapy started yesterday. Waiting BM result before going through further chemotherapy.      Principal Problem:    Fever and neutropenia: fever subsided, still neutropenic on cefazolin         Active Problems:    CML (chronic myelocytic leukemia)-Accelerated phase (Chronic) POA: Yes      Overview:  Had fevers overnight.       WBC 0.1. Patient may need more chemotherapy as per Dr. Jorgensen.     Neutropenia (CMS-HCC) POA: Yes      Overview:           Acute bilateral low back pain without sciatica POA: resolved     Diarrhea POA: resolved       Overview: Loperamide stopped on  due to initiation of antibacterials.           Seizure disorder (CMS-HCC) POA: Yes    Transaminitis POA: Yes      Overview: Related to chemo therapy. AST/ALT: 46/89>>61/111>>71/134>>80/171 >>88/169    BPH (benign prostatic hyperplasia) (Chronic) POA: Yes      Overview: On flomax    Low vitamin D level (Chronic) POA: Yes      Overview: Vit D: 11 on 17    Hypertension POA: Unknown  Resolved Problems:    Bone pain POA: Yes      Overview: Stable      Negative PE for tenderness over the rib cage.    Elevated PSA (Chronic) POA: Yes      Overview: PSA: 8.4 on 17   >>>>>4 on 5/18    Dysuria POA: Unknown      Overview: Stable           Abdominal pain POA: No      Overview: Resolved.           Review of Systems   Constitutional: Negative for fever and chills.   HENT: Negative for hearing loss.    Respiratory: Negative for cough and hemoptysis.    Cardiovascular: Negative for chest pain and palpitations.   Gastrointestinal: Negative for heartburn, nausea, abdominal pain and diarrhea.   Genitourinary: Negative for dysuria and urgency.   Musculoskeletal: Negative for myalgias and neck pain.   Skin: Negative for rash.   Neurological: Negative for dizziness and headaches.   Psychiatric/Behavioral: Negative for depression and suicidal ideas.       Consultants/Specialty  Oncology - Van Meter/Mak    Disposition  Inpatient for further chemotherapy vs hospice referral, the patient has not decided yet    Quality Measures  EKG reviewed, Labs reviewed, Medications reviewed and Radiology images reviewed  Mahajan catheter: No Mahajan      DVT Prophylaxis: Contraindicated - High bleeding risk  DVT prophylaxis - mechanical: SCDs (low plt)            Physical Exam       Filed Vitals:    06/05/17 0000 06/05/17 0400 06/05/17 0731 06/05/17 1233   BP: 116/64 114/62 109/58 102/60   Pulse: 66 68 63 72   Temp: 36.9 °C (98.4 °F) 36.9 °C (98.5 °F) 36.9 °C (98.5 °F) 36.2 °C (97.2 °F)   Resp: 18 18 18 18   Height:       Weight:       SpO2: 100% 98% 97% 98%     Body mass index is 21.82 kg/(m^2).    Oxygen Therapy:  Pulse Oximetry: 98 %, O2 (LPM): 0, O2 Delivery: None (Room Air)    Physical Exam   Constitutional: He is well-developed, well-nourished, and in no distress. No distress.   HENT:   Head: Normocephalic.   Neck: No tracheal deviation present. No thyromegaly present.   Cardiovascular: Normal rate.  Exam reveals no friction rub.    Murmur heard.  Pansystolic murmur on the apex    Pulmonary/Chest: Effort normal. No respiratory distress. He has no wheezes.   Abdominal: Soft. He exhibits no distension.  There is no tenderness.   Musculoskeletal: He exhibits no edema.   Neurological: He is alert.   Skin: No erythema.         Lab Data Review:      5/4/2017  1:28 PM    Recent Labs      06/03/17 0003 06/03/17 2351 06/05/17   0916   SODIUM  136  134*  131*   POTASSIUM  4.3  4.0  4.2   CHLORIDE  103  103  100   CO2  25  24  26   BUN  16  14  16   CREATININE  0.62  0.62  0.72   CALCIUM  8.4*  8.5  8.5       Recent Labs      06/03/17 0003 06/03/17 2351 06/05/17   0916   ALTSGPT  33  28  30   ASTSGOT  30  33  36   ALKPHOSPHAT  258*  262*  289*   TBILIRUBIN  0.6  0.6  0.7   GLUCOSE  105*  105*  108*       Recent Labs      06/03/17 0003 06/03/17 2351 06/04/17 2357   RBC  2.78*  2.60*  2.97*   HEMOGLOBIN  7.9*  7.4*  8.3*   HEMATOCRIT  22.4*  20.7*  23.6*   PLATELETCT  29*  17*  14*       Recent Labs      06/03/17 0003 06/03/17 2351 06/04/17 2357 06/05/17   0916   WBC  0.1*  0.1*  0.2*   --    ASTSGOT  30  33   --   36   ALTSGPT  33  28   --   30   ALKPHOSPHAT  258*  262*   --   289*   TBILIRUBIN  0.6  0.6   --   0.7           Assessment/Plan       CML (chronic myelocytic leukemia)-Accelerated phase  - Has completed multiple chemotherapy cycles, multiple relapses.  now he is in accelerated or blastic crisis   Was treated on dasatinib for a time with response, but then relapse, and bosutinib.  Pancytopenia (chronic, before starting with chemotherapy)        Started on 5/6/17 with chemotherapy (synribo ) BID for 14 days which is completed on 5/20  - started another cycle of synribo yesterday  - waiting BM biopsy result    Pancytopenia (CMS-HCC)  Transfused multiple platelet and PRBCs in the past.  HB 8 8.3, plt 14, wbc 0.2  Follow up and keep plt>10 and Hb>7.5.      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 No indication for neopogen.    Transaminitis  Alk.phophatase 289  No abdominal pain, likely effect of chemotherapy  Discontinued all hepatotoxic drugs as possible  Continue to follow      Diarrhea  Most likely related to chemo  Resolved with loperamide       Hypertension  Bp decreased today to 96/50 yesterday,  Improved later to 141/57  discontinued amlodipine and continue carvedilol 12.5 mg BID        Seizure disorder (CMS-HCC)  Stable    Continue on his home dose of keppra  Had 1 episode last year after dental procedure.     BPH (benign prostatic hyperplasia)  Stable.   Continue on tamsulosin 0.4    Low vitamin D level  Continue vit D2 weekly.

## 2017-06-05 NOTE — PROGRESS NOTES
Chemotherapy Verification - PRIMARY RN  Cycle 2 day 2    Height = 170.2 cm  Weight = 63.2 kg  BSA = 1.73 m2       Medication: Omacetacine  Dose: 1.25 mg/m2  Calculated Dose: 2.16 mg (ordered dose= 2.4 mg, this is greater than 5% differential, Ok to treat with final dose per MD)                             (In mg/m2, AUC, mg/kg)       I confirm this process was performed independently with the BSA and all final chemotherapy dosing calculations congruent.  Any discrepancies of 5% or greater have been addressed with the chemotherapy pharmacist. The resolution of the discrepancy has been documented in the EPIC progress notes.

## 2017-06-05 NOTE — PROGRESS NOTES
Bedside report received from noc RN, assumed pt care, assessment completed, pt aox4, pt ambulates shuffle gait up SBA, pt medicated per MAR, chemo administered at bedside with verifications completed at bedside, reviewed POC with pt and agreeable, bed alarm verified on, bed in low position, call light and personal belongings within reach, hourly rounding in place, pt needs met.

## 2017-06-05 NOTE — PROGRESS NOTES
"Pharmacy Chemotherapy Verification    Patient Name: Benjamin Post   Dx: CML       Protocol: Synribo Induction   Omacetaxine (Synribo) 1.25 mg/m2 SQ BID for 14 days  Q28 days until hematological response  NCCN Guidelines for Chronic Myelogenous Leukemia.V.1.2016  Brice J, et al. Blood. 2012 Sep 27;120(13):2573-80. Epub 2012 Aug 15.  Alfonso ROSADO et al. J Clin Oncol. 2011;30(15s):abstr 6513.    Allergies:  Review of patient's allergies indicates no known allergies.     /58 mmHg  Pulse 63  Temp(Src) 36.9 °C (98.5 °F)  Resp 18  Ht 1.702 m (5' 7\")  Wt 63.2 kg (139 lb 5.3 oz)  BMI 21.82 kg/m2  SpO2 97% Body surface area is 1.73 meters squared.    ANC~ 0 Plt = 14k   Hgb = 8.3     SCr = 0.72mg/dL CrCl ~ 82  LFT's = WNL, except AP  = 289 TBili = 0.7   **MD aware of all labs, okay to continue with treatment**     Drug Order   (Drug name, dose, route, IV Fluid & volume, frequency, number of doses) Cycle: 2 Day 2 of 14     Previous treatment: C1 May 6-20, 2017   Medication = Omacetaxine  Base Dose= 1.25 mg/m2   Calc Dose: Base Dose x 1.73m2= 2.16 mg  Final Dose = 2.4 mg  Route = subq  Conc & Volume = 3.5 mg/mL = 0.69 mL  Admin Duration = subcutaneous injection   Use Pateint's Own Medication       >5% difference, okay to treat with final dose per Dr Vu to avoid drug waste. If medication reordered, will decrease dose       By my signature below, I confirm this process was performed independently with the BSA and all final chemotherapy dosing calculations congruent. I have reviewed the above chemotherapy order and that my calculation of the final dose and BSA (when applicable) corroborate those calculations of the  pharmacist. Discrepancies of 5% or greater in the written dose have been addressed and documented within the [facility] Progress notes.    AVA Linares Pharm.D.              "

## 2017-06-05 NOTE — PROGRESS NOTES
Chemotherapy Verification - SECONDARY RN       Height = 170.2 cm  Weight = 63.2 kg  BSA = 1.73 / m2       Medication: Omaetaxine 1.25 mg / m2  Calculated Dose: 2.16 mg, ordered dose 2.4 mg                             (In mg/m2, AUC, mg/kg)       I confirm that this process was performed independently.

## 2017-06-05 NOTE — PROGRESS NOTES
Spoke with Keri from [facility] regarding chemo injections Synribo, per Keri wanted to ensure that the doses that we currently have that they will be expiring June 7th good for the morning dose only not the pm, they can over night ship if needed. Notified her that per MD the dose will be changing soon. Will inform pharmacy and Dr. Person.       Contact Info:  Keri [phone]    Eastern Time

## 2017-06-06 ENCOUNTER — APPOINTMENT (OUTPATIENT)
Dept: ONCOLOGY | Facility: MEDICAL CENTER | Age: 72
End: 2017-06-06
Attending: SPECIALIST
Payer: MEDICARE

## 2017-06-06 LAB
ABO GROUP BLD: NORMAL
ALBUMIN SERPL BCP-MCNC: 3.1 G/DL (ref 3.2–4.9)
ALBUMIN/GLOB SERPL: 0.9 G/DL
ALP SERPL-CCNC: 273 U/L (ref 30–99)
ALT SERPL-CCNC: 28 U/L (ref 2–50)
ANION GAP SERPL CALC-SCNC: 7 MMOL/L (ref 0–11.9)
AST SERPL-CCNC: 34 U/L (ref 12–45)
BILIRUB SERPL-MCNC: 0.6 MG/DL (ref 0.1–1.5)
BLD GP AB SCN SERPL QL: NORMAL
BUN SERPL-MCNC: 21 MG/DL (ref 8–22)
CALCIUM SERPL-MCNC: 8.2 MG/DL (ref 8.5–10.5)
CHLORIDE SERPL-SCNC: 102 MMOL/L (ref 96–112)
CO2 SERPL-SCNC: 23 MMOL/L (ref 20–33)
CREAT SERPL-MCNC: 0.69 MG/DL (ref 0.5–1.4)
ERYTHROCYTE [DISTWIDTH] IN BLOOD BY AUTOMATED COUNT: 35.1 FL (ref 35.9–50)
GFR SERPL CREATININE-BSD FRML MDRD: >60 ML/MIN/1.73 M 2
GLOBULIN SER CALC-MCNC: 3.3 G/DL (ref 1.9–3.5)
GLUCOSE SERPL-MCNC: 94 MG/DL (ref 65–99)
HCT VFR BLD AUTO: 24.5 % (ref 42–52)
HGB BLD-MCNC: 8.7 G/DL (ref 14–18)
MCH RBC QN AUTO: 27.9 PG (ref 27–33)
MCHC RBC AUTO-ENTMCNC: 35.5 G/DL (ref 33.7–35.3)
MCV RBC AUTO: 78.5 FL (ref 81.4–97.8)
NEUTROPHILS # BLD AUTO: ABNORMAL K/UL (ref 1.82–7.42)
NRBC # BLD AUTO: 0 K/UL
NRBC BLD AUTO-RTO: 0 /100 WBC
PLATELET # BLD AUTO: 7 K/UL (ref 164–446)
PMV BLD AUTO: 10 FL (ref 9–12.9)
POTASSIUM SERPL-SCNC: 4.2 MMOL/L (ref 3.6–5.5)
PROT SERPL-MCNC: 6.4 G/DL (ref 6–8.2)
RBC # BLD AUTO: 3.12 M/UL (ref 4.7–6.1)
RH BLD: NORMAL
SODIUM SERPL-SCNC: 132 MMOL/L (ref 135–145)
WBC # BLD AUTO: 0.2 K/UL (ref 4.8–10.8)

## 2017-06-06 PROCEDURE — 700102 HCHG RX REV CODE 250 W/ 637 OVERRIDE(OP): Performed by: STUDENT IN AN ORGANIZED HEALTH CARE EDUCATION/TRAINING PROGRAM

## 2017-06-06 PROCEDURE — 36430 TRANSFUSION BLD/BLD COMPNT: CPT

## 2017-06-06 PROCEDURE — 97116 GAIT TRAINING THERAPY: CPT

## 2017-06-06 PROCEDURE — 36415 COLL VENOUS BLD VENIPUNCTURE: CPT

## 2017-06-06 PROCEDURE — A9270 NON-COVERED ITEM OR SERVICE: HCPCS | Performed by: INTERNAL MEDICINE

## 2017-06-06 PROCEDURE — 97110 THERAPEUTIC EXERCISES: CPT

## 2017-06-06 PROCEDURE — A9270 NON-COVERED ITEM OR SERVICE: HCPCS | Performed by: STUDENT IN AN ORGANIZED HEALTH CARE EDUCATION/TRAINING PROGRAM

## 2017-06-06 PROCEDURE — 84100 ASSAY OF PHOSPHORUS: CPT

## 2017-06-06 PROCEDURE — 84550 ASSAY OF BLOOD/URIC ACID: CPT

## 2017-06-06 PROCEDURE — 700102 HCHG RX REV CODE 250 W/ 637 OVERRIDE(OP): Performed by: INTERNAL MEDICINE

## 2017-06-06 PROCEDURE — 700111 HCHG RX REV CODE 636 W/ 250 OVERRIDE (IP): Performed by: INTERNAL MEDICINE

## 2017-06-06 PROCEDURE — 85025 COMPLETE CBC W/AUTO DIFF WBC: CPT

## 2017-06-06 PROCEDURE — 86901 BLOOD TYPING SEROLOGIC RH(D): CPT

## 2017-06-06 PROCEDURE — 86850 RBC ANTIBODY SCREEN: CPT

## 2017-06-06 PROCEDURE — 99233 SBSQ HOSP IP/OBS HIGH 50: CPT | Mod: GC | Performed by: INTERNAL MEDICINE

## 2017-06-06 PROCEDURE — 770009 HCHG ROOM/CARE - ONCOLOGY SEMI PRI*

## 2017-06-06 PROCEDURE — 86900 BLOOD TYPING SEROLOGIC ABO: CPT

## 2017-06-06 PROCEDURE — 86644 CMV ANTIBODY: CPT

## 2017-06-06 PROCEDURE — 97530 THERAPEUTIC ACTIVITIES: CPT

## 2017-06-06 PROCEDURE — 83735 ASSAY OF MAGNESIUM: CPT

## 2017-06-06 PROCEDURE — P9034 PLATELETS, PHERESIS: HCPCS

## 2017-06-06 PROCEDURE — 80053 COMPREHEN METABOLIC PANEL: CPT

## 2017-06-06 RX ORDER — SODIUM CHLORIDE 9 MG/ML
INJECTION, SOLUTION INTRAVENOUS CONTINUOUS
Status: DISCONTINUED | OUTPATIENT
Start: 2017-06-06 | End: 2017-06-13

## 2017-06-06 RX ADMIN — LEVETIRACETAM 750 MG: 250 TABLET, FILM COATED ORAL at 22:48

## 2017-06-06 RX ADMIN — ACETAMINOPHEN 650 MG: 325 TABLET, FILM COATED ORAL at 01:34

## 2017-06-06 RX ADMIN — ACYCLOVIR 400 MG: 800 TABLET ORAL at 08:41

## 2017-06-06 RX ADMIN — DIPHENHYDRAMINE HCL 25 MG: 25 TABLET ORAL at 01:34

## 2017-06-06 RX ADMIN — CARVEDILOL 12.5 MG: 12.5 TABLET, FILM COATED ORAL at 08:41

## 2017-06-06 RX ADMIN — TAMSULOSIN HYDROCHLORIDE 0.8 MG: 0.4 CAPSULE ORAL at 08:42

## 2017-06-06 RX ADMIN — CEFAZOLIN SODIUM 2 G: 2 INJECTION, SOLUTION INTRAVENOUS at 22:48

## 2017-06-06 RX ADMIN — ACYCLOVIR 400 MG: 800 TABLET ORAL at 22:48

## 2017-06-06 RX ADMIN — LEVETIRACETAM 750 MG: 250 TABLET, FILM COATED ORAL at 08:42

## 2017-06-06 RX ADMIN — CEFAZOLIN SODIUM 2 G: 2 INJECTION, SOLUTION INTRAVENOUS at 07:05

## 2017-06-06 RX ADMIN — CEFAZOLIN SODIUM 2 G: 2 INJECTION, SOLUTION INTRAVENOUS at 14:46

## 2017-06-06 RX ADMIN — FINASTERIDE 5 MG: 5 TABLET, FILM COATED ORAL at 08:42

## 2017-06-06 RX ADMIN — CARVEDILOL 12.5 MG: 12.5 TABLET, FILM COATED ORAL at 17:46

## 2017-06-06 RX ADMIN — MAGNESIUM GLUCONATE 500 MG ORAL TABLET 400 MG: 500 TABLET ORAL at 08:42

## 2017-06-06 ASSESSMENT — COGNITIVE AND FUNCTIONAL STATUS - GENERAL
SUGGESTED CMS G CODE MODIFIER MOBILITY: CJ
MOBILITY SCORE: 21
WALKING IN HOSPITAL ROOM: A LITTLE
STANDING UP FROM CHAIR USING ARMS: A LITTLE
CLIMB 3 TO 5 STEPS WITH RAILING: A LITTLE

## 2017-06-06 ASSESSMENT — ENCOUNTER SYMPTOMS
DIZZINESS: 0
CARDIOVASCULAR NEGATIVE: 1
DEPRESSION: 0
HEADACHES: 0
HEARTBURN: 0
NECK PAIN: 0
HEMOPTYSIS: 0
MUSCULOSKELETAL NEGATIVE: 1
BRUISES/BLEEDS EASILY: 0
MYALGIAS: 0
NAUSEA: 0
CHILLS: 0
FEVER: 0
NEUROLOGICAL NEGATIVE: 1
CONSTITUTIONAL NEGATIVE: 1
GASTROINTESTINAL NEGATIVE: 1
PALPITATIONS: 0
COUGH: 0
RESPIRATORY NEGATIVE: 1
DIARRHEA: 0
ABDOMINAL PAIN: 0

## 2017-06-06 ASSESSMENT — GAIT ASSESSMENTS
DEVIATION: DECREASED BASE OF SUPPORT;DECREASED TOE OFF
GAIT LEVEL OF ASSIST: STAND BY ASSIST
ASSISTIVE DEVICE: FRONT WHEEL WALKER
DISTANCE (FEET): 220

## 2017-06-06 ASSESSMENT — PAIN SCALES - GENERAL
PAINLEVEL_OUTOF10: 0

## 2017-06-06 NOTE — PROGRESS NOTES
Chemotherapy Verification - SECONDARY RN   Day 3 of 4      Height = 170.2 cm  Weight = 63.2 kg  BSA = 1.73 m2        Medication: omacetaxine  Dose: 1.25 mg/m2  Calculated Dose: 2.16 mg ordered= 2.4 mg (set dose per MD)                            (In mg/m2, AUC, mg/kg)     I confirm that this process was performed independently.

## 2017-06-06 NOTE — PROGRESS NOTES
Oncology/Hematology Progress Note               Author:  Wayne Wilsoncherry Date & Time created: 6/6/2017  11:27 AM     Interval History:  CC: Accelerated/Blast crisis CML, started second 14 day course of Omacetaxine today because drug until 6/7  New bone marrow with a total of <10% blast.  No events overnight    Review of Systems:  Review of Systems   Constitutional: Negative.  Negative for fever and chills.   HENT: Negative.    Respiratory: Negative.    Cardiovascular: Negative.    Gastrointestinal: Negative.  Negative for abdominal pain.   Genitourinary: Negative.    Musculoskeletal: Negative.    Skin: Negative for rash.   Neurological: Negative.    Endo/Heme/Allergies: Negative.  Does not bruise/bleed easily.       Physical Exam:  Physical Exam   Constitutional: He is oriented to person, place, and time. He appears well-developed and well-nourished. No distress.   HENT:   Head: Normocephalic and atraumatic.   Mouth/Throat: No oropharyngeal exudate.   Eyes: Pupils are equal, round, and reactive to light. No scleral icterus.   Neck: Normal range of motion.   Cardiovascular: Normal rate and normal heart sounds.    Pulmonary/Chest: Effort normal. No respiratory distress. He has no wheezes. He has no rales.   Abdominal: Soft. Bowel sounds are normal.   Lymphadenopathy:     He has no cervical adenopathy.   Neurological: He is alert and oriented to person, place, and time.   Skin: Skin is warm.       Labs:        Invalid input(s): [id number]      Recent Labs      06/03/17   2351  06/05/17   0916  06/05/17   2354   SODIUM  134*  131*  132*   POTASSIUM  4.0  4.2  4.2   CHLORIDE  103  100  102   CO2  24  26  23   BUN  14  16  21   CREATININE  0.62  0.72  0.69   CALCIUM  8.5  8.5  8.2*     Recent Labs      06/03/17   2351  06/05/17   0916  06/05/17   2354   ALTSGPT  28  30  28   ASTSGOT  33  36  34   ALKPHOSPHAT  262*  289*  273*   TBILIRUBIN  0.6  0.7  0.6   GLUCOSE  105*  108*  94     Recent Labs      06/03/17   2351   17   2354   RBC  2.60*  2.97*  3.12*   HEMOGLOBIN  7.4*  8.3*  8.7*   HEMATOCRIT  20.7*  23.6*  24.5*   PLATELETCT  17*  14*  7*     Recent Labs      17   [id number]  17   2354   WBC  0.1*  0.2*   --   0.2*   ASTSGOT  33   --   36  34   ALTSGPT  28   --   30  28   ALKPHOSPHAT  262*   --   289*  273*   TBILIRUBIN  0.6   --   0.7  0.6     Recent Labs      17   0916  17   2354   SODIUM  134*  131*  132*   POTASSIUM  4.0  4.2  4.2   CHLORIDE  103  100  102   CO2  24  26  23   GLUCOSE  105*  108*  94   BUN  14  16  21   CREATININE  0.62  0.72  0.69   CALCIUM  8.5  8.5  8.2*     Hemodynamics:  Temp (24hrs), Av.7 °C (98 °F), Min:36.2 °C (97.2 °F), Max:37 °C (98.6 °F)  Temperature: 36.5 °C (97.7 °F)  Pulse  Av.2  Min: 58  Max: 118   Blood Pressure : 101/51 mmHg     Respiratory:    Respiration: 18, Pulse Oximetry: 100 %     Work Of Breathing / Effort: Mild  RUL Breath Sounds: Clear, RML Breath Sounds: Clear, RLL Breath Sounds: Diminished, WAGNER Breath Sounds: Clear, LLL Breath Sounds: Diminished  Fluids:    Intake/Output Summary (Last 24 hours) at 17 1034  Last data filed at 17 1000   Gross per 24 hour   Intake   2057 ml   Output   1675 ml   Net    382 ml        GI/Nutrition:  Orders Placed This Encounter   Procedures   • DIET ORDER     Standing Status: Standing      Number of Occurrences: 1      Standing Expiration Date:      Order Specific Question:  Diet:     Answer:  Regular [1]     Order Specific Question:  Texture/Fiber modifications:     Answer:  Dysphagia 2(Pureed/Chopped)specify fluid consistency(question 6) [2]      Comments:  pt with absolutely no teeth     Order Specific Question:  Consistency/Fluid modifications:     Answer:  Thin Liquids [3]     Medical Decision Making, by Problem:  Active Hospital Problems    Diagnosis   • *Fever and neutropenia (CMS-HCC) [D70.9, R50.81]   • Neutropenia (CMS-HCC)  [D70.9]   • 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]   • Diarrhea [R19.7]   • Acute bilateral low back pain without sciatica [M54.5]   • Seizure disorder (CMS-HCC) [G40.909]   • Hypertension [I10]   • BPH (benign prostatic hyperplasia) [N40.0]   • Low vitamin D level [E55.9]   • Transaminitis [R74.0]       Plan:  Advanced CML:  reponded well to Omacetaxine. Last dose will be given on 6/7. Given he got a good response with <10% blast plan is to transfer him to [facility] for [facility], will likely happen this week  -received platelets today  Guarded prognosis  Transfuse  PRBC if hemoglobin <8 g/dl or Platelets if <10 or bleeding    Labs reviewed

## 2017-06-06 NOTE — PROGRESS NOTES
Chemotherapy Verification - PRIMARY RN  Cycle 3 day 1      Height = 170.2 cm  Weight = 63.2 kg  BSA = 1.73 m2       Medication: omacetaxine  Dose: 1.25 mg/m2  Calculated Dose: 2.16 mg (ordered dose= 2.4, greater than 5% difference, MD aware ok give)                             (In mg/m2, AUC, mg/kg)     I confirm this process was performed independently with the BSA and all final chemotherapy dosing calculations congruent.  Any discrepancies of 5% or greater have been addressed with the chemotherapy pharmacist. The resolution of the discrepancy has been documented in the EPIC progress notes.

## 2017-06-06 NOTE — PROGRESS NOTES
Bedside report received from noc RN, assumed pt care, assessment completed, pt aox4, pt denies pain and nausea at present time, PIV IVF infusing, pt ambulates steady gait up SBA, bed alarm verified on, pt in good spirits this morning, chemo administered per MAR, chemo cals completed, and verifications completed at bedside, bed in low position, call light and personal belongings within reach, hourly rounding in place, pt needs met.

## 2017-06-06 NOTE — PROGRESS NOTES
"Pharmacy Chemotherapy Verification    Patient Name: Benjamin Post   Dx: CML       Protocol: Synribo Induction   Omacetaxine (Synribo) 1.25 mg/m2 SQ BID for 14 days  Q28 days until hematological response  NCCN Guidelines for Chronic Myelogenous Leukemia.V.1.2016  Brice J, et al. Blood. 2012 Sep 27;120(13):2573-80. Epub 2012 Aug 15.  Alfonso ROSADO et al. J Clin Oncol. 2011;30(15s):abstr 6513.    Allergies:  Review of patient's allergies indicates no known allergies.     /51 mmHg  Pulse 69  Temp(Src) 36.5 °C (97.7 °F)  Resp 18  Ht 1.702 m (5' 7\")  Wt 63.2 kg (139 lb 5.3 oz)  BMI 21.82 kg/m2  SpO2 100% Body surface area is 1.73 meters squared.    6/6/17:  ANC~ 0 Plt = 7k   Hgb = 8.7     SCr = 0.69mg/dL CrCl ~ 84 (min Scr = 0.7)   LFT's = WNL, except AP  = 273 TBili = 0.6  **MD aware of all labs, okay to continue with treatment**    ** 6/6/17 NOTE: Pt will likely transfer out  To [facility] for bone marrow transplant this week. Per Dr.'s Vu and Naya- will only receive omacetaxine(Synribo)  through AM dose 6/7/17.      Drug Order   (Drug name, dose, route, IV Fluid & volume, frequency, number of doses) Cycle: 2 Day 3 of 14     Previous treatment: C1 May 6-20, 2017   Medication = Omacetaxine  Base Dose= 1.25 mg/m2   Calc Dose: Base Dose x 1.73m2= 2.16 mg  Final Dose = 2.4 mg  Route = subq  Conc & Volume = 3.5 mg/mL = 0.69 mL  Admin Duration = subcutaneous injection   Use Pateint's Own Medication       >5% difference, okay to treat with final dose per Dr uV to avoid drug waste. If medication reordered, will decrease dose       By my signature below, I confirm this process was performed independently with the BSA and all final chemotherapy dosing calculations congruent. I have reviewed the above chemotherapy order and that my calculation of the final dose and BSA (when applicable) corroborate those calculations of the  pharmacist. Discrepancies of 5% or greater in the written dose have been " addressed and documented within the EPIC Progress notes.    Jabier Saez.[email or web address].

## 2017-06-06 NOTE — PROGRESS NOTES
[facility] [facility] [place]    Name Benjamin Post       1945   Age/Sex 71 y.o. male   MRN [id number]   Code Status DNR     After 5PM or if no immediate response to page, please call for cross-coverage  Attending/Team: Dr. Melissa / rudolph  Call [phone] to page   1st Call - Day Intern (R1):   Dr Sherman Sears  2nd Call - Day Sr. Resident (R2/R3):   Dr. Sarbjit Kent         Chief complaint/ reason for interval visit (Primary Diagnosis)   CML/pancytopenia     Interval Problem Daily Status Update  :  S : No significant overnight events. Pt denies any acute complaints. HB 8.3, received omacetaxine   Received platelets    Today's Changes :      Yesterday labs  HB 8.7  PLT 7  Wbc 0.2  BM biopsy result shows hypocellular BM with blast 18 % of cellularity   To receive another dose of omacetaxine today    Principal Problem:    Fever and neutropenia: fever subsided, still neutropenic on cefazolin         Active Problems:    CML (chronic myelocytic leukemia)-Accelerated phase (Chronic) POA: Yes      Overview:  Had fevers overnight.       WBC 0.1. Patient may need more chemotherapy as per Dr. Jorgensen.     Neutropenia (CMS-[facility]) POA: Yes      Overview:           Acute bilateral low back pain without sciatica POA: resolved     Diarrhea POA: resolved       Overview: Loperamide stopped on  due to initiation of antibacterials.           Seizure disorder (CMS-HCC) POA: Yes    Transaminitis POA: Yes      Overview: Related to chemo therapy. AST/ALT: 46/89>>61/111>>71/134>>80/171 >>88/169    BPH (benign prostatic hyperplasia) (Chronic) POA: Yes      Overview: On flomax    Low vitamin D level (Chronic) POA: Yes      Overview: Vit D: 11 on 17    Hypertension POA: Unknown  Resolved Problems:    Bone pain POA: Yes      Overview: Stable      Negative PE for tenderness over the rib cage.    Elevated PSA (Chronic) POA: Yes      Overview: PSA: 8.4 on  4/4/17  >>>>>4 on 5/18    Dysuria POA: Unknown      Overview: Stable           Abdominal pain POA: No      Overview: Resolved.           Review of Systems   Constitutional: Negative for fever and chills.   HENT: Negative for hearing loss.    Respiratory: Negative for cough and hemoptysis.    Cardiovascular: Negative for chest pain and palpitations.   Gastrointestinal: Negative for heartburn, nausea, abdominal pain and diarrhea.   Genitourinary: Negative for dysuria and urgency.   Musculoskeletal: Negative for myalgias and neck pain.   Skin: Negative for rash.   Neurological: Negative for dizziness and headaches.   Psychiatric/Behavioral: Negative for depression and suicidal ideas.       Consultants/Specialty  Oncology - Van Meter/Mak    Disposition  Inpatient for further chemotherapy vs hospice referral, the patient has not decided yet    Quality Measures  EKG reviewed, Labs reviewed, Medications reviewed and Radiology images reviewed  Mahajan catheter: No Mahajan      DVT Prophylaxis: Contraindicated - High bleeding risk  DVT prophylaxis - mechanical: SCDs (low plt)            Physical Exam       Filed Vitals:    06/06/17 0718 06/06/17 0815 06/06/17 1124 06/06/17 1620   BP: 107/64  101/51 102/60   Pulse: 73  69 86   Temp: 36.4 °C (97.6 °F)  36.5 °C (97.7 °F) 37.1 °C (98.8 °F)   Resp: 18  18 18   Height:       Weight:       SpO2: 99% 98% 100% 97%     Body mass index is 21.82 kg/(m^2).    Oxygen Therapy:  Pulse Oximetry: 97 %, O2 (LPM): 0, O2 Delivery: None (Room Air)    Physical Exam   Constitutional: He is well-developed, well-nourished, and in no distress. No distress.   HENT:   Head: Normocephalic.   Neck: No tracheal deviation present. No thyromegaly present.   Cardiovascular: Normal rate.  Exam reveals no friction rub.    Murmur heard.  Pansystolic murmur on the apex    Pulmonary/Chest: Effort normal. No respiratory distress. He has no wheezes.   Abdominal: Soft. He exhibits no distension. There is no  tenderness.   Musculoskeletal: He exhibits no edema.   Neurological: He is alert.   Skin: No erythema.         Lab Data Review:      5/4/2017  1:28 PM    Recent Labs      06/03/17 2351 06/05/17 0916 06/05/17   2354   SODIUM  134*  131*  132*   POTASSIUM  4.0  4.2  4.2   CHLORIDE  103  100  102   CO2  24  26  23   BUN  14  16  21   CREATININE  0.62  0.72  0.69   CALCIUM  8.5  8.5  8.2*       Recent Labs      06/03/17 2351 06/05/17 0916  06/05/17   2354   ALTSGPT  28  30  28   ASTSGOT  33  36  34   ALKPHOSPHAT  262*  289*  273*   TBILIRUBIN  0.6  0.7  0.6   GLUCOSE  105*  108*  94       Recent Labs      06/03/17 2351 06/04/17 2357 06/05/17   2354   RBC  2.60*  2.97*  3.12*   HEMOGLOBIN  7.4*  8.3*  8.7*   HEMATOCRIT  20.7*  23.6*  24.5*   PLATELETCT  17*  14*  7*       Recent Labs      06/03/17 2351 06/04/17 2357 06/05/17 0916 06/05/17   2354   WBC  0.1*  0.2*   --   0.2*   ASTSGOT  33   --   36  34   ALTSGPT  28   --   30  28   ALKPHOSPHAT  262*   --   289*  273*   TBILIRUBIN  0.6   --   0.7  0.6           Assessment/Plan       CML (chronic myelocytic leukemia)-Accelerated phase  - Has completed multiple chemotherapy cycles, multiple relapses.  now he is in accelerated or blastic crisis   Was treated on dasatinib for a time with response, but then relapse, and bosutinib.  Pancytopenia (chronic, before starting with chemotherapy)   No blast in peripheral blood film  Bone Marrow showed: hypocellular bone marrow with evidence of increased          blasts, 18% of marrow cellularity, in small fibrous area of          marrow composed predominately of CD3+ T lymphocytes.         Findings are consistent with population of residual blasts          consistent with accelerated phase of CML.  - to receive chemotherapy today and to transfer him to BM transplantation per oncology. Considered respondent to chemotherapy  - we will follow his electrolyte and watch for features of metabolic syndrome while he  is receiving chemotherapy.    Pancytopenia (CMS-HCC)  Transfused multiple platelet and PRBCs in the past.  HB 8 8.3, plt 14, wbc 0.2  Follow up and keep plt>10 and Hb>7.5.      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 No indication for neopogen.    Transaminitis  Alk.phophatase 289  No abdominal pain, likely effect of chemotherapy  Discontinued all hepatotoxic drugs as possible  Continue to follow      Diarrhea  Most likely related to chemo  Resolved with loperamide       Hypertension  controlled  discontinued amlodipine and continue carvedilol 12.5 mg BID        Seizure disorder (CMS-HCC)  Stable    Continue on his home dose of keppra  Had 1 episode last year after dental procedure.     BPH (benign prostatic hyperplasia)  Stable.   Continue on tamsulosin 0.4    Low vitamin D level  Continue vit D2 weekly.

## 2017-06-06 NOTE — THERAPY
"Pt agreeable, SUPV  for bed mob flat bed use of one handrail, sitting EOB x 8 mins unsupported SUPV, SBA for transfers and VCS for safer use of AD, gait was slow, cadance diminished and frequent rest stops of 45-75 secs in duration was needed 28 to fatigue. Pt sat [facility] for aforementioned BLE strength exs and will benifit from acute/post acute rehab.Physical Therapy Treatment completed.   Bed Mobility:  Supine to Sit: Supervised  Transfers: Sit to Stand: Supervised  Gait: Level Of Assist: Stand by Assist with Front-Wheel Walker       Plan of Care: Will benefit from Physical Therapy 2 times per week  Discharge Recommendations: Equipment: Front-Wheel Walker. Post-acute therapy Discharge to a transitional care facility for continued skilled therapy services.     See \"Rehab Therapy-Acute\" Patient Summary Report for complete documentation.       "

## 2017-06-07 ENCOUNTER — APPOINTMENT (OUTPATIENT)
Dept: RADIOLOGY | Facility: MEDICAL CENTER | Age: 72
DRG: 808 | End: 2017-06-07
Attending: INTERNAL MEDICINE
Payer: MEDICARE

## 2017-06-07 LAB
ALBUMIN SERPL BCP-MCNC: 2.8 G/DL (ref 3.2–4.9)
ALBUMIN/GLOB SERPL: 0.9 G/DL
ALP SERPL-CCNC: 251 U/L (ref 30–99)
ALT SERPL-CCNC: 29 U/L (ref 2–50)
ANION GAP SERPL CALC-SCNC: 4 MMOL/L (ref 0–11.9)
AST SERPL-CCNC: 37 U/L (ref 12–45)
BILIRUB SERPL-MCNC: 0.5 MG/DL (ref 0.1–1.5)
BUN SERPL-MCNC: 17 MG/DL (ref 8–22)
CALCIUM SERPL-MCNC: 8.2 MG/DL (ref 8.5–10.5)
CHLORIDE SERPL-SCNC: 104 MMOL/L (ref 96–112)
CO2 SERPL-SCNC: 24 MMOL/L (ref 20–33)
CREAT SERPL-MCNC: 0.7 MG/DL (ref 0.5–1.4)
ERYTHROCYTE [DISTWIDTH] IN BLOOD BY AUTOMATED COUNT: 35.5 FL (ref 35.9–50)
GFR SERPL CREATININE-BSD FRML MDRD: >60 ML/MIN/1.73 M 2
GLOBULIN SER CALC-MCNC: 3.1 G/DL (ref 1.9–3.5)
GLUCOSE SERPL-MCNC: 106 MG/DL (ref 65–99)
HCT VFR BLD AUTO: 21.9 % (ref 42–52)
HGB BLD-MCNC: 7.8 G/DL (ref 14–18)
MAGNESIUM SERPL-MCNC: 1.8 MG/DL (ref 1.5–2.5)
MCH RBC QN AUTO: 28.2 PG (ref 27–33)
MCHC RBC AUTO-ENTMCNC: 35.6 G/DL (ref 33.7–35.3)
MCV RBC AUTO: 79.1 FL (ref 81.4–97.8)
NEUTROPHILS # BLD AUTO: ABNORMAL K/UL (ref 1.82–7.42)
NRBC # BLD AUTO: 0 K/UL
NRBC BLD AUTO-RTO: 0 /100 WBC
PHOSPHATE SERPL-MCNC: 3.2 MG/DL (ref 2.5–4.5)
PLATELET # BLD AUTO: 18 K/UL (ref 164–446)
PMV BLD AUTO: 10.9 FL (ref 9–12.9)
POTASSIUM SERPL-SCNC: 4.2 MMOL/L (ref 3.6–5.5)
PROT SERPL-MCNC: 5.9 G/DL (ref 6–8.2)
RBC # BLD AUTO: 2.77 M/UL (ref 4.7–6.1)
SODIUM SERPL-SCNC: 132 MMOL/L (ref 135–145)
URATE SERPL-MCNC: 2.3 MG/DL (ref 2.5–8.3)
WBC # BLD AUTO: 0.1 K/UL (ref 4.8–10.8)

## 2017-06-07 PROCEDURE — 700102 HCHG RX REV CODE 250 W/ 637 OVERRIDE(OP): Performed by: STUDENT IN AN ORGANIZED HEALTH CARE EDUCATION/TRAINING PROGRAM

## 2017-06-07 PROCEDURE — 71260 CT THORAX DX C+: CPT

## 2017-06-07 PROCEDURE — A9270 NON-COVERED ITEM OR SERVICE: HCPCS | Performed by: STUDENT IN AN ORGANIZED HEALTH CARE EDUCATION/TRAINING PROGRAM

## 2017-06-07 PROCEDURE — 70460 CT HEAD/BRAIN W/DYE: CPT

## 2017-06-07 PROCEDURE — 99232 SBSQ HOSP IP/OBS MODERATE 35: CPT | Mod: GC | Performed by: INTERNAL MEDICINE

## 2017-06-07 PROCEDURE — A9270 NON-COVERED ITEM OR SERVICE: HCPCS | Performed by: INTERNAL MEDICINE

## 2017-06-07 PROCEDURE — 700111 HCHG RX REV CODE 636 W/ 250 OVERRIDE (IP): Performed by: GENERAL ACUTE CARE HOSPITAL

## 2017-06-07 PROCEDURE — 700105 HCHG RX REV CODE 258: Performed by: GENERAL ACUTE CARE HOSPITAL

## 2017-06-07 PROCEDURE — 700117 HCHG RX CONTRAST REV CODE 255: Performed by: INTERNAL MEDICINE

## 2017-06-07 PROCEDURE — 700102 HCHG RX REV CODE 250 W/ 637 OVERRIDE(OP): Performed by: INTERNAL MEDICINE

## 2017-06-07 PROCEDURE — 700101 HCHG RX REV CODE 250: Performed by: STUDENT IN AN ORGANIZED HEALTH CARE EDUCATION/TRAINING PROGRAM

## 2017-06-07 PROCEDURE — 700111 HCHG RX REV CODE 636 W/ 250 OVERRIDE (IP): Performed by: INTERNAL MEDICINE

## 2017-06-07 PROCEDURE — 770009 HCHG ROOM/CARE - ONCOLOGY SEMI PRI*

## 2017-06-07 RX ADMIN — LIDOCAINE 1 PATCH: 50 PATCH CUTANEOUS at 08:54

## 2017-06-07 RX ADMIN — CARVEDILOL 12.5 MG: 12.5 TABLET, FILM COATED ORAL at 18:25

## 2017-06-07 RX ADMIN — MAGNESIUM SULFATE HEPTAHYDRATE 1 G: 1 INJECTION, SOLUTION INTRAVENOUS at 08:53

## 2017-06-07 RX ADMIN — CEFAZOLIN SODIUM 2 G: 2 INJECTION, SOLUTION INTRAVENOUS at 22:53

## 2017-06-07 RX ADMIN — IOHEXOL 100 ML: 350 INJECTION, SOLUTION INTRAVENOUS at 21:32

## 2017-06-07 RX ADMIN — MAGNESIUM GLUCONATE 500 MG ORAL TABLET 400 MG: 500 TABLET ORAL at 08:54

## 2017-06-07 RX ADMIN — FINASTERIDE 5 MG: 5 TABLET, FILM COATED ORAL at 08:53

## 2017-06-07 RX ADMIN — NYSTATIN 500000 UNITS: 500000 SUSPENSION ORAL at 10:35

## 2017-06-07 RX ADMIN — NYSTATIN 500000 UNITS: 500000 SUSPENSION ORAL at 14:08

## 2017-06-07 RX ADMIN — LOPERAMIDE HYDROCHLORIDE 2 MG: 2 CAPSULE ORAL at 08:53

## 2017-06-07 RX ADMIN — ACYCLOVIR 400 MG: 800 TABLET ORAL at 20:46

## 2017-06-07 RX ADMIN — NYSTATIN 500000 UNITS: 500000 SUSPENSION ORAL at 20:46

## 2017-06-07 RX ADMIN — TAMSULOSIN HYDROCHLORIDE 0.8 MG: 0.4 CAPSULE ORAL at 08:54

## 2017-06-07 RX ADMIN — NYSTATIN 500000 UNITS: 500000 SUSPENSION ORAL at 18:25

## 2017-06-07 RX ADMIN — ACYCLOVIR 400 MG: 800 TABLET ORAL at 08:54

## 2017-06-07 RX ADMIN — CARVEDILOL 12.5 MG: 12.5 TABLET, FILM COATED ORAL at 08:53

## 2017-06-07 RX ADMIN — CEFAZOLIN SODIUM 2 G: 2 INJECTION, SOLUTION INTRAVENOUS at 14:08

## 2017-06-07 RX ADMIN — LEVETIRACETAM 750 MG: 250 TABLET, FILM COATED ORAL at 20:47

## 2017-06-07 RX ADMIN — SODIUM CHLORIDE: 9 INJECTION, SOLUTION INTRAVENOUS at 17:30

## 2017-06-07 RX ADMIN — LEVETIRACETAM 750 MG: 250 TABLET, FILM COATED ORAL at 10:20

## 2017-06-07 RX ADMIN — CEFAZOLIN SODIUM 2 G: 2 INJECTION, SOLUTION INTRAVENOUS at 05:30

## 2017-06-07 ASSESSMENT — ENCOUNTER SYMPTOMS
NECK PAIN: 0
GASTROINTESTINAL NEGATIVE: 1
MUSCULOSKELETAL NEGATIVE: 1
HEARTBURN: 0
DIZZINESS: 0
BRUISES/BLEEDS EASILY: 0
NEUROLOGICAL NEGATIVE: 1
CONSTITUTIONAL NEGATIVE: 1
DIARRHEA: 0
CHILLS: 0
FEVER: 0
HEMOPTYSIS: 0
NAUSEA: 0
COUGH: 0
RESPIRATORY NEGATIVE: 1
PALPITATIONS: 0
ABDOMINAL PAIN: 0
HEADACHES: 0
CARDIOVASCULAR NEGATIVE: 1
MYALGIAS: 0
DEPRESSION: 0

## 2017-06-07 ASSESSMENT — PAIN SCALES - GENERAL
PAINLEVEL_OUTOF10: 0
PAINLEVEL_OUTOF10: 0

## 2017-06-07 NOTE — PROGRESS NOTES
Call received from Joan DURAN, pt's transplant coordinator at [facility] requesting latest pathology report. Report faxed to  as requested.

## 2017-06-07 NOTE — DISCHARGE PLANNING
Medical SW    Referral: Marylou DURAN, indicates Oncologist, Dr. Gomez is completing doctor to doctor report and plans to transfer pt to [facility].      Intervention: Sw called [facility] ([id number]) and spoke to Fatimah. She will contact bedside Marylou DURAN.    Plan: Sw to assist w/ d/c planning as needed.

## 2017-06-07 NOTE — PROGRESS NOTES
Chemotherapy Verification - PRIMARY RN      Height = 170.2 cm  Weight = 65 kg  BSA = 1.75 m2         Medication: Omacetaxine  Dose: 1.25 mg/m2  Calculated Dose: 2.19 mg  Ordered dose 2.4 mg- fixed dose for remaining of available medication                            (In mg/m2, AUC, mg/kg)     > 5% difference- ok to treat per onc      I confirm this process was performed independently with the BSA and all final chemotherapy dosing calculations congruent.  Any discrepancies of 5% or greater have been addressed with the chemotherapy pharmacist. The resolution of the discrepancy has been documented in the EPIC progress notes.

## 2017-06-07 NOTE — PROGRESS NOTES
Chemotherapy Verification - SECONDARY RN       Height = 170.2  Weight = 63.2  BSA = 1.73       Medication: Omacetaxine  Dose: 1.25 mg/m2  Calculated Dose: 2.16 mg Ordered dose 2.4 mg                          I confirm that this process was performed independently. All discrepancies of 5% or greater have been addressed with the chemotherapy pharmacist Dr. Vu.

## 2017-06-07 NOTE — PROGRESS NOTES
Chemotherapy Verification - SECONDARY RN       Height = 170.2 cm  Weight = 63.2 kg  BSA = 1.73 m2       Medication: omacetaxine  Dose: 1.25 mg/m2  Calculated Dose: 2.16 mg ordered= 2.4 mg (per MD set dose)                             (In mg/m2, AUC, mg/kg)     I confirm that this process was performed independently.

## 2017-06-07 NOTE — PROGRESS NOTES
[facility] [place] [facility] Interval Note    Name Benjamin Post       1945   Age/Sex 71 y.o. male   MRN [id number]   Code Status DNR     After 5PM or if no immediate response to page, please call for cross-coverage  Attending/Team: Dr. Melissa / rudolph  Call [phone] to page   1st Call - Day Intern (R1):   Dr Sherman Sears  2nd Call - Day Sr. Resident (R2/R3):   Dr. Sarbjit Kent         Chief complaint/ reason for interval visit (Primary Diagnosis)   CML/pancytopenia     Interval Problem Daily Status Update  :  S : No significant overnight events. Pt denies any acute complaints. HB 8.3, received omacetaxine       Today's Changes :      Yesterday labs  HB 7.8  PLT 18  Wbc 0.1  BM biopsy result shows hypocellular BM with blast 18 % of cellularity   To receive another dose of omacetaxine today    Principal Problem:    Fever and neutropenia: fever subsided, still neutropenic on cefazolin         Active Problems:    CML (chronic myelocytic leukemia)-Accelerated phase (Chronic) POA: Yes      WBC 0.1. Patient may need more chemotherapy as per Dr. Jorgensen.     Neutropenia (CMS-HCC) POA: Yes      Overview:           Acute bilateral low back pain without sciatica POA: resolved     Diarrhea POA: resolved       Overview: Loperamide stopped on  due to initiation of antibacterials.           Seizure disorder (CMS-HCC) POA: Yes    Transaminitis POA: Yes      Overview: Related to chemo therapy. AST/ALT: 46/89>>61/111>>71/134>>80/171 >>88/169    BPH (benign prostatic hyperplasia) (Chronic) POA: Yes      Overview: On flomax    Low vitamin D level (Chronic) POA: Yes      Overview: Vit D: 11 on 17    Hypertension POA: Unknown  Resolved Problems:    Bone pain POA: Yes      Overview: Stable      Negative PE for tenderness over the rib cage.    Elevated PSA (Chronic) POA: Yes      Overview: PSA: 8.4 on 17  >>>>>4 on     Dysuria POA: Unknown       Overview: Stable           Abdominal pain POA: No      Overview: Resolved.           Review of Systems   Constitutional: Negative for fever and chills.   HENT: Negative for hearing loss.    Respiratory: Negative for cough and hemoptysis.    Cardiovascular: Negative for chest pain and palpitations.   Gastrointestinal: Negative for heartburn, nausea, abdominal pain and diarrhea.   Genitourinary: Negative for dysuria and urgency.   Musculoskeletal: Negative for myalgias and neck pain.   Skin: Negative for rash.   Neurological: Negative for dizziness and headaches.   Psychiatric/Behavioral: Negative for depression and suicidal ideas.       Consultants/Specialty  Oncology - Van Meter/Mak    Disposition  Inpatient for further chemotherapy vs hospice referral, the patient has not decided yet    Quality Measures  EKG reviewed, Labs reviewed, Medications reviewed and Radiology images reviewed  Mahajan catheter: No Mahajan      DVT Prophylaxis: Contraindicated - High bleeding risk  DVT prophylaxis - mechanical: SCDs (low plt)            Physical Exam       Filed Vitals:    06/07/17 0353 06/07/17 0404 06/07/17 0800 06/07/17 1200   BP: 106/51  112/66 108/71   Pulse: 83  82 81   Temp: 37.2 °C (98.9 °F)  36.7 °C (98.1 °F) 37.7 °C (99.9 °F)   Resp: 16  18 18   Height:       Weight:  65 kg (143 lb 4.8 oz)     SpO2: 99%  98% 96%     Body mass index is 22.44 kg/(m^2). Weight: 65 kg (143 lb 4.8 oz)  Oxygen Therapy:  Pulse Oximetry: 96 %, O2 (LPM): 0, O2 Delivery: None (Room Air)    Physical Exam   Constitutional: He is well-developed, well-nourished, and in no distress. No distress.   HENT:   Head: Normocephalic.   Neck: No tracheal deviation present. No thyromegaly present.   Cardiovascular: Normal rate.  Exam reveals no friction rub.    Murmur heard.  Pansystolic murmur on the apex    Pulmonary/Chest: Effort normal. No respiratory distress. He has no wheezes.   Abdominal: Soft. He exhibits no distension. There is no tenderness.    Musculoskeletal: He exhibits no edema.   Neurological: He is alert.   Skin: No erythema.         Lab Data Review:      5/4/2017  1:28 PM    Recent Labs      06/05/17 0916  06/05/17 2354 06/06/17   2359   SODIUM  131*  132*  132*   POTASSIUM  4.2  4.2  4.2   CHLORIDE  100  102  104   CO2  26  23  24   BUN  16  21  17   CREATININE  0.72  0.69  0.70   MAGNESIUM   --    --   1.8   PHOSPHORUS   --    --   3.2   CALCIUM  8.5  8.2*  8.2*       Recent Labs      06/05/17 0916  06/05/17 2354 06/06/17   2359   ALTSGPT  30  28  29   ASTSGOT  36  34  37   ALKPHOSPHAT  289*  273*  251*   TBILIRUBIN  0.7  0.6  0.5   GLUCOSE  108*  94  106*       Recent Labs      06/04/17 2357 06/05/17 2354 06/06/17 2359   RBC  2.97*  3.12*  2.77*   HEMOGLOBIN  8.3*  8.7*  7.8*   HEMATOCRIT  23.6*  24.5*  21.9*   PLATELETCT  14*  7*  18*       Recent Labs      06/04/17 2357 06/05/17 0916 06/05/17 2354 06/06/17 2359   WBC  0.2*   --   0.2*  0.1*   ASTSGOT   --   36  34  37   ALTSGPT   --   30  28  29   ALKPHOSPHAT   --   289*  273*  251*   TBILIRUBIN   --   0.7  0.6  0.5           Assessment/Plan       CML (chronic myelocytic leukemia)-Accelerated phase  - Has completed multiple chemotherapy cycles, multiple relapses.  now he is in accelerated or blastic crisis   Was treated on dasatinib for a time with response, but then relapse, and bosutinib.  Pancytopenia (chronic, before starting with chemotherapy)   No blast in peripheral blood film  Bone Marrow showed: hypocellular bone marrow with evidence of increased          blasts, 18% of marrow cellularity, in small fibrous area of          marrow composed predominately of CD3+ T lymphocytes.         Findings are consistent with population of residual blasts          consistent with accelerated phase of CML.  - to receive chemotherapy today and to transfer him to BM transplantation per oncology. Considered respondent to chemotherapy  - we will follow his electrolyte and  watch for any infection or bleeding    Pancytopenia (CMS-[facility])  Transfused multiple platelet and PRBCs in the past.  HB 7.8, plt 18, wbc 0.2  Follow up and keep plt>10 and Hb>7.5.      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 No indication for neopogen.    Transaminitis  Alk.phophatase 289  No abdominal pain, likely effect of chemotherapy  Discontinued all hepatotoxic drugs as possible  Continue to follow      Diarrhea  Most likely related to chemo  Resolved with loperamide       Hypertension  controlled  discontinued amlodipine and continue carvedilol 12.5 mg BID        Seizure disorder (CMS-[facility])  Stable    Continue on his home dose of keppra  Had 1 episode last year after dental procedure.     BPH (benign prostatic hyperplasia)  Stable.   Continue on tamsulosin 0.4    Low vitamin D level  Continue vit D2 weekly.

## 2017-06-07 NOTE — PROGRESS NOTES
Oncology/Hematology Progress Note               Author:  Wayne Wilsoncherry Date & Time created: 6/7/2017  12:37 PM     Interval History:  CC: Accelerated/Blast crisis CML, started second 14 day course of Omacetaxine today because drug until today  New bone marrow with a total of <10% blast.  No events overnight  Awaiting transfer to [facility] for BMT    Review of Systems:  Review of Systems   Constitutional: Negative.  Negative for fever and chills.   HENT: Negative.    Respiratory: Negative.    Cardiovascular: Negative.    Gastrointestinal: Negative.  Negative for abdominal pain.   Genitourinary: Negative.    Musculoskeletal: Negative.    Skin: Negative for rash.   Neurological: Negative.    Endo/Heme/Allergies: Negative.  Does not bruise/bleed easily.       Physical Exam:  Physical Exam   Constitutional: He is oriented to person, place, and time. He appears well-developed and well-nourished. No distress.   HENT:   Head: Normocephalic and atraumatic.   Mouth/Throat: No oropharyngeal exudate.   Eyes: Pupils are equal, round, and reactive to light. No scleral icterus.   Neck: Normal range of motion.   Cardiovascular: Normal rate and normal heart sounds.    Pulmonary/Chest: Effort normal. No respiratory distress. He has no wheezes. He has no rales.   Abdominal: Soft. Bowel sounds are normal.   Lymphadenopathy:     He has no cervical adenopathy.   Neurological: He is alert and oriented to person, place, and time.   Skin: Skin is warm.       Labs:        Invalid input(s): [id number]      Recent Labs      06/05/17 0916 06/05/17 2354 06/06/17 2359   SODIUM  131*  132*  132*   POTASSIUM  4.2  4.2  4.2   CHLORIDE  100  102  104   CO2  26  23  24   BUN  16  21  17   CREATININE  0.72  0.69  0.70   MAGNESIUM   --    --   1.8   PHOSPHORUS   --    --   3.2   CALCIUM  8.5  8.2*  8.2*     Recent Labs      06/05/17   0916  06/05/17   2354  06/06/17   2359   ALTSGPT  30  28  29   ASTSGOT  36  34  37   ALKPHOSPHAT  289*  273*   251*   TBILIRUBIN  0.7  0.6  0.5   GLUCOSE  108*  94  106*     Recent Labs      17   235   RBC  2.97*  3.12*  2.77*   HEMOGLOBIN  8.3*  8.7*  7.8*   HEMATOCRIT  23.6*  24.5*  21.9*   PLATELETCT  14*  7*  18*     Recent Labs      17   WBC  0.2*   --   0.2*  0.1*   ASTSGOT   --   36  34  37   ALTSGPT   --   30  28  29   ALKPHOSPHAT   --   289*  273*  251*   TBILIRUBIN   --   0.7  0.6  0.5     Recent Labs      17   SODIUM  131*  132*  132*   POTASSIUM  4.2  4.2  4.2   CHLORIDE  100  102  104   CO2  26  23  24   GLUCOSE  108*  94  106*   BUN  16  21  17   CREATININE  0.72  0.69  0.70   CALCIUM  8.5  8.2*  8.2*     Hemodynamics:  Temp (24hrs), Av.1 °C (98.7 °F), Min:36.7 °C (98 °F), Max:37.7 °C (99.9 °F)  Temperature: 37.7 °C (99.9 °F)  Pulse  Av.1  Min: 58  Max: 118   Blood Pressure : 108/71 mmHg     Respiratory:    Respiration: 18, Pulse Oximetry: 96 %     Work Of Breathing / Effort: Mild  RUL Breath Sounds: Clear, RML Breath Sounds: Clear, RLL Breath Sounds: Diminished, WAGNER Breath Sounds: Clear, LLL Breath Sounds: Diminished  Fluids:    Intake/Output Summary (Last 24 hours) at 17 1034  Last data filed at 17 1000   Gross per 24 hour   Intake   2057 ml   Output   1675 ml   Net    382 ml     Weight: 65 kg (143 lb 4.8 oz)  GI/Nutrition:  Orders Placed This Encounter   Procedures   • DIET ORDER     Standing Status: Standing      Number of Occurrences: 1      Standing Expiration Date:      Order Specific Question:  Diet:     Answer:  Regular [1]     Order Specific Question:  Texture/Fiber modifications:     Answer:  Dysphagia 2(Pureed/Chopped)specify fluid consistency(question 6) [2]      Comments:  pt with absolutely no teeth     Order Specific Question:  Consistency/Fluid modifications:     Answer:  Thin Liquids [3]     Medical Decision Making, by  Problem:  Active Hospital Problems    Diagnosis   • *Fever and neutropenia (CMS-HCC) [D70.9, R50.81]   • Neutropenia (CMS-HCC) [D70.9]   • 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]   • Diarrhea [R19.7]   • Acute bilateral low back pain without sciatica [M54.5]   • Seizure disorder (CMS-HCC) [G40.909]   • Hypertension [I10]   • BPH (benign prostatic hyperplasia) [N40.0]   • Low vitamin D level [E55.9]   • Transaminitis [R74.0]       Plan:  Advanced CML:  reponded well to Omacetaxine. Last dose will be given on 6/7 (today). Given he got a good response with <10% blast plan is to transfer him to [facility] for BMT, will likely happen this week. Still we are awaiting for transfer  Guarded prognosis  Transfuse  PRBC if hemoglobin <8 g/dl or Platelets if <10 or bleeding.    Labs reviewed

## 2017-06-08 ENCOUNTER — APPOINTMENT (OUTPATIENT)
Dept: RADIOLOGY | Facility: MEDICAL CENTER | Age: 72
DRG: 808 | End: 2017-06-08
Attending: INTERNAL MEDICINE
Payer: MEDICARE

## 2017-06-08 PROBLEM — R93.89 ABNORMAL CT SCAN, CHEST: Status: ACTIVE | Noted: 2017-06-08

## 2017-06-08 LAB
ALBUMIN SERPL BCP-MCNC: 2.8 G/DL (ref 3.2–4.9)
ALBUMIN/GLOB SERPL: 0.9 G/DL
ALP SERPL-CCNC: 252 U/L (ref 30–99)
ALT SERPL-CCNC: 31 U/L (ref 2–50)
ANION GAP SERPL CALC-SCNC: 5 MMOL/L (ref 0–11.9)
AST SERPL-CCNC: 41 U/L (ref 12–45)
BILIRUB SERPL-MCNC: 0.6 MG/DL (ref 0.1–1.5)
BUN SERPL-MCNC: 11 MG/DL (ref 8–22)
CALCIUM SERPL-MCNC: 8.4 MG/DL (ref 8.5–10.5)
CHLORIDE SERPL-SCNC: 101 MMOL/L (ref 96–112)
CO2 SERPL-SCNC: 22 MMOL/L (ref 20–33)
CREAT SERPL-MCNC: 0.6 MG/DL (ref 0.5–1.4)
ERYTHROCYTE [DISTWIDTH] IN BLOOD BY AUTOMATED COUNT: 34.2 FL (ref 35.9–50)
GFR SERPL CREATININE-BSD FRML MDRD: >60 ML/MIN/1.73 M 2
GLOBULIN SER CALC-MCNC: 3.2 G/DL (ref 1.9–3.5)
GLUCOSE SERPL-MCNC: 112 MG/DL (ref 65–99)
HCT VFR BLD AUTO: 21.6 % (ref 42–52)
HGB BLD-MCNC: 7.7 G/DL (ref 14–18)
MCH RBC QN AUTO: 27.7 PG (ref 27–33)
MCHC RBC AUTO-ENTMCNC: 35.6 G/DL (ref 33.7–35.3)
MCV RBC AUTO: 77.7 FL (ref 81.4–97.8)
NEUTROPHILS # BLD AUTO: ABNORMAL K/UL (ref 1.82–7.42)
NRBC # BLD AUTO: 0 K/UL
NRBC BLD AUTO-RTO: 0 /100 WBC
OSMOLALITY SERPL: 273 MOSM/KG H2O (ref 278–298)
OSMOLALITY UR: 392 MOSM/KG H2O (ref 300–900)
PLATELET # BLD AUTO: 11 K/UL (ref 164–446)
PMV BLD AUTO: 11.5 FL (ref 9–12.9)
POTASSIUM SERPL-SCNC: 4.2 MMOL/L (ref 3.6–5.5)
PROT SERPL-MCNC: 6 G/DL (ref 6–8.2)
RBC # BLD AUTO: 2.78 M/UL (ref 4.7–6.1)
SODIUM SERPL-SCNC: 128 MMOL/L (ref 135–145)
SODIUM UR-SCNC: 115 MMOL/L
WBC # BLD AUTO: <0.1 K/UL (ref 4.8–10.8)

## 2017-06-08 PROCEDURE — 93306 TTE W/DOPPLER COMPLETE: CPT

## 2017-06-08 PROCEDURE — 700102 HCHG RX REV CODE 250 W/ 637 OVERRIDE(OP): Performed by: INTERNAL MEDICINE

## 2017-06-08 PROCEDURE — 700117 HCHG RX CONTRAST REV CODE 255: Performed by: INTERNAL MEDICINE

## 2017-06-08 PROCEDURE — 72157 MRI CHEST SPINE W/O & W/DYE: CPT

## 2017-06-08 PROCEDURE — 82746 ASSAY OF FOLIC ACID SERUM: CPT

## 2017-06-08 PROCEDURE — 36415 COLL VENOUS BLD VENIPUNCTURE: CPT

## 2017-06-08 PROCEDURE — A9270 NON-COVERED ITEM OR SERVICE: HCPCS | Performed by: INTERNAL MEDICINE

## 2017-06-08 PROCEDURE — 86140 C-REACTIVE PROTEIN: CPT

## 2017-06-08 PROCEDURE — A9270 NON-COVERED ITEM OR SERVICE: HCPCS | Performed by: STUDENT IN AN ORGANIZED HEALTH CARE EDUCATION/TRAINING PROGRAM

## 2017-06-08 PROCEDURE — 93306 TTE W/DOPPLER COMPLETE: CPT | Mod: 26 | Performed by: INTERNAL MEDICINE

## 2017-06-08 PROCEDURE — 83615 LACTATE (LD) (LDH) ENZYME: CPT

## 2017-06-08 PROCEDURE — 700101 HCHG RX REV CODE 250: Performed by: STUDENT IN AN ORGANIZED HEALTH CARE EDUCATION/TRAINING PROGRAM

## 2017-06-08 PROCEDURE — 84300 ASSAY OF URINE SODIUM: CPT

## 2017-06-08 PROCEDURE — 84443 ASSAY THYROID STIM HORMONE: CPT

## 2017-06-08 PROCEDURE — 80053 COMPREHEN METABOLIC PANEL: CPT

## 2017-06-08 PROCEDURE — 99233 SBSQ HOSP IP/OBS HIGH 50: CPT | Mod: GC | Performed by: INTERNAL MEDICINE

## 2017-06-08 PROCEDURE — 700102 HCHG RX REV CODE 250 W/ 637 OVERRIDE(OP): Performed by: STUDENT IN AN ORGANIZED HEALTH CARE EDUCATION/TRAINING PROGRAM

## 2017-06-08 PROCEDURE — 85652 RBC SED RATE AUTOMATED: CPT

## 2017-06-08 PROCEDURE — 85025 COMPLETE CBC W/AUTO DIFF WBC: CPT

## 2017-06-08 PROCEDURE — 85610 PROTHROMBIN TIME: CPT

## 2017-06-08 PROCEDURE — 700111 HCHG RX REV CODE 636 W/ 250 OVERRIDE (IP): Performed by: INTERNAL MEDICINE

## 2017-06-08 PROCEDURE — 83550 IRON BINDING TEST: CPT

## 2017-06-08 PROCEDURE — 84134 ASSAY OF PREALBUMIN: CPT

## 2017-06-08 PROCEDURE — 85046 RETICYTE/HGB CONCENTRATE: CPT

## 2017-06-08 PROCEDURE — 83735 ASSAY OF MAGNESIUM: CPT

## 2017-06-08 PROCEDURE — A9579 GAD-BASE MR CONTRAST NOS,1ML: HCPCS | Performed by: INTERNAL MEDICINE

## 2017-06-08 PROCEDURE — 82607 VITAMIN B-12: CPT

## 2017-06-08 PROCEDURE — 83540 ASSAY OF IRON: CPT

## 2017-06-08 PROCEDURE — 83935 ASSAY OF URINE OSMOLALITY: CPT

## 2017-06-08 PROCEDURE — 85730 THROMBOPLASTIN TIME PARTIAL: CPT

## 2017-06-08 PROCEDURE — 82728 ASSAY OF FERRITIN: CPT

## 2017-06-08 PROCEDURE — 83930 ASSAY OF BLOOD OSMOLALITY: CPT

## 2017-06-08 PROCEDURE — 770009 HCHG ROOM/CARE - ONCOLOGY SEMI PRI*

## 2017-06-08 RX ORDER — VORICONAZOLE 200 MG/1
200 TABLET, FILM COATED ORAL EVERY 12 HOURS
Status: DISCONTINUED | OUTPATIENT
Start: 2017-06-08 | End: 2017-06-15 | Stop reason: HOSPADM

## 2017-06-08 RX ADMIN — LEVETIRACETAM 250 MG: 250 TABLET, FILM COATED ORAL at 20:35

## 2017-06-08 RX ADMIN — NYSTATIN 500000 UNITS: 500000 SUSPENSION ORAL at 18:34

## 2017-06-08 RX ADMIN — NYSTATIN 500000 UNITS: 500000 SUSPENSION ORAL at 20:34

## 2017-06-08 RX ADMIN — VORICONAZOLE 200 MG: 200 TABLET, FILM COATED ORAL at 22:42

## 2017-06-08 RX ADMIN — CEFAZOLIN SODIUM 2 G: 2 INJECTION, SOLUTION INTRAVENOUS at 05:41

## 2017-06-08 RX ADMIN — LEVETIRACETAM 750 MG: 250 TABLET, FILM COATED ORAL at 08:58

## 2017-06-08 RX ADMIN — CEFAZOLIN SODIUM 2 G: 2 INJECTION, SOLUTION INTRAVENOUS at 14:40

## 2017-06-08 RX ADMIN — TAMSULOSIN HYDROCHLORIDE 0.8 MG: 0.4 CAPSULE ORAL at 08:58

## 2017-06-08 RX ADMIN — ACYCLOVIR 400 MG: 800 TABLET ORAL at 20:34

## 2017-06-08 RX ADMIN — LIDOCAINE 1 PATCH: 50 PATCH CUTANEOUS at 08:57

## 2017-06-08 RX ADMIN — CEFAZOLIN SODIUM 2 G: 2 INJECTION, SOLUTION INTRAVENOUS at 20:36

## 2017-06-08 RX ADMIN — LEVETIRACETAM 500 MG: 250 TABLET, FILM COATED ORAL at 21:01

## 2017-06-08 RX ADMIN — VORICONAZOLE 200 MG: 200 TABLET, FILM COATED ORAL at 14:40

## 2017-06-08 RX ADMIN — NYSTATIN 500000 UNITS: 500000 SUSPENSION ORAL at 13:50

## 2017-06-08 RX ADMIN — GADODIAMIDE 15 ML: 287 INJECTION INTRAVENOUS at 22:41

## 2017-06-08 RX ADMIN — CARVEDILOL 12.5 MG: 12.5 TABLET, FILM COATED ORAL at 08:58

## 2017-06-08 RX ADMIN — CARVEDILOL 12.5 MG: 12.5 TABLET, FILM COATED ORAL at 18:34

## 2017-06-08 RX ADMIN — FINASTERIDE 5 MG: 5 TABLET, FILM COATED ORAL at 08:58

## 2017-06-08 RX ADMIN — ACYCLOVIR 400 MG: 800 TABLET ORAL at 08:58

## 2017-06-08 RX ADMIN — NYSTATIN 500000 UNITS: 500000 SUSPENSION ORAL at 08:58

## 2017-06-08 ASSESSMENT — ENCOUNTER SYMPTOMS
CHILLS: 0
GASTROINTESTINAL NEGATIVE: 1
DIARRHEA: 0
HEARTBURN: 0
CONSTITUTIONAL NEGATIVE: 1
HEMOPTYSIS: 0
MUSCULOSKELETAL NEGATIVE: 1
NEUROLOGICAL NEGATIVE: 1
DEPRESSION: 0
HEADACHES: 0
DIZZINESS: 0
CARDIOVASCULAR NEGATIVE: 1
FEVER: 0
NAUSEA: 0
COUGH: 0
ABDOMINAL PAIN: 0
RESPIRATORY NEGATIVE: 1
BRUISES/BLEEDS EASILY: 0
PALPITATIONS: 0
MYALGIAS: 0
NECK PAIN: 0

## 2017-06-08 ASSESSMENT — PAIN SCALES - GENERAL
PAINLEVEL_OUTOF10: 0
PAINLEVEL_OUTOF10: 0

## 2017-06-08 NOTE — PROGRESS NOTES
Assumed care of patient at 1900. No new complaints at this time, needs met. Patient to CT via transport. Patient back without any complications. Patient resting in bed. Denies SOB, DRAPER, CP, pain. VSS. Bed alarm on. Safety protocols in place. Patient call light within reach. Instructed to call for assistance. Will continue to monitor.

## 2017-06-08 NOTE — DISCHARGE PLANNING
Acute to Acute Tranfer:     Late entry:    Received call from BRIANNE Gates regarding possible transfer to [facility] for Bone Marrow Transplant.  Dr. Person of Cancer Care is in communication with Dr. Lew (Loc Onc) of [facility].  Call placed to [facility] @ [phone].  Spoke with Xavier.  She is not aware of the transfer between Dr. Person and Dr. Lew.  Request made for transfer.  Information provided.      Received call from ROGER Fernandez at bedside.  Patient's daughter brought in paperwork regarding plan for transplant.  Pt has Joan Walsh @ [phone] which is pt's transplant coordinator at [facility].  Plan was provided to Dr. Person.  Per RN report, will discuss plan with MD, pt, and family.      Spoke with Manager, Ema.  This may not qualify as higher level of care transfer if pt's condition is stable.  Pt may be d/c home then follow up outpatient with Transplant Program in [facility].  But if pt's condition deteriorates and need emergent intervention then he will be transfer as higher level of care.

## 2017-06-08 NOTE — CARE PLAN
Problem: Acute Care of the Febrile Neutropenia Patient  Goal: Optimal Outcome for the Febrile Neutropenia Patient  Intervention: Vital Signs Q 4 hours, oral temps only  Neutropenic precautions in place. Pt afebrile this shift      Problem: Urinary Elimination:  Goal: Ability to reestablish a normal urinary elimination pattern will improve  Outcome: PROGRESSING AS EXPECTED  Pt voiding without difficulty. Occasional urinary incontinence. None this shift    Problem: Acute Care of the Chemotherapy Patient  Goal: Optimal Outcome for the Chemotherapy Patient  Intervention: Review WBC, ANC, platelet count, kidney function and liver function tests before chemotherapy administration  Labs reviewed, 2 RNS verified orders, dosages, calculations, > 5% differences addressed with MD and order to proceed received

## 2017-06-08 NOTE — PROGRESS NOTES
[facility] [place] Internal Medicine Interval Note    Name Benjamin Post       1945   Age/Sex 71 y.o. male   MRN [id number]   Code Status DNR     After 5PM or if no immediate response to page, please call for cross-coverage  Attending/Team: Dr. Melissa / rudolph  Call [phone] to page   1st Call - Day Intern (R1):   Dr Sherman Sears  2nd Call - Day Sr. Resident (R2/R3):   Dr. Sarbjit Kent         Chief complaint/ reason for interval visit (Primary Diagnosis)   CML/pancytopenia     Interval Problem Daily Status Update  :  S : No significant overnight events. Pt denies any acute complaints. HB 8.3, received omacetaxine, last dose yesterday    Pan CT chest, abdomen and pelvis done yesterday and showed possible septic emboli or metastasis to lungs with osteolytic lesions at T4, and ribs    Today's Changes :      HB 7.7, wbc less than 0.1, platelets 11   Sent for echocardiogram TTE, MRI spine thoracic, to rule out septic emboli or metastasis.   Infectious disease consulted  BM biopsy to be done tomorrow    Principal Problem:    Fever and neutropenia: fever subsided, still neutropenic on cefazolin         Active Problems:    CML (chronic myelocytic leukemia)-Accelerated phase (Chronic) POA: Yes      WBC 0.1. Patient may need more chemotherapy as per Dr. Jorgensen.     Neutropenia (CMS-HCC) POA: Yes      Overview:           Acute bilateral low back pain without sciatica POA: resolved     Diarrhea POA: resolved       Overview: Loperamide stopped on  due to initiation of antibacterials.           Seizure disorder (CMS-HCC) POA: Yes    Transaminitis POA: Yes      Overview: Related to chemo therapy. AST/ALT: 46/89>>61/111>>71/134>>80/171 >>88/169    BPH (benign prostatic hyperplasia) (Chronic) POA: Yes      Overview: On flomax    Low vitamin D level (Chronic) POA: Yes      Overview: Vit D: 11 on 17    Hypertension POA: Unknown  Resolved Problems:    Bone  pain POA: Yes      Overview: Stable      Negative PE for tenderness over the rib cage.    Elevated PSA (Chronic) POA: Yes      Overview: PSA: 8.4 on 4/4/17  >>>>>4 on 5/18    Dysuria POA: Unknown      Overview: Stable           Abdominal pain POA: No      Overview: Resolved.           Review of Systems   Constitutional: Negative for fever and chills.   HENT: Negative for hearing loss.    Respiratory: Negative for cough and hemoptysis.    Cardiovascular: Negative for chest pain and palpitations.   Gastrointestinal: Negative for heartburn, nausea, abdominal pain and diarrhea.   Genitourinary: Negative for dysuria and urgency.   Musculoskeletal: Negative for myalgias and neck pain.   Skin: Negative for rash.   Neurological: Negative for dizziness and headaches.   Psychiatric/Behavioral: Negative for depression and suicidal ideas.       Consultants/Specialty  Oncology - Dr. Naya LAKHANI  Infectious disease MD    Disposition  Inpatient to continue ABx for 6 weeks for possible deep seated MSSA infection    Quality Measures  EKG reviewed, Labs reviewed, Medications reviewed and Radiology images reviewed  Mahajan catheter: No Mahajan      DVT Prophylaxis: Contraindicated - High bleeding risk  DVT prophylaxis - mechanical: SCDs (low plt)            Physical Exam       Filed Vitals:    06/08/17 0000 06/08/17 0400 06/08/17 0809 06/08/17 1200   BP: 108/59 105/69 111/63 103/64   Pulse: 86 85 88 79   Temp: 36.9 °C (98.5 °F) 36.8 °C (98.2 °F) 37 °C (98.6 °F) 36.6 °C (97.9 °F)   Resp: 16 16 16 16   Height:       Weight: 65.6 kg (144 lb 10 oz)      SpO2: 96% 99% 96% 94%     Body mass index is 22.65 kg/(m^2). Weight: 65.6 kg (144 lb 10 oz)  Oxygen Therapy:  Pulse Oximetry: 94 %, O2 (LPM): 0, O2 Delivery: None (Room Air)    Physical Exam   Constitutional: He is well-developed, well-nourished, and in no distress. No distress.   HENT:   Head: Normocephalic.   Neck: No tracheal deviation present. No thyromegaly present.   Cardiovascular:  Normal rate.  Exam reveals no friction rub.    Murmur heard.  Ejection systolic murmur graded 3/6 with maximal intensity at tricuspid area, no thrill or radiation.    Pulmonary/Chest: Effort normal. No respiratory distress. He has no wheezes.   Abdominal: Soft. He exhibits no distension. There is no tenderness.   Musculoskeletal: He exhibits no edema.   Neurological: He is alert.   Skin: No erythema.         Lab Data Review:      5/4/2017  1:28 PM    Recent Labs      06/05/17 2354 06/06/17 2359 06/08/17   0011   SODIUM  132*  132*  128*   POTASSIUM  4.2  4.2  4.2   CHLORIDE  102  104  101   CO2  23  24  22   BUN  21  17  11   CREATININE  0.69  0.70  0.60   MAGNESIUM   --   1.8   --    PHOSPHORUS   --   3.2   --    CALCIUM  8.2*  8.2*  8.4*       Recent Labs      06/05/17 2354 06/06/17 2359 06/08/17   0011   ALTSGPT  28  29  31   ASTSGOT  34  37  41   ALKPHOSPHAT  273*  251*  252*   TBILIRUBIN  0.6  0.5  0.6   GLUCOSE  94  106*  112*       Recent Labs      06/05/17 2354 06/06/17 2359 06/08/17   0011   RBC  3.12*  2.77*  2.78*   HEMOGLOBIN  8.7*  7.8*  7.7*   HEMATOCRIT  24.5*  21.9*  21.6*   PLATELETCT  7*  18*  11*       Recent Labs      06/05/17 2354 06/06/17 2359 06/08/17   0011   WBC  0.2*  0.1*  <0.1*   ASTSGOT  34  37  41   ALTSGPT  28  29  31   ALKPHOSPHAT  273*  251*  252*   TBILIRUBIN  0.6  0.5  0.6           Assessment/Plan       CML (chronic myelocytic leukemia)-Accelerated phase  - Has completed multiple chemotherapy cycles, multiple relapses.  now he is in accelerated or blastic crisis   Was treated on dasatinib for a time with response, but then relapse, and bosutinib.  Pancytopenia (chronic, before starting with chemotherapy)   No blast in peripheral blood film  Bone Marrow showed: hypocellular with blast less than 10 %  - Pan CT showed: Multiple new ill-defined pulmonary nodules involving both lungs, which may be infectious/inflammatory or metastatic.  Septic emboli are a  consideration.  lytic lesions in the T5 vertebral body on the LEFT and adjacent LEFT medial 5th rib, likely metastatic.  There is high possibility that these lesions are septic emboli from his previous MSSA bacteremia vs metastasis.    Plan :  Sent for echocardiogram to exclude infective endocarditis  Sent for thoracic MRI   To continue cefazolin for 6 weeks.  We will hold any blood cultures at this time and to watch for any signs of infection  To repeat BM biopsy tomorrow per oncology   To continue on prophylactic antiviral, acyclovir   To start prophylactic antifungal with voriconazole PO       Pancytopenia (CMS-[facility])  Transfused multiple platelet and PRBCs in the past.  HB 7.7, plt 11, wbc 0.1  Follow up and keep plt>10 and Hb>7.5.      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, metastasis   - new CT showed the same lesion  - the pain resolved    # hyponatremia  , serum osmolality 273 low, urine  high  Seems to be hypotonic hyponatremia, dehydration probably  Continue IV fluid and follow up    Neutropenia (CMS-[facility])  Was on ceftriaxone, switched  to cefazolin per pharmacy recommendation, today is day 13on ABX  Blood culture showed MSSA  to continue on AB for 6 weeks per ID    Transaminitis  Alk.phophatase 289  No abdominal pain, likely effect of chemotherapy  Discontinued all hepatotoxic drugs as possible  Continue to follow      Diarrhea  Most likely related to chemo  Resolved with loperamide       Hypertension  controlled  discontinued amlodipine and continue carvedilol 12.5 mg BID        Seizure disorder (CMS-[facility])  Stable    Continue on his home dose of keppra  Had 1 episode last year after dental procedure.     BPH (benign prostatic hyperplasia)  Stable.   Continue on tamsulosin 0.4    Low vitamin D level  Continue vit D2 weekly.

## 2017-06-08 NOTE — PROGRESS NOTES
Oncology/Hematology Progress Note               Author:  Wayne Person Date & Time created: 6/8/2017  10:06 AM     Interval History:  CC: Accelerated/Blast crisis CML, started second 14 day course of Omacetaxine today because drug until today  New bone marrow with a total of <10% blast.  No events overnight  Now CT chest showing bilateral pulm nodules likely related to septic emboli to  MSSA bacteremia in the past. Also lytic lesions on T5 and 5ft rib.    Review of Systems:  Review of Systems   Constitutional: Negative.  Negative for fever and chills.   HENT: Negative.    Respiratory: Negative.    Cardiovascular: Negative.    Gastrointestinal: Negative.  Negative for abdominal pain.   Genitourinary: Negative.    Musculoskeletal: Negative.    Skin: Negative for rash.   Neurological: Negative.    Endo/Heme/Allergies: Negative.  Does not bruise/bleed easily.       Physical Exam:  Physical Exam   Constitutional: He is oriented to person, place, and time. He appears well-developed and well-nourished. No distress.   HENT:   Head: Normocephalic and atraumatic.   Mouth/Throat: No oropharyngeal exudate.   Eyes: Pupils are equal, round, and reactive to light. No scleral icterus.   Neck: Normal range of motion.   Cardiovascular: Normal rate and normal heart sounds.    Pulmonary/Chest: Effort normal. No respiratory distress. He has no wheezes. He has no rales.   Abdominal: Soft. Bowel sounds are normal.   Lymphadenopathy:     He has no cervical adenopathy.   Neurological: He is alert and oriented to person, place, and time.   Skin: Skin is warm.       Labs:        Invalid input(s): [id number]      Recent Labs      06/05/17   2354  06/06/17   2359  06/08/17   0011   SODIUM  132*  132*  128*   POTASSIUM  4.2  4.2  4.2   CHLORIDE  102  104  101   CO2  23  24  22   BUN  21  17  11   CREATININE  0.69  0.70  0.60   MAGNESIUM   --   1.8   --    PHOSPHORUS   --   3.2   --    CALCIUM  8.2*  8.2*  8.4*     Recent Labs      06/05/17    23517   2359  17   0011   ALTSGPT  28  29  31   ASTSGOT  34  37  41   ALKPHOSPHAT  273*  251*  252*   TBILIRUBIN  0.6  0.5  0.6   GLUCOSE  94  106*  112*     Recent Labs      17   0011   RBC  3.12*  2.77*  2.78*   HEMOGLOBIN  8.7*  7.8*  7.7*   HEMATOCRIT  24.5*  21.9*  21.6*   PLATELETCT  7*  18*  11*     Recent Labs      17   23517   23517   0011   WBC  0.2*  0.1*  <0.1*   ASTSGOT  34  37  41   ALTSGPT  28  29  31   ALKPHOSPHAT  273*  251*  252*   TBILIRUBIN  0.6  0.5  0.6     Recent Labs      17   0011   SODIUM  132*  132*  128*   POTASSIUM  4.2  4.2  4.2   CHLORIDE  102  104  101   CO2  23  24  22   GLUCOSE  94  106*  112*   BUN  21  17  11   CREATININE  0.69  0.70  0.60   CALCIUM  8.2*  8.2*  8.4*     Hemodynamics:  Temp (24hrs), Av.2 °C (99 °F), Min:36.8 °C (98.2 °F), Max:37.7 °C (99.9 °F)  Temperature: 37 °C (98.6 °F)  Pulse  Av.1  Min: 58  Max: 118   Blood Pressure : 111/63 mmHg     Respiratory:    Respiration: 16, Pulse Oximetry: 96 %        RUL Breath Sounds: Clear, RML Breath Sounds: Clear, RLL Breath Sounds: Diminished, WAGNER Breath Sounds: Clear, LLL Breath Sounds: Diminished  Fluids:    Intake/Output Summary (Last 24 hours) at 17 1034  Last data filed at 17 1000   Gross per 24 hour   Intake   2057 ml   Output   1675 ml   Net    382 ml     Weight: 65.6 kg (144 lb 10 oz)  GI/Nutrition:  Orders Placed This Encounter   Procedures   • DIET ORDER     Standing Status: Standing      Number of Occurrences: 1      Standing Expiration Date:      Order Specific Question:  Diet:     Answer:  Regular [1]     Order Specific Question:  Texture/Fiber modifications:     Answer:  Dysphagia 2(Pureed/Chopped)specify fluid consistency(question 6) [2]      Comments:  pt with absolutely no teeth     Order Specific Question:  Consistency/Fluid modifications:     Answer:  Thin Liquids [3]      Medical Decision Making, by Problem:  Active Hospital Problems    Diagnosis   • *Fever and neutropenia (CMS-HCC) [D70.9, R50.81]   • Neutropenia (CMS-HCC) [D70.9]   • 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]   • Diarrhea [R19.7]   • Acute bilateral low back pain without sciatica [M54.5]   • Seizure disorder (CMS-HCC) [G40.909]   • Hypertension [I10]   • BPH (benign prostatic hyperplasia) [N40.0]   • Low vitamin D level [E55.9]   • Transaminitis [R74.0]       Plan:  Advanced CML:  reponded well to Omacetaxine. Last dose will be given on 6/7 (today). Given he got a good response with <10% blasts  CT scan showed bilateral pulm nodules likely related to septic emboli, Per ID continue on ANCEF for a total of 6 weeks,  Will recommend MR of T spine with contrast for further evaluation of lytic lesioin. Discussed case with IR.  Guarded prognosis  Transfuse  PRBC if hemoglobin <8 g/dl or Platelets if <10 or bleeding.  Voriconazole added for prophylaxis for prolonged neutropenia as he may receive BMT.      Labs reviewed

## 2017-06-08 NOTE — CONSULTS
DATE OF SERVICE:  06/08/2017    REFERRING PHYSICIAN:  Dr. Wayne Person.    REASON FOR CONSULTATION:  Abnormal CT scan.    HISTORY OF PRESENT ILLNESS:  The patient is a 71-year-old  male who is   from [place] and has CML.  He has had multiple admissions in the recent past.    He initially came in to the emergency room with fevers, generalized malaise,   fatigue, and total body pain.  He also in the past had to have all his teeth   removed because of the periodontal disease on 03/27/2017 in anticipation of   bone marrow biopsy.  During this admission here, he had fevers up to 102 on   05/25/2017.  His cultures were drawn.  Those blood cultures have come back   positive for Staph aureus.  His line was removed, which possibly was a   peripheral line, and his repeat blood cultures were negative on 06/05/2017.    CT scan of his chest, abdomen and pelvis were done, which showed multiple new   ill-defined pulmonary nodules as well as some gallbladder thickening.  In view   of this abnormal CT scan, infectious disease consult has been called.    REVIEW OF SYSTEMS:  The patient has had no fevers.  Complains of some back   pain.  No cough.  No shortness of breath.  No nausea, vomiting, diarrhea.    Other review of systems is negative per AMA and CMS criteria.    MEDICATIONS:  He is on Zovirax, Coreg, Ancef, Proscar, Keppra, Lidoderm,   Mycostatin, Flomax, and vitamin D.    ALLERGIES:  No known drug allergies.    PAST MEDICAL HISTORY:  History of CML, hypertension, GERD.    PAST SURGICAL HISTORY:  Dental extraction, submandibular abscess I and D.    SOCIAL HISTORY:  Does not smoke, does not drink.  No drug use.    FAMILY HISTORY:  Reviewed and noncontributory.    LABORATORY DATA:  His WBC count is less than 0.1, platelets of 11.  Creatinine   is 0.6.    PHYSICAL EXAMINATION:  GENERAL:  The patient looks chronically ill, thin male.  VITAL SIGNS:  T-max is 99.9, blood pressure is 111/63, pulse is 88.  HEAD AND ENT:   Edentulous.  Mucosa is moist.  No oral thrush.  NECK:  Supple.  No lymphadenopathy.  PULMONARY:  Bilateral air entry, clear to auscultation.  CARDIOVASCULAR:  Regular.  No murmurs, no gallops heard.  ABDOMEN:  Obese, distended, soft, nontender.  EXTREMITIES:  No edema.  CENTRAL NERVOUS SYSTEM:  Alert and oriented x3.  No gross focal neurological   deficit.    ASSESSMENT:  1.  Abnormal CT scan, possibly septic emboli.  2.  Recent Staphylococcus aureus bacteremia.  3.  Chronic myelogenous leukemia for bone marrow transplant.    RECOMMENDATIONS:  At this time, I would recommend to check an echocardiogram.    The patient needs at least 6 weeks of intravenous antibiotics before he can   be considered a candidate for bone marrow transplant.  Continue with the   supportive measures.  I have reviewed all the records.  Plan was discussed   with Dr. Person.    Thank you for the consultation.       ____________________________________     ALFONZO RAZA MD JD / JOSÉ ANTONIO    DD:  06/08/2017 08:40:27  DT:  06/08/2017 08:57:49    D#:  [id number]  Job#:  [id number]

## 2017-06-08 NOTE — PROGRESS NOTES
Pt and family updated on CT orders as per attending team. Consents signed and in pt chart. 20G PIV now present. Per CT dept, scan to occur around 21:00

## 2017-06-08 NOTE — PROGRESS NOTES
Assumed pt care at change of shift. Labs and orders noted. CT abdomen-pelvis results noted and MD updated- no new orders yet. Pt denies pain, N/V, N/T. All needs met at this time. Call light and belongings within reach. Neutropenic and chemotherapy precautions in place.     7:45 Per MD Person ID consult pending

## 2017-06-09 ENCOUNTER — APPOINTMENT (OUTPATIENT)
Dept: RADIOLOGY | Facility: MEDICAL CENTER | Age: 72
DRG: 808 | End: 2017-06-09
Attending: GENERAL ACUTE CARE HOSPITAL
Payer: MEDICARE

## 2017-06-09 PROBLEM — E53.8 FOLIC ACID DEFICIENCY: Status: ACTIVE | Noted: 2017-06-09

## 2017-06-09 PROBLEM — D64.9 ANEMIA REQUIRING TRANSFUSIONS: Status: ACTIVE | Noted: 2017-06-09

## 2017-06-09 PROBLEM — E43 PROTEIN-CALORIE MALNUTRITION, SEVERE (HCC): Status: ACTIVE | Noted: 2017-06-09

## 2017-06-09 LAB
ABO GROUP BLD: NORMAL
ALBUMIN SERPL BCP-MCNC: 2.9 G/DL (ref 3.2–4.9)
ALBUMIN/GLOB SERPL: 0.9 G/DL
ALP SERPL-CCNC: 244 U/L (ref 30–99)
ALT SERPL-CCNC: 32 U/L (ref 2–50)
ANION GAP SERPL CALC-SCNC: 6 MMOL/L (ref 0–11.9)
APTT PPP: 44.2 SEC (ref 24.7–36)
AST SERPL-CCNC: 44 U/L (ref 12–45)
BARCODED ABORH UBTYP: 5100
BARCODED ABORH UBTYP: 5100
BARCODED PRD CODE UBPRD: NORMAL
BARCODED PRD CODE UBPRD: NORMAL
BARCODED UNIT NUM UBUNT: NORMAL
BARCODED UNIT NUM UBUNT: NORMAL
BILIRUB SERPL-MCNC: 0.7 MG/DL (ref 0.1–1.5)
BLD GP AB SCN SERPL QL: NORMAL
BUN SERPL-MCNC: 10 MG/DL (ref 8–22)
CALCIUM SERPL-MCNC: 8.5 MG/DL (ref 8.5–10.5)
CHLORIDE SERPL-SCNC: 102 MMOL/L (ref 96–112)
CO2 SERPL-SCNC: 22 MMOL/L (ref 20–33)
COMPONENT R 8504R: NORMAL
COMPONENT R 8504R: NORMAL
CREAT SERPL-MCNC: 0.61 MG/DL (ref 0.5–1.4)
CRP SERPL HS-MCNC: 6.73 MG/DL (ref 0–0.75)
ERYTHROCYTE [DISTWIDTH] IN BLOOD BY AUTOMATED COUNT: 34.6 FL (ref 35.9–50)
ERYTHROCYTE [SEDIMENTATION RATE] IN BLOOD BY WESTERGREN METHOD: 105 MM/HOUR (ref 0–20)
FERRITIN SERPL-MCNC: 7461.8 NG/ML (ref 22–322)
FOLATE SERPL-MCNC: 3.1 NG/ML
GFR SERPL CREATININE-BSD FRML MDRD: >60 ML/MIN/1.73 M 2
GLOBULIN SER CALC-MCNC: 3.3 G/DL (ref 1.9–3.5)
GLUCOSE SERPL-MCNC: 101 MG/DL (ref 65–99)
HCT VFR BLD AUTO: 18.6 % (ref 42–52)
HGB BLD-MCNC: 6.6 G/DL (ref 14–18)
HGB RETIC QN AUTO: 32.9 PG/CELL (ref 29–35)
IMM RETICS NFR: 0 % (ref 9.3–17.4)
INR PPP: 1.46 (ref 0.87–1.13)
IRON SATN MFR SERPL: 84 % (ref 15–55)
IRON SERPL-MCNC: 144 UG/DL (ref 50–180)
LDH SERPL-CCNC: 116 U/L (ref 107–266)
LV EJECT FRACT  99904: 70
LV EJECT FRACT MOD 2C 99903: 75
LV EJECT FRACT MOD 4C 99902: 76
LV EJECT FRACT MOD BP 99901: 75.08
MAGNESIUM SERPL-MCNC: 1.9 MG/DL (ref 1.5–2.5)
MCH RBC QN AUTO: 27.7 PG (ref 27–33)
MCHC RBC AUTO-ENTMCNC: 35.5 G/DL (ref 33.7–35.3)
MCV RBC AUTO: 78.2 FL (ref 81.4–97.8)
NEUTROPHILS # BLD AUTO: ABNORMAL K/UL (ref 1.82–7.42)
NRBC # BLD AUTO: 0 K/UL
NRBC BLD AUTO-RTO: 0 /100 WBC
PLATELET # BLD AUTO: 6 K/UL (ref 164–446)
PMV BLD AUTO: 13.1 FL (ref 9–12.9)
POTASSIUM SERPL-SCNC: 4.1 MMOL/L (ref 3.6–5.5)
PREALB SERPL-MCNC: 9 MG/DL (ref 18–38)
PRODUCT TYPE UPROD: NORMAL
PRODUCT TYPE UPROD: NORMAL
PROT SERPL-MCNC: 6.2 G/DL (ref 6–8.2)
PROTHROMBIN TIME: 18.2 SEC (ref 12–14.6)
RBC # BLD AUTO: 2.38 M/UL (ref 4.7–6.1)
RETICS # AUTO: 0 M/UL (ref 0.04–0.06)
RETICS/RBC NFR: 0.2 % (ref 0.8–2.1)
RH BLD: NORMAL
SODIUM SERPL-SCNC: 130 MMOL/L (ref 135–145)
TIBC SERPL-MCNC: 172 UG/DL (ref 250–450)
TSH SERPL DL<=0.005 MIU/L-ACNC: 3.57 UIU/ML (ref 0.3–3.7)
UNIT STATUS USTAT: NORMAL
UNIT STATUS USTAT: NORMAL
VIT B12 SERPL-MCNC: 290 PG/ML (ref 211–911)
WBC # BLD AUTO: 0.1 K/UL (ref 4.8–10.8)

## 2017-06-09 PROCEDURE — A9579 GAD-BASE MR CONTRAST NOS,1ML: HCPCS | Performed by: INTERNAL MEDICINE

## 2017-06-09 PROCEDURE — A9270 NON-COVERED ITEM OR SERVICE: HCPCS | Performed by: INTERNAL MEDICINE

## 2017-06-09 PROCEDURE — 86900 BLOOD TYPING SEROLOGIC ABO: CPT

## 2017-06-09 PROCEDURE — 700102 HCHG RX REV CODE 250 W/ 637 OVERRIDE(OP): Performed by: INTERNAL MEDICINE

## 2017-06-09 PROCEDURE — 700111 HCHG RX REV CODE 636 W/ 250 OVERRIDE (IP): Performed by: STUDENT IN AN ORGANIZED HEALTH CARE EDUCATION/TRAINING PROGRAM

## 2017-06-09 PROCEDURE — 87305 ASPERGILLUS AG IA: CPT

## 2017-06-09 PROCEDURE — 86923 COMPATIBILITY TEST ELECTRIC: CPT

## 2017-06-09 PROCEDURE — 700102 HCHG RX REV CODE 250 W/ 637 OVERRIDE(OP): Performed by: GENERAL ACUTE CARE HOSPITAL

## 2017-06-09 PROCEDURE — 70553 MRI BRAIN STEM W/O & W/DYE: CPT

## 2017-06-09 PROCEDURE — A9270 NON-COVERED ITEM OR SERVICE: HCPCS | Performed by: STUDENT IN AN ORGANIZED HEALTH CARE EDUCATION/TRAINING PROGRAM

## 2017-06-09 PROCEDURE — 36430 TRANSFUSION BLD/BLD COMPNT: CPT

## 2017-06-09 PROCEDURE — 99233 SBSQ HOSP IP/OBS HIGH 50: CPT | Mod: GC | Performed by: INTERNAL MEDICINE

## 2017-06-09 PROCEDURE — A9270 NON-COVERED ITEM OR SERVICE: HCPCS | Performed by: GENERAL ACUTE CARE HOSPITAL

## 2017-06-09 PROCEDURE — 36415 COLL VENOUS BLD VENIPUNCTURE: CPT

## 2017-06-09 PROCEDURE — P9034 PLATELETS, PHERESIS: HCPCS

## 2017-06-09 PROCEDURE — 700117 HCHG RX CONTRAST REV CODE 255: Performed by: INTERNAL MEDICINE

## 2017-06-09 PROCEDURE — 700102 HCHG RX REV CODE 250 W/ 637 OVERRIDE(OP): Performed by: STUDENT IN AN ORGANIZED HEALTH CARE EDUCATION/TRAINING PROGRAM

## 2017-06-09 PROCEDURE — 770009 HCHG ROOM/CARE - ONCOLOGY SEMI PRI*

## 2017-06-09 PROCEDURE — 700111 HCHG RX REV CODE 636 W/ 250 OVERRIDE (IP): Performed by: INTERNAL MEDICINE

## 2017-06-09 PROCEDURE — 86644 CMV ANTIBODY: CPT | Mod: 91

## 2017-06-09 PROCEDURE — 86901 BLOOD TYPING SEROLOGIC RH(D): CPT

## 2017-06-09 PROCEDURE — P9016 RBC LEUKOCYTES REDUCED: HCPCS

## 2017-06-09 PROCEDURE — 700101 HCHG RX REV CODE 250: Performed by: STUDENT IN AN ORGANIZED HEALTH CARE EDUCATION/TRAINING PROGRAM

## 2017-06-09 PROCEDURE — 86850 RBC ANTIBODY SCREEN: CPT

## 2017-06-09 RX ORDER — UBIDECARENONE 75 MG
100 CAPSULE ORAL DAILY
Status: DISCONTINUED | OUTPATIENT
Start: 2017-06-10 | End: 2017-06-09

## 2017-06-09 RX ORDER — CYANOCOBALAMIN 1000 UG/ML
1000 INJECTION, SOLUTION INTRAMUSCULAR; SUBCUTANEOUS
Status: DISCONTINUED | OUTPATIENT
Start: 2017-06-09 | End: 2017-06-09

## 2017-06-09 RX ORDER — CYANOCOBALAMIN 1000 UG/ML
1000 INJECTION, SOLUTION INTRAMUSCULAR; SUBCUTANEOUS
Status: DISCONTINUED | OUTPATIENT
Start: 2017-06-09 | End: 2017-06-15 | Stop reason: HOSPADM

## 2017-06-09 RX ORDER — CHOLECALCIFEROL (VITAMIN D3) 125 MCG
2000 CAPSULE ORAL DAILY
Status: DISCONTINUED | OUTPATIENT
Start: 2017-06-09 | End: 2017-06-09

## 2017-06-09 RX ORDER — FOLIC ACID 1 MG/1
1 TABLET ORAL DAILY
Status: DISCONTINUED | OUTPATIENT
Start: 2017-06-09 | End: 2017-06-15 | Stop reason: HOSPADM

## 2017-06-09 RX ADMIN — ACYCLOVIR 400 MG: 800 TABLET ORAL at 08:26

## 2017-06-09 RX ADMIN — FOLIC ACID 1 MG: 1 TABLET ORAL at 08:25

## 2017-06-09 RX ADMIN — CARVEDILOL 12.5 MG: 12.5 TABLET, FILM COATED ORAL at 16:56

## 2017-06-09 RX ADMIN — VORICONAZOLE 200 MG: 200 TABLET, FILM COATED ORAL at 08:25

## 2017-06-09 RX ADMIN — VORICONAZOLE 200 MG: 200 TABLET, FILM COATED ORAL at 21:02

## 2017-06-09 RX ADMIN — LIDOCAINE 1 PATCH: 50 PATCH CUTANEOUS at 08:31

## 2017-06-09 RX ADMIN — ACETAMINOPHEN 650 MG: 325 TABLET, FILM COATED ORAL at 01:08

## 2017-06-09 RX ADMIN — GADODIAMIDE 15 ML: 287 INJECTION INTRAVENOUS at 14:50

## 2017-06-09 RX ADMIN — CEFAZOLIN SODIUM 2 G: 2 INJECTION, SOLUTION INTRAVENOUS at 06:35

## 2017-06-09 RX ADMIN — NYSTATIN 500000 UNITS: 500000 SUSPENSION ORAL at 16:56

## 2017-06-09 RX ADMIN — FINASTERIDE 5 MG: 5 TABLET, FILM COATED ORAL at 08:25

## 2017-06-09 RX ADMIN — NYSTATIN 500000 UNITS: 500000 SUSPENSION ORAL at 08:25

## 2017-06-09 RX ADMIN — CARVEDILOL 12.5 MG: 12.5 TABLET, FILM COATED ORAL at 08:25

## 2017-06-09 RX ADMIN — ACETAMINOPHEN 650 MG: 325 TABLET, FILM COATED ORAL at 23:59

## 2017-06-09 RX ADMIN — CEFAZOLIN SODIUM 2 G: 2 INJECTION, SOLUTION INTRAVENOUS at 20:58

## 2017-06-09 RX ADMIN — DIPHENHYDRAMINE HCL 25 MG: 25 TABLET ORAL at 01:08

## 2017-06-09 RX ADMIN — TAMSULOSIN HYDROCHLORIDE 0.8 MG: 0.4 CAPSULE ORAL at 08:25

## 2017-06-09 RX ADMIN — NYSTATIN 500000 UNITS: 500000 SUSPENSION ORAL at 13:18

## 2017-06-09 RX ADMIN — CYANOCOBALAMIN TAB 500 MCG 2000 MCG: 500 TAB at 08:25

## 2017-06-09 RX ADMIN — LEVETIRACETAM 750 MG: 250 TABLET, FILM COATED ORAL at 10:01

## 2017-06-09 RX ADMIN — ACYCLOVIR 400 MG: 800 TABLET ORAL at 20:59

## 2017-06-09 RX ADMIN — NYSTATIN 500000 UNITS: 500000 SUSPENSION ORAL at 20:59

## 2017-06-09 RX ADMIN — CEFAZOLIN SODIUM 2 G: 2 INJECTION, SOLUTION INTRAVENOUS at 13:08

## 2017-06-09 RX ADMIN — LEVETIRACETAM 750 MG: 250 TABLET, FILM COATED ORAL at 21:43

## 2017-06-09 RX ADMIN — ONDANSETRON 4 MG: 2 INJECTION INTRAMUSCULAR; INTRAVENOUS at 23:47

## 2017-06-09 ASSESSMENT — ENCOUNTER SYMPTOMS
FEVER: 0
NAUSEA: 0
CHILLS: 0
DEPRESSION: 0
PALPITATIONS: 0
NEUROLOGICAL NEGATIVE: 1
ABDOMINAL PAIN: 0
CARDIOVASCULAR NEGATIVE: 1
HEMOPTYSIS: 0
HEADACHES: 0
MYALGIAS: 0
CONSTITUTIONAL NEGATIVE: 1
RESPIRATORY NEGATIVE: 1
DIZZINESS: 0
SHORTNESS OF BREATH: 0
CHILLS: 1
COUGH: 0
GASTROINTESTINAL NEGATIVE: 1
SPEECH CHANGE: 0
DIARRHEA: 0
VOMITING: 0
NECK PAIN: 0
MUSCULOSKELETAL NEGATIVE: 1
HEARTBURN: 0
SENSORY CHANGE: 0
BRUISES/BLEEDS EASILY: 0

## 2017-06-09 ASSESSMENT — PAIN SCALES - GENERAL
PAINLEVEL_OUTOF10: 0

## 2017-06-09 NOTE — PROGRESS NOTES
Oncology/Hematology Progress Note               Author:  Wayne Wilsoncherry Date & Time created: 6/9/2017  11:55 AM     Interval History:  CC: Accelerated/Blast crisis CML, started second 14 day course of Omacetaxine for 1 more week finishing on 6/7  New bone marrow with a total of <10% blast. Good response to Omacetaxine  No events overnight  Now CT chest showing bilateral pulm nodules likely related to septic emboli to  MSSA bacteremia in the past. Also lytic lesions on T5 and 5ft rib. MRI spine with heterogenoes diffuse involvement due to CML    Review of Systems:  Review of Systems   Constitutional: Negative.  Negative for fever and chills.   HENT: Negative.    Respiratory: Negative.    Cardiovascular: Negative.    Gastrointestinal: Negative.  Negative for abdominal pain.   Genitourinary: Negative.    Musculoskeletal: Negative.    Skin: Negative for rash.   Neurological: Negative.    Endo/Heme/Allergies: Negative.  Does not bruise/bleed easily.       Physical Exam:  Physical Exam   Constitutional: He is oriented to person, place, and time. He appears well-developed and well-nourished. No distress.   HENT:   Head: Normocephalic and atraumatic.   Mouth/Throat: No oropharyngeal exudate.   Eyes: Pupils are equal, round, and reactive to light. No scleral icterus.   Neck: Normal range of motion.   Cardiovascular: Normal rate and normal heart sounds.    Pulmonary/Chest: Effort normal. No respiratory distress. He has no wheezes. He has no rales.   Abdominal: Soft. Bowel sounds are normal.   Lymphadenopathy:     He has no cervical adenopathy.   Neurological: He is alert and oriented to person, place, and time.   Skin: Skin is warm.       Labs:        Invalid input(s): [id number]      Recent Labs      06/06/17   2359  06/08/17   0011  06/08/17   2352   SODIUM  132*  128*  130*   POTASSIUM  4.2  4.2  4.1   CHLORIDE  104  101  102   CO2  24  22  22   BUN  17  11  10   CREATININE  0.70  0.60  0.61   MAGNESIUM  1.8   --   1.9    PHOSPHORUS  3.2   --    --    CALCIUM  8.2*  8.4*  8.5     Recent Labs      17   ALTSGPT  29  31  32   ASTSGOT  37  41  44   ALKPHOSPHAT  251*  252*  244*   TBILIRUBIN  0.5  0.6  0.7   PREALBUMIN   --    --   9.0*   GLUCOSE  106*  112*  101*     Recent Labs      17   RBC  2.77*  2.78*  2.38*   HEMOGLOBIN  7.8*  7.7*  6.6*   HEMATOCRIT  21.9*  21.6*  18.6*   PLATELETCT  18*  11*  6*   PROTHROMBTM   --    --   18.2*   APTT   --    --   44.2*   INR   --    --   1.46*   IRON   --    --   144   FERRITIN   --    --   7461.8*   TOTIRONBC   --    --   172*     Recent Labs      17   WBC  0.1*  <0.1*  0.1*   ASTSGOT  37  41  44   ALTSGPT  29  31  32   ALKPHOSPHAT  251*  252*  244*   TBILIRUBIN  0.5  0.6  0.7     Recent Labs      17   SODIUM  132*  128*  130*   POTASSIUM  4.2  4.2  4.1   CHLORIDE  104  101  102   CO2  24  22  22   GLUCOSE  106*  112*  101*   BUN  17  11  10   CREATININE  0.70  0.60  0.61   CALCIUM  8.2*  8.4*  8.5     Hemodynamics:  Temp (24hrs), Av.7 °C (98.1 °F), Min:36.4 °C (97.6 °F), Max:37.6 °C (99.6 °F)  Temperature: 36.5 °C (97.7 °F)  Pulse  Av.9  Min: 58  Max: 118   Blood Pressure : 147/79 mmHg     Respiratory:    Respiration: 16, Pulse Oximetry: 100 %     Work Of Breathing / Effort: Mild  RUL Breath Sounds: Diminished, RML Breath Sounds: Diminished, RLL Breath Sounds: Diminished, WAGNER Breath Sounds: Diminished, LLL Breath Sounds: Diminished  Fluids:    Intake/Output Summary (Last 24 hours) at 17 1034  Last data filed at 17 1000   Gross per 24 hour   Intake   2057 ml   Output   1675 ml   Net    382 ml        GI/Nutrition:  Orders Placed This Encounter   Procedures   • DIET ORDER     Standing Status: Standing      Number of Occurrences: 1      Standing Expiration Date:      Order Specific  Question:  Diet:     Answer:  Regular [1]     Order Specific Question:  Texture/Fiber modifications:     Answer:  Dysphagia 2(Pureed/Chopped)specify fluid consistency(question 6) [2]     Order Specific Question:  Consistency/Fluid modifications:     Answer:  Thin Liquids [3]     Medical Decision Making, by Problem:  Active Hospital Problems    Diagnosis   • *Fever and neutropenia (CMS-HCC) [D70.9, R50.81]   • Neutropenia (CMS-HCC) [D70.9]   • 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]   • Diarrhea [R19.7]   • Acute bilateral low back pain without sciatica [M54.5]   • Seizure disorder (CMS-HCC) [G40.909]   • Hypertension [I10]   • BPH (benign prostatic hyperplasia) [N40.0]   • Low vitamin D level [E55.9]   • Transaminitis [R74.0]       Plan:  Advanced CML:  reponded well to Omacetaxine. Last dose will be given on 6/7 . He got a good response with <10% blasts  CT scan showed bilateral pulm nodules likely related to septic emboli, Per ID continue on ANCEF for a total of 6 weeks,  MR T spine showed diffuse heterogenous findings related to CML, no further intervention indicated  Guarded prognosis  Transfuse  PRBC if hemoglobin <8 g/dl or Platelets if <10 or bleeding.  Voriconazole added for prophylaxis for prolonged neutropenia as he may receive BMT.  Per Dr Lew he will not pospone BMT despite septic emboli, the donor is the daughter which is an [employer], No working on the donor. Awaitin ok to transfer patient.      Labs reviewed

## 2017-06-09 NOTE — PROGRESS NOTES
Received report from NOC RN, assumed care of patient at 0700. Pt. Is awake alert and oriented x 4. Patient is up with assistance of 1, unsteady gait. Bed alarm is in place and sounding. Patient denies pain this AM. Call light within reach, bed in low and locked position, room near nursing station.

## 2017-06-09 NOTE — PROGRESS NOTES
[facility] [place] Internal Medicine Interval Note    Name Benjamin Post       1945   Age/Sex 71 y.o. male   MRN [id number]   Code Status DNR     After 5PM or if no immediate response to page, please call for cross-coverage  Attending/Team: Dr. Melissa / rudolph  Call [phone] to page   1st Call - Day Intern (R1):   Dr Sherman Sears  2nd Call - Day Sr. Resident (R2/R3):   Dr. Sarbjit Kent         Chief complaint/ reason for interval visit (Primary Diagnosis)   CML/pancytopenia     Interval Problem Daily Status Update :    Subjective : No significant overnight events. Pt denies any acute complaints.       Interval events:  Hb 6.6-Received 1 unit of PRBC overnight  PLT 6-Received 1 unit of platelets overnight  ECHO no vegetations  ID recommends beta D glucan and galactomannan antigen testing. Also recommend holding off transplant.  BM biopsy cancelled   FA and B12 low-replacement started       Review of Systems   Constitutional: Negative for fever and chills.   HENT: Negative for hearing loss.    Respiratory: Negative for cough and hemoptysis.    Cardiovascular: Negative for chest pain and palpitations.   Gastrointestinal: Negative for heartburn, nausea, abdominal pain and diarrhea.   Genitourinary: Negative for dysuria and urgency.   Musculoskeletal: Negative for myalgias and neck pain.   Skin: Negative for rash.   Neurological: Negative for dizziness and headaches.   Psychiatric/Behavioral: Negative for depression and suicidal ideas.       Consultants/Specialty  Oncology   Infectious disease    Disposition  Inpatient for 6 weeks of IV ABx for possible deep seated MSSA infection    Quality Measures  EKG reviewed, Labs reviewed, Medications reviewed and Radiology images reviewed  Mahajan catheter: No Mahajan      DVT Prophylaxis: Contraindicated - High bleeding risk  DVT prophylaxis - mechanical: SCDs (low plt)            Physical Exam       Filed Vitals:     06/09/17 0400 06/09/17 0631 06/09/17 0816 06/09/17 1200   BP: 126/63 136/67 147/79 147/68   Pulse: 71 69 70 80   Temp: 36.5 °C (97.7 °F) 36.4 °C (97.6 °F) 36.5 °C (97.7 °F) 36.9 °C (98.4 °F)   Resp: 16 16 16 18   Height:       Weight:       SpO2: 99% 98% 100% 99%     Body mass index is 22.65 kg/(m^2).    Oxygen Therapy:  Pulse Oximetry: 99 %, O2 (LPM): 0, O2 Delivery: None (Room Air)    Physical Exam   Constitutional: He is well-developed, well-nourished, and in no distress. No distress.   HENT:   Head: Normocephalic.   Neck: No tracheal deviation present. No thyromegaly present.   Cardiovascular: Normal rate.  Exam reveals no friction rub.    Murmur heard.  Ejection systolic murmur graded 3/6 with maximal intensity at tricuspid area, no thrill or radiation.    Pulmonary/Chest: Effort normal. No respiratory distress. He has no wheezes.   Abdominal: Soft. He exhibits no distension. There is no tenderness.   Musculoskeletal: He exhibits no edema.   Neurological: He is alert.   Skin: No erythema.         Lab Data Review:      5/4/2017  1:28 PM    Recent Labs      06/06/17 2359 06/08/17 0011 06/08/17 2352   SODIUM  132*  128*  130*   POTASSIUM  4.2  4.2  4.1   CHLORIDE  104  101  102   CO2  24  22  22   BUN  17  11  10   CREATININE  0.70  0.60  0.61   MAGNESIUM  1.8   --   1.9   PHOSPHORUS  3.2   --    --    CALCIUM  8.2*  8.4*  8.5       Recent Labs      06/06/17 2359 06/08/17   0011  06/08/17   2352   ALTSGPT  29  31  32   ASTSGOT  37  41  44   ALKPHOSPHAT  251*  252*  244*   TBILIRUBIN  0.5  0.6  0.7   PREALBUMIN   --    --   9.0*   GLUCOSE  106*  112*  101*       Recent Labs      06/06/17 2359 06/08/17   0011  06/08/17   2352   RBC  2.77*  2.78*  2.38*   HEMOGLOBIN  7.8*  7.7*  6.6*   HEMATOCRIT  21.9*  21.6*  18.6*   PLATELETCT  18*  11*  6*   PROTHROMBTM   --    --   18.2*   APTT   --    --   44.2*   INR   --    --   1.46*   IRON   --    --   144   FERRITIN   --    --   7461.8*   TOTIRONBC   --     --   172*       Recent Labs      06/06/17   2359  06/08/17   0011  06/08/17   2352   WBC  0.1*  <0.1*  0.1*   ASTSGOT  37  41  44   ALTSGPT  29  31  32   ALKPHOSPHAT  251*  252*  244*   TBILIRUBIN  0.5  0.6  0.7           Assessment/Plan       CML (chronic myelocytic leukemia)-Accelerated phase  - Has completed multiple chemotherapy cycles, multiple relapses.  now he is in accelerated or blastic crisis   Was treated on dasatinib for a time with response, but then relapse, and bosutinib.  Pancytopenia (chronic, before starting with chemotherapy)   No blast in peripheral blood film  Bone Marrow showed: hypocellular with blast less than 10%   - Pan CT showed: Multiple new ill-defined pulmonary nodules involving both lungs, which may be infectious/inflammatory or metastatic.  Septic emboli are a consideration.  lytic lesions in the T5 vertebral body on the LEFT and adjacent LEFT medial 5th rib, likely metastatic.  There is high possibility that these lesions are septic emboli from his previous MSSA bacteremia vs metastasis.  ECHO no vegetation  Thoracic MRI diffuse infiltration of T spine consistent with CML  ID does not recommend BM biopsy  Plan:  Continue cefazolin for 6 weeks.  Continue prophylactic acyclovir and voriconazole       Pancytopenia (CMS-HCC)  Transfused multiple platelet and PRBCs in the past.  Transfuse plt>10  Or >30 if bleeding and Hb>7.5.  Folate and B12 low-replacement started    Hyponatremia-improving  >130, serum osmolality 273 low, urine  high  Seems to be hypotonic hyponatremia, dehydration probably  Continue IV fluid and follow up    Neutropenia (CMS-HCC)  Was on ceftriaxone, switched  to cefazolin per pharmacy recommendation. Today is day 14 of ABX  Blood culture showed MSSA  Will continue IV AB for 6 weeks per ID    Transaminitis-resolved  No abdominal pain, likely effect of chemotherapy  Discontinued all hepatotoxic drugs as possible  Continue to  follow    Hypertension  controlled  discontinued amlodipine and continue carvedilol 12.5 mg BID    Seizure disorder (CMS-HCC)  Stable    Continue on his home dose of keppra  Had 1 episode last year after dental procedure.     BPH (benign prostatic hyperplasia)  Stable.   Continue on tamsulosin and finasteride    Low vitamin D level  High Alk Phos  Continue vit D2 weekly.     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

## 2017-06-09 NOTE — PROGRESS NOTES
Received report and assumed patient care at change of shift. Patient is resting in bed, A/O x4. Patient reports 0/10 pain at this time, medications provided per MAR.    PIV assessed, patent with no s/s of infection or infiltration noted at this time.    Patient to go down to MRI tonight. Will be NPO at midnight for potential procedure tomorrow.    Plan of care discussed, questions answered. Bed is in the lowest position and locked, call light within reach, non-skid socks in place, hourly rounding. Patient reports no further needs and this time.

## 2017-06-09 NOTE — PROGRESS NOTES
Infectious Disease Progress Note    Author: Larisa De La Paz M.D. 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 No new issues overnight. Denies any cough  Labs Reviewed, Medications Reviewed and Radiology Reviewed.    Review of Systems:  Review of Systems   Constitutional: Negative for fever and malaise/fatigue.   Respiratory: Negative for cough and shortness of breath.    Cardiovascular: Negative for chest pain.   Gastrointestinal: Negative for nausea, vomiting and abdominal pain.   Genitourinary: Negative for dysuria.   Musculoskeletal: Negative for myalgias.   Skin: Negative for rash.   Neurological: Negative for sensory change, speech change and headaches.       Hemodynamics:  Temp (24hrs), Av.8 °C (98.2 °F), Min:36.4 °C (97.6 °F), Max:37.6 °C (99.6 °F)  Temperature: 36.9 °C (98.4 °F)  Pulse  Av.8  Min: 58  Max: 118   Blood Pressure : 147/68 mmHg       Physical Exam:  Physical Exam   Constitutional: He is oriented to person, place, and time. No distress.   HENT:   Mouth/Throat: No oropharyngeal exudate.   Edentulous   Eyes: No scleral icterus.   Neck: Neck supple.   Cardiovascular: Regular rhythm.    No murmur heard.  Pulmonary/Chest: He has no wheezes. He has no rales.   Abdominal: Soft. There is no tenderness. There is no rebound and no guarding.   Musculoskeletal: He exhibits no edema.   Neurological: He is alert and oriented to person, place, and time. No cranial nerve deficit. Coordination normal.   Vitals reviewed.      Meds:    Current facility-administered medications:   •  folic acid (FOLVITE) tablet 1 mg, 1 mg, Oral, DAILY, Sarbjit Kent M.D., 1 mg at 17 0825  •  cyanocobalamin (VITAMIN B-12) injection 1,000 mcg, 1,000 mcg, Intramuscular, Q7  DAYS, Sarbjit Kent M.D., Stopped at 06/09/17 1100  •  voriconazole (VFEND) tablet 200 mg, 200 mg, Oral, Q12HRS, Wayne Person III, M.D., 200 mg at 06/09/17 0825  •  NS infusion, , Intravenous, Continuous, Sherman Sears M.D., Last Rate: 100 mL/hr at 06/08/17 0700  •  ceFAZolin (ANCEF) IVPB 2 g, 2 g, Intravenous, Q8HRS, Sherman Sears M.D., 2 g at 06/09/17 0635  •  loperamide (IMODIUM) capsule 2 mg, 2 mg, Oral, 4X/DAY PRN, Sherman Sears M.D., 2 mg at 06/07/17 0853  •  acetaminophen (TYLENOL) tablet 650 mg, 650 mg, Oral, Q6HRS PRN, Sherman Sears M.D., 650 mg at 06/09/17 0108  •  acetaminophen (TYLENOL) tablet 650 mg, 650 mg, Oral, Q4HRS PRN, Sherman Sears M.D., 650 mg at 05/27/17 1711  •  carvedilol (COREG) tablet 12.5 mg, 12.5 mg, Oral, BID WITH MEALS, Sherman Sears M.D., 12.5 mg at 06/09/17 0825  •  tamsulosin (FLOMAX) capsule 0.8 mg, 0.8 mg, Oral, QAM, Sherman Sears M.D., 0.8 mg at 06/09/17 0825  •  finasteride (PROSCAR) tablet 5 mg, 5 mg, Oral, DAILY, Sherman Sears M.D., 5 mg at 06/09/17 0825  •  hydrALAZINE (APRESOLINE) injection 10 mg, 10 mg, Intravenous, Q6HRS PRN, Sherman Sears M.D., 10 mg at 05/25/17 0932  •  diphenoxylate-atropine (LOMOTIL) 2.5-0.025 MG per tablet 1 Tab, 1 Tab, Oral, 4X/DAY PRN, Sherman Sears M.D., 1 Tab at 05/24/17 1023  •  hydrocodone-acetaminophen (NORCO) 5-325 MG per tablet 1-2 Tab, 1-2 Tab, Oral, Q6HRS PRN, Sherman Sears M.D., 2 Tab at 05/21/17 1630  •  acyclovir (ZOVIRAX) tablet 400 mg, 400 mg, Oral, BID, Sherman Sears M.D., 400 mg at 06/09/17 0826  •  nystatin (MYCOSTATIN) [id number] UNIT/ML suspension 500,000 Units, 5 mL, Oral, 4X/DAY, Sherman Sears M.D., 500,000 Units at 06/09/17 0825  •  diphenhydrAMINE (BENADRYL) tablet/capsule 25 mg, 25 mg, Oral, PRN, Sherman Sears M.D., 25 mg at 06/09/17 0108  •  pneumococcal 13-Elizabeth Conj Vacc (PREVNAR 13) syringe 0.5 mL, 0.5 mL, Intramuscular, Once PRN, Gerry Soto M.D.  •  Respiratory Care per  Protocol, , Nebulization, Continuous RT, Rc Boss D.O.  •  ondansetron (ZOFRAN) syringe/vial injection 4 mg, 4 mg, Intravenous, Q4HRS PRN, JAQUELINE Turcios.ORupert, 4 mg at 05/16/17 1112  •  ondansetron (ZOFRAN ODT) dispertab 4 mg, 4 mg, Oral, Q4HRS PRN, AVA TurciosO., 4 mg at 05/01/17 1955  •  levetiracetam (KEPPRA) tablet 750 mg, 750 mg, Oral, BID, JAQUELINE Turcios.O., 750 mg at 06/09/17 1001  •  oxycodone immediate-release (ROXICODONE) tablet 5 mg, 5 mg, Oral, Q4HRS PRN, JAQUELINE Turcios.O., 5 mg at 05/21/17 1225  •  lidocaine (LIDODERM) 5 % 1 Patch, 1 Patch, Transdermal, Q24HR, JAQUELINE Turcios.O., 1 Patch at 06/09/17 0831  •  vitamin D (Ergocalciferol) (DRISDOL) capsule 50,000 Units, 50,000 Units, Oral, Q7 DAYS, Gerry Soto M.D., 50,000 Units at 06/03/17 0603    Labs:  Recent Labs      06/06/17 2359 06/08/17 0011 06/08/17 2352   WBC  0.1*  <0.1*  0.1*   RBC  2.77*  2.78*  2.38*   HEMOGLOBIN  7.8*  7.7*  6.6*   HEMATOCRIT  21.9*  21.6*  18.6*   MCV  79.1*  77.7*  78.2*   MCH  28.2  27.7  27.7   RDW  35.5*  34.2*  34.6*   PLATELETCT  18*  11*  6*   MPV  10.9  11.5  13.1*     Recent Labs      06/06/17 2359 06/08/17 0011 06/08/17 2352   SODIUM  132*  128*  130*   POTASSIUM  4.2  4.2  4.1   CHLORIDE  104  101  102   CO2  24  22  22   GLUCOSE  106*  112*  101*   BUN  17  11  10     Recent Labs      06/06/17 2359 06/08/17 0011 06/08/17   2352   ALBUMIN  2.8*  2.8*  2.9*   TBILIRUBIN  0.5  0.6  0.7   ALKPHOSPHAT  251*  252*  244*   TOTPROTEIN  5.9*  6.0  6.2   ALTSGPT  29  31  32   ASTSGOT  37  41  44   CREATININE  0.70  0.60  0.61       Imaging:  Ct-chest,abdomen,pelvis With    6/8/2017  Lytic lesions in the T5 vertebral body and medial LEFT 5th rib appear to have been present in April 2017. These findings were discussed with Dr. Person on 6/8/2017 7:33 AM.    6/8/2017 6/7/2017 9:13 PM HISTORY/REASON FOR EXAM:  patient with CML on chemotherapy, waiting BM transplantation, needs to  exclude infection before the procedure TECHNIQUE/EXAM DESCRIPTION: CT scan of the chest, abdomen and pelvis with contrast. Thin-section helical scanning was obtained with intravenous contrast from the lung apices through the pubic symphysis to include the chest, abdomen and pelvis. 100 mL of Omnipaque 350 nonionic contrast was administered intravenously without complication. Low dose optimization technique was utilized for this CT exam including automated exposure control and adjustment of the mA and/or kV according to patient size. COMPARISON: CT chest 4/29/2017 FINDINGS: CT Chest: Thoracic aorta is unremarkable. No mediastinal mass or adenopathy. Minimal bilateral pleural fluid. Ill-defined nodular densities are present in both lungs, measuring up to 8 mm.  These are new from prior exam. No pneumothorax. Lytic lesion within the medial LEFT 5th rib and adjacent T5 vertebral body. CT Abdomen: The liver is unremarkable. The spleen is unremarkable. Adrenal glands are unremarkable. LEFT kidney shows tiny cortical cyst. RIGHT kidney shows cortical cyst measuring up to 2.4 cm.  Nonobstructing stones present measuring up to 4 mm. The pancreas is unremarkable. Gallbladder shows diffuse wall thickening. CT Pelvis: Prostate is markedly enlarged and heterogeneous. Bladder is unremarkable. Small amount of pelvic fluid dependently. Colonic diverticula present. Increased colonic stool. No pneumoperitoneum. Mild atherosclerotic change of the abdominal aorta. Multilevel degenerative change of lumbar spine     6/8/2017  1.  Multiple new ill-defined pulmonary nodules involving both lungs, which may be infectious/inflammatory or metastatic.  Septic emboli are a consideration. 2.  Minimal bilateral pleural effusions with associated atelectasis. 3.  Apparent lytic lesions in the T5 vertebral body on the LEFT and adjacent LEFT medial 5th rib, likely metastatic. 4.  Gallbladder wall thickening, concerning for inflammation. 5.  Small  amount of peritoneal fluid, of uncertain etiology.  Abnormal for male patient, raising concern for intra-abdominal inflammatory process. 6.  Markedly enlarged heterogeneous prostate.    Ct-head With    6/7/2017 6/7/2017 9:13 PM HISTORY/REASON FOR EXAM:  patient with CML on chemotherapy, waiting bone marrow transplantation, needs to exclude infection before the procedure TECHNIQUE/EXAM DESCRIPTION AND NUMBER OF VIEWS: CT scan of the head with contrast. The study was performed on a helical multidetector CT scanner. Contiguous 2.5 mm axial sections were obtained from the skull base through the vertex. 100 mL of Omnipaque 350 nonionic contrast was injected intravenously. Up to date radiation dose reduction adjustments have been utilized to meet ALARA standards for radiation dose reduction. COMPARISON:  3/30/2017 FINDINGS: Lateral ventricles are moderately enlarged. Cortical sulci are moderately enlarged. No significant mass effect or midline shift. The basal cisterns are patent. No gross intracranial hemorrhage. Dural thickening throughout the RIGHT hemisphere, most apparent in the RIGHT parietal region where it measures 3 mm in thickness. No mass or abnormal enhancement within the brain. Dural venous sinuses enhance normally. The visualized orbits are unremarkable. The visualized mastoids are unremarkable. The visualized paranasal sinuses are clear. Calcifications of the carotid arteries noted.     6/7/2017  1.  Diffuse RIGHT hemispheric dural thickening, as seen previously.  Possibly sequela of prior subdural hematoma, however tumor and infection are also considerations. 2.  No focal cerebritis or intra-axial mass. 3.  Diffuse atrophy. 4.  No gross intracranial hemorrhage however presence of intravenous contrast may obscure small amounts of subarachnoid hemorrhage.    Dx-chest-portable (1 View)    5/25/2017 5/25/2017 5:52 PM HISTORY/REASON FOR EXAM:  Fever. Cough TECHNIQUE/EXAM DESCRIPTION AND NUMBER OF VIEWS:  Single portable view of the chest. COMPARISON: May 7, 2017 FINDINGS: The mediastinum and cardiac slight unremarkable. There is new right basilar consolidation. There is left basilar atelectasis. No effusion. No pneumothorax.     5/25/2017  New right basilar consolidation suggestive of pneumonia.    Ir-us Guided Piv    5/13/2017  EXAMINATION:                                                                    HISTORY/REASON FOR EXAM:  Ultrasound Guided PIV.  TECHNIQUE/EXAM DESCRIPTION AND NUMBER OF VIEWS:  Peripheral IV insertion with ultrasound guidance.  The procedure was prepared using maximal sterile barrier technique including sterile gown, mask, cap, and donning of sterile gloves following appropriate hand hygiene and/or sterile scrub. Patient skin site was prepped with 2% Chlorhexidine solution.   FINDINGS: Peripheral IV insertion with Ultrasound Guidance was performed by qualified imaging nursing staff without the assistance of a Radiologist.     5/13/2017   Ultrasound-guided PERIPHERAL IV INSERTION performed by qualified nursing staff as above.     Mr-thoracic Spine-with & W/o    6/9/2017 6/8/2017 10:01 PM HISTORY/REASON FOR EXAM:  Chronic myeloid leukemia TECHNIQUE/EXAM DESCRIPTION: MRI of the thoracic spine without and with contrast. The study was performed on a yavalu Signa 1.5 Jill MRI scanner. T1 sagittal, T2 fast spin-echo sagittal, and T2 axial images were obtained of the thoracic spine. T1 post-contrast fat suppressed sagittal images were obtained. Optional T1 post-contrast axial images may be obtained. 15 mL Omniscan contrast was administered intravenously. COMPARISON:  None. FINDINGS: There is diffuse inhomogeneous marrow infiltration seen throughout the visualized thoracic spine consistent with chronic myelomatous infiltration. There is no disc herniation. There is no intradural extramedullary mass. There is no abnormal intramedullary T2 signal intensity. Prominent ligamentum flavum is noted at the  level of T9-10. There are nodules in the lung. Diffusion is noted along the right major fissure.     2017  1.  Diffuse inhomogeneous infiltration of visualized thoracic spine consistent with chronic myeloid leukemia. There is no epidural tumor or spinal canal compromise. 2.  Small multiple lung nodules. This was noted on the previous CT scan dated 2017. 3.  Fluid along the right lung major fissure.This was noted on the previous CT scan dated 2017.    Echocardiogram Comp W/o Cont    2017  Transthoracic Echo Report Echocardiography Laboratory CONCLUSIONS Normal left ventricular systolic function. Left ventricular ejection fraction is visually estimated to be 70%. Grade I diastolic dysfunction. Normal right ventricular systolic function. Mild mitral regurgitation. Mild aortic stenosis. Transvalvular gradients are - Peak: 26 mmHg, Mean:  15mmHg. Moderate aortic insufficiency. Moderate tricuspid regurgitation. Right ventricular systolic pressure is estimated to be 50 mmHg. Compared to the images of the study done 3/20/2015 - there has been no significant change. MUKUND HAINES Exam Date:         2017                    16:28 Exam Location:     Inpatient Priority:          Routine Ordering Physician:        ALFONZO RAZA Referring Physician:       RY Salgado Sonographer:               Alda Zamora RDCS, RVT Age:    71     Gender:    M MRN:    [id number] :    1945 BSA:    1.76   Ht (in):    67     Wt (lb):    144 Exam Type:     Complete Indications:     Endocarditis ICD Codes:       421 CPT Codes:       83330 BP:   105    /   69     HR: Technical Quality:       Fair MEASUREMENTS  (Male / Female) Normal Values 2D ECHO LV Diastolic Diameter PLAX        4 cm                  4.2 - 5.9 / 3.9 - 5.3 cm LV Systolic Diameter PLAX         2.7 cm                2.1 - 4.0 cm IVS Diastolic Thickness           1.2 cm                LVPW Diastolic Thickness           1.2 cm                LVOT Diameter                     1.9 cm                Estimated LV Ejection Fraction    70 %                  LV Ejection Fraction MOD BP       75.1 %                >= 55  % LV Ejection Fraction MOD 4C       76 %                  LV Ejection Fraction MOD 2C       75 %                  IVC Diameter                      1.5 cm                M-MODE Aortic Root Diameter MM           3.4 cm                DOPPLER AV Peak Velocity                  2.5 m/s               AV Peak Gradient                  25.3 mmHg             AV Mean Gradient                  14.4 mmHg             AI Pressure Half Time             334 ms                LVOT Peak Velocity                1.4 m/s               AV Area Cont Eq vti               1.6 cm²               MV Velocity Time Integral         40.3 cm               Mitral E Point Velocity           1.3 m/s               Mitral E to A Ratio               0.8                   MV Pressure Half Time             73.7 ms               MV Area PHT                       3 cm²                 MV Deceleration Time              254 ms                TR Peak Velocity                  330 cm/s              PV Peak Velocity                  1.4 m/s               PV Peak Gradient                  7.8 mmHg              RVOT Peak Velocity                0.86 m/s              * Indicates values subject to auto-interpretation LV EF:  70    % FINDINGS Left Ventricle Normal left ventricular chamber size. Mild concentric left ventricular hypertrophy. Normal left ventricular systolic function. Left ventricular ejection fraction is visually estimated to be 70%. Normal regional wall motion. Grade I diastolic dysfunction. Right Ventricle Moderately dilated right ventricle. Normal right ventricular systolic function. Right Atrium Enlarged right atrium. Left Atrium Moderately dilated left atrium. Left atrial volume index is 47   mL/sq m. Mitral Valve Mitral annular calcification. Mild  mitral regurgitation. No valvular vegetations. Aortic Valve Calcified aortic valve leaflets. Mild aortic stenosis. Transvalvular gradients are - Peak: 26 mmHg, Mean:  15mmHg. Aortic valve area calculated from the continuity equation is 1.5 cm2. Moderate aortic insufficiency. Pressure half time is 317   msec. No valvular vegetations. Tricuspid Valve Structurally normal tricuspid valve. Moderate tricuspid regurgitation. Right ventricular systolic pressure is estimated to be 50 mmHg. Pulmonic Valve The pulmonic valve is not well visualized. No pulmonic stenosis. Mild pulmonic insufficiency. Pulmonic artery diastolic pressure is 4.0  mmHg. Pericardium Normal pericardium without effusion. Aorta The aortic root is normal. Torito Castillo MD (Electronically Signed) Final Date:     09 June 2017                 00:37      Micro:  BLOOD CULTURE   Date Value Ref Range Status   06/05/2017   Preliminary    No Growth    Note: Blood cultures are incubated for 5 days and  are monitored continuously.Positive blood cultures  are called to the RN and reported as soon as  they are identified.     06/05/2017   Preliminary    No Growth    Note: Blood cultures are incubated for 5 days and  are monitored continuously.Positive blood cultures  are called to the RN and reported as soon as  they are identified.          Assessment:  Active Hospital Problems    Diagnosis   • *Fever and neutropenia (CMS-HCC) [D70.9, R50.81]   • Neutropenia (CMS-HCC) [D70.9]   • 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]   • Diarrhea [R19.7]   • Acute bilateral low back pain without sciatica [M54.5]   • Seizure disorder (CMS-HCC) [G40.909]   • Anemia requiring transfusions [D64.9]   • Abnormal CT scan, chest [R93.8]   • Hypertension [I10]   • BPH (benign prostatic hyperplasia) [N40.0]   • Low vitamin D level [E55.9]   • Transaminitis [R74.0]       Plan:  Abnormal CT scan  Most likely septic emboli  But because of his prolonged neutropenia  we always have to consider fungal infection  Patient is currently not febrile  Check beta D glucan as well as galactomannan antigen    MSSA bacteremia  Repeat cultures are negative from 6/5/17  Aim for at least 6 weeks of MSSA therapy    Neutropenia  Due to CML  Hold  off the transplant for now      Discussed with internal Medicine

## 2017-06-09 NOTE — DIETARY
"Nutrition Services: failure to thrive consult    PMHx: CLL, HTN, indigestion     Ht: 67\" Weight: 65.6kg (144#) BMI: 22.7 IBW: 67.3kg (148#) % IBW: 97%    Labs: glucose: 101 Na: 130 K: 4.1 albumin: 2.9 Phos: 3.2 M.9 Pre-albumin: 9.0  Meds: vit B-12, coreg, folic acid, vitamin D  IVF: NS @100ml/hr = 2400ml/day  GI: last BM   Skin: intact; no documented skin issues  Diet: Regular; dysphagia II     70y/o M admit w/ fall and confusion with known past medical hx significant CLL, HTN, and indigestion per H&P. Consult for failure to thrive received; pt does present with a 10% (16#) wt loss since admit. Pt with fluctuating intake since admit, PO improving at consistent % intake of meals; however, given the catabolic effects of cancer, pt does have increased kcal/protein needs at this time.     Recommendations:   1) Pt to benefit from magic cups TID for additional kcals/protein; also continue with snacks in between meals.   2) RD to encourage consistent adequate intake in combination with adequate PO of supplements and snacks dialy.     RD to monitor PO adequacy, wt, and labs to leave further recommendations accordingly.   "

## 2017-06-09 NOTE — CARE PLAN
Problem: Bowel/Gastric:  Goal: Normal bowel function is maintained or improved  Outcome: PROGRESSING AS EXPECTED  Patient with daily BMs and reports that they are normal for him. Will continue to monitor and intervene accordingly.    Problem: Knowledge Deficit  Goal: Knowledge of the prescribed therapeutic regimen will improve  Outcome: PROGRESSING AS EXPECTED  Patient provided education about MRI procedure, patient expresses understanding.

## 2017-06-09 NOTE — PROGRESS NOTES
Karissa from Lab called with critical result of WBC: 0.1, Plts: 6 at 0044. Critical lab result read back to Karissa.   This critical lab result is within parameters established by [facility] for this patient

## 2017-06-09 NOTE — PROGRESS NOTES
[facility] [place] [facility] Interval Note    Name Benjamin Post       1945   Age/Sex 71 y.o. male   MRN [id number]   Code Status DNR     After 5PM or if no immediate response to page, please call for cross-coverage  Attending/Team: Dr. Melissa/Ly Call [phone] to page   1st Call - Day Intern (R1):   Dr. Sears 2nd Call - Day Sr. Resident (R2/R3):   Dr. Kent         Chief complaint/ reason for interval visit (Primary Diagnosis)   Patient is a 71 year old male here for the management of his CML.    No acute events overnight. Patient received RBC + platelets this morning and reports feeling good today. His only complaint is that he feels cold. He denies any pain or symptoms that are bothering him.     Interval Problem Daily Status Update    Principal Problem:    Fever and neutropenia (CMS-[facility]) POA: Unknown      Overview: Tmax up to 102.7 . None thereafter.      BC x 2 show Staphylococcus growth on Bactec. Final culture reports are       pending.       Benign physical examination. PIV site area is clean and normal without       inflammation.       CXR shows a questionable right basilar consolidation      UA negative      Tylenol on board.      Cefepime, flagyl oral, and IV vancomycin added to antiviral agent      Will monitor closely.   Active Problems:    CML (chronic myelocytic leukemia)-Accelerated phase (Chronic) POA: Yes      Overview:  Had fevers overnight.       WBC 0.1. Patient may need more chemotherapy as per Dr. Jorgensen.     Neutropenia (CMS-[facility]) POA: Yes      Overview:           Acute bilateral low back pain without sciatica POA: Yes    Diarrhea POA: No      Overview: Loperamide stopped on  due to initiation of antibacterials.           Seizure disorder (CMS-[facility]) POA: Yes    Transaminitis POA: Yes      Overview: Related to chemo therapy. AST/ALT: 46/89>>61/111>>71/134>>80/171 >>88/169    BPH (benign prostatic hyperplasia) (Chronic) POA: Yes      Overview: On  flomax    Low vitamin D level (Chronic) POA: Yes      Overview: Vit D: 11 on 4/29/17    Hypertension POA: Unknown    Abnormal CT scan, chest POA: Unknown    Anemia requiring transfusions POA: Unknown  Resolved Problems:    Bone pain POA: Yes      Overview: Stable      Negative PE for tenderness over the rib cage.    Elevated PSA (Chronic) POA: Yes      Overview: PSA: 8.4 on 4/4/17  >>>>>4 on 5/18    Dysuria POA: Unknown      Overview: Stable           Abdominal pain POA: No      Overview: Resolved.       Review of Systems   Constitutional: Positive for chills. Negative for fever.   Respiratory: Negative for cough and shortness of breath.    Cardiovascular: Negative for chest pain.   Gastrointestinal: Negative for heartburn and nausea.       Consultants/Specialty  Oncology  Infectious Disease    Disposition  In patient for IV ABX + prophylaxis.    Quality Measures  Labs reviewed and Medications reviewed  Mahajan catheter: No Mahajan      DVT Prophylaxis: Contraindicated - High bleeding risk  DVT prophylaxis - mechanical: SCDs                Physical Exam       Filed Vitals:    06/09/17 0400 06/09/17 0631 06/09/17 0816 06/09/17 1200   BP: 126/63 136/67 147/79 147/68   Pulse: 71 69 70 80   Temp: 36.5 °C (97.7 °F) 36.4 °C (97.6 °F) 36.5 °C (97.7 °F) 36.9 °C (98.4 °F)   Resp: 16 16 16 18   Height:       Weight:       SpO2: 99% 98% 100% 99%     Body mass index is 22.65 kg/(m^2).    Oxygen Therapy:  Pulse Oximetry: 99 %, O2 (LPM): 0, O2 Delivery: None (Room Air)    Physical Exam  Gen: NAD, pleasant, sitting up in bed  HEENT: White plaque on tongue, no gum bleeding  CV: RRR, no murmur  Resp: Exam limited by patient's frailty  Abd: +BS, NT, ND  Extr: no rash, petechiae, peripheral edema    Lab Data Review:      6/9/2017  12:38 PM    Recent Labs      06/06/17   2359  06/08/17   0011  06/08/17   2352   SODIUM  132*  128*  130*   POTASSIUM  4.2  4.2  4.1   CHLORIDE  104  101  102   CO2  24  22  22   BUN  17  11  10   CREATININE   0.70  0.60  0.61   MAGNESIUM  1.8   --   1.9   PHOSPHORUS  3.2   --    --    CALCIUM  8.2*  8.4*  8.5       Recent Labs      06/06/17 2359 06/08/17 0011 06/08/17 2352   ALTSGPT  29  31  32   ASTSGOT  37  41  44   ALKPHOSPHAT  251*  252*  244*   TBILIRUBIN  0.5  0.6  0.7   PREALBUMIN   --    --   9.0*   GLUCOSE  106*  112*  101*       Recent Labs      06/06/17 2359 06/08/17 0011 06/08/17 2352   RBC  2.77*  2.78*  2.38*   HEMOGLOBIN  7.8*  7.7*  6.6*   HEMATOCRIT  21.9*  21.6*  18.6*   PLATELETCT  18*  11*  6*   PROTHROMBTM   --    --   18.2*   APTT   --    --   44.2*   INR   --    --   1.46*   IRON   --    --   144   FERRITIN   --    --   7461.8*   TOTIRONBC   --    --   172*       Recent Labs      06/06/17 2359 06/08/17 0011 06/08/17 2352   WBC  0.1*  <0.1*  0.1*   ASTSGOT  37  41  44   ALTSGPT  29  31  32   ALKPHOSPHAT  251*  252*  244*   TBILIRUBIN  0.5  0.6  0.7           Assessment/Plan     * Fever and neutropenia (CMS-HCC)  Assessment & Plan  Patient developed a 102F temp on 5/25. Cefepime, flagyl, and vancomycin added to his antiviral regimen. Blood cultures drawn which show a preliminary report of staphylococcus. Temperature managed with tylenol. Patient's vital signs remained stable and he did not have any complaints. Loperamide stopped due to the concern for CDiff. Oncology aware and onboard.     Pancytopenia (CMS-HCC)  Overview  - Pancytopenia secondary to CML and chemotherapy          Assessment & Plan  Transfused multiple platelet and PRBCs   Follow up and keep plt>10 and Hb>7.5.  Platelet 7, HB 9.7  For platelet transfusion      CML (chronic myelocytic leukemia)-Accelerated phase (present on admission)  Overview   Had fevers overnight.   WBC 0.1. Patient may need more chemotherapy as per Dr. Jorgensen.     Assessment & Plan  - Has completed multiple chemotherapy cycles.     multiple relapses.    Was treated on dasatinib for a time with response, but then relapse, and  bosutinib.  Pancytopenia (chronic, before starting with chemotherapy)   Protein electrophoresis: the polyclonal increase in the gamma region which may be indicative of specific immune   response or an early monoclonal protein.     Started on 5/6/17 with chemotherapy (synribo ) BID for 14 days which is completed on 5/20  Developed diarrhea as a complication treated with loperamide  -Completed chemotherapy 6/7  -Pancytopenic - received RBC/platelets this AM  -Prophylaxis for viral infections(acyclovir), fungal infections (voriconazole)  -Treatment for oral thrush - Nystatin  -Treatment for possible MSSA - Cefazolin, will also treat potential bacteremia/endocarditis possibly causing recent pulmonary septic emboli. TTE negative for vegetations but BREANNA better to diagnose endo; patient cannot tolerate BREANNA however. Coverage for MSSA bactermia should also cover endocarditis treatment.  -Consideration of BMT  -CTM    Hyponatremia  -Low osmolality; hypotonic hyponatremia likely 2/2 dehydration  -Continue IV fluids    Anemia  -due to high ferritin, low TIBC some component of anemia of chronic disease  -Also low folate; give folate    Neutropenia (CMS-HCC) (present on admission)  Overview        Assessment & Plan  Was on ceftriaxone, switched today to cefazolin per pharmacy recommendation.  Blood culture showed MSSA    Diarrhea  Overview  Loperamide stopped on 5/25 due to initiation of antibacterials.       Assessment & Plan  Most likely related to chemo  Resolved with loperamide       Transaminitis (present on admission)  Overview  Related to chemo therapy. AST/ALT: 46/89>>61/111>>71/134>>80/171 >>88/169    Assessment & Plan  No abdominal pain, likely effect of chemotherapy  Discontinue all hepatotoxic drugs as possible  Continue to follow    BPH (benign prostatic hyperplasia) (present on admission)  Assessment & Plan  Stable,  Complains of some dysuria, repeat PSA and perineal exam negative for prostatitis  Continue on  tamsulosin 0.8 mg daily.   Added finasteride as well    Low vitamin D level (present on admission)  Overview  Vit D: 11 on 4/29/17    Assessment & Plan  Vit D 11 on 4/29/2017   Continue supplementation.     Hypertension  Assessment & Plan  Controlled   On Coreg  Norvasc 5 added on 5/24 for better BP control.   PRN hydralazine 10 mg onboard.      Seizure disorder (CMS-HCC) (present on admission)  Assessment & Plan  Stable    Continue on his home dose of keppra  Had 1 episode last year after dental procedure.

## 2017-06-10 ENCOUNTER — APPOINTMENT (OUTPATIENT)
Dept: RADIOLOGY | Facility: MEDICAL CENTER | Age: 72
DRG: 808 | End: 2017-06-10
Attending: STUDENT IN AN ORGANIZED HEALTH CARE EDUCATION/TRAINING PROGRAM
Payer: MEDICARE

## 2017-06-10 PROBLEM — R78.81 BACTEREMIA DUE TO STAPHYLOCOCCUS AUREUS: Status: ACTIVE | Noted: 2017-06-10

## 2017-06-10 PROBLEM — R50.81 NEUTROPENIC FEVER (HCC): Status: ACTIVE | Noted: 2017-06-10

## 2017-06-10 PROBLEM — B95.61 BACTEREMIA DUE TO STAPHYLOCOCCUS AUREUS: Status: ACTIVE | Noted: 2017-06-10

## 2017-06-10 PROBLEM — D70.9 NEUTROPENIC FEVER (HCC): Status: ACTIVE | Noted: 2017-06-10

## 2017-06-10 LAB
ALBUMIN SERPL BCP-MCNC: 3.4 G/DL (ref 3.2–4.9)
ALBUMIN/GLOB SERPL: 0.9 G/DL
ALP SERPL-CCNC: 295 U/L (ref 30–99)
ALT SERPL-CCNC: 33 U/L (ref 2–50)
ANION GAP SERPL CALC-SCNC: 8 MMOL/L (ref 0–11.9)
AST SERPL-CCNC: 69 U/L (ref 12–45)
BILIRUB SERPL-MCNC: 1.1 MG/DL (ref 0.1–1.5)
BUN SERPL-MCNC: 11 MG/DL (ref 8–22)
CALCIUM SERPL-MCNC: 8.5 MG/DL (ref 8.5–10.5)
CHLORIDE SERPL-SCNC: 103 MMOL/L (ref 96–112)
CO2 SERPL-SCNC: 22 MMOL/L (ref 20–33)
CREAT SERPL-MCNC: 0.75 MG/DL (ref 0.5–1.4)
ERYTHROCYTE [DISTWIDTH] IN BLOOD BY AUTOMATED COUNT: 34.9 FL (ref 35.9–50)
GFR SERPL CREATININE-BSD FRML MDRD: >60 ML/MIN/1.73 M 2
GLOBULIN SER CALC-MCNC: 3.7 G/DL (ref 1.9–3.5)
GLUCOSE SERPL-MCNC: 128 MG/DL (ref 65–99)
HCT VFR BLD AUTO: 23 % (ref 42–52)
HGB BLD-MCNC: 8.2 G/DL (ref 14–18)
LACTATE BLD-SCNC: 1 MMOL/L (ref 0.5–2)
MCH RBC QN AUTO: 27.9 PG (ref 27–33)
MCHC RBC AUTO-ENTMCNC: 35.7 G/DL (ref 33.7–35.3)
MCV RBC AUTO: 78.2 FL (ref 81.4–97.8)
NEUTROPHILS # BLD AUTO: ABNORMAL K/UL (ref 1.82–7.42)
NRBC # BLD AUTO: 0 K/UL
NRBC BLD AUTO-RTO: 0 /100 WBC
PLATELET # BLD AUTO: 22 K/UL (ref 164–446)
PMV BLD AUTO: 10.1 FL (ref 9–12.9)
POTASSIUM SERPL-SCNC: 4.3 MMOL/L (ref 3.6–5.5)
PROT SERPL-MCNC: 7.1 G/DL (ref 6–8.2)
RBC # BLD AUTO: 2.94 M/UL (ref 4.7–6.1)
SODIUM SERPL-SCNC: 133 MMOL/L (ref 135–145)
WBC # BLD AUTO: 0.1 K/UL (ref 4.8–10.8)

## 2017-06-10 PROCEDURE — 87324 CLOSTRIDIUM AG IA: CPT

## 2017-06-10 PROCEDURE — 700102 HCHG RX REV CODE 250 W/ 637 OVERRIDE(OP): Performed by: GENERAL ACUTE CARE HOSPITAL

## 2017-06-10 PROCEDURE — 99233 SBSQ HOSP IP/OBS HIGH 50: CPT | Mod: GC | Performed by: INTERNAL MEDICINE

## 2017-06-10 PROCEDURE — 87086 URINE CULTURE/COLONY COUNT: CPT

## 2017-06-10 PROCEDURE — 700111 HCHG RX REV CODE 636 W/ 250 OVERRIDE (IP): Performed by: GENERAL ACUTE CARE HOSPITAL

## 2017-06-10 PROCEDURE — 51798 US URINE CAPACITY MEASURE: CPT

## 2017-06-10 PROCEDURE — 36415 COLL VENOUS BLD VENIPUNCTURE: CPT

## 2017-06-10 PROCEDURE — 700101 HCHG RX REV CODE 250: Performed by: STUDENT IN AN ORGANIZED HEALTH CARE EDUCATION/TRAINING PROGRAM

## 2017-06-10 PROCEDURE — A9270 NON-COVERED ITEM OR SERVICE: HCPCS | Performed by: HOSPITALIST

## 2017-06-10 PROCEDURE — 71010 DX-CHEST-PORTABLE (1 VIEW): CPT

## 2017-06-10 PROCEDURE — 770009 HCHG ROOM/CARE - ONCOLOGY SEMI PRI*

## 2017-06-10 PROCEDURE — 84145 PROCALCITONIN (PCT): CPT

## 2017-06-10 PROCEDURE — 700102 HCHG RX REV CODE 250 W/ 637 OVERRIDE(OP): Performed by: INTERNAL MEDICINE

## 2017-06-10 PROCEDURE — 700111 HCHG RX REV CODE 636 W/ 250 OVERRIDE (IP): Performed by: PHARMACIST

## 2017-06-10 PROCEDURE — A9270 NON-COVERED ITEM OR SERVICE: HCPCS | Performed by: GENERAL ACUTE CARE HOSPITAL

## 2017-06-10 PROCEDURE — A9270 NON-COVERED ITEM OR SERVICE: HCPCS | Performed by: INTERNAL MEDICINE

## 2017-06-10 PROCEDURE — 700105 HCHG RX REV CODE 258: Performed by: INTERNAL MEDICINE

## 2017-06-10 PROCEDURE — 80053 COMPREHEN METABOLIC PANEL: CPT | Mod: 91

## 2017-06-10 PROCEDURE — 700105 HCHG RX REV CODE 258: Performed by: PHARMACIST

## 2017-06-10 PROCEDURE — 700102 HCHG RX REV CODE 250 W/ 637 OVERRIDE(OP): Performed by: HOSPITALIST

## 2017-06-10 PROCEDURE — 700111 HCHG RX REV CODE 636 W/ 250 OVERRIDE (IP): Performed by: INTERNAL MEDICINE

## 2017-06-10 PROCEDURE — 87493 C DIFF AMPLIFIED PROBE: CPT

## 2017-06-10 PROCEDURE — 87305 ASPERGILLUS AG IA: CPT

## 2017-06-10 PROCEDURE — 87449 NOS EACH ORGANISM AG IA: CPT | Mod: 91

## 2017-06-10 PROCEDURE — 700102 HCHG RX REV CODE 250 W/ 637 OVERRIDE(OP): Performed by: STUDENT IN AN ORGANIZED HEALTH CARE EDUCATION/TRAINING PROGRAM

## 2017-06-10 PROCEDURE — 87040 BLOOD CULTURE FOR BACTERIA: CPT | Mod: 91

## 2017-06-10 PROCEDURE — A9270 NON-COVERED ITEM OR SERVICE: HCPCS | Performed by: STUDENT IN AN ORGANIZED HEALTH CARE EDUCATION/TRAINING PROGRAM

## 2017-06-10 PROCEDURE — 85025 COMPLETE CBC W/AUTO DIFF WBC: CPT

## 2017-06-10 PROCEDURE — 83605 ASSAY OF LACTIC ACID: CPT

## 2017-06-10 RX ADMIN — VORICONAZOLE 200 MG: 200 TABLET, FILM COATED ORAL at 19:43

## 2017-06-10 RX ADMIN — VORICONAZOLE 200 MG: 200 TABLET, FILM COATED ORAL at 09:37

## 2017-06-10 RX ADMIN — PIPERACILLIN SODIUM AND TAZOBACTAM SODIUM 4.5 G: 4; .5 INJECTION, POWDER, FOR SOLUTION INTRAVENOUS at 17:16

## 2017-06-10 RX ADMIN — MINERAL OIL AND WHITE PETROLATUM 1 APPLICATION: 150; 830 OINTMENT OPHTHALMIC at 22:52

## 2017-06-10 RX ADMIN — ACYCLOVIR 400 MG: 800 TABLET ORAL at 19:43

## 2017-06-10 RX ADMIN — ACETAMINOPHEN 650 MG: 325 TABLET, FILM COATED ORAL at 19:39

## 2017-06-10 RX ADMIN — CEFAZOLIN SODIUM 2 G: 2 INJECTION, SOLUTION INTRAVENOUS at 05:19

## 2017-06-10 RX ADMIN — CARVEDILOL 12.5 MG: 12.5 TABLET, FILM COATED ORAL at 07:58

## 2017-06-10 RX ADMIN — ERGOCALCIFEROL 50000 UNITS: 1.25 CAPSULE ORAL at 07:57

## 2017-06-10 RX ADMIN — ACETAMINOPHEN 650 MG: 325 TABLET, FILM COATED ORAL at 06:28

## 2017-06-10 RX ADMIN — PIPERACILLIN SODIUM AND TAZOBACTAM SODIUM 4.5 G: 4; .5 INJECTION, POWDER, FOR SOLUTION INTRAVENOUS at 22:52

## 2017-06-10 RX ADMIN — NYSTATIN 500000 UNITS: 500000 SUSPENSION ORAL at 19:44

## 2017-06-10 RX ADMIN — LEVETIRACETAM 750 MG: 250 TABLET, FILM COATED ORAL at 19:44

## 2017-06-10 RX ADMIN — SODIUM CHLORIDE: 9 INJECTION, SOLUTION INTRAVENOUS at 12:03

## 2017-06-10 RX ADMIN — ACYCLOVIR 400 MG: 800 TABLET ORAL at 08:33

## 2017-06-10 RX ADMIN — PIPERACILLIN SODIUM AND TAZOBACTAM SODIUM 4.5 G: 4; .5 INJECTION, POWDER, FOR SOLUTION INTRAVENOUS at 12:45

## 2017-06-10 RX ADMIN — PIPERACILLIN SODIUM AND TAZOBACTAM SODIUM 4.5 G: 4; .5 INJECTION, POWDER, FOR SOLUTION INTRAVENOUS at 09:53

## 2017-06-10 RX ADMIN — LEVETIRACETAM 750 MG: 250 TABLET, FILM COATED ORAL at 08:33

## 2017-06-10 RX ADMIN — TAMSULOSIN HYDROCHLORIDE 0.8 MG: 0.4 CAPSULE ORAL at 08:33

## 2017-06-10 RX ADMIN — ACETAMINOPHEN 650 MG: 325 TABLET, FILM COATED ORAL at 19:43

## 2017-06-10 RX ADMIN — CYANOCOBALAMIN 1000 MCG: 1000 INJECTION, SOLUTION INTRAMUSCULAR; SUBCUTANEOUS at 10:44

## 2017-06-10 RX ADMIN — FOLIC ACID 1 MG: 1 TABLET ORAL at 08:33

## 2017-06-10 RX ADMIN — VANCOMYCIN HYDROCHLORIDE 1600 MG: 100 INJECTION, POWDER, LYOPHILIZED, FOR SOLUTION INTRAVENOUS at 07:58

## 2017-06-10 RX ADMIN — ACETAMINOPHEN 650 MG: 325 TABLET, FILM COATED ORAL at 12:29

## 2017-06-10 RX ADMIN — FINASTERIDE 5 MG: 5 TABLET, FILM COATED ORAL at 08:33

## 2017-06-10 RX ADMIN — NYSTATIN 500000 UNITS: 500000 SUSPENSION ORAL at 08:33

## 2017-06-10 ASSESSMENT — ENCOUNTER SYMPTOMS
GASTROINTESTINAL NEGATIVE: 1
CONSTITUTIONAL NEGATIVE: 1
HEMOPTYSIS: 0
CARDIOVASCULAR NEGATIVE: 1
VOMITING: 0
MYALGIAS: 0
COUGH: 0
RESPIRATORY NEGATIVE: 1
HEARTBURN: 0
SENSORY CHANGE: 0
NECK PAIN: 0
SPEECH CHANGE: 0
PALPITATIONS: 0
ABDOMINAL PAIN: 0
NEUROLOGICAL NEGATIVE: 1
DIZZINESS: 0
FEVER: 0
MUSCULOSKELETAL NEGATIVE: 1
DIARRHEA: 0
HEADACHES: 0
DEPRESSION: 0
CHILLS: 0
NAUSEA: 0
BRUISES/BLEEDS EASILY: 0
SHORTNESS OF BREATH: 0

## 2017-06-10 ASSESSMENT — PAIN SCALES - GENERAL
PAINLEVEL_OUTOF10: 0

## 2017-06-10 NOTE — PROGRESS NOTES
Oncology/Hematology Progress Note               Author:  Wayne Wilsoncherry Date & Time created: 6/10/2017  12:34 PM     Interval History:  CC: Accelerated/Blast crisis CML, started second 14 day course of Omacetaxine for 1 more week finishing on 6/7  New bone marrow with a total of <10% blast. Good response to Omacetaxine  No events overnight  Now CT chest showing bilateral pulm nodules likely related to septic emboli to  MSSA bacteremia in the past. Also lytic lesions on T5 and 5ft rib. MRI spine with heterogenoes diffuse involvement due to CML  6/10 now spiking fever, tmax 39. Started on Zosyn.    Review of Systems:  Review of Systems   Constitutional: Negative.  Negative for fever and chills.   HENT: Negative.    Respiratory: Negative.    Cardiovascular: Negative.    Gastrointestinal: Negative.  Negative for abdominal pain.   Genitourinary: Negative.    Musculoskeletal: Negative.    Skin: Negative for rash.   Neurological: Negative.    Endo/Heme/Allergies: Negative.  Does not bruise/bleed easily.       Physical Exam:  Physical Exam   Constitutional: He is oriented to person, place, and time. He appears well-developed and well-nourished. No distress.   HENT:   Head: Normocephalic and atraumatic.   Mouth/Throat: No oropharyngeal exudate.   Eyes: Pupils are equal, round, and reactive to light. No scleral icterus.   Neck: Normal range of motion.   Cardiovascular: Normal rate and normal heart sounds.    Pulmonary/Chest: Effort normal. No respiratory distress. He has no wheezes. He has no rales.   Abdominal: Soft. Bowel sounds are normal.   Lymphadenopathy:     He has no cervical adenopathy.   Neurological: He is alert and oriented to person, place, and time.   Skin: Skin is warm.       Labs:        Invalid input(s): [id number]      Recent Labs      06/08/17   0011  06/08/17   2352  06/10/17   0021   SODIUM  128*  130*  133*   POTASSIUM  4.2  4.1  4.3   CHLORIDE  101  102  103   CO2  22  22  22   BUN  11  10  11    CREATININE  0.60  0.61  0.75   MAGNESIUM   --   1.9   --    CALCIUM  8.4*  8.5  8.5     Recent Labs      17   0011  06/08/17   2352  06/10/17   0021   ALTSGPT  31  32  33   ASTSGOT  41  44  69*   ALKPHOSPHAT  252*  244*  295*   TBILIRUBIN  0.6  0.7  1.1   PREALBUMIN   --   9.0*   --    GLUCOSE  112*  101*  128*     Recent Labs      06/08/17   0011  06/08/17   2352  06/10/17   0021   RBC  2.78*  2.38*  2.94*   HEMOGLOBIN  7.7*  6.6*  8.2*   HEMATOCRIT  21.6*  18.6*  23.0*   PLATELETCT  11*  6*  22*   PROTHROMBTM   --   18.2*   --    APTT   --   44.2*   --    INR   --   1.46*   --    IRON   --   144   --    FERRITIN   --   7461.8*   --    TOTIRONBC   --   172*   --      Recent Labs      06/08/17   0011  06/08/17   2352  06/10/17   0021   WBC  <0.1*  0.1*  0.1*   ASTSGOT  41  44  69*   ALTSGPT  31  32  33   ALKPHOSPHAT  252*  244*  295*   TBILIRUBIN  0.6  0.7  1.1     Recent Labs      06/08/17   0011  06/08/17   2352  06/10/17   0021   SODIUM  128*  130*  133*   POTASSIUM  4.2  4.1  4.3   CHLORIDE  101  102  103   CO2  22  22  22   GLUCOSE  112*  101*  128*   BUN  11  10  11   CREATININE  0.60  0.61  0.75   CALCIUM  8.4*  8.5  8.5     Hemodynamics:  Temp (24hrs), Av.9 °C (100.3 °F), Min:37.1 °C (98.7 °F), Max:39.1 °C (102.4 °F)  Temperature: (!) 38.5 °C (101.3 °F)  Pulse  Av.9  Min: 58  Max: 118   Blood Pressure : 119/57 mmHg     Respiratory:    Respiration: (!) 22, Pulse Oximetry: 92 %     Work Of Breathing / Effort: Mild  RUL Breath Sounds: Diminished;Clear, RML Breath Sounds: Diminished;Clear, RLL Breath Sounds: Diminished, WAGNER Breath Sounds: Diminished, LLL Breath Sounds: Diminished  Fluids:    Intake/Output Summary (Last 24 hours) at 17 1034  Last data filed at 17 1000   Gross per 24 hour   Intake   2057 ml   Output   1675 ml   Net    382 ml        GI/Nutrition:  Orders Placed This Encounter   Procedures   • DIET ORDER     Standing Status: Standing      Number of Occurrences: 1       Standing Expiration Date:      Order Specific Question:  Diet:     Answer:  Regular [1]     Order Specific Question:  Texture/Fiber modifications:     Answer:  Dysphagia 2(Pureed/Chopped)specify fluid consistency(question 6) [2]     Order Specific Question:  Consistency/Fluid modifications:     Answer:  Thin Liquids [3]     Medical Decision Making, by Problem:  Active Hospital Problems    Diagnosis   • *Fever and neutropenia (CMS-HCC) [D70.9, R50.81]   • Neutropenia (CMS-HCC) [D70.9]   • 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]   • Diarrhea [R19.7]   • Acute bilateral low back pain without sciatica [M54.5]   • Seizure disorder (CMS-HCC) [G40.909]   • Hypertension [I10]   • BPH (benign prostatic hyperplasia) [N40.0]   • Low vitamin D level [E55.9]   • Transaminitis [R74.0]       Plan:  Advanced CML:  reponded well to Omacetaxine. Last dose will be given on 6/7 . He got a good response with <10% blasts  CT scan showed bilateral pulm nodules likely related to septic emboli, Per ID continue on ANCEF for a total of 6 weeks,  MR T spine showed diffuse heterogenous findings related to CML, no further intervention indicated  Guarded prognosis  Transfuse  PRBC if hemoglobin <8 g/dl or Platelets if <10 or bleeding.  Voriconazole added for prophylaxis for prolonged neutropenia as he may receive BMT.  Per Dr Lew he will not pospone BMT despite septic emboli, the donor is the daughter which is an [employer], No working on the donor. Awaitin ok to transfer patient. May be ready by 6/25.  Now spiking fever, pancultured on Zosyn, voriconazole, acyclovir. ID on Board.       Labs reviewed

## 2017-06-10 NOTE — PROGRESS NOTES
Re checked patients temperature after tylenol. Patient is trending down, ice packs given and cold wash cloth applied to head.

## 2017-06-10 NOTE — PROGRESS NOTES
Patient with a fever of 102.4, [facility] resident on call notified. Tylenol given and fever reduced to 100.4. CXR and blood cultures obtained. Will continue to monitor temperature and patient condition.

## 2017-06-10 NOTE — PROGRESS NOTES
Ness LAKHANI, MEW score increased to 3. Temperature of 99.9, pulse: 109, Respirations: 21. No new orders received at this time.

## 2017-06-10 NOTE — PROGRESS NOTES
Infectious Disease Progress Note    Author: Katelin Kay M.D. Date of service & Time created: 6/10/2017  11:49 AM    Chief Complaint:  Chief Complaint   Patient presents with   • Body Aches     generalized   • Back Pain       Interval History:  71-year-old  male with CML seen for possible septic emboli and MSSA bacteremia  17- no fevers. No new issues overnight. Denies any cough  6/10 Temp 102.4, WBC 0.1 New cxs done Denies new sxs  Labs Reviewed, Medications Reviewed and Radiology Reviewed.    Review of Systems:  Review of Systems   Constitutional: Negative for fever and malaise/fatigue.   Respiratory: Negative for cough and shortness of breath.    Cardiovascular: Negative for chest pain.   Gastrointestinal: Negative for nausea, vomiting and abdominal pain.   Genitourinary: Negative for dysuria.   Musculoskeletal: Negative for myalgias.   Skin: Negative for rash.   Neurological: Negative for sensory change, speech change and headaches.       Hemodynamics:  Temp (24hrs), Av.8 °C (100.1 °F), Min:36.9 °C (98.4 °F), Max:39.1 °C (102.4 °F)  Temperature: 37.7 °C (99.9 °F)  Pulse  Av.9  Min: 58  Max: 118   Blood Pressure : 111/53 mmHg       Physical Exam:  Physical Exam   Constitutional: He is oriented to person, place, and time. He appears well-developed. No distress.   Chronically ill and frail   HENT:   Head: Normocephalic and atraumatic.   Mouth/Throat: Oropharyngeal exudate present.   Edentulous   Eyes: EOM are normal. Pupils are equal, round, and reactive to light. No scleral icterus.   Neck: Neck supple.   Cardiovascular: Normal rate and regular rhythm.    Murmur heard.  Pulmonary/Chest: Effort normal. No respiratory distress. He has no wheezes.   Abdominal: Soft. He exhibits no distension. There is no tenderness.   Musculoskeletal: He exhibits no edema.   Neurological: He is alert and oriented to person, place, and time. No cranial nerve deficit. Coordination normal.   Skin: No rash  noted.   Vitals reviewed.      Meds:    Current facility-administered medications:   •  piperacillin-tazobactam (ZOSYN) 4.5 g in  mL IVPB, 4.5 g, Intravenous, Q6HRS, Katelin Kay M.D., Stopped at 06/10/17 1053  •  folic acid (FOLVITE) tablet 1 mg, 1 mg, Oral, DAILY, Sarbjit Kent M.D., 1 mg at 06/10/17 0833  •  cyanocobalamin (VITAMIN B-12) injection 1,000 mcg, 1,000 mcg, Intramuscular, Q7 DAYS, Sarbjit Kent M.D., 1,000 mcg at 06/10/17 1044  •  artificial tear ointment (REFRESH,LACRI-LUBE) 1 Application, 1 Application, Both Eyes, Q8HRS, Sherman Sears M.D., 1 Application at 06/09/17 2200  •  voriconazole (VFEND) tablet 200 mg, 200 mg, Oral, Q12HRS, Wayne Person III, M.D., 200 mg at 06/10/17 0937  •  NS infusion, , Intravenous, Continuous, Sherman Sears M.D., Last Rate: 200 mL/hr at 06/10/17 0846  •  loperamide (IMODIUM) capsule 2 mg, 2 mg, Oral, 4X/DAY PRN, Sherman Sears M.D., 2 mg at 06/07/17 0853  •  acetaminophen (TYLENOL) tablet 650 mg, 650 mg, Oral, Q6HRS PRN, Sherman Sears M.D., 650 mg at 06/09/17 0108  •  acetaminophen (TYLENOL) tablet 650 mg, 650 mg, Oral, Q4HRS PRN, Sherman Sears M.D., 650 mg at 06/10/17 0628  •  carvedilol (COREG) tablet 12.5 mg, 12.5 mg, Oral, BID WITH MEALS, Sherman Sears M.D., 12.5 mg at 06/10/17 0758  •  tamsulosin (FLOMAX) capsule 0.8 mg, 0.8 mg, Oral, QAM, Sherman Sears M.D., 0.8 mg at 06/10/17 0833  •  finasteride (PROSCAR) tablet 5 mg, 5 mg, Oral, DAILY, Sherman Sears M.D., 5 mg at 06/10/17 0833  •  hydrALAZINE (APRESOLINE) injection 10 mg, 10 mg, Intravenous, Q6HRS PRN, Sherman Sears M.D., 10 mg at 05/25/17 0932  •  diphenoxylate-atropine (LOMOTIL) 2.5-0.025 MG per tablet 1 Tab, 1 Tab, Oral, 4X/DAY PRN, Sherman Sears M.D., 1 Tab at 05/24/17 1023  •  hydrocodone-acetaminophen (NORCO) 5-325 MG per tablet 1-2 Tab, 1-2 Tab, Oral, Q6HRS PRN, Sherman Sears M.D., 2 Tab at 05/21/17 1630  •  acyclovir (ZOVIRAX) tablet 400 mg, 400 mg,  Oral, BID, Sherman Sears M.D., 400 mg at 06/10/17 0833  •  nystatin (MYCOSTATIN) 802566 UNIT/ML suspension 500,000 Units, 5 mL, Oral, 4X/DAY, Sherman Sears M.D., 500,000 Units at 06/10/17 0833  •  diphenhydrAMINE (BENADRYL) tablet/capsule 25 mg, 25 mg, Oral, PRN, Sherman Sears M.D., 25 mg at 06/09/17 0108  •  pneumococcal 13-Elizabeth Conj Vacc (PREVNAR 13) syringe 0.5 mL, 0.5 mL, Intramuscular, Once PRN, Gerry Soto M.D.  •  Respiratory Care per Protocol, , Nebulization, Continuous RT, JAQUELINE Turcios.O.  •  ondansetron (ZOFRAN) syringe/vial injection 4 mg, 4 mg, Intravenous, Q4HRS PRN, JAQUELINE Turcios.O., 4 mg at 06/09/17 2347  •  ondansetron (ZOFRAN ODT) dispertab 4 mg, 4 mg, Oral, Q4HRS PRN, JAQUELINE Turcios.O., 4 mg at 05/01/17 1955  •  levetiracetam (KEPPRA) tablet 750 mg, 750 mg, Oral, BID, Rc Boss D.O., 750 mg at 06/10/17 0833  •  oxycodone immediate-release (ROXICODONE) tablet 5 mg, 5 mg, Oral, Q4HRS PRN, Rc Boss D.O., 5 mg at 05/21/17 1225  •  lidocaine (LIDODERM) 5 % 1 Patch, 1 Patch, Transdermal, Q24HR, JAQUELINE Turcios.O., 1 Patch at 06/10/17 0937  •  vitamin D (Ergocalciferol) (DRISDOL) capsule 50,000 Units, 50,000 Units, Oral, Q7 DAYS, Gerry Soto M.D., 50,000 Units at 06/10/17 0757    Labs:  Recent Labs      06/08/17   0011  06/08/17   2352  06/10/17   0021   WBC  <0.1*  0.1*  0.1*   RBC  2.78*  2.38*  2.94*   HEMOGLOBIN  7.7*  6.6*  8.2*   HEMATOCRIT  21.6*  18.6*  23.0*   MCV  77.7*  78.2*  78.2*   MCH  27.7  27.7  27.9   RDW  34.2*  34.6*  34.9*   PLATELETCT  11*  6*  22*   MPV  11.5  13.1*  10.1     Recent Labs      06/08/17   0011  06/08/17   2352  06/10/17   0021   SODIUM  128*  130*  133*   POTASSIUM  4.2  4.1  4.3   CHLORIDE  101  102  103   CO2  22  22  22   GLUCOSE  112*  101*  128*   BUN  11  10  11     Recent Labs      06/08/17   0011  06/08/17 2352  06/10/17   0021   ALBUMIN  2.8*  2.9*  3.4   TBILIRUBIN  0.6  0.7  1.1   ALKPHOSPHAT  252*  244*  295*    TOTPROTEIN  6.0  6.2  7.1   ALTSGPT  31  32  33   ASTSGOT  41  44  69*   CREATININE  0.60  0.61  0.75       Imaging:  Ct-chest,abdomen,pelvis With    6/8/2017  Lytic lesions in the T5 vertebral body and medial LEFT 5th rib appear to have been present in April 2017. These findings were discussed with Dr. Person on 6/8/2017 7:33 AM.  6/8/2017  1.  Multiple new ill-defined pulmonary nodules involving both lungs, which may be infectious/inflammatory or metastatic.  Septic emboli are a consideration. 2.  Minimal bilateral pleural effusions with associated atelectasis. 3.  Apparent lytic lesions in the T5 vertebral body on the LEFT and adjacent LEFT medial 5th rib, likely metastatic. 4.  Gallbladder wall thickening, concerning for inflammation. 5.  Small amount of peritoneal fluid, of uncertain etiology.  Abnormal for male patient, raising concern for intra-abdominal inflammatory process. 6.  Markedly enlarged heterogeneous prostate.    Ct-head With  6/7/2017  1.  Diffuse RIGHT hemispheric dural thickening, as seen previously.  Possibly sequela of prior subdural hematoma, however tumor and infection are also considerations. 2.  No focal cerebritis or intra-axial mass. 3.  Diffuse atrophy. 4.  No gross intracranial hemorrhage however presence of intravenous contrast may obscure small amounts of subarachnoid hemorrhage.    Mr-thoracic Spine-with & W/o  6/9/2017  1.  Diffuse inhomogeneous infiltration of visualized thoracic spine consistent with chronic myeloid leukemia. There is no epidural tumor or spinal canal compromise. 2.  Small multiple lung nodules. This was noted on the previous CT scan dated 6/7/2017. 3.  Fluid along the right lung major fissure.This was noted on the previous CT scan dated 6/7/2017.    Echocardiogram Comp W/o Cont    6/9/2017  Transthoracic Echo Report Echocardiography Laboratory CONCLUSIONS Normal left ventricular systolic function. Left ventricular ejection fraction is visually estimated to be  70%. Grade I diastolic dysfunction. Normal right ventricular systolic function. Mild mitral regurgitation. Mild aortic stenosis. Transvalvular gradients are - Peak: 26 mmHg, Mean:  15mmHg. Moderate aortic insufficiency. Moderate tricuspid regurgitation. Right ventricular systolic pressure is estimated to be 50 mmHg. Compared to the images of the study done 3/20/2015 - there has been no significant change.     Micro:  BLOOD CULTURE   Date Value Ref Range Status   06/05/2017   Preliminary    No Growth    Note: Blood cultures are incubated for 5 days and  are monitored continuously.Positive blood cultures  are called to the RN and reported as soon as  they are identified.     06/05/2017   Preliminary    No Growth    Note: Blood cultures are incubated for 5 days and  are monitored continuously.Positive blood cultures  are called to the RN and reported as soon as  they are identified.        Results     Procedure Component Value Units Date/Time    URINE CULTURE(NEW) [[id number]] Collected:  06/10/17 1116    Order Status:  Completed Specimen Information:  Urine from Urine, Clean Catch Updated:  06/10/17 1120    Narrative:      Collected By:[id number] CARLITA JULIAN  Indication for culture:->Temp Equal to,or Greater Than 101  Indication for culture:->Suspected Sepsis    URINE CULTURE(NEW) [[id number]] Collected:  06/10/17 1117    Order Status:  Sent Specimen Information:  Urine from Urine, Clean Catch     CDIFF BY PCR WITH TOXIN [[id number]]     Order Status:  No result Specimen Information:  Stool from Stool     [facility][id number]     Order Status:  No result Specimen Information:  Respirate from Sputum     [facility] [[id number]]     Order Status:  No result Specimen Information:  Sputum from Sputum     FUNGAL BLOOD CULTURE [[id number]]     Order Status:  Sent Specimen Information:  Blood from Blood     CULTURE THROAT [[id number]]     Order Status:  No result Specimen Information:  Throat  "from Throat     BLOOD CULTURE [[id number]] Collected:  06/10/17 0021    Order Status:  Completed Specimen Information:  Blood from Peripheral Updated:  06/10/17 0100    Narrative:      Per Hospital Policy: Only change Specimen Src: to \"Line\" if  specified by physician order.    BLOOD CULTURE [[id number]] Collected:  06/10/17 0021    Order Status:  Completed Specimen Information:  Blood from Peripheral Updated:  06/10/17 0100    Narrative:      Per Hospital Policy: Only change Specimen Src: to \"Line\" if  specified by physician order.    BLOOD CULTURE [[id number]] Collected:  06/08/17 0000    Order Status:  Canceled Specimen Information:  Other from Peripheral     Narrative:      ORDER WAS CANCELLED 06/08/2017 11:38, Duplicate . cxl per nadja  drawn on 6/5/17.  Per Hospital Policy: Only change Specimen Src: to \"Line\" if  specified by physician order.    BLOOD CULTURE [[id number]] Collected:  06/08/17 0000    Order Status:  Canceled Specimen Information:  Other from Peripheral     Narrative:      ORDER WAS CANCELLED 06/08/2017 11:36, Duplicate . cxl per nadja,  duplicate order.  Per Hospital Policy: Only change Specimen Src: to \"Line\" if  specified by physician order.    BLOOD CULTURE [[id number]] Collected:  06/05/17 2354    Order Status:  Completed Specimen Information:  Blood from Peripheral Updated:  06/07/17 0939     Significant Indicator NEG      Source BLD      Site PERIPHERAL      Blood Culture --      Result:        No Growth    Note: Blood cultures are incubated for 5 days and  are monitored continuously.Positive blood cultures  are called to the RN and reported as soon as  they are identified.      Narrative:      Per Hospital Policy: Only change Specimen Src: to \"Line\" if  specified by physician order.    BLOOD CULTURE [[id number]] Collected:  06/05/17 2354    Order Status:  Completed Specimen Information:  Blood from Peripheral Updated:  06/07/17 0939     Significant Indicator NEG      " "Source BLD      Site PERIPHERAL      Blood Culture --      Result:        No Growth    Note: Blood cultures are incubated for 5 days and  are monitored continuously.Positive blood cultures  are called to the RN and reported as soon as  they are identified.      Narrative:      Per Hospital Policy: Only change Specimen Src: to \"Line\" if  specified by physician order.    BLOOD CULTURE [[id number]]     Order Status:  Canceled Specimen Information:  Blood from Peripheral     BLOOD CULTURE [[id number]]     Order Status:  Canceled Specimen Information:  Blood from Peripheral           Assessment:  Active Hospital Problems    Diagnosis   • *Fever and neutropenia (CMS-[facility]) [D70.9, R50.81]   • Neutropenia (CMS-[facility]) [D70.9]   • 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]   • Diarrhea [R19.7]   • Acute bilateral low back pain without sciatica [M54.5]   • Seizure disorder (CMS-[facility]) [G40.909]   • Abnormal CT scan, chest [R93.8]   • Transaminitis [R74.0]       Plan:  MSSA bacteremia  Low grade fever  TTE neg for vegetation  BREANNA if feasible  Repeat cultures are negative from 6/5/17  Aim for at least 6 weeks of MSSA therapy    Septic emboli  Likely secondary to above  Fungal serologies ordered as well    Neutropenic fever  Febrile again  +MSSA blood-spiked on abx  Hold  off the transplant for now  Check beta D glucan as well as galactomannan antigen  Change to Zosyn to cover prior MSSA and GNR  Voriconazole added    Thrush  Continue Nystatin  Also on voriconazole  Discussed with internal Medicine  "

## 2017-06-10 NOTE — PROGRESS NOTES
Received report from NOC RN, assumed care of patient at 0700. Patient is awake alert and oriented x 4. Patient is lethargic. Patient he denies pain this morning. Patient states he does not feel well but does not specify how. Patients vitals are stable. IV infusing antibiotics, monitoring patients temperatures. Patient has a stool sample ordered for C-diff but no active stooling at this time. No urine sample as of yet. Bladder scan ordered. Call light within reach, bed alarm is in place.

## 2017-06-10 NOTE — PROGRESS NOTES
"Pharmacy Kinetics 71 y.o. male on vancomycin day # 1 6/10/2017    Currently on Vancomycin 1600 mg iv loading dose    Indication for Treatment: Neutropenic fever    Pertinent history per medical record: Admitted on 2017 for neutropenia. Pt with history of relapsed CML, on chemotherapy was on vancomycin from  through  for neutropenic fever. Vancomycin was d/c'ed after cultures grew MSSA.  Pt spiked fever overnight of 102.4F. Vancomycin reinitiated for neutropenic fever. ID consulting on patient.    Other antibiotics: Cefazolin 2g IV Q8H, acyclovir 400mg PO BID, Voriconazole 200mg PO Q12H    Allergies: Review of patient's allergies indicates no known allergies.     List concerns for renal function: Abnormal LFTs, advanced age, SIRS    Pertinent cultures to date:   17 - blood (peripheral) x 2: NGTD  17 - blood (peripheral) x 2: Staph aureus  17 - Cdiff PCR/Toxin: Negative  17 - N gonorrhoeae/C trachomatis PCR: Negative  5/15/17 - urine cx: Negative  17 - Cdiff PCR/Toxin: Negative    Recent Labs      17   0011  17   2352  06/10/17   0021   WBC  <0.1*  0.1*  0.1*     Recent Labs      17   0011  17   2352  06/10/17   0021   BUN  11  10  11   CREATININE  0.60  0.61  0.75   ALBUMIN  2.8*  2.9*  3.4     No results for input(s): VANCOTROUGH, VANCOPEAK, VANCORANDOM in the last 72 hours.  Intake/Output Summary (Last 24 hours) at 06/10/17 0637  Last data filed at 06/10/17 0400   Gross per 24 hour   Intake    120 ml   Output   2075 ml   Net  -1955 ml      Blood pressure 136/58, pulse 98, temperature 38.9 °C (102.1 °F), resp. rate 20, height 1.702 m (5' 7\"), weight 65.6 kg (144 lb 10 oz), SpO2 95 %. Temp (24hrs), Av.7 °C (99.9 °F), Min:36.5 °C (97.7 °F), Max:39.1 °C (102.4 °F)      A/P   1. Vancomycin dose change: 1200 mg IV Q12H  2. Next vancomycin level: VT prior to 3rd dose (level not ordered)  3. Goal trough: 16-20 mcg/mL  4. Comments: Renal indices remain stable. " Pt was on vancomycin until 5/28 and was able to tolerate scheduled maintenance dosing. Will dose similarly and check trough prior to 3rd total dose. Clinical pharmacist to f/u in AM. Look to de-escalate pending cultures.    Lane Law PharmD

## 2017-06-10 NOTE — PROGRESS NOTES
Patient spiked another fever, patient tachycardic. Patient given tylenol. Fluids infusing. Blood cultures were sent this morning urine sample was sent this afternoon.

## 2017-06-10 NOTE — CARE PLAN
Problem: Safety  Goal: Will remain free from injury  Outcome: PROGRESSING AS EXPECTED  Patients frame alarm and bed alarm is on and sounding. PAtient has call light within reach but continues not to call.     Problem: Infection  Goal: Will remain free from infection  Outcome: PROGRESSING AS EXPECTED  Patient neutropenic at this time, family encouraged to wear mask.

## 2017-06-10 NOTE — PROGRESS NOTES
[facility] medical team, informed them of low grade fever and patient feeling they had dry eyes. Orders for eye drops received. Monitor fevers if greater than 100 will order blood cultures.

## 2017-06-10 NOTE — PROGRESS NOTES
Updated [facility] resident about recurrence of temper at 102.1 F. New orders received for vancomycin and Zosyn.

## 2017-06-10 NOTE — PROGRESS NOTES
Lainey from Lab called with critical result of Platelets; 22 WBC: 0.1 at 0118. Critical lab result read back to Lainey.   This critical lab result is within parameters established by [facility] for this patient

## 2017-06-10 NOTE — PROGRESS NOTES
Received report and assumed patient care at change of shift. Patient is resting in bed, A/O x4. Patient reports 0/10 pain at this time, medications provided per MAR.    PIV assessed, patent with no s/s of infection or infiltration noted at this time.    Patient ambulated up to the bathroom with standby assist.    Plan of care discussed, questions answered. Bed is in the lowest position and locked, call light within reach, non-skid socks in place, hourly rounding. Patient reports no further needs and this time.

## 2017-06-10 NOTE — PROGRESS NOTES
[facility] [place] Internal Medicine Interval Note    Name Benjamin Post       1945   Age/Sex 71 y.o. male   MRN [id number]   Code Status DNR     After 5PM or if no immediate response to page, please call for cross-coverage  Attending/Team: Dr. Melissa / rudolph  Call [phone] to page   1st Call - Day Intern (R1):   Dr Sherman Sears  2nd Call - Day Sr. Resident (R2/R3):   Dr. Sarbjit Kent         Chief complaint/ reason for interval visit (Primary Diagnosis)   CML/pancytopenia     Interval Problem Daily Status Update :    Subjective : developed fever overnight with temperature up to 39      Interval events:    Started vancomycin and zosyn. Stop cefazolin   Sent for pan cultures  ECHO no vegetations  MRI thoracic spine showed lytic lesion  ID recommends beta D glucan and galactomannan antigen testing. Also recommend holding off transplant.  To hold BM transplant now         Review of Systems   Constitutional: Negative for fever and chills.   HENT: Negative for hearing loss.    Respiratory: Negative for cough and hemoptysis.    Cardiovascular: Negative for chest pain and palpitations.   Gastrointestinal: Negative for heartburn, nausea, abdominal pain and diarrhea.   Genitourinary: Negative for dysuria and urgency.   Musculoskeletal: Negative for myalgias and neck pain.   Skin: Negative for rash.   Neurological: Negative for dizziness and headaches.   Psychiatric/Behavioral: Negative for depression and suicidal ideas.       Consultants/Specialty  Oncology   Infectious disease    Disposition  Inpatient for neutropenic fever    Quality Measures  EKG reviewed, Labs reviewed, Medications reviewed and Radiology images reviewed  Mahajan catheter: No Mahajan      DVT Prophylaxis: Contraindicated - High bleeding risk  DVT prophylaxis - mechanical: SCDs (low plt)            Physical Exam       Filed Vitals:    06/10/17 0100 06/10/17 0400 06/10/17 0624 06/10/17 0800    BP: 135/58 121/42 136/58 111/53   Pulse: 77 109 98 91   Temp: 38 °C (100.4 °F) 37.7 °C (99.9 °F) 38.9 °C (102.1 °F) 37.7 °C (99.9 °F)   Resp: 19 21 20 20   Height:       Weight:       SpO2:  95%  98%     Body mass index is 22.65 kg/(m^2).    Oxygen Therapy:  Pulse Oximetry: 98 %, O2 (LPM): 0, O2 Delivery: None (Room Air)    Physical Exam   Constitutional: He is well-developed, well-nourished, and in no distress. No distress.   HENT:   Head: Normocephalic.   Neck: No tracheal deviation present. No thyromegaly present.   Cardiovascular: Normal rate.  Exam reveals no friction rub.    Murmur heard.  Ejection systolic murmur graded 3/6 with maximal intensity at tricuspid area, no thrill or radiation.    Pulmonary/Chest: Effort normal. No respiratory distress. He has no wheezes.   Abdominal: Soft. He exhibits no distension. There is no tenderness.   Musculoskeletal: He exhibits no edema.   Neurological: He is alert.   Skin: No erythema.         Lab Data Review:      5/4/2017  1:28 PM    Recent Labs      06/08/17   0011  06/08/17   2352  06/10/17   0021   SODIUM  128*  130*  133*   POTASSIUM  4.2  4.1  4.3   CHLORIDE  101  102  103   CO2  22  22  22   BUN  11  10  11   CREATININE  0.60  0.61  0.75   MAGNESIUM   --   1.9   --    CALCIUM  8.4*  8.5  8.5       Recent Labs      06/08/17   0011  06/08/17   2352  06/10/17   0021   ALTSGPT  31  32  33   ASTSGOT  41  44  69*   ALKPHOSPHAT  252*  244*  295*   TBILIRUBIN  0.6  0.7  1.1   PREALBUMIN   --   9.0*   --    GLUCOSE  112*  101*  128*       Recent Labs      06/08/17   0011  06/08/17   2352  06/10/17   0021   RBC  2.78*  2.38*  2.94*   HEMOGLOBIN  7.7*  6.6*  8.2*   HEMATOCRIT  21.6*  18.6*  23.0*   PLATELETCT  11*  6*  22*   PROTHROMBTM   --   18.2*   --    APTT   --   44.2*   --    INR   --   1.46*   --    IRON   --   144   --    FERRITIN   --   7461.8*   --    TOTIRONBC   --   172*   --        Recent Labs      06/08/17   0011  06/08/17   2352  06/10/17   0021   WBC   <0.1*  0.1*  0.1*   ASTSGOT  41  44  69*   ALTSGPT  31  32  33   ALKPHOSPHAT  252*  244*  295*   TBILIRUBIN  0.6  0.7  1.1           Assessment/Plan       CML (chronic myelocytic leukemia)-Accelerated phase    - spiked fever today up to 39, neutropenic fever  Bone Marrow showed: hypocellular with blast less than 10%   - Pan CT showed: Multiple new ill-defined pulmonary nodules involving both lungs, which may be infectious/inflammatory or metastatic.  Septic emboli are a consideration.  lytic lesions in the T5 vertebral body on the LEFT and adjacent LEFT medial 5th rib, likely metastatic.  There is high possibility that these lesions are septic emboli from his previous MSSA bacteremia vs metastasis.  ECHO no vegetation  Thoracic MRI diffuse infiltration of T spine consistent with CML    - sent for blood culture, urine, sputum cultures, fungal blood culture and fungal AG for aspirgillosis  - started vanco and zocyn  - continue on voriconozole and acyclovir prophylaxis  - ID following  - to hold BM transplant now    Pancytopenia (CMS-HCC)  Transfused multiple platelet and PRBCs in the past.  Transfuse plt>10  Or >30 if bleeding and Hb>7.5.  Folate and B12 low-replacement started    Hyponatremia-improving  >130, serum osmolality 273 low, urine  high  Seems to be hypotonic hyponatremia, dehydration probably  Continue IV fluid and follow up    Neutropenia (CMS-HCC)  Developed neutropenic fever  See CML work up above    Transaminitis-resolved  No abdominal pain, likely effect of chemotherapy  Discontinued all hepatotoxic drugs as possible  Continue to follow    Hypertension  controlled  discontinued amlodipine and continue carvedilol 12.5 mg BID    Seizure disorder (CMS-HCC)  Stable    Continue on his home dose of keppra  Had 1 episode last year after dental procedure.     BPH (benign prostatic hyperplasia)  Stable.   Continue on tamsulosin and finasteride    Low vitamin D level  High Alk Phos  Continue vit D2  weekly.     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

## 2017-06-11 ENCOUNTER — APPOINTMENT (OUTPATIENT)
Dept: RADIOLOGY | Facility: MEDICAL CENTER | Age: 72
DRG: 808 | End: 2017-06-11
Attending: INTERNAL MEDICINE
Payer: MEDICARE

## 2017-06-11 LAB
ALBUMIN SERPL BCP-MCNC: 2.7 G/DL (ref 3.2–4.9)
ALBUMIN/GLOB SERPL: 0.8 G/DL
ALP SERPL-CCNC: 254 U/L (ref 30–99)
ALT SERPL-CCNC: 31 U/L (ref 2–50)
ANION GAP SERPL CALC-SCNC: 9 MMOL/L (ref 0–11.9)
AST SERPL-CCNC: 96 U/L (ref 12–45)
BACTERIA BLD CULT: NORMAL
BACTERIA BLD CULT: NORMAL
BILIRUB SERPL-MCNC: 1.6 MG/DL (ref 0.1–1.5)
BUN SERPL-MCNC: 12 MG/DL (ref 8–22)
C DIFF DNA SPEC QL NAA+PROBE: NEGATIVE
C DIFF TOX A+B STL QL IA: NEGATIVE
C DIFF TOX GENS STL QL NAA+PROBE: NEGATIVE
CALCIUM SERPL-MCNC: 8.1 MG/DL (ref 8.5–10.5)
CHLORIDE SERPL-SCNC: 108 MMOL/L (ref 96–112)
CO2 SERPL-SCNC: 20 MMOL/L (ref 20–33)
CREAT SERPL-MCNC: 0.91 MG/DL (ref 0.5–1.4)
ERYTHROCYTE [DISTWIDTH] IN BLOOD BY AUTOMATED COUNT: 36.8 FL (ref 35.9–50)
GALACTOMANNAN AG SERPL QL IA: NEGATIVE
GALACTOMANNAN AG SERPL-ACNC: 0.05
GFR SERPL CREATININE-BSD FRML MDRD: >60 ML/MIN/1.73 M 2
GLOBULIN SER CALC-MCNC: 3.4 G/DL (ref 1.9–3.5)
GLUCOSE SERPL-MCNC: 122 MG/DL (ref 65–99)
HCT VFR BLD AUTO: 19.1 % (ref 42–52)
HGB BLD-MCNC: 6.6 G/DL (ref 14–18)
MCH RBC QN AUTO: 28 PG (ref 27–33)
MCHC RBC AUTO-ENTMCNC: 34.6 G/DL (ref 33.7–35.3)
MCV RBC AUTO: 80.9 FL (ref 81.4–97.8)
NEUTROPHILS # BLD AUTO: ABNORMAL K/UL (ref 1.82–7.42)
NRBC # BLD AUTO: 0 K/UL
NRBC BLD AUTO-RTO: 0 /100 WBC
PLATELET # BLD AUTO: 8 K/UL (ref 164–446)
PMV BLD AUTO: 9 FL (ref 9–12.9)
POTASSIUM SERPL-SCNC: 3.9 MMOL/L (ref 3.6–5.5)
PROCALCITONIN SERPL-MCNC: 3.13 NG/ML
PROT SERPL-MCNC: 6.1 G/DL (ref 6–8.2)
RBC # BLD AUTO: 2.36 M/UL (ref 4.7–6.1)
SIGNIFICANT IND 70042: NORMAL
SIGNIFICANT IND 70042: NORMAL
SITE SITE: NORMAL
SITE SITE: NORMAL
SODIUM SERPL-SCNC: 137 MMOL/L (ref 135–145)
SOURCE SOURCE: NORMAL
SOURCE SOURCE: NORMAL
WBC # BLD AUTO: 0.1 K/UL (ref 4.8–10.8)

## 2017-06-11 PROCEDURE — 86923 COMPATIBILITY TEST ELECTRIC: CPT

## 2017-06-11 PROCEDURE — P9016 RBC LEUKOCYTES REDUCED: HCPCS

## 2017-06-11 PROCEDURE — A9270 NON-COVERED ITEM OR SERVICE: HCPCS | Performed by: INTERNAL MEDICINE

## 2017-06-11 PROCEDURE — A9270 NON-COVERED ITEM OR SERVICE: HCPCS | Performed by: GENERAL ACUTE CARE HOSPITAL

## 2017-06-11 PROCEDURE — 700105 HCHG RX REV CODE 258: Performed by: INTERNAL MEDICINE

## 2017-06-11 PROCEDURE — 770009 HCHG ROOM/CARE - ONCOLOGY SEMI PRI*

## 2017-06-11 PROCEDURE — A9270 NON-COVERED ITEM OR SERVICE: HCPCS | Performed by: STUDENT IN AN ORGANIZED HEALTH CARE EDUCATION/TRAINING PROGRAM

## 2017-06-11 PROCEDURE — 700102 HCHG RX REV CODE 250 W/ 637 OVERRIDE(OP): Performed by: INTERNAL MEDICINE

## 2017-06-11 PROCEDURE — 700111 HCHG RX REV CODE 636 W/ 250 OVERRIDE (IP)

## 2017-06-11 PROCEDURE — P9034 PLATELETS, PHERESIS: HCPCS

## 2017-06-11 PROCEDURE — 99233 SBSQ HOSP IP/OBS HIGH 50: CPT | Mod: GC | Performed by: INTERNAL MEDICINE

## 2017-06-11 PROCEDURE — 86644 CMV ANTIBODY: CPT

## 2017-06-11 PROCEDURE — 700102 HCHG RX REV CODE 250 W/ 637 OVERRIDE(OP): Performed by: GENERAL ACUTE CARE HOSPITAL

## 2017-06-11 PROCEDURE — 36430 TRANSFUSION BLD/BLD COMPNT: CPT

## 2017-06-11 PROCEDURE — 700102 HCHG RX REV CODE 250 W/ 637 OVERRIDE(OP): Performed by: STUDENT IN AN ORGANIZED HEALTH CARE EDUCATION/TRAINING PROGRAM

## 2017-06-11 PROCEDURE — 700111 HCHG RX REV CODE 636 W/ 250 OVERRIDE (IP): Performed by: INTERNAL MEDICINE

## 2017-06-11 PROCEDURE — 700105 HCHG RX REV CODE 258

## 2017-06-11 RX ORDER — CARVEDILOL 6.25 MG/1
6.25 TABLET ORAL 2 TIMES DAILY WITH MEALS
Status: DISCONTINUED | OUTPATIENT
Start: 2017-06-11 | End: 2017-06-13

## 2017-06-11 RX ADMIN — NYSTATIN 500000 UNITS: 500000 SUSPENSION ORAL at 21:08

## 2017-06-11 RX ADMIN — CARVEDILOL 12.5 MG: 12.5 TABLET, FILM COATED ORAL at 09:42

## 2017-06-11 RX ADMIN — NYSTATIN 500000 UNITS: 500000 SUSPENSION ORAL at 17:54

## 2017-06-11 RX ADMIN — VORICONAZOLE 200 MG: 200 TABLET, FILM COATED ORAL at 09:45

## 2017-06-11 RX ADMIN — ACYCLOVIR 400 MG: 800 TABLET ORAL at 09:42

## 2017-06-11 RX ADMIN — ACYCLOVIR 400 MG: 800 TABLET ORAL at 21:08

## 2017-06-11 RX ADMIN — SODIUM CHLORIDE: 9 INJECTION, SOLUTION INTRAVENOUS at 02:39

## 2017-06-11 RX ADMIN — FINASTERIDE 5 MG: 5 TABLET, FILM COATED ORAL at 09:43

## 2017-06-11 RX ADMIN — VANCOMYCIN HYDROCHLORIDE 1200 MG: 100 INJECTION, POWDER, LYOPHILIZED, FOR SOLUTION INTRAVENOUS at 13:38

## 2017-06-11 RX ADMIN — VORICONAZOLE 200 MG: 200 TABLET, FILM COATED ORAL at 21:08

## 2017-06-11 RX ADMIN — ACETAMINOPHEN 650 MG: 325 TABLET, FILM COATED ORAL at 14:46

## 2017-06-11 RX ADMIN — MINERAL OIL AND WHITE PETROLATUM 1 APPLICATION: 150; 830 OINTMENT OPHTHALMIC at 21:13

## 2017-06-11 RX ADMIN — NYSTATIN 500000 UNITS: 500000 SUSPENSION ORAL at 09:44

## 2017-06-11 RX ADMIN — DIPHENHYDRAMINE HCL 25 MG: 25 TABLET ORAL at 03:53

## 2017-06-11 RX ADMIN — PIPERACILLIN SODIUM AND TAZOBACTAM SODIUM 4.5 G: 4; .5 INJECTION, POWDER, FOR SOLUTION INTRAVENOUS at 12:53

## 2017-06-11 RX ADMIN — PIPERACILLIN SODIUM AND TAZOBACTAM SODIUM 4.5 G: 4; .5 INJECTION, POWDER, FOR SOLUTION INTRAVENOUS at 18:00

## 2017-06-11 RX ADMIN — PIPERACILLIN SODIUM AND TAZOBACTAM SODIUM 4.5 G: 4; .5 INJECTION, POWDER, FOR SOLUTION INTRAVENOUS at 06:16

## 2017-06-11 RX ADMIN — ACETAMINOPHEN 650 MG: 325 TABLET, FILM COATED ORAL at 03:53

## 2017-06-11 RX ADMIN — CARVEDILOL 6.25 MG: 6.25 TABLET, FILM COATED ORAL at 17:53

## 2017-06-11 RX ADMIN — FOLIC ACID 1 MG: 1 TABLET ORAL at 09:43

## 2017-06-11 RX ADMIN — VANCOMYCIN HYDROCHLORIDE 1200 MG: 100 INJECTION, POWDER, LYOPHILIZED, FOR SOLUTION INTRAVENOUS at 00:45

## 2017-06-11 RX ADMIN — PIPERACILLIN SODIUM AND TAZOBACTAM SODIUM 4.5 G: 4; .5 INJECTION, POWDER, FOR SOLUTION INTRAVENOUS at 23:34

## 2017-06-11 RX ADMIN — LEVETIRACETAM 750 MG: 250 TABLET, FILM COATED ORAL at 09:43

## 2017-06-11 RX ADMIN — LEVETIRACETAM 750 MG: 250 TABLET, FILM COATED ORAL at 21:08

## 2017-06-11 RX ADMIN — DIPHENHYDRAMINE HCL 25 MG: 25 TABLET ORAL at 14:46

## 2017-06-11 RX ADMIN — TAMSULOSIN HYDROCHLORIDE 0.8 MG: 0.4 CAPSULE ORAL at 09:45

## 2017-06-11 ASSESSMENT — ENCOUNTER SYMPTOMS
DOUBLE VISION: 0
HEMOPTYSIS: 0
ABDOMINAL PAIN: 0
SPEECH CHANGE: 0
POLYDIPSIA: 0
DEPRESSION: 0
FEVER: 0
BLURRED VISION: 0
COUGH: 0
DIZZINESS: 0
MUSCULOSKELETAL NEGATIVE: 1
DIARRHEA: 0
CONSTITUTIONAL NEGATIVE: 1
CHILLS: 0
TINGLING: 0
VOMITING: 0
SENSORY CHANGE: 0
CARDIOVASCULAR NEGATIVE: 1
GASTROINTESTINAL NEGATIVE: 1
PHOTOPHOBIA: 0
TREMORS: 0
BRUISES/BLEEDS EASILY: 0
SHORTNESS OF BREATH: 0
HEADACHES: 0
HEARTBURN: 0
RESPIRATORY NEGATIVE: 1
PALPITATIONS: 0
NEUROLOGICAL NEGATIVE: 1
NECK PAIN: 0
MYALGIAS: 0
NAUSEA: 0

## 2017-06-11 ASSESSMENT — PAIN SCALES - GENERAL
PAINLEVEL_OUTOF10: 0

## 2017-06-11 NOTE — PROGRESS NOTES
Pt calm and relaxed, very flat affect, reluctant to take evening medications but did take all and tolerated well. Claims no pain or nausea. Mostly Macedonian speaking but understands simple questions. Gave tylenol for elevated temp (102.9F) and will re-check. Bed alarm on for safety, call light in reach and pt calling appropriately, bed in low/locked position, 2 side rails up.

## 2017-06-11 NOTE — PROGRESS NOTES
[facility] [place] [facility] Internal Medicine Interval Note    Name Benjamin Post       1945   Age/Sex 71 y.o. male   MRN [id number]   Code Status DNR     After 5PM or if no immediate response to page, please call for cross-coverage  Attending/Team: Dr. Melissa / rudolph  Call [phone] to page   1st Call - Day Intern (R1):   Dr Sherman Sears  2nd Call - Day Sr. Resident (R2/R3):   Dr. Sarbjit Kent         Chief complaint/ reason for interval visit (Primary Diagnosis)   CML/pancytopenia     Interval Problem Daily Status Update :    Still febrile with temperature up to 39  The patient refusing another peripheral line  Blood culture came negative  To be sent for fungal blood culture    Subjective :     The patient at his baseline clinically. Does not report any cough or vomiting, no diarrhea or bleeding        Review of Systems   Constitutional: Negative for fever and chills.   HENT: Negative for ear discharge and hearing loss.    Eyes: Negative for blurred vision, double vision and photophobia.   Respiratory: Negative for cough and hemoptysis.    Cardiovascular: Negative for chest pain and palpitations.   Gastrointestinal: Negative for heartburn, nausea, abdominal pain and diarrhea.   Genitourinary: Negative for dysuria and urgency.   Musculoskeletal: Negative for myalgias and neck pain.   Skin: Negative for rash.   Neurological: Negative for dizziness, tingling, tremors and headaches.   Endo/Heme/Allergies: Negative for polydipsia. Does not bruise/bleed easily.   Psychiatric/Behavioral: Negative for depression and suicidal ideas.       Consultants/Specialty  Oncology   Infectious disease    Disposition  Inpatient for neutropenic fever    Quality Measures  EKG reviewed, Labs reviewed, Medications reviewed and Radiology images reviewed  Mahajan catheter: No Mahajan      DVT Prophylaxis: Contraindicated - High bleeding risk  DVT prophylaxis - mechanical: SCDs (low  plt)            Physical Exam       Filed Vitals:    06/10/17 2354 06/11/17 0445 06/11/17 0500 06/11/17 0738   BP: 118/72 119/55 117/53 136/68   Pulse: 98 111 106 91   Temp: 36.7 °C (98.1 °F) 38.9 °C (102 °F) 37.1 °C (98.8 °F) 36.4 °C (97.6 °F)   Resp: 20 18 20 20   Height:       Weight:       SpO2: 97% 94% 95% 94%     Body mass index is 22.65 kg/(m^2).    Oxygen Therapy:  Pulse Oximetry: 94 %, O2 (LPM): 0, O2 Delivery: None (Room Air)    Physical Exam   Constitutional: He is well-developed, well-nourished, and in no distress. No distress.   HENT:   Head: Normocephalic.   Neck: No tracheal deviation present. No thyromegaly present.   Cardiovascular: Normal rate.  Exam reveals no friction rub.    Murmur heard.  Ejection systolic murmur graded 3/6 with maximal intensity at tricuspid area, no thrill or radiation.    Pulmonary/Chest: Effort normal. No respiratory distress. He has no wheezes.   Abdominal: Soft. He exhibits no distension. There is no tenderness.   Musculoskeletal: He exhibits no edema.   Neurological: He is alert.   Skin: No erythema.         Lab Data Review:      5/4/2017  1:28 PM    Recent Labs      06/08/17   2352  06/10/17   0021  06/10/17   2354   SODIUM  130*  133*  137   POTASSIUM  4.1  4.3  3.9   CHLORIDE  102  103  108   CO2  22  22  20   BUN  10  11  12   CREATININE  0.61  0.75  0.91   MAGNESIUM  1.9   --    --    CALCIUM  8.5  8.5  8.1*       Recent Labs      06/08/17   2352  06/10/17   0021  06/10/17   2354   ALTSGPT  32  33  31   ASTSGOT  44  69*  96*   ALKPHOSPHAT  244*  295*  254*   TBILIRUBIN  0.7  1.1  1.6*   PREALBUMIN  9.0*   --    --    GLUCOSE  101*  128*  122*       Recent Labs      06/08/17   2352  06/10/17   0021  06/10/17   2354   RBC  2.38*  2.94*  2.36*   HEMOGLOBIN  6.6*  8.2*  6.6*   HEMATOCRIT  18.6*  23.0*  19.1*   PLATELETCT  6*  22*  8*   PROTHROMBTM  18.2*   --    --    APTT  44.2*   --    --    INR  1.46*   --    --    IRON  144   --    --    FERRITIN  7461.8*   --    --     [facility]  172*   --    --        Recent Labs      06/08/17   2352  06/10/17   0021  06/10/17   2354   WBC  0.1*  0.1*  0.1*   ASTSGOT  44  69*  96*   ALTSGPT  32  33  31   ALKPHOSPHAT  244*  295*  254*   TBILIRUBIN  0.7  1.1  1.6*           Assessment/Plan       CML (chronic myelocytic leukemia)-Accelerated phase    still febrile up to 39, neutropenic fever  Blood culture negative on 6/5 and on 6/10  Bone Marrow showed: hypocellular with blast less than 10%    Pan CT showed: Multiple new ill-defined pulmonary nodules involving both lungs, which may be infectious/inflammatory or metastatic.  Septic emboli are a consideration.  lytic lesions in the T5 vertebral body on the LEFT and adjacent LEFT medial 5th rib, likely metastatic.  There is high possibility that these lesions are septic emboli from his previous MSSA bacteremia vs metastasis.  ECHO no vegetation  Thoracic MRI diffuse infiltration of T spine consistent with CML    Plan :    - waiting urine, sputum cultures, fungal blood culture and fungal AG for aspirgillosis  - continue on vanco and zocyn, we will continue on AB for 6 weeks  - continue on voriconozole and acyclovir prophylaxis  - ID following  - per oncology they will go through BM transplant  And it will be arranged probably on 6/25  - for platelet and blood transfusion today    Pancytopenia (CMS-HCC)  Transfused multiple platelet and PRBCs in the past.  Transfuse plt>10  Or >30 if bleeding and Hb>7.5.  Folate and B12 low-replacement started  Received platelet and for blood today    MSSA bacteremia   Blood culture came negative  TTE neg for vegetation,   heart murmur seems to be chronic due to probably TR  To continue on ABX for at least 6 weeks  We will hold BREANNA at this time as this the patient might not be able to tolerate the procedure     Hyponatremia-improving  Improving,     Neutropenia (CMS-HCC)  Developed neutropenic fever  See CML work up above    Transaminitis-resolved  No abdominal  pain, likely effect of chemotherapy  Discontinued all hepatotoxic drugs as possible  Continue to follow    Hypertension  controlled  To decrease carvedilol to 6.25 mg BID for sepsis concern      Seizure disorder (CMS-HCC)  Stable    Continue on his home dose of keppra  Had 1 episode last year after dental procedure.     BPH (benign prostatic hyperplasia)  Stable.   Continue on tamsulosin and finasteride    Low vitamin D level  High Alk Phos  Continue vit D2 weekly.     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

## 2017-06-11 NOTE — PROGRESS NOTES
Infectious Disease Progress Note    Author: Katelin Kay M.D. Date of service & Time created: 2017  11:11 AM    Chief Complaint:  Chief Complaint   Patient presents with   • Body Aches     generalized   • Back Pain       Interval History:  71-year-old  male with CML seen for possible septic emboli and MSSA bacteremia  17- no fevers. No new issues overnight. Denies any cough  6/10 Temp 102.4, WBC 0.1 New cxs done Denies new sxs   Temp 102.9, WBC 0.1 no new sxs Denies SE abx  Labs Reviewed, Medications Reviewed and Radiology Reviewed.    Review of Systems:  Review of Systems   Constitutional: Negative for fever and malaise/fatigue.   Respiratory: Negative for cough and shortness of breath.    Cardiovascular: Negative for chest pain.   Gastrointestinal: Negative for nausea, vomiting and abdominal pain.   Genitourinary: Negative for dysuria.   Musculoskeletal: Negative for myalgias.   Skin: Negative for rash.   Neurological: Negative for sensory change, speech change and headaches.       Hemodynamics:  Temp (24hrs), Av.9 °C (100.3 °F), Min:36.4 °C (97.6 °F), Max:39.4 °C (102.9 °F)  Temperature: 36.4 °C (97.6 °F)  Pulse  Av.3  Min: 58  Max: 118   Blood Pressure : 136/68 mmHg       Physical Exam:  Physical Exam   Constitutional: He is oriented to person, place, and time. He appears well-developed. No distress.   Chronically ill and frail   HENT:   Head: Normocephalic and atraumatic.   Mouth/Throat: Oropharyngeal exudate present.   Edentulous   Eyes: EOM are normal. Pupils are equal, round, and reactive to light. No scleral icterus.   Neck: Neck supple.   Cardiovascular: Normal rate and regular rhythm.    Murmur heard.  Pulmonary/Chest: Effort normal. No respiratory distress. He has no wheezes.   Abdominal: Soft. He exhibits no distension. There is no tenderness.   Musculoskeletal: He exhibits no edema.   Neurological: He is alert and oriented to person, place, and time. No cranial  nerve deficit. Coordination normal.   Skin: No rash noted.   Vitals reviewed.      Meds:    Current facility-administered medications:   •  carvedilol (COREG) tablet 6.25 mg, 6.25 mg, Oral, BID WITH MEALS, Sarbjit Kent M.D.  •  piperacillin-tazobactam (ZOSYN) 4.5 g in  mL IVPB, 4.5 g, Intravenous, Q6HRS, Katelin Kay M.D., Stopped at 06/11/17 0716  •  MD ALERT... vancomycin per pharmacy protocol, , Other, pharmacy to dose, Dulce Maria Del Rosario M.D.  •  vancomycin 1,200 mg in  mL IVPB, 1,200 mg, Intravenous, Q12HR, FARIHA JassoD, Stopped at 06/11/17 0215  •  folic acid (FOLVITE) tablet 1 mg, 1 mg, Oral, DAILY, Sarbjit Kent M.D., 1 mg at 06/11/17 0943  •  cyanocobalamin (VITAMIN B-12) injection 1,000 mcg, 1,000 mcg, Intramuscular, Q7 DAYS, Sarbjit Kent M.D., 1,000 mcg at 06/10/17 1044  •  artificial tear ointment (REFRESH,LACRI-LUBE) 1 Application, 1 Application, Both Eyes, Q8HRS, Sherman Sears M.D., 1 Application at 06/10/17 2252  •  voriconazole (VFEND) tablet 200 mg, 200 mg, Oral, Q12HRS, Wayne Person III, M.D., 200 mg at 06/11/17 0945  •  NS infusion, , Intravenous, Continuous, Sherman Sears M.D., Last Rate: 200 mL/hr at 06/11/17 0239  •  loperamide (IMODIUM) capsule 2 mg, 2 mg, Oral, 4X/DAY PRN, Sherman Sears M.D., 2 mg at 06/07/17 0853  •  acetaminophen (TYLENOL) tablet 650 mg, 650 mg, Oral, Q6HRS PRN, Sherman Sears M.D., 650 mg at 06/11/17 0353  •  acetaminophen (TYLENOL) tablet 650 mg, 650 mg, Oral, Q4HRS PRN, Sherman Sears M.D., 650 mg at 06/10/17 1939  •  tamsulosin (FLOMAX) capsule 0.8 mg, 0.8 mg, Oral, QAM, Sherman Sears M.D., 0.8 mg at 06/11/17 0945  •  finasteride (PROSCAR) tablet 5 mg, 5 mg, Oral, DAILY, Sherman Sears M.D., 5 mg at 06/11/17 0943  •  hydrALAZINE (APRESOLINE) injection 10 mg, 10 mg, Intravenous, Q6HRS PRN, Sherman Sears M.D., 10 mg at 05/25/17 0932  •  diphenoxylate-atropine (LOMOTIL) 2.5-0.025 MG per tablet 1 Tab, 1  Tab, Oral, 4X/DAY PRN, Sherman Sears M.D., 1 Tab at 05/24/17 1023  •  hydrocodone-acetaminophen (NORCO) 5-325 MG per tablet 1-2 Tab, 1-2 Tab, Oral, Q6HRS PRN, Sherman Sears M.D., 2 Tab at 05/21/17 1630  •  acyclovir (ZOVIRAX) tablet 400 mg, 400 mg, Oral, BID, Sherman Sears M.D., 400 mg at 06/11/17 0942  •  nystatin (MYCOSTATIN) 919916 UNIT/ML suspension 500,000 Units, 5 mL, Oral, 4X/DAY, Sherman Sears M.D., 500,000 Units at 06/11/17 0944  •  diphenhydrAMINE (BENADRYL) tablet/capsule 25 mg, 25 mg, Oral, PRN, Sherman Sears M.D., 25 mg at 06/11/17 0353  •  pneumococcal 13-Elizabeth Conj Vacc (PREVNAR 13) syringe 0.5 mL, 0.5 mL, Intramuscular, Once PRN, Gerry Soto M.D.  •  Respiratory Care per Protocol, , Nebulization, Continuous RT, JAQUELINE Turcios.O.  •  ondansetron (ZOFRAN) syringe/vial injection 4 mg, 4 mg, Intravenous, Q4HRS PRN, JAQUELINE Turcios.ORupert, 4 mg at 06/09/17 2347  •  ondansetron (ZOFRAN ODT) dispertab 4 mg, 4 mg, Oral, Q4HRS PRN, Rc Boss D.O., 4 mg at 05/01/17 1955  •  levetiracetam (KEPPRA) tablet 750 mg, 750 mg, Oral, BID, JAQUELINE Turcios.ORupert, 750 mg at 06/11/17 0943  •  oxycodone immediate-release (ROXICODONE) tablet 5 mg, 5 mg, Oral, Q4HRS PRN, JAQUELINE Turcios.O., 5 mg at 05/21/17 1225  •  lidocaine (LIDODERM) 5 % 1 Patch, 1 Patch, Transdermal, Q24HR, JAQUELINE Turcios.O., Stopped at 06/10/17 0937  •  vitamin D (Ergocalciferol) (DRISDOL) capsule 50,000 Units, 50,000 Units, Oral, Q7 DAYS, Gerry Soto M.D., 50,000 Units at 06/10/17 0757    Labs:  Recent Labs      06/08/17   2352  06/10/17   0021  06/10/17   2354   WBC  0.1*  0.1*  0.1*   RBC  2.38*  2.94*  2.36*   HEMOGLOBIN  6.6*  8.2*  6.6*   HEMATOCRIT  18.6*  23.0*  19.1*   MCV  78.2*  78.2*  80.9*   MCH  27.7  27.9  28.0   RDW  34.6*  34.9*  36.8   PLATELETCT  6*  22*  8*   MPV  13.1*  10.1  9.0     Recent Labs      06/08/17   2352  06/10/17   0021  06/10/17   2354   SODIUM  130*  133*  137   POTASSIUM  4.1  4.3  3.9    CHLORIDE  102  103  108   CO2  22  22  20   GLUCOSE  101*  128*  122*   BUN  10  11  12     Recent Labs      06/08/17   2352  06/10/17   0021  06/10/17   2354   ALBUMIN  2.9*  3.4  2.7*   TBILIRUBIN  0.7  1.1  1.6*   ALKPHOSPHAT  244*  295*  254*   TOTPROTEIN  6.2  7.1  6.1   ALTSGPT  32  33  31   ASTSGOT  44  69*  96*   CREATININE  0.61  0.75  0.91       Imaging:  Ct-chest,abdomen,pelvis With    6/8/2017  Lytic lesions in the T5 vertebral body and medial LEFT 5th rib appear to have been present in April 2017. These findings were discussed with Dr. Person on 6/8/2017 7:33 AM.  6/8/2017  1.  Multiple new ill-defined pulmonary nodules involving both lungs, which may be infectious/inflammatory or metastatic.  Septic emboli are a consideration. 2.  Minimal bilateral pleural effusions with associated atelectasis. 3.  Apparent lytic lesions in the T5 vertebral body on the LEFT and adjacent LEFT medial 5th rib, likely metastatic. 4.  Gallbladder wall thickening, concerning for inflammation. 5.  Small amount of peritoneal fluid, of uncertain etiology.  Abnormal for male patient, raising concern for intra-abdominal inflammatory process. 6.  Markedly enlarged heterogeneous prostate.    Ct-head With  6/7/2017  1.  Diffuse RIGHT hemispheric dural thickening, as seen previously.  Possibly sequela of prior subdural hematoma, however tumor and infection are also considerations. 2.  No focal cerebritis or intra-axial mass. 3.  Diffuse atrophy. 4.  No gross intracranial hemorrhage however presence of intravenous contrast may obscure small amounts of subarachnoid hemorrhage.    Mr-thoracic Spine-with & W/o  6/9/2017  1.  Diffuse inhomogeneous infiltration of visualized thoracic spine consistent with chronic myeloid leukemia. There is no epidural tumor or spinal canal compromise. 2.  Small multiple lung nodules. This was noted on the previous CT scan dated 6/7/2017. 3.  Fluid along the right lung major fissure.This was noted on the  previous CT scan dated 6/7/2017.    Echocardiogram Comp W/o Cont    6/9/2017  Transthoracic Echo Report Echocardiography Laboratory CONCLUSIONS Normal left ventricular systolic function. Left ventricular ejection fraction is visually estimated to be 70%. Grade I diastolic dysfunction. Normal right ventricular systolic function. Mild mitral regurgitation. Mild aortic stenosis. Transvalvular gradients are - Peak: 26 mmHg, Mean:  15mmHg. Moderate aortic insufficiency. Moderate tricuspid regurgitation. Right ventricular systolic pressure is estimated to be 50 mmHg. Compared to the images of the study done 3/20/2015 - there has been no significant change.     Micro:  BLOOD CULTURE   Date Value Ref Range Status   06/10/2017   Preliminary    No Growth    Note: Blood cultures are incubated for 5 days and  are monitored continuously.Positive blood cultures  are called to the RN and reported as soon as  they are identified.     06/10/2017   Preliminary    No Growth    Note: Blood cultures are incubated for 5 days and  are monitored continuously.Positive blood cultures  are called to the RN and reported as soon as  they are identified.        Results     Procedure Component Value Units Date/Time    CDIFF BY PCR WITH TOXIN [[id number]] Collected:  06/10/17 2335    Order Status:  Completed Specimen Information:  Stool from Stool Updated:  06/11/17 0908     C Diff by PCR Negative      027-NAP1-BI Presumptive Negative      Comment: Presumptive 027/NAP1/BI target DNA sequences are NOT DETECTED.       Narrative:      Special Contact [id number] CELSO PARSONS  Does this patient have risk factors for C-diff?->Yes  Has patient taken stool softeners or laxatives in the last 5  days?->No    BLOOD CULTURE [[id number]] Collected:  06/10/17 0021    Order Status:  Completed Specimen Information:  Blood from Peripheral Updated:  06/11/17 0717     Significant Indicator NEG      Source BLD      Site PERIPHERAL      Blood Culture --       "Result:        No Growth    Note: Blood cultures are incubated for 5 days and  are monitored continuously.Positive blood cultures  are called to the RN and reported as soon as  they are identified.      Narrative:      Per Hospital Policy: Only change Specimen Src: to \"Line\" if  specified by physician order.    BLOOD CULTURE [[id number]] Collected:  06/10/17 0021    Order Status:  Completed Specimen Information:  Blood from Peripheral Updated:  06/11/17 0717     Significant Indicator NEG      Source BLD      Site PERIPHERAL      Blood Culture --      Result:        No Growth    Note: Blood cultures are incubated for 5 days and  are monitored continuously.Positive blood cultures  are called to the RN and reported as soon as  they are identified.      Narrative:      Per Hospital Policy: Only change Specimen Src: to \"Line\" if  specified by physician order.    BLOOD CULTURE [[id number]] Collected:  06/05/17 2354    Order Status:  Completed Specimen Information:  Blood from Peripheral Updated:  06/11/17 0100     Significant Indicator NEG      Source BLD      Site PERIPHERAL      Blood Culture No growth after 5 days of incubation.     Narrative:      Per Hospital Policy: Only change Specimen Src: to \"Line\" if  specified by physician order.    BLOOD CULTURE [[id number]] Collected:  06/05/17 2354    Order Status:  Completed Specimen Information:  Blood from Peripheral Updated:  06/11/17 0100     Significant Indicator NEG      Source BLD      Site PERIPHERAL      Blood Culture No growth after 5 days of incubation.     Narrative:      Per Hospital Policy: Only change Specimen Src: to \"Line\" if  specified by physician order.    URINE CULTURE(NEW) [[id number]] Collected:  06/10/17 1116    Order Status:  Completed Specimen Information:  Urine from Urine, Clean Catch Updated:  06/10/17 1120    Narrative:      Collected By:[id number] CARLITA JULIAN  Indication for culture:->Temp Equal to,or Greater Than 101  Indication for " "culture:->Suspected Sepsis    URINE CULTURE(NEW) [[id number]] Collected:  06/10/17 1117    Order Status:  Sent Specimen Information:  Urine from Urine, Clean Catch     CULTURE RESPIRATORY W/ [facility] [[id number]]     Order Status:  No result Specimen Information:  Respirate from Sputum     CULTURE RESPIRATORY W/ [facility] [[id number]]     Order Status:  No result Specimen Information:  Sputum from Sputum     FUNGAL BLOOD CULTURE [[id number]]     Order Status:  Sent Specimen Information:  Blood from Blood     CULTURE THROAT [[id number]]     Order Status:  No result Specimen Information:  Throat from Throat     BLOOD CULTURE [[id number]] Collected:  06/08/17 0000    Order Status:  Canceled Specimen Information:  Other from Peripheral     Narrative:      ORDER WAS CANCELLED 06/08/2017 11:38, Duplicate . cxl per nadja  drawn on 6/5/17.  Per Hospital Policy: Only change Specimen Src: to \"Line\" if  specified by physician order.    BLOOD CULTURE [[id number]] Collected:  06/08/17 0000    Order Status:  Canceled Specimen Information:  Other from Peripheral     Narrative:      ORDER WAS CANCELLED 06/08/2017 11:36, Duplicate . cxl per nadja,  duplicate order.  Per Hospital Policy: Only change Specimen Src: to \"Line\" if  specified by physician order.    BLOOD CULTURE [[id number]]     Order Status:  Canceled Specimen Information:  Blood from Peripheral     BLOOD CULTURE [[id number]]     Order Status:  Canceled Specimen Information:  Blood from Peripheral           Assessment:  Active Hospital Problems    Diagnosis   • *Fever and neutropenia (CMS-[facility]) [D70.9, R50.81]   • Neutropenia (CMS-[facility]) [D70.9]   • 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]   • Diarrhea [R19.7]   • Acute bilateral low back pain without sciatica [M54.5]   • Seizure disorder (CMS-[facility]) [G40.909]   • Abnormal CT scan, chest [R93.8]   • Transaminitis [R74.0]       Plan:  Neutropenic fever  Persistent  +MSSA blood-repeat blood cxs " are neg  Ucx neg  Cdiff neg  Hold  off the transplant for now  Checking beta D glucan as well as galactomannan antigen  Continue Zosyn to cover prior MSSA and GNR  Continue Voriconazole     MSSA bacteremia  TTE neg for vegetation  BREANNA if feasible  Repeat cultures are negative from 6/5/17  Aim for at least 6 weeks of MSSA therapy    Septic emboli  Likely secondary to above  Fungal serologies ordered as above    Thrush  Continue Nystatin  Also on voriconazole

## 2017-06-11 NOTE — PROGRESS NOTES
Started platelet administration per standing order to infuse if platelets <10, pt platelets at 8 this morning.

## 2017-06-11 NOTE — PROGRESS NOTES
Pt is A&O.  Up to br to void and have soft bm.  Poor appetite for breakfast.  Refusing lab draws more than 1 x / day.  Awaiting RBC transfusion after ivabx this am.  Received platelets this am.  No obvious bleeding noted.  VSS.  Afebrile.  Neutropenic precautions in place.

## 2017-06-11 NOTE — PROGRESS NOTES
"Pharmacy Kinetics 71 y.o. male on vancomycin day # 2   2017    Indication for Treatment: neutropenic fever    Pertinent history per medical record: Admitted on 2017 for neutropenia. Pt with history of relapsed CML, on chemotherapy was on vancomycin from  through  for neutropenic fever. Vancomycin was discontinued after cultures grew MSSA.  Pt spiked fever overnight of 102.4F. Vancomycin reinitiated this morning but then discontinued by ID Dr. Kay.  Patient spiked fever again this evening of 102.9 and Vanco was ordered again by UNS[facility].      Other antibiotics:   Zosyn 4.5 g IV q6h  acyclovir 400mg PO BID  Voriconazole 200mg PO Q12H    Allergies: Review of patient's allergies indicates no known allergies.     List concerns for renal function: Age, abnormal LFTs, SIRS, low albumin    Pertinent cultures to date:   6/10/17- PBC x 2 NGTD  6/10/17- urine- NGTD  17 - blood (peripheral) x 2: MSSA  17 - Cdiff PCR/Toxin: Negative  17 - N gonorrhoeae/C trachomatis PCR: Negative  5/15/17 - urine cx: Negative  17 - Cdiff PCR/Toxin: Negative    Recent Labs      17   2352  06/10/17   0021  06/10/17   2354   WBC  0.1*  0.1*  0.1*     Recent Labs      17   2352  06/10/17   0021  06/10/17   2354   BUN  10  11  12   CREATININE  0.61  0.75  0.91   ALBUMIN  2.9*  3.4  2.7*      Ref. Range 6/10/2017 23:54   Procalcitonin Latest Ref Range: <0.25 ng/mL 3.13 (H)     No results for input(s): VANCOTROUGH, VANCOPEAK, VANCORANDOM in the last 72 hours.  Intake/Output Summary (Last 24 hours) at 17 1425  Last data filed at 17 1200   Gross per 24 hour   Intake   2105 ml   Output    480 ml   Net   1625 ml      Blood pressure 120/60, pulse 118, temperature 38.3 °C (100.9 °F), resp. rate 18, height 1.702 m (5' 7\"), weight 65.6 kg (144 lb 10 oz), SpO2 97 %. Temp (24hrs), Av.7 °C (99.8 °F), Min:36.4 °C (97.6 °F), Max:39.4 °C (102.9 °F)      A/P   1. Vancomycin dose change: none  2. Next " vancomycin level: ~1230 6/12/17- pt only allows 1 blood draw per day. If ordering a trough, coordinate with RN so they know to draw all labs at the same time.   3. Goal trough: 16-20mcg/mL  4. Comments: Renal function appears stable with adequate UOP per d/w RN. Remains severely neutropenic. Awaiting transfer to [facility] for BMT. Per Dr. Lew, will not delay due to infection. Continue current regimen. Pharmacy will monitor/adjust as needed per protocol.     AVA Linares Pharm.D.

## 2017-06-11 NOTE — PROGRESS NOTES
Lainey from Lab called with critical result of Platelets at 8. Critical lab result read back to Lainey.   This critical lab result is within parameters established by [facility] for this patient.

## 2017-06-11 NOTE — PROGRESS NOTES
Oncology/Hematology Progress Note               Author:  Wayne Person Date & Time created: 6/11/2017  9:50 AM     Interval History:  CC: Accelerated/Blast crisis CML, started second 14 day course of Omacetaxine for 1 more week finishing on 6/7  New bone marrow with a total of <10% blast. Good response to Omacetaxine  No events overnight  Now CT chest showing bilateral pulm nodules likely related to septic emboli to  MSSA bacteremia in the past. Also lytic lesions on T5 and 5ft rib. MRI spine with heterogenoes diffuse involvement due to CML  6/10  spiking fever, tmax 39. Started on Zosyn, vancomycin. On voriconazole. Cultures negative. Patient asymptomatic    Review of Systems:  Review of Systems   Constitutional: Negative.  Negative for fever and chills.   HENT: Negative.    Respiratory: Negative.    Cardiovascular: Negative.    Gastrointestinal: Negative.  Negative for abdominal pain.   Genitourinary: Negative.    Musculoskeletal: Negative.    Skin: Negative for rash.   Neurological: Negative.    Endo/Heme/Allergies: Negative.  Does not bruise/bleed easily.       Physical Exam:  Physical Exam   Constitutional: He is oriented to person, place, and time. He appears well-developed and well-nourished. No distress.   HENT:   Head: Normocephalic and atraumatic.   Mouth/Throat: No oropharyngeal exudate.   Eyes: Pupils are equal, round, and reactive to light. No scleral icterus.   Neck: Normal range of motion.   Cardiovascular: Normal rate and normal heart sounds.    Pulmonary/Chest: Effort normal. No respiratory distress. He has no wheezes. He has no rales.   Abdominal: Soft. Bowel sounds are normal.   Lymphadenopathy:     He has no cervical adenopathy.   Neurological: He is alert and oriented to person, place, and time.   Skin: Skin is warm.       Labs:        Invalid input(s): [id number]      Recent Labs      06/08/17   2352  06/10/17   0021  06/10/17   2354   SODIUM  130*  133*  137   POTASSIUM  4.1  4.3  3.9    CHLORIDE  102  103  108   CO2  22  22  20   BUN  10  11  12   CREATININE  0.61  0.75  0.91   MAGNESIUM  1.9   --    --    CALCIUM  8.5  8.5  8.1*     Recent Labs      06/08/17   2352  06/10/17   0021  06/10/17   2354   ALTSGPT  32  33  31   ASTSGOT  44  69*  96*   ALKPHOSPHAT  244*  295*  254*   TBILIRUBIN  0.7  1.1  1.6*   PREALBUMIN  9.0*   --    --    GLUCOSE  101*  128*  122*     Recent Labs      06/08/17   2352  06/10/17   0021  06/10/17   2354   RBC  2.38*  2.94*  2.36*   HEMOGLOBIN  6.6*  8.2*  6.6*   HEMATOCRIT  18.6*  23.0*  19.1*   PLATELETCT  6*  22*  8*   PROTHROMBTM  18.2*   --    --    APTT  44.2*   --    --    INR  1.46*   --    --    IRON  144   --    --    FERRITIN  7461.8*   --    --    TOTIRONBC  172*   --    --      Recent Labs      06/08/17   2352  06/10/17   0021  06/10/17   2354   WBC  0.1*  0.1*  0.1*   ASTSGOT  44  69*  96*   ALTSGPT  32  33  31   ALKPHOSPHAT  244*  295*  254*   TBILIRUBIN  0.7  1.1  1.6*     Recent Labs      06/08/17   2352  06/10/17   0021  06/10/17   2354   SODIUM  130*  133*  137   POTASSIUM  4.1  4.3  3.9   CHLORIDE  102  103  108   CO2  22  22  20   GLUCOSE  101*  128*  122*   BUN  10  11  12   CREATININE  0.61  0.75  0.91   CALCIUM  8.5  8.5  8.1*     Hemodynamics:  Temp (24hrs), Av.9 °C (100.3 °F), Min:36.4 °C (97.6 °F), Max:39.4 °C (102.9 °F)  Temperature: 36.4 °C (97.6 °F)  Pulse  Av.3  Min: 58  Max: 118   Blood Pressure : 136/68 mmHg     Respiratory:    Respiration: 20, Pulse Oximetry: 94 %     Work Of Breathing / Effort: Mild  RUL Breath Sounds: Diminished;Clear, RML Breath Sounds: Diminished;Clear, RLL Breath Sounds: Diminished, WAGNER Breath Sounds: Diminished, LLL Breath Sounds: Diminished  Fluids:    Intake/Output Summary (Last 24 hours) at 17 1034  Last data filed at 17 1000   Gross per 24 hour   Intake   2057 ml   Output   1675 ml   Net    382 ml        GI/Nutrition:  Orders Placed This Encounter   Procedures   • DIET ORDER     Standing  Status: Standing      Number of Occurrences: 1      Standing Expiration Date:      Order Specific Question:  Diet:     Answer:  Regular [1]     Order Specific Question:  Texture/Fiber modifications:     Answer:  Dysphagia 2(Pureed/Chopped)specify fluid consistency(question 6) [2]     Order Specific Question:  Consistency/Fluid modifications:     Answer:  Thin Liquids [3]     Medical Decision Making, by Problem:  Active Hospital Problems    Diagnosis   • *Fever and neutropenia (CMS-HCC) [D70.9, R50.81]   • Neutropenia (CMS-HCC) [D70.9]   • 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]   • Diarrhea [R19.7]   • Acute bilateral low back pain without sciatica [M54.5]   • Seizure disorder (CMS-HCC) [G40.909]   • Hypertension [I10]   • BPH (benign prostatic hyperplasia) [N40.0]   • Low vitamin D level [E55.9]   • Transaminitis [R74.0]       Plan:  Advanced CML:  -reponded well to Omacetaxine. Last dose will be given on 6/7 . He got a good response with <10% blasts.  -CT scan showed bilateral pulm nodules likely related to septic emboli, Per ID continue on ANCEF for a total of 6 weeks,  -MR T spine showed diffuse heterogenous findings related to CML, no further intervention indicated  -Transfuse  PRBC if hemoglobin <8 g/dl or Platelets if <10 or bleeding. Transfuse blood and platelet today.   -Per Dr Lew he will not pospone BMT despite septic emboli, the donor is the daughter which is an Hapl[employer], No working on the donor. Awaitin ok to transfer patient. May be ready by 6/25.  Needs BREANNA, 2DECHO negative  Now spiking fever, pancultured on Zosyn, voriconazole, acyclovir. ID on Board.     Guarded Prognosis      Labs reviewed

## 2017-06-11 NOTE — PROGRESS NOTES
"Pharmacy Kinetics 71 y.o. male on vancomycin day # 1 6/10/2017    Currently on Vancomycin 1600 mg iv x 1 loading dose was given at 08:00     Indication for Treatment: neutropenic fever    Pertinent history per medical record: Admitted on 4/28/2017 for neutropenia. Pt with history of relapsed CML, on chemotherapy was on vancomycin from 5/26 through 5/28 for neutropenic fever. Vancomycin was d/c'ed after cultures grew MSSA.  Pt spiked fever overnight of 102.4F. Vancomycin reinitiated this morning but then discontinued by ID Dr. Kay.  Patient spiked fever again this evening of 102.9 and Vanco was ordered again by UNSOM.      Other antibiotics: Zosyn 4.5 g IV q6h, acyclovir 400mg PO BID, Voriconazole 200mg PO Q12H    Allergies: Review of patient's allergies indicates no known allergies.     List concerns for renal function: Age, abnormal LFTs, SIRS    Pertinent cultures to date:   5/25/17 - blood (peripheral) x 2: MSSA  5/18/17 - Cdiff PCR/Toxin: Negative  5/17/17 - N gonorrhoeae/C trachomatis PCR: Negative  5/15/17 - urine cx: Negative  5/13/17 - Cdiff PCR/Toxin: Negative    Recent Labs      06/08/17   0011  06/08/17   2352  06/10/17   0021   WBC  <0.1*  0.1*  0.1*     Recent Labs      06/08/17   0011  06/08/17   2352  06/10/17   0021   BUN  11  10  11   CREATININE  0.60  0.61  0.75   ALBUMIN  2.8*  2.9*  3.4     No results for input(s): VANCOTROUGH, VANCOPEAK, VANCORANDOM in the last 72 hours.  Intake/Output Summary (Last 24 hours) at 06/10/17 2302  Last data filed at 06/10/17 1300   Gross per 24 hour   Intake   1956 ml   Output    750 ml   Net   1206 ml      Blood pressure 130/58, pulse 94, temperature 37 °C (98.6 °F), resp. rate 20, height 1.702 m (5' 7\"), weight 65.6 kg (144 lb 10 oz), SpO2 92 %.     A/P   1. Vancomycin dose change: give Vancomycin 1200 mg IV q12h (00,12)  2. Next vancomycin level: not ordered  3. Goal trough: 12 - 20 mcg/mL  4. Comments: Dosing entered based on previous Vancomycin dosing " tolerated this admission.  This patient is followed by ID and Vancomycin was discontinued this morning by Dr. Kay.  Clinical pharmacist to follow up on rounds for de-escalation if clinically appropriate.  Pharmacy will continue to follow.      Renetta Braden [facility]

## 2017-06-12 LAB
ABO GROUP BLD: NORMAL
ALBUMIN SERPL BCP-MCNC: 2.7 G/DL (ref 3.2–4.9)
ALBUMIN/GLOB SERPL: 0.8 G/DL
ALP SERPL-CCNC: 313 U/L (ref 30–99)
ALT SERPL-CCNC: 51 U/L (ref 2–50)
ANION GAP SERPL CALC-SCNC: 7 MMOL/L (ref 0–11.9)
AST SERPL-CCNC: 109 U/L (ref 12–45)
BACTERIA UR CULT: NORMAL
BARCODED ABORH UBTYP: 5100
BARCODED ABORH UBTYP: 5100
BARCODED PRD CODE UBPRD: NORMAL
BARCODED PRD CODE UBPRD: NORMAL
BARCODED UNIT NUM UBUNT: NORMAL
BARCODED UNIT NUM UBUNT: NORMAL
BILIRUB SERPL-MCNC: 1.3 MG/DL (ref 0.1–1.5)
BLD GP AB SCN SERPL QL: NORMAL
BUN SERPL-MCNC: 15 MG/DL (ref 8–22)
CALCIUM SERPL-MCNC: 8.3 MG/DL (ref 8.5–10.5)
CHLORIDE SERPL-SCNC: 113 MMOL/L (ref 96–112)
CO2 SERPL-SCNC: 19 MMOL/L (ref 20–33)
COMPONENT R 8504R: NORMAL
COMPONENT R 8504R: NORMAL
CREAT SERPL-MCNC: 0.78 MG/DL (ref 0.5–1.4)
ERYTHROCYTE [DISTWIDTH] IN BLOOD BY AUTOMATED COUNT: 39.8 FL (ref 35.9–50)
GFR SERPL CREATININE-BSD FRML MDRD: >60 ML/MIN/1.73 M 2
GLOBULIN SER CALC-MCNC: 3.3 G/DL (ref 1.9–3.5)
GLUCOSE SERPL-MCNC: 130 MG/DL (ref 65–99)
HCT VFR BLD AUTO: 15.7 % (ref 42–52)
HGB BLD-MCNC: 5.3 G/DL (ref 14–18)
MCH RBC QN AUTO: 27.9 PG (ref 27–33)
MCHC RBC AUTO-ENTMCNC: 33.8 G/DL (ref 33.7–35.3)
MCV RBC AUTO: 82.6 FL (ref 81.4–97.8)
NEUTROPHILS # BLD AUTO: ABNORMAL K/UL (ref 1.82–7.42)
NRBC # BLD AUTO: 0 K/UL
NRBC BLD AUTO-RTO: 0 /100 WBC
PLATELET # BLD AUTO: 16 K/UL (ref 164–446)
PMV BLD AUTO: 10.3 FL (ref 9–12.9)
POTASSIUM SERPL-SCNC: 3.4 MMOL/L (ref 3.6–5.5)
PRODUCT TYPE UPROD: NORMAL
PRODUCT TYPE UPROD: NORMAL
PROT SERPL-MCNC: 6 G/DL (ref 6–8.2)
RBC # BLD AUTO: 1.9 M/UL (ref 4.7–6.1)
RH BLD: NORMAL
SIGNIFICANT IND 70042: NORMAL
SITE SITE: NORMAL
SODIUM SERPL-SCNC: 139 MMOL/L (ref 135–145)
SOURCE SOURCE: NORMAL
TEST NAME 95000: ABNORMAL
TEST NAME 95000: ABNORMAL
UNIT STATUS USTAT: NORMAL
UNIT STATUS USTAT: NORMAL
VANCOMYCIN TROUGH SERPL-MCNC: 23 UG/ML (ref 10–20)
WBC # BLD AUTO: 0.1 K/UL (ref 4.8–10.8)

## 2017-06-12 PROCEDURE — 700102 HCHG RX REV CODE 250 W/ 637 OVERRIDE(OP): Performed by: GENERAL ACUTE CARE HOSPITAL

## 2017-06-12 PROCEDURE — 97530 THERAPEUTIC ACTIVITIES: CPT

## 2017-06-12 PROCEDURE — 97535 SELF CARE MNGMENT TRAINING: CPT

## 2017-06-12 PROCEDURE — 700102 HCHG RX REV CODE 250 W/ 637 OVERRIDE(OP): Performed by: INTERNAL MEDICINE

## 2017-06-12 PROCEDURE — 86901 BLOOD TYPING SEROLOGIC RH(D): CPT

## 2017-06-12 PROCEDURE — 86644 CMV ANTIBODY: CPT

## 2017-06-12 PROCEDURE — 700105 HCHG RX REV CODE 258: Performed by: INTERNAL MEDICINE

## 2017-06-12 PROCEDURE — A9270 NON-COVERED ITEM OR SERVICE: HCPCS | Performed by: INTERNAL MEDICINE

## 2017-06-12 PROCEDURE — P9016 RBC LEUKOCYTES REDUCED: HCPCS

## 2017-06-12 PROCEDURE — A9270 NON-COVERED ITEM OR SERVICE: HCPCS | Performed by: GENERAL ACUTE CARE HOSPITAL

## 2017-06-12 PROCEDURE — 700111 HCHG RX REV CODE 636 W/ 250 OVERRIDE (IP): Performed by: INTERNAL MEDICINE

## 2017-06-12 PROCEDURE — 85025 COMPLETE CBC W/AUTO DIFF WBC: CPT

## 2017-06-12 PROCEDURE — 36430 TRANSFUSION BLD/BLD COMPNT: CPT

## 2017-06-12 PROCEDURE — 86900 BLOOD TYPING SEROLOGIC ABO: CPT

## 2017-06-12 PROCEDURE — 86850 RBC ANTIBODY SCREEN: CPT

## 2017-06-12 PROCEDURE — 80202 ASSAY OF VANCOMYCIN: CPT

## 2017-06-12 PROCEDURE — 36415 COLL VENOUS BLD VENIPUNCTURE: CPT

## 2017-06-12 PROCEDURE — 80053 COMPREHEN METABOLIC PANEL: CPT

## 2017-06-12 PROCEDURE — 97116 GAIT TRAINING THERAPY: CPT

## 2017-06-12 PROCEDURE — 99232 SBSQ HOSP IP/OBS MODERATE 35: CPT | Mod: GC | Performed by: INTERNAL MEDICINE

## 2017-06-12 PROCEDURE — 770009 HCHG ROOM/CARE - ONCOLOGY SEMI PRI*

## 2017-06-12 PROCEDURE — 86923 COMPATIBILITY TEST ELECTRIC: CPT

## 2017-06-12 PROCEDURE — 97110 THERAPEUTIC EXERCISES: CPT

## 2017-06-12 PROCEDURE — 700105 HCHG RX REV CODE 258

## 2017-06-12 PROCEDURE — A9270 NON-COVERED ITEM OR SERVICE: HCPCS | Performed by: STUDENT IN AN ORGANIZED HEALTH CARE EDUCATION/TRAINING PROGRAM

## 2017-06-12 PROCEDURE — 700102 HCHG RX REV CODE 250 W/ 637 OVERRIDE(OP): Performed by: STUDENT IN AN ORGANIZED HEALTH CARE EDUCATION/TRAINING PROGRAM

## 2017-06-12 PROCEDURE — 700111 HCHG RX REV CODE 636 W/ 250 OVERRIDE (IP)

## 2017-06-12 RX ORDER — POTASSIUM CHLORIDE 20 MEQ/1
40 TABLET, EXTENDED RELEASE ORAL
Status: DISCONTINUED | OUTPATIENT
Start: 2017-06-12 | End: 2017-06-12

## 2017-06-12 RX ORDER — POTASSIUM CHLORIDE 20 MEQ/1
60 TABLET, EXTENDED RELEASE ORAL
Status: COMPLETED | OUTPATIENT
Start: 2017-06-12 | End: 2017-06-12

## 2017-06-12 RX ADMIN — ACYCLOVIR 400 MG: 800 TABLET ORAL at 08:32

## 2017-06-12 RX ADMIN — LEVETIRACETAM 750 MG: 250 TABLET, FILM COATED ORAL at 21:01

## 2017-06-12 RX ADMIN — TAMSULOSIN HYDROCHLORIDE 0.8 MG: 0.4 CAPSULE ORAL at 08:35

## 2017-06-12 RX ADMIN — MINERAL OIL AND WHITE PETROLATUM 1 APPLICATION: 150; 830 OINTMENT OPHTHALMIC at 05:57

## 2017-06-12 RX ADMIN — LEVETIRACETAM 750 MG: 250 TABLET, FILM COATED ORAL at 08:33

## 2017-06-12 RX ADMIN — NYSTATIN 500000 UNITS: 500000 SUSPENSION ORAL at 08:34

## 2017-06-12 RX ADMIN — VANCOMYCIN HYDROCHLORIDE 1200 MG: 100 INJECTION, POWDER, LYOPHILIZED, FOR SOLUTION INTRAVENOUS at 12:51

## 2017-06-12 RX ADMIN — ACETAMINOPHEN 650 MG: 325 TABLET, FILM COATED ORAL at 18:03

## 2017-06-12 RX ADMIN — DIPHENHYDRAMINE HCL 25 MG: 25 TABLET ORAL at 18:04

## 2017-06-12 RX ADMIN — ACYCLOVIR 400 MG: 800 TABLET ORAL at 21:01

## 2017-06-12 RX ADMIN — PIPERACILLIN SODIUM AND TAZOBACTAM SODIUM 4.5 G: 4; .5 INJECTION, POWDER, FOR SOLUTION INTRAVENOUS at 05:53

## 2017-06-12 RX ADMIN — SODIUM CHLORIDE: 9 INJECTION, SOLUTION INTRAVENOUS at 05:52

## 2017-06-12 RX ADMIN — CARVEDILOL 6.25 MG: 6.25 TABLET, FILM COATED ORAL at 08:32

## 2017-06-12 RX ADMIN — ACETAMINOPHEN 650 MG: 325 TABLET, FILM COATED ORAL at 03:28

## 2017-06-12 RX ADMIN — VORICONAZOLE 200 MG: 200 TABLET, FILM COATED ORAL at 08:35

## 2017-06-12 RX ADMIN — POTASSIUM CHLORIDE 60 MEQ: 1500 TABLET, EXTENDED RELEASE ORAL at 18:05

## 2017-06-12 RX ADMIN — SODIUM CHLORIDE: 9 INJECTION, SOLUTION INTRAVENOUS at 11:51

## 2017-06-12 RX ADMIN — CARVEDILOL 6.25 MG: 6.25 TABLET, FILM COATED ORAL at 18:04

## 2017-06-12 RX ADMIN — FINASTERIDE 5 MG: 5 TABLET, FILM COATED ORAL at 08:33

## 2017-06-12 RX ADMIN — PIPERACILLIN SODIUM AND TAZOBACTAM SODIUM 4.5 G: 4; .5 INJECTION, POWDER, FOR SOLUTION INTRAVENOUS at 11:52

## 2017-06-12 RX ADMIN — VANCOMYCIN HYDROCHLORIDE 1200 MG: 100 INJECTION, POWDER, LYOPHILIZED, FOR SOLUTION INTRAVENOUS at 00:55

## 2017-06-12 RX ADMIN — PIPERACILLIN SODIUM AND TAZOBACTAM SODIUM 4.5 G: 4; .5 INJECTION, POWDER, FOR SOLUTION INTRAVENOUS at 18:05

## 2017-06-12 RX ADMIN — VORICONAZOLE 200 MG: 200 TABLET, FILM COATED ORAL at 21:01

## 2017-06-12 RX ADMIN — PIPERACILLIN SODIUM AND TAZOBACTAM SODIUM 4.5 G: 4; .5 INJECTION, POWDER, FOR SOLUTION INTRAVENOUS at 23:48

## 2017-06-12 RX ADMIN — FOLIC ACID 1 MG: 1 TABLET ORAL at 08:33

## 2017-06-12 ASSESSMENT — ENCOUNTER SYMPTOMS
TREMORS: 0
DIAPHORESIS: 0
FEVER: 0
GASTROINTESTINAL NEGATIVE: 1
VOMITING: 0
SHORTNESS OF BREATH: 0
HEADACHES: 0
SPEECH CHANGE: 0
COUGH: 0
PHOTOPHOBIA: 0
CARDIOVASCULAR NEGATIVE: 1
HEARTBURN: 0
NECK PAIN: 0
BRUISES/BLEEDS EASILY: 1
DIZZINESS: 0
NAUSEA: 0
MUSCULOSKELETAL NEGATIVE: 1
TINGLING: 0
POLYDIPSIA: 0
EYE REDNESS: 0
CHILLS: 0
HEMOPTYSIS: 0
BLURRED VISION: 0
DOUBLE VISION: 0
PALPITATIONS: 0
ORTHOPNEA: 0
DIARRHEA: 0
MYALGIAS: 0
ABDOMINAL PAIN: 0
WEAKNESS: 1
SENSORY CHANGE: 0
RESPIRATORY NEGATIVE: 1
SPUTUM PRODUCTION: 0
BRUISES/BLEEDS EASILY: 0
DEPRESSION: 0

## 2017-06-12 ASSESSMENT — COGNITIVE AND FUNCTIONAL STATUS - GENERAL
STANDING UP FROM CHAIR USING ARMS: A LITTLE
CLIMB 3 TO 5 STEPS WITH RAILING: A LITTLE
WALKING IN HOSPITAL ROOM: A LITTLE
SUGGESTED CMS G CODE MODIFIER MOBILITY: CJ
MOBILITY SCORE: 21

## 2017-06-12 ASSESSMENT — PAIN SCALES - GENERAL
PAINLEVEL_OUTOF10: 0

## 2017-06-12 ASSESSMENT — GAIT ASSESSMENTS
GAIT LEVEL OF ASSIST: STAND BY ASSIST
ASSISTIVE DEVICE: FRONT WHEEL WALKER
DISTANCE (FEET): 250
DEVIATION: DECREASED BASE OF SUPPORT;DECREASED TOE OFF

## 2017-06-12 NOTE — PROGRESS NOTES
"Pharmacy Kinetics 71 y.o. male on vancomycin day # 3          2017        Currently on Vancomycin 1200mg IV q12h    Indication for Treatment: neutropenic fever    Pertinent history per medical record: Admitted on 2017 for neutropenia. Pt with history of relapsed CML, on chemotherapy was on vancomycin from  through  for neutropenic fever. Vancomycin was discontinued after cultures grew MSSA.  Pt spiked fever overnight of 102.4F. Vancomycin reinitiated but then discontinued by ID Dr. Kay.  Patient spiked fever again of 102.9 and Vanco was ordered by UNSOM.      Other antibiotics:    Zosyn 4.5 g IV q6h  acyclovir 400mg PO BID  Voriconazole 200mg PO Q12H    Allergies: Review of patient's allergies indicates no known allergies.     List concerns for renal function: Age, abnormal LFTs, SIRS, low albumin    Pertinent cultures to date:    6/10/17- PBC x 2 NGTD  6/10/17- urine- NGTD  6/10/17- Cdiff PCR/Toxin: Negative  17 - blood (peripheral) x 2: MSSA    Recent Labs      06/10/17   0021  06/10/17   2354  17   1220   WBC  0.1*  0.1*  0.1*     Recent Labs      06/10/17   0021  06/10/17   2354  17   1219   BUN  11  12  15   CREATININE  0.75  0.91  0.78   ALBUMIN  3.4  2.7*  2.7*     Recent Labs      17   1219   VANCOTROUGH  23.0*        Ref. Range 6/10/2017 00:21   Asp. Galacto Ag. Serum Latest Ref Range: Negative  Negative   Asp. Galacto. Index Unknown 0.05       Intake/Output Summary (Last 24 hours) at 17 1352  Last data filed at 17 0557   Gross per 24 hour   Intake   2139 ml   Output    550 ml   Net   1589 ml      Blood pressure 153/81, pulse 81, temperature 36.7 °C (98.1 °F), resp. rate 18, height 1.702 m (5' 7\"), weight 65.6 kg (144 lb 10 oz), SpO2 96 %. Temp (24hrs), Av.1 °C (98.8 °F), Min:36.7 °C (98 °F), Max:37.9 °C (100.3 °F)      A/P   1. Vancomycin dose change: decrease to 900mg IV q12h starting ~ 18hrs after last dose to allow for additional drug " clearance.   2. Next vancomycin level: ~ 0530 6/14/17 - pt only allows 1 blood draw per day. If ordering a trough, coordinate with RN so they know to draw all labs at the same time.  3. Goal trough: 16-20mcg/mL  4. Comments:  Renal function appears stable. UOP adequate per RN. Remains severely neutropenic and febrile. Awaiting transfer to [facility] for BMT. Per Dr. Lew, will not delay due to infection. Vancomycin trough above therapeutic goal range. Decrease dose as above. Pharmacy will monitor/adjust as needed per protocol.     AVA Linares, Pharm.D.

## 2017-06-12 NOTE — PROGRESS NOTES
Oncology/Hematology Progress Note               Author:  Dick Elizabeth  DX-Relapsed RefractoRy CML. Date & Time created: 6/12/2017  4:19 PM     Interval History:Accelerated/Blast crisis CML, started second 14 day course of Omacetaxine.  Last bone marrow with a total of <10% blast. Good response to Omacetaxine  Now CT chest showing bilateral pulm nodules likely related to septic emboli to  MSSA bacteremia in the past. Also lytic lesions on T5 and 5ft rib. MRI spine with heterogenoes diffuse involvement due to CML  6/10  spiking fever, tmax 39. Started on Zosyn, vancomycin. On voriconazole. Cultures negative. Patient asymptomatic  6/12-afebrile today but anemia is worse.    Review of Systems:  Review of Systems   Constitutional: Positive for malaise/fatigue. Negative for fever, chills and diaphoresis.   HENT: Negative.  Negative for congestion.    Eyes: Negative for blurred vision and redness.   Respiratory: Negative.  Negative for cough and sputum production.    Cardiovascular: Negative.  Negative for chest pain, palpitations and orthopnea.   Gastrointestinal: Negative.  Negative for abdominal pain.   Genitourinary: Negative.  Negative for dysuria and hematuria.   Musculoskeletal: Negative.  Negative for myalgias and neck pain.   Skin: Negative for itching and rash.   Neurological: Positive for weakness. Negative for dizziness, tremors and headaches.   Endo/Heme/Allergies: Negative for polydipsia. Bruises/bleeds easily.   Psychiatric/Behavioral: Negative for depression and suicidal ideas.       Physical Exam:  Physical Exam   Constitutional: He is oriented to person, place, and time. He appears well-developed and well-nourished. No distress.   HENT:   Head: Normocephalic and atraumatic.   Mouth/Throat: No oropharyngeal exudate.   Eyes: Conjunctivae are normal. Pupils are equal, round, and reactive to light. No scleral icterus.   Neck: Normal range of motion. Neck supple.   Cardiovascular: Normal rate and normal heart  sounds.    Pulmonary/Chest: Effort normal. No respiratory distress. He has no wheezes. He has no rales.   Abdominal: Soft. Bowel sounds are normal. He exhibits no distension. There is no rebound and no guarding.   Musculoskeletal: He exhibits edema. He exhibits no tenderness.   Lymphadenopathy:     He has no cervical adenopathy.   Neurological: He is alert and oriented to person, place, and time. No cranial nerve deficit.   Skin: Skin is warm and dry. No rash noted. No erythema.   Psychiatric: He has a normal mood and affect. His behavior is normal.       Labs:        Invalid input(s): [id number]      Recent Labs      06/10/17   0021  06/10/17   2354  06/12/17   1219   SODIUM  133*  137  139   POTASSIUM  4.3  3.9  3.4*   CHLORIDE  103  108  113*   CO2  22  20  19*   BUN  11  12  15   CREATININE  0.75  0.91  0.78   CALCIUM  8.5  8.1*  8.3*     Recent Labs      06/10/17   0021  06/10/17   2354  06/12/17   1219   ALTSGPT  33  31  51*   ASTSGOT  69*  96*  109*   ALKPHOSPHAT  295*  254*  313*   TBILIRUBIN  1.1  1.6*  1.3   GLUCOSE  128*  122*  130*     Recent Labs      06/10/17   0021  06/10/17   2354  06/12/17   1220   RBC  2.94*  2.36*  1.90*   HEMOGLOBIN  8.2*  6.6*  5.3*   HEMATOCRIT  23.0*  19.1*  15.7*   PLATELETCT  22*  8*  16*     Recent Labs      06/10/17   0021  06/10/17   2354  06/12/17   12117   1220   WBC  0.1*  0.1*   --   0.1*   ASTSGOT  69*  96*  109*   --    ALTSGPT  33  31  51*   --    ALKPHOSPHAT  295*  254*  313*   --    TBILIRUBIN  1.1  1.6*  1.3   --      Recent Labs      06/10/17   0021  06/10/17   2354  06/12/17   1219   SODIUM  133*  137  139   POTASSIUM  4.3  3.9  3.4*   CHLORIDE  103  108  113*   CO2  22  20  19*   GLUCOSE  128*  122*  130*   BUN  11  12  15   CREATININE  0.75  0.91  0.78   CALCIUM  8.5  8.1*  8.3*     Hemodynamics:  Temp (24hrs), Av.9 °C (98.5 °F), Min:36.4 °C (97.6 °F), Max:37.9 °C (100.3 °F)  Temperature: 36.4 °C (97.6 °F)  Pulse  Av.4  Min: 58  Max:  118   Blood Pressure : 153/80 mmHg     Respiratory:    Respiration: 18, Pulse Oximetry: 97 %     Work Of Breathing / Effort: Mild  RUL Breath Sounds: Diminished;Clear, RML Breath Sounds: Diminished;Clear, RLL Breath Sounds: Diminished, WAGNER Breath Sounds: Diminished, LLL Breath Sounds: Diminished  Fluids:    Intake/Output Summary (Last 24 hours) at 06/04/17 1034  Last data filed at 06/04/17 1000   Gross per 24 hour   Intake   2057 ml   Output   1675 ml   Net    382 ml        GI/Nutrition:  Orders Placed This Encounter   Procedures   • DIET ORDER     Standing Status: Standing      Number of Occurrences: 1      Standing Expiration Date:      Order Specific Question:  Diet:     Answer:  Regular [1]     Order Specific Question:  Texture/Fiber modifications:     Answer:  Dysphagia 2(Pureed/Chopped)specify fluid consistency(question 6) [2]     Order Specific Question:  Consistency/Fluid modifications:     Answer:  Thin Liquids [3]     Medical Decision Making, by Problem:  Active Hospital Problems    Diagnosis   • *Fever and neutropenia (CMS-HCC) [D70.9, R50.81]   • Neutropenia (CMS-HCC) [D70.9]   • 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]   • Diarrhea [R19.7]   • Acute bilateral low back pain without sciatica [M54.5]   • Seizure disorder (CMS-HCC) [G40.909]   • Hypertension [I10]   • BPH (benign prostatic hyperplasia) [N40.0]   • Low vitamin D level [E55.9]   • Transaminitis [R74.0]       Plan:  Advanced CML:  -reponded well to Omacetaxine. Last dose will be given on 6/7 . He got a good response with <10% blasts.  -CT scan showed bilateral pulm nodules likely related to septic emboli, Per ID continue on ANCEF for a total of 6 weeks,  -MR T spine showed diffuse heterogenous findings related to CML, no further intervention indicated  -Transfuse  PRBC if hemoglobin <8 g/dl or Platelets if <10 or bleeding. Transfuse blood and platelet today.   -Per Dr Lew he will not pospone BMT despite septic emboli. Now  working on the donor. Awaiting ok to transfer patient. May be ready by 6/25.  Anemia-Transfuse  PRBC if hemoglobin <8 g/dl or Platelets if <10 or bleeding. Transfuse PRBC today.  ID- ID on Board. Continue anti infective agents per them.    Guarded Prognosis      Labs reviewed

## 2017-06-12 NOTE — PROGRESS NOTES
[facility] Internal Medicine Interval Note    Name Benjamin Post       1945   Age/Sex 71 y.o. male   MRN [id number]   Code Status DNR     After 5PM or if no immediate response to page, please call for cross-coverage  Attending/Team: Dr. Chung / rudolph  Call [phone] to page   1st Call - Day Intern (R1):   Dr Sherman Sears  2nd Call - Day Sr. Resident (R2/R3):   Dr. Sarbjit Kent         Chief complaint/ reason for interval visit (Primary Diagnosis)   CML/pancytopenia     Interval Problem Daily Status Update :    No fever overnight  HBG dropped to 5.3, receiving blood now  Beta-glucan is positive  Sent for BREANNA      Subjective :     The patient at his baseline clinically. Does not report any cough or vomiting, no diarrhea or bleeding. No fever overnight         Review of Systems   Constitutional: Negative for fever and chills.   HENT: Negative for ear discharge and hearing loss.    Eyes: Negative for blurred vision, double vision and photophobia.   Respiratory: Negative for cough and hemoptysis.    Cardiovascular: Negative for chest pain and palpitations.   Gastrointestinal: Negative for heartburn, nausea, abdominal pain and diarrhea.   Genitourinary: Negative for dysuria and urgency.   Musculoskeletal: Negative for myalgias and neck pain.   Skin: Negative for rash.   Neurological: Negative for dizziness, tingling, tremors and headaches.   Endo/Heme/Allergies: Negative for polydipsia. Does not bruise/bleed easily.   Psychiatric/Behavioral: Negative for depression and suicidal ideas.       Consultants/Specialty  Oncology   Infectious disease    Disposition  Inpatient for neutropenic fever    Quality Measures  EKG reviewed, Labs reviewed, Medications reviewed and Radiology images reviewed  Mahajan catheter: No Mahajan      DVT Prophylaxis: Contraindicated - High bleeding risk  DVT prophylaxis - mechanical: SCDs (low plt)            Physical Exam        Filed Vitals:    06/12/17 0737 06/12/17 0915 06/12/17 1200 06/12/17 1519   BP: 141/82  153/81 153/80   Pulse: 81  81 80   Temp: 36.9 °C (98.4 °F)  36.7 °C (98.1 °F) 36.4 °C (97.6 °F)   Resp: 18  18 18   Height:       Weight:       SpO2: 95% 96% 96% 97%     Body mass index is 22.65 kg/(m^2).    Oxygen Therapy:  Pulse Oximetry: 97 %, O2 (LPM): 0, O2 Delivery: None (Room Air)    Physical Exam   Constitutional: He is well-developed, well-nourished, and in no distress. No distress.   HENT:   Head: Normocephalic.   Neck: No tracheal deviation present. No thyromegaly present.   Cardiovascular: Normal rate.  Exam reveals no friction rub.    Murmur heard.  Ejection systolic murmur graded 3/6 with maximal intensity at tricuspid area, no thrill or radiation.    Pulmonary/Chest: Effort normal. No respiratory distress. He has no wheezes.   Abdominal: Soft. He exhibits no distension. There is no tenderness.   Musculoskeletal: He exhibits no edema.   Neurological: He is alert.   Skin: No erythema.         Lab Data Review:      5/4/2017  1:28 PM    Recent Labs      06/10/17   0021  06/10/17   2354  06/12/17   1219   SODIUM  133*  137  139   POTASSIUM  4.3  3.9  3.4*   CHLORIDE  103  108  113*   CO2  22  20  19*   BUN  11  12  15   CREATININE  0.75  0.91  0.78   CALCIUM  8.5  8.1*  8.3*       Recent Labs      06/10/17   0021  06/10/17   2354  06/12/17   1219   ALTSGPT  33  31  51*   ASTSGOT  69*  96*  109*   ALKPHOSPHAT  295*  254*  313*   TBILIRUBIN  1.1  1.6*  1.3   GLUCOSE  128*  122*  130*       Recent Labs      06/10/17   0021  06/10/17   2354  06/12/17   1220   RBC  2.94*  2.36*  1.90*   HEMOGLOBIN  8.2*  6.6*  5.3*   HEMATOCRIT  23.0*  19.1*  15.7*   PLATELETCT  22*  8*  16*       Recent Labs      06/10/17   0021  06/10/17   2354  06/12/17   1219  06/12/17   1220   WBC  0.1*  0.1*   --   0.1*   ASTSGOT  69*  96*  109*   --    ALTSGPT  33  31  51*   --    ALKPHOSPHAT  295*  254*  313*   --    TBILIRUBIN  1.1  1.6*  1.3    --            Assessment/Plan       CML (chronic myelocytic leukemia)-Accelerated phase    Failed tyrosine kinase inhibitors previously, responded to omacetaxine  Bone Marrow showed: hypocellular with blast less than 10%   Hb dropped to 5.3 today   Pan CT showed: Multiple new ill-defined pulmonary nodules involving both lungs, which may be infectious/inflammatory or metastatic.  Septic emboli are a consideration.  lytic lesions in the T5 vertebral body on the LEFT and adjacent LEFT medial 5th rib, likely metastatic.  There is high possibility that these lesions are septic emboli from his previous MSSA bacteremia vs metastasis.  ECHO no vegetation  Thoracic MRI diffuse infiltration of T spine consistent with CML  Blood culture is negative on 6/5 and 6/10  Beta-glucan positive but clinically unlikely to be aspirgillosis or PCP pneumonia   galactomannan is negative     Plan :    - continue on vanco and zocyn, we will continue on AB for 6 weeks  - continue on voriconozole and acyclovir prophylaxis  - ID following  - per oncology they will go through BM transplant  And it will be arranged probably on 6/25  - for  blood transfusion today  - sent for BREANNA     Pancytopenia (CMS-HCC)  Transfused multiple platelet and PRBCs in the past.  Transfuse plt>10  Or >30 if bleeding and Hb>7.5.  Folate and B12 low-replacement started  Received platelet and for blood today    MSSA bacteremia   Blood culture came negative  TTE neg for vegetation,   heart murmur seems to be chronic due to probably TR  To continue on ABX for at least 6 weeks  Sent for BREANNA, will be done tomorrow probably    Hyponatremia-improving  Resolved     Neutropenia (CMS-HCC)  Developed neutropenic fever  See CML work up above    Transaminitis-resolved  No abdominal pain, likely effect of chemotherapy  Discontinued all hepatotoxic drugs as possible  Continue to follow    Hypertension  controlled   carvedilol to 6.25 mg BID for sepsis concern      Seizure disorder  (CMS-HCC)  Stable    Continue on his home dose of keppra  Had 1 episode last year after dental procedure.     BPH (benign prostatic hyperplasia)  Stable.   Continue on tamsulosin and finasteride    Low vitamin D level  High Alk Phos  Continue vit D2 weekly.     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

## 2017-06-12 NOTE — THERAPY
"Pt willing, SUPV for all mob, static standing fron toilet for handwashing/hygine, gait around unit SPV FWW on FLS, narrow AZUCENA and diminished cadance/stride. Pt sat UIC for exs mentioned above, and will benifit from acute/post acute rehab to address endurance, strength and coordination deficits... Pt needs updated goals ....Physical Therapy Treatment completed.   Bed Mobility:  Supine to Sit: Supervised  Transfers: Sit to Stand: Supervised  Gait: Level Of Assist: Stand by Assist with Front-Wheel Walker       Plan of Care: Will benefit from Physical Therapy 2 times per week  Discharge Recommendations: Equipment: Front-Wheel Walker. Post-acute therapy Discharge to a transitional care facility for continued skilled therapy services.     See \"Rehab Therapy-Acute\" Patient Summary Report for complete documentation.       "

## 2017-06-12 NOTE — PROGRESS NOTES
Infectious Disease Progress Note    Author: Larisa De La Paz M.D. Date of service & Time created: 2017  4:24 PM    Chief Complaint:  Chief Complaint   Patient presents with   • Body Aches     generalized   • Back Pain       Interval History:  71-year-old  male with CML seen for possible septic emboli and MSSA bacteremia  17- no fevers. No new issues overnight. Denies any cough  6/10 Temp 102.4, WBC 0.1 New cxs done Denies new sxs   Temp 102.9, WBC 0.1 no new sxs Denies SE abx  70- ongoing low-grade fevers. Denies any new issues  Labs Reviewed, Medications Reviewed and Radiology Reviewed.    Review of Systems:  Review of Systems   Constitutional: Negative for fever and malaise/fatigue.   Respiratory: Negative for cough and shortness of breath.    Cardiovascular: Negative for chest pain.   Gastrointestinal: Negative for nausea, vomiting and abdominal pain.   Genitourinary: Negative for dysuria.   Musculoskeletal: Negative for myalgias.   Skin: Negative for rash.   Neurological: Negative for sensory change, speech change and headaches.       Hemodynamics:  Temp (24hrs), Av.9 °C (98.5 °F), Min:36.4 °C (97.6 °F), Max:37.9 °C (100.3 °F)  Temperature: 36.4 °C (97.6 °F)  Pulse  Av.4  Min: 58  Max: 118   Blood Pressure : 153/80 mmHg       Physical Exam:  Physical Exam   Constitutional: He is oriented to person, place, and time. He appears well-developed. No distress.   Chronically ill and frail   HENT:   Head: Normocephalic and atraumatic.   Mouth/Throat: Oropharyngeal exudate present.   Edentulous   Eyes: EOM are normal. Pupils are equal, round, and reactive to light. No scleral icterus.   Neck: Neck supple.   Cardiovascular: Normal rate and regular rhythm.    Murmur heard.  Pulmonary/Chest: Effort normal. No respiratory distress. He has no wheezes.   Abdominal: Soft. He exhibits no distension. There is no tenderness.   Musculoskeletal: He exhibits no edema.   Neurological: He is alert and  oriented to person, place, and time. No cranial nerve deficit. Coordination normal.   Skin: No rash noted.   Vitals reviewed.      Meds:    Current facility-administered medications:   •  potassium chloride SA (Kdur) tablet 40 mEq, 40 mEq, Oral, AT 1800 TODAY, Sherman Sears M.D.  •  [START ON 6/13/2017] vancomycin 900 mg in  mL IVPB, 900 mg, Intravenous, Q12HR, Dulce Maria Del Rosario M.D.  •  carvedilol (COREG) tablet 6.25 mg, 6.25 mg, Oral, BID WITH MEALS, Sarbjit Kent M.D., 6.25 mg at 06/12/17 0832  •  piperacillin-tazobactam (ZOSYN) 4.5 g in  mL IVPB, 4.5 g, Intravenous, Q6HRS, Katelin Kay M.D., Last Rate: 100 mL/hr at 06/12/17 1152, 4.5 g at 06/12/17 1152  •  MD ALERT... vancomycin per pharmacy protocol, , Other, pharmacy to dose, Dulce Maria Del Rosario M.D.  •  folic acid (FOLVITE) tablet 1 mg, 1 mg, Oral, DAILY, Sarbjit Kent M.D., 1 mg at 06/12/17 0833  •  cyanocobalamin (VITAMIN B-12) injection 1,000 mcg, 1,000 mcg, Intramuscular, Q7 DAYS, Sarbjit Kent M.D., 1,000 mcg at 06/10/17 1044  •  artificial tear ointment (REFRESH,LACRI-LUBE) 1 Application, 1 Application, Both Eyes, Q8HRS, Sherman Sears M.D., 1 Application at 06/12/17 0557  •  voriconazole (VFEND) tablet 200 mg, 200 mg, Oral, Q12HRS, Wayne Person III, M.D., 200 mg at 06/12/17 0835  •  NS infusion, , Intravenous, Continuous, Sherman Sears M.D., Last Rate: 200 mL/hr at 06/12/17 1151  •  loperamide (IMODIUM) capsule 2 mg, 2 mg, Oral, 4X/DAY PRN, Sherman Sears M.D., 2 mg at 06/07/17 0853  •  acetaminophen (TYLENOL) tablet 650 mg, 650 mg, Oral, Q6HRS PRN, Sherman Sears M.D., 650 mg at 06/11/17 0353  •  acetaminophen (TYLENOL) tablet 650 mg, 650 mg, Oral, Q4HRS PRN, Sherman Sears M.D., 650 mg at 06/12/17 0328  •  tamsulosin (FLOMAX) capsule 0.8 mg, 0.8 mg, Oral, QAM, Sherman Sears M.D., 0.8 mg at 06/12/17 0835  •  finasteride (PROSCAR) tablet 5 mg, 5 mg, Oral, DAILY, Sherman Sears M.D., 5 mg at  06/12/17 0833  •  hydrALAZINE (APRESOLINE) injection 10 mg, 10 mg, Intravenous, Q6HRS PRN, Sherman Sears M.D., 10 mg at 05/25/17 0932  •  diphenoxylate-atropine (LOMOTIL) 2.5-0.025 MG per tablet 1 Tab, 1 Tab, Oral, 4X/DAY PRN, Sherman Sears M.D., 1 Tab at 05/24/17 1023  •  hydrocodone-acetaminophen (NORCO) 5-325 MG per tablet 1-2 Tab, 1-2 Tab, Oral, Q6HRS PRN, Sherman Sears M.D., 2 Tab at 05/21/17 1630  •  acyclovir (ZOVIRAX) tablet 400 mg, 400 mg, Oral, BID, Sherman Sears M.D., 400 mg at 06/12/17 0832  •  nystatin (MYCOSTATIN) 401776 UNIT/ML suspension 500,000 Units, 5 mL, Oral, 4X/DAY, Sherman Sears M.D., 500,000 Units at 06/12/17 0834  •  diphenhydrAMINE (BENADRYL) tablet/capsule 25 mg, 25 mg, Oral, PRN, Sherman Sears M.D., 25 mg at 06/11/17 1446  •  pneumococcal 13-Elizabeth Conj Vacc (PREVNAR 13) syringe 0.5 mL, 0.5 mL, Intramuscular, Once PRN, Gerry Soto M.D.  •  Respiratory Care per Protocol, , Nebulization, Continuous RT, Rc Boss D.O.  •  ondansetron (ZOFRAN) syringe/vial injection 4 mg, 4 mg, Intravenous, Q4HRS PRN, Rc Boss D.O., 4 mg at 06/09/17 2347  •  ondansetron (ZOFRAN ODT) dispertab 4 mg, 4 mg, Oral, Q4HRS PRN, Rc Boss D.O., 4 mg at 05/01/17 1955  •  levetiracetam (KEPPRA) tablet 750 mg, 750 mg, Oral, BID, Rc Boss D.ORupert, 750 mg at 06/12/17 0833  •  oxycodone immediate-release (ROXICODONE) tablet 5 mg, 5 mg, Oral, Q4HRS PRN, Rc Boss D.ORupert, 5 mg at 05/21/17 1225  •  lidocaine (LIDODERM) 5 % 1 Patch, 1 Patch, Transdermal, Q24HR, Rc Boss D.O., Stopped at 06/10/17 0937  •  vitamin D (Ergocalciferol) (DRISDOL) capsule 50,000 Units, 50,000 Units, Oral, Q7 DAYS, Gerry Soto M.D., 50,000 Units at 06/10/17 0757    Labs:  Recent Labs      06/10/17   0021  06/10/17   2354  06/12/17   1220   WBC  0.1*  0.1*  0.1*   RBC  2.94*  2.36*  1.90*   HEMOGLOBIN  8.2*  6.6*  5.3*   HEMATOCRIT  23.0*  19.1*  15.7*   MCV  78.2*  80.9*  82.6   MCH  27.9  28.0  27.9    RDW  34.9*  36.8  39.8   PLATELETCT  22*  8*  16*   MPV  10.1  9.0  10.3     Recent Labs      06/10/17   0021  06/10/17   2354  06/12/17   1219   SODIUM  133*  137  139   POTASSIUM  4.3  3.9  3.4*   CHLORIDE  103  108  113*   CO2  22  20  19*   GLUCOSE  128*  122*  130*   BUN  11  12  15     Recent Labs      06/10/17   0021  06/10/17   2354  06/12/17   1219   ALBUMIN  3.4  2.7*  2.7*   TBILIRUBIN  1.1  1.6*  1.3   ALKPHOSPHAT  295*  254*  313*   TOTPROTEIN  7.1  6.1  6.0   ALTSGPT  33  31  51*   ASTSGOT  69*  96*  109*   CREATININE  0.75  0.91  0.78       Imaging:  Ct-chest,abdomen,pelvis With    6/8/2017  Lytic lesions in the T5 vertebral body and medial LEFT 5th rib appear to have been present in April 2017. These findings were discussed with Dr. Person on 6/8/2017 7:33 AM.  6/8/2017  1.  Multiple new ill-defined pulmonary nodules involving both lungs, which may be infectious/inflammatory or metastatic.  Septic emboli are a consideration. 2.  Minimal bilateral pleural effusions with associated atelectasis. 3.  Apparent lytic lesions in the T5 vertebral body on the LEFT and adjacent LEFT medial 5th rib, likely metastatic. 4.  Gallbladder wall thickening, concerning for inflammation. 5.  Small amount of peritoneal fluid, of uncertain etiology.  Abnormal for male patient, raising concern for intra-abdominal inflammatory process. 6.  Markedly enlarged heterogeneous prostate.    Ct-head With  6/7/2017  1.  Diffuse RIGHT hemispheric dural thickening, as seen previously.  Possibly sequela of prior subdural hematoma, however tumor and infection are also considerations. 2.  No focal cerebritis or intra-axial mass. 3.  Diffuse atrophy. 4.  No gross intracranial hemorrhage however presence of intravenous contrast may obscure small amounts of subarachnoid hemorrhage.    Mr-thoracic Spine-with & W/o  6/9/2017  1.  Diffuse inhomogeneous infiltration of visualized thoracic spine consistent with chronic myeloid leukemia. There  is no epidural tumor or spinal canal compromise. 2.  Small multiple lung nodules. This was noted on the previous CT scan dated 6/7/2017. 3.  Fluid along the right lung major fissure.This was noted on the previous CT scan dated 6/7/2017.    Echocardiogram Comp W/o Cont    6/9/2017  Transthoracic Echo Report Echocardiography Laboratory CONCLUSIONS Normal left ventricular systolic function. Left ventricular ejection fraction is visually estimated to be 70%. Grade I diastolic dysfunction. Normal right ventricular systolic function. Mild mitral regurgitation. Mild aortic stenosis. Transvalvular gradients are - Peak: 26 mmHg, Mean:  15mmHg. Moderate aortic insufficiency. Moderate tricuspid regurgitation. Right ventricular systolic pressure is estimated to be 50 mmHg. Compared to the images of the study done 3/20/2015 - there has been no significant change.     Micro:  BLOOD CULTURE   Date Value Ref Range Status   06/10/2017   Preliminary    No Growth    Note: Blood cultures are incubated for 5 days and  are monitored continuously.Positive blood cultures  are called to the RN and reported as soon as  they are identified.     06/10/2017   Preliminary    No Growth    Note: Blood cultures are incubated for 5 days and  are monitored continuously.Positive blood cultures  are called to the RN and reported as soon as  they are identified.        Results     Procedure Component Value Units Date/Time    URINE CULTURE(NEW) [[id number]] Collected:  06/10/17 1116    Order Status:  Completed Specimen Information:  Urine from Urine, Clean Catch Updated:  06/12/17 0833     Significant Indicator NEG      Source UR      Site URINE, CLEAN CATCH      Urine Culture No growth at 48 hours     Narrative:      Collected By:[id number] CARLITA JULIAN  Indication for culture:->Temp Equal to,or Greater Than 101  Indication for culture:->Suspected Sepsis    FUNGAL BLOOD CULTURE [[id number]]     Order Status:  Sent Specimen Information:  Blood from Blood      "CDIFF BY PCR WITH TOXIN [[id number]] Collected:  06/10/17 2335    Order Status:  Completed Specimen Information:  Stool from Stool Updated:  06/11/17 2132     C Diff by PCR Negative      027-NAP1-BI Presumptive Negative      Comment: Presumptive 027/NAP1/BI target DNA sequences are NOT DETECTED.        C.Diff Toxin A&B Negative      Comment: C. difficile NOT detected by PCR.  Treatment not indicated per guidelines.         Narrative:      Special Contact [id number] CELSO PARSONS  Does this patient have risk factors for C-diff?->Yes  Has patient taken stool softeners or laxatives in the last 5  days?->No    BLOOD CULTURE [[id number]] Collected:  06/10/17 0021    Order Status:  Completed Specimen Information:  Blood from Peripheral Updated:  06/11/17 0717     Significant Indicator NEG      Source BLD      Site PERIPHERAL      Blood Culture --      Result:        No Growth    Note: Blood cultures are incubated for 5 days and  are monitored continuously.Positive blood cultures  are called to the RN and reported as soon as  they are identified.      Narrative:      Per Hospital Policy: Only change Specimen Src: to \"Line\" if  specified by physician order.    BLOOD CULTURE [[id number]] Collected:  06/10/17 0021    Order Status:  Completed Specimen Information:  Blood from Peripheral Updated:  06/11/17 0717     Significant Indicator NEG      Source BLD      Site PERIPHERAL      Blood Culture --      Result:        No Growth    Note: Blood cultures are incubated for 5 days and  are monitored continuously.Positive blood cultures  are called to the RN and reported as soon as  they are identified.      Narrative:      Per Hospital Policy: Only change Specimen Src: to \"Line\" if  specified by physician order.    BLOOD CULTURE [[id number]] Collected:  06/05/17 2354    Order Status:  Completed Specimen Information:  Blood from Peripheral Updated:  06/11/17 0100     Significant Indicator NEG      Source BLD      Site PERIPHERAL  " "    Blood Culture No growth after 5 days of incubation.     Narrative:      Per Hospital Policy: Only change Specimen Src: to \"Line\" if  specified by physician order.    BLOOD CULTURE [[id number]] Collected:  06/05/17 2354    Order Status:  Completed Specimen Information:  Blood from Peripheral Updated:  06/11/17 0100     Significant Indicator NEG      Source BLD      Site PERIPHERAL      Blood Culture No growth after 5 days of incubation.     Narrative:      Per Hospital Policy: Only change Specimen Src: to \"Line\" if  specified by physician order.    URINE CULTURE(NEW) [[id number]] Collected:  06/10/17 1117    Order Status:  Canceled Specimen Information:  Other from Urine, Clean Catch     Narrative:      ORDER WAS CANCELLED 06/11/2017 12:01, Duplicate ..  Collected By:[id number] CARLITA JULIAN  Indication for culture:->Temp Equal to,or Greater Than 101    CULTURE RESPIRATORY W/ GRM STN [[id number]]     Order Status:  Completed Specimen Information:  Respirate from Sputum     CULTURE RESPIRATORY W/ GRM STN [[id number]]     Order Status:  Completed Specimen Information:  Sputum from Sputum     FUNGAL BLOOD CULTURE [[id number]]     Order Status:  Canceled Specimen Information:  Blood from Blood     CULTURE THROAT [[id number]]     Order Status:  No result Specimen Information:  Throat from Throat     BLOOD CULTURE [[id number]] Collected:  06/08/17 0000    Order Status:  Canceled Specimen Information:  Other from Peripheral     Narrative:      ORDER WAS CANCELLED 06/08/2017 11:38, Duplicate . cxl per nadja  drawn on 6/5/17.  Per Hospital Policy: Only change Specimen Src: to \"Line\" if  specified by physician order.    BLOOD CULTURE [[id number]] Collected:  06/08/17 0000    Order Status:  Canceled Specimen Information:  Other from Peripheral     Narrative:      ORDER WAS CANCELLED 06/08/2017 11:36, Duplicate . cxl per nadja,  duplicate order.  Per Hospital Policy: Only change Specimen Src: to \"Line\" " if  specified by physician order.          Assessment:  Active Hospital Problems    Diagnosis   • *Fever and neutropenia (CMS-[facility]) [D70.9, R50.81]   • Neutropenia (CMS-[facility]) [D70.9]   • 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]   • Diarrhea [R19.7]   • Acute bilateral low back pain without sciatica [M54.5]   • Seizure disorder (CMS-[facility]) [G40.909]   • Abnormal CT scan, chest [R93.8]   • Transaminitis [R74.0]       Plan:  Neutropenic fever  Persistent  +MSSA blood-repeat blood cxs are neg  Ucx neg  Cdiff neg  Hold  off the transplant for now  Beta D glucagon is positive  Continue Zosyn to cover prior MSSA and GNR  Continue Voriconazole     MSSA bacteremia  TTE neg for vegetation  BREANNA if feasible  Repeat cultures are negative from 6/5/17  Aim for at least 6 weeks of MSSA therapy    Septic emboli  Likely secondary to above  Fungal serologies ordered as above    Thrush  Continue Nystatin  Also on voriconazole

## 2017-06-12 NOTE — PROGRESS NOTES
Pt is A&O.  Up with PT to ambulate halls with sba and walker.  Incont of urine in bed.  Also voided in toilet and had a small semi solid BM.  Appetite is poor for breakfast but he's trying.  Neutropenic precautions in place.  Afebrile.  VSS.  Bed alarm on. Pt idoes not call.  Awaiting blood draw at 1230 today.

## 2017-06-12 NOTE — PROGRESS NOTES
Pt calm and relaxed, in good spirits tonight, claims no pain or nausea. Tolerated evening medications well. Mostly Turkish speaking but understands simple questions. Bed alarm on for safety, call light in reach and does not always call and at times impulsive, bed in low/locked position, 2 side rails up.

## 2017-06-12 NOTE — PROGRESS NOTES
"                               [facility] Internal Medicine Interval Note    Name Benjamin Post       1945   Age/Sex 71 y.o. male   MRN [id number]   Code Status DNR     After 5PM or if no immediate response to page, please call for cross-coverage  Attending/Team: Dr. Chung Call [phone] to page   1st Call - Day Intern (R1):   Dr. Sears 2nd Call - Day Sr. Resident (R2/R3):   Dr. Kent         Chief complaint/ reason for interval visit (Primary Diagnosis)   Benjamin Post is a 71 year old male admitted for CML and neutropenic fever.    No acute events overnight although he did receive blood yesterday. He feels \"regular\" this morning although as normal he says that he feels somewhat cold. Patient is primarily Danish-speaking.        Review of Systems   Constitutional: Negative for fever and chills.   Respiratory: Negative for shortness of breath.    Cardiovascular: Negative for chest pain.   Gastrointestinal: Negative for nausea and vomiting.       Consultants/Specialty  Hem/onc  ID    Disposition  Inpatient for treatment/prophylaxis of MSSA, CML.    Quality Measures  Labs reviewed and Medications reviewed  Mahajan catheter: No Mahajan      DVT Prophylaxis: Contraindicated - High bleeding risk  DVT prophylaxis - mechanical: SCDs                Physical Exam       Filed Vitals:    17 0425 17 0737 17 0915 17 1200   BP:  141/82  153/81   Pulse:  81  81   Temp: 36.9 °C (98.5 °F) 36.9 °C (98.4 °F)  36.7 °C (98.1 °F)   Resp:  18  18   Height:       Weight:       SpO2:  95% 96% 96%     Body mass index is 22.65 kg/(m^2).    Oxygen Therapy:  Pulse Oximetry: 96 %, O2 (LPM): 0, O2 Delivery: None (Room Air)    Physical Exam  Gen: NAD  HEENT: Thrush  CV: Regular rate and rhythm, no murmurs  Resp: Clear to auscultation bilaterally  Abd: +BS, NT, ND  Extr: No peripheral lower extremity edema    Lab Data Review:      2017  2:18 PM    Recent Labs      06/10/17   0021  06/10/17   2354  17   " 1219   SODIUM  133*  137  139   POTASSIUM  4.3  3.9  3.4*   CHLORIDE  103  108  113*   CO2  22  20  19*   BUN  11  12  15   CREATININE  0.75  0.91  0.78   CALCIUM  8.5  8.1*  8.3*       Recent Labs      06/10/17   0021  06/10/17   2354  06/12/17   1219   ALTSGPT  33  31  51*   ASTSGOT  69*  96*  109*   ALKPHOSPHAT  295*  254*  313*   TBILIRUBIN  1.1  1.6*  1.3   GLUCOSE  128*  122*  130*       Recent Labs      06/10/17   0021  06/10/17   2354  06/12/17   1220   RBC  2.94*  2.36*  1.90*   HEMOGLOBIN  8.2*  6.6*  5.3*   HEMATOCRIT  23.0*  19.1*  15.7*   PLATELETCT  22*  8*  16*       Recent Labs      06/10/17   0021  06/10/17   2354  06/12/17   1219  06/12/17   1220   WBC  0.1*  0.1*   --   0.1*   ASTSGOT  69*  96*  109*   --    ALTSGPT  33  31  51*   --    ALKPHOSPHAT  295*  254*  313*   --    TBILIRUBIN  1.1  1.6*  1.3   --            Assessment/Plan     #CML  -Neutropenic fever - voriconazole, aciclovir, vanc/zosyn as prophylaxis  -Blood and urine cultures still negative from 6/10  -Beta d-glucan high at 311 (>80 is positive), aspergillus PCR-  -Possible PCP pneumonia consider starting trimethoprim-sulfamethoxazole 15-20mg/kg/day empirically  -Follow up with PCP PCR  -Pulmonary nodules thought to be likely septic emboli  -TTE does not demonstrate endocarditis, BREANNA should be performed pending approval from cardiology.  -Patient to have BMT on 6/25    #MSSA bacteremia  -Negative cultures  -Continue ABX for 6 weeks    #Pancytopenia  -Received transfusion 6/11  -H/H: 5.3/15.7 today, Platelets: 16. Should get another transfusion    #Hypokalemia  -3.4  -Replete with oral K+ 40meq    #Thrush  -Receiving oral nystatin

## 2017-06-13 ENCOUNTER — APPOINTMENT (OUTPATIENT)
Dept: ONCOLOGY | Facility: MEDICAL CENTER | Age: 72
End: 2017-06-13
Attending: SPECIALIST
Payer: MEDICARE

## 2017-06-13 ENCOUNTER — APPOINTMENT (OUTPATIENT)
Dept: RADIOLOGY | Facility: MEDICAL CENTER | Age: 72
DRG: 808 | End: 2017-06-13
Attending: INTERNAL MEDICINE
Payer: MEDICARE

## 2017-06-13 ENCOUNTER — APPOINTMENT (OUTPATIENT)
Dept: ONCOLOGY | Facility: MEDICAL CENTER | Age: 72
End: 2017-06-13
Payer: MEDICARE

## 2017-06-13 LAB
ALBUMIN SERPL BCP-MCNC: 3.1 G/DL (ref 3.2–4.9)
ALBUMIN/GLOB SERPL: 0.9 G/DL
ALP SERPL-CCNC: 428 U/L (ref 30–99)
ALT SERPL-CCNC: 49 U/L (ref 2–50)
ANION GAP SERPL CALC-SCNC: 6 MMOL/L (ref 0–11.9)
AST SERPL-CCNC: 80 U/L (ref 12–45)
BILIRUB SERPL-MCNC: 1.9 MG/DL (ref 0.1–1.5)
BNP SERPL-MCNC: 1435 PG/ML (ref 0–100)
BUN SERPL-MCNC: 12 MG/DL (ref 8–22)
CALCIUM SERPL-MCNC: 8.7 MG/DL (ref 8.5–10.5)
CHLORIDE SERPL-SCNC: 111 MMOL/L (ref 96–112)
CO2 SERPL-SCNC: 21 MMOL/L (ref 20–33)
CREAT SERPL-MCNC: 0.67 MG/DL (ref 0.5–1.4)
ERYTHROCYTE [DISTWIDTH] IN BLOOD BY AUTOMATED COUNT: 37.8 FL (ref 35.9–50)
GALACTOMANNAN AG SERPL QL IA: POSITIVE
GALACTOMANNAN AG SERPL-ACNC: 0.74
GFR SERPL CREATININE-BSD FRML MDRD: >60 ML/MIN/1.73 M 2
GLOBULIN SER CALC-MCNC: 3.5 G/DL (ref 1.9–3.5)
GLUCOSE SERPL-MCNC: 99 MG/DL (ref 65–99)
HCT VFR BLD AUTO: 30 % (ref 42–52)
HGB BLD-MCNC: 10.6 G/DL (ref 14–18)
MCH RBC QN AUTO: 28.4 PG (ref 27–33)
MCHC RBC AUTO-ENTMCNC: 35.3 G/DL (ref 33.7–35.3)
MCV RBC AUTO: 80.4 FL (ref 81.4–97.8)
NEUTROPHILS # BLD AUTO: ABNORMAL K/UL (ref 1.82–7.42)
NRBC # BLD AUTO: 0 K/UL
NRBC BLD AUTO-RTO: 0 /100 WBC
PLATELET # BLD AUTO: 15 K/UL (ref 164–446)
PMV BLD AUTO: 9.2 FL (ref 9–12.9)
POTASSIUM SERPL-SCNC: 3.5 MMOL/L (ref 3.6–5.5)
PROT SERPL-MCNC: 6.6 G/DL (ref 6–8.2)
RBC # BLD AUTO: 3.73 M/UL (ref 4.7–6.1)
SODIUM SERPL-SCNC: 138 MMOL/L (ref 135–145)
TROPONIN I SERPL-MCNC: <0.01 NG/ML (ref 0–0.04)
WBC # BLD AUTO: 0.1 K/UL (ref 4.8–10.8)

## 2017-06-13 PROCEDURE — 700102 HCHG RX REV CODE 250 W/ 637 OVERRIDE(OP): Performed by: GENERAL ACUTE CARE HOSPITAL

## 2017-06-13 PROCEDURE — A9270 NON-COVERED ITEM OR SERVICE: HCPCS | Performed by: INTERNAL MEDICINE

## 2017-06-13 PROCEDURE — 700111 HCHG RX REV CODE 636 W/ 250 OVERRIDE (IP): Performed by: INTERNAL MEDICINE

## 2017-06-13 PROCEDURE — 86644 CMV ANTIBODY: CPT

## 2017-06-13 PROCEDURE — 700102 HCHG RX REV CODE 250 W/ 637 OVERRIDE(OP): Performed by: INTERNAL MEDICINE

## 2017-06-13 PROCEDURE — A9270 NON-COVERED ITEM OR SERVICE: HCPCS | Performed by: STUDENT IN AN ORGANIZED HEALTH CARE EDUCATION/TRAINING PROGRAM

## 2017-06-13 PROCEDURE — P9016 RBC LEUKOCYTES REDUCED: HCPCS

## 2017-06-13 PROCEDURE — 71020 DX-CHEST-2 VIEWS: CPT

## 2017-06-13 PROCEDURE — A9270 NON-COVERED ITEM OR SERVICE: HCPCS | Performed by: GENERAL ACUTE CARE HOSPITAL

## 2017-06-13 PROCEDURE — 99233 SBSQ HOSP IP/OBS HIGH 50: CPT | Mod: GC | Performed by: INTERNAL MEDICINE

## 2017-06-13 PROCEDURE — 80053 COMPREHEN METABOLIC PANEL: CPT

## 2017-06-13 PROCEDURE — 86923 COMPATIBILITY TEST ELECTRIC: CPT

## 2017-06-13 PROCEDURE — 36430 TRANSFUSION BLD/BLD COMPNT: CPT

## 2017-06-13 PROCEDURE — 700111 HCHG RX REV CODE 636 W/ 250 OVERRIDE (IP): Performed by: GENERAL ACUTE CARE HOSPITAL

## 2017-06-13 PROCEDURE — 770009 HCHG ROOM/CARE - ONCOLOGY SEMI PRI*

## 2017-06-13 PROCEDURE — 700105 HCHG RX REV CODE 258: Performed by: INTERNAL MEDICINE

## 2017-06-13 PROCEDURE — 36415 COLL VENOUS BLD VENIPUNCTURE: CPT

## 2017-06-13 PROCEDURE — 83880 ASSAY OF NATRIURETIC PEPTIDE: CPT

## 2017-06-13 PROCEDURE — 700102 HCHG RX REV CODE 250 W/ 637 OVERRIDE(OP): Performed by: STUDENT IN AN ORGANIZED HEALTH CARE EDUCATION/TRAINING PROGRAM

## 2017-06-13 PROCEDURE — 85025 COMPLETE CBC W/AUTO DIFF WBC: CPT

## 2017-06-13 PROCEDURE — 84484 ASSAY OF TROPONIN QUANT: CPT

## 2017-06-13 RX ORDER — HYDRALAZINE HYDROCHLORIDE 20 MG/ML
10 INJECTION INTRAMUSCULAR; INTRAVENOUS EVERY 6 HOURS PRN
Status: DISCONTINUED | OUTPATIENT
Start: 2017-06-13 | End: 2017-06-15 | Stop reason: HOSPADM

## 2017-06-13 RX ORDER — FUROSEMIDE 10 MG/ML
20 INJECTION INTRAMUSCULAR; INTRAVENOUS ONCE
Status: COMPLETED | OUTPATIENT
Start: 2017-06-13 | End: 2017-06-13

## 2017-06-13 RX ORDER — HYDRALAZINE HYDROCHLORIDE 20 MG/ML
10 INJECTION INTRAMUSCULAR; INTRAVENOUS EVERY 6 HOURS PRN
Status: DISCONTINUED | OUTPATIENT
Start: 2017-06-13 | End: 2017-06-13

## 2017-06-13 RX ORDER — AMLODIPINE BESYLATE 5 MG/1
5 TABLET ORAL
Status: DISCONTINUED | OUTPATIENT
Start: 2017-06-13 | End: 2017-06-13

## 2017-06-13 RX ORDER — AMLODIPINE BESYLATE 10 MG/1
10 TABLET ORAL
Status: DISCONTINUED | OUTPATIENT
Start: 2017-06-14 | End: 2017-06-14

## 2017-06-13 RX ORDER — CARVEDILOL 12.5 MG/1
12.5 TABLET ORAL 2 TIMES DAILY WITH MEALS
Status: DISCONTINUED | OUTPATIENT
Start: 2017-06-13 | End: 2017-06-15 | Stop reason: HOSPADM

## 2017-06-13 RX ORDER — ENALAPRILAT 1.25 MG/ML
1.25 INJECTION INTRAVENOUS EVERY 6 HOURS PRN
Status: DISCONTINUED | OUTPATIENT
Start: 2017-06-13 | End: 2017-06-15 | Stop reason: HOSPADM

## 2017-06-13 RX ORDER — POTASSIUM CHLORIDE 20 MEQ/1
40 TABLET, EXTENDED RELEASE ORAL ONCE
Status: COMPLETED | OUTPATIENT
Start: 2017-06-13 | End: 2017-06-13

## 2017-06-13 RX ORDER — IPRATROPIUM BROMIDE AND ALBUTEROL SULFATE 2.5; .5 MG/3ML; MG/3ML
3 SOLUTION RESPIRATORY (INHALATION)
Status: DISCONTINUED | OUTPATIENT
Start: 2017-06-13 | End: 2017-06-15 | Stop reason: HOSPADM

## 2017-06-13 RX ADMIN — DIPHENHYDRAMINE HCL 25 MG: 25 TABLET ORAL at 01:42

## 2017-06-13 RX ADMIN — FOLIC ACID 1 MG: 1 TABLET ORAL at 07:53

## 2017-06-13 RX ADMIN — ENALAPRILAT 1.25 MG: 1.25 INJECTION INTRAVENOUS at 15:11

## 2017-06-13 RX ADMIN — VANCOMYCIN HYDROCHLORIDE 900 MG: 100 INJECTION, POWDER, LYOPHILIZED, FOR SOLUTION INTRAVENOUS at 22:00

## 2017-06-13 RX ADMIN — ACETAMINOPHEN 650 MG: 325 TABLET, FILM COATED ORAL at 01:42

## 2017-06-13 RX ADMIN — ACYCLOVIR 400 MG: 800 TABLET ORAL at 20:44

## 2017-06-13 RX ADMIN — VORICONAZOLE 200 MG: 200 TABLET, FILM COATED ORAL at 07:53

## 2017-06-13 RX ADMIN — MINERAL OIL AND WHITE PETROLATUM 1 APPLICATION: 150; 830 OINTMENT OPHTHALMIC at 20:44

## 2017-06-13 RX ADMIN — TAMSULOSIN HYDROCHLORIDE 0.8 MG: 0.4 CAPSULE ORAL at 07:53

## 2017-06-13 RX ADMIN — HYDRALAZINE HYDROCHLORIDE 10 MG: 20 INJECTION INTRAMUSCULAR; INTRAVENOUS at 06:19

## 2017-06-13 RX ADMIN — PIPERACILLIN SODIUM AND TAZOBACTAM SODIUM 4.5 G: 4; .5 INJECTION, POWDER, FOR SOLUTION INTRAVENOUS at 06:04

## 2017-06-13 RX ADMIN — FUROSEMIDE 20 MG: 10 INJECTION, SOLUTION INTRAMUSCULAR; INTRAVENOUS at 10:13

## 2017-06-13 RX ADMIN — HYDRALAZINE HYDROCHLORIDE 10 MG: 20 INJECTION INTRAMUSCULAR; INTRAVENOUS at 16:39

## 2017-06-13 RX ADMIN — MICAFUNGIN SODIUM 100 MG: 20 INJECTION, POWDER, LYOPHILIZED, FOR SOLUTION INTRAVENOUS at 20:42

## 2017-06-13 RX ADMIN — CARVEDILOL 12.5 MG: 12.5 TABLET, FILM COATED ORAL at 16:39

## 2017-06-13 RX ADMIN — FINASTERIDE 5 MG: 5 TABLET, FILM COATED ORAL at 07:53

## 2017-06-13 RX ADMIN — NYSTATIN 500000 UNITS: 500000 SUSPENSION ORAL at 20:44

## 2017-06-13 RX ADMIN — NYSTATIN 500000 UNITS: 500000 SUSPENSION ORAL at 16:39

## 2017-06-13 RX ADMIN — LEVETIRACETAM 750 MG: 250 TABLET, FILM COATED ORAL at 20:44

## 2017-06-13 RX ADMIN — PIPERACILLIN SODIUM AND TAZOBACTAM SODIUM 4.5 G: 4; .5 INJECTION, POWDER, FOR SOLUTION INTRAVENOUS at 18:03

## 2017-06-13 RX ADMIN — CARVEDILOL 12.5 MG: 12.5 TABLET, FILM COATED ORAL at 07:54

## 2017-06-13 RX ADMIN — ACYCLOVIR 400 MG: 800 TABLET ORAL at 07:53

## 2017-06-13 RX ADMIN — VANCOMYCIN HYDROCHLORIDE 900 MG: 100 INJECTION, POWDER, LYOPHILIZED, FOR SOLUTION INTRAVENOUS at 07:25

## 2017-06-13 RX ADMIN — AMLODIPINE BESYLATE 5 MG: 5 TABLET ORAL at 12:07

## 2017-06-13 RX ADMIN — PIPERACILLIN SODIUM AND TAZOBACTAM SODIUM 4.5 G: 4; .5 INJECTION, POWDER, FOR SOLUTION INTRAVENOUS at 12:07

## 2017-06-13 RX ADMIN — MINERAL OIL AND WHITE PETROLATUM 1 APPLICATION: 150; 830 OINTMENT OPHTHALMIC at 15:05

## 2017-06-13 RX ADMIN — POTASSIUM CHLORIDE 40 MEQ: 1500 TABLET, EXTENDED RELEASE ORAL at 12:07

## 2017-06-13 RX ADMIN — NYSTATIN 500000 UNITS: 500000 SUSPENSION ORAL at 07:53

## 2017-06-13 RX ADMIN — LEVETIRACETAM 750 MG: 250 TABLET, FILM COATED ORAL at 07:53

## 2017-06-13 RX ADMIN — VORICONAZOLE 200 MG: 200 TABLET, FILM COATED ORAL at 20:44

## 2017-06-13 RX ADMIN — NYSTATIN 500000 UNITS: 500000 SUSPENSION ORAL at 12:07

## 2017-06-13 ASSESSMENT — ENCOUNTER SYMPTOMS
TREMORS: 0
SHORTNESS OF BREATH: 1
BRUISES/BLEEDS EASILY: 0
DEPRESSION: 0
ABDOMINAL PAIN: 0
SENSORY CHANGE: 0
MYALGIAS: 0
SORE THROAT: 0
NAUSEA: 0
BLURRED VISION: 0
RESPIRATORY NEGATIVE: 1
SEIZURES: 0
SPUTUM PRODUCTION: 0
PND: 0
FEVER: 0
SPEECH CHANGE: 0
BRUISES/BLEEDS EASILY: 1
TINGLING: 0
NECK PAIN: 0
VOMITING: 0
HEMOPTYSIS: 0
SHORTNESS OF BREATH: 0
HEADACHES: 0
ORTHOPNEA: 0
MUSCULOSKELETAL NEGATIVE: 1
DOUBLE VISION: 0
HEARTBURN: 0
DIARRHEA: 0
WEAKNESS: 1
POLYDIPSIA: 0
COUGH: 0
DIAPHORESIS: 0
DIZZINESS: 0
PALPITATIONS: 0
PHOTOPHOBIA: 0
CHILLS: 0
CARDIOVASCULAR NEGATIVE: 1
EYE REDNESS: 0
DIARRHEA: 1

## 2017-06-13 ASSESSMENT — PAIN SCALES - GENERAL
PAINLEVEL_OUTOF10: 4
PAINLEVEL_OUTOF10: 0
PAINLEVEL_OUTOF10: 4
PAINLEVEL_OUTOF10: 0

## 2017-06-13 NOTE — PROGRESS NOTES
Received shift report from day shift RN. Pt is AO4 and declines any pain. Discussed POC. Assessed and administered evening medications. Bed alarm on, bed in lowest position, call light and personal belongings within reach, educated to call if in need of assistance, non skid socks, room near nurses station, all needs met at this time.

## 2017-06-13 NOTE — PROGRESS NOTES
[facility] – [place] Internal Medicine Interval Note    Name Benjamin Post       1945   Age/Sex 71 y.o. male   MRN [id number]   Code Status DNR     After 5PM or if no immediate response to page, please call for cross-coverage  Attending/Team: Dr. Chung / rudolph  Call [phone] to page   1st Call - Day Intern (R1):   Dr Sherman Sears  2nd Call - Day Sr. Resident (R2/R3):   Dr. Sarbjit Kent         Chief complaint/ reason for interval visit (Primary Diagnosis)   CML/pancytopenia     Interval Problem Daily Status Update :    HBG dropped to 5.3 yesterday, received 2 units of blood, last HB 10.6  Developed some SOB and chest pain, CXR showed new infiltrate, BNP 1435, trop negative  BP elevated up to 196/100, amlodipine reintroduced, carvedilol increased   Waiting BREANNA  No fever overnight    Subjective :     The patient reports some SOB and chest pain, but no fever or cough    Review of Systems   Constitutional: Negative for fever and chills.   HENT: Negative for ear discharge and hearing loss.    Eyes: Negative for blurred vision, double vision and photophobia.   Respiratory: Positive for shortness of breath. Negative for cough and hemoptysis.    Cardiovascular: Positive for chest pain. Negative for palpitations, leg swelling and PND.   Gastrointestinal: Negative for heartburn, nausea, abdominal pain and diarrhea.   Genitourinary: Negative for dysuria and urgency.   Musculoskeletal: Negative for myalgias and neck pain.   Skin: Negative for rash.   Neurological: Negative for dizziness, tingling, tremors and headaches.   Endo/Heme/Allergies: Negative for polydipsia. Does not bruise/bleed easily.   Psychiatric/Behavioral: Negative for depression and suicidal ideas.       Consultants/Specialty  Oncology   Infectious disease    Disposition  Inpatient for neutropenic fever    Quality Measures  EKG reviewed, Labs reviewed, Medications reviewed and Radiology  images reviewed  Mahajan catheter: No Mahajan      DVT Prophylaxis: Contraindicated - High bleeding risk  DVT prophylaxis - mechanical: SCDs (low plt)            Physical Exam       Filed Vitals:    06/13/17 0605 06/13/17 0742 06/13/17 0745 06/13/17 1200   BP: 184/84  196/100 184/83   Pulse: 77  78 63   Temp: 36.9 °C (98.4 °F)  36.4 °C (97.6 °F) 36.3 °C (97.3 °F)   Resp: 18  20 18   Height:       Weight:       SpO2: 94% 98% 96% 99%     Body mass index is 22.65 kg/(m^2).    Oxygen Therapy:  Pulse Oximetry: 99 %, O2 (LPM): 0, O2 Delivery: None (Room Air)    Physical Exam   Constitutional: He is well-developed, well-nourished, and in no distress. No distress.   HENT:   Head: Normocephalic.   Neck: No tracheal deviation present. No thyromegaly present.   Cardiovascular: Normal rate.  Exam reveals no friction rub.    Murmur heard.  Ejection systolic murmur graded 3/6 with maximal intensity at tricuspid area, no thrill or radiation.    Pulmonary/Chest: Effort normal. No respiratory distress. He has no wheezes. He has rales.   Very few basal crepitations   Abdominal: Soft. He exhibits no distension. There is no tenderness.   Musculoskeletal: He exhibits no edema.   Neurological: He is alert.   Skin: No erythema.         Lab Data Review:      5/4/2017  1:28 PM    Recent Labs      06/10/17   2354  06/12/17   1219  06/13/17   0838   SODIUM  137  139  138   POTASSIUM  3.9  3.4*  3.5*   CHLORIDE  108  113*  111   CO2  20  19*  21   BUN  12  15  12   CREATININE  0.91  0.78  0.67   CALCIUM  8.1*  8.3*  8.7       Recent Labs      06/10/17   2354  06/12/17   1219  06/13/17   0838   ALTSGPT  31  51*  49   ASTSGOT  96*  109*  80*   ALKPHOSPHAT  254*  313*  428*   TBILIRUBIN  1.6*  1.3  1.9*   GLUCOSE  122*  130*  99       Recent Labs      06/10/17   2354  06/12/17   1220  06/13/17   0838   RBC  2.36*  1.90*  3.73*   HEMOGLOBIN  6.6*  5.3*  10.6*   HEMATOCRIT  19.1*  15.7*  30.0*   PLATELETCT  8*  16*  15*       Recent Labs       06/10/17   2354  06/12/17   1219  06/12/17   1220  06/13/17   0838   WBC  0.1*   --   0.1*  0.1*   ASTSGOT  96*  109*   --   80*   ALTSGPT  31  51*   --   49   ALKPHOSPHAT  254*  313*   --   428*   TBILIRUBIN  1.6*  1.3   --   1.9*           Assessment/Plan       # pneumonia or invasive aspergillosis  - patient developed sob today, mild chest pain which seems pleuritic, normal O2 saturation  - new CXR showed : right middle lobe atelectasis and right lower lobe opacities/possible pneumonia.  Small right pleural effusion that has increased in volume.  - CT chest done on 6/7 showed Ill-defined nodular densities are present in both lungs, measuring up to 8 mm.   - Beta-glucan positive, galactomannan is positive   - these small nodules most likely are due aspergillosis infection. The new infiltrate could be bacterial pneumonia, aspiration? On the top of aspergillosis    Plan :    - continue on voriconozole, we will need IV voriconozole. Infectious disease is following   - continue IV antibiotics with vanco and zocyn     CML (chronic myelocytic leukemia)-Accelerated phase    Failed tyrosine kinase inhibitors previously, responded to omacetaxine  Bone Marrow showed: hypocellular with blast less than 10%, considered responded to chemotherapy   Pan CT showed: Multiple new ill-defined pulmonary nodules involving both lungs, which may be infectious/inflammatory or metastatic.  Septic emboli are a consideration.  lytic lesions in the T5 vertebral body on the LEFT and adjacent LEFT medial 5th rib, likely metastatic.  There is high possibility that these lesions are septic emboli from his previous MSSA bacteremia vs metastasis.  ECHO no vegetation  Thoracic MRI diffuse infiltration of T spine consistent with CML  per oncology they will go through BM transplant despite the septic emboli And it will be arranged probably on 6/25        Pancytopenia (CMS-HCC)  Due to CML  Hb dropped yesterday to 5.3, received 2 units of blood, HB  today 10.6  Transfuse plt>10  Or >30 if bleeding and Hb>7.5.  Folate and B12 low-replacement       MSSA bacteremia   Afebrile now  Blood culture is negative on 6/5 and 6/10  TTE neg for vegetation,   heart murmur seems to be chronic due to probably TR   continue on vanco and zocyn, we will continue on AB for 6 weeks  Sent for BREANNA    Hyponatremia-improving  Resolved     Neutropenia (CMS-HCC)  Developed neutropenic fever  See  work up above    Transaminitis-resolved  AST 80, ALT 49, ALK. Ph 429, elevated from the previous days  Most likely due to medication effect, voriconozole could be the cause  Follow up    Hypertension  BP elevated today up to 190/100  Amlodipine restarted with 10 mg, carvedilol increased to 12.5 mg BID  Hydralazine on board      Seizure disorder (CMS-HCC)  Stable    Continue on his home dose of keppra  Had 1 episode last year after dental procedure.     BPH (benign prostatic hyperplasia)  Stable.   Continue on tamsulosin and finasteride    Low vitamin D level  High Alk Phos  Continue vit D2 weekly.     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

## 2017-06-13 NOTE — CARE PLAN
Problem: Safety  Goal: Will remain free from injury  Outcome: PROGRESSING AS EXPECTED  Bed alarm, bed in lowest position, call light and personal belongings within reach, educated to call if in need of assistance, non skid socks, room near nurses station    Problem: Urinary Elimination:  Goal: Ability to reestablish a normal urinary elimination pattern will improve  Outcome: PROGRESSING AS EXPECTED  Urinal at bedside

## 2017-06-13 NOTE — DISCHARGE PLANNING
..Medical SW    Referral: RN indicates pt will not transfer as he has medical complications.     Intervention: Pt not transferring at this time.    Plan: Sw to assist w/ d/c planning as needed.

## 2017-06-13 NOTE — PROGRESS NOTES
[facility] [place]    Name Benjamin Post       1945   Age/Sex 71 y.o. male   MRN [id number]   Code Status DNR     After 5PM or if no immediate response to page, please call for cross-coverage  Attending/Team: Dr. Chung Call [phone] to page   1st Call - Day Intern (R1):   Dr. Sears 2nd Call - Day Sr. Resident (R2/R3):   Dr. Kent         Chief complaint/ reason for interval visit (Primary Diagnosis)   Benjamin Post is a 71 year old male admitted  for CML and neutropenic fever.    Patient complains of some chest pressure and shortness of breath this morning. He received 1 unit of blood overnight and 2 the day previously. Otherwise he states that he feels well.        Review of Systems   Constitutional: Negative for fever and chills.   Respiratory: Positive for shortness of breath.    Cardiovascular: Positive for chest pain.   Gastrointestinal: Negative for nausea and vomiting.       Consultants/Specialty  ID  Heme/onc    Disposition  Inpatient for IV ABX.    Quality Measures  Labs reviewed and Medications reviewed  Mahajan catheter: No Mahajan      DVT Prophylaxis: Contraindicated - High bleeding risk  DVT prophylaxis - mechanical: SCDs                Physical Exam       Filed Vitals:    17 0605 17 0742 17 0745 17 1200   BP: 184/84  196/100 184/83   Pulse: 77  78 63   Temp: 36.9 °C (98.4 °F)  36.4 °C (97.6 °F) 36.3 °C (97.3 °F)   Resp: 18  20 18   Height:       Weight:       SpO2: 94% 98% 96% 99%     Body mass index is 22.65 kg/(m^2).    Oxygen Therapy:  Pulse Oximetry: 99 %, O2 (LPM): 0, O2 Delivery: None (Room Air)    Physical Exam  Gen: NAD  HEENT: Thrush, no bleeding gums  CV: Regular rate and rhythms  Resp: Bilateral basilar crackles  Abd: +BS, NT, ND  Extr: 1+ edema    Lab Data Review:      2017  1:46 PM    Recent Labs      06/10/17   2354  17   1219  17   0838   SODIUM  137  139  138   POTASSIUM  3.9   3.4*  3.5*   CHLORIDE  108  113*  111   CO2  20  19*  21   BUN  12  15  12   CREATININE  0.91  0.78  0.67   CALCIUM  8.1*  8.3*  8.7       Recent Labs      06/10/17   2354  06/12/17   1219  06/13/17   0838   ALTSGPT  31  51*  49   ASTSGOT  96*  109*  80*   ALKPHOSPHAT  254*  313*  428*   TBILIRUBIN  1.6*  1.3  1.9*   GLUCOSE  122*  130*  99       Recent Labs      06/10/17   2354  06/12/17   1220  06/13/17   0838   RBC  2.36*  1.90*  3.73*   HEMOGLOBIN  6.6*  5.3*  10.6*   HEMATOCRIT  19.1*  15.7*  30.0*   PLATELETCT  8*  16*  15*       Recent Labs      06/10/17   2354  06/12/17   1219  06/12/17   1220  06/13/17   0838   WBC  0.1*   --   0.1*  0.1*   ASTSGOT  96*  109*   --   80*   ALTSGPT  31  51*   --   49   ALKPHOSPHAT  254*  313*   --   428*   TBILIRUBIN  1.6*  1.3   --   1.9*           Assessment/Plan     #CML with neutropenic fever  -Patient currently afebrile  -Cultures from 6/10 are negative (urine and blood)  -Patient still on voriconazole, aciclovir, vancomycin, and zosyn  -BREANNA to be done today  -Beta d glucan and aspergillus galacto kristin both positive  -Had considered prophylactic bactrim for PCP, however with positive galacto kristin aspergillus seems more likely and consistent with radiographic findings. Consider amphotericin B for invasive aspergillosis. Will consult ID on the matter.    #Transaminitis  -Trending up from 6/8 slowly  -Potentially drug induced, perhaps Voriconazole, continue to monitor    #Volume overload  -Patient complaining of SOB and has been receiving fluids  -Patient hypertensive overnight and did not respond to hydralazine  -CXR shows right pleural effusion increased in volume and left costophrenic angle blunting  -BNP 1400  -Received 1 dose of lasix 20mg IV  -Will continue to monitor symptoms and blood pressure    #Pancytopenia  -Received 1 unit of blood today and 2 yesterday  -Platelet count improving  -Will continue to monitor especially H/H and WBC    #Hypokalemia  -Repleted  with 40mEq  -Will monitor as lasix dose may exacerbate    #Thrush  -Still present on examination  -No dysphagia or odynophagia  -Continue nystatin

## 2017-06-13 NOTE — PROGRESS NOTES
"Pharmacy Kinetics 71 y.o. male on vancomycin day # 4   2017    Currently on Vancomycin 900mg IV q12h    Indication for Treatment: neutropenic fever    Pertinent history per medical record: Admitted on 2017 for neutropenia. Pt with history of relapsed CML, on chemotherapy was on vancomycin from  through  for neutropenic fever. Vancomycin was discontinued after cultures grew MSSA.  Pt spiked fever overnight of 102.4F. Vancomycin reinitiated but then discontinued by ID Dr. Kay.  Patient spiked fever again of 102.9 and Vanco was ordered by [facility].      Other antibiotics:    Zosyn 4.5 g IV q6h  acyclovir 400mg PO BID  Voriconazole 200mg PO Q12H    Allergies: Review of patient's allergies indicates no known allergies.     List concerns for renal function: Age, abnormal LFTs, SIRS, low albumin    Pertinent cultures to date:    6/10/17- PBC x 2 NGTD  6/10/17- urine- NGTD  6/10/17- Cdiff PCR/Toxin: Negative  17 - blood (peripheral) x 2: MSSA    Recent Labs      06/10/17   2354  17   1220  17   0838   WBC  0.1*  0.1*  0.1*     Recent Labs      06/10/17   2354  17   1219  17   0838   BUN  12  15  12   CREATININE  0.91  0.78  0.67   ALBUMIN  2.7*  2.7*  3.1*     Recent Labs      17   1219   VANCOTROUGH  23.0*     Intake/Output Summary (Last 24 hours) at 17 1550  Last data filed at 17 1400   Gross per 24 hour   Intake    930 ml   Output   2820 ml   Net  -1890 ml      Blood pressure 197/93, pulse 74, temperature 36.3 °C (97.3 °F), resp. rate 18, height 1.702 m (5' 7\"), weight 65.6 kg (144 lb 10 oz), SpO2 99 %. Temp (24hrs), Av.6 °C (97.8 °F), Min:36.3 °C (97.3 °F), Max:36.9 °C (98.4 °F)      A/P   1. Vancomycin dose change: none  2. Next vancomycin level: ~ 0530 17 - pt only allows 1 blood draw per day. If ordering a trough, coordinate with RN so they know to draw all labs at the same time.  3. Goal trough: 16-20mcg/mL  4. Comments:  Renal function " appears stable with adequate UOP. Remains neutropenic. Trough as above. Pharmacy will monitor/adjust as needed per protocol.     Jabier Saez.D.

## 2017-06-13 NOTE — PROGRESS NOTES
[facility] [place]ING [place]:      Date & Time note created:   6/13/2017   2:22 PM     Visit Time:   Attending/resident bedside rounds 9-11:30 AM     The patient was evaluated with the resident staff.  I reviewed the resident's note and agree with the resident's findings and plan as documented in the resident's note except as documented in the attending note. Please reference resident daily note for complete information.The chart was reviewed and summarized.  Available labs, imaging, O2 sats, EKGs were reviewed. Available nursing, consultant, and resident notes were reviewed. I am actively involved in the patient's care.                                                                BRIEF DISCUSSION:                                                         //CML likely with blast crisis,Pancytopenia: S/p Omacetaxine. Had received Syrinbo in the past. Planned for BMT in future at [facility]. Monitor for bleeding complications and neutropenic complications. Transfusions as recommended by Hem/Onc.    // Neutropenic fever, MSSA Bacteremia: Continue Abx. On Vanc and Zosyn, No fever in 24 hours. ID following  BC + 5/25, repeat Cx all negative  CT chest s/o multifocal PNA. Planning on BREANNA if possible to r/o vegetation. Repeat cx are negative.   // Suspected Invasive Aspergillus PNA, R/O Septic phenomena: Asp galactomannan and B2 glucan are positive. On Voriconazole. New infiltrates seen today, with pleuritic chest pain. Probably needs escalation of antifungal. D/W ID.   // Transaminitis: Suspect medication related. Has ALP>AST>ALT. Suspect some ALP from his bone lesion. Monitor. Avoid Hepatotoxic medication as much as possible. Is on Voriconazole.  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 Has elevated PSA and  No Spinal lesions so doubt Prostrate related. CT c/a/p no mass.  SPEP with FERNY polyclonal     // h/o  Hypercalcemia: PTH 6.8, Vit D 22, Likely related to malignancy. PTHrP  elevated. PSA elevated as well. CT c/a/p 4/4 s/o possible lesions related to CML. No spinal blastic lesions.    // Valvular Cardiomyopathy, PHTN: No evidence of HF at this time. Cont BB. Monitor fluid status. Outpatient cards follow up.       // HTN: BP ok.  // Seizure Disorder: Continue Keppra  // h/o SDH    Echo: Normal left ventricular systolic function.Left ventricular ejection fraction is visually estimated to be 70%.Grade I diastolic dysfunction.Normal right ventricular systolic function.Mild mitral regurgitation.Mild aortic stenosis.Transvalvular gradients are - Peak: 26 mmHg, Mean:  15mmHg. Moderate aortic insufficiency.Moderate tricuspid regurgitation. Right ventricular systolic pressure is estimated to be 50 mmHg. Compared to the images of the study done 3/20/2015 - there has been no significant change.  ----------------------------------------------------------------------------------------------------------------------  Filed Vitals:    06/13/17 0605 06/13/17 0742 06/13/17 0745 06/13/17 1200   BP: 184/84  196/100 184/83   Pulse: 77  78 63   Temp: 36.9 °C (98.4 °F)  36.4 °C (97.6 °F) 36.3 °C (97.3 °F)   Resp: 18  20 18   Height:       Weight:       SpO2: 94% 98% 96% 99%     Weight/BMI: Body mass index is 22.65 kg/(m^2).  Pulse Oximetry: 99 %, O2 (LPM): 0, O2 Delivery: None (Room Air)    Intake/Output Summary (Last 24 hours) at 06/13/17 1422  Last data filed at 06/13/17 1355   Gross per 24 hour   Intake    930 ml   Output   2720 ml   Net  -1790 ml       Bianca Chung MD   [employer]

## 2017-06-13 NOTE — PROGRESS NOTES
rena from Lab called with critical result of WBC of 0.1 and platelets of 15 at 0935. Critical lab result read back to Darius.   This critical lab result is within parameters established by [facility] for this patient

## 2017-06-13 NOTE — PROGRESS NOTES
Oncology/Hematology Progress Note               Author:  Dick Elizabeth  DX-Relapsed RefractoRy CML. Date & Time created: 6/13/2017  3:07 PM     Interval History:Accelerated/Blast crisis CML, started second 14 day course of Omacetaxine.  Last bone marrow with a total of <10% blast. Good response to Omacetaxine  Now CT chest showing bilateral pulm nodules likely related to septic emboli to  MSSA bacteremia in the past. Also lytic lesions on T5 and 5ft rib. MRI spine with heterogenoes diffuse involvement due to CML  6/10  spiking fever, tmax 39. Started on Zosyn, vancomycin. On voriconazole. Cultures negative. Patient asymptomatic  6/12-afebrile today but anemia is worse.    Review of Systems:  Review of Systems   Constitutional: Positive for malaise/fatigue. Negative for fever, chills and diaphoresis.   HENT: Negative.  Negative for congestion and sore throat.    Eyes: Negative for blurred vision and redness.   Respiratory: Negative.  Negative for cough and sputum production.    Cardiovascular: Negative.  Negative for chest pain, palpitations and orthopnea.   Gastrointestinal: Positive for diarrhea. Negative for nausea and abdominal pain.   Genitourinary: Negative.  Negative for dysuria and hematuria.   Musculoskeletal: Negative.  Negative for myalgias and neck pain.   Skin: Negative for itching and rash.   Neurological: Positive for weakness. Negative for dizziness, tremors, speech change, seizures and headaches.   Endo/Heme/Allergies: Negative for polydipsia. Bruises/bleeds easily.   Psychiatric/Behavioral: Negative for depression and suicidal ideas.       Physical Exam:  Physical Exam   Constitutional: He is oriented to person, place, and time. He appears well-developed and well-nourished. No distress.   HENT:   Head: Normocephalic and atraumatic.   Mouth/Throat: No oropharyngeal exudate.   Eyes: Conjunctivae are normal. Pupils are equal, round, and reactive to light. No scleral icterus.   Neck: Normal range of  motion. Neck supple.   Cardiovascular: Normal rate and normal heart sounds.    Pulmonary/Chest: Effort normal. No respiratory distress. He has no wheezes. He has no rales.   Abdominal: Soft. Bowel sounds are normal. He exhibits no distension. There is no tenderness. There is no rebound and no guarding.   Musculoskeletal: He exhibits edema. He exhibits no tenderness.   Lymphadenopathy:     He has no cervical adenopathy.   Neurological: He is alert and oriented to person, place, and time.   Skin: Skin is warm and dry. No rash noted. No erythema.   Psychiatric: He has a normal mood and affect. His behavior is normal.       Labs:        Invalid input(s): [id number]  Recent Labs      06/13/17   0837   TROPONINI  <0.01   BNPBTYPENAT  1435*     Recent Labs      06/10/17   2354  06/12/17   1219  06/13/17   0838   SODIUM  137  139  138   POTASSIUM  3.9  3.4*  3.5*   CHLORIDE  108  113*  111   CO2  20  19*  21   BUN  12  15  12   CREATININE  0.91  0.78  0.67   CALCIUM  8.1*  8.3*  8.7     Recent Labs      06/10/17   2354  06/12/17   1219  06/13/17   0838   ALTSGPT  31  51*  49   ASTSGOT  96*  109*  80*   ALKPHOSPHAT  254*  313*  428*   TBILIRUBIN  1.6*  1.3  1.9*   GLUCOSE  122*  130*  99     Recent Labs      06/10/17   2354  06/12/17   1220  06/13/17   0838   RBC  2.36*  1.90*  3.73*   HEMOGLOBIN  6.6*  5.3*  10.6*   HEMATOCRIT  19.1*  15.7*  30.0*   PLATELETCT  8*  16*  15*     Recent Labs      06/10/17   2354  06/12/17   1219  06/12/17   1220  06/13/17   0838   WBC  0.1*   --   0.1*  0.1*   ASTSGOT  96*  109*   --   80*   ALTSGPT  31  51*   --   49   ALKPHOSPHAT  254*  313*   --   428*   TBILIRUBIN  1.6*  1.3   --   1.9*     Recent Labs      06/10/17   2354  06/12/17   1219  06/13/17   0838   SODIUM  137  139  138   POTASSIUM  3.9  3.4*  3.5*   CHLORIDE  108  113*  111   CO2  20  19*  21   GLUCOSE  122*  130*  99   BUN  12  15  12   CREATININE  0.91  0.78  0.67   CALCIUM  8.1*  8.3*  8.7     Hemodynamics:  Temp  (24hrs), Av.6 °C (97.8 °F), Min:36.3 °C (97.3 °F), Max:36.9 °C (98.4 °F)  Temperature: 36.3 °C (97.3 °F)  Pulse  Av.3  Min: 58  Max: 118   Blood Pressure : (!) 184/83 mmHg (RN notified.)     Respiratory:    Respiration: 18, Pulse Oximetry: 99 %     Work Of Breathing / Effort: Mild;Shallow  RUL Breath Sounds: Diminished, RML Breath Sounds: Diminished, RLL Breath Sounds: Diminished, WAGNER Breath Sounds: Clear, LLL Breath Sounds: Clear  Fluids:    Intake/Output Summary (Last 24 hours) at 17 1034  Last data filed at 17 1000   Gross per 24 hour   Intake   2057 ml   Output   1675 ml   Net    382 ml        GI/Nutrition:  Orders Placed This Encounter   Procedures   • DIET NPO     Standing Status: Standing      Number of Occurrences: 1      Standing Expiration Date:      Order Specific Question:  Restrict to:     Answer:  Sips with Medications [3]     Medical Decision Making, by Problem:  Active Hospital Problems    Diagnosis   • *Fever and neutropenia (CMS-HCC) [D70.9, R50.81]   • Neutropenia (CMS-HCC) [D70.9]   • 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]   • Diarrhea [R19.7]   • Acute bilateral low back pain without sciatica [M54.5]   • Seizure disorder (CMS-HCC) [G40.909]   • Hypertension [I10]   • BPH (benign prostatic hyperplasia) [N40.0]   • Low vitamin D level [E55.9]   • Transaminitis [R74.0]       Plan:  Advanced CML:  -reponded well to Omacetaxine. Last dose will be given on  . He got a good response with <10% blasts.  -CT scan showed bilateral pulm nodules likely related to septic emboli, Per ID continue on ANCEF for a total of 6 weeks,  -MR T spine showed diffuse heterogenous findings related to CML, no further intervention indicated  -Transfuse  PRBC if hemoglobin <8 g/dl or Platelets if <10 or bleeding. Transfuse blood and platelet today.   -Per Dr Lew he will not pospone BMT despite septic emboli. Now working on the donor. Awaiting ok to transfer patient. May be ready  by 6/25.  Anemia-Transfuse  PRBC if hemoglobin <8 g/dl or Platelets if <10 or bleeding.   ID- ID on Board. Continue anti infective agents per them.  Diarrhea-better. abdominal exam is OK.    Guarded Prognosis      Labs reviewed

## 2017-06-13 NOTE — CARE PLAN
Problem: Venous Thromboembolism (VTW)/Deep Vein Thrombosis (DVT) Prevention:  Goal: Patient will participate in Venous Thrombosis (VTE)/Deep Vein Thrombosis (DVT)Prevention Measures  Outcome: PROGRESSING AS EXPECTED  Pharmacologic prophylacis contraindicated due to thrombocytopenia. Pt ambulating in room frequently.     Problem: Bowel/Gastric:  Goal: Normal bowel function is maintained or improved  Outcome: PROGRESSING AS EXPECTED  Last BM this morning. Bowel sounds normoactive.

## 2017-06-13 NOTE — PROGRESS NOTES
Pt alert and oriented x 4. Denies nausea but complains of slight pain on inspiration and with complains of increasing shortness of breath and work of breathing. Breathing noted to be shallow and R lung sounds diminished through out as well as in LLL. O2 sats in the mid to high 90's on RA. Pt continues with hypertension despite prn IV hydralazine given on night shift. Scheduled carvedilol given. Otherwise pt remain afebrile and HR in the 70's. MDs notified of all of the above. Orders received. Pt up to a chair with one person assist. Tolerated well. Resting comfortably at this time. Call light within reach. Hourly rounding in place.

## 2017-06-14 ENCOUNTER — APPOINTMENT (OUTPATIENT)
Dept: RADIOLOGY | Facility: MEDICAL CENTER | Age: 72
DRG: 808 | End: 2017-06-14
Attending: GENERAL ACUTE CARE HOSPITAL
Payer: MEDICARE

## 2017-06-14 ENCOUNTER — APPOINTMENT (OUTPATIENT)
Dept: RADIOLOGY | Facility: MEDICAL CENTER | Age: 72
DRG: 808 | End: 2017-06-14
Attending: INTERNAL MEDICINE
Payer: MEDICARE

## 2017-06-14 LAB
ALBUMIN SERPL BCP-MCNC: 3 G/DL (ref 3.2–4.9)
ALBUMIN/GLOB SERPL: 0.8 G/DL
ALP SERPL-CCNC: 525 U/L (ref 30–99)
ALT SERPL-CCNC: 39 U/L (ref 2–50)
ANION GAP SERPL CALC-SCNC: 11 MMOL/L (ref 0–11.9)
AST SERPL-CCNC: 59 U/L (ref 12–45)
BARCODED ABORH UBTYP: 5100
BARCODED ABORH UBTYP: 600
BARCODED ABORH UBTYP: 600
BARCODED ABORH UBTYP: 8400
BARCODED PRD CODE UBPRD: NORMAL
BARCODED UNIT NUM UBUNT: NORMAL
BILIRUB SERPL-MCNC: 1.5 MG/DL (ref 0.1–1.5)
BUN SERPL-MCNC: 13 MG/DL (ref 8–22)
CALCIUM SERPL-MCNC: 9.1 MG/DL (ref 8.5–10.5)
CHLORIDE SERPL-SCNC: 105 MMOL/L (ref 96–112)
CO2 SERPL-SCNC: 23 MMOL/L (ref 20–33)
COMPONENT P 8504P: NORMAL
CREAT SERPL-MCNC: 0.74 MG/DL (ref 0.5–1.4)
ERYTHROCYTE [DISTWIDTH] IN BLOOD BY AUTOMATED COUNT: 38.4 FL (ref 35.9–50)
GFR SERPL CREATININE-BSD FRML MDRD: >60 ML/MIN/1.73 M 2
GLOBULIN SER CALC-MCNC: 3.8 G/DL (ref 1.9–3.5)
GLUCOSE SERPL-MCNC: 100 MG/DL (ref 65–99)
HCT VFR BLD AUTO: 30.1 % (ref 42–52)
HGB BLD-MCNC: 10.7 G/DL (ref 14–18)
LV EJECT FRACT  99904: 55
MCH RBC QN AUTO: 28.5 PG (ref 27–33)
MCHC RBC AUTO-ENTMCNC: 35.5 G/DL (ref 33.7–35.3)
MCV RBC AUTO: 80.3 FL (ref 81.4–97.8)
NEUTROPHILS # BLD AUTO: ABNORMAL K/UL (ref 1.82–7.42)
NRBC # BLD AUTO: 0 K/UL
NRBC BLD AUTO-RTO: 0 /100 WBC
PLATELET # BLD AUTO: 11 K/UL (ref 164–446)
PLATELETS.RETICULATED NFR BLD AUTO: 0.7 K/UL (ref 0.6–13.1)
PMV BLD AUTO: 11 FL (ref 9–12.9)
POTASSIUM SERPL-SCNC: 3 MMOL/L (ref 3.6–5.5)
PRODUCT TYPE UPROD: NORMAL
PROT SERPL-MCNC: 6.8 G/DL (ref 6–8.2)
RBC # BLD AUTO: 3.75 M/UL (ref 4.7–6.1)
SODIUM SERPL-SCNC: 139 MMOL/L (ref 135–145)
UNIT STATUS USTAT: NORMAL
VANCOMYCIN TROUGH SERPL-MCNC: 20.9 UG/ML (ref 10–20)
WBC # BLD AUTO: 0.1 K/UL (ref 4.8–10.8)

## 2017-06-14 PROCEDURE — 700102 HCHG RX REV CODE 250 W/ 637 OVERRIDE(OP): Performed by: INTERNAL MEDICINE

## 2017-06-14 PROCEDURE — A9270 NON-COVERED ITEM OR SERVICE: HCPCS | Performed by: INTERNAL MEDICINE

## 2017-06-14 PROCEDURE — 36415 COLL VENOUS BLD VENIPUNCTURE: CPT

## 2017-06-14 PROCEDURE — 700111 HCHG RX REV CODE 636 W/ 250 OVERRIDE (IP): Performed by: INTERNAL MEDICINE

## 2017-06-14 PROCEDURE — 700111 HCHG RX REV CODE 636 W/ 250 OVERRIDE (IP): Performed by: GENERAL ACUTE CARE HOSPITAL

## 2017-06-14 PROCEDURE — 700105 HCHG RX REV CODE 258: Performed by: INTERNAL MEDICINE

## 2017-06-14 PROCEDURE — P9034 PLATELETS, PHERESIS: HCPCS

## 2017-06-14 PROCEDURE — 80053 COMPREHEN METABOLIC PANEL: CPT

## 2017-06-14 PROCEDURE — 99232 SBSQ HOSP IP/OBS MODERATE 35: CPT | Mod: GC | Performed by: INTERNAL MEDICINE

## 2017-06-14 PROCEDURE — 93325 DOPPLER ECHO COLOR FLOW MAPG: CPT

## 2017-06-14 PROCEDURE — 86644 CMV ANTIBODY: CPT

## 2017-06-14 PROCEDURE — A9270 NON-COVERED ITEM OR SERVICE: HCPCS | Performed by: GENERAL ACUTE CARE HOSPITAL

## 2017-06-14 PROCEDURE — 700102 HCHG RX REV CODE 250 W/ 637 OVERRIDE(OP): Performed by: STUDENT IN AN ORGANIZED HEALTH CARE EDUCATION/TRAINING PROGRAM

## 2017-06-14 PROCEDURE — 80202 ASSAY OF VANCOMYCIN: CPT

## 2017-06-14 PROCEDURE — 85025 COMPLETE CBC W/AUTO DIFF WBC: CPT

## 2017-06-14 PROCEDURE — 36430 TRANSFUSION BLD/BLD COMPNT: CPT

## 2017-06-14 PROCEDURE — 87103 BLOOD FUNGUS CULTURE: CPT

## 2017-06-14 PROCEDURE — 700102 HCHG RX REV CODE 250 W/ 637 OVERRIDE(OP): Performed by: GENERAL ACUTE CARE HOSPITAL

## 2017-06-14 PROCEDURE — 700111 HCHG RX REV CODE 636 W/ 250 OVERRIDE (IP)

## 2017-06-14 PROCEDURE — 770009 HCHG ROOM/CARE - ONCOLOGY SEMI PRI*

## 2017-06-14 PROCEDURE — A9270 NON-COVERED ITEM OR SERVICE: HCPCS | Performed by: STUDENT IN AN ORGANIZED HEALTH CARE EDUCATION/TRAINING PROGRAM

## 2017-06-14 PROCEDURE — 93312 ECHO TRANSESOPHAGEAL: CPT

## 2017-06-14 PROCEDURE — 85055 RETICULATED PLATELET ASSAY: CPT

## 2017-06-14 PROCEDURE — 71250 CT THORAX DX C-: CPT

## 2017-06-14 PROCEDURE — 700101 HCHG RX REV CODE 250

## 2017-06-14 PROCEDURE — B24BZZ4 ULTRASONOGRAPHY OF HEART WITH AORTA, TRANSESOPHAGEAL: ICD-10-PCS | Performed by: INTERNAL MEDICINE

## 2017-06-14 RX ORDER — FLUMAZENIL 0.1 MG/ML
INJECTION INTRAVENOUS
Status: COMPLETED
Start: 2017-06-14 | End: 2017-06-14

## 2017-06-14 RX ORDER — AMLODIPINE BESYLATE 10 MG/1
10 TABLET ORAL
Status: DISCONTINUED | OUTPATIENT
Start: 2017-06-14 | End: 2017-06-15 | Stop reason: HOSPADM

## 2017-06-14 RX ORDER — POTASSIUM CHLORIDE 7.45 MG/ML
10 INJECTION INTRAVENOUS
Status: ACTIVE | OUTPATIENT
Start: 2017-06-14 | End: 2017-06-14

## 2017-06-14 RX ORDER — LISINOPRIL 10 MG/1
10 TABLET ORAL
Status: DISCONTINUED | OUTPATIENT
Start: 2017-06-14 | End: 2017-06-15

## 2017-06-14 RX ORDER — POTASSIUM CHLORIDE 20 MEQ/1
60 TABLET, EXTENDED RELEASE ORAL ONCE
Status: COMPLETED | OUTPATIENT
Start: 2017-06-14 | End: 2017-06-14

## 2017-06-14 RX ORDER — NALOXONE HYDROCHLORIDE 0.4 MG/ML
INJECTION, SOLUTION INTRAMUSCULAR; INTRAVENOUS; SUBCUTANEOUS
Status: COMPLETED
Start: 2017-06-14 | End: 2017-06-14

## 2017-06-14 RX ORDER — MIDAZOLAM HYDROCHLORIDE 1 MG/ML
INJECTION INTRAMUSCULAR; INTRAVENOUS
Status: COMPLETED
Start: 2017-06-14 | End: 2017-06-14

## 2017-06-14 RX ADMIN — CARVEDILOL 12.5 MG: 12.5 TABLET, FILM COATED ORAL at 08:38

## 2017-06-14 RX ADMIN — VORICONAZOLE 200 MG: 200 TABLET, FILM COATED ORAL at 21:16

## 2017-06-14 RX ADMIN — CARVEDILOL 12.5 MG: 12.5 TABLET, FILM COATED ORAL at 18:28

## 2017-06-14 RX ADMIN — TAMSULOSIN HYDROCHLORIDE 0.8 MG: 0.4 CAPSULE ORAL at 08:40

## 2017-06-14 RX ADMIN — ACETAMINOPHEN 650 MG: 325 TABLET, FILM COATED ORAL at 11:40

## 2017-06-14 RX ADMIN — MIDAZOLAM 1.5 MG: 1 INJECTION INTRAMUSCULAR; INTRAVENOUS at 15:02

## 2017-06-14 RX ADMIN — PIPERACILLIN SODIUM AND TAZOBACTAM SODIUM 4.5 G: 4; .5 INJECTION, POWDER, FOR SOLUTION INTRAVENOUS at 11:40

## 2017-06-14 RX ADMIN — LEVETIRACETAM 750 MG: 250 TABLET, FILM COATED ORAL at 21:11

## 2017-06-14 RX ADMIN — PIPERACILLIN SODIUM AND TAZOBACTAM SODIUM 4.5 G: 4; .5 INJECTION, POWDER, FOR SOLUTION INTRAVENOUS at 05:46

## 2017-06-14 RX ADMIN — LISINOPRIL 10 MG: 10 TABLET ORAL at 06:34

## 2017-06-14 RX ADMIN — HYDRALAZINE HYDROCHLORIDE 10 MG: 20 INJECTION INTRAMUSCULAR; INTRAVENOUS at 05:47

## 2017-06-14 RX ADMIN — PIPERACILLIN SODIUM AND TAZOBACTAM SODIUM 4.5 G: 4; .5 INJECTION, POWDER, FOR SOLUTION INTRAVENOUS at 00:47

## 2017-06-14 RX ADMIN — AMLODIPINE BESYLATE 10 MG: 10 TABLET ORAL at 06:35

## 2017-06-14 RX ADMIN — VANCOMYCIN HYDROCHLORIDE 900 MG: 100 INJECTION, POWDER, LYOPHILIZED, FOR SOLUTION INTRAVENOUS at 06:27

## 2017-06-14 RX ADMIN — VORICONAZOLE 200 MG: 200 TABLET, FILM COATED ORAL at 08:40

## 2017-06-14 RX ADMIN — LEVETIRACETAM 750 MG: 250 TABLET, FILM COATED ORAL at 08:38

## 2017-06-14 RX ADMIN — POTASSIUM CHLORIDE 60 MEQ: 1500 TABLET, EXTENDED RELEASE ORAL at 21:13

## 2017-06-14 RX ADMIN — MINERAL OIL AND WHITE PETROLATUM 1 APPLICATION: 150; 830 OINTMENT OPHTHALMIC at 05:46

## 2017-06-14 RX ADMIN — MINERAL OIL AND WHITE PETROLATUM 1 APPLICATION: 150; 830 OINTMENT OPHTHALMIC at 21:06

## 2017-06-14 RX ADMIN — ACYCLOVIR 400 MG: 800 TABLET ORAL at 21:12

## 2017-06-14 RX ADMIN — MICAFUNGIN SODIUM 100 MG: 20 INJECTION, POWDER, LYOPHILIZED, FOR SOLUTION INTRAVENOUS at 08:39

## 2017-06-14 RX ADMIN — NYSTATIN 500000 UNITS: 500000 SUSPENSION ORAL at 21:12

## 2017-06-14 RX ADMIN — PIPERACILLIN SODIUM AND TAZOBACTAM SODIUM 4.5 G: 4; .5 INJECTION, POWDER, FOR SOLUTION INTRAVENOUS at 18:28

## 2017-06-14 RX ADMIN — DIPHENHYDRAMINE HCL 25 MG: 25 TABLET ORAL at 11:40

## 2017-06-14 RX ADMIN — ACYCLOVIR 400 MG: 800 TABLET ORAL at 08:37

## 2017-06-14 RX ADMIN — FENTANYL CITRATE 50 MCG: 50 INJECTION, SOLUTION INTRAMUSCULAR; INTRAVENOUS at 15:02

## 2017-06-14 RX ADMIN — FINASTERIDE 5 MG: 5 TABLET, FILM COATED ORAL at 08:38

## 2017-06-14 RX ADMIN — FOLIC ACID 1 MG: 1 TABLET ORAL at 08:38

## 2017-06-14 ASSESSMENT — ENCOUNTER SYMPTOMS
CHILLS: 0
ABDOMINAL PAIN: 0
DEPRESSION: 0
EYE REDNESS: 0
VOMITING: 0
DOUBLE VISION: 0
TINGLING: 0
PHOTOPHOBIA: 0
PND: 0
HEADACHES: 0
WEAKNESS: 1
SORE THROAT: 0
FEVER: 0
SPUTUM PRODUCTION: 0
COUGH: 0
CONSTIPATION: 0
DIARRHEA: 0
HEARTBURN: 0
TREMORS: 0
BRUISES/BLEEDS EASILY: 0
NECK PAIN: 0
SPEECH CHANGE: 0
MYALGIAS: 0
HEMOPTYSIS: 0
SHORTNESS OF BREATH: 1
DIAPHORESIS: 0
BLURRED VISION: 0
ORTHOPNEA: 0
POLYDIPSIA: 0
MUSCULOSKELETAL NEGATIVE: 1
NERVOUS/ANXIOUS: 1
RESPIRATORY NEGATIVE: 1
SEIZURES: 0
MEMORY LOSS: 0
SENSORY CHANGE: 0
SHORTNESS OF BREATH: 0
BRUISES/BLEEDS EASILY: 1
NAUSEA: 0
DIZZINESS: 0
CARDIOVASCULAR NEGATIVE: 1
PALPITATIONS: 0

## 2017-06-14 ASSESSMENT — PAIN SCALES - GENERAL
PAINLEVEL_OUTOF10: 0
PAINLEVEL_OUTOF10: 0

## 2017-06-14 NOTE — PROGRESS NOTES
Infectious Disease Progress Note    Author: Larisa De La Paz M.D. 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 No new issues overnight. Denies any cough  6/10 Temp 102.4, WBC 0.1 New cxs done Denies new sxs   Temp 102.9, WBC 0.1 no new sxs Denies SE abx  17- ongoing low-grade fevers. Denies any new issues  17- no fevers overnight. Had some respiratory issues today. But feeling better now. Denies any cough or any shortness of breath.   Labs Reviewed, Medications Reviewed and Radiology Reviewed.    Review of Systems:  Review of Systems   Constitutional: Negative for fever and malaise/fatigue.   Respiratory: Negative for cough and shortness of breath.    Cardiovascular: Negative for chest pain.   Gastrointestinal: Negative for nausea, vomiting and abdominal pain.   Genitourinary: Negative for dysuria.   Musculoskeletal: Negative for myalgias.   Skin: Negative for rash.   Neurological: Negative for sensory change, speech change and headaches.       Hemodynamics:  Temp (24hrs), Av.6 °C (97.8 °F), Min:36.3 °C (97.3 °F), Max:36.9 °C (98.4 °F)  Temperature: 36.3 °C (97.3 °F)  Pulse  Av.2  Min: 58  Max: 118   Blood Pressure : (!) 192/87 mmHg       Physical Exam:  Physical Exam   Constitutional: He is oriented to person, place, and time. He appears well-developed. No distress.   Chronically ill and frail   HENT:   Head: Normocephalic and atraumatic.   Mouth/Throat: Oropharyngeal exudate present.   Edentulous   Eyes: EOM are normal. Pupils are equal, round, and reactive to light. No scleral icterus.   Neck: Neck supple.   Cardiovascular: Normal rate and regular rhythm.    Murmur heard.  Pulmonary/Chest: Effort normal. No respiratory distress. He has no wheezes.    Abdominal: Soft. He exhibits no distension. There is no tenderness.   Musculoskeletal: He exhibits no edema.   Neurological: He is alert and oriented to person, place, and time. No cranial nerve deficit. Coordination normal.   Skin: No rash noted.   Vitals reviewed.      Meds:    Current facility-administered medications:   •  carvedilol (COREG) tablet 12.5 mg, 12.5 mg, Oral, BID WITH MEALS, Sherman Sears M.D., 12.5 mg at 06/13/17 1639  •  amlodipine (NORVASC) tablet 5 mg, 5 mg, Oral, Q DAY, Sarbjit Kent M.D., 5 mg at 06/13/17 1207  •  hydrALAZINE (APRESOLINE) injection 10 mg, 10 mg, Intravenous, Q6HRS PRN, Sarbjit Kent M.D., 10 mg at 06/13/17 1639  •  enalaprilat (VASOTEC) injection 1.25 mg, 1.25 mg, Intravenous, Q6HRS PRN, Sarbjit Kent M.D., 1.25 mg at 06/13/17 1511  •  ipratropium-albuterol (DUONEB) nebulizer solution 3 mL, 3 mL, Nebulization, Q4H PRN (RT), Sarbjit Kent M.D.  •  vancomycin 900 mg in  mL IVPB, 900 mg, Intravenous, Q12HR, Dulce Maria Del Rosario M.D., Stopped at 06/13/17 0825  •  piperacillin-tazobactam (ZOSYN) 4.5 g in  mL IVPB, 4.5 g, Intravenous, Q6HRS, Katelin Kay M.D., Stopped at 06/13/17 1307  •  MD ALERT... vancomycin per pharmacy protocol, , Other, pharmacy to dose, Dulce Marai Del Rosario M.D.  •  folic acid (FOLVITE) tablet 1 mg, 1 mg, Oral, DAILY, Sarbjit Kent M.D., 1 mg at 06/13/17 0753  •  cyanocobalamin (VITAMIN B-12) injection 1,000 mcg, 1,000 mcg, Intramuscular, Q7 DAYS, Sarbjit Kent M.D., 1,000 mcg at 06/10/17 1044  •  artificial tear ointment (REFRESH,LACRI-LUBE) 1 Application, 1 Application, Both Eyes, Q8HRS, Sherman Sears M.D., 1 Application at 06/13/17 1505  •  voriconazole (VFEND) tablet 200 mg, 200 mg, Oral, Q12HRS, Wayne Person III, M.D., 200 mg at 06/13/17 0753  •  loperamide (IMODIUM) capsule 2 mg, 2 mg, Oral, 4X/DAY PRN, Sherman Sears M.D., 2 mg at 06/07/17 0856  •  acetaminophen (TYLENOL) tablet 650 mg, 650  mg, Oral, Q6HRS PRN, Sherman Sears M.D., 650 mg at 06/13/17 0142  •  acetaminophen (TYLENOL) tablet 650 mg, 650 mg, Oral, Q4HRS PRN, Sherman Sears M.D., 650 mg at 06/12/17 1803  •  tamsulosin (FLOMAX) capsule 0.8 mg, 0.8 mg, Oral, QAM, Sherman Sears M.D., 0.8 mg at 06/13/17 0753  •  finasteride (PROSCAR) tablet 5 mg, 5 mg, Oral, DAILY, Sherman Sears M.D., 5 mg at 06/13/17 0753  •  diphenoxylate-atropine (LOMOTIL) 2.5-0.025 MG per tablet 1 Tab, 1 Tab, Oral, 4X/DAY PRN, Sherman Sears M.D., 1 Tab at 05/24/17 1023  •  hydrocodone-acetaminophen (NORCO) 5-325 MG per tablet 1-2 Tab, 1-2 Tab, Oral, Q6HRS PRN, Sherman Sears M.D., 2 Tab at 05/21/17 1630  •  acyclovir (ZOVIRAX) tablet 400 mg, 400 mg, Oral, BID, Sherman Sears M.D., 400 mg at 06/13/17 0753  •  nystatin (MYCOSTATIN) [id number] UNIT/ML suspension 500,000 Units, 5 mL, Oral, 4X/DAY, Sherman Sears M.D., 500,000 Units at 06/13/17 1639  •  diphenhydrAMINE (BENADRYL) tablet/capsule 25 mg, 25 mg, Oral, PRN, Sherman Sears M.D., 25 mg at 06/13/17 0142  •  pneumococcal 13-Elizabeth Conj Vacc (PREVNAR 13) syringe 0.5 mL, 0.5 mL, Intramuscular, Once PRN, Gerry Soto M.D.  •  Respiratory Care per Protocol, , Nebulization, Continuous RT, Rc Boss D.O.  •  ondansetron (ZOFRAN) syringe/vial injection 4 mg, 4 mg, Intravenous, Q4HRS PRN, JAQUELINE Turcios.O., 4 mg at 06/09/17 2347  •  ondansetron (ZOFRAN ODT) dispertab 4 mg, 4 mg, Oral, Q4HRS PRN, JAQUELINE Turcios.O., 4 mg at 05/01/17 1955  •  levetiracetam (KEPPRA) tablet 750 mg, 750 mg, Oral, BID, JAQUELINE Turcios.O., 750 mg at 06/13/17 0753  •  oxycodone immediate-release (ROXICODONE) tablet 5 mg, 5 mg, Oral, Q4HRS PRN, JAQUELINE Turcios.O., 5 mg at 05/21/17 1225  •  lidocaine (LIDODERM) 5 % 1 Patch, 1 Patch, Transdermal, Q24HR, JAQUELINE Turcios.O., Stopped at 06/10/17 0937  •  vitamin D (Ergocalciferol) (DRISDOL) capsule 50,000 Units, 50,000 Units, Oral, Q7 DAYS, Gerry Soto M.D., 50,000 Units at  06/10/17 0757    Labs:  Recent Labs      06/10/17   2354  06/12/17   1220  06/13/17   0838   WBC  0.1*  0.1*  0.1*   RBC  2.36*  1.90*  3.73*   HEMOGLOBIN  6.6*  5.3*  10.6*   HEMATOCRIT  19.1*  15.7*  30.0*   MCV  80.9*  82.6  80.4*   MCH  28.0  27.9  28.4   RDW  36.8  39.8  37.8   PLATELETCT  8*  16*  15*   MPV  9.0  10.3  9.2     Recent Labs      06/10/17   2354  06/12/17   1219  06/13/17   0838   SODIUM  137  139  138   POTASSIUM  3.9  3.4*  3.5*   CHLORIDE  108  113*  111   CO2  20  19*  21   GLUCOSE  122*  130*  99   BUN  12  15  12     Recent Labs      06/10/17   2354  06/12/17   1219  06/13/17   0838   ALBUMIN  2.7*  2.7*  3.1*   TBILIRUBIN  1.6*  1.3  1.9*   ALKPHOSPHAT  254*  313*  428*   TOTPROTEIN  6.1  6.0  6.6   ALTSGPT  31  51*  49   ASTSGOT  96*  109*  80*   CREATININE  0.91  0.78  0.67       Imaging:  Ct-chest,abdomen,pelvis With    6/8/2017  Lytic lesions in the T5 vertebral body and medial LEFT 5th rib appear to have been present in April 2017. These findings were discussed with Dr. Person on 6/8/2017 7:33 AM.  6/8/2017  1.  Multiple new ill-defined pulmonary nodules involving both lungs, which may be infectious/inflammatory or metastatic.  Septic emboli are a consideration. 2.  Minimal bilateral pleural effusions with associated atelectasis. 3.  Apparent lytic lesions in the T5 vertebral body on the LEFT and adjacent LEFT medial 5th rib, likely metastatic. 4.  Gallbladder wall thickening, concerning for inflammation. 5.  Small amount of peritoneal fluid, of uncertain etiology.  Abnormal for male patient, raising concern for intra-abdominal inflammatory process. 6.  Markedly enlarged heterogeneous prostate.    Ct-head With  6/7/2017  1.  Diffuse RIGHT hemispheric dural thickening, as seen previously.  Possibly sequela of prior subdural hematoma, however tumor and infection are also considerations. 2.  No focal cerebritis or intra-axial mass. 3.  Diffuse atrophy. 4.  No gross intracranial  hemorrhage however presence of intravenous contrast may obscure small amounts of subarachnoid hemorrhage.    Mr-thoracic Spine-with & W/o  6/9/2017  1.  Diffuse inhomogeneous infiltration of visualized thoracic spine consistent with chronic myeloid leukemia. There is no epidural tumor or spinal canal compromise. 2.  Small multiple lung nodules. This was noted on the previous CT scan dated 6/7/2017. 3.  Fluid along the right lung major fissure.This was noted on the previous CT scan dated 6/7/2017.    Echocardiogram Comp W/o Cont    6/9/2017  Transthoracic Echo Report Echocardiography Laboratory CONCLUSIONS Normal left ventricular systolic function. Left ventricular ejection fraction is visually estimated to be 70%. Grade I diastolic dysfunction. Normal right ventricular systolic function. Mild mitral regurgitation. Mild aortic stenosis. Transvalvular gradients are - Peak: 26 mmHg, Mean:  15mmHg. Moderate aortic insufficiency. Moderate tricuspid regurgitation. Right ventricular systolic pressure is estimated to be 50 mmHg. Compared to the images of the study done 3/20/2015 - there has been no significant change.     Micro:  BLOOD CULTURE   Date Value Ref Range Status   06/10/2017   Preliminary    No Growth    Note: Blood cultures are incubated for 5 days and  are monitored continuously.Positive blood cultures  are called to the RN and reported as soon as  they are identified.     06/10/2017   Preliminary    No Growth    Note: Blood cultures are incubated for 5 days and  are monitored continuously.Positive blood cultures  are called to the RN and reported as soon as  they are identified.        Results     Procedure Component Value Units Date/Time    FUNGAL BLOOD CULTURE [[id number]] Collected:  06/13/17 0000    Order Status:  Canceled Specimen Information:  Other from Blood     Narrative:      TEST Fungal Blood Culture WAS CANCELLED, 06/13/2017 10:35 added to stat draws  Protective    FUNGAL BLOOD CULTURE [[id number]]   "   Order Status:  No result Specimen Information:  Other     URINE CULTURE(NEW) [[id number]] Collected:  06/10/17 1116    Order Status:  Completed Specimen Information:  Urine from Urine, Clean Catch Updated:  06/12/17 0833     Significant Indicator NEG      Source UR      Site URINE, CLEAN CATCH      Urine Culture No growth at 48 hours     Narrative:      Collected By:[id number] CARLITA CHARLY JULIAN  Indication for culture:->Temp Equal to,or Greater Than 101  Indication for culture:->Suspected Sepsis    FUNGAL BLOOD CULTURE [[id number]]     Order Status:  Canceled Specimen Information:  Blood from Blood     CDIFF BY PCR WITH TOXIN [[id number]] Collected:  06/10/17 2335    Order Status:  Completed Specimen Information:  Stool from Stool Updated:  06/11/17 2132     C Diff by PCR Negative      027-NAP1-BI Presumptive Negative      Comment: Presumptive 027/NAP1/BI target DNA sequences are NOT DETECTED.        C.Diff Toxin A&B Negative      Comment: C. difficile NOT detected by PCR.  Treatment not indicated per guidelines.         Narrative:      Special Contact [id number] CELSO PARSONS  Does this patient have risk factors for C-diff?->Yes  Has patient taken stool softeners or laxatives in the last 5  days?->No    BLOOD CULTURE [[id number]] Collected:  06/10/17 0021    Order Status:  Completed Specimen Information:  Blood from Peripheral Updated:  06/11/17 0717     Significant Indicator NEG      Source BLD      Site PERIPHERAL      Blood Culture --      Result:        No Growth    Note: Blood cultures are incubated for 5 days and  are monitored continuously.Positive blood cultures  are called to the RN and reported as soon as  they are identified.      Narrative:      Per Hospital Policy: Only change Specimen Src: to \"Line\" if  specified by physician order.    BLOOD CULTURE [[id number]] Collected:  06/10/17 0021    Order Status:  Completed Specimen Information:  Blood from Peripheral Updated:  06/11/17 0717     Significant " "Indicator NEG      Source BLD      Site PERIPHERAL      Blood Culture --      Result:        No Growth    Note: Blood cultures are incubated for 5 days and  are monitored continuously.Positive blood cultures  are called to the RN and reported as soon as  they are identified.      Narrative:      Per Hospital Policy: Only change Specimen Src: to \"Line\" if  specified by physician order.    BLOOD CULTURE [[id number]] Collected:  06/05/17 2354    Order Status:  Completed Specimen Information:  Blood from Peripheral Updated:  06/11/17 0100     Significant Indicator NEG      Source BLD      Site PERIPHERAL      Blood Culture No growth after 5 days of incubation.     Narrative:      Per Hospital Policy: Only change Specimen Src: to \"Line\" if  specified by physician order.    BLOOD CULTURE [[id number]] Collected:  06/05/17 2354    Order Status:  Completed Specimen Information:  Blood from Peripheral Updated:  06/11/17 0100     Significant Indicator NEG      Source BLD      Site PERIPHERAL      Blood Culture No growth after 5 days of incubation.     Narrative:      Per Hospital Policy: Only change Specimen Src: to \"Line\" if  specified by physician order.    URINE CULTURE(NEW) [[id number]] Collected:  06/10/17 1117    Order Status:  Canceled Specimen Information:  Other from Urine, Clean Catch     Narrative:      ORDER WAS CANCELLED 06/11/2017 12:01, Duplicate ..  Collected By:[id number] CARLITA JULIAN  Indication for culture:->Temp Equal to,or Greater Than 101    CULTURE RESPIRATORY W/ GRM STN [[id number]]     Order Status:  Completed Specimen Information:  Respirate from Sputum     CULTURE RESPIRATORY W/ GRM STN [[id number]]     Order Status:  Completed Specimen Information:  Sputum from Sputum     FUNGAL BLOOD CULTURE [[id number]]     Order Status:  Canceled Specimen Information:  Blood from Blood     CULTURE THROAT [[id number]]     Order Status:  No result Specimen Information:  Throat from Throat     BLOOD CULTURE " "[[id number]] Collected:  06/08/17 0000    Order Status:  Canceled Specimen Information:  Other from Peripheral     Narrative:      ORDER WAS CANCELLED 06/08/2017 11:38, Duplicate . cxl per nadja  drawn on 6/5/17.  Per Hospital Policy: Only change Specimen Src: to \"Line\" if  specified by physician order.    BLOOD CULTURE [[id number]] Collected:  06/08/17 0000    Order Status:  Canceled Specimen Information:  Other from Peripheral     Narrative:      ORDER WAS CANCELLED 06/08/2017 11:36, Duplicate . cxl per nadja,  duplicate order.  Per Hospital Policy: Only change Specimen Src: to \"Line\" if  specified by physician order.          Assessment:  Active Hospital Problems    Diagnosis   • *Fever and neutropenia (CMS-[facility]) [D70.9, R50.81]   • Neutropenia (CMS-[facility]) [D70.9]   • 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]   • Diarrhea [R19.7]   • Acute bilateral low back pain without sciatica [M54.5]   • Seizure disorder (CMS-[facility]) [G40.909]   • Abnormal CT scan, chest [R93.8]   • Transaminitis [R74.0]       Plan:  Neutropenic fever  Persistent  +MSSA blood-repeat blood cxs are neg  Ucx neg  Cdiff neg  Hold  off the transplant for now  Beta D glucagon is positive  Continue Zosyn to cover prior MSSA and GNR  Continue Voriconazole     Fungal infection  Both beta D glucagon and galactomannan antigen are positive  Continue voriconazole  Add Mycamine    MSSA bacteremia  TTE neg for vegetation  BREANNA if feasible  Repeat cultures are negative from 6/5/17  Aim for at least 6 weeks of MSSA therapy    Septic emboli  Likely secondary to above  Fungal serologies ordered as above    Thrush  Continue Nystatin  Also on voriconazole      "

## 2017-06-14 NOTE — PROGRESS NOTES
Infectious Disease Progress Note    Author: Larisa De La Paz M.D. Date of service & Time created: 2017  12:24 PM    Chief Complaint:  Chief Complaint   Patient presents with   • Body Aches     generalized   • Back Pain       Interval History:  71-year-old  male with CML seen for possible septic emboli and MSSA bacteremia  17- no fevers. No new issues overnight. Denies any cough  6/10 Temp 102.4, WBC 0.1 New cxs done Denies new sxs   Temp 102.9, WBC 0.1 no new sxs Denies SE abx  17- ongoing low-grade fevers. Denies any new issues  17- no fevers overnight. Had some respiratory issues today. But feeling better now. Denies any cough or any shortness of breath.   17-no fevers. Denies any respiratory issues.  Labs Reviewed, Medications Reviewed and Radiology Reviewed.    Review of Systems:  Review of Systems   Constitutional: Negative for fever and malaise/fatigue.   Respiratory: Negative for cough and shortness of breath.    Cardiovascular: Negative for chest pain.   Gastrointestinal: Negative for nausea, vomiting and abdominal pain.   Genitourinary: Negative for dysuria.   Musculoskeletal: Negative for myalgias.   Skin: Negative for rash.   Neurological: Negative for sensory change, speech change and headaches.       Hemodynamics:  Temp (24hrs), Av.3 °C (97.4 °F), Min:36.3 °C (97.3 °F), Max:36.4 °C (97.5 °F)  Temperature: 36.4 °C (97.5 °F)  Pulse  Av.1  Min: 58  Max: 118   Blood Pressure : 146/69 mmHg       Physical Exam:  Physical Exam   Constitutional: He is oriented to person, place, and time. He appears well-developed. No distress.   Chronically ill and frail   HENT:   Head: Normocephalic and atraumatic.   Mouth/Throat: No oropharyngeal exudate.   Edentulous   Eyes: EOM are normal. Pupils are equal, round, and reactive to light. No scleral icterus.   Neck: Neck supple.   Cardiovascular: Normal rate and regular rhythm.    Murmur heard.  Pulmonary/Chest: Effort normal. No  respiratory distress. He has no wheezes.   Abdominal: Soft. He exhibits no distension. There is no tenderness.   Musculoskeletal: He exhibits no edema.   Neurological: He is alert and oriented to person, place, and time. No cranial nerve deficit. Coordination normal.   Skin: No rash noted.   Vitals reviewed.      Meds:    Current facility-administered medications:   •  amlodipine (NORVASC) tablet 10 mg, 10 mg, Oral, Q DAY, Sarbjit Kent M.D., 10 mg at 06/14/17 0635  •  lisinopril (PRINIVIL) 10 MG tablet 10 mg, 10 mg, Oral, Q DAY, Sarbjit Kent M.D., 10 mg at 06/14/17 0634  •  potassium chloride in water (KCL) ivpb 10 mEq, 10 mEq, Intravenous, Q HOUR, Sarbjit Kent M.D.  •  potassium chloride SA (Kdur) tablet 60 mEq, 60 mEq, Oral, Once, Sarbjit Kent M.D.  •  carvedilol (COREG) tablet 12.5 mg, 12.5 mg, Oral, BID WITH MEALS, Sherman Sears M.D., 12.5 mg at 06/14/17 0838  •  hydrALAZINE (APRESOLINE) injection 10 mg, 10 mg, Intravenous, Q6HRS PRN, Sarbjit Kent M.D., 10 mg at 06/14/17 0547  •  enalaprilat (VASOTEC) injection 1.25 mg, 1.25 mg, Intravenous, Q6HRS PRN, Sarbjit Kent M.D., 1.25 mg at 06/13/17 1511  •  ipratropium-albuterol (DUONEB) nebulizer solution 3 mL, 3 mL, Nebulization, Q4H PRN (RT), Sarbjit Kent M.D.  •  piperacillin-tazobactam (ZOSYN) 4.5 g in  mL IVPB, 4.5 g, Intravenous, Q6HRS, Katelin Kay M.D., Last Rate: 100 mL/hr at 06/14/17 1140, 4.5 g at 06/14/17 1140  •  folic acid (FOLVITE) tablet 1 mg, 1 mg, Oral, DAILY, Sarbjit Kent M.D., 1 mg at 06/14/17 0838  •  cyanocobalamin (VITAMIN B-12) injection 1,000 mcg, 1,000 mcg, Intramuscular, Q7 DAYS, Sarbjit Kent M.D., 1,000 mcg at 06/10/17 1044  •  artificial tear ointment (REFRESH,LACRI-LUBE) 1 Application, 1 Application, Both Eyes, Q8HRS, Sherman Sears M.D., 1 Application at 06/14/17 0546  •  voriconazole (VFEND) tablet 200 mg, 200 mg, Oral, Q12HRS, Wayne Person III, M.D., 200 mg at 06/14/17  0840  •  loperamide (IMODIUM) capsule 2 mg, 2 mg, Oral, 4X/DAY PRN, Sherman Sears M.D., 2 mg at 06/07/17 0853  •  acetaminophen (TYLENOL) tablet 650 mg, 650 mg, Oral, Q6HRS PRN, Sherman Sears M.D., 650 mg at 06/13/17 0142  •  acetaminophen (TYLENOL) tablet 650 mg, 650 mg, Oral, Q4HRS PRN, Sherman Sears M.D., 650 mg at 06/14/17 1140  •  tamsulosin (FLOMAX) capsule 0.8 mg, 0.8 mg, Oral, QAM, Sherman Sears M.D., 0.8 mg at 06/14/17 0840  •  finasteride (PROSCAR) tablet 5 mg, 5 mg, Oral, DAILY, Sherman Sears M.D., 5 mg at 06/14/17 0838  •  diphenoxylate-atropine (LOMOTIL) 2.5-0.025 MG per tablet 1 Tab, 1 Tab, Oral, 4X/DAY PRN, Sherman Sears M.D., 1 Tab at 05/24/17 1023  •  hydrocodone-acetaminophen (NORCO) 5-325 MG per tablet 1-2 Tab, 1-2 Tab, Oral, Q6HRS PRN, Sherman Sears M.D., 2 Tab at 05/21/17 1630  •  acyclovir (ZOVIRAX) tablet 400 mg, 400 mg, Oral, BID, Sherman Sears M.D., 400 mg at 06/14/17 0837  •  nystatin (MYCOSTATIN) 323496 UNIT/ML suspension 500,000 Units, 5 mL, Oral, 4X/DAY, Sherman Sears M.D., Stopped at 06/14/17 0900  •  diphenhydrAMINE (BENADRYL) tablet/capsule 25 mg, 25 mg, Oral, PRN, Sherman Sears M.D., 25 mg at 06/14/17 1140  •  pneumococcal 13-Elizabeth Conj Vacc (PREVNAR 13) syringe 0.5 mL, 0.5 mL, Intramuscular, Once PRN, Gerry Brittany, M.D.  •  Respiratory Care per Protocol, , Nebulization, Continuous RT, Rc Boss D.O.  •  ondansetron (ZOFRAN) syringe/vial injection 4 mg, 4 mg, Intravenous, Q4HRS PRN, JAQUELINE Turcios.O., 4 mg at 06/09/17 2347  •  ondansetron (ZOFRAN ODT) dispertab 4 mg, 4 mg, Oral, Q4HRS PRN, JAQUELINE Turcios.O., 4 mg at 05/01/17 1955  •  levetiracetam (KEPPRA) tablet 750 mg, 750 mg, Oral, BID, JAQUELINE Turcios.O., 750 mg at 06/14/17 0838  •  oxycodone immediate-release (ROXICODONE) tablet 5 mg, 5 mg, Oral, Q4HRS PRN, JAQUELINE Turcios.O., 5 mg at 05/21/17 1225  •  lidocaine (LIDODERM) 5 % 1 Patch, 1 Patch, Transdermal, Q24HR, AVA TurciosORupert,  Stopped at 06/10/17 0937  •  vitamin D (Ergocalciferol) (DRISDOL) capsule 50,000 Units, 50,000 Units, Oral, Q7 DAYS, Gerry Soto M.D., 50,000 Units at 06/10/17 0757    Labs:  Recent Labs      06/12/17   1220  06/13/17   0838  06/14/17   0506   WBC  0.1*  0.1*  0.1*   RBC  1.90*  3.73*  3.75*   HEMOGLOBIN  5.3*  10.6*  10.7*   HEMATOCRIT  15.7*  30.0*  30.1*   MCV  82.6  80.4*  80.3*   MCH  27.9  28.4  28.5   RDW  39.8  37.8  38.4   PLATELETCT  16*  15*  11*   MPV  10.3  9.2  11.0     Recent Labs      06/12/17   1219  06/13/17   0838  06/14/17   0506   SODIUM  139  138  139   POTASSIUM  3.4*  3.5*  3.0*   CHLORIDE  113*  111  105   CO2  19*  21  23   GLUCOSE  130*  99  100*   BUN  15  12  13     Recent Labs      06/12/17   1219  06/13/17   0838  06/14/17   0506   ALBUMIN  2.7*  3.1*  3.0*   TBILIRUBIN  1.3  1.9*  1.5   ALKPHOSPHAT  313*  428*  525*   TOTPROTEIN  6.0  6.6  6.8   ALTSGPT  51*  49  39   ASTSGOT  109*  80*  59*   CREATININE  0.78  0.67  0.74       Imaging:  Ct-chest,abdomen,pelvis With    6/8/2017  Lytic lesions in the T5 vertebral body and medial LEFT 5th rib appear to have been present in April 2017. These findings were discussed with Dr. Person on 6/8/2017 7:33 AM.  6/8/2017  1.  Multiple new ill-defined pulmonary nodules involving both lungs, which may be infectious/inflammatory or metastatic.  Septic emboli are a consideration. 2.  Minimal bilateral pleural effusions with associated atelectasis. 3.  Apparent lytic lesions in the T5 vertebral body on the LEFT and adjacent LEFT medial 5th rib, likely metastatic. 4.  Gallbladder wall thickening, concerning for inflammation. 5.  Small amount of peritoneal fluid, of uncertain etiology.  Abnormal for male patient, raising concern for intra-abdominal inflammatory process. 6.  Markedly enlarged heterogeneous prostate.    Ct-head With  6/7/2017  1.  Diffuse RIGHT hemispheric dural thickening, as seen previously.  Possibly sequela of prior subdural  hematoma, however tumor and infection are also considerations. 2.  No focal cerebritis or intra-axial mass. 3.  Diffuse atrophy. 4.  No gross intracranial hemorrhage however presence of intravenous contrast may obscure small amounts of subarachnoid hemorrhage.    Mr-thoracic Spine-with & W/o  6/9/2017  1.  Diffuse inhomogeneous infiltration of visualized thoracic spine consistent with chronic myeloid leukemia. There is no epidural tumor or spinal canal compromise. 2.  Small multiple lung nodules. This was noted on the previous CT scan dated 6/7/2017. 3.  Fluid along the right lung major fissure.This was noted on the previous CT scan dated 6/7/2017.    Echocardiogram Comp W/o Cont    6/9/2017  Transthoracic Echo Report Echocardiography Laboratory CONCLUSIONS Normal left ventricular systolic function. Left ventricular ejection fraction is visually estimated to be 70%. Grade I diastolic dysfunction. Normal right ventricular systolic function. Mild mitral regurgitation. Mild aortic stenosis. Transvalvular gradients are - Peak: 26 mmHg, Mean:  15mmHg. Moderate aortic insufficiency. Moderate tricuspid regurgitation. Right ventricular systolic pressure is estimated to be 50 mmHg. Compared to the images of the study done 3/20/2015 - there has been no significant change.     Micro:  BLOOD CULTURE   Date Value Ref Range Status   06/10/2017   Preliminary    No Growth    Note: Blood cultures are incubated for 5 days and  are monitored continuously.Positive blood cultures  are called to the RN and reported as soon as  they are identified.     06/10/2017   Preliminary    No Growth    Note: Blood cultures are incubated for 5 days and  are monitored continuously.Positive blood cultures  are called to the RN and reported as soon as  they are identified.        Results     Procedure Component Value Units Date/Time    FUNGAL BLOOD CULTURE [[id number]] Collected:  06/14/17 0511    Order Status:  Completed Specimen Information:  Other  Updated:  06/14/17 0515    FUNGAL BLOOD CULTURE [[id number]]     Order Status:  No result Specimen Information:  Blood from Blood     FUNGAL BLOOD CULTURE [[id number]] Collected:  06/13/17 0000    Order Status:  Canceled Specimen Information:  Other from Blood     Narrative:      TEST Fungal Blood Culture WAS CANCELLED, 06/13/2017 10:35 added to stat draws  Protective    URINE CULTURE(NEW) [[id number]] Collected:  06/10/17 1116    Order Status:  Completed Specimen Information:  Urine from Urine, Clean Catch Updated:  06/12/17 0833     Significant Indicator NEG      Source UR      Site URINE, CLEAN CATCH      Urine Culture No growth at 48 hours     Narrative:      Collected By:[id number] CARLITA JULIAN  Indication for culture:->Temp Equal to,or Greater Than 101  Indication for culture:->Suspected Sepsis    FUNGAL BLOOD CULTURE [[id number]]     Order Status:  Canceled Specimen Information:  Blood from Blood     CDIFF BY PCR WITH TOXIN [[id number]] Collected:  06/10/17 2335    Order Status:  Completed Specimen Information:  Stool from Stool Updated:  06/11/17 2132     C Diff by PCR Negative      027-NAP1-BI Presumptive Negative      Comment: Presumptive 027/NAP1/BI target DNA sequences are NOT DETECTED.        C.Diff Toxin A&B Negative      Comment: C. difficile NOT detected by PCR.  Treatment not indicated per guidelines.         Narrative:      Special Contact [id number] CELSO PARSONS  Does this patient have risk factors for C-diff?->Yes  Has patient taken stool softeners or laxatives in the last 5  days?->No    BLOOD CULTURE [[id number]] Collected:  06/10/17 0021    Order Status:  Completed Specimen Information:  Blood from Peripheral Updated:  06/11/17 0717     Significant Indicator NEG      Source BLD      Site PERIPHERAL      Blood Culture --      Result:        No Growth    Note: Blood cultures are incubated for 5 days and  are monitored continuously.Positive blood cultures  are called to the RN and reported as soon  "as  they are identified.      Narrative:      Per Hospital Policy: Only change Specimen Src: to \"Line\" if  specified by physician order.    BLOOD CULTURE [[id number]] Collected:  06/10/17 0021    Order Status:  Completed Specimen Information:  Blood from Peripheral Updated:  06/11/17 0717     Significant Indicator NEG      Source BLD      Site PERIPHERAL      Blood Culture --      Result:        No Growth    Note: Blood cultures are incubated for 5 days and  are monitored continuously.Positive blood cultures  are called to the RN and reported as soon as  they are identified.      Narrative:      Per Hospital Policy: Only change Specimen Src: to \"Line\" if  specified by physician order.    BLOOD CULTURE [[id number]] Collected:  06/05/17 2354    Order Status:  Completed Specimen Information:  Blood from Peripheral Updated:  06/11/17 0100     Significant Indicator NEG      Source BLD      Site PERIPHERAL      Blood Culture No growth after 5 days of incubation.     Narrative:      Per Hospital Policy: Only change Specimen Src: to \"Line\" if  specified by physician order.    BLOOD CULTURE [[id number]] Collected:  06/05/17 2354    Order Status:  Completed Specimen Information:  Blood from Peripheral Updated:  06/11/17 0100     Significant Indicator NEG      Source BLD      Site PERIPHERAL      Blood Culture No growth after 5 days of incubation.     Narrative:      Per Hospital Policy: Only change Specimen Src: to \"Line\" if  specified by physician order.    URINE CULTURE(NEW) [[id number]] Collected:  06/10/17 1117    Order Status:  Canceled Specimen Information:  Other from Urine, Clean Catch     Narrative:      ORDER WAS CANCELLED 06/11/2017 12:01, Duplicate ..  Collected By:[id number] CARLITA JULIAN  Indication for culture:->Temp Equal to,or Greater Than 101    CULTURE RESPIRATORY W/ GRM STN [[id number]]     Order Status:  Completed Specimen Information:  Respirate from Sputum     CULTURE RESPIRATORY W/ GRM STN " "[[id number]]     Order Status:  Completed Specimen Information:  Sputum from Sputum     FUNGAL BLOOD CULTURE [[id number]]     Order Status:  Canceled Specimen Information:  Blood from Blood     CULTURE THROAT [[id number]]     Order Status:  No result Specimen Information:  Throat from Throat     BLOOD CULTURE [[id number]] Collected:  06/08/17 0000    Order Status:  Canceled Specimen Information:  Other from Peripheral     Narrative:      ORDER WAS CANCELLED 06/08/2017 11:38, Duplicate . cxl per nadja  drawn on 6/5/17.  Per Hospital Policy: Only change Specimen Src: to \"Line\" if  specified by physician order.    BLOOD CULTURE [[id number]] Collected:  06/08/17 0000    Order Status:  Canceled Specimen Information:  Other from Peripheral     Narrative:      ORDER WAS CANCELLED 06/08/2017 11:36, Duplicate . cxl per nadja,  duplicate order.  Per Hospital Policy: Only change Specimen Src: to \"Line\" if  specified by physician order.          Assessment:  Active Hospital Problems    Diagnosis   • *Fever and neutropenia (CMS-[facility]) [D70.9, R50.81]   • Neutropenia (CMS-[facility]) [D70.9]   • 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]   • Diarrhea [R19.7]   • Acute bilateral low back pain without sciatica [M54.5]   • Seizure disorder (CMS-[facility]) [G40.909]   • Abnormal CT scan, chest [R93.8]   • Transaminitis [R74.0]       Plan:  Neutropenic fever  Persistent  +MSSA blood-repeat blood cxs are neg  Ucx neg  Cdiff neg  Hold  off the transplant for now  Beta D glucagon is positive  Continue Zosyn to cover prior MSSA and GNR  Continue Voriconazole     Fungal infection  Both beta D glucagon and galactomannan antigen are positive.  Latter is weakly positive after being negative on the same day  Continue voriconazole    Abnormal chest x-ray  Voriconazole to be continued. Check voriconazole levels  Repeat CT scan of the chest    MSSA bacteremia  TTE neg for vegetation  BREANNA if feasible  Repeat cultures are " negative from 6/5/17  Aim for at least 6 weeks of MSSA therapy    Septic emboli  Likely secondary to above      Thrush  Continue Nystatin  Also on voriconazole

## 2017-06-14 NOTE — PROGRESS NOTES
[facility] Interval Note    Name Benjamin Post       1945   Age/Sex 71 y.o. male   MRN [id number]   Code Status DNR     After 5PM or if no immediate response to page, please call for cross-coverage  Attending/Team: Dr. Chung / rudolph  Call [phone] to page   1st Call - Day Intern (R1):   Dr Sherman Sears  2nd Call - Day Sr. Resident (R2/R3):   Dr. Sarbjit Kent       Chief complaint/ reason for interval visit (Primary Diagnosis)   CML/pancytopenia     Interval Problem Daily Status Update :  K 3.0. Replaced. Will recheck K in the evening.  BP high overnight. Amlodipine 10 mg was added yesterday. Added lisinopril 10 mg today.  ID recommends rechecking CT thorax and checking voriconazole levels.  ID stopped micafungin  BREANNA at 1630 today.      Subjective :   Reports no chest pain or shortness of breath today.    Review of Systems   Constitutional: Negative for fever and chills.   HENT: Negative for ear discharge and hearing loss.    Eyes: Negative for blurred vision, double vision and photophobia.   Respiratory: Positive for shortness of breath. Negative for cough and hemoptysis.    Cardiovascular: Positive for chest pain. Negative for palpitations, leg swelling and PND.   Gastrointestinal: Negative for heartburn, nausea, abdominal pain and diarrhea.   Genitourinary: Negative for dysuria and urgency.   Musculoskeletal: Negative for myalgias and neck pain.   Skin: Negative for rash.   Neurological: Negative for dizziness, tingling, tremors and headaches.   Endo/Heme/Allergies: Negative for polydipsia. Does not bruise/bleed easily.   Psychiatric/Behavioral: Negative for depression and suicidal ideas.       Consultants/Specialty  Oncology   Infectious disease  Cardiology    Disposition  Inpatient for neutropenic fever, CML    Quality Measures  EKG reviewed, Labs reviewed, Medications reviewed and Radiology images  reviewed  Mahajan catheter: No Mahajan      DVT Prophylaxis: Contraindicated - High bleeding risk  DVT prophylaxis - mechanical: SCDs (low plt)            Physical Exam       Filed Vitals:    06/14/17 0600 06/14/17 0809 06/14/17 1158 06/14/17 1245   BP: 153/66 153/63 146/69 146/69   Pulse:  64 61 61   Temp:  36.3 °C (97.3 °F) 36.4 °C (97.5 °F) 36.4 °C (97.5 °F)   Resp:  18 18 18   Height:       Weight:       SpO2:  98% 98% 98%     Body mass index is 22.96 kg/(m^2). Weight: 66.5 kg (146 lb 9.7 oz)  Oxygen Therapy:  Pulse Oximetry: 98 %, O2 (LPM): 0, O2 Delivery: None (Room Air)    Physical Exam   Constitutional: He is well-developed, well-nourished, and in no distress. No distress.   HENT:   Head: Normocephalic.   Neck: No tracheal deviation present. No thyromegaly present.   Cardiovascular: Normal rate.  Exam reveals no friction rub.    Murmur heard.  Ejection systolic murmur graded 3/6 with maximal intensity at tricuspid area, no thrill or radiation.    Pulmonary/Chest: Effort normal. No respiratory distress. He has no wheezes. He has no rales.   Bronchial breath sounds RLL   Abdominal: Soft. He exhibits no distension. There is no tenderness.   Musculoskeletal: He exhibits no edema.   Neurological: He is alert.   Skin: No erythema.         Lab Data Review:      5/4/2017  1:28 PM    Recent Labs      06/12/17   1219  06/13/17   0838  06/14/17   0506   SODIUM  139  138  139   POTASSIUM  3.4*  3.5*  3.0*   CHLORIDE  113*  111  105   CO2  19*  21  23   BUN  15  12  13   CREATININE  0.78  0.67  0.74   CALCIUM  8.3*  8.7  9.1       Recent Labs      06/12/17   1219  06/13/17   0838  06/14/17   0506   ALTSGPT  51*  49  39   ASTSGOT  109*  80*  59*   ALKPHOSPHAT  313*  428*  525*   TBILIRUBIN  1.3  1.9*  1.5   GLUCOSE  130*  99  100*       Recent Labs      06/12/17   1220  06/13/17   0838  06/14/17   0506   RBC  1.90*  3.73*  3.75*   HEMOGLOBIN  5.3*  10.6*  10.7*   HEMATOCRIT  15.7*  30.0*  30.1*   PLATELETCT  16*  15*  11*        Recent Labs      06/12/17   1219  06/12/17   1220  06/13/17   0838  06/14/17   0506   WBC   --   0.1*  0.1*  0.1*   ASTSGOT  109*   --   80*  59*   ALTSGPT  51*   --   49  39   ALKPHOSPHAT  313*   --   428*  525*   TBILIRUBIN  1.3   --   1.9*  1.5           Assessment/Plan     Pneumonia or invasive aspergillosis  patient developed sob and chest painyesterday  new CXR showed : right middle lobe atelectasis and right lower lobe opacities/possible pneumonia.  Small right pleural effusion that has increased in volume.  CT chest done on 6/7 showed Ill-defined nodular densities are present in both lungs, measuring up to 8 mm.   Beta-glucan positive, galactomannan is positive   these small nodules most likely are due aspergillosis infection. The new infiltrate could be bacterial pneumonia, aspiration? on top of aspergillosis  continue on voriconozole-repeat CT thorax per ID  continue IV antibiotics with vanco and zosyn    CML (chronic myelocytic leukemia)-Accelerated phase  Failed tyrosine kinase inhibitors previously, responded to omacetaxine  Bone Marrow showed: hypocellular with blast less than 10%, considered responded to chemotherapy   Pan CT showed: Multiple new ill-defined pulmonary nodules involving both lungs, which may be infectious/inflammatory or metastatic.  Septic emboli are a consideration.  lytic lesions in the T5 vertebral body on the LEFT and adjacent LEFT medial 5th rib, likely metastatic.  There is high possibility that these lesions are septic emboli from his previous MSSA bacteremia vs metastasis.  ECHO no vegetation. BREANNA pending  Thoracic MRI diffuse infiltration of T spine consistent with CML  per oncology they will go through BM transplant despite the septic emboli And it will be arranged probably on 6/25      Pancytopenia (CMS-HCC)  Due to CML  Hb dropped yesterday to 5.3, received 2 units of blood, HB today 10.6  Transfuse plt>10  Or >30 if bleeding and Hb>7.5.  Folate and B12  low-replacement       MSSA bacteremia   Blood culture is negative on 6/5 and 6/10  TTE neg for vegetation,   Heart murmur seems to be chronic due to probably TR  Continue on vanco and zosyn for 6 weeks  BREANNA today at 1630    Hypertension-uncontrolled  BP elevated  Amlodipine restarted with 10 mg, carvedilol increased back to 12.5 mg BID yesterday  Lisinopril 10 mg added today  Hydralazine on board    Hyponatremia-improving  Resolved     Neutropenia (CMS-HCC)  Developed neutropenic fever  See  work up above    Transaminitis-resolved  AST 80, ALT 49, ALK. Ph 429, elevated from the previous days  Most likely due to medication effect, voriconozole could be the cause  Follow up    Seizure disorder (CMS-HCC)  Stable    Continue on his home dose of keppra  Had 1 episode last year after dental procedure.     BPH (benign prostatic hyperplasia)  Stable.   Continue tamsulosin and finasteride    Low vitamin D level  High Alk Phos  Continue vit D2 weekly.     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

## 2017-06-14 NOTE — PROGRESS NOTES
Oncology/Hematology Progress Note               Author:  Dick Elizabeth  DX-Relapsed RefractoRy CML. Date & Time created: 6/14/2017  2:39 PM     Interval History:Accelerated/Blast crisis CML, started second 14 day course of Omacetaxine.  Last bone marrow with a total of <10% blast. Good response to Omacetaxine  Now CT chest showing bilateral pulm nodules likely related to septic emboli to  MSSA bacteremia in the past. Also lytic lesions on T5 and 5ft rib. MRI spine with heterogenoes diffuse involvement due to CML  6/10  spiking fever, tmax 39. Started on Zosyn, vancomycin. On voriconazole. Cultures negative. Patient asymptomatic  6/12-afebrile today but anemia is worse.  6/14-REMAINS AFEBRILE. FEELING BETTER.    Review of Systems:  Review of Systems   Constitutional: Positive for malaise/fatigue. Negative for fever, chills and diaphoresis.   HENT: Negative.  Negative for congestion and sore throat.    Eyes: Negative for blurred vision and redness.   Respiratory: Negative.  Negative for cough and sputum production.    Cardiovascular: Negative.  Negative for chest pain, palpitations and orthopnea.   Gastrointestinal: Negative for nausea, abdominal pain, diarrhea and constipation.   Genitourinary: Negative.  Negative for dysuria and hematuria.   Musculoskeletal: Negative.  Negative for myalgias and neck pain.   Skin: Negative for itching and rash.   Neurological: Positive for weakness. Negative for dizziness, tremors, speech change, seizures and headaches.   Endo/Heme/Allergies: Negative for polydipsia. Bruises/bleeds easily.   Psychiatric/Behavioral: Negative for depression, suicidal ideas and memory loss. The patient is nervous/anxious.        Physical Exam:  Physical Exam   Constitutional: He is oriented to person, place, and time. He appears well-developed and well-nourished. No distress.   HENT:   Head: Normocephalic and atraumatic.   Mouth/Throat: No oropharyngeal exudate.   Eyes: Conjunctivae are normal. Pupils  are equal, round, and reactive to light. No scleral icterus.   Neck: Normal range of motion. Neck supple. No thyromegaly present.   Cardiovascular: Normal rate and normal heart sounds.    Pulmonary/Chest: Effort normal. No respiratory distress. He has no wheezes. He has no rales.   Abdominal: Soft. Bowel sounds are normal. He exhibits no distension. There is no tenderness. There is no rebound and no guarding.   Musculoskeletal: He exhibits edema. He exhibits no tenderness.   Lymphadenopathy:     He has no cervical adenopathy.   Neurological: He is alert and oriented to person, place, and time. No cranial nerve deficit.   Skin: Skin is warm and dry. No rash noted. No erythema.       Labs:        Invalid input(s): [id number]  Recent Labs      06/13/17   0837   TROPONINI  <0.01   BNPBTYPENAT  1435*     Recent Labs      06/12/17   1219  06/13/17   0838  06/14/17   0506   SODIUM  139  138  139   POTASSIUM  3.4*  3.5*  3.0*   CHLORIDE  113*  111  105   CO2  19*  21  23   BUN  15  12  13   CREATININE  0.78  0.67  0.74   CALCIUM  8.3*  8.7  9.1     Recent Labs      06/12/17 1219  06/13/17   0838  06/14/17   0506   ALTSGPT  51*  49  39   ASTSGOT  109*  80*  59*   ALKPHOSPHAT  313*  428*  525*   TBILIRUBIN  1.3  1.9*  1.5   GLUCOSE  130*  99  100*     Recent Labs      06/12/17   1220  06/13/17   0838  06/14/17   0506   RBC  1.90*  3.73*  3.75*   HEMOGLOBIN  5.3*  10.6*  10.7*   HEMATOCRIT  15.7*  30.0*  30.1*   PLATELETCT  16*  15*  11*     Recent Labs      06/12/17 1219  06/12/17   1220  06/13/17   0838  06/14/17   0506   WBC   --   0.1*  0.1*  0.1*   ASTSGOT  109*   --   80*  59*   ALTSGPT  51*   --   49  39   ALKPHOSPHAT  313*   --   428*  525*   TBILIRUBIN  1.3   --   1.9*  1.5     Recent Labs      06/12/17 1219  06/13/17   0838  06/14/17   0506   SODIUM  139  138  139   POTASSIUM  3.4*  3.5*  3.0*   CHLORIDE  113*  111  105   CO2  19*  21  23   GLUCOSE  130*  99  100*   BUN  15  12  13   CREATININE  0.78  0.67   0.74   CALCIUM  8.3*  8.7  9.1     Hemodynamics:  Temp (24hrs), Av.3 °C (97.4 °F), Min:36.3 °C (97.3 °F), Max:36.4 °C (97.5 °F)  Temperature: 36.4 °C (97.5 °F)  Pulse  Av  Min: 58  Max: 118   Blood Pressure : 146/69 mmHg     Respiratory:    Respiration: 18, Pulse Oximetry: 98 %        RUL Breath Sounds: Diminished, RML Breath Sounds: Diminished, RLL Breath Sounds: Diminished, WAGNER Breath Sounds: Clear, LLL Breath Sounds: Clear  Fluids:    Intake/Output Summary (Last 24 hours) at 17 1034  Last data filed at 17 1000   Gross per 24 hour   Intake   2057 ml   Output   1675 ml   Net    382 ml     Weight: 66.5 kg (146 lb 9.7 oz)  GI/Nutrition:  Orders Placed This Encounter   Procedures   • DIET NPO     Standing Status: Standing      Number of Occurrences: 1      Standing Expiration Date:      Order Specific Question:  Restrict to:     Answer:  Sips with Medications [3]     Medical Decision Making, by Problem:  Active Hospital Problems    Diagnosis   • *Fever and neutropenia (CMS-HCC) [D70.9, R50.81]   • Neutropenia (CMS-HCC) [D70.9]   • 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]   • Diarrhea [R19.7]   • Acute bilateral low back pain without sciatica [M54.5]   • Seizure disorder (CMS-HCC) [G40.909]   • Hypertension [I10]   • BPH (benign prostatic hyperplasia) [N40.0]   • Low vitamin D level [E55.9]   • Transaminitis [R74.0]       Plan:  Advanced CML:  -reponded well to Omacetaxine.He got a good response with <10% blasts.  -CT scan showed bilateral pulm nodules likely related to septic emboli, Per ID continue on IV ABX for a total of 6 weeks,  -MR T spine showed diffuse heterogenous findings related to CML, no further intervention indicated  -Transfuse  PRBC if hemoglobin <8 g/dl or Platelets if <10 or bleeding. Transfuse blood and platelet today.   D/w Dr Lew today. Plan to trf to [facility] fro an allo transplant today.  Anemia-Transfuse  PRBC if hemoglobin <8 g/dl or Platelets if <10 or  bleeding.   ID- ID on Board. Continue anti infective agents per them.  Diarrhea-better. abdominal exam is OK.    Guarded Prognosis      Labs reviewed

## 2017-06-14 NOTE — PROGRESS NOTES
Assumed care at 1900. Received bedside report from day RN. Pt A/Ox4. Pt denied pain. Pt assessed, labs and notes reviewed. Medicated per MAR. POC discussed; pt agreeable to goals. Pt board updated and pt informed of phone ext's, and hourly rounding. Bed alarm is in use and functioning. Pt is up x1, calls appropriately, but does not wait for staff. Room close to nurses station. Pt needs are met at this time. Call light and phone within reach.

## 2017-06-14 NOTE — PROGRESS NOTES
[facility] [place] [facility] INTERNAL MEDICINE ATTENDING NOTE:      Date & Time note created:   6/14/2017   3:18 PM     Visit Time:   Attending/resident bedside rounds 9-11:30 AM     The patient was evaluated with the resident staff.  I reviewed the resident's note and agree with the resident's findings and plan as documented in the resident's note except as documented in the attending note. Please reference resident daily note for complete information.The chart was reviewed and summarized.  Available labs, imaging, O2 sats, EKGs were reviewed. Available nursing, consultant, and resident notes were reviewed. I am actively involved in the patient's care.                                                                BRIEF DISCUSSION:                                                         //CML likely with blast crisis,Pancytopenia: S/p Omacetaxine. Had received Syrinbo in the past. Planned for BMT in future at [facility]. Monitor for bleeding complications and neutropenic complications. Transfusions as recommended by Hem/Onc.    // Neutropenic fever, MSSA Bacteremia: Continue Abx. On Vanc and Zosyn, No fever in 24 hours. ID following  BC + 5/25, repeat Cx all negative  CT chest s/o multifocal PNA. BREANNA  Negative for vegetations.  Repeat cx are negative.   // Suspected Invasive Aspergillus PNA, R/O Septic phenomena: Asp galactomannan and B2 glucan are positive. On Voriconazole. New infiltrates seen on CXR . Repeat Imaging under way. D/W ID on antifungal escalations. Has abnormal LFTS.   // Transaminitis: Suspect medication related. Has ALP>AST>ALT. Suspect some ALP from his bone lesion. Monitor. Avoid Hepatotoxic medication as much as possible. Is on Voriconazole.  Repeat US abdomen to R/O obstructive biliary pathology, ALP is rising slowly.   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 Has elevated PSA and  No Spinal lesions so doubt Prostrate related. CT c/a/p no mass.  SPEP with FERNY polyclonal     // h/o   Hypercalcemia: PTH 6.8, Vit D 22, Likely related to malignancy. PTHrP elevated. PSA elevated as well. CT c/a/p 4/4 s/o possible lesions related to CML. No spinal blastic lesions.    // Valvular Cardiomyopathy, PHTN: No evidence of HF at this time. Cont BB. Monitor fluid status. Outpatient cards follow up.       // HTN: BP ok.  // Seizure Disorder: Continue Keppra  // h/o SDH    Echo: Normal left ventricular systolic function.Left ventricular ejection fraction is visually estimated to be 70%.Grade I diastolic dysfunction.Normal right ventricular systolic function.Mild mitral regurgitation.Mild aortic stenosis.Transvalvular gradients are - Peak: 26 mmHg, Mean:  15mmHg. Moderate aortic insufficiency.Moderate tricuspid regurgitation. Right ventricular systolic pressure is estimated to be 50 mmHg. Compared to the images of the study done 3/20/2015 - there has been no significant change.  ----------------------------------------------------------------------------------------------------------------------  Filed Vitals:    06/14/17 0600 06/14/17 0809 06/14/17 1158 06/14/17 1245   BP: 153/66 153/63 146/69 146/69   Pulse:  64 61 61   Temp:  36.3 °C (97.3 °F) 36.4 °C (97.5 °F) 36.4 °C (97.5 °F)   Resp:  18 18 18   Height:       Weight:       SpO2:  98% 98% 98%     Weight/BMI: Body mass index is 22.96 kg/(m^2).  Pulse Oximetry: 98 %, O2 (LPM): 0, O2 Delivery: None (Room Air)    Intake/Output Summary (Last 24 hours) at 06/13/17 1422  Last data filed at 06/13/17 1355   Gross per 24 hour   Intake    930 ml   Output   2720 ml   Net  -1790 ml       Bishwas Juliet, MD   [facility]

## 2017-06-15 VITALS
SYSTOLIC BLOOD PRESSURE: 167 MMHG | HEART RATE: 62 BPM | DIASTOLIC BLOOD PRESSURE: 74 MMHG | TEMPERATURE: 98.6 F | OXYGEN SATURATION: 95 % | WEIGHT: 145.28 LBS | BODY MASS INDEX: 22.8 KG/M2 | HEIGHT: 67 IN | RESPIRATION RATE: 17 BRPM

## 2017-06-15 LAB
ALBUMIN SERPL BCP-MCNC: 3.4 G/DL (ref 3.2–4.9)
ALBUMIN/GLOB SERPL: 0.8 G/DL
ALP SERPL-CCNC: 552 U/L (ref 30–99)
ALT SERPL-CCNC: 38 U/L (ref 2–50)
ANION GAP SERPL CALC-SCNC: 8 MMOL/L (ref 0–11.9)
AST SERPL-CCNC: 47 U/L (ref 12–45)
BACTERIA BLD CULT: NORMAL
BACTERIA BLD CULT: NORMAL
BILIRUB SERPL-MCNC: 1.2 MG/DL (ref 0.1–1.5)
BUN SERPL-MCNC: 14 MG/DL (ref 8–22)
CALCIUM SERPL-MCNC: 9.1 MG/DL (ref 8.5–10.5)
CHLORIDE SERPL-SCNC: 105 MMOL/L (ref 96–112)
CO2 SERPL-SCNC: 26 MMOL/L (ref 20–33)
CREAT SERPL-MCNC: 0.71 MG/DL (ref 0.5–1.4)
ERYTHROCYTE [DISTWIDTH] IN BLOOD BY AUTOMATED COUNT: 40.4 FL (ref 35.9–50)
GFR SERPL CREATININE-BSD FRML MDRD: >60 ML/MIN/1.73 M 2
GLOBULIN SER CALC-MCNC: 4.1 G/DL (ref 1.9–3.5)
GLUCOSE SERPL-MCNC: 120 MG/DL (ref 65–99)
HCT VFR BLD AUTO: 34.9 % (ref 42–52)
HGB BLD-MCNC: 12 G/DL (ref 14–18)
MAGNESIUM SERPL-MCNC: 1.9 MG/DL (ref 1.5–2.5)
MCH RBC QN AUTO: 28 PG (ref 27–33)
MCHC RBC AUTO-ENTMCNC: 34.4 G/DL (ref 33.7–35.3)
MCV RBC AUTO: 81.4 FL (ref 81.4–97.8)
NEUTROPHILS # BLD AUTO: ABNORMAL K/UL (ref 1.82–7.42)
NRBC # BLD AUTO: 0 K/UL
NRBC BLD AUTO-RTO: 0 /100 WBC
PLATELET # BLD AUTO: 30 K/UL (ref 164–446)
PMV BLD AUTO: 9.4 FL (ref 9–12.9)
POTASSIUM SERPL-SCNC: 3.8 MMOL/L (ref 3.6–5.5)
PROT SERPL-MCNC: 7.5 G/DL (ref 6–8.2)
RBC # BLD AUTO: 4.29 M/UL (ref 4.7–6.1)
SIGNIFICANT IND 70042: NORMAL
SIGNIFICANT IND 70042: NORMAL
SITE SITE: NORMAL
SITE SITE: NORMAL
SODIUM SERPL-SCNC: 139 MMOL/L (ref 135–145)
SOURCE SOURCE: NORMAL
SOURCE SOURCE: NORMAL
WBC # BLD AUTO: 0.1 K/UL (ref 4.8–10.8)

## 2017-06-15 PROCEDURE — A9270 NON-COVERED ITEM OR SERVICE: HCPCS | Performed by: STUDENT IN AN ORGANIZED HEALTH CARE EDUCATION/TRAINING PROGRAM

## 2017-06-15 PROCEDURE — 700102 HCHG RX REV CODE 250 W/ 637 OVERRIDE(OP): Performed by: INTERNAL MEDICINE

## 2017-06-15 PROCEDURE — 700105 HCHG RX REV CODE 258: Performed by: INTERNAL MEDICINE

## 2017-06-15 PROCEDURE — A9270 NON-COVERED ITEM OR SERVICE: HCPCS | Performed by: INTERNAL MEDICINE

## 2017-06-15 PROCEDURE — 700102 HCHG RX REV CODE 250 W/ 637 OVERRIDE(OP): Performed by: STUDENT IN AN ORGANIZED HEALTH CARE EDUCATION/TRAINING PROGRAM

## 2017-06-15 PROCEDURE — 83735 ASSAY OF MAGNESIUM: CPT

## 2017-06-15 PROCEDURE — 36415 COLL VENOUS BLD VENIPUNCTURE: CPT

## 2017-06-15 PROCEDURE — 85025 COMPLETE CBC W/AUTO DIFF WBC: CPT

## 2017-06-15 PROCEDURE — 700111 HCHG RX REV CODE 636 W/ 250 OVERRIDE (IP): Performed by: INTERNAL MEDICINE

## 2017-06-15 PROCEDURE — 99239 HOSP IP/OBS DSCHRG MGMT >30: CPT | Mod: GC | Performed by: INTERNAL MEDICINE

## 2017-06-15 PROCEDURE — A9270 NON-COVERED ITEM OR SERVICE: HCPCS | Performed by: GENERAL ACUTE CARE HOSPITAL

## 2017-06-15 PROCEDURE — 80299 QUANTITATIVE ASSAY DRUG: CPT

## 2017-06-15 PROCEDURE — 700102 HCHG RX REV CODE 250 W/ 637 OVERRIDE(OP): Performed by: GENERAL ACUTE CARE HOSPITAL

## 2017-06-15 PROCEDURE — 80053 COMPREHEN METABOLIC PANEL: CPT

## 2017-06-15 RX ORDER — VORICONAZOLE 200 MG/1
200 TABLET, FILM COATED ORAL EVERY 12 HOURS
Qty: 20 TAB | Refills: 0
Start: 2017-06-15 | End: 2017-07-28

## 2017-06-15 RX ORDER — CYANOCOBALAMIN 1000 UG/ML
1000 INJECTION, SOLUTION INTRAMUSCULAR; SUBCUTANEOUS
Refills: 0
Start: 2017-06-15 | End: 2017-07-28

## 2017-06-15 RX ORDER — DIPHENHYDRAMINE HCL 25 MG
25 TABLET ORAL PRN
Qty: 30 TAB | Refills: 0
Start: 2017-06-15 | End: 2017-07-28

## 2017-06-15 RX ORDER — ONDANSETRON 4 MG/1
4 TABLET, ORALLY DISINTEGRATING ORAL EVERY 4 HOURS PRN
Qty: 10 TAB | Refills: 0
Start: 2017-06-15 | End: 2017-07-28

## 2017-06-15 RX ORDER — LISINOPRIL 20 MG/1
20 TABLET ORAL
Status: DISCONTINUED | OUTPATIENT
Start: 2017-06-15 | End: 2017-06-15 | Stop reason: HOSPADM

## 2017-06-15 RX ORDER — LISINOPRIL 20 MG/1
20 TABLET ORAL DAILY
Qty: 30 TAB
Start: 2017-06-15 | End: 2017-07-28

## 2017-06-15 RX ORDER — ONDANSETRON 2 MG/ML
4 INJECTION INTRAMUSCULAR; INTRAVENOUS EVERY 4 HOURS PRN
Qty: 15 ML
Start: 2017-06-15 | End: 2017-07-28

## 2017-06-15 RX ORDER — IPRATROPIUM BROMIDE AND ALBUTEROL SULFATE 2.5; .5 MG/3ML; MG/3ML
3 SOLUTION RESPIRATORY (INHALATION) EVERY 4 HOURS PRN
Start: 2017-06-15 | End: 2017-07-28

## 2017-06-15 RX ORDER — AMLODIPINE BESYLATE 10 MG/1
10 TABLET ORAL DAILY
Qty: 30 TAB
Start: 2017-06-15 | End: 2017-07-28

## 2017-06-15 RX ORDER — ENALAPRILAT 1.25 MG/ML
1.25 INJECTION INTRAVENOUS EVERY 6 HOURS PRN
Refills: 0
Start: 2017-06-15 | End: 2017-07-28

## 2017-06-15 RX ORDER — HYDROCODONE BITARTRATE AND ACETAMINOPHEN 5; 325 MG/1; MG/1
1-2 TABLET ORAL EVERY 6 HOURS PRN
Qty: 20 TAB | Refills: 0
Start: 2017-06-15 | End: 2017-07-28

## 2017-06-15 RX ORDER — LOPERAMIDE HYDROCHLORIDE 2 MG/1
2 CAPSULE ORAL 4 TIMES DAILY PRN
Qty: 30 CAP
Start: 2017-06-15 | End: 2017-07-28

## 2017-06-15 RX ORDER — ERGOCALCIFEROL 1.25 MG/1
50000 CAPSULE ORAL
Qty: 30 CAP
Start: 2017-06-15 | End: 2017-07-28

## 2017-06-15 RX ORDER — LIDOCAINE 50 MG/G
1 PATCH TOPICAL EVERY 24 HOURS
Qty: 10 PATCH
Start: 2017-06-15 | End: 2017-07-28

## 2017-06-15 RX ORDER — FOLIC ACID 1 MG/1
1 TABLET ORAL DAILY
Qty: 30 TAB
Start: 2017-06-15 | End: 2017-07-28

## 2017-06-15 RX ORDER — FINASTERIDE 5 MG/1
5 TABLET, FILM COATED ORAL DAILY
Qty: 30 TAB
Start: 2017-06-15 | End: 2017-07-28

## 2017-06-15 RX ORDER — POTASSIUM CHLORIDE 20 MEQ/1
60 TABLET, EXTENDED RELEASE ORAL ONCE
Status: COMPLETED | OUTPATIENT
Start: 2017-06-15 | End: 2017-06-15

## 2017-06-15 RX ORDER — DIPHENOXYLATE HYDROCHLORIDE AND ATROPINE SULFATE 2.5; .025 MG/1; MG/1
1 TABLET ORAL 4 TIMES DAILY PRN
Qty: 30 TAB | Refills: 0
Start: 2017-06-15 | End: 2017-07-28

## 2017-06-15 RX ORDER — ACYCLOVIR 400 MG/1
400 TABLET ORAL 2 TIMES DAILY
Qty: 20 TAB | Refills: 0
Start: 2017-06-15 | End: 2017-07-28

## 2017-06-15 RX ORDER — HYDRALAZINE HYDROCHLORIDE 20 MG/ML
10 INJECTION INTRAMUSCULAR; INTRAVENOUS EVERY 6 HOURS PRN
Refills: 0
Start: 2017-06-15 | End: 2017-07-28

## 2017-06-15 RX ADMIN — MINERAL OIL AND WHITE PETROLATUM 1 APPLICATION: 150; 830 OINTMENT OPHTHALMIC at 06:28

## 2017-06-15 RX ADMIN — TAMSULOSIN HYDROCHLORIDE 0.8 MG: 0.4 CAPSULE ORAL at 08:31

## 2017-06-15 RX ADMIN — AMLODIPINE BESYLATE 10 MG: 10 TABLET ORAL at 08:32

## 2017-06-15 RX ADMIN — POTASSIUM CHLORIDE 60 MEQ: 1500 TABLET, EXTENDED RELEASE ORAL at 06:27

## 2017-06-15 RX ADMIN — PIPERACILLIN SODIUM AND TAZOBACTAM SODIUM 4.5 G: 4; .5 INJECTION, POWDER, FOR SOLUTION INTRAVENOUS at 12:11

## 2017-06-15 RX ADMIN — LEVETIRACETAM 750 MG: 250 TABLET, FILM COATED ORAL at 08:32

## 2017-06-15 RX ADMIN — FOLIC ACID 1 MG: 1 TABLET ORAL at 08:31

## 2017-06-15 RX ADMIN — PIPERACILLIN SODIUM AND TAZOBACTAM SODIUM 4.5 G: 4; .5 INJECTION, POWDER, FOR SOLUTION INTRAVENOUS at 00:36

## 2017-06-15 RX ADMIN — ACYCLOVIR 400 MG: 800 TABLET ORAL at 08:31

## 2017-06-15 RX ADMIN — FINASTERIDE 5 MG: 5 TABLET, FILM COATED ORAL at 08:32

## 2017-06-15 RX ADMIN — VORICONAZOLE 200 MG: 200 TABLET, FILM COATED ORAL at 08:34

## 2017-06-15 RX ADMIN — LOPERAMIDE HYDROCHLORIDE 2 MG: 2 CAPSULE ORAL at 12:11

## 2017-06-15 RX ADMIN — LISINOPRIL 20 MG: 20 TABLET ORAL at 08:31

## 2017-06-15 RX ADMIN — PIPERACILLIN SODIUM AND TAZOBACTAM SODIUM 4.5 G: 4; .5 INJECTION, POWDER, FOR SOLUTION INTRAVENOUS at 06:25

## 2017-06-15 RX ADMIN — CARVEDILOL 12.5 MG: 12.5 TABLET, FILM COATED ORAL at 08:31

## 2017-06-15 ASSESSMENT — ENCOUNTER SYMPTOMS
SPEECH CHANGE: 0
FLANK PAIN: 0
PALPITATIONS: 0
WEAKNESS: 1
DEPRESSION: 0
CARDIOVASCULAR NEGATIVE: 1
FALLS: 0
HEADACHES: 0
DIARRHEA: 0
FEVER: 0
DIAPHORESIS: 0
VOMITING: 0
SEIZURES: 0
SORE THROAT: 0
MYALGIAS: 0
POLYDIPSIA: 0
BLURRED VISION: 0
NECK PAIN: 0
NERVOUS/ANXIOUS: 1
MUSCULOSKELETAL NEGATIVE: 1
TREMORS: 0
CONSTIPATION: 0
CHILLS: 0
ORTHOPNEA: 0
MEMORY LOSS: 0
SPUTUM PRODUCTION: 0
BRUISES/BLEEDS EASILY: 1
NAUSEA: 0
SENSORY CHANGE: 0
ABDOMINAL PAIN: 0
DIZZINESS: 0
RESPIRATORY NEGATIVE: 1
EYE REDNESS: 0
COUGH: 0
SHORTNESS OF BREATH: 0

## 2017-06-15 ASSESSMENT — PAIN SCALES - GENERAL
PAINLEVEL_OUTOF10: 0

## 2017-06-15 NOTE — DISCHARGE INSTRUCTIONS
Discharge Instructions    Discharged to other by ambulance with escort. Discharged via ambulance, hospital escort: Yes.  Special equipment needed: Not Applicable    Be sure to schedule a follow-up appointment with your primary care doctor or any specialists as instructed.     Discharge Plan:   Diet Plan: Discussed  Activity Level: Discussed  Confirmed Follow up Appointment: No (Comments)  Confirmed Symptoms Management: Discussed  Medication Reconciliation Updated: Yes  Influenza Vaccine Indication: Not indicated: Previously immunized this influenza season and > 8 years of age    I understand that a diet low in cholesterol, fat, and sodium is recommended for good health. Unless I have been given specific instructions below for another diet, I accept this instruction as my diet prescription.   Other diet: Regular diet as tolerated      Special Instructions: None    · Is patient discharged on Warfarin / Coumadin?   No     · Is patient Post Blood Transfusion?  Yes  POST BLOOD TRANSFUSION INFORMATION (ADULT)    The purpose of blood transfusion is to correct anemia, low levels of blood clotting factors or to correct acute blood loss.   Blood transfusion is very safe but occasionally unexpected adverse reactions do occur. Most adverse reactions occur during transfusion, within one to two days following transfusion or one to two weeks following transfusion. Some adverse reactions can occur one to six months after transfusion and some even years later.             If any of the symptoms listed below happen to you during your transfusion,                                 please notify your nurse immediately.   · Fever or Chills  · Flushing of the Face  · Hives, rash or itching  · Difficulty in breathing or shortness of breath  · Pain or oozing of blood from the IV needle site  · Low back pain  · Nausea or vomiting  · Weakness or fainting  · Chest pain  · Blood in the urine  · Decreased frequency of urination    The above  symptoms may also occur within 24 to 48 after transfusion; if so, notify your physician.     · Yellowing of the skin    This can happen one to six months after transfusion; if so, notify your physician    Depression / Suicide Risk    As you are discharged from this [facility] facility, it is important to learn how to keep safe from harming yourself.    Recognize the warning signs:  · Abrupt changes in personality, positive or negative- including increase in energy   · Giving away possessions  · Change in eating patterns- significant weight changes-  positive or negative  · Change in sleeping patterns- unable to sleep or sleeping all the time   · Unwillingness or inability to communicate  · Depression  · Unusual sadness, discouragement and loneliness  · Talk of wanting to die  · Neglect of personal appearance   · Rebelliousness- reckless behavior  · Withdrawal from people/activities they love  · Confusion- inability to concentrate     If you or a loved one observes any of these behaviors or has concerns about self-harm, here's what you can do:  · Talk about it- your feelings and reasons for harming yourself  · Remove any means that you might use to hurt yourself (examples: pills, rope, extension cords, firearm)  · Get professional help from the community (Mental Health, Substance Abuse, psychological counseling)  · Do not be alone:Call your Safe Contact- someone whom you trust who will be there for you.  · Call your local CRISIS HOTLINE 702-0350 or 572-424-8054  · Call your local Children's Mobile Crisis Response Team [place] (456) 461-7415 or www.[employer]  · Call the toll free National Suicide Prevention Hotlines   · National Suicide Prevention Lifeline 294-368-CNHI (4532)  · National Hope Line Network 800-SUICIDE (427-6763)

## 2017-06-15 NOTE — DISCHARGE PLANNING
Pt accepted to [facility] [place]. Transportation arranged by transfer center. [facility] ground transportation to arrive at 1330. Update to MDs for d/c summary and orders. Met with pt to complete COBRA. Update to daughter Lita who will be in [place] today. Packet, disc burned for [facility] [place], face sheets for transport.

## 2017-06-15 NOTE — PROGRESS NOTES
Pt calm and relaxed, in good spirits tonight, family at bedside, claims no pain or nausea. Tolerated evening medications well. Mostly Z[place]ian speaking but understands simple questions. Bed alarm on for safety, call light in reach and does not always call and at times impulsive, bed in low/locked position, 2 side rails up.

## 2017-06-15 NOTE — DISCHARGE PLANNING
: Shelly at [facility] called states that there is a discrepancy in  for patient. Called Floor RN to verify  with patient. Patient states that his  is 1945. Called Shelly at [facility] back to update . Shelly states that Dr. Lew is accepting patient but at this time they do not have a bed for him. States that they will possibly have an open bed tomorrow. Will follow up tomorrow.

## 2017-06-15 NOTE — DISCHARGE PLANNING
Received call from Floor RN stating that Dr. Elizabeth wants patient to be transferred tomorrow to  [facility] for Stem Cell Transplant. Per Floor RN Dr. Lew is accepting and the patient should have a bed.     1811: Called [facility] [phone] and spoke with Shelly. States that she doesn't know anything about patient. Requests for Facesheet, Progress note and consults to be faxed to [phone].     Faxed requested paperwork and received confirmation.

## 2017-06-15 NOTE — PROGRESS NOTES
Patient is being transfer to another facility: [facility]    Discharge Instructions    Discharged to other by ambulance with escort. Discharged via ambulance, hospital escort: Yes.  Special equipment needed: Not Applicable    Be sure to schedule a follow-up appointment with your primary care doctor or any specialists as instructed.     Discharge Plan:   Diet Plan: Discussed  Activity Level: Discussed  Confirmed Follow up Appointment: No (Comments)  Confirmed Symptoms Management: Discussed  Medication Reconciliation Updated: Yes  Influenza Vaccine Indication: Not indicated: Previously immunized this influenza season and > 8 years of age    I understand that a diet low in cholesterol, fat, and sodium is recommended for good health. Unless I have been given specific instructions below for another diet, I accept this instruction as my diet prescription.   Other diet: Regular diet as tolerated      Special Instructions: None    · Is patient discharged on Warfarin / Coumadin?   No     · Is patient Post Blood Transfusion?  Yes  POST BLOOD TRANSFUSION INFORMATION (ADULT)    The purpose of blood transfusion is to correct anemia, low levels of blood clotting factors or to correct acute blood loss.   Blood transfusion is very safe but occasionally unexpected adverse reactions do occur. Most adverse reactions occur during transfusion, within one to two days following transfusion or one to two weeks following transfusion. Some adverse reactions can occur one to six months after transfusion and some even years later.             If any of the symptoms listed below happen to you during your transfusion,                                 please notify your nurse immediately.   · Fever or Chills  · Flushing of the Face  · Hives, rash or itching  · Difficulty in breathing or shortness of breath  · Pain or oozing of blood from the IV needle site  · Low back pain  · Nausea or vomiting  · Weakness or fainting  · Chest pain  · Blood in the  urine  · Decreased frequency of urination    The above symptoms may also occur within 24 to 48 after transfusion; if so, notify your physician.     · Yellowing of the skin    This can happen one to six months after transfusion; if so, notify your physician    Depression / Suicide Risk    As you are discharged from this [facility] facility, it is important to learn how to keep safe from harming yourself.    Recognize the warning signs:  · Abrupt changes in personality, positive or negative- including increase in energy   · Giving away possessions  · Change in eating patterns- significant weight changes-  positive or negative  · Change in sleeping patterns- unable to sleep or sleeping all the time   · Unwillingness or inability to communicate  · Depression  · Unusual sadness, discouragement and loneliness  · Talk of wanting to die  · Neglect of personal appearance   · Rebelliousness- reckless behavior  · Withdrawal from people/activities they love  · Confusion- inability to concentrate     If you or a loved one observes any of these behaviors or has concerns about self-harm, here's what you can do:  · Talk about it- your feelings and reasons for harming yourself  · Remove any means that you might use to hurt yourself (examples: pills, rope, extension cords, firearm)  · Get professional help from the community (Mental Health, Substance Abuse, psychological counseling)  · Do not be alone:Call your Safe Contact- someone whom you trust who will be there for you.  · Call your local CRISIS HOTLINE 472-7622 or 372-540-3906  · Call your local Children's Mobile Crisis Response Team [place] (017) 704-6120 or www.[employer]  · Call the toll free National Suicide Prevention Hotlines   · National Suicide Prevention Lifeline 128-561-ISQV (3522)  · National Hope Line Network 800-SUICIDE (739-5289)

## 2017-06-15 NOTE — PROGRESS NOTES
Infectious Disease Progress Note    Author: Larisa De La Paz M.D. 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 No new issues overnight. Denies any cough  6/10 Temp 102.4, WBC 0.1 New cxs done Denies new sxs   Temp 102.9, WBC 0.1 no new sxs Denies SE abx  17- ongoing low-grade fevers. Denies any new issues  17- no fevers overnight. Had some respiratory issues today. But feeling better now. Denies any cough or any shortness of breath.   17-no fevers. Denies any respiratory issues.  6/15/17- no fevers. No new issues overnight. WBC 0.1. LFTs slightly improving  Labs Reviewed, Medications Reviewed and Radiology Reviewed.    Review of Systems:  Review of Systems   Constitutional: Negative for fever and malaise/fatigue.   Respiratory: Negative for cough and shortness of breath.    Cardiovascular: Negative for chest pain.   Gastrointestinal: Negative for nausea, vomiting and abdominal pain.   Genitourinary: Negative for dysuria.   Musculoskeletal: Negative for myalgias.   Skin: Negative for rash.   Neurological: Negative for sensory change, speech change and headaches.       Hemodynamics:  Temp (24hrs), Av.6 °C (97.9 °F), Min:36.3 °C (97.4 °F), Max:37 °C (98.6 °F)  Temperature: 37 °C (98.6 °F)  Pulse  Av.9  Min: 58  Max: 118   Blood Pressure : (!) 167/74 mmHg       Physical Exam:  Physical Exam   Constitutional: He is oriented to person, place, and time. He appears well-developed. No distress.   Chronically ill and frail   HENT:   Head: Normocephalic and atraumatic.   Mouth/Throat: No oropharyngeal exudate.   Edentulous   Eyes: EOM are normal. Pupils are equal, round, and reactive to light. No scleral icterus.   Neck: Neck supple.   Cardiovascular: Normal  rate and regular rhythm.    Murmur heard.  Pulmonary/Chest: Effort normal. No respiratory distress. He has no wheezes.   Abdominal: Soft. He exhibits no distension. There is no tenderness.   Musculoskeletal: He exhibits no edema.   Neurological: He is alert and oriented to person, place, and time. No cranial nerve deficit. Coordination normal.   Skin: No rash noted.   Vitals reviewed.      Meds:    Current facility-administered medications:   •  lisinopril (PRINIVIL) tablet 20 mg, 20 mg, Oral, Q DAY, Sarbjit Kent M.D., 20 mg at 06/15/17 0831  •  amlodipine (NORVASC) tablet 10 mg, 10 mg, Oral, Q DAY, Sarbjit Kent M.D., 10 mg at 06/15/17 0832  •  carvedilol (COREG) tablet 12.5 mg, 12.5 mg, Oral, BID WITH MEALS, Sherman Sears M.D., 12.5 mg at 06/15/17 0831  •  hydrALAZINE (APRESOLINE) injection 10 mg, 10 mg, Intravenous, Q6HRS PRN, Sarbjit Kent M.D., 10 mg at 06/14/17 0547  •  enalaprilat (VASOTEC) injection 1.25 mg, 1.25 mg, Intravenous, Q6HRS PRN, Sarbjit Kent M.D., 1.25 mg at 06/13/17 1511  •  ipratropium-albuterol (DUONEB) nebulizer solution 3 mL, 3 mL, Nebulization, Q4H PRN (RT), Sarbjit Kent M.D.  •  piperacillin-tazobactam (ZOSYN) 4.5 g in  mL IVPB, 4.5 g, Intravenous, Q6HRS, Katelin Kay M.D., Stopped at 06/15/17 0725  •  folic acid (FOLVITE) tablet 1 mg, 1 mg, Oral, DAILY, Sarbjit Kent M.D., 1 mg at 06/15/17 0831  •  cyanocobalamin (VITAMIN B-12) injection 1,000 mcg, 1,000 mcg, Intramuscular, Q7 DAYS, Sarbjit Kent M.D., 1,000 mcg at 06/10/17 1044  •  artificial tear ointment (REFRESH,LACRI-LUBE) 1 Application, 1 Application, Both Eyes, Q8HRS, Sherman Sears M.D., 1 Application at 06/15/17 0628  •  voriconazole (VFEND) tablet 200 mg, 200 mg, Oral, Q12HRS, Wayne Person III, M.D., 200 mg at 06/15/17 0834  •  loperamide (IMODIUM) capsule 2 mg, 2 mg, Oral, 4X/DAY PRN, Sherman Sears M.D., 2 mg at 06/07/17 0853  •  acetaminophen (TYLENOL) tablet 650 mg, 650  mg, Oral, Q6HRS PRN, Sherman Sears M.D., 650 mg at 06/13/17 0142  •  acetaminophen (TYLENOL) tablet 650 mg, 650 mg, Oral, Q4HRS PRN, Sherman Sears M.D., 650 mg at 06/14/17 1140  •  tamsulosin (FLOMAX) capsule 0.8 mg, 0.8 mg, Oral, QAM, Sherman Sears M.D., 0.8 mg at 06/15/17 0831  •  finasteride (PROSCAR) tablet 5 mg, 5 mg, Oral, DAILY, Sherman Sears M.D., 5 mg at 06/15/17 0832  •  diphenoxylate-atropine (LOMOTIL) 2.5-0.025 MG per tablet 1 Tab, 1 Tab, Oral, 4X/DAY PRN, Sherman Sears M.D., 1 Tab at 05/24/17 1023  •  hydrocodone-acetaminophen (NORCO) 5-325 MG per tablet 1-2 Tab, 1-2 Tab, Oral, Q6HRS PRN, Sherman Sears M.D., 2 Tab at 05/21/17 1630  •  acyclovir (ZOVIRAX) tablet 400 mg, 400 mg, Oral, BID, Sherman Sears M.D., 400 mg at 06/15/17 0831  •  nystatin (MYCOSTATIN) 597695 UNIT/ML suspension 500,000 Units, 5 mL, Oral, 4X/DAY, Sherman Sears M.D., 500,000 Units at 06/14/17 2112  •  diphenhydrAMINE (BENADRYL) tablet/capsule 25 mg, 25 mg, Oral, PRN, Sherman Sears M.D., 25 mg at 06/14/17 1140  •  pneumococcal 13-Elizabeth Conj Vacc (PREVNAR 13) syringe 0.5 mL, 0.5 mL, Intramuscular, Once PRN, Gerry Soto M.D.  •  Respiratory Care per Protocol, , Nebulization, Continuous RT, Rc Boss D.O.  •  ondansetron (ZOFRAN) syringe/vial injection 4 mg, 4 mg, Intravenous, Q4HRS PRN, JAQUELINE Turcios.O., 4 mg at 06/09/17 2347  •  ondansetron (ZOFRAN ODT) dispertab 4 mg, 4 mg, Oral, Q4HRS PRN, JAQUELINE Turcios.O., 4 mg at 05/01/17 1955  •  levetiracetam (KEPPRA) tablet 750 mg, 750 mg, Oral, BID, JAQUELINE Turcios.O., 750 mg at 06/15/17 0832  •  oxycodone immediate-release (ROXICODONE) tablet 5 mg, 5 mg, Oral, Q4HRS PRN, JAQULEINE Turcios.O., 5 mg at 05/21/17 1225  •  lidocaine (LIDODERM) 5 % 1 Patch, 1 Patch, Transdermal, Q24HR, JAQUELINE Turcios.O., Stopped at 06/10/17 0937  •  vitamin D (Ergocalciferol) (DRISDOL) capsule 50,000 Units, 50,000 Units, Oral, Q7 DAYS, Gerry Soto M.D., 50,000 Units at  06/10/17 0757    Labs:  Recent Labs      06/12/17   1220  06/13/17   0838  06/14/17   0506   WBC  0.1*  0.1*  0.1*   RBC  1.90*  3.73*  3.75*   HEMOGLOBIN  5.3*  10.6*  10.7*   HEMATOCRIT  15.7*  30.0*  30.1*   MCV  82.6  80.4*  80.3*   MCH  27.9  28.4  28.5   RDW  39.8  37.8  38.4   PLATELETCT  16*  15*  11*   MPV  10.3  9.2  11.0     Recent Labs      06/12/17   1219  06/13/17   0838  06/14/17   0506   SODIUM  139  138  139   POTASSIUM  3.4*  3.5*  3.0*   CHLORIDE  113*  111  105   CO2  19*  21  23   GLUCOSE  130*  99  100*   BUN  15  12  13     Recent Labs      06/12/17   1219  06/13/17   0838  06/14/17   0506   ALBUMIN  2.7*  3.1*  3.0*   TBILIRUBIN  1.3  1.9*  1.5   ALKPHOSPHAT  313*  428*  525*   TOTPROTEIN  6.0  6.6  6.8   ALTSGPT  51*  49  39   ASTSGOT  109*  80*  59*   CREATININE  0.78  0.67  0.74       Imaging:  Ct-chest,abdomen,pelvis With    6/8/2017  Lytic lesions in the T5 vertebral body and medial LEFT 5th rib appear to have been present in April 2017. These findings were discussed with Dr. Person on 6/8/2017 7:33 AM.  6/8/2017  1.  Multiple new ill-defined pulmonary nodules involving both lungs, which may be infectious/inflammatory or metastatic.  Septic emboli are a consideration. 2.  Minimal bilateral pleural effusions with associated atelectasis. 3.  Apparent lytic lesions in the T5 vertebral body on the LEFT and adjacent LEFT medial 5th rib, likely metastatic. 4.  Gallbladder wall thickening, concerning for inflammation. 5.  Small amount of peritoneal fluid, of uncertain etiology.  Abnormal for male patient, raising concern for intra-abdominal inflammatory process. 6.  Markedly enlarged heterogeneous prostate.    Ct-head With  6/7/2017  1.  Diffuse RIGHT hemispheric dural thickening, as seen previously.  Possibly sequela of prior subdural hematoma, however tumor and infection are also considerations. 2.  No focal cerebritis or intra-axial mass. 3.  Diffuse atrophy. 4.  No gross intracranial  "hemorrhage however presence of intravenous contrast may obscure small amounts of subarachnoid hemorrhage.    Mr-thoracic Spine-with & W/o  6/9/2017  1.  Diffuse inhomogeneous infiltration of visualized thoracic spine consistent with chronic myeloid leukemia. There is no epidural tumor or spinal canal compromise. 2.  Small multiple lung nodules. This was noted on the previous CT scan dated 6/7/2017. 3.  Fluid along the right lung major fissure.This was noted on the previous CT scan dated 6/7/2017.    Echocardiogram Comp W/o Cont    6/9/2017  Transthoracic Echo Report Echocardiography Laboratory CONCLUSIONS Normal left ventricular systolic function. Left ventricular ejection fraction is visually estimated to be 70%. Grade I diastolic dysfunction. Normal right ventricular systolic function. Mild mitral regurgitation. Mild aortic stenosis. Transvalvular gradients are - Peak: 26 mmHg, Mean:  15mmHg. Moderate aortic insufficiency. Moderate tricuspid regurgitation. Right ventricular systolic pressure is estimated to be 50 mmHg. Compared to the images of the study done 3/20/2015 - there has been no significant change.     Micro:  BLOOD CULTURE   Date Value Ref Range Status   06/10/2017 No growth after 5 days of incubation.  Final   06/10/2017 No growth after 5 days of incubation.  Final      Results     Procedure Component Value Units Date/Time    BLOOD CULTURE [[id number]] Collected:  06/10/17 0021    Order Status:  Completed Specimen Information:  Blood from Peripheral Updated:  06/15/17 0500     Significant Indicator NEG      Source BLD      Site PERIPHERAL      Blood Culture No growth after 5 days of incubation.     Narrative:      Per Hospital Policy: Only change Specimen Src: to \"Line\" if  specified by physician order.    BLOOD CULTURE [[id number]] Collected:  06/10/17 0021    Order Status:  Completed Specimen Information:  Blood from Peripheral Updated:  06/15/17 0500     Significant Indicator NEG      Source BLD      " "Site PERIPHERAL      Blood Culture No growth after 5 days of incubation.     Narrative:      Per Hospital Policy: Only change Specimen Src: to \"Line\" if  specified by physician order.    FUNGAL BLOOD CULTURE [[id number]] Collected:  06/14/17 0511    Order Status:  Completed Specimen Information:  Blood Updated:  06/14/17 1605     Significant Indicator NEG      Source BLD      Site Blue Port Peripheral      Fungal Culture Culture in progress.     FUNGAL BLOOD CULTURE [[id number]]     Order Status:  No result Specimen Information:  Blood from Blood     FUNGAL BLOOD CULTURE [[id number]] Collected:  06/13/17 0000    Order Status:  Canceled Specimen Information:  Other from Blood     Narrative:      TEST Fungal Blood Culture WAS CANCELLED, 06/13/2017 10:35 added to stat draws  Protective    URINE CULTURE(NEW) [[id number]] Collected:  06/10/17 1116    Order Status:  Completed Specimen Information:  Urine from Urine, Clean Catch Updated:  06/12/17 0833     Significant Indicator NEG      Source UR      Site URINE, CLEAN CATCH      Urine Culture No growth at 48 hours     Narrative:      Collected By:[id number] CARLITA JULIAN  Indication for culture:->Temp Equal to,or Greater Than 101  Indication for culture:->Suspected Sepsis    FUNGAL BLOOD CULTURE [[id number]]     Order Status:  Canceled Specimen Information:  Blood from Blood     CDIFF BY PCR WITH TOXIN [[id number]] Collected:  06/10/17 2335    Order Status:  Completed Specimen Information:  Stool from Stool Updated:  06/11/17 2132     C Diff by PCR Negative      027-NAP1-BI Presumptive Negative      Comment: Presumptive 027/NAP1/BI target DNA sequences are NOT DETECTED.        C.Diff Toxin A&B Negative      Comment: C. difficile NOT detected by PCR.  Treatment not indicated per guidelines.         Narrative:      Special Contact [id number] CELSO PARSONS  Does this patient have risk factors for C-diff?->Yes  Has patient taken stool softeners or laxatives in the last " "5  days?->No    BLOOD CULTURE [[id number]] Collected:  06/05/17 2354    Order Status:  Completed Specimen Information:  Blood from Peripheral Updated:  06/11/17 0100     Significant Indicator NEG      Source BLD      Site PERIPHERAL      Blood Culture No growth after 5 days of incubation.     Narrative:      Per Hospital Policy: Only change Specimen Src: to \"Line\" if  specified by physician order.    BLOOD CULTURE [[id number]] Collected:  06/05/17 2354    Order Status:  Completed Specimen Information:  Blood from Peripheral Updated:  06/11/17 0100     Significant Indicator NEG      Source BLD      Site PERIPHERAL      Blood Culture No growth after 5 days of incubation.     Narrative:      Per Hospital Policy: Only change Specimen Src: to \"Line\" if  specified by physician order.    URINE CULTURE(NEW) [[id number]] Collected:  06/10/17 1117    Order Status:  Canceled Specimen Information:  Other from Urine, Clean Catch     Narrative:      ORDER WAS CANCELLED 06/11/2017 12:01, Duplicate ..  Collected By:[id number] CARLITA JULIAN  Indication for culture:->Temp Equal to,or Greater Than 101    CULTURE RESPIRATORY W/ GRM STN [[id number]]     Order Status:  Completed Specimen Information:  Respirate from Sputum     CULTURE RESPIRATORY W/ GRM STN [[id number]]     Order Status:  Completed Specimen Information:  Sputum from Sputum     FUNGAL BLOOD CULTURE [[id number]]     Order Status:  Canceled Specimen Information:  Blood from Blood     CULTURE THROAT [[id number]]     Order Status:  No result Specimen Information:  Throat from Throat           Assessment:  Active Hospital Problems    Diagnosis   • *Fever and neutropenia (CMS-[facility]) [D70.9, R50.81]   • Neutropenia (CMS-[facility]) [D70.9]   • 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]   • Diarrhea [R19.7]   • Acute bilateral low back pain without sciatica [M54.5]   • Seizure disorder (CMS-[facility]) [G40.909]   • Abnormal CT scan, chest [R93.8]   • Transaminitis [R74.0] "       Plan:  Neutropenic fever  Has been afebrile since 6/10/17  Blood cultures were positive for MSSA and 5/25/2017  -repeat blood cxs are neg from 6/5/17 and 6/10/17  Ucx neg  Cdiff neg  Beta D glucagon is positive  Continue Zosyn to cover prior MSSA and GNR  Was started on voriconazole after he spiked fevers  Continue with the voriconazole as well    Fungal infection  Both beta D glucagon and galactomannan antigen are positive.  Latter is weakly positive after being negative on the same day  Continue voriconazole  Aspergillus IgG is pending    Abnormal chest x-ray  Voriconazole to be continued. Check voriconazole levels  Repeat CT scan of the chest on 6/14/17 shows moderate right pleural effusion and hazy and linear lung to base densities favoring atelectasis as well as pulmonary nodules  As far as indications for PCP prophylaxis he does not meet the indications at this time  MSSA bacteremia  TTE neg for vegetation  BREANNA if feasible  Repeat cultures are negative from 6/5/17  Aim for at least 6 weeks of MSSA therapy    Septic emboli  Likely secondary to above    Patient is being transferred to [facility] for possible bone marrow transplant.  Further management per [facility] team.

## 2017-06-15 NOTE — DISCHARGE PLANNING
1225: received call from Manpreet at [facility]. ETA to patient  is 1330. Estimated drive time from here to [facility] is 2-3 hours. BRIANNE and Jazmin at [facility] updated on ETA for patient transport.

## 2017-06-15 NOTE — DISCHARGE PLANNING
0958: Received call from Jazmin at [facility]. Patient has an accepting MD and a room;    [address] [address]    Accepting: Dr. Lew    Unit: [place] [address] Room:     Nurse to Nurse Report: [phone] (Needs to be completed prior to patient leaving)

## 2017-06-15 NOTE — DISCHARGE PLANNING
Faxed back Transfer agreement to Jazmin at [facility].     1008: [phone] and spoke with Hernesto. Request for PCS and facesheet.   Requested documents faxed.     1041: Called [facility] [phone] spoke with Sascha. Patient not eligible for their services.     1116: [facility] called. Spoke with Lj. Per Lj a revised PCS needs to be faxed to them.   PCS revised and faxed to Manpreet at [phone]. Per Lj, estimated cost for transport is $7,758.

## 2017-06-15 NOTE — PROGRESS NOTES
Oncology/Hematology Progress Note               Author:  Dick Elizabeth  DX-Relapsed Refractory CML.  Pancytopenia Date & Time created: 6/15/2017  12:20 PM     Interval History:Accelerated/Blast crisis CML, started second 14 day course of Omacetaxine.  Last bone marrow with a total of <10% blast. Good response to Omacetaxine  Now CT chest showing bilateral pulm nodules likely related to septic emboli to  MSSA bacteremia in the past. Also lytic lesions on T5 and 5ft rib. MRI spine with heterogenoes diffuse involvement due to CML  6/10  spiking fever, tmax 39. Started on Zosyn, vancomycin. On voriconazole. Cultures negative. Patient asymptomatic  6/12-afebrile today but anemia is worse.  6/14-remains afebrile. Feeling better.  6/15/17-being transferred to [facility]. D/W Dr Lew. Feels well.    Review of Systems:  Review of Systems   Constitutional: Positive for malaise/fatigue. Negative for fever, chills and diaphoresis.   HENT: Negative.  Negative for congestion and sore throat.    Eyes: Negative for blurred vision and redness.   Respiratory: Negative.  Negative for cough and sputum production.    Cardiovascular: Negative.  Negative for chest pain, palpitations and orthopnea.   Gastrointestinal: Negative for nausea, abdominal pain, diarrhea and constipation.   Genitourinary: Negative.  Negative for dysuria, hematuria and flank pain.   Musculoskeletal: Negative.  Negative for myalgias, falls and neck pain.   Skin: Negative for itching and rash.   Neurological: Positive for weakness. Negative for dizziness, tremors, speech change, seizures and headaches.   Endo/Heme/Allergies: Negative for polydipsia. Bruises/bleeds easily.   Psychiatric/Behavioral: Negative for depression and memory loss. The patient is nervous/anxious.        Physical Exam:  Physical Exam   Constitutional: He is oriented to person, place, and time. He appears well-developed and well-nourished. No distress.   HENT:   Head: Normocephalic and atraumatic.    Mouth/Throat: No oropharyngeal exudate.   Eyes: Conjunctivae are normal. Pupils are equal, round, and reactive to light. No scleral icterus.   Neck: Normal range of motion. Neck supple. No JVD present. No thyromegaly present.   Cardiovascular: Normal rate.    Pulmonary/Chest: Effort normal. No respiratory distress. He has no wheezes. He has no rales.   Abdominal: Soft. Bowel sounds are normal. He exhibits no distension. There is no tenderness. There is no rebound and no guarding.   Musculoskeletal: He exhibits edema. He exhibits no tenderness.   Lymphadenopathy:     He has no cervical adenopathy.   Neurological: He is alert and oriented to person, place, and time. No cranial nerve deficit.   Skin: Skin is warm and dry. No rash noted. No erythema.   Psychiatric: He has a normal mood and affect. His behavior is normal.       Labs:        Invalid input(s): [id number]  Recent Labs      06/13/17   0837   TROPONINI  <0.01   BNPBTYPENAT  1435*     Recent Labs      06/13/17   0838  06/14/17   0506  06/15/17   1131   SODIUM  138  139  139   POTASSIUM  3.5*  3.0*  3.8   CHLORIDE  111  105  105   CO2  21  23  26   BUN  12  13  14   CREATININE  0.67  0.74  0.71   MAGNESIUM   --    --   1.9   CALCIUM  8.7  9.1  9.1     Recent Labs      06/13/17   0838  06/14/17   0506  06/15/17   1131   ALTSGPT  49  39  38   ASTSGOT  80*  59*  47*   ALKPHOSPHAT  428*  525*  552*   TBILIRUBIN  1.9*  1.5  1.2   GLUCOSE  99  100*  120*     Recent Labs      06/13/17   0838  06/14/17   0506  06/15/17   1131   RBC  3.73*  3.75*  4.29*   HEMOGLOBIN  10.6*  10.7*  12.0*   HEMATOCRIT  30.0*  30.1*  34.9*   PLATELETCT  15*  11*  30*     Recent Labs      06/13/17   0838  06/14/17   0506  06/15/17   1131   WBC  0.1*  0.1*  0.1*   ASTSGOT  80*  59*  47*   ALTSGPT  49  39  38   ALKPHOSPHAT  428*  525*  552*   TBILIRUBIN  1.9*  1.5  1.2     Recent Labs      06/13/17   0838  06/14/17   0506  06/15/17   1131   SODIUM  138  139  139   POTASSIUM  3.5*  3.0*   3.8   CHLORIDE  111  105  105   CO2  21  23  26   GLUCOSE  99  100*  120*   BUN  12  13  14   CREATININE  0.67  0.74  0.71   CALCIUM  8.7  9.1  9.1     Hemodynamics:  Temp (24hrs), Av.6 °C (97.9 °F), Min:36.3 °C (97.4 °F), Max:37 °C (98.6 °F)  Temperature: 37 °C (98.6 °F)  Pulse  Av.9  Min: 58  Max: 118   Blood Pressure : (!) 167/74 mmHg     Respiratory:    Respiration: 17, Pulse Oximetry: 95 %        RUL Breath Sounds: Clear, RML Breath Sounds: Diminished, RLL Breath Sounds: Diminished, WAGNER Breath Sounds: Clear, LLL Breath Sounds: Diminished  Fluids:    Intake/Output Summary (Last 24 hours) at 17 1034  Last data filed at 17 1000   Gross per 24 hour   Intake   2057 ml   Output   1675 ml   Net    382 ml     Weight: 65.9 kg (145 lb 4.5 oz)  GI/Nutrition:  Orders Placed This Encounter   Procedures   • DIET ORDER     Standing Status: Standing      Number of Occurrences: 1      Standing Expiration Date:      Order Specific Question:  Diet:     Answer:  Regular [1]     Order Specific Question:  Texture/Fiber modifications:     Answer:  Dysphagia 2(Pureed/Chopped)specify fluid consistency(question 6) [2]     Order Specific Question:  Consistency/Fluid modifications:     Answer:  Thin Liquids [3]     Medical Decision Making, by Problem:  Active Hospital Problems    Diagnosis   • *Fever and neutropenia (CMS-HCC) [D70.9, R50.81]   • Neutropenia (CMS-HCC) [D70.9]   • 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]   • Diarrhea [R19.7]   • Acute bilateral low back pain without sciatica [M54.5]   • Seizure disorder (CMS-HCC) [G40.909]   • Hypertension [I10]   • BPH (benign prostatic hyperplasia) [N40.0]   • Low vitamin D level [E55.9]   • Transaminitis [R74.0]       Plan:  Advanced CML:  -reponded well to Omacetaxine.He got a good response with <10% blasts.  -CT scan showed bilateral pulm nodules possibly related to septic emboli, Per ID continue on IV ABX for a total of 6 weeks,  -MR T spine  showed diffuse heterogenous findings related to CML, no further intervention indicated  -Transfuse  PRBC if hemoglobin <8 g/dl or Platelets if <10 or bleeding. Transfuse blood and platelet today.    Plan to trf to [facility] fro an allo transplant today. Had discuused ID concerns with Dr Lew.  ID- ID on Board. Continue anti infective agents per them.  Diarrhea-better. abdominal exam is brenign.    Guarded Prognosis      Labs reviewed

## 2017-06-15 NOTE — PROGRESS NOTES
Left voice message to daughter Keith to call back. Need to inform her that pt plan is to discharge to [facility] today, will attempt again later.

## 2017-06-18 LAB — VORICONAZOLE SERPL-MCNC: 7 UG/ML (ref 1–6)

## 2017-06-18 NOTE — PROGRESS NOTES
Dr Elizabeth (oncology) notified of critical lab value of 7.0 mg/ml of voriconazole level. Dr Elizabeth called and left voicemail for Dr Lew at [facility] where patient is now admitted. Unit clerk, Narda, called [facility] and spoke with Joan to inform about critical lab. Joan stated Dr Lew would be in soon and would also inform him of critical lab.

## 2017-06-18 NOTE — PROGRESS NOTES
Tech from Lab called with critical result of voriconazole level of 7.0 mg/ml at 0926. Critical lab result read back to tech.   Dr. Elizabeth notified of critical lab result at 1000.  Critical lab result read back by Dr. Elizabeth.

## 2017-06-20 ENCOUNTER — APPOINTMENT (OUTPATIENT)
Dept: ONCOLOGY | Facility: MEDICAL CENTER | Age: 72
End: 2017-06-20
Attending: SPECIALIST
Payer: MEDICARE

## 2017-06-27 ENCOUNTER — APPOINTMENT (OUTPATIENT)
Dept: ONCOLOGY | Facility: MEDICAL CENTER | Age: 72
End: 2017-06-27
Attending: SPECIALIST
Payer: MEDICARE

## 2017-07-26 LAB
FUNGUS BLD CULT: NORMAL
SIGNIFICANT IND 70042: NORMAL
SITE SITE: NORMAL
SOURCE SOURCE: NORMAL

## 2017-07-28 ENCOUNTER — APPOINTMENT (OUTPATIENT)
Dept: RADIOLOGY | Facility: MEDICAL CENTER | Age: 72
DRG: 871 | End: 2017-07-28
Attending: EMERGENCY MEDICINE
Payer: MEDICARE

## 2017-07-28 ENCOUNTER — HOSPITAL ENCOUNTER (INPATIENT)
Facility: MEDICAL CENTER | Age: 72
LOS: 2 days | DRG: 871 | End: 2017-07-30
Attending: EMERGENCY MEDICINE | Admitting: HOSPITALIST
Payer: MEDICARE

## 2017-07-28 ENCOUNTER — RESOLUTE PROFESSIONAL BILLING HOSPITAL PROF FEE (OUTPATIENT)
Dept: HOSPITALIST | Facility: MEDICAL CENTER | Age: 72
End: 2017-07-28
Payer: MEDICARE

## 2017-07-28 DIAGNOSIS — R50.9 FEVER, UNSPECIFIED FEVER CAUSE: ICD-10-CM

## 2017-07-28 DIAGNOSIS — R40.4 ALTERED LEVEL OF CONSCIOUSNESS: ICD-10-CM

## 2017-07-28 PROBLEM — A41.9 SEPSIS, UNSPECIFIED: Status: ACTIVE | Noted: 2017-07-28

## 2017-07-28 PROBLEM — R41.82 ALTERED MENTAL STATE: Status: ACTIVE | Noted: 2017-07-28

## 2017-07-28 PROBLEM — Z94.84 STEM CELLS TRANSPLANT STATUS (HCC): Status: ACTIVE | Noted: 2017-07-28

## 2017-07-28 LAB
ALBUMIN SERPL BCP-MCNC: 3.8 G/DL (ref 3.2–4.9)
ALBUMIN/GLOB SERPL: 1.2 G/DL
ALP SERPL-CCNC: 282 U/L (ref 30–99)
ALT SERPL-CCNC: 79 U/L (ref 2–50)
ANION GAP SERPL CALC-SCNC: 14 MMOL/L (ref 0–11.9)
ANISOCYTOSIS BLD QL SMEAR: ABNORMAL
APPEARANCE UR: CLEAR
APTT PPP: 29.2 SEC (ref 24.7–36)
AST SERPL-CCNC: 89 U/L (ref 12–45)
BACTERIA #/AREA URNS HPF: NEGATIVE /HPF
BASOPHILS # BLD AUTO: 0 % (ref 0–1.8)
BASOPHILS # BLD: 0 K/UL (ref 0–0.12)
BILIRUB SERPL-MCNC: 0.8 MG/DL (ref 0.1–1.5)
BILIRUB UR QL STRIP.AUTO: NEGATIVE
BUN SERPL-MCNC: 31 MG/DL (ref 8–22)
BURR CELLS/RBC NFR CSF MANUAL: 0 %
CALCIUM SERPL-MCNC: 9.7 MG/DL (ref 8.5–10.5)
CHLORIDE SERPL-SCNC: 109 MMOL/L (ref 96–112)
CLARITY CSF: NORMAL
CO2 SERPL-SCNC: 19 MMOL/L (ref 20–33)
COLOR CSF: NORMAL
COLOR SPUN CSF: COLORLESS
COLOR UR: YELLOW
CREAT SERPL-MCNC: 1.17 MG/DL (ref 0.5–1.4)
EKG IMPRESSION: NORMAL
EOSINOPHIL # BLD AUTO: 0 K/UL (ref 0–0.51)
EOSINOPHIL NFR BLD: 0 % (ref 0–6.9)
EPI CELLS #/AREA URNS HPF: ABNORMAL /HPF
ERYTHROCYTE [DISTWIDTH] IN BLOOD BY AUTOMATED COUNT: 54.6 FL (ref 35.9–50)
GFR SERPL CREATININE-BSD FRML MDRD: >60 ML/MIN/1.73 M 2
GLOBULIN SER CALC-MCNC: 3.1 G/DL (ref 1.9–3.5)
GLUCOSE CSF-MCNC: 51 MG/DL (ref 40–80)
GLUCOSE SERPL-MCNC: 96 MG/DL (ref 65–99)
GLUCOSE UR STRIP.AUTO-MCNC: NEGATIVE MG/DL
GRAM STN SPEC: NORMAL
GRAN CASTS #/AREA URNS LPF: ABNORMAL /LPF
HCT VFR BLD AUTO: 34.3 % (ref 42–52)
HGB BLD-MCNC: 11.5 G/DL (ref 14–18)
HYALINE CASTS #/AREA URNS LPF: ABNORMAL /LPF
INR PPP: 1.17 (ref 0.87–1.13)
KETONES UR STRIP.AUTO-MCNC: NEGATIVE MG/DL
LACTATE BLD-SCNC: 2.1 MMOL/L (ref 0.5–2)
LACTATE BLD-SCNC: 2.6 MMOL/L (ref 0.5–2)
LACTATE BLD-SCNC: 4.1 MMOL/L (ref 0.5–2)
LEUKOCYTE ESTERASE UR QL STRIP.AUTO: NEGATIVE
LYMPHOCYTES # BLD AUTO: 0.23 K/UL (ref 1–4.8)
LYMPHOCYTES NFR BLD: 2.7 % (ref 22–41)
LYMPHOCYTES NFR CSF: 50 %
MACROCYTES BLD QL SMEAR: ABNORMAL
MANUAL DIFF BLD: NORMAL
MCH RBC QN AUTO: 30.2 PG (ref 27–33)
MCHC RBC AUTO-ENTMCNC: 33.5 G/DL (ref 33.7–35.3)
MCV RBC AUTO: 90 FL (ref 81.4–97.8)
METAMYELOCYTES NFR BLD MANUAL: 2.7 %
MICRO URNS: ABNORMAL
MICROCYTES BLD QL SMEAR: ABNORMAL
MONOCYTES # BLD AUTO: 0.6 K/UL (ref 0–0.85)
MONOCYTES NFR BLD AUTO: 7.1 % (ref 0–13.4)
MONONUC CELLS NFR CSF: 20 %
MORPHOLOGY BLD-IMP: NORMAL
NEUTROPHILS # BLD AUTO: 7.36 K/UL (ref 1.82–7.42)
NEUTROPHILS NFR BLD: 83.9 % (ref 44–72)
NEUTROPHILS NFR CSF: 30 %
NEUTS BAND NFR BLD MANUAL: 2.7 % (ref 0–10)
NITRITE UR QL STRIP.AUTO: NEGATIVE
NRBC # BLD AUTO: 0.11 K/UL
NRBC BLD AUTO-RTO: 1.3 /100 WBC
PH UR STRIP.AUTO: 5.5 [PH]
PLATELET # BLD AUTO: 85 K/UL (ref 164–446)
PLATELET BLD QL SMEAR: NORMAL
PMV BLD AUTO: 11.5 FL (ref 9–12.9)
POTASSIUM SERPL-SCNC: 3.7 MMOL/L (ref 3.6–5.5)
PROMYELOCYTES NFR BLD MANUAL: 0.9 %
PROT CSF-MCNC: 61 MG/DL (ref 15–45)
PROT SERPL-MCNC: 6.9 G/DL (ref 6–8.2)
PROT UR QL STRIP: 100 MG/DL
PROTHROMBIN TIME: 15.3 SEC (ref 12–14.6)
RBC # BLD AUTO: 3.81 M/UL (ref 4.7–6.1)
RBC # CSF: 205 CELLS/UL
RBC # URNS HPF: ABNORMAL /HPF
RBC BLD AUTO: PRESENT
RBC UR QL AUTO: ABNORMAL
SIGNIFICANT IND 70042: NORMAL
SITE SITE: NORMAL
SODIUM SERPL-SCNC: 142 MMOL/L (ref 135–145)
SOURCE SOURCE: NORMAL
SP GR UR STRIP.AUTO: 1.02
SPECIMEN VOL CSF: 2 ML
TUBE # CSF: 4
TUBE # CSF: 4
UROBILINOGEN UR STRIP.AUTO-MCNC: 0.2 MG/DL
WBC # BLD AUTO: 8.5 K/UL (ref 4.8–10.8)
WBC # CSF: 2 CELLS/UL (ref 0–10)
WBC #/AREA URNS HPF: ABNORMAL /HPF

## 2017-07-28 PROCEDURE — 87496 CYTOMEG DNA AMP PROBE: CPT

## 2017-07-28 PROCEDURE — 85027 COMPLETE CBC AUTOMATED: CPT

## 2017-07-28 PROCEDURE — 700105 HCHG RX REV CODE 258: Performed by: EMERGENCY MEDICINE

## 2017-07-28 PROCEDURE — A9270 NON-COVERED ITEM OR SERVICE: HCPCS | Performed by: HOSPITALIST

## 2017-07-28 PROCEDURE — 80053 COMPREHEN METABOLIC PANEL: CPT

## 2017-07-28 PROCEDURE — 302151 K-PAD 14X20: Performed by: HOSPITALIST

## 2017-07-28 PROCEDURE — 83605 ASSAY OF LACTIC ACID: CPT

## 2017-07-28 PROCEDURE — 700105 HCHG RX REV CODE 258: Performed by: HOSPITALIST

## 2017-07-28 PROCEDURE — 99223 1ST HOSP IP/OBS HIGH 75: CPT | Mod: AI | Performed by: HOSPITALIST

## 2017-07-28 PROCEDURE — 700111 HCHG RX REV CODE 636 W/ 250 OVERRIDE (IP): Performed by: HOSPITALIST

## 2017-07-28 PROCEDURE — 700102 HCHG RX REV CODE 250 W/ 637 OVERRIDE(OP): Performed by: HOSPITALIST

## 2017-07-28 PROCEDURE — 87086 URINE CULTURE/COLONY COUNT: CPT

## 2017-07-28 PROCEDURE — 85610 PROTHROMBIN TIME: CPT

## 2017-07-28 PROCEDURE — 93005 ELECTROCARDIOGRAM TRACING: CPT

## 2017-07-28 PROCEDURE — 87070 CULTURE OTHR SPECIMN AEROBIC: CPT

## 2017-07-28 PROCEDURE — 87205 SMEAR GRAM STAIN: CPT

## 2017-07-28 PROCEDURE — 96368 THER/DIAG CONCURRENT INF: CPT

## 2017-07-28 PROCEDURE — 81001 URINALYSIS AUTO W/SCOPE: CPT

## 2017-07-28 PROCEDURE — 87040 BLOOD CULTURE FOR BACTERIA: CPT | Mod: 91

## 2017-07-28 PROCEDURE — 62270 DX LMBR SPI PNXR: CPT

## 2017-07-28 PROCEDURE — 009U3ZX DRAINAGE OF SPINAL CANAL, PERCUTANEOUS APPROACH, DIAGNOSTIC: ICD-10-PCS | Performed by: EMERGENCY MEDICINE

## 2017-07-28 PROCEDURE — 96367 TX/PROPH/DG ADDL SEQ IV INF: CPT

## 2017-07-28 PROCEDURE — 51701 INSERT BLADDER CATHETER: CPT

## 2017-07-28 PROCEDURE — 700101 HCHG RX REV CODE 250: Performed by: EMERGENCY MEDICINE

## 2017-07-28 PROCEDURE — 96366 THER/PROPH/DIAG IV INF ADDON: CPT

## 2017-07-28 PROCEDURE — 71010 DX-CHEST-PORTABLE (1 VIEW): CPT

## 2017-07-28 PROCEDURE — 700111 HCHG RX REV CODE 636 W/ 250 OVERRIDE (IP): Performed by: EMERGENCY MEDICINE

## 2017-07-28 PROCEDURE — 85730 THROMBOPLASTIN TIME PARTIAL: CPT

## 2017-07-28 PROCEDURE — 82945 GLUCOSE OTHER FLUID: CPT

## 2017-07-28 PROCEDURE — 70450 CT HEAD/BRAIN W/O DYE: CPT

## 2017-07-28 PROCEDURE — 85007 BL SMEAR W/DIFF WBC COUNT: CPT

## 2017-07-28 PROCEDURE — 770022 HCHG ROOM/CARE - ICU (200)

## 2017-07-28 PROCEDURE — 99285 EMERGENCY DEPT VISIT HI MDM: CPT

## 2017-07-28 PROCEDURE — 84157 ASSAY OF PROTEIN OTHER: CPT

## 2017-07-28 PROCEDURE — 96365 THER/PROPH/DIAG IV INF INIT: CPT

## 2017-07-28 PROCEDURE — 70551 MRI BRAIN STEM W/O DYE: CPT

## 2017-07-28 PROCEDURE — 89051 BODY FLUID CELL COUNT: CPT

## 2017-07-28 RX ORDER — VORICONAZOLE 50 MG/1
100 TABLET, FILM COATED ORAL EVERY 12 HOURS
Status: DISCONTINUED | OUTPATIENT
Start: 2017-07-28 | End: 2017-07-30 | Stop reason: HOSPADM

## 2017-07-28 RX ORDER — VORICONAZOLE 50 MG/1
300 TABLET, FILM COATED ORAL 2 TIMES DAILY
COMMUNITY

## 2017-07-28 RX ORDER — AMIODARONE HYDROCHLORIDE 150 MG/3ML
INJECTION, SOLUTION INTRAVENOUS
Status: DISCONTINUED
Start: 2017-07-28 | End: 2017-07-29

## 2017-07-28 RX ORDER — TACROLIMUS 0.5 MG/1
0.5 CAPSULE ORAL 3 TIMES DAILY
Status: DISCONTINUED | OUTPATIENT
Start: 2017-07-28 | End: 2017-07-28

## 2017-07-28 RX ORDER — VORICONAZOLE 200 MG/1
200 TABLET, FILM COATED ORAL EVERY 12 HOURS
Status: DISCONTINUED | OUTPATIENT
Start: 2017-07-28 | End: 2017-07-30 | Stop reason: HOSPADM

## 2017-07-28 RX ORDER — TACROLIMUS 0.5 MG/1
0.5 CAPSULE ORAL 3 TIMES DAILY
COMMUNITY

## 2017-07-28 RX ORDER — ACETAMINOPHEN 10 MG/ML
1000 INJECTION, SOLUTION INTRAVENOUS ONCE
Status: COMPLETED | OUTPATIENT
Start: 2017-07-28 | End: 2017-07-28

## 2017-07-28 RX ORDER — SODIUM CHLORIDE 9 MG/ML
1000 INJECTION, SOLUTION INTRAVENOUS ONCE
Status: COMPLETED | OUTPATIENT
Start: 2017-07-28 | End: 2017-07-29

## 2017-07-28 RX ORDER — PANTOPRAZOLE SODIUM 40 MG/1
40 TABLET, DELAYED RELEASE ORAL DAILY
Status: DISCONTINUED | OUTPATIENT
Start: 2017-07-28 | End: 2017-07-28

## 2017-07-28 RX ORDER — URSODIOL 300 MG/1
300 CAPSULE ORAL 2 TIMES DAILY
COMMUNITY

## 2017-07-28 RX ORDER — VORICONAZOLE 200 MG/1
200 TABLET, FILM COATED ORAL EVERY 12 HOURS
Status: DISCONTINUED | OUTPATIENT
Start: 2017-07-28 | End: 2017-07-28

## 2017-07-28 RX ORDER — ACYCLOVIR 400 MG/1
400 TABLET ORAL 3 TIMES DAILY
COMMUNITY

## 2017-07-28 RX ORDER — ONDANSETRON 8 MG/1
8 TABLET, ORALLY DISINTEGRATING ORAL EVERY 8 HOURS PRN
COMMUNITY

## 2017-07-28 RX ORDER — METHYLPREDNISOLONE SODIUM SUCCINATE 40 MG/ML
20 INJECTION, POWDER, LYOPHILIZED, FOR SOLUTION INTRAMUSCULAR; INTRAVENOUS
Status: DISCONTINUED | OUTPATIENT
Start: 2017-07-28 | End: 2017-07-29

## 2017-07-28 RX ORDER — VORICONAZOLE 50 MG/1
100 TABLET, FILM COATED ORAL EVERY 12 HOURS
Status: DISCONTINUED | OUTPATIENT
Start: 2017-07-28 | End: 2017-07-28

## 2017-07-28 RX ORDER — ASPIRIN 300 MG/1
600 SUPPOSITORY RECTAL EVERY 6 HOURS PRN
Status: DISCONTINUED | OUTPATIENT
Start: 2017-07-28 | End: 2017-07-29

## 2017-07-28 RX ORDER — HEPARIN SODIUM 5000 [USP'U]/ML
5000 INJECTION, SOLUTION INTRAVENOUS; SUBCUTANEOUS EVERY 8 HOURS
Status: DISCONTINUED | OUTPATIENT
Start: 2017-07-28 | End: 2017-07-28

## 2017-07-28 RX ORDER — LIDOCAINE HYDROCHLORIDE 10 MG/ML
20 INJECTION, SOLUTION INFILTRATION; PERINEURAL ONCE
Status: COMPLETED | OUTPATIENT
Start: 2017-07-28 | End: 2017-07-28

## 2017-07-28 RX ORDER — OMEPRAZOLE 20 MG/1
20 CAPSULE, DELAYED RELEASE ORAL 2 TIMES DAILY
Status: DISCONTINUED | OUTPATIENT
Start: 2017-07-28 | End: 2017-07-28

## 2017-07-28 RX ORDER — OMEPRAZOLE 20 MG/1
20 CAPSULE, DELAYED RELEASE ORAL DAILY
Status: DISCONTINUED | OUTPATIENT
Start: 2017-07-29 | End: 2017-07-29

## 2017-07-28 RX ORDER — VORICONAZOLE 200 MG/1
600 TABLET, FILM COATED ORAL 2 TIMES DAILY
Status: DISCONTINUED | OUTPATIENT
Start: 2017-07-28 | End: 2017-07-28

## 2017-07-28 RX ORDER — SODIUM CHLORIDE 9 MG/ML
1000 INJECTION, SOLUTION INTRAVENOUS
Status: DISCONTINUED | OUTPATIENT
Start: 2017-07-28 | End: 2017-07-30 | Stop reason: HOSPADM

## 2017-07-28 RX ORDER — MAGNESIUM OXIDE 400 MG/1
800 TABLET ORAL DAILY
COMMUNITY

## 2017-07-28 RX ORDER — PREDNISONE 10 MG/1
30 TABLET ORAL DAILY
Status: DISCONTINUED | OUTPATIENT
Start: 2017-07-28 | End: 2017-07-30 | Stop reason: HOSPADM

## 2017-07-28 RX ORDER — LEVOFLOXACIN 500 MG/1
500 TABLET, FILM COATED ORAL DAILY
Status: ON HOLD | COMMUNITY
End: 2017-07-30

## 2017-07-28 RX ORDER — PREDNISONE 10 MG/1
30 TABLET ORAL DAILY
COMMUNITY

## 2017-07-28 RX ORDER — SODIUM CHLORIDE 9 MG/ML
30 INJECTION, SOLUTION INTRAVENOUS
Status: COMPLETED | OUTPATIENT
Start: 2017-07-28 | End: 2017-07-29

## 2017-07-28 RX ORDER — URSODIOL 300 MG/1
300 CAPSULE ORAL 2 TIMES DAILY
Status: DISCONTINUED | OUTPATIENT
Start: 2017-07-28 | End: 2017-07-30 | Stop reason: HOSPADM

## 2017-07-28 RX ORDER — PANTOPRAZOLE SODIUM 40 MG/1
40 TABLET, DELAYED RELEASE ORAL DAILY
COMMUNITY

## 2017-07-28 RX ORDER — ACYCLOVIR 800 MG/1
400 TABLET ORAL 3 TIMES DAILY
Status: DISCONTINUED | OUTPATIENT
Start: 2017-07-28 | End: 2017-07-30 | Stop reason: HOSPADM

## 2017-07-28 RX ORDER — TACROLIMUS 0.5 MG/1
0.5 CAPSULE ORAL 3 TIMES DAILY
Status: DISCONTINUED | OUTPATIENT
Start: 2017-07-28 | End: 2017-07-29

## 2017-07-28 RX ADMIN — MEROPENEM 500 MG: 500 INJECTION, POWDER, FOR SOLUTION INTRAVENOUS at 17:56

## 2017-07-28 RX ADMIN — MEROPENEM 500 MG: 500 INJECTION, POWDER, FOR SOLUTION INTRAVENOUS at 11:13

## 2017-07-28 RX ADMIN — METHYLPREDNISOLONE SODIUM SUCCINATE 20 MG: 40 INJECTION, POWDER, FOR SOLUTION INTRAMUSCULAR; INTRAVENOUS at 23:06

## 2017-07-28 RX ADMIN — LIDOCAINE HYDROCHLORIDE 20 ML: 10 INJECTION, SOLUTION INFILTRATION; PERINEURAL at 13:30

## 2017-07-28 RX ADMIN — VANCOMYCIN HYDROCHLORIDE 1600 MG: 100 INJECTION, POWDER, LYOPHILIZED, FOR SOLUTION INTRAVENOUS at 11:50

## 2017-07-28 RX ADMIN — TOBRAMYCIN SULFATE 329 MG: 40 INJECTION, SOLUTION INTRAMUSCULAR; INTRAVENOUS at 12:39

## 2017-07-28 RX ADMIN — SODIUM CHLORIDE 1974 ML: 9 INJECTION, SOLUTION INTRAVENOUS at 15:12

## 2017-07-28 RX ADMIN — MICAFUNGIN SODIUM 100 MG: 20 INJECTION, POWDER, LYOPHILIZED, FOR SOLUTION INTRAVENOUS at 11:45

## 2017-07-28 RX ADMIN — SODIUM CHLORIDE 1000 ML: 9 INJECTION, SOLUTION INTRAVENOUS at 11:00

## 2017-07-28 RX ADMIN — ASPIRIN 600 MG: 300 SUPPOSITORY RECTAL at 23:55

## 2017-07-28 RX ADMIN — ACETAMINOPHEN 1000 MG: 10 INJECTION, SOLUTION INTRAVENOUS at 11:33

## 2017-07-28 ASSESSMENT — PAIN SCALES - GENERAL: PAINLEVEL_OUTOF10: 0

## 2017-07-28 NOTE — H&P
Internal Medicine Admitting History and Physical    Name Benjamin Post 1945   Age/Sex 71 y.o. male   MRN [id number]   Code Status Full     After 5PM or if no immediate response to page, please call for cross-coverage  Attending:          Chief Complaint:  Altered mentation, fevers    HPI:  Patient unable to give any history. No family at bedside. All information at this time through chart review.    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 Patient BIB [facility]. Per family patient altered   Since last night. Patient usually alert and oriented with ambulation. Since last night has been in bed not eating or drinking.    Review of last admission:  Patient was admitted on  and discharged to [facility] on 6/15. Patient initially admitted for worsening back/rib Pain for x4 days prior to  admission after a chemotherapy session on . Both infectious diseases and heme/onc was consulted on that admission. In terms of accelerated/blast crisis CML patient was started on second 14 day course of omacetaxine with plans to transfer to [facility] for allo transpant. During that admission Patient with neutropenic fever along with positive blood cultures for MSSA  along with septic emboli with plan to treat for 6 weeks IV abx. Patient also with beta d gluc positive and galacto Ag positive and spiked fevers prompting ID to initiate voriconazole. ECHO negative for infective endocarditis.  Patient transferred to [facility] 6/15.     Per chart, patient discharged from [facility] yesterday. Patient had received stem cell transplant. ERP able to get in touch with transplant surgeon from [facility], Dr. Moreno Lew. Cell phone number [phone]. Pager [phone]. He recommended meropenem, vancomycin, tobramycin.  Patient with history of subdural hemorrhage so MRI brain ordered by ERP along with LP performed in ED. CT head unremarkable.    Here in the ED, patient is awake. Unresponsive to any  verbal cues. Unable to get any information at bedside.  In ED, ERP ordered meropenem, micafungin, vanco, tobramycin. Patient initial lactate 4.1 trended down to 2.6 after IVFs.      Review of Systems   Unable to perform ROS: medical condition       Past Medical History:   Past Medical History   Diagnosis Date   • Hypertension    • Cancer (CMS-HCC)    • Indigestion    • Heart burn        Past Surgical History:  Past Surgical History   Procedure Laterality Date   • Bone marrow aspiration  11/5/2015     Procedure: BONE MARROW ASPIRATION;  Surgeon: Rosita Bolton M.D.;  Location: [facility];  Service:    • Bone marrow biopsy, ndl/trocar  11/5/2015     Procedure: BONE MARROW BIOPSY, NDL/TROCAR;  Surgeon: Rosita Bolton M.D.;  Location: [facility];  Service:    • Bone marrow biopsy, ndl/trocar  3/23/2016     Procedure: BONE MARROW BIOPSY, NDL/TROCAR;  Surgeon: Fili Prakash M.D.;  Location: [facility];  Service:    • Bone marrow aspiration  3/23/2016     Procedure: BONE MARROW ASPIRATION;  Surgeon: Fili Prakash M.D.;  Location: [facility];  Service:    • Bone marrow biopsy, ndl/trocar  7/12/2016     Procedure: BONE MARROW BIOPSY, NDL/TROCAR;  Surgeon: Fili Prakash M.D.;  Location: ENDOSCOPY [facility];  Service:    • Bone marrow aspiration  7/12/2016     Procedure: BONE MARROW ASPIRATION;  Surgeon: Fili Prakash M.D.;  Location: ENDOSCOPY [facility];  Service:    • Dental extraction(s) N/A 3/25/2017     Procedure: DENTAL EXTRACTION(S);  Surgeon: Surendra Barragan D.M.D.,M.DRupert;  Location: SURGERY [facility];  Service:    • Submandible abscess incision and drainage N/A 3/25/2017     Procedure:  INTRAORAL  ABSCESS INCISION AND DRAINAGE;  Surgeon: Surendra Barragan D.M.D.,M.DRupert;  Location: SURGERY [facility];  Service:        Current Outpatient Medications:  Home Medications     Reviewed by Salomón Segovia (Pharmacy  Tech) on 07/28/17 at 1117  Med List Status: Complete    Medication Last Dose Status    acyclovir (ZOVIRAX) 400 MG tablet unk Active    levofloxacin (LEVAQUIN) 500 MG tablet unk Active    magnesium oxide (MAG-OX) 400 MG Tab unk Active    metformin (GLUCOPHAGE) 500 MG Tab unk Active    ondansetron (ZOFRAN ODT) 8 MG TABLET DISPERSIBLE unk Active    pantoprazole (PROTONIX) 40 MG Tablet Delayed Response unk Active    predniSONE (DELTASONE) 10 MG Tab unk Active    tacrolimus (PROGRAF) 0.5 MG Cap unk Active    ursodiol (ACTIGALL) 300 MG Cap unk Active    voriconazole (VFEND) 50 MG Tab unk Active                Medication Allergy/Sensitivities:  No Known Allergies    Family History:  No family history on file.    Social History:  Social History     Social History   • Marital Status: Legally      Spouse Name: N/A   • Number of Children: N/A   • Years of Education: N/A     Occupational History   • Not on file.     Social History Main Topics   • Smoking status: Never Smoker    • Smokeless tobacco: Not on file   • Alcohol Use: No   • Drug Use: No   • Sexual Activity: Not on file     Other Topics Concern   • Not on file     Social History Narrative       Physical Exam     Filed Vitals:    07/28/17 1130 07/28/17 1200 07/28/17 1230 07/28/17 1248   BP:       Pulse: 122 115 108 95   Temp:  40.4 °C (104.7 °F)  37.5 °C (99.5 °F)   Resp: 51 47 28 35   Weight:       SpO2: 98% 98% 96% 98%     Body mass index is 22.71 kg/(m^2).  /82 mmHg  Pulse 95  Temp(Src) 37.5 °C (99.5 °F)  Resp 35  Wt 65.772 kg (145 lb)  SpO2 98%  O2 therapy: Pulse Oximetry: 98 %, O2 (LPM): 2, O2 Delivery: Nasal Cannula    Physical Exam   Constitutional:   Cachetic, chronically ill appearing   HENT:   Head: Normocephalic and atraumatic.   Eyes: EOM are normal. Pupils are equal, round, and reactive to light.   Neck: Normal range of motion. Neck supple. No JVD present.   Cardiovascular: Normal rate, regular rhythm and normal heart sounds.  Exam  reveals no gallop and no friction rub.    No murmur heard.  Pulmonary/Chest: Breath sounds normal. No respiratory distress. He has no wheezes. He has no rales.   Abdominal: Soft. Bowel sounds are normal. He exhibits no distension.   Musculoskeletal: He exhibits no edema.   Neurological:   Awake. Unresponsive to verbal stimuli          Lab Data Review:  Recent Results (from the past 24 hour(s))   EKG (ER)    Collection Time: 17 10:33 AM   Result Value Ref Range    Report       [facility] Emergency Dept.    Test Date:  2017  Pt Name:    MUKUND HAINES                Department: ER  MRN:        [id number]                      Room:       [facility]  Gender:     M                            Technician: [id number]  :        1945                   Requested By:ER TRIAGE PROTOCOL  Order #:    [id number]                    Reading MD:    Measurements  Intervals                                Axis  Rate:       125                          P:          27  MO:         112                          QRS:        114  QRSD:       94                           T:          -28  QT:         324  QTc:        468    Interpretive Statements  SINUS TACHYCARDIA  RIGHT AXIS DEVIATION  INFERIOR INFARCT, AGE INDETERMINATE  ARTIFACT IN LEAD(S) I,III,V1 AND BASELINE WANDER IN LEAD(S) I,III,aVL,V2,V5  Compared to ECG 2017 21:45:53  Right-axis deviation now present  Myocardial infarct finding now present  Sinus rhythm no longer present  Left ventricular hypertrop hy no longer present  ST (T wave) deviation no longer present     CBC with Differential    Collection Time: 17 10:55 AM   Result Value Ref Range    WBC 8.5 4.8 - 10.8 K/uL    RBC 3.81 (L) 4.70 - 6.10 M/uL    Hemoglobin 11.5 (L) 14.0 - 18.0 g/dL    Hematocrit 34.3 (L) 42.0 - 52.0 %    MCV 90.0 81.4 - 97.8 fL    MCH 30.2 27.0 - 33.0 pg    MCHC 33.5 (L) 33.7 - 35.3 g/dL    RDW 54.6 (H) 35.9 - 50.0 fL    Platelet Count 85 (L) 164 - 446 K/uL    MPV 11.5  9.0 - 12.9 fL    Nucleated RBC 1.30 /100 WBC    NRBC (Absolute) 0.11 K/uL    Neutrophils-Polys 83.90 (H) 44.00 - 72.00 %    Lymphocytes 2.70 (L) 22.00 - 41.00 %    Monocytes 7.10 0.00 - 13.40 %    Eosinophils 0.00 0.00 - 6.90 %    Basophils 0.00 0.00 - 1.80 %    Neutrophils (Absolute) 7.36 1.82 - 7.42 K/uL    Lymphs (Absolute) 0.23 (L) 1.00 - 4.80 K/uL    Monos (Absolute) 0.60 0.00 - 0.85 K/uL    Eos (Absolute) 0.00 0.00 - 0.51 K/uL    Baso (Absolute) 0.00 0.00 - 0.12 K/uL    Anisocytosis 1+     Macrocytosis 1+     Microcytosis 1+    Prothrombin Time (INR)    Collection Time: 07/28/17 10:55 AM   Result Value Ref Range    PT 15.3 (H) 12.0 - 14.6 sec    INR 1.17 (H) 0.87 - 1.13   APTT (PTT)    Collection Time: 07/28/17 10:55 AM   Result Value Ref Range    APTT 29.2 24.7 - 36.0 sec   DIFFERENTIAL MANUAL    Collection Time: 07/28/17 10:55 AM   Result Value Ref Range    Bands-Stabs 2.70 0.00 - 10.00 %    Metamyelocytes 2.70 %    Progranulocytes 0.90 %    Manual Diff Status PERFORMED    PERIPHERAL SMEAR REVIEW    Collection Time: 07/28/17 10:55 AM   Result Value Ref Range    Peripheral Smear Review see below    PLATELET ESTIMATE    Collection Time: 07/28/17 10:55 AM   Result Value Ref Range    Plt Estimation Decreased    MORPHOLOGY    Collection Time: 07/28/17 10:55 AM   Result Value Ref Range    RBC Morphology Present    Lactic acid (lactate)    Collection Time: 07/28/17 10:57 AM   Result Value Ref Range    Lactic Acid 4.1 (HH) 0.5 - 2.0 mmol/L   Comp Metabolic Panel (CMP)    Collection Time: 07/28/17 10:57 AM   Result Value Ref Range    Sodium 142 135 - 145 mmol/L    Potassium 3.7 3.6 - 5.5 mmol/L    Chloride 109 96 - 112 mmol/L    Co2 19 (L) 20 - 33 mmol/L    Anion Gap 14.0 (H) 0.0 - 11.9    Glucose 96 65 - 99 mg/dL    Bun 31 (H) 8 - 22 mg/dL    Creatinine 1.17 0.50 - 1.40 mg/dL    Calcium 9.7 8.5 - 10.5 mg/dL    AST(SGOT) 89 (H) 12 - 45 U/L    ALT(SGPT) 79 (H) 2 - 50 U/L    Alkaline Phosphatase 282 (H) 30 - 99 U/L     Total Bilirubin 0.8 0.1 - 1.5 mg/dL    Albumin 3.8 3.2 - 4.9 g/dL    Total Protein 6.9 6.0 - 8.2 g/dL    Globulin 3.1 1.9 - 3.5 g/dL    A-G Ratio 1.2 g/dL   ESTIMATED GFR    Collection Time: 07/28/17 10:57 AM   Result Value Ref Range    GFR If African American >60 >60 mL/min/1.73 m 2    GFR If Non African American >60 >60 mL/min/1.73 m 2   Urinalysis    Collection Time: 07/28/17 11:09 AM   Result Value Ref Range    Color Yellow     Character Clear     Specific Gravity 1.017 <1.035    Ph 5.5 5.0-8.0    Glucose Negative Negative mg/dL    Ketones Negative Negative mg/dL    Protein 100 (A) Negative mg/dL    Bilirubin Negative Negative    Urobilinogen, Urine 0.2 Negative    Nitrite Negative Negative    Leukocyte Esterase Negative Negative    Occult Blood Trace (A) Negative    Micro Urine Req Microscopic    URINE MICROSCOPIC (W/UA)    Collection Time: 07/28/17 11:09 AM   Result Value Ref Range    WBC 0-2 (A) /hpf    RBC 0-2 (A) /hpf    Bacteria Negative None /hpf    Epithelial Cells Few /hpf    Hyaline Cast 11-20 (A) /lpf    Granular Casts 0-2 /lpf   CSF PROTEIN    Collection Time: 07/28/17  1:25 PM   Result Value Ref Range    Total Protein, CSF 61 (H) 15 - 45 mg/dL   CSF GLUCOSE    Collection Time: 07/28/17  1:25 PM   Result Value Ref Range    Glucose CSF 51 40 - 80 mg/dL   GRAM STAIN    Collection Time: 07/28/17  1:25 PM   Result Value Ref Range    Significant Indicator .     Source CSF     Site TAP     Gram Stain Result No organisms seen.        Imaging/Procedures Review:    ndependant Imaging Review: Completed  DX-CHEST-PORTABLE (1 VIEW)   Final Result      Mild bilateral basilar interstitial prominence which can be seen in setting of edema and/or infection.      CT-HEAD W/O    (Results Pending)   MR-BRAIN-W/O    (Results Pending)           Assessment/Plan   //Severe Sepsis, altered mentation  4/4 SIRS, tachypnea, leukopenia, fever, tachycardic, lung possible source  IVFs per sepsis protocol  Per ERP, conversation  with Dr. Lew of [facility] recommends  Tobramycin, vanco, meropenem.  Patient continued on outpatient regimen with acyclovir, voriconazole  Initial lactate 4.1 trend down to 2.6  F/U blood cultures  U/A unremarkable  CXR suggestive of of infection  Discussed with pharmacy for administration of  Empiric coverage with tobramycin, vanc, roland, micafungin.  Pending ID consult. Spoke with Dr. Bishop who will consult.    //Hx of accelerated/blast crisis CML  Apparently received stem cell transplant with Dr. Lew, d/c from  [facility] yesterday  Cont tacrolimus    Anticipated Hospital stay:  >2 midnights    Quality Measures  EKG reviewed, Labs reviewed, Medications reviewed and Radiology images reviewed  Mahajan catheter: Critically Ill - Requiring Accurate Measurement of Urinary Output      DVT Prophylaxis: Heparin      Antibiotics: Treating active infection/contamination beyond 24 hours perioperative coverage

## 2017-07-28 NOTE — ED NOTES
[facility] called to report pt was d/c from them yesterday after a stem cell transplant. The bone marrow doctor can be reached at    Dr. Moreno Garcia   pager #[phone]  Cell #[phone]

## 2017-07-28 NOTE — PROGRESS NOTES
Dr. Bishop in room to examine patient with this RN who speaks Israeli. He remains non verbal even with persuasion. Physician is requesting that primary team place patient in ICU for closer observation. Will notify team.

## 2017-07-28 NOTE — ED NOTES
Late entry - 1320 -  Pt incontinent of small amt of stool.  Pt cleaned, new brief applied. Pt adjusted in bed.

## 2017-07-28 NOTE — ED NOTES
BIB [facility] to [facility].  Per family pt has been altered since last night.  Pt typically is A&O, ambulatory and since last night has been in bed and not eating or drinking. Family states when this happens pt typically has a fever.  Pt arrives alert but nonverbal.  Sepsis score of 5.  Protocol initiated.  Pt in gown, on monitor, chart up for ERP.

## 2017-07-28 NOTE — PROGRESS NOTES
Pharmacy Kinetics 71 y.o. male on vancomycin day # 1 2017    Received vancomycin loading dose in the ER x1.    Indication for Treatment: Unknown source of infection, immunocompromised stem cell tranplant    Pertinent history per medical record: Admitted on 2017 for sepsis. Recently transferred to [facility].    Other antibiotics: Micamine, Merrem, voriconazole, acyclovir, tobramycin x1    Allergies: Review of patient's allergies indicates no known allergies.     List concerns for renal function: NATY, BUN/SCr ratio > 20:1, advanced age, hypermetabolic state (SIRS), nephrotoxic drugs    Pertinent cultures to date:    - MSSA  Previously beta D gluc and glactomannan positive.  17 - blood cultures x2 in process    Recent Labs      17   1055   WBC  8.5   NEUTSPOLYS  83.90*   BANDSSTABS  2.70     Recent Labs      17   1057   BUN  31*   CREATININE  1.17   ALBUMIN  3.8     No results for input(s): VANCOTROUGH, VANCOPEAK, VANCORANDOM in the last 72 hours.  Intake/Output Summary (Last 24 hours) at 17 1622  Last data filed at 17 1124   Gross per 24 hour   Intake      0 ml   Output     15 ml   Net    -15 ml      Blood pressure 139/82, pulse 114, temperature 39.9 °C (103.9 °F), resp. rate 22, weight 65.772 kg (145 lb), SpO2 98 %. Temp (24hrs), Av.7 °C (103.4 °F), Min:37.5 °C (99.5 °F), Max:40.8 °C (105.5 °F)      A/P   1. Vancomycin dose change: Start vancomycin pulse dosing.  2. Next vancomycin level: Tonight at midnight - 12 hour level  3. Goal trough: 16-20 mcg/ml  4. Comments: Patient with NATY. Kidney function could worsen due to additional nephrotoxins. High grade fever. Will pulse dose for now. My colleague will follow up tonight. ID has been consulted. Pharmacy will follow, narrow therapy as able.  5. Patient with AMS, nonverbal in ER. LP is negative for menegitis. Patient newly started Prograf, Prograf can cause reversible posterior leukoencephalopathy syndrome, it appears MRI  does not indicate this. Prograf level tomorrow to rule out cause of NATY.    Shankar Plascencia, PharmD, BCPS

## 2017-07-28 NOTE — PROGRESS NOTES
Patient transferred from ER via gurney. Luxembourgish speaking but was informed that patient was non-verbal. He is lethargic but will open eyes to voice and is not responding to questions. This is a change from previous exam. Informed of safety and call system, call light next to patient. Rhythm is sinus tach at 130-140's

## 2017-07-28 NOTE — ED NOTES
Pt febrile at 103.9.  Dr. Rodriguez aware.  NS boluses infusing.  Pt awake, groggy at times.  Able to nod but remains non verbal at this time. Report called to T7th, pt transported up to floor.  pts medications & belongings up with him.

## 2017-07-28 NOTE — ED PROVIDER NOTES
ED Provider Note    CHIEF COMPLAINT  Chief Complaint   Patient presents with   • ALOC   • Fever       HPI  Benjamin Post is a 71 y.o. male who presents with altered level of consciousness, not speaking since last night. Fever noted this morning. No vomiting no diarrhea no cough no shortness of breath. Has no specific complaints but then again is not speaking. Evidently had a recent stem cells done at [facility]. His stem cell done about one month ago. Discharged from hospital [facility] yesterday  [place] and I was able to get in touch with his transplant surgeon, for stem cell transplant [facility]. His name is Dr. Jorgensen, his cell phone number is [phone]. He recommends antibiotics, meropenem, vancomycin tobramycin. This patient is already on acyclovir. He has had subdural hemorrhage in the past so CAT scan MRI were also recommended. Along with spinal tap.. This patient is already on acyclovir  REVIEW OF SYSTEMS  See HPI for further details. History of hypertension, chronic myelogenous disease, indigestion and heartburn recent stem cell.Denies other G.I., G.U.. endrocine, cardiovascular, respriatory or neurological problems.  All other systems are negative.     PAST MEDICAL HISTORY  Past Medical History   Diagnosis Date   • Hypertension    • Cancer (CMS-HCC)    • Indigestion    • Heart burn        FAMILY HISTORY  No family history on file.    SOCIAL HISTORY nonsmoker  Social History     Social History   • Marital Status: Legally      Spouse Name: N/A   • Number of Children: N/A   • Years of Education: N/A     Social History Main Topics   • Smoking status: Never Smoker    • Smokeless tobacco: Not on file   • Alcohol Use: No   • Drug Use: No   • Sexual Activity: Not on file     Other Topics Concern   • Not on file     Social History Narrative       SURGICAL HISTORY  Past Surgical History   Procedure Laterality Date   • Bone marrow aspiration  11/5/2015     Procedure: BONE MARROW  ASPIRATION;  Surgeon: Rosita Bolton M.D.;  Location: [facility];  Service:    • Bone marrow biopsy, ndl/trocar  11/5/2015     Procedure: BONE MARROW BIOPSY, NDL/TROCAR;  Surgeon: Rosita Bolton M.D.;  Location: [facility];  Service:    • Bone marrow biopsy, ndl/trocar  3/23/2016     Procedure: BONE MARROW BIOPSY, NDL/TROCAR;  Surgeon: Fili Prakash M.D.;  Location: [facility];  Service:    • Bone marrow aspiration  3/23/2016     Procedure: BONE MARROW ASPIRATION;  Surgeon: Fili Prakash M.D.;  Location: [facility];  Service:    • Bone marrow biopsy, ndl/trocar  7/12/2016     Procedure: BONE MARROW BIOPSY, NDL/TROCAR;  Surgeon: Fili Prakash M.D.;  Location: [facility];  Service:    • Bone marrow aspiration  7/12/2016     Procedure: BONE MARROW ASPIRATION;  Surgeon: Fili Prakash M.D.;  Location: [facility];  Service:    • Dental extraction(s) N/A 3/25/2017     Procedure: DENTAL EXTRACTION(S);  Surgeon: Surendra Barragan D.M.D.,MRupertDRupert;  Location: [facility];  Service:    • Submandible abscess incision and drainage N/A 3/25/2017     Procedure:  INTRAORAL  ABSCESS INCISION AND DRAINAGE;  Surgeon: Surendra Barragan D.M.D.,MRupertDRupert;  Location: SURGERY [facility];  Service:        CURRENT MEDICATIONS  Home Medications     **Home medications have not yet been reviewed for this encounter**          ALLERGIES  No Known Allergies    PHYSICAL EXAM  VITAL SIGNS: /82 mmHg  Pulse 126  Temp(Src) 40.8 °C (105.5 °F)  Resp 28  Wt 65.772 kg (145 lb)  Constitutional: Appears chronically debilitated No acute distress, Non-toxic appearance.   HENT: Normocephalic, Atraumatic, Bilateral external ears normal, Oropharynx moist, No oral exudates, Nose normal.   Eyes: PERRL, EOMI, Conjunctiva normal, No discharge.   Neck: Neck is not supple. No masses, No stridor.   Lymphatic: No lymphadenopathy noted.    Cardiovascular: Normal heart rate, Normal rhythm, No murmurs, No rubs, No gallops.   Thorax & Lungs: Normal breath sounds, No respiratory distress, No wheezing, No chest tenderness.   Abdomen:  No tenderness, no guarding no rigidity and the abdomen is soft.  No masses, No pulsatile masses.  Skin: Warm, Dry, No erythema, No rash.   Back: No tenderness, No CVA tenderness.   Extremities: Intact distal pulses, No edema, No tenderness, No cyanosis, No clubbing.   Musculoskeletal: Good range of motion in all major joints. No tenderness to palpation or major deformities noted.   Neurologic: Alert & oriented x 3, Normal motor function, Normal sensory function, No focal deficits noted.   Psychiatric: Affect normal, Judgment normal, Mood normal.   EKG Interpretation    Interpreted by me    Rhythm: Sinus tachycardia     Rate: 125  Axis: Right axis deviation Ectopy: none  Conduction: normal  ST Segments: no acute change  T Waves: no acute change  Q Waves: Q waves in the inferior leads.    Clinical Impression: I do not have an old EKG to compare to        RADIOLOGY/PROCEDURES  DX-CHEST-PORTABLE (1 VIEW)   Final Result      Mild bilateral basilar interstitial prominence which can be seen in setting of edema and/or infection.      CT-HEAD W/O    (Results Pending)   MR-BRAIN-W/O    (Results Pending)         COURSE & MEDICAL DECISION MAKING  Pertinent Labs & Imaging studies reviewed. (See chart for details) white count 8.5 hematocrit 34 platelet count 85, urinalysis trace blood. Electrolytes, unremarkable renal function BUN is elevated at 31     He has a temperature of 105.5. Altered mental status, recent stem cell transplant. Neutropenia in the past however today his white count is normal. Lactic acid elevated at 4.1     FINAL IMPRESSION  1.   1. Fever, unspecified fever cause    2. Altered level of consciousness        2.   3.     Disposition  I have explained the indications and complications of a spinal tap. The patient  appears to understand the indications and complications of the spinal tap. The patient does understand that a severe headache may follow the spinal tap. The patient then signed a permit concerning and allowing the spinal tap to be done. I prepped the L4-5 interspace with Betadine. The L4-5 interspace was then anesthetized with 1% Xylocaine. When adequate anesthesia was achieved I used a spinal needle to obtain clear fluid. The spinal fluid was sent to the laboratory for analysis. Patient tolerated the procedure well.    Patient is admitted to hospitalist, oncology [place].  Electronically signed by: Davide Montesinos, 7/28/2017 10:48 AM

## 2017-07-28 NOTE — ED NOTES
The Medication Reconciliation process has been partially completed from a med sheet in the room, unknown when he took his last doses.      Allergies have been reviewed  Antibiotic use in 30 days - [facility]:  Olegario Braxton

## 2017-07-29 ENCOUNTER — APPOINTMENT (OUTPATIENT)
Dept: RADIOLOGY | Facility: MEDICAL CENTER | Age: 72
DRG: 871 | End: 2017-07-29
Attending: HOSPITALIST
Payer: MEDICARE

## 2017-07-29 PROBLEM — R79.89 ELEVATED LFTS: Status: ACTIVE | Noted: 2017-07-29

## 2017-07-29 PROBLEM — E43 SEVERE PROTEIN-CALORIE MALNUTRITION (HCC): Status: ACTIVE | Noted: 2017-07-29

## 2017-07-29 PROBLEM — E83.42 HYPOMAGNESEMIA: Status: ACTIVE | Noted: 2017-07-29

## 2017-07-29 LAB
ALBUMIN SERPL BCP-MCNC: 2.9 G/DL (ref 3.2–4.9)
ALBUMIN/GLOB SERPL: 1.2 G/DL
ALP SERPL-CCNC: 246 U/L (ref 30–99)
ALT SERPL-CCNC: 93 U/L (ref 2–50)
ANION GAP SERPL CALC-SCNC: 9 MMOL/L (ref 0–11.9)
ANION GAP SERPL CALC-SCNC: 9 MMOL/L (ref 0–11.9)
ANISOCYTOSIS BLD QL SMEAR: ABNORMAL
AST SERPL-CCNC: 128 U/L (ref 12–45)
BASOPHILS # BLD AUTO: 0 % (ref 0–1.8)
BASOPHILS # BLD: 0 K/UL (ref 0–0.12)
BILIRUB SERPL-MCNC: 0.7 MG/DL (ref 0.1–1.5)
BUN SERPL-MCNC: 24 MG/DL (ref 8–22)
BUN SERPL-MCNC: 26 MG/DL (ref 8–22)
CALCIUM SERPL-MCNC: 8.2 MG/DL (ref 8.5–10.5)
CALCIUM SERPL-MCNC: 8.3 MG/DL (ref 8.5–10.5)
CHLORIDE SERPL-SCNC: 115 MMOL/L (ref 96–112)
CHLORIDE SERPL-SCNC: 119 MMOL/L (ref 96–112)
CO2 SERPL-SCNC: 19 MMOL/L (ref 20–33)
CO2 SERPL-SCNC: 19 MMOL/L (ref 20–33)
CREAT SERPL-MCNC: 1.01 MG/DL (ref 0.5–1.4)
CREAT SERPL-MCNC: 1.05 MG/DL (ref 0.5–1.4)
EOSINOPHIL # BLD AUTO: 0 K/UL (ref 0–0.51)
EOSINOPHIL NFR BLD: 0 % (ref 0–6.9)
ERYTHROCYTE [DISTWIDTH] IN BLOOD BY AUTOMATED COUNT: 54.6 FL (ref 35.9–50)
GFR SERPL CREATININE-BSD FRML MDRD: >60 ML/MIN/1.73 M 2
GFR SERPL CREATININE-BSD FRML MDRD: >60 ML/MIN/1.73 M 2
GLOBULIN SER CALC-MCNC: 2.5 G/DL (ref 1.9–3.5)
GLUCOSE SERPL-MCNC: 109 MG/DL (ref 65–99)
GLUCOSE SERPL-MCNC: 123 MG/DL (ref 65–99)
HCT VFR BLD AUTO: 27.2 % (ref 42–52)
HGB BLD-MCNC: 9 G/DL (ref 14–18)
LYMPHOCYTES # BLD AUTO: 0.11 K/UL (ref 1–4.8)
LYMPHOCYTES NFR BLD: 1.7 % (ref 22–41)
MACROCYTES BLD QL SMEAR: ABNORMAL
MAGNESIUM SERPL-MCNC: 1.3 MG/DL (ref 1.5–2.5)
MANUAL DIFF BLD: NORMAL
MCH RBC QN AUTO: 29.4 PG (ref 27–33)
MCHC RBC AUTO-ENTMCNC: 33 G/DL (ref 33.7–35.3)
MCV RBC AUTO: 89.2 FL (ref 81.4–97.8)
METAMYELOCYTES NFR BLD MANUAL: 0.9 %
MONOCYTES # BLD AUTO: 0.06 K/UL (ref 0–0.85)
MONOCYTES NFR BLD AUTO: 0.9 % (ref 0–13.4)
MORPHOLOGY BLD-IMP: NORMAL
NEUTROPHILS # BLD AUTO: 6.27 K/UL (ref 1.82–7.42)
NEUTROPHILS NFR BLD: 96.5 % (ref 44–72)
NRBC # BLD AUTO: 0.05 K/UL
NRBC BLD AUTO-RTO: 0.8 /100 WBC
PLATELET # BLD AUTO: 65 K/UL (ref 164–446)
PLATELET BLD QL SMEAR: NORMAL
PMV BLD AUTO: 12.4 FL (ref 9–12.9)
POLYCHROMASIA BLD QL SMEAR: NORMAL
POTASSIUM SERPL-SCNC: 3.3 MMOL/L (ref 3.6–5.5)
POTASSIUM SERPL-SCNC: 3.9 MMOL/L (ref 3.6–5.5)
POTASSIUM SERPL-SCNC: 4.4 MMOL/L (ref 3.6–5.5)
PROT SERPL-MCNC: 5.4 G/DL (ref 6–8.2)
RBC # BLD AUTO: 3.06 M/UL (ref 4.7–6.1)
RBC BLD AUTO: PRESENT
SODIUM SERPL-SCNC: 143 MMOL/L (ref 135–145)
SODIUM SERPL-SCNC: 147 MMOL/L (ref 135–145)
VANCOMYCIN TROUGH SERPL-MCNC: 11.1 UG/ML (ref 10–20)
WBC # BLD AUTO: 6.5 K/UL (ref 4.8–10.8)

## 2017-07-29 PROCEDURE — 700111 HCHG RX REV CODE 636 W/ 250 OVERRIDE (IP): Performed by: HOSPITALIST

## 2017-07-29 PROCEDURE — 770022 HCHG ROOM/CARE - ICU (200)

## 2017-07-29 PROCEDURE — 700105 HCHG RX REV CODE 258: Performed by: HOSPITALIST

## 2017-07-29 PROCEDURE — 302136 NUTRITION PUMP: Performed by: HOSPITALIST

## 2017-07-29 PROCEDURE — 700102 HCHG RX REV CODE 250 W/ 637 OVERRIDE(OP): Performed by: HOSPITALIST

## 2017-07-29 PROCEDURE — 700105 HCHG RX REV CODE 258

## 2017-07-29 PROCEDURE — 700111 HCHG RX REV CODE 636 W/ 250 OVERRIDE (IP)

## 2017-07-29 PROCEDURE — 80053 COMPREHEN METABOLIC PANEL: CPT

## 2017-07-29 PROCEDURE — 80197 ASSAY OF TACROLIMUS: CPT

## 2017-07-29 PROCEDURE — 85007 BL SMEAR W/DIFF WBC COUNT: CPT

## 2017-07-29 PROCEDURE — A9270 NON-COVERED ITEM OR SERVICE: HCPCS | Performed by: HOSPITALIST

## 2017-07-29 PROCEDURE — 99233 SBSQ HOSP IP/OBS HIGH 50: CPT | Performed by: HOSPITALIST

## 2017-07-29 PROCEDURE — 83735 ASSAY OF MAGNESIUM: CPT

## 2017-07-29 PROCEDURE — 700101 HCHG RX REV CODE 250: Performed by: HOSPITALIST

## 2017-07-29 PROCEDURE — 76705 ECHO EXAM OF ABDOMEN: CPT

## 2017-07-29 PROCEDURE — 80202 ASSAY OF VANCOMYCIN: CPT

## 2017-07-29 PROCEDURE — A9270 NON-COVERED ITEM OR SERVICE: HCPCS

## 2017-07-29 PROCEDURE — 85027 COMPLETE CBC AUTOMATED: CPT

## 2017-07-29 PROCEDURE — 80048 BASIC METABOLIC PNL TOTAL CA: CPT

## 2017-07-29 PROCEDURE — 700102 HCHG RX REV CODE 250 W/ 637 OVERRIDE(OP)

## 2017-07-29 PROCEDURE — 84132 ASSAY OF SERUM POTASSIUM: CPT

## 2017-07-29 RX ORDER — MAGNESIUM SULFATE HEPTAHYDRATE 40 MG/ML
4 INJECTION, SOLUTION INTRAVENOUS ONCE
Status: COMPLETED | OUTPATIENT
Start: 2017-07-29 | End: 2017-07-29

## 2017-07-29 RX ORDER — SODIUM CHLORIDE 9 MG/ML
INJECTION, SOLUTION INTRAVENOUS
Status: COMPLETED
Start: 2017-07-29 | End: 2017-07-29

## 2017-07-29 RX ORDER — DEXTROSE MONOHYDRATE 50 MG/ML
INJECTION, SOLUTION INTRAVENOUS CONTINUOUS
Status: DISCONTINUED | OUTPATIENT
Start: 2017-07-29 | End: 2017-07-30 | Stop reason: ALTCHOICE

## 2017-07-29 RX ORDER — POTASSIUM CHLORIDE 29.8 MG/ML
40 INJECTION INTRAVENOUS ONCE
Status: COMPLETED | OUTPATIENT
Start: 2017-07-29 | End: 2017-07-29

## 2017-07-29 RX ORDER — DEXTROSE MONOHYDRATE, SODIUM CHLORIDE, AND POTASSIUM CHLORIDE 50; 1.49; 4.5 G/1000ML; G/1000ML; G/1000ML
INJECTION, SOLUTION INTRAVENOUS CONTINUOUS
Status: DISCONTINUED | OUTPATIENT
Start: 2017-07-29 | End: 2017-07-29

## 2017-07-29 RX ORDER — FAMOTIDINE 20 MG/1
20 TABLET, FILM COATED ORAL DAILY
Status: DISCONTINUED | OUTPATIENT
Start: 2017-07-29 | End: 2017-07-30 | Stop reason: HOSPADM

## 2017-07-29 RX ORDER — ACETAMINOPHEN 650 MG/1
650 SUPPOSITORY RECTAL EVERY 4 HOURS PRN
Status: DISCONTINUED | OUTPATIENT
Start: 2017-07-29 | End: 2017-07-30 | Stop reason: HOSPADM

## 2017-07-29 RX ORDER — ACETAMINOPHEN 325 MG/1
650 TABLET ORAL EVERY 4 HOURS PRN
Status: DISCONTINUED | OUTPATIENT
Start: 2017-07-29 | End: 2017-07-30 | Stop reason: HOSPADM

## 2017-07-29 RX ADMIN — URSODIOL 300 MG: 300 CAPSULE ORAL at 22:05

## 2017-07-29 RX ADMIN — ACYCLOVIR 400 MG: 800 TABLET ORAL at 23:21

## 2017-07-29 RX ADMIN — PREDNISONE 30 MG: 10 TABLET ORAL at 14:50

## 2017-07-29 RX ADMIN — POTASSIUM CHLORIDE 40 MEQ: 29.8 INJECTION, SOLUTION INTRAVENOUS at 02:26

## 2017-07-29 RX ADMIN — MAGNESIUM SULFATE IN WATER 4 G: 40 INJECTION, SOLUTION INTRAVENOUS at 11:12

## 2017-07-29 RX ADMIN — DEXTROSE MONOHYDRATE: 50 INJECTION, SOLUTION INTRAVENOUS at 15:56

## 2017-07-29 RX ADMIN — TACROLIMUS 0.5 MG: 5 CAPSULE ORAL at 21:04

## 2017-07-29 RX ADMIN — VORICONAZOLE 100 MG: 50 TABLET, FILM COATED ORAL at 20:58

## 2017-07-29 RX ADMIN — ACYCLOVIR 400 MG: 800 TABLET ORAL at 14:22

## 2017-07-29 RX ADMIN — POTASSIUM CHLORIDE, DEXTROSE MONOHYDRATE AND SODIUM CHLORIDE: 150; 5; 450 INJECTION, SOLUTION INTRAVENOUS at 11:12

## 2017-07-29 RX ADMIN — FAMOTIDINE 20 MG: 20 TABLET, FILM COATED ORAL at 14:22

## 2017-07-29 RX ADMIN — TACROLIMUS 0.5 MG: 5 CAPSULE ORAL at 15:27

## 2017-07-29 RX ADMIN — VORICONAZOLE 200 MG: 200 TABLET, FILM COATED ORAL at 20:58

## 2017-07-29 RX ADMIN — VANCOMYCIN HYDROCHLORIDE 900 MG: 100 INJECTION, POWDER, LYOPHILIZED, FOR SOLUTION INTRAVENOUS at 09:42

## 2017-07-29 RX ADMIN — MEROPENEM 500 MG: 500 INJECTION, POWDER, FOR SOLUTION INTRAVENOUS at 18:04

## 2017-07-29 RX ADMIN — SODIUM CHLORIDE 250 ML: 9 INJECTION, SOLUTION INTRAVENOUS at 02:15

## 2017-07-29 RX ADMIN — MEROPENEM 500 MG: 500 INJECTION, POWDER, FOR SOLUTION INTRAVENOUS at 00:13

## 2017-07-29 RX ADMIN — VANCOMYCIN HYDROCHLORIDE 900 MG: 100 INJECTION, POWDER, LYOPHILIZED, FOR SOLUTION INTRAVENOUS at 21:58

## 2017-07-29 RX ADMIN — MEROPENEM 500 MG: 500 INJECTION, POWDER, FOR SOLUTION INTRAVENOUS at 11:48

## 2017-07-29 RX ADMIN — MEROPENEM 500 MG: 500 INJECTION, POWDER, FOR SOLUTION INTRAVENOUS at 05:59

## 2017-07-29 RX ADMIN — ENOXAPARIN SODIUM 40 MG: 100 INJECTION SUBCUTANEOUS at 11:36

## 2017-07-29 RX ADMIN — MICAFUNGIN SODIUM 100 MG: 20 INJECTION, POWDER, LYOPHILIZED, FOR SOLUTION INTRAVENOUS at 08:10

## 2017-07-29 RX ADMIN — ACETAMINOPHEN 650 MG: 325 TABLET, FILM COATED ORAL at 14:23

## 2017-07-29 RX ADMIN — URSODIOL 300 MG: 300 CAPSULE ORAL at 14:23

## 2017-07-29 ASSESSMENT — PAIN SCALES - GENERAL
PAINLEVEL_OUTOF10: 0

## 2017-07-29 NOTE — PROGRESS NOTES
"Pharmacy Kinetics 71 y.o. male on vancomycin day # 2 2017    Currently on Vancomycin pulse dosing    Indication for Treatment: unknown source of infection    Pertinent history per medical record: Admitted on 2017 for sepsis.  Patient has as history of CML, was recently admitted to [facility] and eventually transferred to [facility] for a stem cell transplant. He was started on multiple immunosuppressive agents after his transplant and discharged on . He presented to the ED again on  with significant fever and altered mental status. Given his recent transplant, broad spectrum antibiotics were initiated. ID following.    Other antibiotics: meropenem 500 mg IV q8h, micafungin 100 mg IV q24h, voriconazole (home medication) 300 mg PO BID, acyclovir (home medication) 400 mg PO TID    Allergies: Review of patient's allergies indicates no known allergies.     List concerns for renal function: age, BUN/SCr ratio > 20:1, abnormal LFTs, malnutrition, nephrotoxic agents (received tobramycin in ED, tacrolimus)    Pertinent cultures to date:    PBC x 1: NGTD   PICC line: NGTD   urine: NGTD   CSF: NGTD    Recent Labs      17   1055  17   0400   WBC  8.5  6.5   NEUTSPOLYS  83.90*  96.50*   BANDSSTABS  2.70   --      Recent Labs      17   1057  17   0001  17   0400   BUN  31*  24*  26*   CREATININE  1.17  1.05  1.01   ALBUMIN  3.8   --   2.9*     Recent Labs      17   0400   VANCOTROUGH  11.1     Intake/Output Summary (Last 24 hours) at 17 1503  Last data filed at 17 1400   Gross per 24 hour   Intake   3265 ml   Output   2125 ml   Net   1140 ml      Blood pressure 159/94, pulse 126, temperature 39.2 °C (102.6 °F), resp. rate 23, height 1.554 m (5' 1.2\"), weight 57 kg (125 lb 10.6 oz), SpO2 98 %. Temp (24hrs), Av.1 °C (102.3 °F), Min:37.2 °C (99 °F), Max:40.7 °C (105.3 °F)      A/P   1. Vancomycin dose change: initiate 15 mg/kg q12h  2. Next vancomycin " level: 7/30 before the 1000 dose  3. Goal trough: 16-20 mcg/mL  4. Comments: random level drawn this morning was subtherapeutic. Patient with multiple risk factors for accumulation, but given current risk and immunosuppressed state and evidence of clearing vancomycin, will dose per protocol for now and check a level prior to the 4th total dose (tomorrow AM). SCr trending down today. UOP improving, was 1.7 mL/kg/hr overnight. Would highly recommend discontinuation of vancomycin if no MRSA isolated. All cultures negative thus far. Will continue to follow.    Jane Jasmine, PHARMD

## 2017-07-29 NOTE — CONSULTS
NEUROLOGY CONSULT         CHIEF COMPLAINT:  Chief Complaint   Patient presents with   • ALOC   • Fever        Pt seen on 07/28/2017      HISTORY OF PRESENT ILLNESS:  MR. Benjamin Post is a 71 y.o. male with history of CML with recent MSSA sepsis and blast crisis in June 2017 for which he was transferred to [facility] for a bone marrow transplant at [facility] about a month ago. He was discharged from [facility] yesterday.  Yesterday, he became more lethargic, not eating, drinking, or talking and was febrile this morning. In the ED, he had a fever of 105.5  He had an LP done which showed a WBC of 2,  RBC of 205, protein of 51 and glucose of 51. He had and MRI of the brain which I have reviewed that was unremarkable as well as an CT brain.  Neurology was asked to consult.     ROS:  + fevers.       PAST MEDICAL HISTORY:  CML s-p recent bone marrow transplant  Hyperlipidemia    Right subdural hematoma   GERD    ALLERGIES:  No Known Allergies      MEDICATIONS:  No current facility-administered medications on file prior to encounter.     No current outpatient prescriptions on file prior to encounter.       MEDICATIONS:    Current facility-administered medications:   •  acyclovir (ZOVIRAX) tablet 400 mg, 400 mg, Oral, TID, Mary Pike M.D., 400 mg at 07/28/17 1345  •  predniSONE (DELTASONE) tablet 30 mg, 30 mg, Oral, DAILY, Mary Pike M.D., Stopped at 07/28/17 1345  •  ursodiol (ACTIGALL) capsule 300 mg, 300 mg, Oral, BID, Mary Pike M.D.  •  voriconazole (VFEND) tablet 200 mg, 200 mg, Oral, Q12HRS, 200 mg at 07/28/17 1400 **AND** voriconazole (VFEND) tablet 100 mg, 100 mg, Oral, Q12HRS, Shankar Plascencia, PHARMD, 100 mg at 07/28/17 1400  •  NS (BOLUS) infusion 1,000 mL, 1,000 mL, Intravenous, Once PRN, Fili Rodriguez M.D.  •  [START ON 7/29/2017] omeprazole (PRILOSEC) capsule 20 mg, 20 mg, Oral, DAILY, Shankar Plascencia, PHARMD  •  [START ON 7/29/2017] micafungin (MYCAMINE) 100 mg in D5W  100 mL ivpb, 100 mg, Intravenous, Q24HRS, Mary Pike M.D.  •  meropenem (MERREM) 500 mg in  mL IVPB, 500 mg, Intravenous, Q6HRS, Mary Pike M.D., Stopped at 07/28/17 1826  •  MD ALERT... vancomycin per pharmacy protocol, , Other, pharmacy to dose, Mary Pike M.D.  •  tacrolimus (PROGRAF) capsule 0.5 mg, 0.5 mg, Oral, TID, Mary Pike M.D., Stopped at 07/28/17 1645  •  aspirin (ASA) suppository 600 mg, 600 mg, Rectal, Q6HRS PRN, Frankie Law M.D.  •  methylPREDNISolone (SOLU-MEDROL) 40 MG injection 20 mg, 20 mg, Intravenous, QDAY PRN, Frankie Law M.D.  •  Pharmacy Consult Request, , Other, PRN, Frankie Law M.D.    FAMILY HISTORY:  No family history on file.    b:  Social History     Social History   • Marital Status: Legally      Spouse Name: N/A   • Number of Children: N/A   • Years of Education: N/A     Occupational History   • Not on file.     Social History Main Topics   • Smoking status: Never Smoker    • Smokeless tobacco: Not on file   • Alcohol Use: No   • Drug Use: No   • Sexual Activity: Not on file     Other Topics Concern   • Not on file     Social History Narrative       EXAM:  Filed Vitals:    07/28/17 2015 07/28/17 2030 07/28/17 2045 07/28/17 2100   BP:       Pulse: 135 135 134 133   Temp:  40.7 °C (105.3 °F) 40.4 °C (104.7 °F) 40.4 °C (104.7 °F)   Resp: 29 27 31 27   Height:       Weight:       SpO2: 100% 100% 100%      General: pt is lying in bed, NAD  HEENT: normocephalic, atraumatic, normal moist oral mucosa  EYes: anicteric, PERRLA, pupils midline  Neck: supple, no carotid bruits  Chest: CTA b/l no wheezing or rales  Heart: regular rate and rhythm, no murmurs  Abd: soft, non-tender, non distended  Extremtiies: no edema  Skin: no rashes or lesions  Neuro  General: pt is lethargic, his eyes will open to noxious stimuli, but he does not follow commands, no verbal output.    CN: visual fields show blink to threat, he has a right surgical pupil, left  eye has ptosis but his eye deviates in (esotopria?), no facial asymmetry  DTR: 2+ throughout  Plantar reflexes: downgoing b/l   Minimally withdrawal to pain in all 4 extremities.      LABORATORY TESTING  Lab Results   Component Value Date/Time    WBC 8.5 07/28/2017 10:55 AM    RBC 3.81* 07/28/2017 10:55 AM    HEMOGLOBIN 11.5* 07/28/2017 10:55 AM    HEMATOCRIT 34.3* 07/28/2017 10:55 AM    MCV 90.0 07/28/2017 10:55 AM    MCH 30.2 07/28/2017 10:55 AM    MCHC 33.5* 07/28/2017 10:55 AM    MPV 11.5 07/28/2017 10:55 AM    NEUTROPHILS-POLYS 83.90* 07/28/2017 10:55 AM    LYMPHOCYTES 2.70* 07/28/2017 10:55 AM    MONOCYTES 7.10 07/28/2017 10:55 AM    EOSINOPHILS 0.00 07/28/2017 10:55 AM    BASOPHILS 0.00 07/28/2017 10:55 AM    HYPOCHROMIA 1+ 08/07/2016 02:16 AM    ANISOCYTOSIS 1+ 07/28/2017 10:55 AM        IMAGING:  [unfilled]      IMPRESSION AND PLAN: Ms. Benjamin Post is a 71 y.o. male with history of CML and recent bone marrow transplant who was admitted with fevers and AMS.  Lumbar puncture was unremarkable. He has been started on broad spectrum antibiotics by infectious disease. MRI showed no evidence of stroke or other signal abnormality that would be suggestive of HSV encephalitis.  I think that non-convulsive status is less likely, but will order EEG.       Gisell Blackmon M.D.    Neurology

## 2017-07-29 NOTE — ASSESSMENT & PLAN NOTE
This is severe sepsis with the following associated acute organ dysfunction(s): metabolic/septic encephalopathy.     Source is not clear: Possibly pneumonia Continue broad-spectrum antibiotics and antifungal and antivirals given recent bone marrow transplant    Follow up on cultures  Infectious disease following

## 2017-07-29 NOTE — PROGRESS NOTES
Patient arrived with a temp around 40.4C, placed on cooling blanket around 20:30 last night, temp. By shift end around 37.8C, cooling blanket still on.  Patient extremely lethargic at the beginning of the shift, shift end, turned to voice, opening his eyes, moving his arms spontaneously, but not following commands.  Patient heart rate was in 130-150's, but in high 90's by shift end, -160, down in low 100's by shift end.  Sats % on 2 L/NC, lungs clear to diminished bases, occasional cough, oral suctioning of mucus.  Patient average urine output around 75 mls/hour, bowel-loose diarrhea x 2.  Potassium replaced per order, 04:00am level 3.9, K+ level sent at 06:45.

## 2017-07-29 NOTE — PROGRESS NOTES
Patient K+ level 3.3 from midnight BMP.  Paged Hospitalist on call, received call back for order 40 meq KCL IVPB and repeat K+ level in 4 hours after initiation.

## 2017-07-29 NOTE — PROGRESS NOTES
Ness Hospitalist on call at 20:33 concerning patient's lethargy; needing to change po medications to IV and SCDs order.  No call received back.  Repaged at 21:20, received call back from Dr. JAD Law at 21:22.  Informed Dr. Law of patient's lack of responsiveness except to loud verbal with attempt to open his eyes, no following commands and concerned that patient will not be able to swallow oral medication.  Patient's temp. Also still 40.4C ad no Tylenol ordered.  Patient already on several antibiotics.  New orders placed by Dr. Law.

## 2017-07-29 NOTE — PROGRESS NOTES
12 Hour Chart Check    Bedside report received from night shift RN. Patient connected to all monitors, alarms set properly. Cooling blanket on patient, temp 37.4C. No signs of pain or discomfort at this time.

## 2017-07-29 NOTE — PROGRESS NOTES
NEUROLOGY PROGRESS NOTE      Background:  71 y.o. male was admitted on 7/28/2017 10:34 AM for Sepsis (CMS-HCC)  Stem cells transplant status (CMS-[facility])   He is being followed by Neurology for alteration of mental status    SUBJECTIVE:   Very drowsy but arousable, opened his eyes but does not follow command.    EXAM:   General: pt is lying in bed, NAD  HEENT: normocephalic, atraumatic, normal moist oral mucosa  EYes: anicteric, PERRLA, pupils midline  Neck: supple, no carotid bruits  Chest: CTA b/l no wheezing or rales  Heart: regular rate and rhythm, no murmurs  Abd: soft, non-tender, non distended  Extremtiies: no edema  Skin: no rashes or lesions  Neuro  General: pt is lethargic, his eyes will open to noxious stimuli, but he does not follow commands, no verbal output.     CN: visual fields show blink to threat, he has a right surgical pupil, left eye has ptosis but his eye deviates in (esotopria?), no facial asymmetry  DTR: 2+ throughout  Plantar reflexes: downgoing b/l    Minimally withdrawal to pain in all 4 extremities.      OBJECTIVE:  Brain MRI:  1. Age-related cerebral atrophy.  2. Mild periventricular white matter changes consistent with chronic microvascular ischemic gliosis.    EEG: Pending    MEDICATIONS:  Current Facility-Administered Medications   Medication Dose   • vancomycin 900 mg in  mL IVPB  15 mg/kg   • dextrose 5 % and 0.45 % NaCl with KCl 20 mEq     • famotidine (PEPCID) tablet 20 mg  20 mg   • enoxaparin (LOVENOX) inj 40 mg  40 mg   • magnesium sulfate IVPB premix 4 g  4 g   • tacrolimus (PROGRAF) 0.5 mg/mL oral suspension 0.5 mg  0.5 mg   • acyclovir (ZOVIRAX) tablet 400 mg  400 mg   • predniSONE (DELTASONE) tablet 30 mg  30 mg   • ursodiol (ACTIGALL) capsule 300 mg  300 mg   • voriconazole (VFEND) tablet 100 mg  100 mg    And   • voriconazole (VFEND) tablet 200 mg  200 mg   • NS (BOLUS) infusion 1,000 mL  1,000 mL   • micafungin (MYCAMINE) 100 mg in D5W 100 mL ivpb  100 mg   •  "meropenem (MERREM) 500 mg in  mL IVPB  500 mg   • MD ALERT... vancomycin per pharmacy protocol     • aspirin (ASA) suppository 600 mg  600 mg       Blood pressure 159/94, pulse 115, temperature 38 °C (100.4 °F), resp. rate 21, height 1.554 m (5' 1.2\"), weight 57 kg (125 lb 10.6 oz), SpO2 100 %.        Recent Labs      07/28/17   1055  07/29/17   0400   WBC  8.5  6.5   RBC  3.81*  3.06*   HEMOGLOBIN  11.5*  9.0*   HEMATOCRIT  34.3*  27.2*   MCV  90.0  89.2   MCH  30.2  29.4   MCHC  33.5*  33.0*   RDW  54.6*  54.6*   PLATELETCT  85*  65*   MPV  11.5  12.4     Recent Labs      07/28/17   1057  07/29/17   0001  07/29/17   0400  07/29/17   0645   SODIUM  142  143  147*   --    POTASSIUM  3.7  3.3*  3.9  4.4   CHLORIDE  109  115*  119*   --    CO2  19*  19*  19*   --    GLUCOSE  96  109*  123*   --    BUN  31*  24*  26*   --        Results for orders placed or performed during the hospital encounter of 04/28/17   ECHOCARDIOGRAM COMP W/O CONT   Result Value Ref Range    Eject.Frac. MOD BP 75.08     Eject.Frac. MOD 4C 76     Eject.Frac. MOD 2C 75     Left Ventrical Ejection Fraction 70         ASSESSMENT AND PLAN:   71-year-old male with alteration of mental status likely due to infective encephalopathy. Infectious workup is ongoing. His brain MRI was unremarkable. EEGs pending to rule out subclinical seizure. Continue supportive care.      "

## 2017-07-29 NOTE — PROGRESS NOTES
[facility] Hospitalist Progress Note    Date of Service: 2017    Chief Complaint  71 y.o. male admitted 2017 with CML status post recent bone marrow transplant admitted with altered mentation and high-grade fever    Interval Problem Update  Seen in the ICU remains confused with high-grade fever    Consultants/Specialty  Infectious disease and neurology    Disposition  To be determined        Review of Systems   Unable to perform ROS: mental acuity      Physical Exam  Laboratory/Imaging   Hemodynamics  Temp (24hrs), Av.1 °C (102.3 °F), Min:37.2 °C (99 °F), Max:40.7 °C (105.3 °F)   Temperature: (!) 39.2 °C (102.6 °F), Monitored Temp: 39 °C (102.2 °F)  Pulse  Av.4  Min: 60  Max: 156 Heart Rate (Monitored): (!) 126  Blood Pressure : 159/94 mmHg, NIBP: 136/77 mmHg      Respiratory      Respiration: (!) 23, Pulse Oximetry: 98 %             Fluids    Intake/Output Summary (Last 24 hours) at 17 1509  Last data filed at 17 1400   Gross per 24 hour   Intake   3265 ml   Output   2125 ml   Net   1140 ml       Nutrition  No orders of the defined types were placed in this encounter.     Physical Exam   Constitutional: He appears well-developed.   Thin male   HENT:   Head: Normocephalic and atraumatic.   Mouth/Throat: No oropharyngeal exudate.   Eyes: Conjunctivae are normal. Pupils are equal, round, and reactive to light. Right eye exhibits no discharge. Left eye exhibits no discharge. No scleral icterus.   Neck: Neck supple. No JVD present. No tracheal deviation present.   Cardiovascular: Normal rate and regular rhythm.  Exam reveals no gallop and no friction rub.    No murmur heard.  Pulmonary/Chest: Effort normal. No stridor. No respiratory distress. He has no wheezes. He has rales. He exhibits no tenderness.   Abdominal: Soft. Bowel sounds are normal. He exhibits no distension. There is no tenderness. There is no rebound.   Musculoskeletal: He exhibits edema. He exhibits no tenderness.    Neurological: No cranial nerve deficit. He exhibits normal muscle tone.   Oriented to self only   Skin: Skin is warm and dry. He is not diaphoretic. No cyanosis or erythema. Nails show no clubbing.   Psychiatric: Cognition and memory are impaired. He is noncommunicative.   Nursing note and vitals reviewed.      Recent Labs      07/28/17   1055  07/29/17   0400   WBC  8.5  6.5   RBC  3.81*  3.06*   HEMOGLOBIN  11.5*  9.0*   HEMATOCRIT  34.3*  27.2*   MCV  90.0  89.2   MCH  30.2  29.4   MCHC  33.5*  33.0*   RDW  54.6*  54.6*   PLATELETCT  85*  65*   MPV  11.5  12.4     Recent Labs      07/28/17   1057  07/29/17   0001  07/29/17   0400  07/29/17   0645   SODIUM  142  143  147*   --    POTASSIUM  3.7  3.3*  3.9  4.4   CHLORIDE  109  115*  119*   --    CO2  19*  19*  19*   --    GLUCOSE  96  109*  123*   --    BUN  31*  24*  26*   --    CREATININE  1.17  1.05  1.01   --    CALCIUM  9.7  8.3*  8.2*   --      Recent Labs      07/28/17   1055   APTT  29.2   INR  1.17*                  Assessment/Plan     * Sepsis (CMS-HCC)  Assessment & Plan  This is severe sepsis with the following associated acute organ dysfunction(s): metabolic/septic encephalopathy.     Source is not clear: Possibly pneumonia Continue broad-spectrum antibiotics and antifungal and antivirals given recent bone marrow transplant    Follow up on cultures  Infectious disease following    CML (chronic myelocytic leukemia)-Accelerated phase (present on admission)  Assessment & Plan  Status post bone marrow transplant    Thrombocytopenia (CMS-[facility]) (present on admission)  Assessment & Plan  Monitor CBC    Altered mental state  Assessment & Plan  Likely secondary to metabolic encephalopathy from sepsis  MRI negative  Follow up on EEG  Neurology following    Stem cells transplant status (CMS-HCC)  Assessment & Plan  Continue prednisone and Prograf    Hypomagnesemia  Assessment & Plan  Replete and monitor    Severe protein-calorie malnutrition  (CMS-HCC)  Assessment & Plan  Start tube feeding until mental status improved and patient can tolerate oral intake    Elevated LFTs  Assessment & Plan  Check ultrasound  Abdominal exam is benign        Overall prognosis is extremely guarded   Labs reviewed, Radiology images reviewed and Medications reviewed  Mahajan catheter: Critically Ill - Requiring Accurate Measurement of Urinary Output  Central line in place: Need for access    DVT Prophylaxis: Enoxaparin (Lovenox)      Antibiotics: Treating active infection/contamination beyond 24 hours perioperative coverage

## 2017-07-29 NOTE — CONSULTS
ADULT INFECTIOUS DISEASE CONSULT     Date of Service: 7/28/17    Consult Requested By: Mary Pike M.D.    Reason for Consultation: Fevers and altered mental status    History of Present Illness:   Benjamin Post is a 71 y.o.  male with history of CML, recent MSSA sepsis in June 2017. He underwent marrow transplant at [facility] about a month ago. He was discharged from [facility] yesterday. Apparently last night he became altered. Fever was noted this morning. Hence he was brought into the emergency room. In the ER he had a fever of 105.5. LP was done in the ER which showed 2 WBCs. CT scan of the head was negative as well as the MRI of the brain. X-ray shows minimal basilar atelectasis. In view of these high fevers and his immunocompromised status infectious disease consult has been called.    Review Of Systems:  Not obtained because of patient's mental status    PMH:   Past Medical History   Diagnosis Date   • Hypertension    • Cancer (CMS-HCC)    • Indigestion    • Heart burn          PSH:  Past Surgical History   Procedure Laterality Date   • Bone marrow aspiration  11/5/2015     Procedure: BONE MARROW ASPIRATION;  Surgeon: Rosita Bolton M.D.;  Location: [facility];  Service:    • Bone marrow biopsy, ndl/trocar  11/5/2015     Procedure: BONE MARROW BIOPSY, NDL/TROCAR;  Surgeon: Rosita Bolton M.D.;  Location: [facility];  Service:    • Bone marrow biopsy, ndl/trocar  3/23/2016     Procedure: BONE MARROW BIOPSY, NDL/TROCAR;  Surgeon: Fili Prakash M.D.;  Location: [facility];  Service:    • Bone marrow aspiration  3/23/2016     Procedure: BONE MARROW ASPIRATION;  Surgeon: Fili Prakash M.D.;  Location: [facility];  Service:    • Bone marrow biopsy, ndl/trocar  7/12/2016     Procedure: BONE MARROW BIOPSY, NDL/TROCAR;  Surgeon: Fili Prakash M.D.;  Location: [facility];  Service:    • Bone marrow aspiration   7/12/2016     Procedure: BONE MARROW ASPIRATION;  Surgeon: Fili Prakash M.D.;  Location: ENDOSCOPY [facility];  Service:    • Dental extraction(s) N/A 3/25/2017     Procedure: DENTAL EXTRACTION(S);  Surgeon: Surendra Barragan D.M.D.,MLAKIA;  Location: SURGERY [facility];  Service:    • Submandible abscess incision and drainage N/A 3/25/2017     Procedure:  INTRAORAL  ABSCESS INCISION AND DRAINAGE;  Surgeon: Surendra Barragan D.M.D.,MLAKIA;  Location: SURGERY [facility];  Service:        FAMILY HX:  No family history on file.    SOCIAL HX:  Social History     Social History   • Marital Status: Legally      Spouse Name: N/A   • Number of Children: N/A   • Years of Education: N/A     Occupational History   • Not on file.     Social History Main Topics   • Smoking status: Never Smoker    • Smokeless tobacco: Not on file   • Alcohol Use: No   • Drug Use: No   • Sexual Activity: Not on file     Other Topics Concern   • Not on file     Social History Narrative     History   Smoking status   • Never Smoker    Smokeless tobacco   • Not on file     History   Alcohol Use No       Allergies/Intolerances:  No Known Allergies    History reviewed with the patient    Other Current Medications:    Current facility-administered medications:   •  acyclovir (ZOVIRAX) tablet 400 mg, 400 mg, Oral, TID, Mary Pike M.D., 400 mg at 07/28/17 1345  •  predniSONE (DELTASONE) tablet 30 mg, 30 mg, Oral, DAILY, Mary Pike M.D., Stopped at 07/28/17 1345  •  ursodiol (ACTIGALL) capsule 300 mg, 300 mg, Oral, BID, Mary Pike M.D.  •  voriconazole (VFEND) tablet 200 mg, 200 mg, Oral, Q12HRS, 200 mg at 07/28/17 1400 **AND** voriconazole (VFEND) tablet 100 mg, 100 mg, Oral, Q12HRS, Shankar Plascencia, PHARMD, 100 mg at 07/28/17 1400  •  NS (BOLUS) infusion 1,000 mL, 1,000 mL, Intravenous, Once PRN, Fili Rodriguez M.D.  •  [START ON 7/29/2017] omeprazole (PRILOSEC) capsule 20 mg, 20 mg, Oral, DAILY,  Shankar Plascencia, PHARMD  •  [START ON 2017] micafungin (MYCAMINE) 100 mg in D5W 100 mL ivpb, 100 mg, Intravenous, Q24HRS, Mary Pike M.D.  •  meropenem (MERREM) 500 mg in  mL IVPB, 500 mg, Intravenous, Q6HRS, Mary Pike M.D.  •  MD ALERT... vancomycin per pharmacy protocol, , Other, pharmacy to dose, Mary Pike M.D.  •  tacrolimus (PROGRAF) capsule 0.5 mg, 0.5 mg, Oral, TID, Mary Pike M.D.  [unfilled]    Most Recent Vital Signs:  /94 mmHg  Pulse 132  Temp(Src) 37.6 °C (99.6 °F)  Resp 20  Wt 65.772 kg (145 lb)  SpO2 99%  Temp  Av.2 °C (102.6 °F)  Min: 37.5 °C (99.5 °F)  Max: 40.8 °C (105.5 °F)    Physical Exam:  General: Looks very toxic  HEENT: sclera anicteric,  Neck: supple, no lymphadenopathy  Chest: CTAB, no r/r/w, normal work of breathing.  Cardiac: Regular, no murmurs no gallops heard. Tachycardic  Abdomen: + bowel sounds, soft, non-tender, non-distended, no HSM  Extremities: No edema. No joint swelling. PICC line in the right upper extremity  Skin: no rashes or erythema  Neuro: Eyes are open. He is not following any commands    Pertinent Lab Results:  Recent Labs      17   1055   WBC  8.5      Recent Labs      17   1055   HEMOGLOBIN  11.5*   HEMATOCRIT  34.3*   MCV  90.0   MCH  30.2   MACROCYTOSIS  1+   ANISOCYTOSIS  1+   PLATELETCT  85*         Recent Labs      17   1057   SODIUM  142   POTASSIUM  3.7   CHLORIDE  109   CO2  19*   CREATININE  1.17        Recent Labs      17   1057   ALBUMIN  3.8        Pertinent Micro:  Results     Procedure Component Value Units Date/Time    CSF CULTURE [[id number]] Collected:  17 1325    Order Status:  Completed Specimen Information:  CSF from Tap Updated:  17 3497    Narrative:      Special Contact Isolation    GRAM STAIN [[id number]] Collected:  17 1325    Order Status:  Completed Specimen Information:  CSF Updated:  17 0280     Significant Indicator .       Source CSF      Site TAP      Gram Stain Result No organisms seen.     Narrative:      Special Contact Isolation    BLOOD CULTURE [[id number]] Collected:  07/28/17 1056    Order Status:  Completed Specimen Information:  Blood from Line Updated:  07/28/17 1139    Narrative:      Protective  Blood Cultures X2 Draw one blood culture from central line  and one blood culture peripherally. If no central line  present draw blood cultures times two peripherally    Urinalysis [[id number]]  (Abnormal) Collected:  07/28/17 1109    Order Status:  Completed Specimen Information:  Urine from Urine, Clean Catch Updated:  07/28/17 1134     Color Yellow      Character Clear      Specific Gravity 1.017      Ph 5.5      Glucose Negative mg/dL      Ketones Negative mg/dL      Protein 100 (A) mg/dL      Bilirubin Negative      Urobilinogen, Urine 0.2      Nitrite Negative      Leukocyte Esterase Negative      Occult Blood Trace (A)      Micro Urine Req Microscopic     Narrative:      Protective    Urine culture [[id number]] Collected:  07/28/17 1109    Order Status:  Completed Specimen Information:  Urine from Urine, Clean Catch Updated:  07/28/17 1120    Narrative:      Protective  Indication for culture:->Emergency Room Patient    BLOOD CULTURE [[id number]] Collected:  07/28/17 1055    Order Status:  Completed Specimen Information:  Blood from Peripheral Updated:  07/28/17 1106    Narrative:      Protective  Blood Cultures X2 Draw one blood culture from central line  and one blood culture peripherally. If no central line  present draw blood cultures times two peripherally    Culture Respiratory W/ GRM STN [[id number]]     Order Status:  Sent Specimen Information:  Respirate from Sputum         BLOOD CULTURE   Date Value Ref Range Status   06/10/2017 No growth after 5 days of incubation.  Final   06/10/2017 No growth after 5 days of incubation.  Final        Studies:  Ct-head W/o    7/28/2017 7/28/2017 2:31 PM HISTORY/REASON FOR EXAM:   Recent intracranial hemorrhage.. TECHNIQUE/EXAM DESCRIPTION AND NUMBER OF VIEWS: CT of the head without contrast. Up to date radiation dose reduction adjustments have been utilized to meet ALARA standards for radiation dose reduction. COMPARISON:  6/7/2017 FINDINGS: No acute hemorrhage, mass-effect, or midline shift.   Previously described isodense collection adjacent to the right cerebral hemisphere is not well visualized on the current exam. No acute ischemia or masses identified. There is diffuse atrophy. Ventricles and cisterns are patent. Periventricular white matter changes are consistent with chronic small vessel disease.  The paranasal sinuses and mastoid air cells are clear.     7/28/2017  1.  No acute intracranial findings. 2.  Diffuse atrophy and periventricular white matter changes, consistent with chronic small vessel disease.     Dx-chest-portable (1 View)    7/28/2017 7/28/2017 11:17 AM HISTORY/REASON FOR EXAM:  Neutropenic. TECHNIQUE/EXAM DESCRIPTION AND NUMBER OF VIEWS: Single portable view of the chest. COMPARISON: 6/13/2017 FINDINGS: The cardiomediastinal silhouette is unremarkable. There is mild bilateral basilar interstitial prominence. There is no focal consolidation. There is no evidence of large pleural effusion or pneumothorax. Imaged osseous structures are unremarkable.     7/28/2017  Mild bilateral basilar interstitial prominence which can be seen in setting of edema and/or infection.    Mr-brain-w/o    7/28/2017 7/28/2017 1:47 PM HISTORY/REASON FOR EXAM:  Recent stem cell transplant. Currently with high fevers and difficulty speaking and seizures TECHNIQUE/EXAM DESCRIPTION:  MRI of the brain without and with contrast. T1 sagittal, T2 fast spin-echo axial, T1 coronal, FLAIR coronal, Diffusion weighted and Apparent Diffusion Coefficient (ADC map) axial images were obtained of the whole brain. T1 post-contrast axial and T1 post-contrast coronal images were obtained. The scan was performed  on a 1.5 Jill [employer] Signa MRI scanner. COMPARISON: 6/9/2017 FINDINGS: The calvariae are normal.  There are no extra-axial fluid collections. The ventricular system is mild to moderately prominent. There is mild-to-moderate prominence of the cortical sulci and gyri.  There are a few scattered punctate and patchy areas of increased T2 signal intensity in the periventricular white matter.  There  are no mass effects or shift of midline structures. The diffusion weighted images are unremarkable. There are no hemorrhagic lesions.  The brainstem and posterior fossa structures are unremarkable. Vascular flow voids in the carotid and vertebrobasilar arteries, [place], and dural venous sinuses are intact. The paranasal sinuses and mastoid air cells are free of mucosal inflammatory change.     7/28/2017  1. Age-related cerebral atrophy. 2. Mild periventricular white matter changes consistent with chronic microvascular ischemic gliosis.  IMPRESSION:     Sepsis-? Line related  Recent bone marrow transplant  History of MSSA sepsis  Possible pneumonia    PLAN:   Benjamin Post is a 71 y.o.  male with history of CML and recent bone marrow transplant admitted with altered mental status and high fevers.  Sources possibly pneumonia versus line versus opportunistic infection  Check CMV  Transfer the patient to intensive care unit  Neurology evaluation  Broad spectrum antibiotics started per transplant team  Hold off the IV tobramycin for now  Follow-up the culture results  Prognosis guarded  I reviewed all the records      Discussed with IM. Will continue to follow    Larisa De La Paz M.D.

## 2017-07-29 NOTE — ASSESSMENT & PLAN NOTE
Likely secondary to metabolic encephalopathy from sepsis  MRI negative  Follow up on EEG  Neurology following

## 2017-07-29 NOTE — CARE PLAN
Problem: Communication  Goal: The ability to communicate needs accurately and effectively will improve  Outcome: PROGRESSING SLOWER THAN EXPECTED  Patient arrived to unit, not responding to commands.  Patient does moves his eyes when opened, attempts to suck on the suction catheter with oral care, withdraws from noxious stimulii, but follows no commands.  WIll continue to encourage appropriate response to communication  Intervention: Use communication aids and/or /Language Line as appropriate  Unsure if patient has difficulty with history-no family available to mark anthony in historical data.  Patient is of  decent; unsure of his ability to or not to communicate in English.  WIll continue to attempt commicate with patient verbally and with non-verbal gestures.  Intervention: Develop alternate methods of communication with patient and significant other/support system  Watch non-verbal cues, use of gestures, pointing or other means patient may eventually use.  Intervention: Collaborate with speech therapy  As patient improves, will need speech therapy consult for swallow study and /or verbalization.      Problem: Safety  Goal: Will remain free from injury  Outcome: PROGRESSING AS EXPECTED  Goal: Will remain free from falls  Outcome: PROGRESSING AS EXPECTED    Problem: Venous Thromboembolism (VTW)/Deep Vein Thrombosis (DVT) Prevention:  Goal: Patient will participate in Venous Thrombosis (VTE)/Deep Vein Thrombosis (DVT)Prevention Measures  Outcome: PROGRESSING AS EXPECTED

## 2017-07-29 NOTE — PROGRESS NOTES
Patient transported from T719 to T625 for higher level of care. Patient transported via hospital bed with ICU RNs, chart, medications, and belongings transported with patient. Patient connected to transport monitor and vital signs stable. Patient transferred to ICU bed and connected to unit monitors. Temp 40.4 temporal, lopez temp placed, antibiotics running, cooling blanket ordered.

## 2017-07-30 ENCOUNTER — APPOINTMENT (OUTPATIENT)
Dept: RADIOLOGY | Facility: MEDICAL CENTER | Age: 72
DRG: 871 | End: 2017-07-30
Attending: HOSPITALIST
Payer: MEDICARE

## 2017-07-30 VITALS
RESPIRATION RATE: 22 BRPM | DIASTOLIC BLOOD PRESSURE: 94 MMHG | OXYGEN SATURATION: 98 % | SYSTOLIC BLOOD PRESSURE: 159 MMHG | HEART RATE: 98 BPM | WEIGHT: 125.66 LBS | TEMPERATURE: 99.9 F | BODY MASS INDEX: 23.73 KG/M2 | HEIGHT: 61 IN

## 2017-07-30 LAB
ALBUMIN SERPL BCP-MCNC: 2.5 G/DL (ref 3.2–4.9)
ALBUMIN/GLOB SERPL: 1.2 G/DL
ALP SERPL-CCNC: 194 U/L (ref 30–99)
ALT SERPL-CCNC: 103 U/L (ref 2–50)
AMMONIA PLAS-SCNC: 35 UMOL/L (ref 11–45)
ANION GAP SERPL CALC-SCNC: 8 MMOL/L (ref 0–11.9)
ANISOCYTOSIS BLD QL SMEAR: ABNORMAL
AST SERPL-CCNC: 119 U/L (ref 12–45)
BACTERIA UR CULT: NORMAL
BASOPHILS # BLD AUTO: 0 % (ref 0–1.8)
BASOPHILS # BLD: 0 K/UL (ref 0–0.12)
BILIRUB SERPL-MCNC: 0.5 MG/DL (ref 0.1–1.5)
BUN SERPL-MCNC: 33 MG/DL (ref 8–22)
CALCIUM SERPL-MCNC: 7.5 MG/DL (ref 8.5–10.5)
CHLORIDE SERPL-SCNC: 110 MMOL/L (ref 96–112)
CO2 SERPL-SCNC: 18 MMOL/L (ref 20–33)
CREAT SERPL-MCNC: 1.1 MG/DL (ref 0.5–1.4)
EOSINOPHIL # BLD AUTO: 0 K/UL (ref 0–0.51)
EOSINOPHIL NFR BLD: 0 % (ref 0–6.9)
ERYTHROCYTE [DISTWIDTH] IN BLOOD BY AUTOMATED COUNT: 57.6 FL (ref 35.9–50)
GFR SERPL CREATININE-BSD FRML MDRD: >60 ML/MIN/1.73 M 2
GLOBULIN SER CALC-MCNC: 2.1 G/DL (ref 1.9–3.5)
GLUCOSE BLD-MCNC: 168 MG/DL (ref 65–99)
GLUCOSE SERPL-MCNC: 396 MG/DL (ref 65–99)
HCT VFR BLD AUTO: 23.8 % (ref 42–52)
HGB BLD-MCNC: 7.8 G/DL (ref 14–18)
LYMPHOCYTES # BLD AUTO: 0.06 K/UL (ref 1–4.8)
LYMPHOCYTES NFR BLD: 0.9 % (ref 22–41)
MACROCYTES BLD QL SMEAR: ABNORMAL
MAGNESIUM SERPL-MCNC: 2.2 MG/DL (ref 1.5–2.5)
MANUAL DIFF BLD: NORMAL
MCH RBC QN AUTO: 29.9 PG (ref 27–33)
MCHC RBC AUTO-ENTMCNC: 32.8 G/DL (ref 33.7–35.3)
MCV RBC AUTO: 91.2 FL (ref 81.4–97.8)
MONOCYTES # BLD AUTO: 0.06 K/UL (ref 0–0.85)
MONOCYTES NFR BLD AUTO: 0.9 % (ref 0–13.4)
MORPHOLOGY BLD-IMP: NORMAL
NEUTROPHILS # BLD AUTO: 6.78 K/UL (ref 1.82–7.42)
NEUTROPHILS NFR BLD: 98.2 % (ref 44–72)
NRBC # BLD AUTO: 0.05 K/UL
NRBC BLD AUTO-RTO: 0.7 /100 WBC
PLATELET # BLD AUTO: 54 K/UL (ref 164–446)
PLATELET BLD QL SMEAR: NORMAL
PMV BLD AUTO: 13.1 FL (ref 9–12.9)
POTASSIUM SERPL-SCNC: 3.2 MMOL/L (ref 3.6–5.5)
PROT SERPL-MCNC: 4.6 G/DL (ref 6–8.2)
RBC # BLD AUTO: 2.61 M/UL (ref 4.7–6.1)
RBC BLD AUTO: PRESENT
SIGNIFICANT IND 70042: NORMAL
SITE SITE: NORMAL
SODIUM SERPL-SCNC: 136 MMOL/L (ref 135–145)
SOURCE SOURCE: NORMAL
VANCOMYCIN TROUGH SERPL-MCNC: 24.7 UG/ML (ref 10–20)
WBC # BLD AUTO: 6.9 K/UL (ref 4.8–10.8)

## 2017-07-30 PROCEDURE — 82962 GLUCOSE BLOOD TEST: CPT

## 2017-07-30 PROCEDURE — 99239 HOSP IP/OBS DSCHRG MGMT >30: CPT | Performed by: HOSPITALIST

## 2017-07-30 PROCEDURE — 85027 COMPLETE CBC AUTOMATED: CPT

## 2017-07-30 PROCEDURE — A9270 NON-COVERED ITEM OR SERVICE: HCPCS | Performed by: HOSPITALIST

## 2017-07-30 PROCEDURE — 700105 HCHG RX REV CODE 258: Performed by: HOSPITALIST

## 2017-07-30 PROCEDURE — 700105 HCHG RX REV CODE 258

## 2017-07-30 PROCEDURE — 82140 ASSAY OF AMMONIA: CPT

## 2017-07-30 PROCEDURE — A9270 NON-COVERED ITEM OR SERVICE: HCPCS

## 2017-07-30 PROCEDURE — 80202 ASSAY OF VANCOMYCIN: CPT

## 2017-07-30 PROCEDURE — 700102 HCHG RX REV CODE 250 W/ 637 OVERRIDE(OP)

## 2017-07-30 PROCEDURE — 700102 HCHG RX REV CODE 250 W/ 637 OVERRIDE(OP): Performed by: HOSPITALIST

## 2017-07-30 PROCEDURE — 700111 HCHG RX REV CODE 636 W/ 250 OVERRIDE (IP)

## 2017-07-30 PROCEDURE — 700111 HCHG RX REV CODE 636 W/ 250 OVERRIDE (IP): Performed by: HOSPITALIST

## 2017-07-30 PROCEDURE — 83735 ASSAY OF MAGNESIUM: CPT

## 2017-07-30 PROCEDURE — 80053 COMPREHEN METABOLIC PANEL: CPT

## 2017-07-30 PROCEDURE — 85007 BL SMEAR W/DIFF WBC COUNT: CPT

## 2017-07-30 PROCEDURE — 306595 SYSTEM,FEED TUBE CLOG ZAPPER: Performed by: HOSPITALIST

## 2017-07-30 PROCEDURE — 78226 HEPATOBILIARY SYSTEM IMAGING: CPT

## 2017-07-30 RX ORDER — SODIUM CHLORIDE 9 MG/ML
INJECTION, SOLUTION INTRAVENOUS CONTINUOUS
Status: DISCONTINUED | OUTPATIENT
Start: 2017-07-30 | End: 2017-07-30 | Stop reason: HOSPADM

## 2017-07-30 RX ORDER — ACETAMINOPHEN 325 MG/1
650 TABLET ORAL EVERY 4 HOURS PRN
Qty: 30 TAB | Refills: 0
Start: 2017-07-30

## 2017-07-30 RX ORDER — POTASSIUM CHLORIDE 20 MEQ/1
40 TABLET, EXTENDED RELEASE ORAL 3 TIMES DAILY
Status: DISCONTINUED | OUTPATIENT
Start: 2017-07-30 | End: 2017-07-30 | Stop reason: HOSPADM

## 2017-07-30 RX ORDER — POTASSIUM CHLORIDE 20 MEQ/1
40 TABLET, EXTENDED RELEASE ORAL ONCE
Status: COMPLETED | OUTPATIENT
Start: 2017-07-30 | End: 2017-07-30

## 2017-07-30 RX ORDER — SODIUM CHLORIDE 9 MG/ML
INJECTION, SOLUTION INTRAVENOUS
Start: 2017-07-30

## 2017-07-30 RX ADMIN — ACETAMINOPHEN 650 MG: 325 TABLET, FILM COATED ORAL at 09:09

## 2017-07-30 RX ADMIN — SODIUM CHLORIDE: 9 INJECTION, SOLUTION INTRAVENOUS at 05:07

## 2017-07-30 RX ADMIN — ACYCLOVIR 400 MG: 800 TABLET ORAL at 07:17

## 2017-07-30 RX ADMIN — MEROPENEM 500 MG: 500 INJECTION, POWDER, FOR SOLUTION INTRAVENOUS at 00:05

## 2017-07-30 RX ADMIN — POTASSIUM CHLORIDE 40 MEQ: 1500 TABLET, EXTENDED RELEASE ORAL at 13:03

## 2017-07-30 RX ADMIN — MEROPENEM 500 MG: 500 INJECTION, POWDER, FOR SOLUTION INTRAVENOUS at 05:32

## 2017-07-30 RX ADMIN — FAMOTIDINE 20 MG: 20 TABLET, FILM COATED ORAL at 08:00

## 2017-07-30 RX ADMIN — ACETAMINOPHEN 650 MG: 325 TABLET, FILM COATED ORAL at 00:05

## 2017-07-30 RX ADMIN — VORICONAZOLE 100 MG: 50 TABLET, FILM COATED ORAL at 08:01

## 2017-07-30 RX ADMIN — MEROPENEM 500 MG: 500 INJECTION, POWDER, FOR SOLUTION INTRAVENOUS at 12:10

## 2017-07-30 RX ADMIN — VORICONAZOLE 200 MG: 200 TABLET, FILM COATED ORAL at 08:00

## 2017-07-30 RX ADMIN — ACETAMINOPHEN 650 MG: 325 TABLET, FILM COATED ORAL at 05:07

## 2017-07-30 RX ADMIN — PREDNISONE 30 MG: 10 TABLET ORAL at 08:00

## 2017-07-30 RX ADMIN — URSODIOL 300 MG: 300 CAPSULE ORAL at 08:00

## 2017-07-30 RX ADMIN — MICAFUNGIN SODIUM 100 MG: 20 INJECTION, POWDER, LYOPHILIZED, FOR SOLUTION INTRAVENOUS at 08:01

## 2017-07-30 RX ADMIN — TACROLIMUS 0.5 MG: 5 CAPSULE ORAL at 08:09

## 2017-07-30 RX ADMIN — VANCOMYCIN HYDROCHLORIDE 900 MG: 100 INJECTION, POWDER, LYOPHILIZED, FOR SOLUTION INTRAVENOUS at 10:46

## 2017-07-30 NOTE — WOUND TEAM
[facility]  Inpatient Services  Initial Wound and Skin Care Evaluation    Admission Date:  07/28/17     HPI, PMH, SH: Reviewed  Unit where seen by Wound Team:  [facility]     WOUND CONSULT RELATED TO:  Left elbow      SUBJECTIVE:  Non-verbal at time of assessment.       Self Report / Pain Level:  No complaints of pain      OBJECTIVE:  In bed, wound open to air.    WOUND TYPE, LOCATION, CHARACTERISTICS (Pressure ulcers: location, stage, POA or date identified)    Location and type of wound:  Left elbow, sDTI, POA          Periwound:  Intact       Drainage:   None    Tissue Type and %:  100% purple    Wound Edges:  Attached    Odor:    None    Exposed structure(s): None   S&S of Infection:  None       Measurements:  (Taken 07/29/17)   Length:   1.5 cm    Width:    1.5 cm    Depth:   ANDRIY     INTERVENTIONS BY WOUND TEAM:  Left elbow assessed and left open to air.  Continue to off load and protect.     Interdisciplinary consultation:  RN, patient      EVALUATION:  Patient may have sDTI to left elbow.  Not clear if definite sDTI due to coloring of patients skin.  Continue to monitor and off load.         Factors affecting wound healing:  Sepsis, malnutrition, anemia, DM      Goals:  Decrease in wound size by 1% each week.     NURSING PLAN OF CARE ORDERS (X):    Dressing changes: See Dressing Maintenance orders:   Skin care: See Skin Care orders: X  Rectal tube care: See Rectal Tube Care orders:   Other orders:    RSKIN: CURRENT (X) ORDERED (O)  Q shift Max:  X  Q shift pressure point assessments:  X  Pressure redistribution mattress        YOHANA    X  Bariatric YOHANA      Bariatric foam        Heel float boots       Heels floated on pillows    X  Barrier wipes      Barrier Cream      Barrier paste      Sacral silicone dressing    PRN  Silicone O2 tubing      Anchorfast      Trach with Optifoam split foam       Waffle cushion      Rectal tube or BMS      Antifungal tx    Turn q 2 hours   X  Up to chair     Ambulate    PT/OT     Dietician      PO  X  TF   TPN     PVN    NPO   # days   Other       WOUND TEAM PLAN OF CARE (X):   NPWT change 3 x week:        Dressing changes by wound team:       Follow up as needed:     X  Other (explain):    Anticipated discharge plans (X):  SNF:         X  Home Care:           Outpatient Wound Center:            Self Care:            Other:

## 2017-07-30 NOTE — CARE PLAN
Problem: Infection  Goal: Will remain free from infection  Intervention: Implement standard precautions and perform hand washing before and after patient contact  Preformed hand washing before and after patient care. Followed isolation precautions. Enforced isolation precautions and hand washing to all who entered patient room. Educated patient on isolation precautions. No evidence of learning.       Problem: Skin Integrity  Goal: Risk for impaired skin integrity will decrease  Intervention: Implement precautions to protect skin integrity in collaboration with the interdisciplinary team  Assessed patient skin continuously. Implemented pillows for support and Q2 turns. Float heel boots in place. Educated patient on skin integrity however no evidence of learning. Will continue to monitor.

## 2017-07-30 NOTE — PROGRESS NOTES
NEUROLOGY PROGRESS NOTE      Background:  71 y.o. male was admitted on 7/28/2017 10:34 AM for Sepsis (CMS-HCC)  Stem cells transplant status (CMS-[facility])   He is being followed by Neurology for alteration of mental status    SUBJECTIVE:   Much more alert and communicative. Follows command and moves all 4 ext.    EXAM:   General: pt is lying in bed, NAD  HEENT: normocephalic, atraumatic, normal moist oral mucosa  EYes: anicteric, PERRLA, pupils midline  Neck: supple, no carotid bruits  Chest: CTA b/l no wheezing or rales  Heart: regular rate and rhythm, no murmurs  Abd: soft, non-tender, non distended  Extremtiies: no edema  Skin: no rashes or lesions  Neuro  General: Pt is alert and able to follow command.  CN: visual fields show blink to threat, he has a right surgical pupil, left eye has ptosis but his eye deviates in (esotopria?), no facial asymmetry  DTR: 2+ throughout  Plantar reflexes: downgoing b/l    moves all 4 ext.    OBJECTIVE:  Brain MRI:  1. Age-related cerebral atrophy.  2. Mild periventricular white matter changes consistent with chronic microvascular ischemic gliosis.    EEG: Pending    MEDICATIONS:  Current Facility-Administered Medications   Medication Dose   • NS infusion     • potassium chloride SA (Kdur) tablet 40 mEq  40 mEq   • potassium chloride SA (Kdur) tablet 40 mEq  40 mEq   • vancomycin 900 mg in  mL IVPB  15 mg/kg   • famotidine (PEPCID) tablet 20 mg  20 mg   • enoxaparin (LOVENOX) inj 40 mg  40 mg   • tacrolimus (PROGRAF) 0.5 mg/mL oral suspension 0.5 mg  0.5 mg   • acetaminophen (TYLENOL) tablet 650 mg  650 mg    Or   • acetaminophen (TYLENOL) suppository 650 mg  650 mg   • acyclovir (ZOVIRAX) tablet 400 mg  400 mg   • predniSONE (DELTASONE) tablet 30 mg  30 mg   • ursodiol (ACTIGALL) capsule 300 mg  300 mg   • voriconazole (VFEND) tablet 100 mg  100 mg    And   • voriconazole (VFEND) tablet 200 mg  200 mg   • NS (BOLUS) infusion 1,000 mL  1,000 mL   • micafungin (MYCAMINE) 100 mg  "in D5W 100 mL ivpb  100 mg   • meropenem (MERREM) 500 mg in  mL IVPB  500 mg   • MD ALERT... vancomycin per pharmacy protocol         Blood pressure 159/94, pulse 117, temperature 39 °C (102.2 °F), resp. rate 26, height 1.555 m (5' 1.22\"), weight 57 kg (125 lb 10.6 oz), SpO2 96 %.        Recent Labs      07/28/17   1055  07/29/17   0400  07/30/17   0323   WBC  8.5  6.5  6.9   RBC  3.81*  3.06*  2.61*   HEMOGLOBIN  11.5*  9.0*  7.8*   HEMATOCRIT  34.3*  27.2*  23.8*   MCV  90.0  89.2  91.2   MCH  30.2  29.4  29.9   MCHC  33.5*  33.0*  32.8*   RDW  54.6*  54.6*  57.6*   PLATELETCT  85*  65*  54*   MPV  11.5  12.4  13.1*     Recent Labs      07/29/17   0001  07/29/17   0400  07/29/17   0645  07/30/17   0037   SODIUM  143  147*   --   136   POTASSIUM  3.3*  3.9  4.4  3.2*   CHLORIDE  115*  119*   --   110   CO2  19*  19*   --   18*   GLUCOSE  109*  123*   --   396*   BUN  24*  26*   --   33*       Results for orders placed or performed during the hospital encounter of 04/28/17   ECHOCARDIOGRAM COMP W/O CONT   Result Value Ref Range    Eject.Frac. MOD BP 75.08     Eject.Frac. MOD 4C 76     Eject.Frac. MOD 2C 75     Left Ventrical Ejection Fraction 70         ASSESSMENT AND PLAN:   71-year-old male with alteration of mental status likely due to infective encephalopathy. Infectious workup is ongoing. His brain MRI was unremarkable. Much improved. Continue supportive care.      "

## 2017-07-30 NOTE — CARE PLAN
Problem: Communication  Goal: The ability to communicate needs accurately and effectively will improve  Outcome: PROGRESSING SLOWER THAN EXPECTED  Educating patient on importance of effective communication. Patient still nonverbal and will not follow commands.    Problem: Skin Integrity  Goal: Risk for impaired skin integrity will decrease  Outcome: PROGRESSING AS EXPECTED  Assessed patient for signs and symptoms of skin breakdown frequently, preformed Q2H turns, and kept sheet dry and clean to prevent skin breakdown.

## 2017-07-30 NOTE — PROGRESS NOTES
12 Hour Chart Check    Bedside report received from night shift RN. Patient connected to monitors, alarms set properly, no signs of pain or discomfort at this time. Cooling blanket is on the patient, temp at 38C.

## 2017-07-30 NOTE — PROGRESS NOTES
Infectious Disease Progress Note    Author: Larias De La Paz M.D. Date & Time of service: 2017  1:20 PM    Chief Complaint:  Altered mental status    Interval History:  71-year-old  male with recent bone marrow transplant admitted with altered mental status  17-fevers up to 105. Remains altered.  17-MAXIMUM TEMPERATURE 102.6. Mental status slightly improved  Labs Reviewed, Medications Reviewed and Radiology Reviewed.    Review of Systems:  Review of Systems   Unable to perform ROS: mental status change       Hemodynamics:  Temp (24hrs), Av.2 °C (100.7 °F), Min:37.2 °C (99 °F), Max:39.2 °C (102.6 °F)  Temperature: (!) 39 °C (102.2 °F)  Pulse  Av.2  Min: 53  Max: 156Heart Rate (Monitored): (!) 119  NIBP: 138/73 mmHg       Physical Exam:  Physical Exam   Constitutional: He appears cachectic. No distress.   Not responding   HENT:   Unable to evaluate  cortrak present   Eyes: No scleral icterus.   Cardiovascular: Regular rhythm.    No murmur heard.  Pulmonary/Chest: He has no wheezes. He has no rales.   Abdominal: Soft. There is no tenderness. There is no rebound.   Musculoskeletal: He exhibits no edema.   Neurological:   Moves all extremities  Slightly more responsive today  Answering simple questions   Vitals reviewed.      Meds:    Current facility-administered medications:   •  NS  •  potassium chloride SA  •  vancomycin (VANCOCIN) IVPB (maintenance dose)  •  famotidine  •  enoxaparin (LOVENOX) injection  •  tacrolimus  •  acetaminophen **OR** acetaminophen  •  acyclovir  •  predniSONE  •  ursodiol  •  voriconazole **AND** voriconazole  •  NS  •  micafungin  •  meropenem (MERREM) IV  •  MD ALERT... vancomycin    Labs:  Recent Labs      17   1055  17   0400  17   0323   WBC  8.5  6.5  6.9   RBC  3.81*  3.06*  2.61*   HEMOGLOBIN  11.5*  9.0*  7.8*   HEMATOCRIT  34.3*  27.2*  23.8*   MCV  90.0  89.2  91.2   MCH  30.2  29.4  29.9   RDW  54.6*  54.6*  57.6*   PLATELETCT   85*  65*  54*   MPV  11.5  12.4  13.1*   NEUTSPOLYS  83.90*  96.50*  98.20*   LYMPHOCYTES  2.70*  1.70*  0.90*   MONOCYTES  7.10  0.90  0.90   EOSINOPHILS  0.00  0.00  0.00   BASOPHILS  0.00  0.00  0.00   RBCMORPHOLO  Present  Present  Present     Recent Labs      07/29/17   0001  07/29/17   0400  07/29/17   0645  07/30/17   0037   SODIUM  143  147*   --   136   POTASSIUM  3.3*  3.9  4.4  3.2*   CHLORIDE  115*  119*   --   110   CO2  19*  19*   --   18*   GLUCOSE  109*  123*   --   396*   BUN  24*  26*   --   33*     Recent Labs      07/28/17   1057  07/29/17   0001  07/29/17   0400  07/30/17   0037   ALBUMIN  3.8   --   2.9*  2.5*   TBILIRUBIN  0.8   --   0.7  0.5   ALKPHOSPHAT  282*   --   246*  194*   TOTPROTEIN  6.9   --   5.4*  4.6*   ALTSGPT  79*   --   93*  103*   ASTSGOT  89*   --   128*  119*   CREATININE  1.17  1.05  1.01  1.10       Imaging:  Ct-head W/o    7/28/2017 7/28/2017 2:31 PM HISTORY/REASON FOR EXAM:  Recent intracranial hemorrhage.. TECHNIQUE/EXAM DESCRIPTION AND NUMBER OF VIEWS: CT of the head without contrast. Up to date radiation dose reduction adjustments have been utilized to meet ALARA standards for radiation dose reduction. COMPARISON:  6/7/2017 FINDINGS: No acute hemorrhage, mass-effect, or midline shift.   Previously described isodense collection adjacent to the right cerebral hemisphere is not well visualized on the current exam. No acute ischemia or masses identified. There is diffuse atrophy. Ventricles and cisterns are patent. Periventricular white matter changes are consistent with chronic small vessel disease.  The paranasal sinuses and mastoid air cells are clear.     7/28/2017  1.  No acute intracranial findings. 2.  Diffuse atrophy and periventricular white matter changes, consistent with chronic small vessel disease.     Dx-chest-portable (1 View)    7/28/2017 7/28/2017 11:17 AM HISTORY/REASON FOR EXAM:  Neutropenic. TECHNIQUE/EXAM DESCRIPTION AND NUMBER OF VIEWS: Single  portable view of the chest. COMPARISON: 6/13/2017 FINDINGS: The cardiomediastinal silhouette is unremarkable. There is mild bilateral basilar interstitial prominence. There is no focal consolidation. There is no evidence of large pleural effusion or pneumothorax. Imaged osseous structures are unremarkable.     7/28/2017  Mild bilateral basilar interstitial prominence which can be seen in setting of edema and/or infection.    Mr-brain-w/o    7/28/2017 7/28/2017 1:47 PM HISTORY/REASON FOR EXAM:  Recent stem cell transplant. Currently with high fevers and difficulty speaking and seizures TECHNIQUE/EXAM DESCRIPTION:  MRI of the brain without and with contrast. T1 sagittal, T2 fast spin-echo axial, T1 coronal, FLAIR coronal, Diffusion weighted and Apparent Diffusion Coefficient (ADC map) axial images were obtained of the whole brain. T1 post-contrast axial and T1 post-contrast coronal images were obtained. The scan was performed on a 1.5 Jill [employer] Signa MRI scanner. COMPARISON: 6/9/2017 FINDINGS: The calvariae are normal.  There are no extra-axial fluid collections. The ventricular system is mild to moderately prominent. There is mild-to-moderate prominence of the cortical sulci and gyri.  There are a few scattered punctate and patchy areas of increased T2 signal intensity in the periventricular white matter.  There  are no mass effects or shift of midline structures. The diffusion weighted images are unremarkable. There are no hemorrhagic lesions.  The brainstem and posterior fossa structures are unremarkable. Vascular flow voids in the carotid and vertebrobasilar arteries, [place], and dural venous sinuses are intact. The paranasal sinuses and mastoid air cells are free of mucosal inflammatory change.     7/28/2017  1. Age-related cerebral atrophy. 2. Mild periventricular white matter changes consistent with chronic microvascular ischemic gliosis.    Dx-abdomen For Tube Placement    7/29/2017 7/29/2017 1:05  PM HISTORY/REASON FOR EXAM:  Line evaluation. TECHNIQUE/EXAM DESCRIPTION AND NUMBER OF VIEWS:  1 view(s) of the abdomen. COMPARISON:  3/26/2017. FINDINGS: Enteric tube projects over the stomach. There is a nonspecific bowel gas pattern. There is mitral annulus calcification. Degenerative changes are seen in the spine.     7/29/2017  Enteric tube projects over the stomach.      Micro:  Results     Procedure Component Value Units Date/Time    CSF CULTURE [[id number]] Collected:  07/28/17 1325    Order Status:  Completed Specimen Information:  CSF from Tap Updated:  07/30/17 1022     Significant Indicator NEG      Source CSF      Site TAP      CSF Culture No growth at 48 hours.      Gram Stain Result No organisms seen.     Narrative:      Special Contact Isolation    Urine culture [[id number]] Collected:  07/28/17 1109    Order Status:  Completed Specimen Information:  Urine from Urine, Clean Catch Updated:  07/30/17 0727     Significant Indicator NEG      Source UR      Site URINE, CLEAN CATCH      Urine Culture No growth at 48 hours     Narrative:      Protective  Indication for culture:->Emergency Room Patient    BLOOD CULTURE [[id number]] Collected:  07/28/17 1055    Order Status:  Completed Specimen Information:  Blood from Peripheral Updated:  07/29/17 0850     Significant Indicator NEG      Source BLD      Site PERIPHERAL      Blood Culture --      Result:        No Growth    Note: Blood cultures are incubated for 5 days and  are monitored continuously.Positive blood cultures  are called to the RN and reported as soon as  they are identified.      Narrative:      Protective  Blood Cultures X2 Draw one blood culture from central line  and one blood culture peripherally. If no central line  present draw blood cultures times two peripherally    BLOOD CULTURE [[id number]] Collected:  07/28/17 1056    Order Status:  Completed Specimen Information:  Blood from Line Updated:  07/29/17 0850     Significant Indicator NEG       Source BLD      Site PICC Line      Blood Culture --      Result:        No Growth    Note: Blood cultures are incubated for 5 days and  are monitored continuously.Positive blood cultures  are called to the RN and reported as soon as  they are identified.      Narrative:      Protective  Blood Cultures X2 Draw one blood culture from central line  and one blood culture peripherally. If no central line  present draw blood cultures times two peripherally    GRAM STAIN [[id number]] Collected:  07/28/17 1325    Order Status:  Completed Specimen Information:  CSF Updated:  07/28/17 1359     Significant Indicator .      Source CSF      Site TAP      Gram Stain Result No organisms seen.     Narrative:      Special Contact Isolation    Urinalysis [[id number]]  (Abnormal) Collected:  07/28/17 1109    Order Status:  Completed Specimen Information:  Urine from Urine, Clean Catch Updated:  07/28/17 1134     Color Yellow      Character Clear      Specific Gravity 1.017      Ph 5.5      Glucose Negative mg/dL      Ketones Negative mg/dL      Protein 100 (A) mg/dL      Bilirubin Negative      Urobilinogen, Urine 0.2      Nitrite Negative      Leukocyte Esterase Negative      Occult Blood Trace (A)      Micro Urine Req Microscopic     Narrative:      Protective    Culture Respiratory W/ GRM STN [[id number]]     Order Status:  Sent Specimen Information:  Respirate from Sputum           Assessment:  Active Hospital Problems    Diagnosis   • *Sepsis (CMS-HCC) [A41.9]   • Thrombocytopenia (CMS-HCC) [D69.6]   • 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    CML  Status post transplant    Prognosis  Guarded            Discuss with internal medicine.

## 2017-07-30 NOTE — PROGRESS NOTES
Notified MD Prakash of patient FSBG level. See MAR. Will continue to monitor patient glucose levels. No coverage for current FSBG level provided per MD. Continue to monitor.

## 2017-07-30 NOTE — PROGRESS NOTES
Infectious Disease Progress Note    Author: Larisa De La Paz M.D. Date & Time of service: 2017  5:09 PM    Chief Complaint:  Altered mental status    Interval History:  71-year-old  male with recent bone marrow transplant admitted with altered mental status  17-fevers up to 105. Remains altered.  Labs Reviewed, Medications Reviewed and Radiology Reviewed.    Review of Systems:  Review of Systems   Unable to perform ROS: mental status change       Hemodynamics:  Temp (24hrs), Av.1 °C (102.3 °F), Min:37.2 °C (99 °F), Max:40.7 °C (105.3 °F)  Temperature: 37.6 °C (99.7 °F), Monitored Temp: 39 °C (102.2 °F)  Pulse  Av.4  Min: 60  Max: 156Heart Rate (Monitored): 95  NIBP: 128/74 mmHg       Physical Exam:  Physical Exam   Constitutional: He appears cachectic. No distress.   Not responding   HENT:   Unable to evaluate  cortrak present   Eyes: No scleral icterus.   Cardiovascular: Regular rhythm.    No murmur heard.  Pulmonary/Chest: He has no wheezes. He has no rales.   Abdominal: Soft. There is no tenderness. There is no rebound.   Musculoskeletal: He exhibits no edema.   Neurological:   Moves all extremities  Does not respond   Vitals reviewed.      Meds:    Current facility-administered medications:   •  vancomycin (VANCOCIN) IVPB (maintenance dose)  •  famotidine  •  enoxaparin (LOVENOX) injection  •  tacrolimus  •  acetaminophen **OR** acetaminophen  •  D5W  •  acyclovir  •  predniSONE  •  ursodiol  •  voriconazole **AND** voriconazole  •  NS  •  micafungin  •  meropenem (MERREM) IV  •  MD ALERT... vancomycin    Labs:  Recent Labs      17   1055  17   0400   WBC  8.5  6.5   RBC  3.81*  3.06*   HEMOGLOBIN  11.5*  9.0*   HEMATOCRIT  34.3*  27.2*   MCV  90.0  89.2   MCH  30.2  29.4   RDW  54.6*  54.6*   PLATELETCT  85*  65*   MPV  11.5  12.4   NEUTSPOLYS  83.90*  96.50*   LYMPHOCYTES  2.70*  1.70*   MONOCYTES  7.10  0.90   EOSINOPHILS  0.00  0.00   BASOPHILS  0.00  0.00    RBCMORPHOLO  Present  Present     Recent Labs      07/28/17   1057  07/29/17   0001  07/29/17   0400  07/29/17   0645   SODIUM  142  143  147*   --    POTASSIUM  3.7  3.3*  3.9  4.4   CHLORIDE  109  115*  119*   --    CO2  19*  19*  19*   --    GLUCOSE  96  109*  123*   --    BUN  31*  24*  26*   --      Recent Labs      07/28/17   1057  07/29/17   0001  07/29/17   0400   ALBUMIN  3.8   --   2.9*   TBILIRUBIN  0.8   --   0.7   ALKPHOSPHAT  282*   --   246*   TOTPROTEIN  6.9   --   5.4*   ALTSGPT  79*   --   93*   ASTSGOT  89*   --   128*   CREATININE  1.17  1.05  1.01       Imaging:  Ct-head W/o    7/28/2017 7/28/2017 2:31 PM HISTORY/REASON FOR EXAM:  Recent intracranial hemorrhage.. TECHNIQUE/EXAM DESCRIPTION AND NUMBER OF VIEWS: CT of the head without contrast. Up to date radiation dose reduction adjustments have been utilized to meet ALARA standards for radiation dose reduction. COMPARISON:  6/7/2017 FINDINGS: No acute hemorrhage, mass-effect, or midline shift.   Previously described isodense collection adjacent to the right cerebral hemisphere is not well visualized on the current exam. No acute ischemia or masses identified. There is diffuse atrophy. Ventricles and cisterns are patent. Periventricular white matter changes are consistent with chronic small vessel disease.  The paranasal sinuses and mastoid air cells are clear.     7/28/2017  1.  No acute intracranial findings. 2.  Diffuse atrophy and periventricular white matter changes, consistent with chronic small vessel disease.     Dx-chest-portable (1 View)    7/28/2017 7/28/2017 11:17 AM HISTORY/REASON FOR EXAM:  Neutropenic. TECHNIQUE/EXAM DESCRIPTION AND NUMBER OF VIEWS: Single portable view of the chest. COMPARISON: 6/13/2017 FINDINGS: The cardiomediastinal silhouette is unremarkable. There is mild bilateral basilar interstitial prominence. There is no focal consolidation. There is no evidence of large pleural effusion or pneumothorax. Imaged  osseous structures are unremarkable.     7/28/2017  Mild bilateral basilar interstitial prominence which can be seen in setting of edema and/or infection.    Mr-brain-w/o    7/28/2017 7/28/2017 1:47 PM HISTORY/REASON FOR EXAM:  Recent stem cell transplant. Currently with high fevers and difficulty speaking and seizures TECHNIQUE/EXAM DESCRIPTION:  MRI of the brain without and with contrast. T1 sagittal, T2 fast spin-echo axial, T1 coronal, FLAIR coronal, Diffusion weighted and Apparent Diffusion Coefficient (ADC map) axial images were obtained of the whole brain. T1 post-contrast axial and T1 post-contrast coronal images were obtained. The scan was performed on a 1.5 Jill [employer] Signa MRI scanner. COMPARISON: 6/9/2017 FINDINGS: The calvariae are normal.  There are no extra-axial fluid collections. The ventricular system is mild to moderately prominent. There is mild-to-moderate prominence of the cortical sulci and gyri.  There are a few scattered punctate and patchy areas of increased T2 signal intensity in the periventricular white matter.  There  are no mass effects or shift of midline structures. The diffusion weighted images are unremarkable. There are no hemorrhagic lesions.  The brainstem and posterior fossa structures are unremarkable. Vascular flow voids in the carotid and vertebrobasilar arteries, [place], and dural venous sinuses are intact. The paranasal sinuses and mastoid air cells are free of mucosal inflammatory change.     7/28/2017  1. Age-related cerebral atrophy. 2. Mild periventricular white matter changes consistent with chronic microvascular ischemic gliosis.    Dx-abdomen For Tube Placement    7/29/2017 7/29/2017 1:05 PM HISTORY/REASON FOR EXAM:  Line evaluation. TECHNIQUE/EXAM DESCRIPTION AND NUMBER OF VIEWS:  1 view(s) of the abdomen. COMPARISON:  3/26/2017. FINDINGS: Enteric tube projects over the stomach. There is a nonspecific bowel gas pattern. There is mitral annulus  calcification. Degenerative changes are seen in the spine.     7/29/2017  Enteric tube projects over the stomach.      Micro:  Results     Procedure Component Value Units Date/Time    CSF CULTURE [[id number]] Collected:  07/28/17 1325    Order Status:  Completed Specimen Information:  CSF from Tap Updated:  07/29/17 1339     Significant Indicator NEG      Source CSF      Site TAP      CSF Culture No growth at 24 hours.      Gram Stain Result No organisms seen.     Narrative:      Special Contact Isolation    Urine culture [[id number]] Collected:  07/28/17 1109    Order Status:  Completed Specimen Information:  Urine from Urine, Clean Catch Updated:  07/29/17 1132     Significant Indicator NEG      Source UR      Site URINE, CLEAN CATCH      Urine Culture No growth at 24 hours.     Narrative:      Protective  Indication for culture:->Emergency Room Patient    BLOOD CULTURE [[id number]] Collected:  07/28/17 1055    Order Status:  Completed Specimen Information:  Blood from Peripheral Updated:  07/29/17 0850     Significant Indicator NEG      Source BLD      Site PERIPHERAL      Blood Culture --      Result:        No Growth    Note: Blood cultures are incubated for 5 days and  are monitored continuously.Positive blood cultures  are called to the RN and reported as soon as  they are identified.      Narrative:      Protective  Blood Cultures X2 Draw one blood culture from central line  and one blood culture peripherally. If no central line  present draw blood cultures times two peripherally    BLOOD CULTURE [[id number]] Collected:  07/28/17 1056    Order Status:  Completed Specimen Information:  Blood from Line Updated:  07/29/17 0850     Significant Indicator NEG      Source BLD      Site PICC Line      Blood Culture --      Result:        No Growth    Note: Blood cultures are incubated for 5 days and  are monitored continuously.Positive blood cultures  are called to the RN and reported as soon as  they are identified.       Narrative:      Protective  Blood Cultures X2 Draw one blood culture from central line  and one blood culture peripherally. If no central line  present draw blood cultures times two peripherally    GRAM STAIN [[id number]] Collected:  07/28/17 1325    Order Status:  Completed Specimen Information:  CSF Updated:  07/28/17 1359     Significant Indicator .      Source CSF      Site TAP      Gram Stain Result No organisms seen.     Narrative:      Special Contact Isolation    Urinalysis [[id number]]  (Abnormal) Collected:  07/28/17 1109    Order Status:  Completed Specimen Information:  Urine from Urine, Clean Catch Updated:  07/28/17 1134     Color Yellow      Character Clear      Specific Gravity 1.017      Ph 5.5      Glucose Negative mg/dL      Ketones Negative mg/dL      Protein 100 (A) mg/dL      Bilirubin Negative      Urobilinogen, Urine 0.2      Nitrite Negative      Leukocyte Esterase Negative      Occult Blood Trace (A)      Micro Urine Req Microscopic     Narrative:      Protective    Culture Respiratory W/ GRM STN [[id number]]     Order Status:  Sent Specimen Information:  Respirate from Sputum           Assessment:  Active Hospital Problems    Diagnosis   • *Sepsis (CMS-HCC) [A41.9]   • Thrombocytopenia (CMS-HCC) [D69.6]   • 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]   • Hypomagnesemia [E83.42]   • Severe protein-calorie malnutrition (CMS-HCC) [E43]   • Elevated LFTs [R94.5]   • Altered mental state [R41.82]   • Stem cells transplant status (CMS-HCC) [Z94.84]       Plan:  Altered mental status  Patient is having high fevers  Cultures are negative  CSF is negative  Etiology remains unclear  Patient was already on prophylactic acyclovir  Seen by neurology    Recent bone marrow transplant  On very broad-spectrum antibiotics  Consider transfer back to [facility]    CML  Status post transplant    Prognosis  Guarded            Discuss with internal medicine.

## 2017-07-30 NOTE — PROGRESS NOTES
"Pharmacy Kinetics 71 y.o. male on vancomycin day # 3 2017    Currently on Vancomycin 900 mg iv q12hr    Indication for Treatment: unknown source of infection    Pertinent history per medical record: Admitted on 2017 for sepsis.  Patient has as history of CML, was recently admitted to [facility] and eventually transferred to [facility] for a stem cell transplant. He was started on multiple immunosuppressive agents after his transplant and discharged on . He presented to the ED again on  with significant fever and altered mental status. Given his recent transplant, broad spectrum antibiotics were initiated. ID following.    Other antibiotics: meropenem 500 mg IV q8h, micafungin 100 mg IV q24h, voriconazole (home medication) 300 mg PO BID, acyclovir (home medication) 400 mg PO TID    Allergies: Review of patient's allergies indicates no known allergies.     List concerns for renal function: age, BUN/SCr ratio > 20:1, abnormal LFTs, malnutrition, nephrotoxic agents (received tobramycin in ED, tacrolimus)    Pertinent cultures to date:     PBC x 1: NGTD   PICC line: NGTD   urine: NGTD   CSF: NGTD    Recent Labs      17   1055  17   0400  17   0323   WBC  8.5  6.5  6.9   NEUTSPOLYS  83.90*  96.50*  98.20*   BANDSSTABS  2.70   --    --      Recent Labs      17   1057  17   0001  17   0400  17   0037   BUN  31*  24*  26*  33*   CREATININE  1.17  1.05  1.01  1.10   ALBUMIN  3.8   --   2.9*  2.5*     Recent Labs      17   0400   VANCOTROUGH  11.1     Intake/Output Summary (Last 24 hours) at 17 1521  Last data filed at 17 1400   Gross per 24 hour   Intake   3185 ml   Output   1335 ml   Net   1850 ml      Blood pressure 159/94, pulse 98, temperature 37.7 °C (99.9 °F), resp. rate 22, height 1.555 m (5' 1.22\"), weight 57 kg (125 lb 10.6 oz), SpO2 98 %. Temp (24hrs), Av.1 °C (100.6 °F), Min:37.2 °C (99 °F), Max:39.1 °C (102.4 " °F)      A/P   1. Vancomycin dose change: hold current doses  2. Next vancomycin level: tonight at 2130  3. Goal trough: 16-20 mcg/mL  4. Comments: level not drawn appropriately this morning. Given increase in SCr and dosing history indicating that patient likely to accumulate on current dose, will hold dosing for now and check a level at 2130.  UOP decreasing.  Possible transfer to [facility] later today or possibly tomorrow. ID still following, plan to keep vancomycin for now. Follow up on level tonight.    Jane Jasmine, PHARMD

## 2017-07-30 NOTE — DISCHARGE PLANNING
Received a call from [facility]. Patient has room Assignment.     [facility] [address] Room 87  Accepting: Dr. Ochoa

## 2017-07-30 NOTE — PROGRESS NOTES
Patient returned from nuclear medicine via [facility] with patient transport. Patient transferred to [facility] from transport [facility] for transport to [facility]. Report given to [facility] careflight team, chart and belongings sent with transport team. Report called to receiving nurse at [facility]. Patient's daughter aware of patient transfer.

## 2017-07-30 NOTE — DISCHARGE SUMMARY
DATE OF ADMISSION:  07/28/2017    DATE OF TRANSFER:  07/30/2017    PRINCIPAL DIAGNOSIS:  Suspected sepsis, source not clear.    OTHER COMORBIDITIES:  1.  Chronic myeloid leukemia, status post bone marrow transplant.  2.  Elevated liver function tests.  3.  Altered mental status, likely secondary to metabolic encephalopathy.  4.  Severe protein-calorie malnutrition.  5.  Hypokalemia.  6.  Anemia.  7.  Thrombocytopenia.  8.  Diabetes.    CONSULTANTS:  1.  Dr. De La Paz from infectious disease.  2.  Dr. Blackmon from neurology.    PERTINENT TEST RESULTS:  Sodium 136, potassium 3.2, chloride 110, bicarb 18,   glucose 390, BUN 33, creatinine 1.1, calcium 7.5, , , alkaline   phosphatase 194, magnesium 2.2.  Ammonia level of 35.  CSF fluid analysis   revealed total white blood cell count 2, total red blood cell count 205,   crenated RBCs 0, polys 30%, lymphocytes 50%, mononuclear cells 20%.  Glucose   CSF was 51, total protein was 61.  INR was 1.17, PTT 29.2.  Urinalysis   revealed 0-2 white blood cells, negative nitrites, negative leukocyte   esterase, 100 protein.  CMV PCR is pending at the time of this dictation.  CSF   cultures are negative at the time of this dictation.  Urine culture is   negative.  Blood cultures are negative at the time of this dictation.  CT of   the head without contrast revealed no acute intracranial findings, diffuse   atrophy and periventricular white matter changes consistent with chronic small   vessel disease.  Gallbladder ultrasound revealed borderline hepatomegaly,   cholelithiasis, suspected gallbladder wall thickening versus trace   pericholecystic fluid, findings suspicious for acute cholecystitis, borderline   dilation of the common bile duct, no obvious choledocholithiasis, renal   cortical cyst x2.  MRI of the brain revealed age-related cerebral atrophy,   mild periventricular white matter changes consistent with chronic   microvascular ischemic gliosis.  Chest x-ray  revealed mild bilateral basilar   interstitial prominence, which can be seen in the setting of edema and/or   infection.    HOSPITAL COURSE:  The patient is a 71-year-old male who was recently   discharged from [facility].  He has a history of CML and has received a bone   marrow transplant.  He was instructed to stay in the [place] area overnight;   however, he decided to present to [place].  He was transferred to the emergency   room in [place] for fever, confusion, and lethargy.  He was admitted and   monitored.  His LP was not suggestive of an infectious process.  He also had   an MRI of the brain, which was negative.  He was evaluated by infectious   disease and started on broad-spectrum antibiotics and antifungal agents.  This   morning, he is clinically improved.  He is more alert.  He denies any   abdominal pain.  He continues to have high-grade fever.  His LFTs are   elevated.  I discussed the case with our oncologist, Dr. Person, and I also   spoke with Dr. Bautista Ochoa at [facility], who recommended that the patient   be transferred to their facility and was gracious to accept the patient in   transfer.  The differential for his elevated LFTs could be infectious versus   graft versus host versus other complications following bone marrow biopsy.    The patient is clinically stable for transfer to [facility] for higher level of   care to be evaluated with a bone transplant team with the following   instructions.    DIET:  As tolerated.    ACTIVITY:  As tolerated.    DISCHARGE MEDICATIONS:  Acetaminophen 650 as needed, micafungin 100 mg every   24 hours, vancomycin per pharmacy protocol, saline at 75 mL per hour,   meropenem 500 mg every 6 hours, acyclovir 400 mg 3 times a day, magnesium   oxide 800 mg daily, Zofran 8 mg every 8 hours as needed, Protonix 40 mg daily,   prednisone 30 mg daily, Prograf 0.5 mg 3 times a day, ursodiol 300 mg b.i.d.,   voriconazole 300 mg 3 times a day.    Our initial plan pending transfer  was to obtain a HIDA scan and CT of the   abdomen and pelvis.  Those studies are not completed yet due to initiation of   transfer.    Total time spent preparing for this discharge was 35 minutes.       ____________________________________     MD DARY LAFLEUR / NTS    DD:  07/30/2017 15:06:34  DT:  07/30/2017 15:32:51    D#:  [id number]  Job#:  [id number]    cc: LEVAR MOISE MD, Primary Care Physician

## 2017-07-30 NOTE — DISCHARGE PLANNING
Received call from Dr. Merritt requesting patient transfer to [facility] for Bone Marrow Transplant. Per Dr. Merritt, Dr. Ochoa is accepting patient.     Called [facility] [phone] and spoke with Kvng. Per Kvng, they are aware of patient and Dr. Ochoa is accepting patient. Kvng is currently working on a bed for patient. Requested Facesheet faxed to [phone]. Facesheet faxed and confirmation received.     Called SW and informed of transfer to [facility]. SW to copy chart and COBRA.     Film room called for Radiology Disk.

## 2017-07-31 LAB
BACTERIA CSF CULT: NORMAL
CMV DNA SPEC QL NAA+PROBE: DETECTED
GRAM STN SPEC: NORMAL
SIGNIFICANT IND 70042: NORMAL
SITE SITE: NORMAL
SOURCE SOURCE: NORMAL
SPECIMEN SOURCE: ABNORMAL
TACROLIMUS BLD-MCNC: 3.9 NG/ML

## 2017-07-31 NOTE — CONSULTS
DATE OF SERVICE:  07/30/2017    REQUESTING PHYSICIAN:  Jorge Shaw MD    REASON FOR CONSULTATION:  Patient, who recently got bone marrow transplant at   [facility], discharged 3 days ago, admitted 24 hours after with encephalopathy   and fever of 105.    HISTORY OF PRESENT ILLNESS:  A 71-year-old Norwegian-speaking gentleman who is   following with Dr. Ronni Vu given in 2015, he was found to have   accelerated CML.  The bone marrow at that point showed 15% of blasts and   BCR/ABL of 100.  The patient was started on dasatinib and he developed   significant pleural effusions.  Subsequently, he saw Dr. Lew at [facility] who   recommended dasatinib.  He required reductions on the dose given continued   cytopenia.  Repeated marrow showed 14% of blasts.  BCR-ABL juan to 100.  He   was switched to bosutinib and he continues to have significant cytopenias,   requiring transfusions pretty much every week.  Repeated bone marrow on   07/12/2016 did not show a good response, with 13% blasts.  He was having   issues with cytopenias on the bosutinib and given poor response to TKI's and   significant side effects, late May 2017, he was admitted to [facility]   for salvage regimen with omacetaxine.  He received a total of 2 cycles and a   repeated bone marrow biopsy did show a good response with less than 5% of   blasts.  His hospital course was complicated with infections.  Subsequently,   the patient was transferred to [facility] for an allogeneic bone marrow   transplantation on 06/15/2017.  I do not have further information, but the   daughter is telling me that she was a full match and donor for the patient.    The patient was admitted at [facility] after bone marrow transplantation for a   month and a half.  Per daughter, the hospital course was unremarkable.  He was   discharged in the week of July 24 and 24 hours later, the patient was noted   at home to be confused, not eating, and febrile; reason why daughter  decided   to bring the patient to [facility].  He was found to have here a fever of 105.5.    An LP was done in the ER which showed 2 wbc's.  CT of the head was negative as   well as the MRI of the brain.  Chest x-ray showed minimal basilar   atelectasis.  Over here, he was started on broad spectrum antibiotics.  ID was   consulted.  Also, apparently, Dr. Shaw talked to [facility] people who   recommended broad-spectrum antibiotics.  Over here, he continues to be   lethargic until the present day.  He was pancultured with no clear source of   infection.  Kidney function is okay and liver enzymes have been trending up   since the admission with an AST of 119, , alkaline phosphatase 184.  We   were consulted for further recommendations.    PAST MEDICAL HISTORY:  Hypertension, CML since March 2015 status post full   match allogeneic bone marrow transplantation from daughter, history of sepsis.    PAST SURGICAL HISTORY:  Eye surgery in the past.    SOCIAL HISTORY:  The patient lives at home with daughter in [place].  Nonsmoker.    [place]er-speaking.    PHYSICAL EXAMINATION:  VITAL SIGNS:  Temperature 37.7, pulse 98, respiratory rate 22, blood pressure   135/71.  He was previously 93/60 and before it was 89/56.  GENERAL:  The patient is confused, lethargic.  HEENT:  Mucous membranes are moist.  LUNGS:  Clear to auscultation.  CARDIOVASCULAR:  Tachycardic.  ABDOMEN:  Soft, nontender, and nondistended.  Positive bowel sounds.  EXTREMITIES:  There is no swelling.    LABORATORY DATA:  WBC 6.9, hemoglobin 7.8, platelet count 54, sodium 136,   chloride 110, potassium 3.2, glucose 396, creatinine 1.1, calcium 7.5, total   bilirubin 0.5, , , alkaline phosphatase 194.  CMV plasma   pending.  Cultures as of today negative.    IMAGING STUDIES:  As stated above.    ASSESSMENT AND PLAN:  The patient is a 71-year-old gentleman who was found to   have accelerated CML who did not tolerate or respond on tyrosine kinase    inhibitors in the past who received salvage therapy with omacetaxine x2 cycles   with good response.  The patient was then taken to what it looks like   full-matched related bone marrow transplantation.  The daughter was the donor   around 1 month and a half ago.  He was discharged from [facility] around 4 days   ago; 24 hours later, came back to the hospital complaining of encephalopathy,   fever of 105.5.  The patient over here was started on broad-spectrum   antibiotics, pancultured, and noted to have an elevation of liver enzymes.  I   was consulted for further recommendations.  Infectious disease on board.  So   far, there is no clear source of infection.  The patient has been here since   07/28/2017.  Given the complex case in a patient who is immunosuppressed from   recent bone marrow transplantation, I strongly recommend the patient to be   transferred to [facility] for further management.  I do think this is much safer   for the patient in terms of the outcome of his recent bone marrow transplant.    I passed these recommendations to Dr. Hemal Ann.  In the meantime, if the   transfer to [facility] do not happen, I recommend a CT of the abdomen for   evaluation of the liver.  Also, recommend to check a trough tacrolimus level.   There is a concern for acute graft versus host disease.  CMV labs are pending.    I passed the recommendations to Dr. Shaw.    Spent 20 minutes with patient and daughter.  All questions were answered for   his satisfaction.       ____________________________________     QUAN Cristina / JOSÉ ANTONIO    DD:  07/30/2017 15:47:03  DT:  07/30/2017 18:32:21    D#:  [id number]  Job#:  [id number]

## 2017-07-31 NOTE — DOCUMENTATION QUERY
DOCUMENTATION QUERY    PROVIDERS: Please select “Cosign w/ note”to reply to query.    To better represent the severity of illness of your patient, please review the following information and exercise your independent professional judgment in responding to this query.     Left elbow, sDTI, POA  is documented in the 7/29 Wound Care evaluation. Based upon the clinical findings, risk factors, and treatment, can this diagnosis be further specified?    • Agree with Wound Care RN assessment  • Disagree with Wound Care RN assessment (document your clinical findings)  • Other explanation of clinical findings  • Unable to determine    The medical record reflects the following:   Clinical Findings  documented in wound care notes- Left elbow, sDTI, POA     Treatment  left open to air; of fload and monitor   Risk Factors  sepsis; malnutrition; immunosuppressed   Location within medical record  History and Physical,  Progress Notes, Wound Card Evaluation     Thank you,  Sharona Sims RN, CCDS   Sr. Clinical    ph- [phone]

## 2017-08-02 LAB
BACTERIA BLD CULT: NORMAL
BACTERIA BLD CULT: NORMAL
SIGNIFICANT IND 70042: NORMAL
SIGNIFICANT IND 70042: NORMAL
SITE SITE: NORMAL
SITE SITE: NORMAL
SOURCE SOURCE: NORMAL
SOURCE SOURCE: NORMAL

## 2017-08-30 NOTE — ED AVS SNAPSHOT
FightMe Access Code: [email or web address]  Expires: 4/24/2017  9:59 AM    Your email address is not on file at [facility].  Email Addresses are required for you to sign up for FightMe, please contact [phone] to verify your personal information and to provide your email address prior to attempting to register for FightMe.    Benjamin Post  [address]  [place] [address]    FightMe  A secure, online tool to manage your health information     [facility]’s FightMe® is a secure, online tool that connects you to your personalized health information from the privacy of your home -- day or night - making it very easy for you to manage your healthcare. Once the activation process is completed, you can even access your medical information using the FightMe alexsander, which is available for free in the Apple Alexsander store or Google Play store.     To learn more about FightMe, visit [email or web address]    There are two levels of access available (as shown below):   My Chart Features  [facility] Primary Care Doctor [facility]  Specialists [facility]  Urgent  Care Non-[facility] Primary Care Doctor   Email your healthcare team securely and privately 24/7 X X X    Manage appointments: schedule your next appointment; view details of past/upcoming appointments X      Request prescription refills. X      View recent personal medical records, including lab and immunizations X X X X   View health record, including health history, allergies, medications X X X X   Read reports about your outpatient visits, procedures, consult and ER notes X X X X   See your discharge summary, which is a recap of your hospital and/or ER visit that includes your diagnosis, lab results, and care plan X X  X     How to register for Womplyt:  Once your e-mail address has been verified, follow the following steps to sign up for FightMe.     1. Go to  [email or web address]  2. Click on the Sign Up Now box, which takes you to the New Member Sign Up page. You will  HPI Comments: 66-year-old lady who is approximately 20 weeks pregnant who presents with concerns about hemorrhoids that have resolved. Patient says that her job told her that she could not go back to work without a work note saying she can go back to work. Patient says she currently has no symptoms including no pain, perianal itch, burn, or bleeding. She said she has had hemorrhoids in the past related to pregnancy but she currently feels fine. Elements of this note were created using speech recognition software. As such, errors of speech recognition may be present. Patient is a 37 y.o. female presenting with constipation. The history is provided by the patient. Constipation    Associated symptoms include constipation. Pertinent negatives include no abdominal pain, no chills and no fever. History reviewed. No pertinent past medical history. Past Surgical History:   Procedure Laterality Date    HX  SECTION           Family History:   Problem Relation Age of Onset    Hypertension Mother     Alcohol abuse Neg Hx     Arthritis-rheumatoid Neg Hx     Asthma Neg Hx     Bleeding Prob Neg Hx     Cancer Neg Hx     Diabetes Neg Hx     Elevated Lipids Neg Hx     Headache Neg Hx     Heart Disease Neg Hx     Lung Disease Neg Hx     Migraines Neg Hx     Psychiatric Disorder Neg Hx     Stroke Neg Hx     Mental Retardation Neg Hx        Social History     Social History    Marital status: SINGLE     Spouse name: N/A    Number of children: N/A    Years of education: N/A     Occupational History    Not on file. Social History Main Topics    Smoking status: Never Smoker    Smokeless tobacco: Never Used    Alcohol use No    Drug use: No    Sexual activity: No     Other Topics Concern    Not on file     Social History Narrative         ALLERGIES: Review of patient's allergies indicates no known allergies. Review of Systems   Constitutional: Negative for chills and fever. Gastrointestinal: Positive for constipation. Negative for abdominal pain, anal bleeding and rectal pain. Vitals:    08/30/17 1858   BP: 105/63   Pulse: 95   Resp: 20   Temp: 98 °F (36.7 °C)   SpO2: 100%   Weight: 78.5 kg (173 lb)   Height: 5' 6\" (1.676 m)            Physical Exam   Constitutional: She appears well-developed and well-nourished. Abdominal: There is no tenderness. There is no rebound and no guarding. Nursing note and vitals reviewed. MDM  Number of Diagnoses or Management Options  Hemorrhoids during pregnancy, second trimester:   Diagnosis management comments: Patient is asymptomatic. I encouraged her to have a high fiber diet and I will write a work note to allow her to go back to work.     ED Course       Procedures need to provide the following information:  a. Enter your [employer] Access Code exactly as it appears at the top of this page. (You will not need to use this code after you’ve completed the sign-up process. If you do not sign up before the expiration date, you must request a new code.)   b. Enter your date of birth.   c. Enter your home email address.   d. Click Submit, and follow the next screen’s instructions.  3. Create a [employer] ID. This will be your [employer] login ID and cannot be changed, so think of one that is secure and easy to remember.  4. Create a [employer] password. You can change your password at any time.  5. Enter your Password Reset Question and Answer. This can be used at a later time if you forget your password.   6. Enter your e-mail address. This allows you to receive e-mail notifications when new information is available in [employer].  7. Click Sign Up. You can now view your health information.    For assistance activating your [employer] account, call [phone]

## 2018-12-13 NOTE — CONSULTS
DATE OF SERVICE:  03/31/2017    REFERRING PHYSICIAN:  ROGER white.    CHIEF COMPLAINT:  1.  Humoral hypercalcemia.  2.  Chronic myelogenous leukemia, possibly entering blast phase.    HISTORY OF PRESENT ILLNESS:  The patient is a pleasant 71-year-old gentleman   who I have known for about 2 years.  The patient presented in March 2015 with   a bone marrow demonstrating 15% blasts and a BCR-ABL that was 100%.  He was   started on dasatinib.  With treatment, the patient developed a pleural   effusion.  Overtime, however, after the reinitiation of dasatinib, the   patient's BCR-ABL trended downwards.  The patient has chronically had   pancytopenia.  He has been seen by Dr. Lew at [facility].  The patient had a   mutational analysis.  He was not found to have any genomic alterations that   would affect CML dosing.  For a while, the patient was continued on dasatinib   with interruptions.  As the patient's BCR-ABL fall and then juan, he was   switched to bosutinib that was about a year ago.  He has continued to be   followed by Dr. Lew.  Last year, the patient had a bone marrow that was   hypocellular and negative for a Charlotte chromosome.  He had a repeat bone   marrow last summer that was suboptimal, but showed 13% blasts and was then   positive for BCR-ABL.  Last summer, the patient had a subdural hematoma on   8/03/2016.  He was admitted to [facility].  He was not deemed to be a surgical   candidate.  Overtime getting platelets twice a week, the patient's subdural   resolved.  The patient has been on track to get a transplant at [facility]   despite his pancytopenia.  Last weekend, the patient had all of his teeth   pulled because of a dental abscess.  Shortly after having his teeth pulled, he   was noted to have seizure activity.  Neurologist was consulted, and the   patient was started on IV Keppra.  Now the patient is admitted with confusion.    He was noted on admission on March 30 to have a calcium of 15.2; as  Progress Notes by Laura Penaloza at 01/26/17 06:27 AM     Author:  Lauar Penaloza Service:  (none) Author Type:  Admin Staff     Filed:  01/26/17 06:27 AM Encounter Date:  1/25/2017 Status:  Signed     :  Laura Penaloza (Admin Staff)            $50 COPAY[DB1.1M]      Revision History        User Key Date/Time User Provider Type Action    > DB1.1 01/26/17 06:27 AM Laura Penaloza Admin Staff Sign    M - Manual             recent   as March 26, it was 10.5.  The patient's ionized calcium on admission was   markedly elevated at 2.1.  Albumin has been relatively normal with a low at   3.2.  Creatinine was up to 1.54 on March 30, but has slowly been coming down.    Calcium has been coming down.  This afternoon, the patient's calcium is down   to 12.7 with an albumin of 3.2.  CBC shows a white count of 1.8, hemoglobin   8.8, and platelets 36,000.  MRI earlier this week of the brain on March 26   showed marrow replacing lesions in the clivus, a resolving right hemispheric   subdural hematoma, no evidence of new subdural hemorrhage.    In seeing the patient this evening, he is not as awake and alert as he   normally is, normally he is joking, normally he is moving around.  He is quite   lethargic.    PAST MEDICAL HISTORY:  Pertinent for previous hypertension.  He has the   chronic myelogenous leukemia presenting in the accelerated phase.  He has had   reflux.  He has had several bone marrows.  He has the dental extraction last   weekend.  He has had some type eye surgery in the past.    SOCIAL HISTORY:  The patient is  from his wife.  He lives with his   children.    REVIEW OF SYSTEMS:  Unable to obtain because of confused state.    MEDICATIONS:  Include bosutinib, carvedilol, Keppra, Pen-Vee K since the   dental surgery, and Flomax.    PHYSICAL EXAMINATION:  VITAL SIGNS:  Currently on physical, the patient is arousable.  Blood pressure   122/81, pulse 87, temperature 99, and oximetry 97.  GENERAL:  The patient is lethargic, not sure if he recognizes me or not.  HEENT:  He has no scleral icterus.  Mucous membranes are dry.  LUNGS:  Grossly clear.  CARDIAC:  Benign.  ABDOMEN:  Soft.  I do think the patient has a modestly enlarged spleen.  NEUROLOGIC:  Hard to ascertain.  The patient does not really follow commands.    Neurologic exam is difficult to perform.  EXTREMITIES:  Reveals no edema.    IMPRESSION:  I have never seen  hypercalcemia with chronic myeloid leukemia,   but I understand it can happen.  It is humoral in origin.  Unfortunately, it   can many times be associated with somebody going into blast crisis.  It can   also be associated with a short response to a diphosphonate.  The patient was   given 90 mg of pamidronate 24 hours ago.  The good thing is looks like his   calciums are starting to come down.  The bad thing is that he is not awaked   yet.  I do see that a PTH related peptide has been ordered.  Hopefully, that   is same thing as a PTH related protein.  We will check a vitamin D.  I will   check another ionized calcium tomorrow.  Hopefully, we can stabilize the   patient's calcium and he will wake up.  I will follow along with you.       ____________________________________     F MD SALLIE FERRARO / NTS    DD:  03/31/2017 19:28:04  DT:  03/31/2017 21:04:02    D#:  [id number]  Job#:  [id number]

## 2020-10-21 NOTE — PROGRESS NOTES
"Assumed care of patient at 1900. Patient sitting at edge of bed, assisted to commode. Patient reports \"stomach making lots of noise.\" Attempting to get stool sample to send to lab. Patient educated on safety protocols. Call light within reach, bed alarm on, non skid socks in place and bed in low and locked position. Patient denies pain, SOB, DRAPER, CP. VSS. Neutropenic precautions in place, contact for r/o cdif. Will continue to monitor.   " Writer help pt position change and check skin. No evidence of develping Pressure injury. Will continue to monitor.

## 2021-01-14 DIAGNOSIS — Z23 NEED FOR VACCINATION: ICD-10-CM

## 2021-06-09 NOTE — PROGRESS NOTES
Clinic Care Coordination Contact    OUTREACH    Referral Information:  Referral Source: IP Report  Primary Diagnosis: Other (hyperglycemia, hypokalemia)  Chief Complaint   Patient presents with     Clinic Care Coordination - [facility]     hypokalemia, hyperglycemia     Universal Utilization: reviewed on this date  Difficulty keeping appointments: No  Compliance Concerns: No  No-Show Concerns: No  No PCP office visit in Past Year: No  Utilization    Last refreshed: 6/9/2021  4:59 PM: Hospital Admissions 4           Last refreshed: 6/9/2021  4:59 PM: ED Visits 5           Last refreshed: 6/9/2021  4:59 PM: No Show Count (past year) 1              Current as of: 6/9/2021  4:59 PM          Patient enrolled with Care Coordination; noted to have hospital admission at [facility] 6/2/21 - 6/4/21 with diagnosis of hypokalemia and hyperglycemia.  CC RN outreach to patient for hospital follow-up.    Patient reported to be feeling quite a bit better now.  He had felt very fatigued on hospital admission.    Patient noted they weren't exactly sure what caused his hyperglycemia but he did indicate that he had been really limiting his salt intake and in turn had been eating a lot more fruit and drinking more juices.  He now understands importance of also limiting sugar intake and he will work to stay hydrated with primarily water.  He updated that his blood sugars have now remained regularly under 200; at OV with PCP, he was instructed to stop his morning Lantus and continue Lantus 50 units at night as well as continue meal-time insulin as prescribed; patient confirmed he hsa been doing this.    Patient's kidney function was worsened at hospital admission, his creatinine increased to 3.24.  He was given IV fluids in the hospital which helped his creatinine improve back to his baseline.  Patient noted he has upcoming appointment with Dr. Greene (Nephrology) this week Friday and will have labs rechecked  Pt is A&O.  Up to the chair this am.  Took po meds with a sip of water as Pt is NPO for BREANNA at 1630 today.  Gait is unsteady and needs SBA to ambulate to the BR.  Afebrile.  BP is high.  New meds are ordered and given this am.  Denies pain and nausea. Neutropenic precautions in place.      at that time, as well.    Patient updated that he has been trying to stay active.  He did go out walking his neighborhood a few days ago but became quite weak toward the end of his walk and had be assisted back home by a neighbor.  Patient is now focusing on walking inside, instead, and also continues doing work with a .    Patient's CHF continues to be managed by Cardiology in conjunction with [facility]; he had Cardiology visit on 5/21/21 and will following up with [facility] in approximately 6 months and Cardiology in 1 year and as needed.  Patient denied new or worsening HF symptoms at present time.  He continues to follow low-salt diet, weighs himself daily, and is compliant with his medications.    Patient had to end conversation as he was on his way to a  session but indicated he feels he is doing well and stable at present time.  CC RN will follow up with patient in approximately 2 weeks for a status update.    Clinical Concerns:  Current Medical Concerns: hypokalemia, hyperglycemia; DM type 2, CKD, CHF, iron deficiency anemia  Current Behavioral Concerns: none noted at this time    Education Provided to patient: reviewed hospital discharge AVS     Pain  Pain (GOAL): No    Medication Management:  Post-discharge medication reconciliation status: Discharge medications reviewed and reconciled.  Changed medications per note/orders (see AVS).    START taking:  potassium chloride ER (K-TAB)  CHANGE how you take:  Lantus SoloStar  insulin glargine (Lantus SoloStar)    Functional Status:  Dependent ADLs: Independent (Patient is use cane outside of home for long distance)  Dependent IADLs: Transportation  Bed or wheelchair confined: No  Mobility Status: Independent    Living Situation:  Current living arrangement: I live in a private home with family  Type of residence: Private home - stairs    Lifestyle & Psychosocial Needs:  Diet: Diabetic diet, No added salt  Inadequate nutrition (GOAL):  No  Tube Feeding: No  Inadequate activity/exercise (GOAL): No  Significant changes in sleep pattern (GOAL): No  Transportation means: Accessible car     Lutheran or spiritual beliefs that impact treatment: No  Mental health DX: No  Mental health management concern (GOAL): No  Informal Support system: Family, Spouse   Socioeconomic History     Marital status:      Spouse name: Cecile     Number of children: Not on file     Years of education: Not on file     Highest education level: Not on file     Tobacco Use     Smoking status: Former Smoker     Packs/day: 2.00     Years: 11.00     Pack years: 22.00     Types: Cigarettes     Start date:      Quit date:      Years since quittin.4     Smokeless tobacco: Never Used   Substance and Sexual Activity     Alcohol use: No     Alcohol/week: 0.0 standard drinks     Drug use: No     Sexual activity: Yes     Partners: Female     Birth control/protection: None     Care Coordinator has reviewed patient's Social Determinants of Health (SDoH) on this date. Upon review, changes were not  made.      Resources and Interventions:  Community Resources: DME, [facility]  Supplies used at home: Hearing Aid Batteries, Diabetic Supplies  Equipment Currently Used at Home: cane, straight, glucometer, walker, rolling  Employment Status: retired    Advance Care Plan/Directive  Advanced Care Plans/Directives on file: In process  Advanced Care Plan/Directive Status: In Process (has, just not on file)    Referrals Placed: None     Goals: reviewed and discussed following transition of care, no changes made; goal remains appropriate and patient-driven at this time  Goals        General    1. Improve chronic symptoms (pt-stated)     Notes - Note edited  4/15/2021 11:53 AM by Jerel Leija RN    Goal Statement: I will verbalize an increase in my strength and mobility and correct knowledge of adequate management of my chronic health conditions to maintain my health and wellness  in preventing hospital readmission.   Date Goal set: 12/24/20; Updated/modified 04/02/2021  Barriers: Multiple health concerns.  Strengths: Motivated and engaged in care coordination.   Date to Achieve By: 9/1/2021  Patient expressed understanding of goal: Yes    Action steps to achieve this goal:  1. I will work with Physical Therapy to build my strength and mobility.   2. I will follow up with my providers as scheduled/recommended. (Primary Care Provider, [facility], sleep studies TBD, Dr. Ariana PARSON, [facility] hematology/oncology, nephrology)   3. I will take my medications as prescribed.   4. I will continue monitoring my blood sugars, blood pressures, weight daily as recommended.   5. I will use my CHF action plan to monitor/manage my symptoms.   6. I will follow care team recommendations of fluid restriction, diabetic and cardiac diet.   7. I will use my incentive spirometer as directed and recommended by my care team.   8. I will continue to work with care coordination for any additional resources and support.  9. I will contact my care team with questions, concerns, support needs. I will use the clinic as a resource and I understand I can contact my clinic with 24/7 after hours services available. Care Coordinator will remain available as needed.          Patient/Caregiver understanding: Yes    Outreach Frequency: 2 weeks  Future Appointments              In 1 week SH PIV LAB [facility]    In 1 week  FAST TRACK LAB [facility]    In 4 weeks SH PIV LAB [facility]    In 4 weeks SH FAST TRACK LAB [facility] [place], [facility]    In 1 month SH PIV LAB [facility] [place], [facility]    In 1 month  FAST TRACK LAB [facility]    In 10 months Kristine Nash MD [facility] [place], [facility]         Plan:    Patient will continue with plan of care as discussed above.  He will continue medications as prescribed and will attend upcoming appointments as scheduled.    Patient will promptly notify care team if any new or worsening symptoms or concerns arise.    Care Coordination will outreach to patient in approximately 2 weeks to get updates on patient status, assess goal progress, and offer additional support and resources as indicated.    Jerel Leija RN  Clinic Care Coordinator  [facility]  [facility]  Ph: [phone]

## 2022-02-07 NOTE — PROGRESS NOTES
Chemotherapy Verification - SECONDARY RN       Height = 170.2 cm  Weight = 71.2  BSA = 1.83 m2       Medication: Omacetaxine  Dose: 1.25 mg/m2  Calculated Dose: 2.3 mg (2.4 mg ordered)                              (In mg/m2, AUC, mg/kg)       I confirm that this process was performed independently.    Protocol For Bath Puva: The patient received Bath PUVA. Protocol For Nbuvb: Hands/Feet: The patient received NBUVB. Protocol For Photochemotherapy For Severe Photoresponsive Dermatoses: Tar And Broad Band Uvb (Goeckerman Treatment): The patient received Photochemotherapy for severe photoresponsive dermatoses: Tar and Broad Band UVB (Goeckerman treatment) requiring at least 4 to 8 hours of care under direct physician supervision. Protocol For Photochemotherapy For Severe Photoresponsive Dermatoses: Petrolatum And Nbuvb: The patient received Photochemotherapy for severe photoresponsive dermatoses: Petrolatum and NBUVB requiring at least 4 to 8 hours of care under direct physician supervision. Protocol: Photochemotherapy: Baby Oil and NBUVB Protocol For Broad Band Uvb: The patient received Broad Band UVB. Comments On Previous Treatment: Increase by 20mj per treatment follow up with Dr Real Pradhan in 3 months max dose 875mj Protocol For Protocol For Photochemotherapy For Severe Photoresponsive Dermatoses: Bath Puva: The patient received Photochemotherapy for severe photoresponsive dermatoses: Bath PUVA requiring at least 4 to 8 hours of care under direct physician supervision. Post-Care Instructions: I reviewed with the patient in detail post-care instructions. Patient is to wear sun protection. Patients may expect sunburn like redness, discomfort and scabbing. Total Treatment Time: 1:15 Protocol For Photochemotherapy: Tar And Nbuvb (Goeckerman Treatment): The patient received Photochemotherapy: Tar and NBUVB (Goeckerman treatment). Irradiance (Optional- Include Units): 3.2 Protocol For Photochemotherapy: Mineral Oil And Nbuvb: The patient received Photochemotherapy: Mineral Oil and NBUVB (mineral oil applied to all lesions prior to phototherapy). Protocol For Photochemotherapy: Tar And Broad Band Uvb (Goeckerman Treatment): The patient received Photochemotherapy: Tar and Broad Band UVB (Goeckerman treatment). Protocol For Nb Uva: The patient received NB UVA. Protocol For Photochemotherapy For Severe Photoresponsive Dermatoses: Petrolatum And Broad Band Uvb: The patient received Photochemotherapyfor severe photoresponsive dermatoses: Petrolatum and Broad Band UVB requiring at least 4 to 8 hours of care under direct physician supervision. Protocol For Photochemotherapy: Petrolatum And Broad Band Uvb: The patient received Photochemotherapy: Petrolatum and Broad Band UVB. Protocol For Photochemotherapy: Petrolatum And Nbuvb: The patient received Photochemotherapy: Petrolatum and NBUVB (petrolatum applied to all lesions prior to phototherapy). Protocol For Puva: The patient received PUVA. Name Of Supervising Technician: bang alberto lpn Consent: The risks were reviewed with the patient including but not limited to: burn, pigmentary changes, pain, blistering, scabbing, redness, increased risk of skin cancers, and the remote possibility of scarring. Protocol For Uva: The patient received UVA. Protocol For Photochemotherapy: Triamcinolone Ointment And Nbuvb: The patient received Photochemotherapy: Triamcinolone and NBUVB (triamcinolone ointment applied to all lesions prior to phototherapy). Skin Type: II Treatment Number: 3 Protocol For Photochemotherapy For Severe Photoresponsive Dermatoses: Puva: The patient received Photochemotherapy for severe photoresponsive dermatoses: PUVA requiring at least 4 to 8 hours of care under direct physician supervision. Detail Level: Generalized Render Post-Care In The Note: no Protocol For Photochemotherapy: Baby Oil And Nbuvb: The patient received Photochemotherapy: Baby Oil and NBUVB (baby oil applied to all lesions prior to phototherapy). Total Body Energy: [address] [facility] Protocol For Photochemotherapy For Severe Photoresponsive Dermatoses: Tar And Nbuvb (Goeckerman Treatment): The patient received Photochemotherapy for severe photoresponsive dermatoses: Tar and NBUVB (Goeckerman treatment) requiring at least 4 to 8 hours of care under direct physician supervision. Protocol For Uva1: The patient received UVA1. Protocol For Photochemotherapy: Mineral Oil And Broad Band Uvb: The patient received Photochemotherapy: Mineral Oil and Broad Band UVB.

## 2023-06-10 NOTE — PROGRESS NOTES
Patient arrived to Infusion for weekly CBC/possible blood product transfusion.  Pt states recent nasal congestion but no other concerns.  PIV started, labs drawn. Reviewed results, pt indicated for platelet transfusion only.  Blood bank notified.  Platelets transfused, pt tolerated well. Bailee, Nurse Navigator came to give pt appt card for tomorrow.  Pt agreed and had no further questions after he left.  PIV discontinued, gauze pressure dressing applied.  Confirmed both pt's appt tomorrow with Bailee and next infusion appt next week; pt left in great spirits with no apparent distress.  
7 (severe pain)

## 2023-07-01 NOTE — DIETARY
"Nutrition Support Assessment - Male    Benjamin Post is a 71 y.o. male with admitting DX of Sepsis (CMS-[facility])  Stem cells transplant status (bone marrow) (CMS-[facility]) Altered mental state and elevated body temperature above 105 F. History of hypertension, chronic myelogenous disease, indigestion and heartburn. Pt w/ seizures and diffculty speaking.  MATTFA    Cortrak placed to R-nare r/t Pt w/ AMS and cannot eat at this time. Chronic microvascular ischemic gliosis per neurology notes       Pertinent Labs: K+ 3.2, CO2 18, glucose 396, BUN 33, AST//194. Corrected calcium 8.7  Pertinent Medications: zovirax, lovenox, pepcid, vancomycin, merrem, mycamine, KCl, prednisone, prograf, actigall, vfend   Pertinent Fluids: per MD, NS at 50mL/hr   Skin: Pt seen per wound team with \"full note to follow\"   Last BM: 17  Temperature: 102.2 F    Height: 155.5 cm (5' 1.22\")  Weight: 57 kg (125 lb 10.6 oz)  Weight to Use in Calculations: 57 kg (125 lb 10.6 oz)  Ideal Body Weight: 50.9 kg (112 lb 3.4 oz)  Percent Ideal Body Weight: 112  Body mass index is 23.57 kg/(m^2).     Estimated Needs:  Total Calories / day: 1419.5 (MSJ x 1.2)   Kcal needs 7854-4216  (Calories / k.9 - 27)  Total Grams Protein / day: 68.5 - 91  (Grams Protein / k.2 - 1.6)  Total Fluids ml / day: 1427.5-1995 ( 25-35 mL/Kg w/ elevated body temperature)         Assessment / Evaluation:     Electrolyte repletion in place     Elderly male lying in bed appears thin and lethargic. Will provide tube feeding that is adequate to meet needs and consistent in carbohydrate.     Plan / Recommendation:    Fluids per MD. Consider adding free water flushes r/t elevated fluid needs with high body temperature     Recommend replete with fiber at 60mL/hr to provide 1440 Kcal, 90g protein, 161g carbohydrate and 1210mL free water     RD following         "
none

## 2023-12-22 NOTE — PROGRESS NOTES
[facility] Internal Medicine Interval Note    Name Benjamin Post       1945   Age/Sex 71 y.o. male   MRN [id number]   Code Status Full code     After 5PM or if no immediate response to page, please call for cross-coverage  Attending/Team: Dr. Apodaca/Ly Call [phone] to page   1st Call - Day Intern (R1):   Dr. Méndez 2nd Call - Day Sr. Resident (R2/R3):   Dr. Benson         Chief complaint/ reason for interval visit (Primary Diagnosis)   Back pain/CML    Interval Problem Daily Status Update    Patient seen. No acute events overnight.  Still neutropenic but afebrile.   Awaiting start of Syniribo.  Platelet down to 12 today from 25.    Active Problems:      CML (chronic myelocytic leukemia)-Accelerated phase (Chronic) POA: Yes     Chemotherapy.     Acute bilateral low back pain without sciatica POA: Yes    Bone pain POA: Yes  Controlled with oxycodone.       Review of Systems   Constitutional: Negative for fever, chills and weight loss.   HENT: Negative.    Eyes: Negative.    Respiratory: Negative.    Cardiovascular: Negative.    Musculoskeletal: Positive for back pain.   Skin: Negative.    Neurological: Negative.    Psychiatric/Behavioral: Negative.        Consultants/Specialty  none    Disposition  Inpatient being treated for back pain    Quality Measures  EKG reviewed, Labs reviewed, Medications reviewed and Radiology images reviewed  Mahajan catheter: No Mahajan        DVT prophylaxis - mechanical: SCDs (low plt)          Physical Exam       Filed Vitals:    17 0400 17 0601 17 0722 17 1443   BP: 120/65  111/71 118/71   Pulse: 78  72 79   Temp: 37.9 °C (100.2 °F) 37.1 °C (98.7 °F) 37.1 °C (98.8 °F) 36.7 °C (98.1 °F)   Resp: 18  18 18   Height:       Weight:       SpO2: 99%  95% 95%     Body mass index is 27.72 kg/(m^2).    Oxygen Therapy:  Pulse Oximetry: 95 %, O2 (LPM): 0, O2 Delivery: None (Room Air)    Physical Exam   Constitutional: He is oriented  Edward-Rosalie  Pre Procedure History and Physical    Herlene Sandhoff [facility] Patient Status:  Outpatient    1967 MRN [id number]   Location [address] Attending Diamond Graham MD   [address] Day # 0 PCP Artemio Munson MD     Consults      History of Present Illness:  Judi Beltran is a a(n) 60year old male here for DCCV      Prior H/P Reviewed with no changes    History:  Past Medical History:   Diagnosis Date    Acute cystitis with hematuria     Acute diastolic (congestive) heart failure ([facility] 2018    Anxiety disorder     Arthritis     Back pain     Blood in the stool     Blurred vision     Brachial neuritis or radiculitis NOS     C4,C5, nerve roots    Calculus of kidney     Last year do to medication i was taking    Chronic atrial fibrillation ([facility] 10/14/2014    [facility] op from [facility] acquired pneumonia of left lower lobe of lung 2018    Decorative tattoo     Depression 2015    Diabetes ([facility] [address]     Diarrhea, unspecified     I believe from my medication    Feeling lonely     Flatulence/gas pain/belching     Food intolerance     Headache disorder     migraines    Heart palpitations     Hemorrhoids     High blood pressure     High cholesterol     History of depression     Hyperlipidemia     Indigestion 2019    not normal    Kidney stone 2017    Leg swelling     Loss of appetite     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 and with chronic kidney disease stage I through stage IV, or unspecified(404.00) 2012    Migraines     Nausea     on and off    Night sweats     Obesity, unspecified     Obsessive-compulsive disorders 2015    ISAAC (obstructive sleep apnea) 3-3-14-PSG/TX-16    AHI-16, O2 alyce-71%/CPAP-10cwp    Other specified cardiac dysrhythmias(427.89)     Other testicular hypofunction     Pain in joints     Pneumonia, organism unspecified(486)     Sleep disturbance     Stress     Suicidal thoughts 09/24/2015    Thoracic or lumbosacral neuritis or radiculitis, unspecified     L3,L4    Thrombocytopenia (HCC)     Uncomfortable fullness after meals     Unspecified essential hypertension     Unspecified sleep apnea     wears CPAP    Visual impairment     Wears glasses     Weight gain     Weight loss      Past Surgical History:   Procedure Laterality Date    ANGIOGRAM  7/15/14    no cad noted    ENDOVAS REPAIR, INFRARENL ABDOM AORTIC ANEURYSM/DISSECT      EPIDUROGRAPHY, RADIOLOGICAL S & I  4/18/2012    Procedure: CERVICAL EPIDURAL;  Surgeon: Felix Hemphill MD;  [facility] [address] NDL/CATH SPI DX/THER NJX  5/11/2012    Procedure: CERVICAL EPIDURAL;  Surgeon: Anita Jordan MD;  Location: [address], W/WO CONTRAST, DX/THERAPEUTIC SUBSTANCE, EPIDURAL/SUBARACHNOID; CERVICAL/THORACIC  4/18/2012    Procedure: CERVICAL EPIDURAL;  Surgeon: Felix Hemphill MD;  Location: [address]    INJECTION, W/WO CONTRAST, DX/THERAPEUTIC SUBSTANCE, EPIDURAL/SUBARACHNOID; CERVICAL/THORACIC  5/11/2012    Procedure: CERVICAL EPIDURAL;  Surgeon: Anita Jordan MD;  Location: [address]    M-SEDAJ BY  PHYS [id number] [address]  N SVC 5+ YR  4/18/2012    Procedure: CERVICAL EPIDURAL;  Surgeon: Felix Hemphill MD;  Location: [facility]    M-SEDAJ BY  PHYS [id number]  N SVC 5+ YR  5/11/2012    Procedure: CERVICAL EPIDURAL;  Surgeon: Anita Jordan MD;  Location: [address] [facility] URGENT REPAIR  10/14/2014    [place]; ascending aorta    TONSILLECTOMY       Family History   Problem Relation Age of Onset    Hypertension Father     Lipids Father     Other (Other[other]) Paternal Grandfather     Heart Disorder Paternal Grandfather     Hypertension Sister     Lipids Sister     Hypertension Brother     Lipids Brother     Psychiatric Brother     Hypertension Mother     Lipids Mother to person, place, and time. No distress.   HENT:   Head: Normocephalic and atraumatic.   Eyes: Conjunctivae are normal. Pupils are equal, round, and reactive to light. No scleral icterus.   Neck: Normal range of motion. Neck supple.   Cardiovascular: Normal rate, regular rhythm and normal heart sounds.    No murmur heard.  Pulmonary/Chest: Effort normal and breath sounds normal.   Abdominal: Soft. Bowel sounds are normal. He exhibits no distension. There is no tenderness.   Musculoskeletal:        Arms:  Neurological: He is alert and oriented to person, place, and time. GCS score is 15.   Psychiatric: Affect normal.         Lab Data Review:      4/29/2017  8:41 AM    Recent Labs      05/01/17 0131 05/02/17   2343   SODIUM  133*  129*   POTASSIUM  3.9  3.7   CHLORIDE  106  101   CO2  20  22   BUN  24*  24*   CREATININE  0.89  0.93   MAGNESIUM  1.9   --    PHOSPHORUS  2.9   --    CALCIUM  8.6  8.8       Recent Labs      05/01/17 0131 05/02/17   2343   ALTSGPT  71*  54*   ASTSGOT  21  21   ALKPHOSPHAT  277*  234*   TBILIRUBIN  1.0  0.8   GLUCOSE  112*  103*       Recent Labs      05/01/17 0131 05/02/17   2343   RBC  2.64*  2.78*   HEMOGLOBIN  7.8*  8.2*   HEMATOCRIT  22.6*  23.8*   PLATELETCT  25*  12*       Recent Labs      05/01/17 0131 05/02/17   2343   WBC  0.4*  0.5*   NEUTSPOLYS  35.50*   --    LYMPHOCYTES  33.30   --    MONOCYTES  16.10*   --    EOSINOPHILS  8.60*   --    BASOPHILS  2.10*   --    ASTSGOT  21  21   ALTSGPT  71*  54*   ALKPHOSPHAT  277*  234*   TBILIRUBIN  1.0  0.8           Assessment/Plan     Neutropenia (CMS-HCC)  - patient was on chemo for CML on bosutinib; last chemotherapy 4/26  - has been neutropenic for long time  - has been afebrile since being inpatient  - was started on cefepime and vancomycin; dc vancomycin ; blood cultures Negative till now.    - d/c cefepime at this time; closely monitor vitals and if there is any spike in temperature; will repeat blood cultures further  Psychiatric Mother     Hypertension Maternal Grandfather     Heart Attack Maternal Grandfather     Stroke Maternal Grandfather       reports that he quit smoking about 19 years ago. His smoking use included cigarettes. He has a 14.00 pack-year smoking history. He has never used smokeless tobacco. He reports current alcohol use. He reports that he does not use drugs. Allergies: Allergies   Allergen Reactions    Jardiance [Empagliflozin] DIZZINESS       Medications:  No current facility-administered medications for this encounter. OBJECTIVE      Physical Exam:  Physical Exam   Height 6' (1.829 m), weight 280 lb (127 kg). No data recorded. Wt Readings from Last 3 Encounters:   12/19/23 280 lb (127 kg)   11/29/23 272 lb (123.4 kg)   10/18/23 266 lb (120.7 kg)       NAD  PERRLA/EOMI  Neck veins not elevated  Carotids- no bruits  CTA bilaterally  Cardiac- irreg irreg  Abdomen- Soft,Nontender, normal BS  Extremities- pulses normal  Edema- none  Mood /Affect Congruent  Skin- no lesions          Results:   No results for input(s): \"GLU\", \"BUN\", \"CREATSERUM\", \"GFRAA\", \"GFRNAA\", \"EGFRCR\", \"CA\", \"NA\", \"K\", \"CL\", \"CO2\" in the last 168 hours. No results for input(s): \"RBC\", \"HGB\", \"HCT\", \"MCV\", \"MCH\", \"MCHC\", \"RDW\", \"NEPRELIM\", \"WBC\", \"PLT\" in the last 168 hours. [unfilled]  No results for input(s): \"BNP\" in the last 168 hours. Lab Results   Component Value Date    PT 13.7 07/14/2014    PT 13.8 02/07/2011    INR 1.23 (H) 11/05/2021    INR 1.0 12/30/2020    INR 1.0 11/19/2020     Lab Results   Component Value Date    TROP <0.045 11/06/2021    TROP <0.045 11/05/2021    TROP <0.045 11/05/2021         No results found.        Assessment and Plan    Patient Active Problem List   Diagnosis    Hyperlipidemia    Thoracic aortic aneurysm (HCC)    Obstructive sleep apnea syndrome    Anxiety, generalized    Type 2 diabetes mellitus (HCC)    Foraminal stenosis of cervical region    CAD in native artery    S/P ascending aortic start antibiotics if needed.    - neutropenic isoloation  -Oncology consult to start with new chemotherapy.     Pancytopenia (CMS-HCC)  - transfuse if  hb <7  - platelet 12 today 05/03 . if <10 transfuse.  - 4/30: given 1 unit of Irradiated and CMV neg Platelets (5000) and 1 units of CMV neg and Irradiated PRBC (6.6).     Bone pain  - pain management of bone lytic pain with morphine IV with breakthrough oxycodone PO and lidocaine patch  - monitor; with fall precautions.    - Pain Improved today. Patient feeling ok.    - Follow up and consider radiation therapy if needed.       CML (chronic myelocytic leukemia)-Accelerated phase  Has completed multiple chemotherapy cycles.     The patient refused hospice care and he wants to try chemotherapy.    Oncology on board, to start with new med for chemotherapy.     Seizure disorder (CMS-HCC)  Stable    Continue on his home dose of keppra    BPH (benign prostatic hyperplasia)  Stable.    Continue on tamsulosin 0.4    Low vitamin D level  Continue vit D2 weekly.      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 9) with comorbidity [facility])       Consent: Risks, Benefits and alternatives discussed in clinic.  Reverified on the day of the procedure    Procedure Planned: DCC    Sedation Plan: [place]    Discharge Plan: Same day      Hernan Fan MD  Cardiac Electrophysiology  [address] [place] [address]  12/22/2023  8:02 AM

## (undated) DEVICE — DRAPE SURGICAL U 77X120 - (10/CA)

## (undated) DEVICE — ELECTRODE DUAL RETURN W/ CORD - (50/PK)

## (undated) DEVICE — HEMOSTAT SURG ABSORBABLE - 4 X 8 IN SURGICEL (24EA/CA)

## (undated) DEVICE — NEPTUNE 4 PORT MANIFOLD - (20/PK)

## (undated) DEVICE — PACK MINOR BASIN - (2EA/CA)

## (undated) DEVICE — SUTURE GENERAL

## (undated) DEVICE — GLOVE BIOGEL PI ORTHO SZ 8 PF LF (40PR/BX)

## (undated) DEVICE — KIT ROOM DECONTAMINATION

## (undated) DEVICE — TUBE AIRWAY NASAL STERILE 7.0MM 28FR (10EA/PK)

## (undated) DEVICE — SUCTION INSTRUMENT YANKAUER BULBOUS TIP W/O VENT (50EA/CA)

## (undated) DEVICE — KIT ANESTHESIA W/CIRCUIT & 3/LT BAG W/FILTER (20EA/CA)

## (undated) DEVICE — SWAB ANAEROBIC SPEC.COLLECTOR - (25/PK 4PK/CA 100EA/CA)

## (undated) DEVICE — SENSOR SPO2 NEO LNCS ADHESIVE (20/BX) SEE USER NOTES

## (undated) DEVICE — LACTATED RINGERS INJ 1000 ML - (14EA/CA 60CA/PF)

## (undated) DEVICE — GLOVE BIOGEL INDICATOR SZ 8 SURGICAL PF LTX - (50/BX 4BX/CA)

## (undated) DEVICE — SUTURE 3-0 CHROMIC GUT SH 27 (36PK/BX)"

## (undated) DEVICE — HEMOSTAT SURG ABSORBABLE - 2 X 3 IN SURGICEL (24EA/CA)

## (undated) DEVICE — LEAD SET 6 DISP. EKG NIHON KOHDEN (100EA/CA) [9859].

## (undated) DEVICE — BLADE SURGICAL #15 - (50/BX 3BX/CA)

## (undated) DEVICE — TOWELS CLOTH SURGICAL - (4/PK 20PK/CA)

## (undated) DEVICE — SUTURE 3-0 CHROMIC GUT FS-2 27 (36PK/BX)"

## (undated) DEVICE — DRAPE LARGE 3 QUARTER - (20/CA)

## (undated) DEVICE — SET EXTENSION WITH 2 PORTS (48EA/CA) ***PART #2C8610 IS A SUBSTITUTE*****

## (undated) DEVICE — PROTECTOR ULNA NERVE - (36PR/CA)

## (undated) DEVICE — SPONGE GAUZESTER 4 X 4 4PLY - (128PK/CA)

## (undated) DEVICE — JAW BRA #93

## (undated) DEVICE — SUTURE 3-0 SILK FS 18 INCH - (12PK/BX)

## (undated) DEVICE — SODIUM CHL IRRIGATION 0.9% 1000ML (12EA/CA)

## (undated) DEVICE — ELECTRODE 850 FOAM ADHESIVE - HYDROGEL RADIOTRNSPRNT (50/PK)

## (undated) DEVICE — TUBING CLEARLINK DUO-VENT - C-FLO (48EA/CA)

## (undated) DEVICE — CANISTER SUCTION 3000ML MECHANICAL FILTER AUTO SHUTOFF MEDI-VAC NONSTERILE LF DISP  (40EA/CA)

## (undated) DEVICE — MASK ANESTHESIA ADULT  - (100/CA)

## (undated) DEVICE — SLEEVE, VASO, THIGH, MED

## (undated) DEVICE — GLOVE BIOGEL PI ORTHO SZ 7 PF LF (40PR/BX)

## (undated) DEVICE — CONTAINER SPECIMEN BAG OR - STERILE 4 OZ W/LID (100EA/CA)

## (undated) DEVICE — DRAIN PENROSE STERILE 1/4 X - 18 IN  (25EA/BX)

## (undated) DEVICE — GLOVE BIOGEL PI ORTHO SZ 7.5 PF LF (40PR/BX)

## (undated) DEVICE — HEAD HOLDER JUNIOR/ADULT

## (undated) DEVICE — SET LEADWIRE 5 LEAD BEDSIDE DISPOSABLE ECG (1SET OF 5/EA)

## (undated) DEVICE — PEROXIDE HYDROGEN 3% 32 OZ. (12EA/BX)

## (undated) DEVICE — BLANKET WARMING FULL BODY - (10/CA)